# Patient Record
Sex: MALE | Race: WHITE | NOT HISPANIC OR LATINO | Employment: OTHER | URBAN - METROPOLITAN AREA
[De-identification: names, ages, dates, MRNs, and addresses within clinical notes are randomized per-mention and may not be internally consistent; named-entity substitution may affect disease eponyms.]

---

## 2017-01-06 ENCOUNTER — OFFICE VISIT (OUTPATIENT)
Dept: FAMILY MEDICINE CLINIC | Facility: CLINIC | Age: 77
End: 2017-01-06

## 2017-01-06 VITALS
OXYGEN SATURATION: 97 % | SYSTOLIC BLOOD PRESSURE: 134 MMHG | BODY MASS INDEX: 31.84 KG/M2 | HEIGHT: 67 IN | DIASTOLIC BLOOD PRESSURE: 74 MMHG | HEART RATE: 67 BPM | WEIGHT: 202.9 LBS

## 2017-01-06 DIAGNOSIS — J43.9 PULMONARY EMPHYSEMA, UNSPECIFIED EMPHYSEMA TYPE (HCC): Primary | ICD-10-CM

## 2017-01-06 DIAGNOSIS — J30.89 PERENNIAL ALLERGIC RHINITIS, UNSPECIFIED ALLERGIC RHINITIS TRIGGER: ICD-10-CM

## 2017-01-06 PROCEDURE — 94010 BREATHING CAPACITY TEST: CPT | Performed by: INTERNAL MEDICINE

## 2017-01-06 PROCEDURE — 99213 OFFICE O/P EST LOW 20 MIN: CPT | Performed by: INTERNAL MEDICINE

## 2017-01-06 RX ORDER — UBIDECARENONE 100 MG
100 CAPSULE ORAL DAILY
COMMUNITY
End: 2021-05-14

## 2017-01-06 RX ORDER — MONTELUKAST SODIUM 10 MG/1
10 TABLET ORAL NIGHTLY
Qty: 30 TABLET | Refills: 6 | Status: SHIPPED | OUTPATIENT
Start: 2017-01-06 | End: 2017-05-01 | Stop reason: SDUPTHER

## 2017-03-16 RX ORDER — ALBUTEROL SULFATE 2.5 MG/3ML
SOLUTION RESPIRATORY (INHALATION)
Qty: 180 ML | Refills: 1 | Status: SHIPPED | OUTPATIENT
Start: 2017-03-16 | End: 2018-05-21 | Stop reason: SDUPTHER

## 2017-03-17 RX ORDER — ALBUTEROL SULFATE 2.5 MG/3ML
SOLUTION RESPIRATORY (INHALATION)
Qty: 180 ML | OUTPATIENT
Start: 2017-03-17

## 2017-04-06 ENCOUNTER — OFFICE VISIT (OUTPATIENT)
Dept: FAMILY MEDICINE CLINIC | Facility: CLINIC | Age: 77
End: 2017-04-06

## 2017-04-06 VITALS
WEIGHT: 205.1 LBS | BODY MASS INDEX: 32.19 KG/M2 | OXYGEN SATURATION: 96 % | DIASTOLIC BLOOD PRESSURE: 76 MMHG | SYSTOLIC BLOOD PRESSURE: 140 MMHG | HEART RATE: 71 BPM | HEIGHT: 67 IN

## 2017-04-06 DIAGNOSIS — I10 ESSENTIAL HYPERTENSION: ICD-10-CM

## 2017-04-06 DIAGNOSIS — J30.89 PERENNIAL ALLERGIC RHINITIS, UNSPECIFIED ALLERGIC RHINITIS TRIGGER: ICD-10-CM

## 2017-04-06 DIAGNOSIS — J43.9 PULMONARY EMPHYSEMA, UNSPECIFIED EMPHYSEMA TYPE (HCC): Primary | ICD-10-CM

## 2017-04-06 PROCEDURE — 99213 OFFICE O/P EST LOW 20 MIN: CPT | Performed by: INTERNAL MEDICINE

## 2017-04-06 NOTE — PROGRESS NOTES
Subjective   Bayron Acevedo is a 76 y.o. male who presents today for:    COPD (3 month f/u)    History of Present Illness   He was struggling with persistent/recurrent upper respiratory infections last year when we diagnosed him with COPD and started him on Symbicort. Since then, he feels like he can walk to the back of his 2 acre place to feed his cats and his chickens, and he has been able to trim up his fruit trees this winter with less shortness of breath than usual. He still uses his nebulizer once or twice a week for wheezing. Often, he feels he can clear his head of some clear drainage (probably due to allergies) and get the same benefit. He does have the head congestion and drainage every morning.    We started Singulair in January to see if that would help with his allergy symptoms.  He is not coughing as much in the mornings, but   he does not think it is the Singulair that has made a difference.  He has noticed an improvement in his breathing with the LABA/ICS, though.    Mr. Acevedo  reports that he has quit smoking. His smoking use included Cigarettes. He has a 55.00 pack-year smoking history. He has never used smokeless tobacco. He reports that he does not drink alcohol or use illicit drugs.       Current Outpatient Prescriptions:   •  albuterol (PROVENTIL) (2.5 MG/3ML) 0.083% nebulizer solution, INHALE ONE VIAL VIA NEBULIZER EVERY 4 HOURS AS NEEDED FOR FOR WHEEZING, Disp: 180 mL, Rfl: 1  •  aspirin 81 MG tablet, Take 81 mg by mouth daily., Disp: , Rfl:   •  budesonide-formoterol (SYMBICORT) 160-4.5 MCG/ACT inhaler, Inhale 2 puffs 2 (two) times a day. Rinse mouth after each use., Disp: 1 inhaler, Rfl: 12  •  carvedilol (COREG) 6.25 MG tablet, Take 1 tablet by mouth 2 (two) times a day., Disp: , Rfl: 1  •  coenzyme Q10 100 MG capsule, Take 100 mg by mouth Daily., Disp: , Rfl:   •  furosemide (LASIX) 20 MG tablet, Take 1 tablet by mouth daily., Disp: , Rfl: 1  •  isosorbide mononitrate (IMDUR) 60 MG 24 hr  "tablet, Take 1 tablet by mouth daily., Disp: , Rfl: 1  •  lisinopril (PRINIVIL,ZESTRIL) 20 MG tablet, Take 1 tablet by mouth daily., Disp: , Rfl: 5  •  montelukast (SINGULAIR) 10 MG tablet, Take 1 tablet by mouth Every Night., Disp: 30 tablet, Rfl: 6  •  nitroglycerin (NITROSTAT) 0.4 MG SL tablet, Place 1 tablet under the tongue every 5 (five) minutes as needed for chest pain. Take no more than 3 doses in 15 minutes., Disp: 25 tablet, Rfl: 1      •  niacin (NIASPAN) 500 MG CR tablet, Take 1 tablet by mouth daily., Disp: , Rfl:   •  Omega 3-6-9 capsule, Take 1 capsule by mouth daily., Disp: , Rfl:   •  pravastatin (PRAVACHOL) 20 MG tablet, TAKE ONE TABLET BY MOUTH EVERY DAY, Disp: 90 tablet, Rfl: 3  Off pravastatin x 1 week, per cardiologist's recommendation. Pt has stopped his Omega-3 and Niacin supplements.    The following portions of the patient's history were reviewed and updated as appropriate: allergies, current medications, past social history and problem list.    Review of Systems   HENT: Positive for congestion.    Respiratory: Positive for cough, shortness of breath and wheezing.    Cardiovascular: Negative for chest pain, palpitations and leg swelling.   Musculoskeletal:        Muscle cramps better off the statin.   Allergic/Immunologic: Positive for environmental allergies.         Objective   Vitals:    04/06/17 1019 04/06/17 1048   BP: 166/86 140/76   BP Location: Right arm    Patient Position: Sitting    Cuff Size: Adult    Pulse: 71    SpO2: 96%    Weight: 205 lb 1.6 oz (93 kg)    Height: 67\" (170.2 cm)      Physical Exam  Well-developed, well-nourished male, in no acute distress.  No cervical lymphadenopathy, thyromegaly or mass.  Regular rate and rhythm without murmur.  Clear to auscultation bilaterally with slightly diminished breath sounds at both bases.  No wheezing or crackles appreciated today.  He has normal respiratory effort.  No lower extremity edema.    Assessment/Plan   Bayron was seen " today for copd.    Diagnoses and all orders for this visit:    Pulmonary emphysema, unspecified emphysema type    Perennial allergic rhinitis, unspecified allergic rhinitis trigger    Essential hypertension    Blood pressure is doing well, so we will not change his medicines.  Dr. Simpson will be providing guidance regarding the cholesterol lowering medication.  With the patient's history of a stent and a coronary artery, he should be on a statin if at all possible.    Already function seems to be doing better.  He was able to get over an episode of bronchitis without any additional interventions.  He will continue the Symbicort indefinitely.  He will try stopping the Singulair to see if that has made a difference.  If his head congestion or runny nose or cough worsen after stopping it he'll get back on it and let us know.

## 2017-04-17 ENCOUNTER — OFFICE VISIT (OUTPATIENT)
Dept: FAMILY MEDICINE CLINIC | Facility: CLINIC | Age: 77
End: 2017-04-17

## 2017-04-17 VITALS
HEIGHT: 67 IN | WEIGHT: 204.4 LBS | OXYGEN SATURATION: 92 % | SYSTOLIC BLOOD PRESSURE: 154 MMHG | BODY MASS INDEX: 32.08 KG/M2 | TEMPERATURE: 98.5 F | DIASTOLIC BLOOD PRESSURE: 88 MMHG | HEART RATE: 73 BPM

## 2017-04-17 DIAGNOSIS — J20.9 ACUTE BRONCHITIS, UNSPECIFIED ORGANISM: Primary | ICD-10-CM

## 2017-04-17 PROCEDURE — 99213 OFFICE O/P EST LOW 20 MIN: CPT | Performed by: NURSE PRACTITIONER

## 2017-04-17 RX ORDER — AZITHROMYCIN 500 MG/1
500 TABLET, FILM COATED ORAL DAILY
Qty: 3 TABLET | Refills: 0 | Status: SHIPPED | OUTPATIENT
Start: 2017-04-17 | End: 2017-04-20

## 2017-04-17 RX ORDER — PREDNISONE 20 MG/1
20 TABLET ORAL 2 TIMES DAILY
Qty: 10 TABLET | Refills: 0 | Status: SHIPPED | OUTPATIENT
Start: 2017-04-17 | End: 2017-04-22

## 2017-04-17 NOTE — PROGRESS NOTES
Subjective   Bayron Acevedo is a 76 y.o. male who presents today for:    Cough (x 2-3 days) and Wheezing    URI    This is a recurrent problem. The current episode started 1 to 4 weeks ago. The problem has been waxing and waning. There has been no fever. Associated symptoms include chest pain, congestion, coughing, rhinorrhea, sneezing and wheezing. Pertinent negatives include no ear pain, headaches, plugged ear sensation, sinus pain or sore throat. He has tried antihistamine and inhaler use for the symptoms. The treatment provided mild relief.      I have reviewed the patient's medical history in detail and updated the computerized patient record.      Mr. Acevedo  reports that he has quit smoking. His smoking use included Cigarettes. He has a 55.00 pack-year smoking history. He has never used smokeless tobacco. He reports that he does not drink alcohol or use illicit drugs.         Current Outpatient Prescriptions:   •  albuterol (PROVENTIL) (2.5 MG/3ML) 0.083% nebulizer solution, INHALE ONE VIAL VIA NEBULIZER EVERY 4 HOURS AS NEEDED FOR FOR WHEEZING, Disp: 180 mL, Rfl: 1  •  aspirin 81 MG tablet, Take 81 mg by mouth daily., Disp: , Rfl:   •  budesonide-formoterol (SYMBICORT) 160-4.5 MCG/ACT inhaler, Inhale 2 puffs 2 (two) times a day. Rinse mouth after each use., Disp: 1 inhaler, Rfl: 12  •  carvedilol (COREG) 6.25 MG tablet, Take 1 tablet by mouth 2 (two) times a day., Disp: , Rfl: 1  •  coenzyme Q10 100 MG capsule, Take 100 mg by mouth Daily., Disp: , Rfl:   •  furosemide (LASIX) 20 MG tablet, Take 1 tablet by mouth daily., Disp: , Rfl: 1  •  isosorbide mononitrate (IMDUR) 60 MG 24 hr tablet, Take 1 tablet by mouth daily., Disp: , Rfl: 1  •  lisinopril (PRINIVIL,ZESTRIL) 20 MG tablet, Take 1 tablet by mouth daily., Disp: , Rfl: 5  •  montelukast (SINGULAIR) 10 MG tablet, Take 1 tablet by mouth Every Night., Disp: 30 tablet, Rfl: 6  •  nitroglycerin (NITROSTAT) 0.4 MG SL tablet, Place 1 tablet under the tongue every  "5 (five) minutes as needed for chest pain. Take no more than 3 doses in 15 minutes., Disp: 25 tablet, Rfl: 1  •  pravastatin (PRAVACHOL) 20 MG tablet, TAKE ONE TABLET BY MOUTH EVERY DAY, Disp: 90 tablet, Rfl: 3      The following portions of the patient's history were reviewed and updated as appropriate: allergies, current medications, past social history and problem list.    Review of Systems   Constitutional: Positive for fatigue. Negative for appetite change and fever.   HENT: Positive for congestion, postnasal drip, rhinorrhea and sneezing. Negative for ear pain, sinus pressure and sore throat.    Respiratory: Positive for cough, choking, shortness of breath and wheezing. Negative for chest tightness.    Cardiovascular: Positive for chest pain and leg swelling.   Allergic/Immunologic: Positive for environmental allergies.   Neurological: Negative for headaches.   Hematological: Negative for adenopathy.         Objective   Vitals:    04/17/17 1100   BP: 154/88   BP Location: Right arm   Patient Position: Sitting   Cuff Size: Adult   Pulse: 73   Temp: 98.5 °F (36.9 °C)   TempSrc: Oral   SpO2: 92%   Weight: 204 lb 6.4 oz (92.7 kg)   Height: 67\" (170.2 cm)     Physical Exam   Constitutional: He is oriented to person, place, and time. He appears well-developed and well-nourished.   HENT:   Head: Normocephalic.   Right Ear: Tympanic membrane normal.   Left Ear: Tympanic membrane normal.   Nose: Rhinorrhea present. Right sinus exhibits no maxillary sinus tenderness and no frontal sinus tenderness. Left sinus exhibits no maxillary sinus tenderness and no frontal sinus tenderness.   Mouth/Throat: Oropharynx is clear and moist. No posterior oropharyngeal erythema.   Eyes: Conjunctivae and EOM are normal.   Neck: Normal range of motion. Neck supple. No JVD present.   Cardiovascular: Normal rate, regular rhythm and normal heart sounds.  Exam reveals no gallop and no friction rub.    No murmur heard.  Pulmonary/Chest: Effort " normal. He has wheezes (expiratory wheezes throughout).   Lung sounds are diminished on expiration.   Musculoskeletal: He exhibits no edema.   Lymphadenopathy:     He has no cervical adenopathy.   Neurological: He is alert and oriented to person, place, and time.   Skin: Skin is warm and dry.   Psychiatric: He has a normal mood and affect. His behavior is normal.   Vitals reviewed.        Assessment/Plan   Bayron was seen today for cough and wheezing.    Diagnoses and all orders for this visit:    Acute bronchitis, unspecified organism  -     predniSONE (DELTASONE) 20 MG tablet; Take 1 tablet by mouth 2 (Two) Times a Day for 5 days.  -     azithromycin (ZITHROMAX) 500 MG tablet; Take 1 tablet by mouth Daily for 3 days.    1.  Bronchitis.  Patient with diminished and expiratory wheezing on auscultation. Will start patient on prednisone and Zithromax as both have helped patient in the past.    2.  Patient to follow up at next scheduled appointment or sooner if symptoms persist or worsen.

## 2017-05-02 RX ORDER — MONTELUKAST SODIUM 10 MG/1
TABLET ORAL
Qty: 30 TABLET | Refills: 12 | Status: SHIPPED | OUTPATIENT
Start: 2017-05-02 | End: 2018-08-09

## 2017-06-27 RX ORDER — PRAVASTATIN SODIUM 20 MG
TABLET ORAL
Qty: 90 TABLET | Refills: 3 | Status: SHIPPED | OUTPATIENT
Start: 2017-06-27 | End: 2018-03-26 | Stop reason: SDUPTHER

## 2017-07-25 DIAGNOSIS — I10 ESSENTIAL HYPERTENSION: Primary | ICD-10-CM

## 2017-07-25 DIAGNOSIS — E78.5 HYPERLIPIDEMIA, UNSPECIFIED HYPERLIPIDEMIA TYPE: ICD-10-CM

## 2017-07-30 ENCOUNTER — RESULTS ENCOUNTER (OUTPATIENT)
Dept: FAMILY MEDICINE CLINIC | Facility: CLINIC | Age: 77
End: 2017-07-30

## 2017-07-30 DIAGNOSIS — I10 ESSENTIAL HYPERTENSION: ICD-10-CM

## 2017-07-30 DIAGNOSIS — E78.5 HYPERLIPIDEMIA, UNSPECIFIED HYPERLIPIDEMIA TYPE: ICD-10-CM

## 2017-07-31 LAB
ALBUMIN SERPL-MCNC: 4.3 G/DL (ref 3.5–5.2)
ALBUMIN/GLOB SERPL: 1.8 G/DL
ALP SERPL-CCNC: 41 U/L (ref 39–117)
ALT SERPL-CCNC: 18 U/L (ref 1–41)
AST SERPL-CCNC: 24 U/L (ref 1–40)
BILIRUB SERPL-MCNC: 0.7 MG/DL (ref 0.1–1.2)
BUN SERPL-MCNC: 23 MG/DL (ref 8–23)
BUN/CREAT SERPL: 18.7 (ref 7–25)
CALCIUM SERPL-MCNC: 9.4 MG/DL (ref 8.6–10.5)
CHLORIDE SERPL-SCNC: 100 MMOL/L (ref 98–107)
CHOLEST SERPL-MCNC: 183 MG/DL (ref 0–200)
CO2 SERPL-SCNC: 26.7 MMOL/L (ref 22–29)
CREAT SERPL-MCNC: 1.23 MG/DL (ref 0.76–1.27)
GLOBULIN SER CALC-MCNC: 2.4 GM/DL
GLUCOSE SERPL-MCNC: 106 MG/DL (ref 65–99)
HDLC SERPL-MCNC: 43 MG/DL (ref 40–60)
LDLC SERPL CALC-MCNC: 109 MG/DL (ref 0–100)
POTASSIUM SERPL-SCNC: 4.4 MMOL/L (ref 3.5–5.2)
PROT SERPL-MCNC: 6.7 G/DL (ref 6–8.5)
SODIUM SERPL-SCNC: 140 MMOL/L (ref 136–145)
TRIGL SERPL-MCNC: 157 MG/DL (ref 0–150)
VLDLC SERPL CALC-MCNC: 31.4 MG/DL (ref 5–40)

## 2017-10-05 RX ORDER — ALBUTEROL SULFATE 2.5 MG/3ML
SOLUTION RESPIRATORY (INHALATION)
Qty: 180 ML | OUTPATIENT
Start: 2017-10-05

## 2018-03-26 RX ORDER — PRAVASTATIN SODIUM 20 MG
TABLET ORAL
Qty: 90 TABLET | Refills: 3 | Status: SHIPPED | OUTPATIENT
Start: 2018-03-26 | End: 2019-06-17 | Stop reason: SDUPTHER

## 2018-05-21 ENCOUNTER — OFFICE VISIT (OUTPATIENT)
Dept: FAMILY MEDICINE CLINIC | Facility: CLINIC | Age: 78
End: 2018-05-21

## 2018-05-21 VITALS
OXYGEN SATURATION: 94 % | WEIGHT: 208.5 LBS | BODY MASS INDEX: 32.72 KG/M2 | SYSTOLIC BLOOD PRESSURE: 162 MMHG | HEART RATE: 68 BPM | DIASTOLIC BLOOD PRESSURE: 64 MMHG | HEIGHT: 67 IN
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
## 2018-05-21 PROCEDURE — 99212 OFFICE O/P EST SF 10 MIN: CPT | Performed by: INTERNAL MEDICINE

## 2018-05-21 RX ORDER — DOXYCYCLINE 100 MG/1
100 CAPSULE ORAL EVERY 12 HOURS SCHEDULED
Qty: 20 CAPSULE | Refills: 0 | Status: SHIPPED | OUTPATIENT
Start: 2018-05-21 | End: 2018-08-09

## 2018-05-21 RX ORDER — FEXOFENADINE HCL 180 MG/1
180 TABLET ORAL
COMMUNITY
End: 2018-05-21 | Stop reason: ALTCHOICE

## 2018-05-21 RX ORDER — ALBUTEROL SULFATE 90 UG/1
AEROSOL, METERED RESPIRATORY (INHALATION)
COMMUNITY
End: 2019-03-18

## 2018-05-21 RX ORDER — ALBUTEROL SULFATE 2.5 MG/3ML
2.5 SOLUTION RESPIRATORY (INHALATION) EVERY 6 HOURS PRN
Qty: 180 ML | Refills: 1 | Status: SHIPPED | OUTPATIENT
Start: 2018-05-21 | End: 2018-09-05 | Stop reason: SDUPTHER

## 2018-05-21 NOTE — PROGRESS NOTES
Tick Removal (Right foot and right inner thigh)    2 tick bites about 4 days ago, with one present for up to 2-3 days.  Neither was engorged, but both sites were red.  Right inner thigh site has remained erythematous; right ankle site is mildly erythematous.    ROS:   No myalgias or arthralgias.  No fever or chills.    Allergies   Allergen Reactions   • Codeine Sulfate Hives   • Penicillins Other (See Comments)     Passed out after a PCN shot- no other sx noted-per pateint     Current Outpatient Prescriptions:   •  albuterol (PROVENTIL) (2.5 MG/3ML) 0.083% nebulizer solution, INHALE ONE VIAL VIA NEBULIZER EVERY 4 HOURS AS NEEDED FOR FOR WHEEZING, Disp: 180 mL, Rfl: 1  •  albuterol (VENTOLIN HFA) 108 (90 Base) MCG/ACT inhaler, Inhale., Disp: , Rfl:   •  aspirin 81 MG tablet, Take 81 mg by mouth daily., Disp: , Rfl:   •  budesonide-formoterol (SYMBICORT) 160-4.5 MCG/ACT inhaler, Inhale 2 puffs 2 (two) times a day. Rinse mouth after each use., Disp: 1 inhaler, Rfl: 12  •  carvedilol (COREG) 6.25 MG tablet, Take 1 tablet by mouth 2 (two) times a day., Disp: , Rfl: 1  •  coenzyme Q10 100 MG capsule, Take 100 mg by mouth Daily., Disp: , Rfl:   •  furosemide (LASIX) 20 MG tablet, Take 1 tablet by mouth daily., Disp: , Rfl: 1  •  isosorbide mononitrate (IMDUR) 60 MG 24 hr tablet, Take 1 tablet by mouth daily., Disp: , Rfl: 1  •  lisinopril (PRINIVIL,ZESTRIL) 20 MG tablet, Take 1 tablet by mouth daily., Disp: , Rfl: 5  •  mometasone-formoterol (DULERA 100) 100-5 MCG/ACT inhaler, Inhale 2 puffs., Disp: , Rfl:   •  montelukast (SINGULAIR) 10 MG tablet, TAKE ONE TABLET BY MOUTH EVERY NIGHT, Disp: 30 tablet, Rfl: 12  •  nitroglycerin (NITROSTAT) 0.4 MG SL tablet, Place 1 tablet under the tongue every 5 (five) minutes as needed for chest pain. Take no more than 3 doses in 15 minutes., Disp: 25 tablet, Rfl: 1  •  pravastatin (PRAVACHOL) 20 MG tablet, TAKE ONE TABLET BY MOUTH EVERY DAY, Disp: 90 tablet, Rfl: 3.    /64 (BP  "Location: Left arm, Patient Position: Sitting, Cuff Size: Large Adult)   Pulse 68   Ht 170.2 cm (67\")   Wt 94.6 kg (208 lb 8 oz)   SpO2 94%   BMI 32.66 kg/m²     EXAM:  Right posteromedial thigh: Moderate erythema surrounding a very small, punctate lesion.  No significant tenderness.  Anterolateral right lower leg, just above the ankle shows a 2 mm diameter ulceration with 1 cm x 1.5 cm area of erythema.  No tenderness.        Bayron was seen today for tick removal.    Diagnoses and all orders for this visit:    Tick bite, initial encounter    Other orders  -     albuterol (PROVENTIL) (2.5 MG/3ML) 0.083% nebulizer solution;     The tick presented by the patient is extremely small, consistent with a deer tick.  Therefore, not knowing how long was in place, we will treat him with doxycycline.    "

## 2018-05-22 RX ORDER — ALBUTEROL SULFATE 2.5 MG/3ML
SOLUTION RESPIRATORY (INHALATION)
Qty: 180 ML | OUTPATIENT
Start: 2018-05-22

## 2018-07-25 DIAGNOSIS — E78.5 HYPERLIPIDEMIA, UNSPECIFIED HYPERLIPIDEMIA TYPE: ICD-10-CM

## 2018-07-25 DIAGNOSIS — Z12.5 SCREENING PSA (PROSTATE SPECIFIC ANTIGEN): ICD-10-CM

## 2018-07-25 DIAGNOSIS — I10 ESSENTIAL HYPERTENSION: ICD-10-CM

## 2018-07-25 DIAGNOSIS — Z00.00 MEDICARE ANNUAL WELLNESS VISIT, SUBSEQUENT: Primary | ICD-10-CM

## 2018-07-29 ENCOUNTER — RESULTS ENCOUNTER (OUTPATIENT)
Dept: FAMILY MEDICINE CLINIC | Facility: CLINIC | Age: 78
End: 2018-07-29

## 2018-07-29 DIAGNOSIS — Z00.00 MEDICARE ANNUAL WELLNESS VISIT, SUBSEQUENT: ICD-10-CM

## 2018-07-29 DIAGNOSIS — E78.5 HYPERLIPIDEMIA, UNSPECIFIED HYPERLIPIDEMIA TYPE: ICD-10-CM

## 2018-07-29 DIAGNOSIS — Z12.5 SCREENING PSA (PROSTATE SPECIFIC ANTIGEN): ICD-10-CM

## 2018-07-29 DIAGNOSIS — I10 ESSENTIAL HYPERTENSION: ICD-10-CM

## 2018-08-02 LAB
ALBUMIN SERPL-MCNC: 4.6 G/DL (ref 3.5–5.2)
ALBUMIN/GLOB SERPL: 2.4 G/DL
ALP SERPL-CCNC: 42 U/L (ref 39–117)
ALT SERPL-CCNC: 17 U/L (ref 1–41)
AST SERPL-CCNC: 11 U/L (ref 1–40)
BILIRUB SERPL-MCNC: 0.6 MG/DL (ref 0.1–1.2)
BUN SERPL-MCNC: 17 MG/DL (ref 8–23)
BUN/CREAT SERPL: 13.3 (ref 7–25)
CALCIUM SERPL-MCNC: 8.8 MG/DL (ref 8.6–10.5)
CHLORIDE SERPL-SCNC: 100 MMOL/L (ref 98–107)
CHOLEST SERPL-MCNC: 179 MG/DL (ref 0–200)
CO2 SERPL-SCNC: 28.3 MMOL/L (ref 22–29)
CREAT SERPL-MCNC: 1.28 MG/DL (ref 0.76–1.27)
ERYTHROCYTE [DISTWIDTH] IN BLOOD BY AUTOMATED COUNT: 13.9 % (ref 11.5–14.5)
GLOBULIN SER CALC-MCNC: 1.9 GM/DL
GLUCOSE SERPL-MCNC: 99 MG/DL (ref 65–99)
HCT VFR BLD AUTO: 43.9 % (ref 40.4–52.2)
HDLC SERPL-MCNC: 43 MG/DL (ref 40–60)
HGB BLD-MCNC: 14 G/DL (ref 13.7–17.6)
LDLC SERPL CALC-MCNC: 112 MG/DL (ref 0–100)
MCH RBC QN AUTO: 29.5 PG (ref 27–32.7)
MCHC RBC AUTO-ENTMCNC: 31.9 G/DL (ref 32.6–36.4)
MCV RBC AUTO: 92.6 FL (ref 79.8–96.2)
PLATELET # BLD AUTO: 156 10*3/MM3 (ref 140–500)
POTASSIUM SERPL-SCNC: 4.6 MMOL/L (ref 3.5–5.2)
PROT SERPL-MCNC: 6.5 G/DL (ref 6–8.5)
PSA SERPL-MCNC: 0.94 NG/ML (ref 0–4)
RBC # BLD AUTO: 4.74 10*6/MM3 (ref 4.6–6)
SODIUM SERPL-SCNC: 139 MMOL/L (ref 136–145)
TRIGL SERPL-MCNC: 122 MG/DL (ref 0–150)
VLDLC SERPL CALC-MCNC: 24.4 MG/DL (ref 5–40)
WBC # BLD AUTO: 5.06 10*3/MM3 (ref 4.5–10.7)

## 2018-08-09 ENCOUNTER — OFFICE VISIT (OUTPATIENT)
Dept: FAMILY MEDICINE CLINIC | Facility: CLINIC | Age: 78
End: 2018-08-09

## 2018-08-09 VITALS
HEIGHT: 67 IN | DIASTOLIC BLOOD PRESSURE: 74 MMHG | OXYGEN SATURATION: 98 % | WEIGHT: 204 LBS | SYSTOLIC BLOOD PRESSURE: 180 MMHG | BODY MASS INDEX: 32.02 KG/M2 | HEART RATE: 58 BPM

## 2018-08-09 DIAGNOSIS — Z00.00 ROUTINE GENERAL MEDICAL EXAMINATION AT HEALTH CARE FACILITY: Primary | ICD-10-CM

## 2018-08-09 PROCEDURE — G0439 PPPS, SUBSEQ VISIT: HCPCS | Performed by: INTERNAL MEDICINE

## 2018-08-09 RX ORDER — LORATADINE 10 MG/1
10 TABLET, ORALLY DISINTEGRATING ORAL DAILY PRN
COMMUNITY

## 2018-09-05 RX ORDER — ALBUTEROL SULFATE 2.5 MG/3ML
SOLUTION RESPIRATORY (INHALATION)
Qty: 180 ML | Refills: 0 | Status: SHIPPED | OUTPATIENT
Start: 2018-09-05 | End: 2019-03-18

## 2018-09-10 ENCOUNTER — OFFICE VISIT (OUTPATIENT)
Dept: FAMILY MEDICINE CLINIC | Facility: CLINIC | Age: 78
End: 2018-09-10

## 2018-09-10 VITALS
HEIGHT: 67 IN | WEIGHT: 200.9 LBS | OXYGEN SATURATION: 98 % | HEART RATE: 59 BPM | BODY MASS INDEX: 31.53 KG/M2 | SYSTOLIC BLOOD PRESSURE: 136 MMHG | DIASTOLIC BLOOD PRESSURE: 76 MMHG

## 2018-09-10 DIAGNOSIS — I65.29 STENOSIS OF CAROTID ARTERY, UNSPECIFIED LATERALITY: ICD-10-CM

## 2018-09-10 DIAGNOSIS — E78.5 HYPERLIPIDEMIA, UNSPECIFIED HYPERLIPIDEMIA TYPE: ICD-10-CM

## 2018-09-10 DIAGNOSIS — N52.9 ERECTILE DYSFUNCTION, UNSPECIFIED ERECTILE DYSFUNCTION TYPE: ICD-10-CM

## 2018-09-10 DIAGNOSIS — I10 ESSENTIAL HYPERTENSION: Primary | ICD-10-CM

## 2018-09-10 DIAGNOSIS — I25.10 CAD IN NATIVE ARTERY: ICD-10-CM

## 2018-09-10 PROCEDURE — 99214 OFFICE O/P EST MOD 30 MIN: CPT | Performed by: INTERNAL MEDICINE

## 2018-09-10 NOTE — PROGRESS NOTES
Subjective   Bayron Acevedo is a 78 y.o. male who presents today for:    Heart Follow-up and Heartburn    History of Present Illness   He has chronic, benign, essential hypertension treated with furosemide, lisinopril, and carvedilol.    He is also on the below regimen because of coronary artery disease.  He also takes pravastatin for this.  He denies any angina.  We stopped his Imdur at his Medicare wellness visit 8/9/18 in anticipation of trying medication for erectile dysfunction.    He has carotid artery disease and peripheral artery disease followed by Dr. Karen Barrera Jr. he is to have claudication, but that has improved with continued exercise.  He feels more limited by his shortness of breath.    He presents today complaining of heartburn that occurred overnight (0200).  He describes a focal SSCP.  He sat up for a minute and the pain resolved.    At his wellness visit last month, he was complaining of erectile dysfunction and inquiring about Viagra.  Because he was on a long-acting nitrate, we told him it was contraindicated.  We then stopped the Imdur.  Since then, he has had no flare and chest pain.     Mr. Acevedo  reports that he has quit smoking. His smoking use included Cigarettes. He has a 55.00 pack-year smoking history. He has never used smokeless tobacco. He reports that he does not drink alcohol or use drugs.     Allergies   Allergen Reactions   • Codeine Sulfate Hives   • Penicillins Other (See Comments)     Passed out after a PCN shot- no other sx noted-per pateint       Current Outpatient Prescriptions:   •  albuterol (PROVENTIL) (2.5 MG/3ML) 0.083% nebulizer solution, USE ONE VIAL EVERY SIX HOURS AS NEEDED FOR FOR WHEEZING OR SHORTNESS OF AIR, Disp: 180 mL, Rfl: 0  •  albuterol (VENTOLIN HFA) 108 (90 Base) MCG/ACT inhaler, Inhale., Disp: , Rfl:   •  aspirin 81 MG tablet, Take 81 mg by mouth daily., Disp: , Rfl:   •  carvedilol (COREG) 6.25 MG tablet, Take 1 tablet by mouth 2 (two) times a day.,  "Disp: , Rfl: 1  •  coenzyme Q10 100 MG capsule, Take 100 mg by mouth Daily., Disp: , Rfl:   •  furosemide (LASIX) 20 MG tablet, Take 1 tablet by mouth daily., Disp: , Rfl: 1  •  isosorbide mononitrate (IMDUR) 60 MG 24 hr tablet, Take 1 tablet by mouth daily., Disp: , Rfl: 1  •  lisinopril (PRINIVIL,ZESTRIL) 20 MG tablet, Take 1 tablet by mouth daily., Disp: , Rfl: 5  •  loratadine (ALLERGY RELIEF) 10 MG disintegrating tablet, Take 10 mg by mouth Daily As Needed for Allergies., Disp: , Rfl:   •  nitroglycerin (NITROSTAT) 0.4 MG SL tablet, Place 1 tablet under the tongue every 5 (five) minutes as needed for chest pain. Take no more than 3 doses in 15 minutes., Disp: 25 tablet, Rfl: 1  •  pravastatin (PRAVACHOL) 20 MG tablet, TAKE ONE TABLET BY MOUTH EVERY DAY, Disp: 90 tablet, Rfl: 3      Review of Systems   Constitutional: Negative for diaphoresis.   HENT: Negative for trouble swallowing.    Eyes: Negative for visual disturbance.   Respiratory: Positive for cough and shortness of breath. Negative for chest tightness and wheezing.    Cardiovascular: Negative for chest pain, palpitations and leg swelling.   Gastrointestinal: Negative for abdominal pain.        Heartburn   Endocrine: Negative for cold intolerance and heat intolerance.   Genitourinary: Negative for difficulty urinating.   Musculoskeletal: Positive for back pain (chronic). Negative for myalgias.   Neurological: Negative for dizziness, syncope, numbness and headaches.   Hematological: Does not bruise/bleed easily.   Psychiatric/Behavioral: The patient is nervous/anxious.    He has trouble obtaining an erection.        Objective   Vitals:    09/10/18 0942   BP: 138/92   BP Location: Left arm   Patient Position: Sitting   Cuff Size: Adult   Pulse: 59   SpO2: 98%   Weight: 91.1 kg (200 lb 14.4 oz)   Height: 170.2 cm (67\")     Physical Exam    Elderly female in no acute distress.  He is mildly obese.  Sclerae anicteric and the conjunctivae were pink.  No " thyromegaly or mass.  No carotid bruit noted.  Regular rate and rhythm.  No murmur appreciated.  No S3 or S4.  No abdominal bruit.  Bowel sounds are normoactive.  No abdominal tenderness.  Assessment for hepatosplenomegaly and mass is difficult due to his body habitus.  No lower extremity edema.  Station, gait, and coordination are normal.  Alert 92×4 and answers all questions appropriately.        Bayron was seen today for heart follow-up and heartburn.    Diagnoses and all orders for this visit:    Essential hypertension    Hyperlipidemia, unspecified hyperlipidemia type    CAD in native artery    Stenosis of carotid artery, unspecified laterality    Erectile dysfunction, unspecified erectile dysfunction type    Overall, he seems to be doing fairly well.  LDL is not quite at goal on the pravastatin only, please tolerates this medication.  Blood pressure is adequately controlled.  The brief episode of substernal chest discomfort that he had overnight after eating a snack before bed last night is not likely to be cardiac in etiology since he is able to exert himself fairly vigorously without chest discomfort.  He is due to follow-up with his cardiologist next month.    I discussed with the patient at length the proper use of his erectile dysfunction medication.  Specifically, we discussed the possible side effects, and the need to decrease the dose if those side effects are troubling.  Similarly, the dose may be titrated up if there is no benefit at lower doses.  I have asked the patient to call when he settles on an effective dose.  We discussed the potential interaction with certain drugs.  The patient is aware that the risk of orthostatic hypotension exists with this medicine, even though he does not drink acohol.  Furthermore, the effect may be decreased if it is taken after a large meal.    Viagra 50 mg samples (#2) were given to the patient today.

## 2019-03-06 DIAGNOSIS — E78.5 HYPERLIPIDEMIA, UNSPECIFIED HYPERLIPIDEMIA TYPE: ICD-10-CM

## 2019-03-06 DIAGNOSIS — I10 ESSENTIAL HYPERTENSION: Primary | ICD-10-CM

## 2019-03-10 ENCOUNTER — RESULTS ENCOUNTER (OUTPATIENT)
Dept: FAMILY MEDICINE CLINIC | Facility: CLINIC | Age: 79
End: 2019-03-10

## 2019-03-10 DIAGNOSIS — I10 ESSENTIAL HYPERTENSION: ICD-10-CM

## 2019-03-10 DIAGNOSIS — E78.5 HYPERLIPIDEMIA, UNSPECIFIED HYPERLIPIDEMIA TYPE: ICD-10-CM

## 2019-03-11 LAB
ALBUMIN SERPL-MCNC: 4.4 G/DL (ref 3.5–5.2)
ALBUMIN/GLOB SERPL: 2.2 G/DL
ALP SERPL-CCNC: 42 U/L (ref 39–117)
ALT SERPL-CCNC: 13 U/L (ref 1–41)
AST SERPL-CCNC: 13 U/L (ref 1–40)
BILIRUB SERPL-MCNC: 0.5 MG/DL (ref 0.1–1.2)
BUN SERPL-MCNC: 20 MG/DL (ref 8–23)
BUN/CREAT SERPL: 15.6 (ref 7–25)
CALCIUM SERPL-MCNC: 9.4 MG/DL (ref 8.6–10.5)
CHLORIDE SERPL-SCNC: 98 MMOL/L (ref 98–107)
CHOLEST SERPL-MCNC: 163 MG/DL (ref 0–200)
CO2 SERPL-SCNC: 26.9 MMOL/L (ref 22–29)
CREAT SERPL-MCNC: 1.28 MG/DL (ref 0.76–1.27)
GLOBULIN SER CALC-MCNC: 2 GM/DL
GLUCOSE SERPL-MCNC: 97 MG/DL (ref 65–99)
HDLC SERPL-MCNC: 43 MG/DL (ref 40–60)
LDLC SERPL CALC-MCNC: 98 MG/DL (ref 0–100)
POTASSIUM SERPL-SCNC: 4.5 MMOL/L (ref 3.5–5.2)
PROT SERPL-MCNC: 6.4 G/DL (ref 6–8.5)
SODIUM SERPL-SCNC: 138 MMOL/L (ref 136–145)
TRIGL SERPL-MCNC: 112 MG/DL (ref 0–150)
VLDLC SERPL CALC-MCNC: 22.4 MG/DL (ref 5–40)

## 2019-03-18 ENCOUNTER — OFFICE VISIT (OUTPATIENT)
Dept: FAMILY MEDICINE CLINIC | Facility: CLINIC | Age: 79
End: 2019-03-18

## 2019-03-18 VITALS
OXYGEN SATURATION: 98 % | HEART RATE: 60 BPM | TEMPERATURE: 98.6 F | BODY MASS INDEX: 31.86 KG/M2 | RESPIRATION RATE: 16 BRPM | SYSTOLIC BLOOD PRESSURE: 122 MMHG | HEIGHT: 67 IN | DIASTOLIC BLOOD PRESSURE: 84 MMHG | WEIGHT: 203 LBS

## 2019-03-18 DIAGNOSIS — E78.5 HYPERLIPIDEMIA, UNSPECIFIED HYPERLIPIDEMIA TYPE: Primary | ICD-10-CM

## 2019-03-18 DIAGNOSIS — I10 ESSENTIAL HYPERTENSION: ICD-10-CM

## 2019-03-18 PROCEDURE — 99213 OFFICE O/P EST LOW 20 MIN: CPT | Performed by: NURSE PRACTITIONER

## 2019-03-18 RX ORDER — ALBUTEROL SULFATE 2.5 MG/3ML
2.5 SOLUTION RESPIRATORY (INHALATION) EVERY 4 HOURS PRN
Qty: 180 ML | Refills: 0 | Status: SHIPPED | OUTPATIENT
Start: 2019-03-18 | End: 2019-09-16 | Stop reason: SDUPTHER

## 2019-03-18 NOTE — PROGRESS NOTES
Subjective   Bayron Acevedo is a 78 y.o. male.     Mr. Acevedo presents today to follow up on his blood pressure and lipid levels with lab review. He states he is doing well. He did strain his back a month ago and still has some lower back discomfort.     I have reviewed the patient's medical history in detail and updated the computerized patient record.     The following portions of the patient's history were reviewed and updated as appropriate: allergies, current medications, past family history, past medical history, past social history, past surgical history and problem list.       Current Outpatient Medications:   •  albuterol (PROVENTIL) (2.5 MG/3ML) 0.083% nebulizer solution, USE ONE VIAL EVERY SIX HOURS AS NEEDED FOR FOR WHEEZING OR SHORTNESS OF AIR, Disp: 180 mL, Rfl: 0  •  albuterol (VENTOLIN HFA) 108 (90 Base) MCG/ACT inhaler, Inhale., Disp: , Rfl:   •  aspirin 81 MG tablet, Take 81 mg by mouth daily., Disp: , Rfl:   •  carvedilol (COREG) 6.25 MG tablet, Take 1 tablet by mouth 2 (two) times a day., Disp: , Rfl: 1  •  coenzyme Q10 100 MG capsule, Take 100 mg by mouth Daily., Disp: , Rfl:   •  furosemide (LASIX) 20 MG tablet, Take 1 tablet by mouth daily., Disp: , Rfl: 1  •  lisinopril (PRINIVIL,ZESTRIL) 20 MG tablet, Take 1 tablet by mouth daily., Disp: , Rfl: 5  •  loratadine (ALLERGY RELIEF) 10 MG disintegrating tablet, Take 10 mg by mouth Daily As Needed for Allergies., Disp: , Rfl:   •  nitroglycerin (NITROSTAT) 0.4 MG SL tablet, Place 1 tablet under the tongue every 5 (five) minutes as needed for chest pain. Take no more than 3 doses in 15 minutes., Disp: 25 tablet, Rfl: 1  •  pravastatin (PRAVACHOL) 20 MG tablet, TAKE ONE TABLET BY MOUTH EVERY DAY, Disp: 90 tablet, Rfl: 3    Review of Systems   Constitutional: Negative.    Eyes: Negative.    Respiratory: Negative.    Cardiovascular: Negative.    Musculoskeletal: Positive for back pain (lower back).   Skin: Negative.    Neurological: Negative.      "    Vitals:    03/18/19 0932   BP: 122/84   BP Location: Left arm   Patient Position: Sitting   Cuff Size: Adult   Pulse: 60   Resp: 16   Temp: 98.6 °F (37 °C)   TempSrc: Oral   SpO2: 98%   Weight: 92.1 kg (203 lb)   Height: 170.2 cm (67\")       Objective   Physical Exam   Constitutional: He is oriented to person, place, and time. He appears well-developed and well-nourished.   Cardiovascular: Normal rate, regular rhythm and normal heart sounds.   Pulmonary/Chest: Effort normal and breath sounds normal.   Musculoskeletal: Normal range of motion. He exhibits no edema.   Neurological: He is alert and oriented to person, place, and time.   Skin: Skin is warm and dry.   Psychiatric:   No acute distress   Vitals reviewed.        Assessment/Plan   Bayron was seen today for hypertension and coronary artery disease.    Diagnoses and all orders for this visit:    Hyperlipidemia, unspecified hyperlipidemia type    Essential hypertension    Other orders  -     albuterol (PROVENTIL) (2.5 MG/3ML) 0.083% nebulizer solution; Take 2.5 mg by nebulization Every 4 (Four) Hours As Needed for Wheezing.    1. I have reviewed his labs with him today.  His renal function is unchanged from six months ago. And his lipid levels are normal.  2. He is to continue all medications as prescribed.   3. He is to return in 6 months for his annual medicare wellness.            "

## 2019-06-17 RX ORDER — PRAVASTATIN SODIUM 20 MG
20 TABLET ORAL DAILY
Qty: 90 TABLET | Refills: 1 | Status: SHIPPED | OUTPATIENT
Start: 2019-06-17 | End: 2019-11-19 | Stop reason: SINTOL

## 2019-08-28 ENCOUNTER — OFFICE VISIT (OUTPATIENT)
Dept: FAMILY MEDICINE CLINIC | Facility: CLINIC | Age: 79
End: 2019-08-28

## 2019-08-28 VITALS
BODY MASS INDEX: 31.45 KG/M2 | HEART RATE: 64 BPM | DIASTOLIC BLOOD PRESSURE: 82 MMHG | OXYGEN SATURATION: 98 % | SYSTOLIC BLOOD PRESSURE: 130 MMHG | HEIGHT: 67 IN | TEMPERATURE: 97.8 F | RESPIRATION RATE: 16 BRPM | WEIGHT: 200.4 LBS

## 2019-08-28 DIAGNOSIS — Z00.00 MEDICARE ANNUAL WELLNESS VISIT, SUBSEQUENT: Primary | ICD-10-CM

## 2019-08-28 PROCEDURE — G0439 PPPS, SUBSEQ VISIT: HCPCS | Performed by: NURSE PRACTITIONER

## 2019-08-28 PROCEDURE — 99213 OFFICE O/P EST LOW 20 MIN: CPT | Performed by: NURSE PRACTITIONER

## 2019-08-28 RX ORDER — FLUCONAZOLE 100 MG/1
100 TABLET ORAL DAILY
Qty: 3 TABLET | Refills: 0 | Status: SHIPPED | OUTPATIENT
Start: 2019-08-28 | End: 2019-08-31

## 2019-08-28 RX ORDER — AMLODIPINE BESYLATE 5 MG/1
5 TABLET ORAL DAILY
COMMUNITY
End: 2019-11-19

## 2019-08-28 NOTE — PROGRESS NOTES
Subjective   Bayron Acevedo is a 79 y.o. male.     Chief Complaint   Patient presents with   • Medicare Wellness-subsequent   • Rash     recurring in both axilla     Rash   This is a chronic problem. The current episode started more than 1 year ago. The problem is unchanged. The affected locations include the left axilla and right axilla. The rash is characterized by redness. It is unknown if there was an exposure to a precipitant. (None) Treatments tried: antifungal cream. The treatment provided mild relief.      I have reviewed the patient's medical history in detail and updated the computerized patient record.    The following portions of the patient's history were reviewed and updated as appropriate: allergies, current medications, past family history, past medical history, past social history, past surgical history and problem list.    Current Outpatient Medications on File Prior to Visit   Medication Sig   • albuterol (PROVENTIL) (2.5 MG/3ML) 0.083% nebulizer solution Take 2.5 mg by nebulization Every 4 (Four) Hours As Needed for Wheezing.   • amLODIPine (NORVASC) 5 MG tablet Take 5 mg by mouth Daily.   • carvedilol (COREG) 6.25 MG tablet Take 1 tablet by mouth 2 (two) times a day.   • coenzyme Q10 100 MG capsule Take 100 mg by mouth Daily.   • furosemide (LASIX) 20 MG tablet Take 1 tablet by mouth daily.   • lisinopril (PRINIVIL,ZESTRIL) 20 MG tablet Take 1 tablet by mouth daily.   • loratadine (ALLERGY RELIEF) 10 MG disintegrating tablet Take 10 mg by mouth Daily As Needed for Allergies.   • nitroglycerin (NITROSTAT) 0.4 MG SL tablet Place 1 tablet under the tongue every 5 (five) minutes as needed for chest pain. Take no more than 3 doses in 15 minutes.   • pravastatin (PRAVACHOL) 20 MG tablet Take 1 tablet by mouth Daily.   • [DISCONTINUED] aspirin 81 MG tablet Take 81 mg by mouth daily.     No current facility-administered medications on file prior to visit.      Review of Systems   Constitutional: Negative.     Respiratory: Negative.    Cardiovascular: Negative.    Skin: Positive for rash.   Neurological: Negative.        Objective   Physical Exam   Constitutional: He is oriented to person, place, and time. He appears well-developed and well-nourished.   Neurological: He is alert and oriented to person, place, and time.   Skin: Skin is warm and dry.   Right axilla quarter size reddened area and left axilla less than a dime size reddened area.    Psychiatric:   No acute distress   Vitals reviewed.        Assessment/Plan   Bayron was seen today for medicare wellness-subsequent and rash.    Diagnoses and all orders for this visit:    Medicare annual wellness visit, subsequent    Other orders  -     fluconazole (DIFLUCAN) 100 MG tablet; Take 1 tablet by mouth Daily for 3 days.      Mr. Acevedo presents today for his annual medicare wellness and to have me look at a recurring rash he has in both axilla. The OTC antifungal and hydrocotisone creams are not working as well as he hoped. He is to continue both creams as instructed and start Fluconazole 100 mg x 3 days. He is to follow up as needed and in 1 year for his next medicare wellness exam.

## 2019-08-28 NOTE — PATIENT INSTRUCTIONS
Medicare Wellness  Personal Prevention Plan of Service     Date of Office Visit:  2019  Encounter Provider:  DASHA Nunez  Place of Service:  [facility] INTERNAL MEDICINE  Patient Name: Bayron Acevedo  :  1940    As part of the Medicare Wellness portion of your visit today, we are providing you with this personalized preventive plan of services (PPPS). This plan is based upon recommendations of the [place] Preventive Services Task Force (USPSTF) and the Advisory Committee on Immunization Practices (ACIP).    This lists the preventive care services that should be considered, and provides dates of when you are due. Items listed as completed are up-to-date and do not require any further intervention.    Health Maintenance   Topic Date Due   • TDAP/TD VACCINES (1 - Tdap) 1959   • PNEUMOCOCCAL VACCINES (65+ LOW/MEDIUM RISK) (2 of 2 - PPSV23) 10/06/2017   • MEDICARE ANNUAL WELLNESS  2019   • INFLUENZA VACCINE  2019   • LIPID PANEL  2020   • ZOSTER VACCINE  Discontinued       No orders of the defined types were placed in this encounter.      Return in about 1 year (around 2020) for Medicare Wellness.

## 2019-09-16 RX ORDER — ALBUTEROL SULFATE 2.5 MG/3ML
SOLUTION RESPIRATORY (INHALATION)
Qty: 180 ML | Refills: 0 | Status: SHIPPED | OUTPATIENT
Start: 2019-09-16 | End: 2019-12-16 | Stop reason: SDUPTHER

## 2019-09-19 ENCOUNTER — CLINICAL SUPPORT (OUTPATIENT)
Dept: FAMILY MEDICINE CLINIC | Facility: CLINIC | Age: 79
End: 2019-09-19

## 2019-09-19 DIAGNOSIS — Z23 NEED FOR 23-POLYVALENT PNEUMOCOCCAL POLYSACCHARIDE VACCINE: Primary | ICD-10-CM

## 2019-11-19 ENCOUNTER — OFFICE VISIT (OUTPATIENT)
Dept: FAMILY MEDICINE CLINIC | Facility: CLINIC | Age: 79
End: 2019-11-19

## 2019-11-19 VITALS
TEMPERATURE: 98 F | SYSTOLIC BLOOD PRESSURE: 140 MMHG | WEIGHT: 201.1 LBS | DIASTOLIC BLOOD PRESSURE: 78 MMHG | HEIGHT: 67 IN | BODY MASS INDEX: 31.56 KG/M2 | OXYGEN SATURATION: 98 % | RESPIRATION RATE: 16 BRPM | HEART RATE: 79 BPM

## 2019-11-19 DIAGNOSIS — J06.9 ACUTE URI: Primary | ICD-10-CM

## 2019-11-19 PROCEDURE — 99213 OFFICE O/P EST LOW 20 MIN: CPT | Performed by: NURSE PRACTITIONER

## 2019-11-19 RX ORDER — AMLODIPINE BESYLATE 5 MG/1
5 TABLET ORAL DAILY
COMMUNITY

## 2019-11-19 NOTE — PATIENT INSTRUCTIONS
You have been diagnosed with an upper respiratory infection which is likely viral.  Viruses are not killed by antibiotics and therefore an antibiotic is not prescribed for you today.  Viral illness can last between 3 in 14 days, and symptoms may persist the entire course of the illness.  Symptomatic treatment is best.  You may take Mucinex D for relieving congestion and cough.  If you have high blood pressure, do not take Mucinex D, instead opting for plain Mucinex and Coricidin HBP.  Take Ibuprofen or Tylenol as needed for pain or fever.  Oral antihistamine, such as Zyrtec or Claritin may help reduce ear pressure and relieve some nasal symptoms.  A saline nasal spray may be used to keep nose clear from discharge.  Be sure that you are increasing your intake of clear to decaffeinated fluids and get plenty of rest.  If your symptoms worsen or persist follow up as needed.

## 2019-11-19 NOTE — PROGRESS NOTES
Subjective   Bayron Acevedo is a 79 y.o. male.     Chief Complaint   Patient presents with   • Cough   • URI     URI    This is a new problem. The current episode started in the past 7 days (within the past 3 to 4 days). The problem has been unchanged. There has been no fever. Associated symptoms include congestion, coughing, rhinorrhea and wheezing (sometimes, clears after using his inhaler). Pertinent negatives include no headaches, plugged ear sensation, sinus pain or sore throat. He has tried inhaler use for the symptoms. The treatment provided mild relief.      I have reviewed the patient's medical history in detail and updated the computerized patient record.    The following portions of the patient's history were reviewed and updated as appropriate: allergies, current medications, past family history, past medical history, past social history, past surgical history and problem list.       Current Outpatient Medications:   •  albuterol (PROVENTIL) (2.5 MG/3ML) 0.083% nebulizer solution, TAKE 2.5MG BY NEBULIZATION EVERY 4 HOURS AS NEEDED FOR WHEEZING, Disp: 180 mL, Rfl: 0  •  carvedilol (COREG) 6.25 MG tablet, Take 1 tablet by mouth 2 (two) times a day., Disp: , Rfl: 1  •  coenzyme Q10 100 MG capsule, Take 100 mg by mouth Daily., Disp: , Rfl:   •  furosemide (LASIX) 20 MG tablet, Take 1 tablet by mouth daily., Disp: , Rfl: 1  •  lisinopril (PRINIVIL,ZESTRIL) 20 MG tablet, Take 1 tablet by mouth daily., Disp: , Rfl: 5  •  loratadine (ALLERGY RELIEF) 10 MG disintegrating tablet, Take 10 mg by mouth Daily As Needed for Allergies., Disp: , Rfl:   •  nitroglycerin (NITROSTAT) 0.4 MG SL tablet, Place 1 tablet under the tongue every 5 (five) minutes as needed for chest pain. Take no more than 3 doses in 15 minutes., Disp: 25 tablet, Rfl: 1  •  pravastatin (PRAVACHOL) 20 MG tablet, Take 1 tablet by mouth Daily., Disp: 90 tablet, Rfl: 1  •  amLODIPine (NORVASC) 5 MG tablet, Take 5 mg by mouth Daily., Disp: , Rfl:  "    Review of Systems   Constitutional: Negative.  Negative for chills, fatigue and fever.   HENT: Positive for congestion and rhinorrhea. Negative for postnasal drip, sinus pressure and sore throat.    Respiratory: Positive for cough and wheezing (sometimes, clears after using his inhaler). Negative for shortness of breath.    Cardiovascular: Negative.    Musculoskeletal: Negative.    Neurological: Negative.         Vitals:    11/19/19 1139   BP: 140/78   BP Location: Left arm   Patient Position: Sitting   Cuff Size: Adult   Pulse: 79   Resp: 16   Temp: 98 °F (36.7 °C)   TempSrc: Oral   SpO2: 98%   Weight: 91.2 kg (201 lb 1.6 oz)   Height: 170.2 cm (67\")       Objective   Physical Exam   Constitutional: He is oriented to person, place, and time. He appears well-developed and well-nourished. He does not appear ill.   HENT:   Right Ear: Tympanic membrane normal.   Left Ear: Tympanic membrane normal.   Nose: Mucosal edema, rhinorrhea and congestion present. Right sinus exhibits no maxillary sinus tenderness and no frontal sinus tenderness. Left sinus exhibits no maxillary sinus tenderness and no frontal sinus tenderness.   Mouth/Throat: Uvula is midline, oropharynx is clear and moist and mucous membranes are normal.   Cardiovascular: Normal rate, regular rhythm and normal heart sounds.   Pulmonary/Chest: Effort normal and breath sounds normal. He has no wheezes. He has no rales.   Neurological: He is alert and oriented to person, place, and time.   Skin: Skin is warm and dry.   Psychiatric:   No acute distress   Vitals reviewed.        Assessment/Plan   Bayron was seen today for cough and uri.    Diagnoses and all orders for this visit:    Acute URI      You have been diagnosed with an upper respiratory infection which is likely viral.  Viruses are not killed by antibiotics and therefore an antibiotic is not prescribed for you today.  Viral illness can last between 3 in 14 days, and symptoms may persist the entire " course of the illness.  Symptomatic treatment is best.  You may take Mucinex D for relieving congestion and cough.  If you have high blood pressure, do not take Mucinex D, instead opting for plain Mucinex and Coricidin HBP.  Take Ibuprofen or Tylenol as needed for pain or fever.  Oral antihistamine, such as Zyrtec or Claritin may help reduce ear pressure and relieve some nasal symptoms.  A saline nasal spray may be used to keep nose clear from discharge.  Be sure that you are increasing your intake of clear to decaffeinated fluids and get plenty of rest.  If your symptoms worsen or persist follow up as needed.

## 2019-12-16 RX ORDER — ALBUTEROL SULFATE 2.5 MG/3ML
SOLUTION RESPIRATORY (INHALATION)
Qty: 180 ML | Refills: 0 | Status: SHIPPED | OUTPATIENT
Start: 2019-12-16 | End: 2021-03-22

## 2019-12-30 RX ORDER — PRAVASTATIN SODIUM 20 MG
TABLET ORAL
Qty: 90 TABLET | Refills: 1 | OUTPATIENT
Start: 2019-12-30

## 2020-02-07 ENCOUNTER — LAB (OUTPATIENT)
Dept: FAMILY MEDICINE CLINIC | Facility: CLINIC | Age: 80
End: 2020-02-07

## 2020-03-19 DIAGNOSIS — J20.9 ACUTE BRONCHITIS, UNSPECIFIED ORGANISM: Primary | ICD-10-CM

## 2020-03-19 RX ORDER — BENZONATATE 100 MG/1
100 CAPSULE ORAL 3 TIMES DAILY PRN
Qty: 90 CAPSULE | Refills: 1 | Status: SHIPPED | OUTPATIENT
Start: 2020-03-19 | End: 2020-12-30

## 2020-03-19 RX ORDER — PREDNISONE 20 MG/1
20 TABLET ORAL 2 TIMES DAILY
Qty: 10 TABLET | Refills: 0 | Status: SHIPPED | OUTPATIENT
Start: 2020-03-19 | End: 2020-03-24

## 2020-03-19 NOTE — PROGRESS NOTES
Mr. Acevedo called today with symptoms of acute bronchitis for the past few days. Associated symptoms include cough, shortness of breath after coughing, and wheezing. He denies fever, chills, nasal congestion, runny nose or body aches. He is currently taking an OTC allergy medication and mucinex for his symptoms. He is also using his nebulizer as needed.   He is to start Tessalon 100 mg three times a day for cough and Prednisone 20 mg twice a day for 5 days.   He is to continue all other medications as prescribed. He has been instructed on self isolation for the next 2 weeks.   He is to follow up as needed for now.

## 2020-12-30 ENCOUNTER — OFFICE VISIT (OUTPATIENT)
Dept: FAMILY MEDICINE CLINIC | Facility: CLINIC | Age: 80
End: 2020-12-30

## 2020-12-30 VITALS
TEMPERATURE: 97.7 F | OXYGEN SATURATION: 100 % | WEIGHT: 200 LBS | HEART RATE: 54 BPM | BODY MASS INDEX: 31.39 KG/M2 | HEIGHT: 67 IN | SYSTOLIC BLOOD PRESSURE: 152 MMHG | DIASTOLIC BLOOD PRESSURE: 78 MMHG

## 2020-12-30 DIAGNOSIS — L30.9 ECZEMA, UNSPECIFIED TYPE: Primary | ICD-10-CM

## 2020-12-30 PROCEDURE — 99213 OFFICE O/P EST LOW 20 MIN: CPT | Performed by: NURSE PRACTITIONER

## 2020-12-30 RX ORDER — CLOTRIMAZOLE AND BETAMETHASONE DIPROPIONATE 10; .5 MG/ML; MG/ML
LOTION TOPICAL 2 TIMES DAILY
Qty: 30 ML | Refills: 1 | Status: SHIPPED | OUTPATIENT
Start: 2020-12-30 | End: 2020-12-30 | Stop reason: ALTCHOICE

## 2020-12-30 RX ORDER — CLOTRIMAZOLE AND BETAMETHASONE DIPROPIONATE 10; .64 MG/G; MG/G
CREAM TOPICAL 2 TIMES DAILY
Qty: 15 G | Refills: 3 | Status: SHIPPED | OUTPATIENT
Start: 2020-12-30 | End: 2021-05-14

## 2020-12-30 RX ORDER — ROSUVASTATIN CALCIUM 20 MG/1
20 TABLET, COATED ORAL DAILY
COMMUNITY
Start: 2020-12-21

## 2020-12-30 NOTE — PROGRESS NOTES
Subjective   Bayron Acevedo is a 80 y.o. male.     Chief Complaint   Patient presents with   • Rash     neck, elbow creases, cheeks     Rash  This is a new problem. The current episode started 1 to 4 weeks ago (started 3 weeks ago). The problem is unchanged. The affected locations include the neck, left elbow and right elbow. The rash is characterized by itchiness and redness. It is unknown if there was an exposure to a precipitant. (None) Past treatments include topical steroids, anti-itch cream and antihistamine. The treatment provided no relief.        I have reviewed the patient's medical history in detail and updated the computerized patient record.    The following portions of the patient's history were reviewed and updated as appropriate: allergies, current medications, past family history, past medical history, past social history, past surgical history and problem list.      Current Outpatient Medications:   •  albuterol (PROVENTIL) (2.5 MG/3ML) 0.083% nebulizer solution, TAKE 2.5MG BY NEBULIZATION EVERY 4 HOURS AS NEEDED FOR WHEEZING, Disp: 180 mL, Rfl: 0  •  amLODIPine (NORVASC) 5 MG tablet, Take 5 mg by mouth Daily., Disp: , Rfl:   •  furosemide (LASIX) 20 MG tablet, Take 1 tablet by mouth daily., Disp: , Rfl: 1  •  lisinopril (PRINIVIL,ZESTRIL) 20 MG tablet, Take 1 tablet by mouth daily., Disp: , Rfl: 5  •  nitroglycerin (NITROSTAT) 0.4 MG SL tablet, Place 1 tablet under the tongue every 5 (five) minutes as needed for chest pain. Take no more than 3 doses in 15 minutes., Disp: 25 tablet, Rfl: 1  •  rosuvastatin (CRESTOR) 20 MG tablet, Take 20 mg by mouth Daily., Disp: , Rfl:   •  clotrimazole-betamethasone (Lotrisone) 1-0.05 % cream, Apply  topically to the appropriate area as directed 2 (Two) Times a Day., Disp: 15 g, Rfl: 3  •  coenzyme Q10 100 MG capsule, Take 100 mg by mouth Daily., Disp: , Rfl:   •  loratadine (ALLERGY RELIEF) 10 MG disintegrating tablet, Take 10 mg by mouth Daily As Needed for  "Allergies., Disp: , Rfl:      Review of Systems   Constitutional: Negative.    Respiratory: Negative.    Cardiovascular: Negative.    Skin: Positive for rash.   Neurological: Negative.        Objective    Vitals:    12/30/20 0857 12/30/20 0916   BP: 164/74 152/78   BP Location: Right arm    Patient Position: Sitting    Cuff Size: Adult    Pulse: 54    Temp: 97.7 °F (36.5 °C)    TempSrc: Infrared    SpO2: 100%    Weight: 90.7 kg (200 lb)    Height: 170.2 cm (67\")      Physical Exam  Vitals signs reviewed.   Constitutional:       Appearance: Normal appearance. He is obese.   Cardiovascular:      Rate and Rhythm: Normal rate and regular rhythm.      Pulses: Normal pulses.      Heart sounds: Normal heart sounds.   Pulmonary:      Effort: Pulmonary effort is normal.      Breath sounds: Normal breath sounds.   Skin:     Findings: Rash (fine red rash located at the base of his neck and upper chest, and in both antecubital spaces ) present.   Neurological:      Mental Status: He is alert and oriented to person, place, and time.   Psychiatric:      Comments: No acute distress           Assessment/Plan   Diagnoses and all orders for this visit:    1. Eczema, unspecified type (Primary)  -     Discontinue: clotrimazole-betamethasone (LOTRISONE) 1-0.05 % lotion; Apply  topically to the appropriate area as directed 2 (Two) Times a Day.  Dispense: 30 mL; Refill: 1    Other orders  -     clotrimazole-betamethasone (Lotrisone) 1-0.05 % cream; Apply  topically to the appropriate area as directed 2 (Two) Times a Day.  Dispense: 15 g; Refill: 3      Mr. Acevedo presents today with a fine rash on his neck and both antecubital spaces.    He is to start Lotrisone 1-0.05 % cream twice daily, apply to the affected areas.   We discussed that he needs to come in for an annual medicare wellness exam. He does not feel that he needs one because a nurse for his insurance company comes to his house and does one. I explained that I do not get those " results and the as his PCP I need to be able to exam him and do labs to follow his health. He states his cardiologist draws labs and I explained that his cardiologist only monitors certain labs and I need to follow up on his liver and kidney function.   He is still reluctant to schedule a wellness exam.   He is to follow up as needed for now.

## 2021-03-22 DIAGNOSIS — J44.0 CHRONIC OBSTRUCTIVE PULMONARY DISEASE WITH (ACUTE) LOWER RESPIRATORY INFECTION (HCC): ICD-10-CM

## 2021-03-22 RX ORDER — ALBUTEROL SULFATE 2.5 MG/3ML
SOLUTION RESPIRATORY (INHALATION)
Qty: 180 ML | Refills: 0 | Status: SHIPPED | OUTPATIENT
Start: 2021-03-22

## 2021-04-01 ENCOUNTER — TRANSCRIBE ORDERS (OUTPATIENT)
Dept: ADMINISTRATIVE | Facility: HOSPITAL | Age: 81
End: 2021-04-01

## 2021-04-01 DIAGNOSIS — I71.40 ABDOMINAL AORTIC ANEURYSM (AAA) WITHOUT RUPTURE (HCC): Primary | ICD-10-CM

## 2021-04-16 ENCOUNTER — APPOINTMENT (OUTPATIENT)
Dept: CT IMAGING | Facility: HOSPITAL | Age: 81
End: 2021-04-16

## 2021-04-16 ENCOUNTER — HOSPITAL ENCOUNTER (OUTPATIENT)
Dept: CT IMAGING | Facility: HOSPITAL | Age: 81
Discharge: HOME | End: 2021-04-16
Admitting: SURGERY

## 2021-04-16 DIAGNOSIS — I71.40 ABDOMINAL AORTIC ANEURYSM (AAA) WITHOUT RUPTURE (HCC): ICD-10-CM

## 2021-04-16 LAB — CREAT BLDA-MCNC: 1.5 MG/DL (ref 0.6–1.3)

## 2021-04-16 PROCEDURE — 75635 CT ANGIO ABDOMINAL ARTERIES: CPT

## 2021-04-16 PROCEDURE — 82565 ASSAY OF CREATININE: CPT

## 2021-04-16 PROCEDURE — 0 IOPAMIDOL PER 1 ML: Performed by: SURGERY

## 2021-04-16 RX ADMIN — IOPAMIDOL 95 ML: 755 INJECTION, SOLUTION INTRAVENOUS at 13:26

## 2021-05-14 ENCOUNTER — PRE-ADMISSION TESTING (OUTPATIENT)
Dept: PREADMISSION TESTING | Facility: HOSPITAL | Age: 81
End: 2021-05-14

## 2021-05-14 VITALS
DIASTOLIC BLOOD PRESSURE: 76 MMHG | BODY MASS INDEX: 31.23 KG/M2 | WEIGHT: 199 LBS | HEART RATE: 75 BPM | SYSTOLIC BLOOD PRESSURE: 162 MMHG | OXYGEN SATURATION: 98 % | HEIGHT: 67 IN | TEMPERATURE: 98.5 F | RESPIRATION RATE: 20 BRPM

## 2021-05-14 LAB
ANION GAP SERPL CALCULATED.3IONS-SCNC: 8.3 MMOL/L (ref 5–15)
BUN SERPL-MCNC: 18 MG/DL (ref 8–23)
BUN/CREAT SERPL: 12.8 (ref 7–25)
CALCIUM SPEC-SCNC: 9.6 MG/DL (ref 8.6–10.5)
CHLORIDE SERPL-SCNC: 99 MMOL/L (ref 98–107)
CO2 SERPL-SCNC: 26.7 MMOL/L (ref 22–29)
CREAT SERPL-MCNC: 1.41 MG/DL (ref 0.76–1.27)
DEPRECATED RDW RBC AUTO: 43.7 FL (ref 37–54)
ERYTHROCYTE [DISTWIDTH] IN BLOOD BY AUTOMATED COUNT: 13.1 % (ref 12.3–15.4)
GFR SERPL CREATININE-BSD FRML MDRD: 48 ML/MIN/1.73
GLUCOSE SERPL-MCNC: 94 MG/DL (ref 65–99)
HCT VFR BLD AUTO: 41.1 % (ref 37.5–51)
HGB BLD-MCNC: 13.4 G/DL (ref 13–17.7)
MCH RBC QN AUTO: 29.5 PG (ref 26.6–33)
MCHC RBC AUTO-ENTMCNC: 32.6 G/DL (ref 31.5–35.7)
MCV RBC AUTO: 90.3 FL (ref 79–97)
PLATELET # BLD AUTO: 165 10*3/MM3 (ref 140–450)
PMV BLD AUTO: 10.6 FL (ref 6–12)
POTASSIUM SERPL-SCNC: 4.4 MMOL/L (ref 3.5–5.2)
RBC # BLD AUTO: 4.55 10*6/MM3 (ref 4.14–5.8)
SARS-COV-2 ORF1AB RESP QL NAA+PROBE: NOT DETECTED
SODIUM SERPL-SCNC: 134 MMOL/L (ref 136–145)
WBC # BLD AUTO: 6.46 10*3/MM3 (ref 3.4–10.8)

## 2021-05-14 PROCEDURE — 85027 COMPLETE CBC AUTOMATED: CPT

## 2021-05-14 PROCEDURE — 36415 COLL VENOUS BLD VENIPUNCTURE: CPT

## 2021-05-14 PROCEDURE — U0004 COV-19 TEST NON-CDC HGH THRU: HCPCS

## 2021-05-14 PROCEDURE — 80048 BASIC METABOLIC PNL TOTAL CA: CPT

## 2021-05-14 PROCEDURE — C9803 HOPD COVID-19 SPEC COLLECT: HCPCS

## 2021-05-17 ENCOUNTER — APPOINTMENT (OUTPATIENT)
Dept: GENERAL RADIOLOGY | Facility: HOSPITAL | Age: 81
End: 2021-05-17

## 2021-05-17 ENCOUNTER — HOSPITAL ENCOUNTER (INPATIENT)
Facility: HOSPITAL | Age: 81
LOS: 1 days | Discharge: HOME | End: 2021-05-18
Attending: SURGERY | Admitting: SURGERY

## 2021-05-17 ENCOUNTER — ANESTHESIA (OUTPATIENT)
Dept: PERIOP | Facility: HOSPITAL | Age: 81
End: 2021-05-17

## 2021-05-17 ENCOUNTER — ANESTHESIA EVENT (OUTPATIENT)
Dept: PERIOP | Facility: HOSPITAL | Age: 81
End: 2021-05-17

## 2021-05-17 PROBLEM — I71.40 AAA (ABDOMINAL AORTIC ANEURYSM) WITHOUT RUPTURE: Status: ACTIVE | Noted: 2021-05-17

## 2021-05-17 LAB
ABO GROUP BLD: NORMAL
BLD GP AB SCN SERPL QL: NEGATIVE
RH BLD: POSITIVE
T&S EXPIRATION DATE: NORMAL

## 2021-05-17 PROCEDURE — C1874 STENT, COATED/COV W/DEL SYS: HCPCS | Performed by: SURGERY

## 2021-05-17 PROCEDURE — C1889 IMPLANT/INSERT DEVICE, NOC: HCPCS | Performed by: SURGERY

## 2021-05-17 PROCEDURE — C1769 GUIDE WIRE: HCPCS | Performed by: SURGERY

## 2021-05-17 PROCEDURE — 04V03DZ RESTRICTION OF ABDOMINAL AORTA WITH INTRALUMINAL DEVICE, PERCUTANEOUS APPROACH: ICD-10-PCS | Performed by: SURGERY

## 2021-05-17 PROCEDURE — 0 IOPAMIDOL PER 1 ML: Performed by: SURGERY

## 2021-05-17 PROCEDURE — C2628 CATHETER, OCCLUSION: HCPCS | Performed by: SURGERY

## 2021-05-17 PROCEDURE — 25010000002 NEOSTIGMINE 5 MG/10ML SOLUTION: Performed by: NURSE ANESTHETIST, CERTIFIED REGISTERED

## 2021-05-17 PROCEDURE — 25010000002 FENTANYL CITRATE (PF) 100 MCG/2ML SOLUTION: Performed by: ANESTHESIOLOGY

## 2021-05-17 PROCEDURE — 25010000002 PROPOFOL 10 MG/ML EMULSION: Performed by: NURSE ANESTHETIST, CERTIFIED REGISTERED

## 2021-05-17 PROCEDURE — 85347 COAGULATION TIME ACTIVATED: CPT

## 2021-05-17 PROCEDURE — 25010000002 HEPARIN (PORCINE) PER 1000 UNITS: Performed by: SURGERY

## 2021-05-17 PROCEDURE — C1760 CLOSURE DEV, VASC: HCPCS | Performed by: SURGERY

## 2021-05-17 PROCEDURE — 25010000002 HEPARIN (PORCINE) PER 1000 UNITS: Performed by: NURSE ANESTHETIST, CERTIFIED REGISTERED

## 2021-05-17 PROCEDURE — C1894 INTRO/SHEATH, NON-LASER: HCPCS | Performed by: SURGERY

## 2021-05-17 PROCEDURE — B4101ZZ FLUOROSCOPY OF ABDOMINAL AORTA USING LOW OSMOLAR CONTRAST: ICD-10-PCS | Performed by: SURGERY

## 2021-05-17 PROCEDURE — 86900 BLOOD TYPING SEROLOGIC ABO: CPT | Performed by: ANESTHESIOLOGY

## 2021-05-17 PROCEDURE — 94799 UNLISTED PULMONARY SVC/PX: CPT

## 2021-05-17 PROCEDURE — 25010000002 ONDANSETRON PER 1 MG: Performed by: NURSE ANESTHETIST, CERTIFIED REGISTERED

## 2021-05-17 PROCEDURE — 25010000002 PHENYLEPHRINE PER 1 ML: Performed by: NURSE ANESTHETIST, CERTIFIED REGISTERED

## 2021-05-17 PROCEDURE — 86901 BLOOD TYPING SEROLOGIC RH(D): CPT | Performed by: ANESTHESIOLOGY

## 2021-05-17 PROCEDURE — 25010000002 FENTANYL CITRATE (PF) 100 MCG/2ML SOLUTION: Performed by: NURSE ANESTHETIST, CERTIFIED REGISTERED

## 2021-05-17 PROCEDURE — 86850 RBC ANTIBODY SCREEN: CPT | Performed by: ANESTHESIOLOGY

## 2021-05-17 PROCEDURE — 04QL0ZZ REPAIR LEFT FEMORAL ARTERY, OPEN APPROACH: ICD-10-PCS | Performed by: SURGERY

## 2021-05-17 PROCEDURE — 25010000002 MIDAZOLAM PER 1 MG: Performed by: ANESTHESIOLOGY

## 2021-05-17 PROCEDURE — 25010000002 DEXAMETHASONE PER 1 MG: Performed by: NURSE ANESTHETIST, CERTIFIED REGISTERED

## 2021-05-17 PROCEDURE — 25010000002 PROTAMINE SULFATE PER 10 MG: Performed by: NURSE ANESTHETIST, CERTIFIED REGISTERED

## 2021-05-17 PROCEDURE — 25010000002 VANCOMYCIN: Performed by: SURGERY

## 2021-05-17 DEVICE — IMPLANTABLE DEVICE: Type: IMPLANTABLE DEVICE | Site: ABDOMEN | Status: FUNCTIONAL

## 2021-05-17 DEVICE — GRFT AAA ZENITH SPIRAL Z ILIAC LEG 16X56: Type: IMPLANTABLE DEVICE | Site: ABDOMEN | Status: FUNCTIONAL

## 2021-05-17 DEVICE — LIGACLIP MCA MULTIPLE CLIP APPLIERS, 20 SMALL CLIPS
Type: IMPLANTABLE DEVICE | Site: ABDOMEN | Status: FUNCTIONAL
Brand: LIGACLIP

## 2021-05-17 RX ORDER — FAMOTIDINE 10 MG/ML
20 INJECTION, SOLUTION INTRAVENOUS ONCE
Status: COMPLETED | OUTPATIENT
Start: 2021-05-17 | End: 2021-05-17

## 2021-05-17 RX ORDER — ALBUTEROL SULFATE 2.5 MG/3ML
2.5 SOLUTION RESPIRATORY (INHALATION) EVERY 4 HOURS PRN
Status: DISCONTINUED | OUTPATIENT
Start: 2021-05-17 | End: 2021-05-18 | Stop reason: HOSPADM

## 2021-05-17 RX ORDER — NALOXONE HCL 0.4 MG/ML
0.4 VIAL (ML) INJECTION
Status: DISCONTINUED | OUTPATIENT
Start: 2021-05-17 | End: 2021-05-18 | Stop reason: HOSPADM

## 2021-05-17 RX ORDER — SODIUM CHLORIDE, SODIUM LACTATE, POTASSIUM CHLORIDE, CALCIUM CHLORIDE 600; 310; 30; 20 MG/100ML; MG/100ML; MG/100ML; MG/100ML
9 INJECTION, SOLUTION INTRAVENOUS CONTINUOUS
Status: DISCONTINUED | OUTPATIENT
Start: 2021-05-17 | End: 2021-05-17

## 2021-05-17 RX ORDER — DIPHENHYDRAMINE HCL 25 MG
25 CAPSULE ORAL
Status: DISCONTINUED | OUTPATIENT
Start: 2021-05-17 | End: 2021-05-17 | Stop reason: HOSPADM

## 2021-05-17 RX ORDER — GLYCOPYRROLATE 0.2 MG/ML
INJECTION INTRAMUSCULAR; INTRAVENOUS AS NEEDED
Status: DISCONTINUED | OUTPATIENT
Start: 2021-05-17 | End: 2021-05-17 | Stop reason: SURG

## 2021-05-17 RX ORDER — HYDRALAZINE HYDROCHLORIDE 20 MG/ML
5 INJECTION INTRAMUSCULAR; INTRAVENOUS
Status: DISCONTINUED | OUTPATIENT
Start: 2021-05-17 | End: 2021-05-17 | Stop reason: HOSPADM

## 2021-05-17 RX ORDER — LABETALOL HYDROCHLORIDE 5 MG/ML
5 INJECTION, SOLUTION INTRAVENOUS
Status: DISCONTINUED | OUTPATIENT
Start: 2021-05-17 | End: 2021-05-17 | Stop reason: HOSPADM

## 2021-05-17 RX ORDER — NALOXONE HCL 0.4 MG/ML
0.2 VIAL (ML) INJECTION AS NEEDED
Status: DISCONTINUED | OUTPATIENT
Start: 2021-05-17 | End: 2021-05-17 | Stop reason: HOSPADM

## 2021-05-17 RX ORDER — FUROSEMIDE 20 MG/1
20 TABLET ORAL DAILY
Status: DISCONTINUED | OUTPATIENT
Start: 2021-05-17 | End: 2021-05-18 | Stop reason: HOSPADM

## 2021-05-17 RX ORDER — ONDANSETRON 2 MG/ML
4 INJECTION INTRAMUSCULAR; INTRAVENOUS ONCE AS NEEDED
Status: DISCONTINUED | OUTPATIENT
Start: 2021-05-17 | End: 2021-05-17 | Stop reason: HOSPADM

## 2021-05-17 RX ORDER — NEOSTIGMINE METHYLSULFATE 0.5 MG/ML
INJECTION, SOLUTION INTRAVENOUS AS NEEDED
Status: DISCONTINUED | OUTPATIENT
Start: 2021-05-17 | End: 2021-05-17 | Stop reason: SURG

## 2021-05-17 RX ORDER — ONDANSETRON 2 MG/ML
4 INJECTION INTRAMUSCULAR; INTRAVENOUS EVERY 6 HOURS PRN
Status: DISCONTINUED | OUTPATIENT
Start: 2021-05-17 | End: 2021-05-18 | Stop reason: HOSPADM

## 2021-05-17 RX ORDER — PROPOFOL 10 MG/ML
VIAL (ML) INTRAVENOUS AS NEEDED
Status: DISCONTINUED | OUTPATIENT
Start: 2021-05-17 | End: 2021-05-17 | Stop reason: SURG

## 2021-05-17 RX ORDER — SODIUM CHLORIDE 9 MG/ML
100 INJECTION, SOLUTION INTRAVENOUS CONTINUOUS
Status: DISCONTINUED | OUTPATIENT
Start: 2021-05-17 | End: 2021-05-18 | Stop reason: HOSPADM

## 2021-05-17 RX ORDER — ONDANSETRON 4 MG/1
4 TABLET, FILM COATED ORAL EVERY 6 HOURS PRN
Status: DISCONTINUED | OUTPATIENT
Start: 2021-05-17 | End: 2021-05-18 | Stop reason: HOSPADM

## 2021-05-17 RX ORDER — FENTANYL CITRATE 50 UG/ML
INJECTION, SOLUTION INTRAMUSCULAR; INTRAVENOUS
Status: COMPLETED | OUTPATIENT
Start: 2021-05-17 | End: 2021-05-17

## 2021-05-17 RX ORDER — PROMETHAZINE HYDROCHLORIDE 25 MG/1
25 SUPPOSITORY RECTAL ONCE AS NEEDED
Status: DISCONTINUED | OUTPATIENT
Start: 2021-05-17 | End: 2021-05-17 | Stop reason: HOSPADM

## 2021-05-17 RX ORDER — LIDOCAINE HYDROCHLORIDE 20 MG/ML
INJECTION, SOLUTION INFILTRATION; PERINEURAL AS NEEDED
Status: DISCONTINUED | OUTPATIENT
Start: 2021-05-17 | End: 2021-05-17 | Stop reason: SURG

## 2021-05-17 RX ORDER — EPHEDRINE SULFATE 50 MG/ML
5 INJECTION, SOLUTION INTRAVENOUS ONCE AS NEEDED
Status: DISCONTINUED | OUTPATIENT
Start: 2021-05-17 | End: 2021-05-17 | Stop reason: HOSPADM

## 2021-05-17 RX ORDER — DEXAMETHASONE SODIUM PHOSPHATE 10 MG/ML
INJECTION INTRAMUSCULAR; INTRAVENOUS AS NEEDED
Status: DISCONTINUED | OUTPATIENT
Start: 2021-05-17 | End: 2021-05-17 | Stop reason: SURG

## 2021-05-17 RX ORDER — FENTANYL CITRATE 50 UG/ML
50 INJECTION, SOLUTION INTRAMUSCULAR; INTRAVENOUS
Status: DISCONTINUED | OUTPATIENT
Start: 2021-05-17 | End: 2021-05-17 | Stop reason: HOSPADM

## 2021-05-17 RX ORDER — ROCURONIUM BROMIDE 10 MG/ML
INJECTION, SOLUTION INTRAVENOUS AS NEEDED
Status: DISCONTINUED | OUTPATIENT
Start: 2021-05-17 | End: 2021-05-17 | Stop reason: SURG

## 2021-05-17 RX ORDER — DIPHENHYDRAMINE HYDROCHLORIDE 50 MG/ML
12.5 INJECTION INTRAMUSCULAR; INTRAVENOUS
Status: DISCONTINUED | OUTPATIENT
Start: 2021-05-17 | End: 2021-05-17 | Stop reason: HOSPADM

## 2021-05-17 RX ORDER — NITROGLYCERIN 0.4 MG/1
0.4 TABLET SUBLINGUAL
Status: DISCONTINUED | OUTPATIENT
Start: 2021-05-17 | End: 2021-05-18 | Stop reason: HOSPADM

## 2021-05-17 RX ORDER — PROMETHAZINE HYDROCHLORIDE 25 MG/1
25 TABLET ORAL ONCE AS NEEDED
Status: DISCONTINUED | OUTPATIENT
Start: 2021-05-17 | End: 2021-05-17 | Stop reason: HOSPADM

## 2021-05-17 RX ORDER — TRAMADOL HYDROCHLORIDE 50 MG/1
50 TABLET ORAL EVERY 6 HOURS PRN
Status: DISCONTINUED | OUTPATIENT
Start: 2021-05-17 | End: 2021-05-18 | Stop reason: HOSPADM

## 2021-05-17 RX ORDER — AMLODIPINE BESYLATE 5 MG/1
5 TABLET ORAL DAILY
Status: DISCONTINUED | OUTPATIENT
Start: 2021-05-18 | End: 2021-05-18 | Stop reason: HOSPADM

## 2021-05-17 RX ORDER — ONDANSETRON 2 MG/ML
INJECTION INTRAMUSCULAR; INTRAVENOUS AS NEEDED
Status: DISCONTINUED | OUTPATIENT
Start: 2021-05-17 | End: 2021-05-17 | Stop reason: SURG

## 2021-05-17 RX ORDER — LISINOPRIL 20 MG/1
20 TABLET ORAL DAILY
Status: DISCONTINUED | OUTPATIENT
Start: 2021-05-17 | End: 2021-05-18 | Stop reason: HOSPADM

## 2021-05-17 RX ORDER — MIDAZOLAM HYDROCHLORIDE 1 MG/ML
INJECTION INTRAMUSCULAR; INTRAVENOUS
Status: COMPLETED | OUTPATIENT
Start: 2021-05-17 | End: 2021-05-17

## 2021-05-17 RX ORDER — HEPARIN SODIUM 1000 [USP'U]/ML
INJECTION, SOLUTION INTRAVENOUS; SUBCUTANEOUS AS NEEDED
Status: DISCONTINUED | OUTPATIENT
Start: 2021-05-17 | End: 2021-05-17 | Stop reason: SURG

## 2021-05-17 RX ORDER — MORPHINE SULFATE 2 MG/ML
1 INJECTION, SOLUTION INTRAMUSCULAR; INTRAVENOUS EVERY 4 HOURS PRN
Status: DISCONTINUED | OUTPATIENT
Start: 2021-05-17 | End: 2021-05-18 | Stop reason: HOSPADM

## 2021-05-17 RX ORDER — ROSUVASTATIN CALCIUM 20 MG/1
20 TABLET, COATED ORAL DAILY
Status: DISCONTINUED | OUTPATIENT
Start: 2021-05-18 | End: 2021-05-18 | Stop reason: HOSPADM

## 2021-05-17 RX ORDER — FLUMAZENIL 0.1 MG/ML
0.2 INJECTION INTRAVENOUS AS NEEDED
Status: DISCONTINUED | OUTPATIENT
Start: 2021-05-17 | End: 2021-05-17 | Stop reason: HOSPADM

## 2021-05-17 RX ORDER — MIDAZOLAM HYDROCHLORIDE 1 MG/ML
0.5 INJECTION INTRAMUSCULAR; INTRAVENOUS
Status: DISCONTINUED | OUTPATIENT
Start: 2021-05-17 | End: 2021-05-17 | Stop reason: HOSPADM

## 2021-05-17 RX ORDER — SODIUM CHLORIDE 0.9 % (FLUSH) 0.9 %
3-10 SYRINGE (ML) INJECTION AS NEEDED
Status: DISCONTINUED | OUTPATIENT
Start: 2021-05-17 | End: 2021-05-17 | Stop reason: HOSPADM

## 2021-05-17 RX ORDER — SODIUM CHLORIDE 0.9 % (FLUSH) 0.9 %
3 SYRINGE (ML) INJECTION EVERY 12 HOURS SCHEDULED
Status: DISCONTINUED | OUTPATIENT
Start: 2021-05-17 | End: 2021-05-17 | Stop reason: HOSPADM

## 2021-05-17 RX ORDER — EPHEDRINE SULFATE 50 MG/ML
INJECTION, SOLUTION INTRAVENOUS AS NEEDED
Status: DISCONTINUED | OUTPATIENT
Start: 2021-05-17 | End: 2021-05-17 | Stop reason: SURG

## 2021-05-17 RX ORDER — PROTAMINE SULFATE 10 MG/ML
INJECTION, SOLUTION INTRAVENOUS AS NEEDED
Status: DISCONTINUED | OUTPATIENT
Start: 2021-05-17 | End: 2021-05-17 | Stop reason: SURG

## 2021-05-17 RX ORDER — LIDOCAINE HYDROCHLORIDE 10 MG/ML
0.5 INJECTION, SOLUTION EPIDURAL; INFILTRATION; INTRACAUDAL; PERINEURAL ONCE AS NEEDED
Status: DISCONTINUED | OUTPATIENT
Start: 2021-05-17 | End: 2021-05-17 | Stop reason: HOSPADM

## 2021-05-17 RX ADMIN — EPHEDRINE SULFATE 10 MG: 50 INJECTION INTRAVENOUS at 07:44

## 2021-05-17 RX ADMIN — MIDAZOLAM 1 MG: 1 INJECTION INTRAMUSCULAR; INTRAVENOUS at 06:31

## 2021-05-17 RX ADMIN — PROTAMINE SULFATE 40 MG: 10 INJECTION, SOLUTION INTRAVENOUS at 09:10

## 2021-05-17 RX ADMIN — PROPOFOL 50 MG: 10 INJECTION, EMULSION INTRAVENOUS at 08:51

## 2021-05-17 RX ADMIN — TRAMADOL HYDROCHLORIDE 50 MG: 50 TABLET ORAL at 10:32

## 2021-05-17 RX ADMIN — HEPARIN SODIUM 2500 UNITS: 1000 INJECTION INTRAVENOUS; SUBCUTANEOUS at 08:15

## 2021-05-17 RX ADMIN — PROPOFOL 150 MG: 10 INJECTION, EMULSION INTRAVENOUS at 07:31

## 2021-05-17 RX ADMIN — VANCOMYCIN HYDROCHLORIDE 1250 MG: 10 INJECTION, POWDER, LYOPHILIZED, FOR SOLUTION INTRAVENOUS at 06:08

## 2021-05-17 RX ADMIN — DEXAMETHASONE SODIUM PHOSPHATE 8 MG: 10 INJECTION INTRAMUSCULAR; INTRAVENOUS at 07:39

## 2021-05-17 RX ADMIN — FENTANYL CITRATE 50 MCG: 50 INJECTION, SOLUTION INTRAMUSCULAR; INTRAVENOUS at 10:33

## 2021-05-17 RX ADMIN — FENTANYL CITRATE 50 MCG: 50 INJECTION, SOLUTION INTRAMUSCULAR; INTRAVENOUS at 09:57

## 2021-05-17 RX ADMIN — IOPAMIDOL 50 ML: 510 INJECTION, SOLUTION INTRAVASCULAR at 09:24

## 2021-05-17 RX ADMIN — FENTANYL CITRATE 50 MCG: 50 INJECTION INTRAMUSCULAR; INTRAVENOUS at 08:51

## 2021-05-17 RX ADMIN — HEPARIN SODIUM 10000 UNITS: 1000 INJECTION INTRAVENOUS; SUBCUTANEOUS at 08:03

## 2021-05-17 RX ADMIN — FENTANYL CITRATE 50 MCG: 50 INJECTION INTRAMUSCULAR; INTRAVENOUS at 06:31

## 2021-05-17 RX ADMIN — PHENYLEPHRINE HYDROCHLORIDE 100 MCG: 10 INJECTION INTRAVENOUS at 07:45

## 2021-05-17 RX ADMIN — SODIUM CHLORIDE 100 ML/HR: 9 INJECTION, SOLUTION INTRAVENOUS at 20:09

## 2021-05-17 RX ADMIN — FUROSEMIDE 20 MG: 20 TABLET ORAL at 14:20

## 2021-05-17 RX ADMIN — GLYCOPYRROLATE 0.4 MG: 0.2 INJECTION INTRAMUSCULAR; INTRAVENOUS at 09:18

## 2021-05-17 RX ADMIN — FENTANYL CITRATE 50 MCG: 50 INJECTION INTRAMUSCULAR; INTRAVENOUS at 07:29

## 2021-05-17 RX ADMIN — NEOSTIGMINE METHYLSULFATE 4 MG: 0.5 INJECTION INTRAVENOUS at 09:18

## 2021-05-17 RX ADMIN — SODIUM CHLORIDE 100 ML/HR: 9 INJECTION, SOLUTION INTRAVENOUS at 10:35

## 2021-05-17 RX ADMIN — LISINOPRIL 20 MG: 20 TABLET ORAL at 14:20

## 2021-05-17 RX ADMIN — LIDOCAINE HYDROCHLORIDE 100 MG: 20 INJECTION, SOLUTION INFILTRATION; PERINEURAL at 07:31

## 2021-05-17 RX ADMIN — FAMOTIDINE 20 MG: 10 INJECTION INTRAVENOUS at 06:28

## 2021-05-17 RX ADMIN — ROCURONIUM BROMIDE 50 MG: 50 INJECTION INTRAVENOUS at 07:31

## 2021-05-17 RX ADMIN — ONDANSETRON 4 MG: 2 INJECTION INTRAMUSCULAR; INTRAVENOUS at 08:51

## 2021-05-17 RX ADMIN — SODIUM CHLORIDE, POTASSIUM CHLORIDE, SODIUM LACTATE AND CALCIUM CHLORIDE 9 ML/HR: 600; 310; 30; 20 INJECTION, SOLUTION INTRAVENOUS at 06:29

## 2021-05-17 NOTE — ANESTHESIA PROCEDURE NOTES
Airway  Urgency: elective    Date/Time: 5/17/2021 7:35 AM  Airway not difficult    General Information and Staff    Patient location during procedure: OR  Anesthesiologist: Rafael Garcia MD  CRNA: Justin Contreras CRNA    Indications and Patient Condition  Indications for airway management: airway protection    Preoxygenated: yes  MILS maintained throughout  Mask difficulty assessment: 2 - vent by mask + OA or adjuvant +/- NMBA    Final Airway Details  Final airway type: endotracheal airway      Successful airway: ETT  Cuffed: yes   Successful intubation technique: direct laryngoscopy  Facilitating devices/methods: intubating stylet  Endotracheal tube insertion site: oral  Blade: Danisha  Blade size: 3  ETT size (mm): 7.5  Cormack-Lehane Classification: grade I - full view of glottis  Placement verified by: chest auscultation and capnometry   Measured from: gums  ETT/EBT to gums (cm): 24  Number of attempts at approach: 1  Assessment: lips, teeth, and gum same as pre-op and atraumatic intubation

## 2021-05-17 NOTE — ANESTHESIA POSTPROCEDURE EVALUATION
Patient: Bayron Acevedo    Procedure Summary     Date: 05/17/21 [facility] / [facility]    Anesthesia Start: 0718 Anesthesia Stop: 0938    Procedure: ZENITH AORTIC STENT GRAFT (N/A ) Diagnosis:     Surgeons: Karen Barrera Jr., MD Provider: Rafael Garcia MD    Anesthesia Type: general ASA Status: 3          Anesthesia Type: general    Vitals  Vitals Value Taken Time   /57 05/17/21 1145   Temp 36.7 °C (98 °F) 05/17/21 0935   Pulse 66 05/17/21 1159   Resp 18 05/17/21 1145   SpO2 97 % 05/17/21 1159   Vitals shown include unvalidated device data.        Post Anesthesia Care and Evaluation    Patient location during evaluation: PACU  Patient participation: complete - patient participated  Level of consciousness: awake and alert  Pain management: adequate  Airway patency: patent  Anesthetic complications: No anesthetic complications  PONV Status: none  Cardiovascular status: acceptable and hemodynamically stable  Respiratory status: acceptable, nonlabored ventilation and spontaneous ventilation  Hydration status: acceptable

## 2021-05-17 NOTE — ANESTHESIA PROCEDURE NOTES
Arterial Line    Pre-sedation assessment completed: 5/17/2021 6:33 AM    Patient reassessed immediately prior to procedure    Patient location during procedure: holding area  Start time: 5/17/2021 6:33 AM  Stop Time:5/17/2021 6:35 AM       Line placed for hemodynamic monitoring and ABGs/Labs/ISTAT.  Performed By   Anesthesiologist: Rafael Garcia MD  Preanesthetic Checklist  Completed: patient identified, IV checked, site marked, risks and benefits discussed, surgical consent, monitors and equipment checked, pre-op evaluation and timeout performed  Arterial Line Prep   Sterile Tech: cap, gloves and mask  Prep: ChloraPrep  Patient monitoring: blood pressure monitoring, continuous pulse oximetry and EKG  Arterial Line Procedure   Laterality:left  Location:  radial artery  Catheter size: 20 G   Guidance: palpation technique  Number of attempts: 1  Successful placement: yes  Post Assessment   Dressing Type: biopatch applied, occlusive dressing applied, secured with tape and wrist guard applied.   Complications no  Circ/Move/Sens Assessment: normal and unchanged.   Patient Tolerance: patient tolerated the procedure well with no apparent complications

## 2021-05-17 NOTE — ANESTHESIA PREPROCEDURE EVALUATION
Anesthesia Evaluation     Patient summary reviewed and Nursing notes reviewed                Airway   Mallampati: I  TM distance: >3 FB  Neck ROM: full  No difficulty expected  Dental    (+) edentulous    Pulmonary - normal exam   (+) a smoker Former, COPD,   Cardiovascular - normal exam    ECG reviewed  Rhythm: regular  Rate: normal    (+) hypertension 2 medications or greater, past MI  >12 months, CAD, cardiac stents more than 12 months ago PVD, hyperlipidemia,  carotid artery disease left carotid    ROS comment: Hx MI and stent in 4/13/hx left CEA in 1/13/AAA/recent ECG from [facility] showed NSR, old IMI    Neuro/Psych  GI/Hepatic/Renal/Endo    (+) obesity,       Musculoskeletal     Abdominal   (+) obese,    Substance History      OB/GYN          Other                      Anesthesia Plan    ASA 3     general   (Art line/2 large peripheral IVs)  intravenous induction     Anesthetic plan, all risks, benefits, and alternatives have been provided, discussed and informed consent has been obtained with: patient.    Plan discussed with CRNA.

## 2021-05-18 ENCOUNTER — READMISSION MANAGEMENT (OUTPATIENT)
Dept: CALL CENTER | Facility: HOSPITAL | Age: 81
End: 2021-05-18

## 2021-05-18 VITALS
OXYGEN SATURATION: 97 % | BODY MASS INDEX: 30.14 KG/M2 | HEIGHT: 68 IN | RESPIRATION RATE: 16 BRPM | WEIGHT: 198.85 LBS | SYSTOLIC BLOOD PRESSURE: 129 MMHG | TEMPERATURE: 97.8 F | DIASTOLIC BLOOD PRESSURE: 63 MMHG | HEART RATE: 70 BPM

## 2021-05-18 LAB
ACT BLD: 120 SECONDS (ref 82–152)
ACT BLD: 208 SECONDS (ref 82–152)
ACT BLD: 230 SECONDS (ref 82–152)
ANION GAP SERPL CALCULATED.3IONS-SCNC: 9.2 MMOL/L (ref 5–15)
BASOPHILS # BLD AUTO: 0.01 10*3/MM3 (ref 0–0.2)
BASOPHILS NFR BLD AUTO: 0.1 % (ref 0–1.5)
BUN SERPL-MCNC: 15 MG/DL (ref 8–23)
BUN/CREAT SERPL: 15.6 (ref 7–25)
CALCIUM SPEC-SCNC: 8.1 MG/DL (ref 8.6–10.5)
CHLORIDE SERPL-SCNC: 101 MMOL/L (ref 98–107)
CO2 SERPL-SCNC: 22.8 MMOL/L (ref 22–29)
CREAT SERPL-MCNC: 0.96 MG/DL (ref 0.76–1.27)
DEPRECATED RDW RBC AUTO: 41 FL (ref 37–54)
EOSINOPHIL # BLD AUTO: 0.03 10*3/MM3 (ref 0–0.4)
EOSINOPHIL NFR BLD AUTO: 0.3 % (ref 0.3–6.2)
ERYTHROCYTE [DISTWIDTH] IN BLOOD BY AUTOMATED COUNT: 12.7 % (ref 12.3–15.4)
GFR SERPL CREATININE-BSD FRML MDRD: 75 ML/MIN/1.73
GLUCOSE SERPL-MCNC: 123 MG/DL (ref 65–99)
HCT VFR BLD AUTO: 32.4 % (ref 37.5–51)
HGB BLD-MCNC: 10.6 G/DL (ref 13–17.7)
LYMPHOCYTES # BLD AUTO: 0.75 10*3/MM3 (ref 0.7–3.1)
LYMPHOCYTES NFR BLD AUTO: 7.6 % (ref 19.6–45.3)
MCH RBC QN AUTO: 29.5 PG (ref 26.6–33)
MCHC RBC AUTO-ENTMCNC: 32.7 G/DL (ref 31.5–35.7)
MCV RBC AUTO: 90.3 FL (ref 79–97)
MONOCYTES # BLD AUTO: 0.77 10*3/MM3 (ref 0.1–0.9)
MONOCYTES NFR BLD AUTO: 7.8 % (ref 5–12)
NEUTROPHILS NFR BLD AUTO: 8.25 10*3/MM3 (ref 1.7–7)
NEUTROPHILS NFR BLD AUTO: 83.6 % (ref 42.7–76)
PLATELET # BLD AUTO: 123 10*3/MM3 (ref 140–450)
PMV BLD AUTO: 10.7 FL (ref 6–12)
POTASSIUM SERPL-SCNC: 4.6 MMOL/L (ref 3.5–5.2)
RBC # BLD AUTO: 3.59 10*6/MM3 (ref 4.14–5.8)
SODIUM SERPL-SCNC: 133 MMOL/L (ref 136–145)
WBC # BLD AUTO: 9.87 10*3/MM3 (ref 3.4–10.8)

## 2021-05-18 PROCEDURE — 80048 BASIC METABOLIC PNL TOTAL CA: CPT | Performed by: SURGERY

## 2021-05-18 PROCEDURE — 85025 COMPLETE CBC W/AUTO DIFF WBC: CPT | Performed by: SURGERY

## 2021-05-18 RX ADMIN — LISINOPRIL 20 MG: 20 TABLET ORAL at 06:44

## 2021-05-18 RX ADMIN — AMLODIPINE BESYLATE 5 MG: 5 TABLET ORAL at 06:44

## 2021-05-18 RX ADMIN — FUROSEMIDE 20 MG: 20 TABLET ORAL at 08:19

## 2021-05-18 RX ADMIN — ROSUVASTATIN CALCIUM 20 MG: 20 TABLET, FILM COATED ORAL at 08:19

## 2021-05-18 RX ADMIN — SODIUM CHLORIDE 100 ML/HR: 9 INJECTION, SOLUTION INTRAVENOUS at 05:50

## 2021-05-19 ENCOUNTER — TRANSITIONAL CARE MANAGEMENT TELEPHONE ENCOUNTER (OUTPATIENT)
Dept: CALL CENTER | Facility: HOSPITAL | Age: 81
End: 2021-05-19

## 2021-05-19 NOTE — OUTREACH NOTE
Prep Survey      Responses   [facility] patient discharged from?  [place]   Is LACE score < 7 ?  No   Emergency Room discharge w/ pulse ox?  No   Eligibility  [facility]   Date of Admission  05/17/21   Date of Discharge  05/18/21   Discharge Disposition  Home or Self Care   Discharge diagnosis  AAA (abdominal aortic aneurysm- stent placed   Does the patient have one of the following disease processes/diagnoses(primary or secondary)?  General Surgery   Does the patient have Home health ordered?  No   Is there a DME ordered?  No   Prep survey completed?  Yes          Glo Brandon RN

## 2021-05-19 NOTE — OUTREACH NOTE
Call Center TCM Note      Responses   [facility] patient discharged from?  [place]   Does the patient have one of the following disease processes/diagnoses(primary or secondary)?  General Surgery   TCM attempt successful?  Yes   Call start time  1124   Call end time  1125   Discharge diagnosis  AAA (abdominal aortic aneurysm- stent placed)   Meds reviewed with patient/caregiver?  N/A   Does the patient have all medications related to this admission filled (includes all antibiotics, pain medications, etc.)  N/A   Is the patient taking all medications as directed (includes completed medication regime)?  N/A   Does the patient have a follow up appointment scheduled with their surgeon?  No   What is preventing the patient from scheduling follow up appointments?  Waiting on return call   Nursing Interventions  Advised patient to make appointment   Has the patient kept scheduled appointments due by today?  N/A   Psychosocial issues?  No   Did the patient receive a copy of their discharge instructions?  Yes   Nursing interventions  Reviewed instructions with patient   What is the patient's perception of their health status since discharge?  Improving   Nursing interventions  Nurse provided patient education   Is the patient /caregiver able to teach back basic post-op care?  Continue use of incentive spirometry at least 1 week post discharge, Practice 'cough and deep breath', Drive as instructed by MD in discharge instructions, Take showers only when approved by MD-sponge bathe until then, No tub bath, swimming, or hot tub until instructed by MD, Keep incision areas clean,dry and protected, Do not remove steri-strips, Lifting as instructed by MD in discharge instructions   Is the patient/caregiver able to teach back signs and symptoms of incisional infection?  Fever   Is the patient/caregiver able to teach back steps to recovery at home?  Set small, achievable goals for return to baseline health, Rest and rebuild  strength, gradually increase activity   Is the patient/caregiver able to teach back the hierarchy of who to call/visit for symptoms/problems? PCP, Specialist, Home health nurse, Urgent Care, ED, 911  Yes   TCM call completed?  Yes   Wrap up additional comments  States he is doing well, following his discharge instructions, no questions or concerns, states he will be following up with his surgeon, no orders to f/u with PCP.          Chey Ghosh RN    5/19/2021, 11:25 EDT

## 2021-05-28 ENCOUNTER — READMISSION MANAGEMENT (OUTPATIENT)
Dept: CALL CENTER | Facility: HOSPITAL | Age: 81
End: 2021-05-28

## 2021-05-28 NOTE — OUTREACH NOTE
ARMD OU -Discussed findings w/ pt today-OCT of MAC performed today and reviewed with patient OU stable and dry-no injection treatment necessary today continue monitor closely-Advised pt 10 wks is the longest he should go w/out OCT MAC-Order OCT MAC for next visit-RTC January 8th (do not change appt date!!!)PC IOL OU-stable monitorRTC 9 wk F/U AMD OCT MAC General Surgery Week 2 Survey      Responses   [facility] patient discharged from?  [place]   Does the patient have one of the following disease processes/diagnoses(primary or secondary)?  General Surgery   Week 2 attempt successful?  Yes   Call start time  0954   Call end time  0957   Meds reviewed with patient/caregiver?  Yes   Is the patient having any side effects they believe may be caused by any medication additions or changes?  No   Is the patient taking all medications as directed (includes completed medication regime)?  Yes   Has the patient kept scheduled appointments due by today?  N/A   Comments  Dr. Bruce house. next week    Comments  sleeping and eating well, no new issues, healing well   What is the patient's perception of their health status since discharge?  Improving   Week 2 call completed?  Yes   Wrap up additional comments  States he is doing well, at this time.            Karuna Barney RN

## 2021-06-02 ENCOUNTER — TRANSCRIBE ORDERS (OUTPATIENT)
Dept: ADMINISTRATIVE | Facility: HOSPITAL | Age: 81
End: 2021-06-02

## 2021-06-02 DIAGNOSIS — Z95.828 ACQUIRED PORTAL-SYSTEMIC SHUNT: Primary | ICD-10-CM

## 2021-06-02 DIAGNOSIS — Z95.828 PRESENCE OF OTHER VASCULAR IMPLANTS AND GRAFTS: ICD-10-CM

## 2021-09-09 LAB
CHOLEST SERPL-MCNC: 131 MG/DL (ref 0–200)
HDLC SERPL-MCNC: 44 MG/DL (ref 40–60)
LDLC SERPL CALC-MCNC: 70 MG/DL (ref 0–100)
TRIGL SERPL-MCNC: 86 MG/DL (ref 0–150)
VLDLC SERPL CALC-MCNC: 17 MG/DL (ref 5–40)

## 2021-11-03 ENCOUNTER — HOSPITAL ENCOUNTER (OUTPATIENT)
Dept: CT IMAGING | Facility: HOSPITAL | Age: 81
Discharge: HOME | End: 2021-11-03
Admitting: SURGERY

## 2021-11-03 DIAGNOSIS — Z95.828 PRESENCE OF OTHER VASCULAR IMPLANTS AND GRAFTS: ICD-10-CM

## 2021-11-03 LAB — CREAT BLDA-MCNC: 2.1 MG/DL (ref 0.6–1.3)

## 2021-11-03 PROCEDURE — 74176 CT ABD & PELVIS W/O CONTRAST: CPT

## 2021-11-03 PROCEDURE — 82565 ASSAY OF CREATININE: CPT

## 2021-11-05 ENCOUNTER — OFFICE VISIT (OUTPATIENT)
Dept: FAMILY MEDICINE CLINIC | Facility: CLINIC | Age: 81
End: 2021-11-05

## 2021-11-05 VITALS
SYSTOLIC BLOOD PRESSURE: 140 MMHG | OXYGEN SATURATION: 98 % | HEART RATE: 67 BPM | DIASTOLIC BLOOD PRESSURE: 70 MMHG | HEIGHT: 68 IN | BODY MASS INDEX: 29.25 KG/M2 | WEIGHT: 193 LBS

## 2021-11-05 DIAGNOSIS — R79.89 ELEVATED SERUM CREATININE: Primary | ICD-10-CM

## 2021-11-05 PROCEDURE — 99213 OFFICE O/P EST LOW 20 MIN: CPT | Performed by: NURSE PRACTITIONER

## 2021-11-05 RX ORDER — ASPIRIN 81 MG/1
81 TABLET ORAL DAILY
COMMUNITY

## 2021-11-05 NOTE — PROGRESS NOTES
"Chief Complaint  decreased kidney function (was unable to get contrast at a ct scan d/t numbers on 11/3)    Subjective          Bayronchasity Acevedo presents to [facility]  Mr. Acevedo present today to follow up on his renal function. He recently had a CT scan and was unable to have contrast because his creatinine was elevated at 2.10. He has had a past history of elevated creatinine.       I have reviewed the patient's medical history in detail and updated the computerized patient record.    Current Outpatient Medications:   •  albuterol (PROVENTIL) (2.5 MG/3ML) 0.083% nebulizer solution, USE ONE VIAL BY NEBULIZATION EVERY FOUR HOURS AS NEEDED FOR FOR WHEEZING (Patient taking differently: Take 2.5 mg by nebulization Every 4 (Four) Hours As Needed.), Disp: 180 mL, Rfl: 0  •  amLODIPine (NORVASC) 5 MG tablet, Take 5 mg by mouth Daily., Disp: , Rfl:   •  aspirin 81 MG EC tablet, Take 81 mg by mouth Daily., Disp: , Rfl:   •  lisinopril (PRINIVIL,ZESTRIL) 20 MG tablet, Take 20 mg by mouth Daily., Disp: , Rfl: 5  •  loratadine (ALLERGY RELIEF) 10 MG disintegrating tablet, Take 10 mg by mouth Daily As Needed for Allergies., Disp: , Rfl:   •  nitroglycerin (NITROSTAT) 0.4 MG SL tablet, Place 1 tablet under the tongue every 5 (five) minutes as needed for chest pain. Take no more than 3 doses in 15 minutes., Disp: 25 tablet, Rfl: 1  •  rosuvastatin (CRESTOR) 20 MG tablet, Take 20 mg by mouth Daily., Disp: , Rfl:   •  Unable to find, 1 each 1 (One) Time. Med Name: Omega XL dietary supplement for arthritic pain, Disp: , Rfl:   •  furosemide (LASIX) 20 MG tablet, Take 1 tablet by mouth daily., Disp: , Rfl: 1  Objective   Vital Signs:   /70 (BP Location: Left arm, Patient Position: Sitting, Cuff Size: Adult)   Pulse 67   Ht 172.7 cm (68\")   Wt 87.5 kg (193 lb)   SpO2 98%   BMI 29.35 kg/m²     Physical Exam  Vitals reviewed.   Constitutional:       Appearance: Normal appearance.   Cardiovascular:    "   Rate and Rhythm: Normal rate and regular rhythm.      Pulses: Normal pulses.      Heart sounds: Normal heart sounds.   Pulmonary:      Effort: Pulmonary effort is normal.      Breath sounds: Normal breath sounds.   Musculoskeletal:      Right lower leg: No edema.      Left lower leg: No edema.   Skin:     General: Skin is warm and dry.   Neurological:      Mental Status: He is alert and oriented to person, place, and time.   Psychiatric:      Comments: No acute distress        Result Review :                 Assessment and Plan    Diagnoses and all orders for this visit:    1. Elevated serum creatinine (Primary)    Mr. Acevedo appears to be doing well today.  Mr. Acevedo is to continue all medications for now.  He is to return in 2 weeks to have a BMP done.  If his creatinine remains elevated I will discuss with cardiology about stopping his lisinopril.     Follow Up   Return in about 2 weeks (around 11/19/2021), or if symptoms worsen or fail to improve, for Fasting labs in the next 2 week, Next scheduled follow up.  Patient was given instructions and counseling regarding his condition or for health maintenance advice. Please see specific information pulled into the AVS if appropriate.

## 2021-11-08 DIAGNOSIS — R79.89 ELEVATED SERUM CREATININE: Primary | ICD-10-CM

## 2021-11-22 DIAGNOSIS — R79.89 ELEVATED SERUM CREATININE: Primary | ICD-10-CM

## 2021-11-23 ENCOUNTER — TELEPHONE (OUTPATIENT)
Dept: FAMILY MEDICINE CLINIC | Facility: CLINIC | Age: 81
End: 2021-11-23

## 2021-11-23 NOTE — TELEPHONE ENCOUNTER
S/w patient and gave him the lab results and the referral information.  Patient voiced understanding and will await Nephrology's phone call to schedule.

## 2022-04-22 ENCOUNTER — TELEPHONE (OUTPATIENT)
Dept: FAMILY MEDICINE CLINIC | Facility: CLINIC | Age: 82
End: 2022-04-22

## 2022-04-22 ENCOUNTER — OFFICE VISIT (OUTPATIENT)
Dept: FAMILY MEDICINE CLINIC | Facility: CLINIC | Age: 82
End: 2022-04-22

## 2022-04-22 VITALS
DIASTOLIC BLOOD PRESSURE: 72 MMHG | WEIGHT: 190 LBS | SYSTOLIC BLOOD PRESSURE: 136 MMHG | HEIGHT: 68 IN | HEART RATE: 78 BPM | TEMPERATURE: 97.9 F | BODY MASS INDEX: 28.79 KG/M2 | OXYGEN SATURATION: 94 %

## 2022-04-22 DIAGNOSIS — Z20.822 COUGH WITH EXPOSURE TO COVID-19 VIRUS: Primary | ICD-10-CM

## 2022-04-22 DIAGNOSIS — R05.8 COUGH WITH EXPOSURE TO COVID-19 VIRUS: Primary | ICD-10-CM

## 2022-04-22 LAB — SARS-COV-2 RNA PNL SPEC NAA+PROBE: DETECTED

## 2022-04-22 PROCEDURE — 99213 OFFICE O/P EST LOW 20 MIN: CPT | Performed by: NURSE PRACTITIONER

## 2022-04-22 RX ORDER — LISINOPRIL 40 MG/1
TABLET ORAL
COMMUNITY
Start: 2022-03-22

## 2022-04-22 RX ORDER — PREDNISONE 20 MG/1
20 TABLET ORAL 2 TIMES DAILY
Qty: 10 TABLET | Refills: 0 | Status: SHIPPED | OUTPATIENT
Start: 2022-04-22 | End: 2022-04-27

## 2022-04-22 RX ORDER — DEXTROMETHORPHAN HYDROBROMIDE AND PROMETHAZINE HYDROCHLORIDE 15; 6.25 MG/5ML; MG/5ML
5 SYRUP ORAL 4 TIMES DAILY PRN
Qty: 240 ML | Refills: 0 | Status: SHIPPED | OUTPATIENT
Start: 2022-04-22

## 2022-04-22 NOTE — TELEPHONE ENCOUNTER
Caller: Amina Acevedo    Relationship: [employer]    Best call back number: [phone]    What medication are you requesting: METHYLPREDONISOLONE 4MG    What are your current symptoms:     COVID POSITIVE 4/22   HEADACHE/SORETHROAT/COUGH/CONGESTION     How long have you been experiencing symptoms: 4/20    Have you had these symptoms before:    [] Yes  [x] No    Have you been treated for these symptoms before:   [] Yes  [x] No    If a prescription is needed, what is your preferred pharmacy and phone number: [facility] - [place] [address] [facility] [phone] [facility] [phone]      Additional notes:DAUGHTER WAS GIVEN THE MEDICATION ABOVE FOR HER SYMPTOMS AND HE WANTS TO KNOW IF HE CAN HAVE IT AS WELL.

## 2022-04-22 NOTE — PROGRESS NOTES
Chief Complaint  Exposure To Known Illness (Covid positive today)    Subjective          Bayron Acevedo presents to [facility]  Exposed to covid on Sunday, went to u/c on Tuesday, POC test was negative. PCR test came back positive today. Associated symptoms, congestion, cough, sneezing, body aches, chills. No sore throat, no ear pain. No headache at this time.    I have reviewed the patient's medical history in detail and updated the computerized patient record.    Current Outpatient Medications:   •  albuterol (PROVENTIL) (2.5 MG/3ML) 0.083% nebulizer solution, USE ONE VIAL BY NEBULIZATION EVERY FOUR HOURS AS NEEDED FOR FOR WHEEZING (Patient taking differently: Take 2.5 mg by nebulization Every 4 (Four) Hours As Needed.), Disp: 180 mL, Rfl: 0  •  amLODIPine (NORVASC) 5 MG tablet, Take 5 mg by mouth Daily., Disp: , Rfl:   •  aspirin 81 MG EC tablet, Take 81 mg by mouth Daily., Disp: , Rfl:   •  furosemide (LASIX) 20 MG tablet, Take 1 tablet by mouth daily., Disp: , Rfl: 1  •  lisinopril (PRINIVIL,ZESTRIL) 40 MG tablet, Take 1 tablet (40 mg total) by mouth 1 (one) time each day, Disp: , Rfl:   •  loratadine (CLARITIN REDITABS) 10 MG disintegrating tablet, Take 10 mg by mouth Daily As Needed for Allergies., Disp: , Rfl:   •  nitroglycerin (NITROSTAT) 0.4 MG SL tablet, Place 1 tablet under the tongue every 5 (five) minutes as needed for chest pain. Take no more than 3 doses in 15 minutes., Disp: 25 tablet, Rfl: 1  •  rosuvastatin (CRESTOR) 20 MG tablet, Take 20 mg by mouth Daily., Disp: , Rfl:   •  Unable to find, 1 each 1 (One) Time. Med Name: Omega XL dietary supplement for arthritic pain, Disp: , Rfl:   •  predniSONE (DELTASONE) 20 MG tablet, Take 1 tablet by mouth 2 (Two) Times a Day for 5 days., Disp: 10 tablet, Rfl: 0  •  promethazine-dextromethorphan (PROMETHAZINE-DM) 6.25-15 MG/5ML syrup, Take 5 mL by mouth 4 (Four) Times a Day As Needed for Cough., Disp: 240 mL, Rfl: 0  Objective  "  Vital Signs:   /72 (BP Location: Right arm, Patient Position: Sitting, Cuff Size: Adult)   Pulse 78   Temp 97.9 °F (36.6 °C) (Oral)   Ht 172.7 cm (68\")   Wt 86.2 kg (190 lb)   SpO2 94%   BMI 28.89 kg/m²            Physical Exam  Constitutional:       Appearance: Normal appearance. He is ill-appearing.   HENT:      Right Ear: Tympanic membrane normal.      Left Ear: Tympanic membrane normal.      Nose: Congestion present.      Mouth/Throat:      Mouth: Mucous membranes are moist.      Pharynx: Oropharynx is clear. No oropharyngeal exudate or posterior oropharyngeal erythema.   Cardiovascular:      Rate and Rhythm: Normal rate and regular rhythm.      Pulses: Normal pulses.      Heart sounds: Normal heart sounds.   Pulmonary:      Effort: Pulmonary effort is normal.      Breath sounds: Decreased air movement present. No wheezing, rhonchi or rales.   Skin:     General: Skin is warm and dry.   Neurological:      Mental Status: He is alert and oriented to person, place, and time.   Psychiatric:      Comments: No acute distress        Result Review :                 Assessment and Plan    Diagnoses and all orders for this visit:    1. Cough with exposure to COVID-19 virus (Primary)  -     predniSONE (DELTASONE) 20 MG tablet; Take 1 tablet by mouth 2 (Two) Times a Day for 5 days.  Dispense: 10 tablet; Refill: 0  -     promethazine-dextromethorphan (PROMETHAZINE-DM) 6.25-15 MG/5ML syrup; Take 5 mL by mouth 4 (Four) Times a Day As Needed for Cough.  Dispense: 240 mL; Refill: 0    Mr. Acevedo appears to be ill today.  His breath sounds are diminished. He is to start Prednisone 20 mg twice a day for 5 days.  He may take Promethazine DM 5 ml four times a day as needed for his cough.   He is to continue his Albuterol nebs as needed for cough, wheezing and/or shortness of breath.       Follow Up   Return if symptoms worsen or fail to improve, for Next scheduled follow up.  Patient was given instructions and counseling " regarding his condition or for health maintenance advice. Please see specific information pulled into the AVS if appropriate.

## 2022-10-25 NOTE — TELEPHONE ENCOUNTER
----- Message from DASHA Nunez sent at 11/22/2021 11:08 AM EST -----  Please call him and let him know that his creatinine is decreasing slowly but his other renal levels are increasing. I am referring him to nephrology for further evaluation.    No

## 2023-04-07 ENCOUNTER — APPOINTMENT (OUTPATIENT)
Dept: GENERAL RADIOLOGY | Facility: HOSPITAL | Age: 83
DRG: 286 | End: 2023-04-07
Payer: MEDICARE

## 2023-04-07 ENCOUNTER — HOSPITAL ENCOUNTER (INPATIENT)
Facility: HOSPITAL | Age: 83
LOS: 4 days | Discharge: HOME | DRG: 286 | End: 2023-04-12
Attending: EMERGENCY MEDICINE | Admitting: INTERNAL MEDICINE
Payer: MEDICARE

## 2023-04-07 DIAGNOSIS — I50.9 ACUTE CONGESTIVE HEART FAILURE, UNSPECIFIED HEART FAILURE TYPE: Primary | ICD-10-CM

## 2023-04-07 DIAGNOSIS — R77.8 ELEVATED TROPONIN: ICD-10-CM

## 2023-04-07 DIAGNOSIS — I25.10 CAD IN NATIVE ARTERY: ICD-10-CM

## 2023-04-07 DIAGNOSIS — N18.9 CHRONIC RENAL IMPAIRMENT, UNSPECIFIED CKD STAGE: ICD-10-CM

## 2023-04-07 DIAGNOSIS — D64.9 CHRONIC ANEMIA: ICD-10-CM

## 2023-04-07 DIAGNOSIS — I50.21 ACUTE SYSTOLIC HEART FAILURE: ICD-10-CM

## 2023-04-07 DIAGNOSIS — J96.01 ACUTE HYPOXEMIC RESPIRATORY FAILURE: ICD-10-CM

## 2023-04-07 LAB
ALBUMIN SERPL-MCNC: 4.2 G/DL (ref 3.5–5.2)
ALBUMIN/GLOB SERPL: 1.4 G/DL
ALP SERPL-CCNC: 87 U/L (ref 39–117)
ALT SERPL W P-5'-P-CCNC: 11 U/L (ref 1–41)
ANION GAP SERPL CALCULATED.3IONS-SCNC: 11.7 MMOL/L (ref 5–15)
AST SERPL-CCNC: 15 U/L (ref 1–40)
BASOPHILS # BLD AUTO: 0.05 10*3/MM3 (ref 0–0.2)
BASOPHILS NFR BLD AUTO: 0.7 % (ref 0–1.5)
BILIRUB SERPL-MCNC: 0.5 MG/DL (ref 0–1.2)
BUN SERPL-MCNC: 17 MG/DL (ref 8–23)
BUN/CREAT SERPL: 9.1 (ref 7–25)
CALCIUM SPEC-SCNC: 9.2 MG/DL (ref 8.6–10.5)
CHLORIDE SERPL-SCNC: 98 MMOL/L (ref 98–107)
CO2 SERPL-SCNC: 24.3 MMOL/L (ref 22–29)
CREAT SERPL-MCNC: 1.87 MG/DL (ref 0.76–1.27)
DEPRECATED RDW RBC AUTO: 41.6 FL (ref 37–54)
EGFRCR SERPLBLD CKD-EPI 2021: 35 ML/MIN/1.73
EOSINOPHIL # BLD AUTO: 0.24 10*3/MM3 (ref 0–0.4)
EOSINOPHIL NFR BLD AUTO: 3.5 % (ref 0.3–6.2)
ERYTHROCYTE [DISTWIDTH] IN BLOOD BY AUTOMATED COUNT: 13.9 % (ref 12.3–15.4)
GEN 5 2HR TROPONIN T REFLEX: 41 NG/L
GLOBULIN UR ELPH-MCNC: 3.1 GM/DL
GLUCOSE SERPL-MCNC: 105 MG/DL (ref 65–99)
HCT VFR BLD AUTO: 38.2 % (ref 37.5–51)
HGB BLD-MCNC: 11.9 G/DL (ref 13–17.7)
IMM GRANULOCYTES # BLD AUTO: 0.02 10*3/MM3 (ref 0–0.05)
IMM GRANULOCYTES NFR BLD AUTO: 0.3 % (ref 0–0.5)
LYMPHOCYTES # BLD AUTO: 1.14 10*3/MM3 (ref 0.7–3.1)
LYMPHOCYTES NFR BLD AUTO: 16.7 % (ref 19.6–45.3)
MAGNESIUM SERPL-MCNC: 2.1 MG/DL (ref 1.6–2.4)
MCH RBC QN AUTO: 25.8 PG (ref 26.6–33)
MCHC RBC AUTO-ENTMCNC: 31.2 G/DL (ref 31.5–35.7)
MCV RBC AUTO: 82.9 FL (ref 79–97)
MONOCYTES # BLD AUTO: 0.57 10*3/MM3 (ref 0.1–0.9)
MONOCYTES NFR BLD AUTO: 8.3 % (ref 5–12)
NEUTROPHILS NFR BLD AUTO: 4.82 10*3/MM3 (ref 1.7–7)
NEUTROPHILS NFR BLD AUTO: 70.5 % (ref 42.7–76)
NRBC BLD AUTO-RTO: 0 /100 WBC (ref 0–0.2)
NT-PROBNP SERPL-MCNC: 4839 PG/ML (ref 0–1800)
PLATELET # BLD AUTO: 193 10*3/MM3 (ref 140–450)
PMV BLD AUTO: 9.5 FL (ref 6–12)
POTASSIUM SERPL-SCNC: 3.9 MMOL/L (ref 3.5–5.2)
PROCALCITONIN SERPL-MCNC: 0.08 NG/ML (ref 0–0.25)
PROT SERPL-MCNC: 7.3 G/DL (ref 6–8.5)
QT INTERVAL: 421 MS
RBC # BLD AUTO: 4.61 10*6/MM3 (ref 4.14–5.8)
SODIUM SERPL-SCNC: 134 MMOL/L (ref 136–145)
TROPONIN T DELTA: 0 NG/L
TROPONIN T SERPL HS-MCNC: 41 NG/L
WBC NRBC COR # BLD: 6.84 10*3/MM3 (ref 3.4–10.8)

## 2023-04-07 PROCEDURE — 84145 PROCALCITONIN (PCT): CPT | Performed by: PHYSICIAN ASSISTANT

## 2023-04-07 PROCEDURE — 93005 ELECTROCARDIOGRAM TRACING: CPT | Performed by: PHYSICIAN ASSISTANT

## 2023-04-07 PROCEDURE — 99285 EMERGENCY DEPT VISIT HI MDM: CPT

## 2023-04-07 PROCEDURE — 80053 COMPREHEN METABOLIC PANEL: CPT | Performed by: PHYSICIAN ASSISTANT

## 2023-04-07 PROCEDURE — G0378 HOSPITAL OBSERVATION PER HR: HCPCS

## 2023-04-07 PROCEDURE — 84484 ASSAY OF TROPONIN QUANT: CPT | Performed by: PHYSICIAN ASSISTANT

## 2023-04-07 PROCEDURE — 25010000002 FUROSEMIDE PER 20 MG: Performed by: INTERNAL MEDICINE

## 2023-04-07 PROCEDURE — 83735 ASSAY OF MAGNESIUM: CPT | Performed by: PHYSICIAN ASSISTANT

## 2023-04-07 PROCEDURE — 25010000002 FUROSEMIDE PER 20 MG: Performed by: PHYSICIAN ASSISTANT

## 2023-04-07 PROCEDURE — 93010 ELECTROCARDIOGRAM REPORT: CPT | Performed by: INTERNAL MEDICINE

## 2023-04-07 PROCEDURE — 83880 ASSAY OF NATRIURETIC PEPTIDE: CPT | Performed by: PHYSICIAN ASSISTANT

## 2023-04-07 PROCEDURE — 85025 COMPLETE CBC W/AUTO DIFF WBC: CPT | Performed by: PHYSICIAN ASSISTANT

## 2023-04-07 PROCEDURE — 71045 X-RAY EXAM CHEST 1 VIEW: CPT

## 2023-04-07 RX ORDER — UREA 10 %
3 LOTION (ML) TOPICAL NIGHTLY PRN
Status: DISCONTINUED | OUTPATIENT
Start: 2023-04-07 | End: 2023-04-12 | Stop reason: HOSPADM

## 2023-04-07 RX ORDER — METOPROLOL SUCCINATE 50 MG/1
50 TABLET, EXTENDED RELEASE ORAL DAILY
Status: DISCONTINUED | OUTPATIENT
Start: 2023-04-08 | End: 2023-04-09

## 2023-04-07 RX ORDER — EPINEPHRINE 0.3 MG/.3ML
INJECTION SUBCUTANEOUS
COMMUNITY
Start: 2022-10-18 | End: 2023-04-12 | Stop reason: HOSPADM

## 2023-04-07 RX ORDER — HYDRALAZINE HYDROCHLORIDE 50 MG/1
50 TABLET, FILM COATED ORAL 3 TIMES DAILY
COMMUNITY
Start: 2023-04-07

## 2023-04-07 RX ORDER — ALBUTEROL SULFATE 2.5 MG/3ML
2.5 SOLUTION RESPIRATORY (INHALATION) EVERY 4 HOURS PRN
Status: DISCONTINUED | OUTPATIENT
Start: 2023-04-07 | End: 2023-04-12 | Stop reason: HOSPADM

## 2023-04-07 RX ORDER — AMLODIPINE BESYLATE 10 MG/1
10 TABLET ORAL
Status: DISCONTINUED | OUTPATIENT
Start: 2023-04-08 | End: 2023-04-09

## 2023-04-07 RX ORDER — HYDRALAZINE HYDROCHLORIDE 50 MG/1
50 TABLET, FILM COATED ORAL 3 TIMES DAILY
Status: DISCONTINUED | OUTPATIENT
Start: 2023-04-07 | End: 2023-04-12 | Stop reason: HOSPADM

## 2023-04-07 RX ORDER — FUROSEMIDE 10 MG/ML
40 INJECTION INTRAMUSCULAR; INTRAVENOUS ONCE
Status: COMPLETED | OUTPATIENT
Start: 2023-04-07 | End: 2023-04-07

## 2023-04-07 RX ORDER — KETOCONAZOLE 20 MG/ML
SHAMPOO TOPICAL
COMMUNITY
Start: 2022-09-15 | End: 2023-04-12 | Stop reason: HOSPADM

## 2023-04-07 RX ORDER — ACETAMINOPHEN 325 MG/1
650 TABLET ORAL EVERY 4 HOURS PRN
Status: DISCONTINUED | OUTPATIENT
Start: 2023-04-07 | End: 2023-04-12 | Stop reason: HOSPADM

## 2023-04-07 RX ORDER — METOPROLOL SUCCINATE 25 MG/1
50 TABLET, EXTENDED RELEASE ORAL DAILY
Qty: 60 TABLET | Refills: 5 | COMMUNITY
Start: 2023-02-23 | End: 2023-04-19

## 2023-04-07 RX ORDER — AMLODIPINE BESYLATE 5 MG/1
5 TABLET ORAL DAILY
Status: DISCONTINUED | OUTPATIENT
Start: 2023-04-07 | End: 2023-04-07

## 2023-04-07 RX ORDER — FUROSEMIDE 10 MG/ML
40 INJECTION INTRAMUSCULAR; INTRAVENOUS EVERY 12 HOURS
Status: DISCONTINUED | OUTPATIENT
Start: 2023-04-07 | End: 2023-04-10

## 2023-04-07 RX ORDER — ASPIRIN 81 MG/1
81 TABLET ORAL DAILY
Status: DISCONTINUED | OUTPATIENT
Start: 2023-04-07 | End: 2023-04-12 | Stop reason: HOSPADM

## 2023-04-07 RX ORDER — ONDANSETRON 2 MG/ML
4 INJECTION INTRAMUSCULAR; INTRAVENOUS EVERY 6 HOURS PRN
Status: DISCONTINUED | OUTPATIENT
Start: 2023-04-07 | End: 2023-04-12 | Stop reason: HOSPADM

## 2023-04-07 RX ORDER — AMLODIPINE BESYLATE 10 MG/1
10 TABLET ORAL
COMMUNITY
Start: 2022-10-18 | End: 2023-04-12 | Stop reason: HOSPADM

## 2023-04-07 RX ORDER — ONDANSETRON 4 MG/1
4 TABLET, FILM COATED ORAL EVERY 6 HOURS PRN
Status: DISCONTINUED | OUTPATIENT
Start: 2023-04-07 | End: 2023-04-12 | Stop reason: HOSPADM

## 2023-04-07 RX ADMIN — FUROSEMIDE 40 MG: 10 INJECTION, SOLUTION INTRAMUSCULAR; INTRAVENOUS at 21:35

## 2023-04-07 RX ADMIN — FUROSEMIDE 40 MG: 10 INJECTION, SOLUTION INTRAMUSCULAR; INTRAVENOUS at 16:48

## 2023-04-07 RX ADMIN — ASPIRIN 81 MG: 81 TABLET, COATED ORAL at 21:35

## 2023-04-07 RX ADMIN — HYDRALAZINE HYDROCHLORIDE 50 MG: 50 TABLET, FILM COATED ORAL at 21:35

## 2023-04-07 NOTE — ED PROVIDER NOTES
MD ATTESTATION NOTE    The DELMY and I have discussed this patient's history, physical exam, and treatment plan.  I have reviewed the documentation and personally had a face to face interaction with the patient. I affirm the documentation and agree with the treatment and plan.  The attached note describes my personal findings.    I provided a substantive portion of the care of this patient. I personally performed the physical exam, in its entirety.    Independent Historians: Patient, family    A complete HPI/ROS/PMH/PSH/SH/FH are unobtainable due to: Nothing    Chronic or social conditions impacting patient care (social determinants of health): None    Bayron Acevedo is a 82 y.o. male who presents to the ED c/o progressively worse shortness of breath over the last 2 to 3 weeks.  He reports it is now severe.  He states that it is worse with exertion.  He went to the cardiologist today and had an abnormal echo and was sent here for further evaluation.  He reports that laying flat makes it worse.  He states the cardiologist wanted him to go to [place] but he prefers [facility] [place], operated by [facility].        Prescription drug monitoring program review:     My review reveals  no controlled substance prescriptions reported.    On exam:  GENERAL: Awake, alert, no acute distress  SKIN: Warm, dry  HENT: Normocephalic, atraumatic  EYES: no scleral icterus  CV: regular rhythm, regular rate  RESPIRATORY: normal effort, lungs managed bilaterally especially at the bases  ABDOMEN: soft, nontender, nondistended  MUSCULOSKELETAL: no deformity  NEURO: alert, moves all extremities, follows commands    Labs  Recent Results (from the past 24 hour(s))   Basic Metabolic Panel    Collection Time: 04/08/23  4:15 AM    Specimen: Blood   Result Value Ref Range    Glucose 98 65 - 99 mg/dL    BUN 19 8 - 23 mg/dL    Creatinine 1.89 (H) 0.76 - 1.27 mg/dL    Sodium 136 136 - 145 mmol/L    Potassium 3.4 (L) 3.5 - 5.2 mmol/L    Chloride 98 98 - 107 mmol/L    CO2 26.5 22.0 - 29.0  mmol/L    Calcium 9.5 8.6 - 10.5 mg/dL    BUN/Creatinine Ratio 10.1 7.0 - 25.0    Anion Gap 11.5 5.0 - 15.0 mmol/L    eGFR 34.6 (L) >60.0 mL/min/1.73   CBC (No Diff)    Collection Time: 04/08/23  4:15 AM    Specimen: Blood   Result Value Ref Range    WBC 5.96 3.40 - 10.80 10*3/mm3    RBC 4.73 4.14 - 5.80 10*6/mm3    Hemoglobin 11.9 (L) 13.0 - 17.7 g/dL    Hematocrit 38.8 37.5 - 51.0 %    MCV 82.0 79.0 - 97.0 fL    MCH 25.2 (L) 26.6 - 33.0 pg    MCHC 30.7 (L) 31.5 - 35.7 g/dL    RDW 13.9 12.3 - 15.4 %    RDW-SD 41.3 37.0 - 54.0 fl    MPV 10.0 6.0 - 12.0 fL    Platelets 208 140 - 450 10*3/mm3       Radiology  No Radiology Exams Resulted Within Past 24 Hours    Medical Decision Making:  ED Course as of 04/08/23 2255 Fri Apr 07, 2023   1535 WBC: 6.84 [DC]   1535 Hemoglobin(!): 11.9  No significant change from baseline [DC]   1544 Oxygen saturation dropped to 88% with ambulation. [DC]   1603 proBNP(!): 4,839.0 [DC]   1603 HS Troponin T(!): 41 [DC]   1644 XR Chest 1 View  My independent interpretation of the chest x-ray is bilateral pleural effusions and left upper lobe infiltrate [TR]   1705 Procalcitonin: 0.08 [DC]   1723 EKG          EKG time: 1514  Rhythm/Rate: Sinus, rate 76  P waves and OH: Normal P, normal OH  QRS, axis: Left bundle branch block, left axis  ST and T waves: T wave inversions in the lateral leads    Independently Interpreted by me  No prior EKG available for comparison   [TR]   1730 Discussed case with Dr. Brito [facility], who will admit the patient. [DC]      ED Course User Index  [DC] Sharona Summers PA  [TR] Declan Oleary MD       With the patient's abnormal echo today I suspect he has an element of worsening heart failure.  We will check chemistries, blood counts, BNP, troponin, chest x-ray.  He is clinically orthopneic.  My differential diagnosis includes pneumonia, CHF, hypoxia, and others.    Procedures:  Procedures    I interpreted the cardiac monitor rhythm and my independent interpretation  is: normal sinus rhythm.     PPE: The patient wore a mask and I wore an N95 mask throughout the entire patient encounter.      The patient has started, but not completed, their COVID-19 vaccination series.      Diagnosis  Final diagnoses:   Acute congestive heart failure, unspecified heart failure type   Acute hypoxemic respiratory failure   Chronic renal impairment, unspecified CKD stage   Chronic anemia   Elevated troponin       Note Disclaimer: At [facility], we believe that sharing information builds trust and better relationships. You are receiving this note because you recently visited [facility]. It is possible you will see health information before a provider has talked with you about it. This kind of information can be easy to misunderstand. To help you fully understand what it means for your health, we urge you to discuss this note with your provider.     Declan Oleary MD  04/07/23 2029       Declan Oleary MD  04/08/23 0222

## 2023-04-07 NOTE — Clinical Note
Hemostasis started on the right brachial vein. Manual pressure applied to vessel. Manual pressure was held by SHAHANA. Manual pressure was held for 8 min. Hemostasis achieved successfully.

## 2023-04-07 NOTE — H&P
HISTORY AND PHYSICAL   [facility]        Date of Admission: 2023  Patient Identification:  Name: Bayron Acevedo  Age: 82 y.o.  Sex: male  :  1940  MRN: [id number]                     Primary Care Physician: Low Sepulveda    Chief Complaint:  82 year old gentleman presented to the emergency room with shortness of breath which started two weeks ago and has been worsening; he saw his cardiologist earlier today and had an echocardiogram done; he was advised to go to the ED; he has been worse with exertion and lying flat and has been sleeping in a recliner; denies fever or chills; he has been waking up in the middle of the night with shortness of air    History of Present Illness:   As above    Past Medical History:  Past Medical History:   Diagnosis Date   • AAA (abdominal aortic aneurysm)    • CAD in native artery    • Carotid stenosis     s/p CEA Left   • Colon polyp    • Heart attack    • Heart failure    • Hyperlipidemia    • Hypertension    • Perennial allergic rhinitis 2017   • S/P angioplasty with stent    • Tinnitus      Past Surgical History:  Past Surgical History:   Procedure Laterality Date   • ARTERIOGRAM AORTIC N/A 2021    Procedure: ZENITH AORTIC STENT GRAFT;  Surgeon: Karen Barrera Jr., MD;  Location: [facility] ;  Service: Vascular;  Laterality: N/A;   • CARDIAC CATHETERIZATION      X2   • CAROTID ENDARTERECTOMY Left 2013    Bruce Jones Jr, MD   • CAROTID STENT Left     LAD   • COLONOSCOPY     • HERNIA REPAIR     • HYDROCELE EXCISION / REPAIR      Dr. SINTIA Osorio      Home Meds:  Medications Prior to Admission   Medication Sig Dispense Refill Last Dose   • albuterol (PROVENTIL) (2.5 MG/3ML) 0.083% nebulizer solution USE ONE VIAL BY NEBULIZATION EVERY FOUR HOURS AS NEEDED FOR FOR WHEEZING (Patient taking differently: Take 2.5 mg by nebulization Every 4 (Four) Hours As Needed.) 180 mL 0 2023   • amLODIPine (NORVASC) 10 MG tablet 1 tablet.    2023   • amLODIPine (NORVASC) 5 MG tablet Take 1 tablet by mouth Daily.   2023   • aspirin 81 MG EC tablet Take 1 tablet by mouth Daily.   Past Week   • furosemide (LASIX) 20 MG tablet Take 1 tablet by mouth Daily.  1 2023   • hydrALAZINE (APRESOLINE) 50 MG tablet 1 tablet 3 (Three) Times a Day.   2023   • metoprolol succinate XL (TOPROL-XL) 25 MG 24 hr tablet Take 2 tablets by mouth Daily. 60 tablet 5 2023   • EPINEPHrine (EPIPEN) 0.3 MG/0.3ML solution auto-injector injection USE ONE INTRAMUSCULAR ONE TIME   Unknown   • ketoconazole (NIZORAL) 2 % shampoo   0 Refill(s)   More than a month   • nitroglycerin (NITROSTAT) 0.4 MG SL tablet Place 1 tablet under the tongue every 5 (five) minutes as needed for chest pain. Take no more than 3 doses in 15 minutes. 25 tablet 1 More than a month   • promethazine-dextromethorphan (PROMETHAZINE-DM) 6.25-15 MG/5ML syrup Take 5 mL by mouth 4 (Four) Times a Day As Needed for Cough. 240 mL 0 Unknown       Allergies:  Allergies   Allergen Reactions   • Codeine Sulfate Hives   • Penicillins Other (See Comments)     Passed out after a PCN shot- no other sx noted-per pateint     Immunizations:  Immunization History   Administered Date(s) Administered   • COVID-19 (MODERNA) 1st, 2nd, 3rd Dose Only 2021, 2021, 2021   • Pneumococcal Conjugate 13-Valent (PCV13) 10/06/2016   • Pneumococcal Polysaccharide (PPSV23) 2019   • Pneumococcal, Unspecified 2011     Social History:   Social History     Social History Narrative   • Not on file     Social History     Socioeconomic History   • Marital status:    Tobacco Use   • Smoking status: Former     Packs/day: 1.00     Years: 55.00     Pack years: 55.00     Types: Cigarettes     Quit date: 2000     Years since quittin.2   • Smokeless tobacco: Never   Vaping Use   • Vaping Use: Never used   Substance and Sexual Activity   • Alcohol use: No   • Drug use: No   • Sexual activity: Defer  "      Family History:  Family History   Problem Relation Age of Onset   • No Known Problems Mother         complications of child birth   • No Known Problems Father    • Cancer Brother    • Malig Hyperthermia Neg Hx         Review of Systems  See history of present illness and past medical history.  Patient denies headache, dizziness, syncope, falls, trauma, change in vision, change in hearing, change in taste, changes in weight, changes in appetite, focal weakness, numbness, or paresthesia.  Patient denies chest pain, palpitations cough, sinus pressure, rhinorrhea, epistaxis, hemoptysis, nausea, vomiting,hematemesis, diarrhea, constipation or hematchezia.  Denies cold or heat intolerance, polydipsia, polyuria, polyphagia. Denies hematuria, pyuria, dysuria, hesitancy, frequency or urgency. Denies consumption of raw and under cooked meats foods or change in water source.  Denies fever, chills, sweats, night sweats.  Denies missing any routine medications.       Objective:  T Max 24 hrs: Temp (24hrs), Av.9 °F (36.6 °C), Min:97.5 °F (36.4 °C), Max:98.2 °F (36.8 °C)    Vitals Ranges:   Temp:  [97.5 °F (36.4 °C)-98.2 °F (36.8 °C)] 97.5 °F (36.4 °C)  Heart Rate:  [63-99] 99  Resp:  [16-20] 16  BP: (156-185)/(77-96) 156/92      Exam:  /92 (BP Location: Left arm, Patient Position: Sitting)   Pulse 99   Temp 97.5 °F (36.4 °C) (Oral)   Resp 16   Ht 170.2 cm (67\")   Wt 79.8 kg (176 lb)   SpO2 96%   BMI 27.57 kg/m²     General Appearance:    Alert, cooperative, no distress, appears stated age   Head:    Normocephalic, without obvious abnormality, atraumatic   Eyes:    PERRL, conjunctivae/corneas clear, EOM's intact, both eyes   Ears:    Normal external ear canals, both ears   Nose:   Nares normal, septum midline, mucosa normal, no drainage    or sinus tenderness   Throat:   Lips, mucosa, and tongue normal   Neck:   Supple, symmetrical, trachea midline, no adenopathy;     thyroid:  no " enlargement/tenderness/nodules; no carotid    bruit or JVD   Back:     Symmetric, no curvature, ROM normal, no CVA tenderness   Lungs:     Decreased breath sounds bilaterally, respirations unlabored   Chest Wall:    No tenderness or deformity    Heart:    Regular rate and rhythm, S1 and S2 normal, no murmur, rub   or gallop   Abdomen:     Soft, nontender, bowel sounds active all four quadrants,     no masses, no hepatomegaly, no splenomegaly   Extremities:   Extremities normal, atraumatic, no cyanosis or edema   Pulses:   2+ and symmetric all extremities   Skin:   Skin color, texture, turgor normal, no rashes or lesions               .    Data Review:  Labs in chart were reviewed.  WBC   Date Value Ref Range Status   04/07/2023 6.84 3.40 - 10.80 10*3/mm3 Final     Hemoglobin   Date Value Ref Range Status   04/07/2023 11.9 (L) 13.0 - 17.7 g/dL Final     Hematocrit   Date Value Ref Range Status   04/07/2023 38.2 37.5 - 51.0 % Final     Platelets   Date Value Ref Range Status   04/07/2023 193 140 - 450 10*3/mm3 Final     Sodium   Date Value Ref Range Status   04/07/2023 134 (L) 136 - 145 mmol/L Final     Potassium   Date Value Ref Range Status   04/07/2023 3.9 3.5 - 5.2 mmol/L Final     Chloride   Date Value Ref Range Status   04/07/2023 98 98 - 107 mmol/L Final     CO2   Date Value Ref Range Status   04/07/2023 24.3 22.0 - 29.0 mmol/L Final     BUN   Date Value Ref Range Status   04/07/2023 17 8 - 23 mg/dL Final     Creatinine   Date Value Ref Range Status   04/07/2023 1.87 (H) 0.76 - 1.27 mg/dL Final     Glucose   Date Value Ref Range Status   04/07/2023 105 (H) 65 - 99 mg/dL Final     Calcium   Date Value Ref Range Status   04/07/2023 9.2 8.6 - 10.5 mg/dL Final     Magnesium   Date Value Ref Range Status   04/07/2023 2.1 1.6 - 2.4 mg/dL Final     AST (SGOT)   Date Value Ref Range Status   04/07/2023 15 1 - 40 U/L Final     ALT (SGPT)   Date Value Ref Range Status   04/07/2023 11 1 - 41 U/L Final     Alkaline  Phosphatase   Date Value Ref Range Status   04/07/2023 87 39 - 117 U/L Final                Imaging Results (All)     Procedure Component Value Units Date/Time    XR Chest 1 View [[id number]] Collected: 04/07/23 1553     Updated: 04/07/23 1601    Narrative:      XR CHEST 1 VW-     HISTORY: Male who is 82 years-old,  dyspnea     TECHNIQUE: Frontal view of the chest     COMPARISON: 01/21/2013     FINDINGS: The heart size is borderline. Aorta is calcified. Pulmonary  vasculature is mildly congested. Patchy opacity at the left upper lung  may represent developing infiltrate. Moderate left and mild to moderate  right pleural effusions are evident with atelectasis or infiltrate at  the bases. No pneumothorax. No acute osseous process.       Impression:      Left more than right pleural effusions and  atelectasis/infiltrate the bases, patchy opacity at the left upper lung,  follow-up recommended, CT can be obtained for further evaluation as  indicated. Borderline heart size with mild pulmonary vascular  congestion.     This report was finalized on 4/7/2023 3:57 PM by Dr. Bruce Johnson M.D.               Assessment:  Active Hospital Problems    Diagnosis  POA   • **Acute systolic congestive heart failure, unspecified heart failure type [I50.9]  Yes      Resolved Hospital Problems   No resolved problems to display.   dyspnea  Cad  Hypertension  ckd3  anemia    Plan:  Will continue diuresis  Monitor on telemetry  Troponin is slightly high but flat  Monitor creatinine on diuretics  D.w patient, wife and ED provider  Jeri Brito MD  4/7/2023  19:43 EDT

## 2023-04-07 NOTE — Clinical Note
The right DP pulse is +2. The right PT pulse is +1. The right radial pulse is +2. The right ulnar pulse is +1. The right femoral pulse is +2.

## 2023-04-07 NOTE — ED NOTES
"Nursing report ED to floor  Bayron Acevedo  82 y.o.  male    HPI :   Chief Complaint   Patient presents with    Shortness of Breath       Admitting doctor:   Jeri Brito MD    Admitting diagnosis:   The primary encounter diagnosis was Acute congestive heart failure, unspecified heart failure type. Diagnoses of Acute hypoxemic respiratory failure, Chronic renal impairment, unspecified CKD stage, Chronic anemia, and Elevated troponin were also pertinent to this visit.    Code status:   Current Code Status       Date Active Code Status Order ID Comments User Context       Prior            Allergies:   Codeine sulfate and Penicillins    Isolation:   No active isolations    Intake and Output  No intake or output data in the 24 hours ending 04/07/23 1747    Weight:       04/07/23  1506   Weight: 79.8 kg (176 lb)       Most recent vitals:   Vitals:    04/07/23 1506 04/07/23 1531 04/07/23 1631 04/07/23 1731   BP: 178/96 178/88 163/77 (!) 185/91   BP Location: Right arm Right arm     Patient Position:  Sitting     Pulse: 77 70 63 73   Resp: 19 17     Temp:       SpO2: 96% 96% 96% 95%   Weight: 79.8 kg (176 lb)      Height: 170.2 cm (67\")          Active LDAs/IV Access:   Lines, Drains & Airways       Active LDAs       Name Placement date Placement time Site Days    Peripheral IV 04/07/23 1515 Right Antecubital 04/07/23  1515  Antecubital  less than 1                    Labs (abnormal labs have a star):   Labs Reviewed   COMPREHENSIVE METABOLIC PANEL - Abnormal; Notable for the following components:       Result Value    Glucose 105 (*)     Creatinine 1.87 (*)     Sodium 134 (*)     eGFR 35.0 (*)     All other components within normal limits    Narrative:     GFR Normal >60  Chronic Kidney Disease <60  Kidney Failure <15    The GFR formula is only valid for adults with stable renal function between ages 18 and 70.   BNP (IN-HOUSE) - Abnormal; Notable for the following components:    proBNP 4,839.0 (*)     All other " "components within normal limits    Narrative:     Among patients with dyspnea, NT-proBNP is highly sensitive for the detection of acute congestive heart failure. In addition NT-proBNP of <300 pg/ml effectively rules out acute congestive heart failure with 99% negative predictive value.    Results may be falsely decreased if patient taking Biotin.     TROPONIN - Abnormal; Notable for the following components:    HS Troponin T 41 (*)     All other components within normal limits    Narrative:     High Sensitive Troponin T Reference Range:  <10.0 ng/L- Negative Female for AMI  <15.0 ng/L- Negative Male for AMI  >=10 - Abnormal Female indicating possible myocardial injury.  >=15 - Abnormal Male indicating possible myocardial injury.   Clinicians would have to utilize clinical acumen, EKG, Troponin, and serial changes to determine if it is an Acute Myocardial Infarction or myocardial injury due to an underlying chronic condition.        CBC WITH AUTO DIFFERENTIAL - Abnormal; Notable for the following components:    Hemoglobin 11.9 (*)     MCH 25.8 (*)     MCHC 31.2 (*)     Lymphocyte % 16.7 (*)     All other components within normal limits   MAGNESIUM - Normal   PROCALCITONIN - Normal    Narrative:     As a Marker for Sepsis (Non-Neonates):    1. <0.5 ng/mL represents a low risk of severe sepsis and/or septic shock.  2. >2 ng/mL represents a high risk of severe sepsis and/or septic shock.    As a Marker for Lower Respiratory Tract Infections that require antibiotic therapy:    PCT on Admission    Antibiotic Therapy       6-12 Hrs later    >0.5                Strongly Recommended  >0.25 - <0.5        Recommended   0.1 - 0.25          Discouraged              Remeasure/reassess PCT  <0.1                Strongly Discouraged     Remeasure/reassess PCT    As 28 day mortality risk marker: \"Change in Procalcitonin Result\" (>80% or <=80%) if Day 0 (or Day 1) and Day 4 values are available. Refer to " http://www.[facility]-pct-calculator.com    Change in PCT <=80%  A decrease of PCT levels below or equal to 80% defines a positive change in PCT test result representing a higher risk for 28-day all-cause mortality of patients diagnosed with severe sepsis for septic shock.    Change in PCT >80%  A decrease of PCT levels of more than 80% defines a negative change in PCT result representing a lower risk for 28-day all-cause mortality of patients diagnosed with severe sepsis or septic shock.      HIGH SENSITIVITIY TROPONIN T 2HR   CBC AND DIFFERENTIAL    Narrative:     The following orders were created for panel order CBC & Differential.  Procedure                               Abnormality         Status                     ---------                               -----------         ------                     CBC Auto Differential[[id number]]        Abnormal            Final result                 Please view results for these tests on the individual orders.       EKG:   ECG 12 Lead Dyspnea   Final Result   HEART RATE= 76  bpm   RR Interval= 789  ms   UT Interval= 199  ms   P Horizontal Axis= 1  deg   P Front Axis= 35  deg   QRSD Interval= 109  ms   QT Interval= 421  ms   QRS Axis= -52  deg   T Wave Axis=   deg   - ABNORMAL ECG -   Sinus rhythm   Probable left atrial enlargement   Incomplete left bundle branch block   Inferior infarct, old   TWI is not new   Electronically Signed By: Loly Fonseca ([facility]) 07-Apr-2023 16:14:46   Date and Time of Study: 2023-04-07 15:14:25          Meds given in ED:   Medications   furosemide (LASIX) injection 40 mg (40 mg Intravenous Given 4/7/23 1648)       Imaging results:  XR Chest 1 View    Result Date: 4/7/2023  Left more than right pleural effusions and atelectasis/infiltrate the bases, patchy opacity at the left upper lung, follow-up recommended, CT can be obtained for further evaluation as indicated. Borderline heart size with mild pulmonary vascular congestion.  This report was  finalized on 2023 3:57 PM by Dr. Bruce Johnson M.D.       Ambulatory status:   - assist x1      Social issues:   Social History     Socioeconomic History    Marital status:    Tobacco Use    Smoking status: Former     Packs/day: 1.00     Years: 55.00     Pack years: 55.00     Types: Cigarettes     Quit date:      Years since quittin.2    Smokeless tobacco: Never   Vaping Use    Vaping Use: Never used   Substance and Sexual Activity    Alcohol use: No    Drug use: No    Sexual activity: Defer       NIH Stroke Scale:         Darcie Lawrence RN  23 17:47 EDT

## 2023-04-07 NOTE — Clinical Note
Hemostasis started on the right radial artery. R-Band was used in achieving hemostasis. Radial compression device applied to vessel. Hemostasis achieved successfully. Closure device additional comment: TR BAND 12 CC OF AIR

## 2023-04-07 NOTE — ED PROVIDER NOTES
EMERGENCY DEPARTMENT ENCOUNTER    Room Number:  S502/1  Date of encounter:  4/7/2023  PCP: Low Sepulveda  Patient Care Team:  Low Sepulveda as PCP - General (Family Medicine)  Guru Simpson MD as Consulting Physician (Cardiology)  Karen Barrera Jr., MD as Consulting Physician (Vascular Surgery)  Bronson Blanc MD as Consulting Physician (Gastroenterology)  Cristian Reyes MD as Consulting Physician (Nephrology)   Independent Historians: Patient    HPI:  Chief Complaint: Dyspnea  A complete HPI/ROS/PMH/PSH/SH/FH are unobtainable due to: None    Chronic or social conditions impacting patient care (social determinants of health): None    Context: Bayron Acevedo is a 82 y.o. male who presents to the ED c/o shortness of breath that has been worsening over the past 2 to 3 weeks.  Patient reports it is worse with exertion.  He denies chest pain with the shortness of breath.  Patient was seen by his cardiologist and had an abnormal echocardiogram today.  They recommended he go to the ER for evaluation of acute heart failure.  Patient reports his primary cardiologist is Dr. Simpson with [facility]. Pt also notes he has been sleeping in a recliner the past couple weeks.    Review of prior external notes (non-ED): Office visit with cardiology Dr. Armstrong today.  Patient was seen for dyspnea and to discuss abnormal echocardiogram results.  Patient was recommended to increase hydralazine to 50 mg 3 times daily and to go to the ER.    Review of prior external test results outside of this encounter: BNP on 3/31/2023 was 677.6.  Labs from same date showed mild hyponatremia of sodium of 135.    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   • Hypertension    • Hyperlipidemia    • S/P angioplasty with stent    • Heart failure    • Heart attack    • Colon polyp    • Carotid stenosis    • CAD in native  artery    • Peripheral artery disease 2016   • History of hydrocele 2016   • Perennial allergic rhinitis 2017   • AAA (abdominal aortic aneurysm) without rupture 2021     Resolved Ambulatory Problems     Diagnosis Date Noted   • No Resolved Ambulatory Problems     Past Medical History:   Diagnosis Date   • AAA (abdominal aortic aneurysm)    • Tinnitus        The patient has started, but not completed, their COVID-19 vaccination series.    PAST SURGICAL HISTORY  Past Surgical History:   Procedure Laterality Date   • ARTERIOGRAM AORTIC N/A 2021    Procedure: ZENITH AORTIC STENT GRAFT;  Surgeon: Karen Barrera Jr., MD;  Location: [facility] OR ;  Service: Vascular;  Laterality: N/A;   • CARDIAC CATHETERIZATION      X2   • CAROTID ENDARTERECTOMY Left 2013    Bruce Jones Jr, MD   • CAROTID STENT Left     LAD   • COLONOSCOPY     • HERNIA REPAIR     • HYDROCELE EXCISION / REPAIR      Dr. SINTIA Osorio         FAMILY HISTORY  Family History   Problem Relation Age of Onset   • No Known Problems Mother         complications of child birth   • No Known Problems Father    • Cancer Brother    • Malig Hyperthermia Neg Hx          SOCIAL HISTORY  Social History     Socioeconomic History   • Marital status:    Tobacco Use   • Smoking status: Former     Packs/day: 1.00     Years: 55.00     Pack years: 55.00     Types: Cigarettes     Quit date:      Years since quittin.2   • Smokeless tobacco: Never   Vaping Use   • Vaping Use: Never used   Substance and Sexual Activity   • Alcohol use: No   • Drug use: No   • Sexual activity: Defer         ALLERGIES  Codeine sulfate and Penicillins        REVIEW OF SYSTEMS  Review of Systems   Constitutional: Negative for chills and fever.   Respiratory: Positive for shortness of breath.    Cardiovascular: Negative for chest pain and leg swelling.        All systems reviewed and negative except for those discussed in HPI.       PHYSICAL  EXAM    I have reviewed the triage vital signs and nursing notes.    ED Triage Vitals   Temp Heart Rate Resp BP SpO2   04/07/23 1457 04/07/23 1457 04/07/23 1457 04/07/23 1506 04/07/23 1457   98.2 °F (36.8 °C) 79 20 178/96 94 %      Temp src Heart Rate Source Patient Position BP Location FiO2 (%)   -- 04/07/23 1506 -- 04/07/23 1506 --    Monitor  Right arm        Physical Exam  GENERAL: alert, no acute distress  SKIN: Warm, dry  HENT: Normocephalic, atraumatic  EYES: no scleral icterus  CV: regular rhythm, regular rate  RESPIRATORY: conversational dyspnea, coarse breath sounds to bases  ABDOMEN: soft, nontender, nondistended  MUSCULOSKELETAL: no deformity, trace edema  NEURO: alert, moves all extremities, follows commands          LAB RESULTS  Recent Results (from the past 24 hour(s))   ECG 12 Lead Dyspnea    Collection Time: 04/07/23  3:14 PM   Result Value Ref Range    QT Interval 421 ms   Comprehensive Metabolic Panel    Collection Time: 04/07/23  3:26 PM    Specimen: Blood   Result Value Ref Range    Glucose 105 (H) 65 - 99 mg/dL    BUN 17 8 - 23 mg/dL    Creatinine 1.87 (H) 0.76 - 1.27 mg/dL    Sodium 134 (L) 136 - 145 mmol/L    Potassium 3.9 3.5 - 5.2 mmol/L    Chloride 98 98 - 107 mmol/L    CO2 24.3 22.0 - 29.0 mmol/L    Calcium 9.2 8.6 - 10.5 mg/dL    Total Protein 7.3 6.0 - 8.5 g/dL    Albumin 4.2 3.5 - 5.2 g/dL    ALT (SGPT) 11 1 - 41 U/L    AST (SGOT) 15 1 - 40 U/L    Alkaline Phosphatase 87 39 - 117 U/L    Total Bilirubin 0.5 0.0 - 1.2 mg/dL    Globulin 3.1 gm/dL    A/G Ratio 1.4 g/dL    BUN/Creatinine Ratio 9.1 7.0 - 25.0    Anion Gap 11.7 5.0 - 15.0 mmol/L    eGFR 35.0 (L) >60.0 mL/min/1.73   BNP    Collection Time: 04/07/23  3:26 PM    Specimen: Blood   Result Value Ref Range    proBNP 4,839.0 (H) 0.0 - 1,800.0 pg/mL   High Sensitivity Troponin T    Collection Time: 04/07/23  3:26 PM    Specimen: Blood   Result Value Ref Range    HS Troponin T 41 (H) <15 ng/L   CBC Auto Differential    Collection Time:  04/07/23  3:26 PM    Specimen: Blood   Result Value Ref Range    WBC 6.84 3.40 - 10.80 10*3/mm3    RBC 4.61 4.14 - 5.80 10*6/mm3    Hemoglobin 11.9 (L) 13.0 - 17.7 g/dL    Hematocrit 38.2 37.5 - 51.0 %    MCV 82.9 79.0 - 97.0 fL    MCH 25.8 (L) 26.6 - 33.0 pg    MCHC 31.2 (L) 31.5 - 35.7 g/dL    RDW 13.9 12.3 - 15.4 %    RDW-SD 41.6 37.0 - 54.0 fl    MPV 9.5 6.0 - 12.0 fL    Platelets 193 140 - 450 10*3/mm3    Neutrophil % 70.5 42.7 - 76.0 %    Lymphocyte % 16.7 (L) 19.6 - 45.3 %    Monocyte % 8.3 5.0 - 12.0 %    Eosinophil % 3.5 0.3 - 6.2 %    Basophil % 0.7 0.0 - 1.5 %    Immature Grans % 0.3 0.0 - 0.5 %    Neutrophils, Absolute 4.82 1.70 - 7.00 10*3/mm3    Lymphocytes, Absolute 1.14 0.70 - 3.10 10*3/mm3    Monocytes, Absolute 0.57 0.10 - 0.90 10*3/mm3    Eosinophils, Absolute 0.24 0.00 - 0.40 10*3/mm3    Basophils, Absolute 0.05 0.00 - 0.20 10*3/mm3    Immature Grans, Absolute 0.02 0.00 - 0.05 10*3/mm3    nRBC 0.0 0.0 - 0.2 /100 WBC   Magnesium    Collection Time: 04/07/23  3:26 PM    Specimen: Blood   Result Value Ref Range    Magnesium 2.1 1.6 - 2.4 mg/dL   Procalcitonin    Collection Time: 04/07/23  3:26 PM    Specimen: Blood   Result Value Ref Range    Procalcitonin 0.08 0.00 - 0.25 ng/mL   High Sensitivity Troponin T 2Hr    Collection Time: 04/07/23  5:27 PM    Specimen: Blood   Result Value Ref Range    HS Troponin T 41 (H) <15 ng/L    Troponin T Delta 0 >=-4 - <+4 ng/L       Ordered the above labs and independently reviewed and interpreted the results.        RADIOLOGY  XR Chest 1 View    Result Date: 4/7/2023  XR CHEST 1 VW-  HISTORY: Male who is 82 years-old,  dyspnea  TECHNIQUE: Frontal view of the chest  COMPARISON: 01/21/2013  FINDINGS: The heart size is borderline. Aorta is calcified. Pulmonary vasculature is mildly congested. Patchy opacity at the left upper lung may represent developing infiltrate. Moderate left and mild to moderate right pleural effusions are evident with atelectasis or infiltrate  at the bases. No pneumothorax. No acute osseous process.      Left more than right pleural effusions and atelectasis/infiltrate the bases, patchy opacity at the left upper lung, follow-up recommended, CT can be obtained for further evaluation as indicated. Borderline heart size with mild pulmonary vascular congestion.  This report was finalized on 4/7/2023 3:57 PM by Dr. Bruce Johnson M.D.        I ordered the above noted radiological studies. Independently reviewed and interpreted by me.  See dictation for official radiology interpretation.      PROCEDURES    Procedures      MEDICATIONS GIVEN IN ER    Medications   acetaminophen (TYLENOL) tablet 650 mg (has no administration in time range)   ondansetron (ZOFRAN) tablet 4 mg (has no administration in time range)     Or   ondansetron (ZOFRAN) injection 4 mg (has no administration in time range)   melatonin tablet 3 mg (has no administration in time range)   furosemide (LASIX) injection 40 mg (has no administration in time range)   albuterol (PROVENTIL) nebulizer solution 0.083% 2.5 mg/3mL (has no administration in time range)   amLODIPine (NORVASC) tablet 10 mg (has no administration in time range)   aspirin EC tablet 81 mg (has no administration in time range)   hydrALAZINE (APRESOLINE) tablet 50 mg (has no administration in time range)   metoprolol succinate XL (TOPROL-XL) 24 hr tablet 50 mg (has no administration in time range)   furosemide (LASIX) injection 40 mg (40 mg Intravenous Given 4/7/23 1648)         PROGRESS, DATA ANALYSIS, CONSULTS, AND MEDICAL DECISION MAKING    All labs have been independently reviewed and interpreted by me.  All radiology studies have been independently reviewed and interpreted by me and discussed with radiologist dictating the report.   EKG's independently reviewed and interpreted by me.  Discussion below represents my analysis of pertinent findings related to patient's condition, differential diagnosis, treatment plan and  final disposition.    My differential diagnosis for dyspnea includes but is not limited to:    Asthma, COPD, pneumonia, pulmonary embolism, acute respiratory distress syndrome, pneumothorax, hemothorax, pleural effusion, pulmonary fibrosis, congestive heart failure, myocardial infarction, DKA, uremia, acidosis, sepsis, anemia, drug related, hyperventilation, CNS disease, inhalation exposure, airway obstruction, aspiration, electrolyte abnormalities, myasthenia gravis, panic attack, anaphylaxis      ED Course as of 04/07/23 2043 Fri Apr 07, 2023   1535 WBC: 6.84 [DC]   1535 Hemoglobin(!): 11.9  No significant change from baseline [DC]   1544 Oxygen saturation dropped to 88% with ambulation. [DC]   1603 proBNP(!): 4,839.0 [DC]   1603 HS Troponin T(!): 41 [DC]   1644 XR Chest 1 View  My independent interpretation of the chest x-ray is bilateral pleural effusions and left upper lobe infiltrate [TR]   1705 Procalcitonin: 0.08 [DC]   1723 EKG          EKG time: 1514  Rhythm/Rate: Sinus, rate 76  P waves and FL: Normal P, normal FL  QRS, axis: Left bundle branch block, left axis  ST and T waves: T wave inversions in the lateral leads    Independently Interpreted by me  No prior EKG available for comparison   [TR]   1730 Discussed case with Dr. Brito, [facility], who will admit the patient. [DC]      ED Course User Index  [DC] Sharona Summers PA  [TR] Declan Oleary MD           PPE: Patient was placed in face mask in first look. Patient was wearing facemask when I entered the room and throughout our encounter. I wore full protective equipment throughout this patient encounter including a face mask, and gloves. Hand hygiene was performed before donning protective equipment and after removal when leaving the room.        AS OF 20:43 EDT VITALS:    BP - 156/92  HR - 99  TEMP - 97.5 °F (36.4 °C) (Oral)  O2 SATS - 96%        DIAGNOSIS  Final diagnoses:   Acute congestive heart failure, unspecified heart failure type   Acute  hypoxemic respiratory failure   Chronic renal impairment, unspecified CKD stage   Chronic anemia   Elevated troponin         DISPOSITION  ED Disposition     ED Disposition   Decision to Admit    Condition   --    Comment   Level of Care: Telemetry [5]   Diagnosis: Acute congestive heart failure, unspecified heart failure type [[id number]]   Admitting Physician: LADI BARNETT [[id number]]   Attending Physician: LADI BARNETT [[id number]]                  Note Disclaimer: At [facility], we believe that sharing information builds trust and better relationships. You are receiving this note because you recently visited [facility]. It is possible you will see health information before a provider has talked with you about it. This kind of information can be easy to misunderstand. To help you fully understand what it means for your health, we urge you to discuss this note with your provider.       Sharona Summers PA  04/07/23 2047

## 2023-04-07 NOTE — ED NOTES
"Pt arrived after being referred by his primary MD due to shortness of breath. He was told he has a \"leaky valve and fluid on his lungs.\"    Pt masked upon arrival, this RN wearing appropriate PPE during encounter.    "

## 2023-04-08 LAB
ANION GAP SERPL CALCULATED.3IONS-SCNC: 11.5 MMOL/L (ref 5–15)
BUN SERPL-MCNC: 19 MG/DL (ref 8–23)
BUN/CREAT SERPL: 10.1 (ref 7–25)
CALCIUM SPEC-SCNC: 9.5 MG/DL (ref 8.6–10.5)
CHLORIDE SERPL-SCNC: 98 MMOL/L (ref 98–107)
CO2 SERPL-SCNC: 26.5 MMOL/L (ref 22–29)
CREAT SERPL-MCNC: 1.89 MG/DL (ref 0.76–1.27)
DEPRECATED RDW RBC AUTO: 41.3 FL (ref 37–54)
EGFRCR SERPLBLD CKD-EPI 2021: 34.6 ML/MIN/1.73
ERYTHROCYTE [DISTWIDTH] IN BLOOD BY AUTOMATED COUNT: 13.9 % (ref 12.3–15.4)
GLUCOSE SERPL-MCNC: 98 MG/DL (ref 65–99)
HCT VFR BLD AUTO: 38.8 % (ref 37.5–51)
HGB BLD-MCNC: 11.9 G/DL (ref 13–17.7)
MCH RBC QN AUTO: 25.2 PG (ref 26.6–33)
MCHC RBC AUTO-ENTMCNC: 30.7 G/DL (ref 31.5–35.7)
MCV RBC AUTO: 82 FL (ref 79–97)
PLATELET # BLD AUTO: 208 10*3/MM3 (ref 140–450)
PMV BLD AUTO: 10 FL (ref 6–12)
POTASSIUM SERPL-SCNC: 3.4 MMOL/L (ref 3.5–5.2)
RBC # BLD AUTO: 4.73 10*6/MM3 (ref 4.14–5.8)
SODIUM SERPL-SCNC: 136 MMOL/L (ref 136–145)
WBC NRBC COR # BLD: 5.96 10*3/MM3 (ref 3.4–10.8)

## 2023-04-08 PROCEDURE — 25010000002 FUROSEMIDE PER 20 MG: Performed by: INTERNAL MEDICINE

## 2023-04-08 PROCEDURE — 80048 BASIC METABOLIC PNL TOTAL CA: CPT | Performed by: INTERNAL MEDICINE

## 2023-04-08 PROCEDURE — 85027 COMPLETE CBC AUTOMATED: CPT | Performed by: INTERNAL MEDICINE

## 2023-04-08 RX ORDER — POTASSIUM CHLORIDE 1.5 G/1.77G
40 POWDER, FOR SOLUTION ORAL AS NEEDED
Status: DISCONTINUED | OUTPATIENT
Start: 2023-04-08 | End: 2023-04-12 | Stop reason: HOSPADM

## 2023-04-08 RX ORDER — POTASSIUM CHLORIDE 750 MG/1
40 TABLET, FILM COATED, EXTENDED RELEASE ORAL AS NEEDED
Status: DISCONTINUED | OUTPATIENT
Start: 2023-04-08 | End: 2023-04-12 | Stop reason: HOSPADM

## 2023-04-08 RX ORDER — POTASSIUM CHLORIDE 7.45 MG/ML
10 INJECTION INTRAVENOUS
Status: DISCONTINUED | OUTPATIENT
Start: 2023-04-08 | End: 2023-04-12 | Stop reason: HOSPADM

## 2023-04-08 RX ORDER — NITROGLYCERIN 0.4 MG/1
0.4 TABLET SUBLINGUAL
Status: DISCONTINUED | OUTPATIENT
Start: 2023-04-08 | End: 2023-04-12 | Stop reason: HOSPADM

## 2023-04-08 RX ADMIN — METOPROLOL SUCCINATE 50 MG: 50 TABLET, FILM COATED, EXTENDED RELEASE ORAL at 09:22

## 2023-04-08 RX ADMIN — FUROSEMIDE 40 MG: 10 INJECTION, SOLUTION INTRAMUSCULAR; INTRAVENOUS at 09:22

## 2023-04-08 RX ADMIN — HYDRALAZINE HYDROCHLORIDE 50 MG: 50 TABLET, FILM COATED ORAL at 09:22

## 2023-04-08 RX ADMIN — HYDRALAZINE HYDROCHLORIDE 50 MG: 50 TABLET, FILM COATED ORAL at 17:15

## 2023-04-08 RX ADMIN — AMLODIPINE BESYLATE 10 MG: 10 TABLET ORAL at 09:22

## 2023-04-08 RX ADMIN — ASPIRIN 81 MG: 81 TABLET, COATED ORAL at 09:22

## 2023-04-08 RX ADMIN — HYDRALAZINE HYDROCHLORIDE 50 MG: 50 TABLET, FILM COATED ORAL at 20:20

## 2023-04-08 RX ADMIN — FUROSEMIDE 40 MG: 10 INJECTION, SOLUTION INTRAMUSCULAR; INTRAVENOUS at 20:20

## 2023-04-08 RX ADMIN — POTASSIUM CHLORIDE 40 MEQ: 750 TABLET, EXTENDED RELEASE ORAL at 20:20

## 2023-04-08 NOTE — PROGRESS NOTES
Name: Bayron Acevedo ADMIT: 2023   : 1940  PCP: Low Sepulveda    MRN: [id number] LOS: 0 days   AGE/SEX: 82 y.o. male  ROOM: [facility]     Subjective   Subjective   Patient seen at bedside.     Objective   Objective   Vital Signs  Temp:  [97.5 °F (36.4 °C)-98.2 °F (36.8 °C)] 98.2 °F (36.8 °C)  Heart Rate:  [70-99] 76  Resp:  [16-18] 16  BP: (148-172)/(77-93) 148/77  SpO2:  [93 %-96 %] 93 %  on   ;   Device (Oxygen Therapy): room air  Body mass index is 27.57 kg/m².  Physical Exam   General, awake and alert.  Head and ENT, normocephalic and atraumatic.  Lungs, symmetric expansion, equal air entry bilaterally.  Heart, regular rate and rhythm.  Abdomen, soft and nontender.  Extremities, no clubbing or cyanosis.  Neuro, no focal deficits.  Skin: Warm and no rash.  Psych, normal mood and affect.  Musculoskeletal, joint examination is grossly normal.      Results Review     I reviewed the patient's new clinical results.  Results from last 7 days   Lab Units 23  0415 23  1526   WBC 10*3/mm3 5.96 6.84   HEMOGLOBIN g/dL 11.9* 11.9*   PLATELETS 10*3/mm3 208 193     Results from last 7 days   Lab Units 23  0415 23  1526   SODIUM mmol/L 136 134*   POTASSIUM mmol/L 3.4* 3.9   CHLORIDE mmol/L 98 98   CO2 mmol/L 26.5 24.3   BUN mg/dL 19 17   CREATININE mg/dL 1.89* 1.87*   GLUCOSE mg/dL 98 105*   EGFR mL/min/1.73 34.6* 35.0*     Results from last 7 days   Lab Units 23  1526   ALBUMIN g/dL 4.2   BILIRUBIN mg/dL 0.5   ALK PHOS U/L 87   AST (SGOT) U/L 15   ALT (SGPT) U/L 11     Results from last 7 days   Lab Units 23  0415 23  1526   CALCIUM mg/dL 9.5 9.2   ALBUMIN g/dL  --  4.2   MAGNESIUM mg/dL  --  2.1     Results from last 7 days   Lab Units 23  1526   PROCALCITONIN ng/mL 0.08     No results found for: HGBA1C, POCGLU    XR Chest 1 View    Result Date: 2023  Left more than right pleural effusions and atelectasis/infiltrate the bases, patchy opacity at the left upper lung,  follow-up recommended, CT can be obtained for further evaluation as indicated. Borderline heart size with mild pulmonary vascular congestion.  This report was finalized on 4/7/2023 3:57 PM by Dr. Bruce Johnson M.D.      I have personally reviewed all medications:  Scheduled Medications  amLODIPine, 10 mg, Oral, Q24H  aspirin, 81 mg, Oral, Daily  furosemide, 40 mg, Intravenous, Q12H  hydrALAZINE, 50 mg, Oral, TID  metoprolol succinate XL, 50 mg, Oral, Daily    Infusions   Diet  Diet: Cardiac Diets; Healthy Heart (2-3 Na+); Texture: Regular Texture (IDDSI 7); Fluid Consistency: Thin (IDDSI 0)    I have personally reviewed:  [x]  Laboratory   [x]  Microbiology   [x]  Radiology   [x]  EKG/Telemetry  [x]  Cardiology/Vascular   [x]  Pathology    [x]  Records       Assessment/Plan     Active Hospital Problems    Diagnosis  POA   • **Acute congestive heart failure, unspecified heart failure type [I50.9]  Yes   • Hyperlipidemia [E78.5]  Yes   • Hypertension [I10]  Yes   • CAD in native artery [I25.10]  Yes   • 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       82 y.o. male admitted with Acute congestive heart failure, unspecified heart failure type.    Assessment and plan  1.  Acute CHF exacerbation, continue Lasix, check echo, cardiology consult.    2.  Hypertension, hyperlipidemia, chronic conditions, continue home medications.    3.  History of coronary disease, continue home medications.    4.  CODE STATUS is full code.  Further plans based on hospital course.       Lonnie Farr MD  [facility]  04/08/23  17:45 EDT

## 2023-04-08 NOTE — PLAN OF CARE
Goal Outcome Evaluation:  Plan of Care Reviewed With: patient           Outcome Evaluation: A&O x4, RA VSS, no complaints of pain. Patient does have SOA with movement but no chest pain. Resting comfortably throughout the night with no questions or concerns. Will continue to monitor.

## 2023-04-08 NOTE — PLAN OF CARE
Goal Outcome Evaluation: Pt resting in bed with no signs of distress noted at this time. Cardiology consult has been called. VS stable. Bed in lowest position with siderails up X2. Call light within reach. RN will continue to monitor.

## 2023-04-09 LAB
ALBUMIN SERPL-MCNC: 3.7 G/DL (ref 3.5–5.2)
ALBUMIN/GLOB SERPL: 1.2 G/DL
ALP SERPL-CCNC: 88 U/L (ref 39–117)
ALT SERPL W P-5'-P-CCNC: 9 U/L (ref 1–41)
ANION GAP SERPL CALCULATED.3IONS-SCNC: 12 MMOL/L (ref 5–15)
AST SERPL-CCNC: 16 U/L (ref 1–40)
BASOPHILS # BLD AUTO: 0.06 10*3/MM3 (ref 0–0.2)
BASOPHILS NFR BLD AUTO: 1 % (ref 0–1.5)
BILIRUB SERPL-MCNC: 0.5 MG/DL (ref 0–1.2)
BUN SERPL-MCNC: 22 MG/DL (ref 8–23)
BUN/CREAT SERPL: 11.2 (ref 7–25)
CALCIUM SPEC-SCNC: 9.2 MG/DL (ref 8.6–10.5)
CHLORIDE SERPL-SCNC: 93 MMOL/L (ref 98–107)
CO2 SERPL-SCNC: 25 MMOL/L (ref 22–29)
CREAT SERPL-MCNC: 1.96 MG/DL (ref 0.76–1.27)
CREAT UR-MCNC: 111.5 MG/DL
DEPRECATED RDW RBC AUTO: 40.6 FL (ref 37–54)
EGFRCR SERPLBLD CKD-EPI 2021: 33.1 ML/MIN/1.73
EOSINOPHIL # BLD AUTO: 0.22 10*3/MM3 (ref 0–0.4)
EOSINOPHIL NFR BLD AUTO: 3.6 % (ref 0.3–6.2)
ERYTHROCYTE [DISTWIDTH] IN BLOOD BY AUTOMATED COUNT: 13.7 % (ref 12.3–15.4)
GLOBULIN UR ELPH-MCNC: 3 GM/DL
GLUCOSE SERPL-MCNC: 97 MG/DL (ref 65–99)
HCT VFR BLD AUTO: 39.1 % (ref 37.5–51)
HGB BLD-MCNC: 12.7 G/DL (ref 13–17.7)
IMM GRANULOCYTES # BLD AUTO: 0.02 10*3/MM3 (ref 0–0.05)
IMM GRANULOCYTES NFR BLD AUTO: 0.3 % (ref 0–0.5)
LYMPHOCYTES # BLD AUTO: 1.22 10*3/MM3 (ref 0.7–3.1)
LYMPHOCYTES NFR BLD AUTO: 20.2 % (ref 19.6–45.3)
MCH RBC QN AUTO: 26.3 PG (ref 26.6–33)
MCHC RBC AUTO-ENTMCNC: 32.5 G/DL (ref 31.5–35.7)
MCV RBC AUTO: 81.1 FL (ref 79–97)
MONOCYTES # BLD AUTO: 0.59 10*3/MM3 (ref 0.1–0.9)
MONOCYTES NFR BLD AUTO: 9.8 % (ref 5–12)
NEUTROPHILS NFR BLD AUTO: 3.92 10*3/MM3 (ref 1.7–7)
NEUTROPHILS NFR BLD AUTO: 65.1 % (ref 42.7–76)
NRBC BLD AUTO-RTO: 0 /100 WBC (ref 0–0.2)
PLATELET # BLD AUTO: 219 10*3/MM3 (ref 140–450)
PMV BLD AUTO: 9.7 FL (ref 6–12)
POTASSIUM SERPL-SCNC: 3.9 MMOL/L (ref 3.5–5.2)
PROT ?TM UR-MCNC: 75 MG/DL
PROT SERPL-MCNC: 6.7 G/DL (ref 6–8.5)
PROT/CREAT UR: 672.6 MG/G CREA (ref 0–200)
RBC # BLD AUTO: 4.82 10*6/MM3 (ref 4.14–5.8)
SODIUM SERPL-SCNC: 130 MMOL/L (ref 136–145)
WBC NRBC COR # BLD: 6.03 10*3/MM3 (ref 3.4–10.8)

## 2023-04-09 PROCEDURE — 82570 ASSAY OF URINE CREATININE: CPT | Performed by: INTERNAL MEDICINE

## 2023-04-09 PROCEDURE — 80053 COMPREHEN METABOLIC PANEL: CPT | Performed by: INTERNAL MEDICINE

## 2023-04-09 PROCEDURE — 97530 THERAPEUTIC ACTIVITIES: CPT

## 2023-04-09 PROCEDURE — 84156 ASSAY OF PROTEIN URINE: CPT | Performed by: INTERNAL MEDICINE

## 2023-04-09 PROCEDURE — 85025 COMPLETE CBC W/AUTO DIFF WBC: CPT | Performed by: INTERNAL MEDICINE

## 2023-04-09 PROCEDURE — 99222 1ST HOSP IP/OBS MODERATE 55: CPT | Performed by: INTERNAL MEDICINE

## 2023-04-09 PROCEDURE — 97162 PT EVAL MOD COMPLEX 30 MIN: CPT

## 2023-04-09 PROCEDURE — 25010000002 FUROSEMIDE PER 20 MG: Performed by: INTERNAL MEDICINE

## 2023-04-09 RX ORDER — METOPROLOL SUCCINATE 100 MG/1
100 TABLET, EXTENDED RELEASE ORAL DAILY
Status: DISCONTINUED | OUTPATIENT
Start: 2023-04-10 | End: 2023-04-10

## 2023-04-09 RX ORDER — LOSARTAN POTASSIUM 50 MG/1
50 TABLET ORAL
Status: DISCONTINUED | OUTPATIENT
Start: 2023-04-09 | End: 2023-04-10

## 2023-04-09 RX ORDER — METOPROLOL SUCCINATE 50 MG/1
50 TABLET, EXTENDED RELEASE ORAL ONCE
Status: COMPLETED | OUTPATIENT
Start: 2023-04-09 | End: 2023-04-09

## 2023-04-09 RX ADMIN — HYDRALAZINE HYDROCHLORIDE 50 MG: 50 TABLET, FILM COATED ORAL at 20:18

## 2023-04-09 RX ADMIN — METOPROLOL SUCCINATE 50 MG: 50 TABLET, FILM COATED, EXTENDED RELEASE ORAL at 09:50

## 2023-04-09 RX ADMIN — LOSARTAN POTASSIUM 50 MG: 50 TABLET, FILM COATED ORAL at 16:29

## 2023-04-09 RX ADMIN — AMLODIPINE BESYLATE 10 MG: 10 TABLET ORAL at 09:50

## 2023-04-09 RX ADMIN — METOPROLOL SUCCINATE 50 MG: 50 TABLET, FILM COATED, EXTENDED RELEASE ORAL at 11:08

## 2023-04-09 RX ADMIN — HYDRALAZINE HYDROCHLORIDE 50 MG: 50 TABLET, FILM COATED ORAL at 16:29

## 2023-04-09 RX ADMIN — FUROSEMIDE 40 MG: 10 INJECTION, SOLUTION INTRAMUSCULAR; INTRAVENOUS at 09:50

## 2023-04-09 RX ADMIN — FUROSEMIDE 40 MG: 10 INJECTION, SOLUTION INTRAMUSCULAR; INTRAVENOUS at 20:18

## 2023-04-09 RX ADMIN — HYDRALAZINE HYDROCHLORIDE 50 MG: 50 TABLET, FILM COATED ORAL at 09:50

## 2023-04-09 RX ADMIN — ASPIRIN 81 MG: 81 TABLET, COATED ORAL at 09:50

## 2023-04-09 NOTE — PLAN OF CARE
Goal Outcome Evaluation:  Plan of Care Reviewed With: patient, spouse        Progress: improving  Outcome Evaluation: Pt is a 83 y/o M who presented to ED with c/o SOB x2 weeks that has progressively worsened. Plan for R/L heart cath this week. Pt reports being independent at baseline without use of AD, living with his wife in a one level home w/ 3 YU. Pt is currently independent for all mobility without AD, ambulating 150'. Pt satting 96% on PT arrival while on RA. Pt ambulated on RA w/ c/o SOB around 120', satting 92% once returned to room. Quick recovery back to 95-96% within few seconds of rest. Pt denied any dizziness or fatigue with ambulation. Pt reports feeling at his baseline and only concern is his SOB. As pt is independent with all mobility and at his baseline, PT will sign-off. Educated pt on activity rec of sitting UIC and ambulating TID; pt v/u. Please re-consult if there is a status change. No skilled PT required at [place]. Thank you.

## 2023-04-09 NOTE — PROGRESS NOTES
Name: Bayron Acevedo ADMIT: 2023   : 1940  PCP: Low Sepulveda    MRN: [id number] LOS: 1 days   AGE/SEX: 82 y.o. male  ROOM: [facility]     Subjective   Subjective   Patient seen at bedside.       Objective   Objective   Vital Signs  Temp:  [97.3 °F (36.3 °C)-97.8 °F (36.6 °C)] 97.7 °F (36.5 °C)  Heart Rate:  [60-68] 60  Resp:  [16-24] 20  BP: (149-159)/(69-95) 151/69  SpO2:  [95 %-97 %] 95 %  on   ;   Device (Oxygen Therapy): room air  Body mass index is 27.57 kg/m².  Physical Exam    General, awake and alert.  Head and ENT, normocephalic and atraumatic.  Lungs, symmetric expansion, equal air entry bilaterally.  Heart, regular rate and rhythm.  Abdomen, soft and nontender.  Extremities, no clubbing or cyanosis.  Neuro, no focal deficits.  Skin: Warm and no rash.  Psych, normal mood and affect.  Musculoskeletal, joint examination is grossly normal.      Results Review     I reviewed the patient's new clinical results.  Results from last 7 days   Lab Units 23  0559 23  04123  1526   WBC 10*3/mm3 6.03 5.96 6.84   HEMOGLOBIN g/dL 12.7* 11.9* 11.9*   PLATELETS 10*3/mm3 219 208 193     Results from last 7 days   Lab Units 23  0559 235 23  1526   SODIUM mmol/L 130* 136 134*   POTASSIUM mmol/L 3.9 3.4* 3.9   CHLORIDE mmol/L 93* 98 98   CO2 mmol/L 25.0 26.5 24.3   BUN mg/dL 22 19 17   CREATININE mg/dL 1.96* 1.89* 1.87*   GLUCOSE mg/dL 97 98 105*   EGFR mL/min/1.73 33.1* 34.6* 35.0*     Results from last 7 days   Lab Units 23  0559 23  1526   ALBUMIN g/dL 3.7 4.2   BILIRUBIN mg/dL 0.5 0.5   ALK PHOS U/L 88 87   AST (SGOT) U/L 16 15   ALT (SGPT) U/L 9 11     Results from last 7 days   Lab Units 23  0559 23  0415 23  1526   CALCIUM mg/dL 9.2 9.5 9.2   ALBUMIN g/dL 3.7  --  4.2   MAGNESIUM mg/dL  --   --  2.1     Results from last 7 days   Lab Units 23  1526   PROCALCITONIN ng/mL 0.08     No results found for: HGBA1C, POCGLU    XR Chest 1  View    Result Date: 4/7/2023  Left more than right pleural effusions and atelectasis/infiltrate the bases, patchy opacity at the left upper lung, follow-up recommended, CT can be obtained for further evaluation as indicated. Borderline heart size with mild pulmonary vascular congestion.  This report was finalized on 4/7/2023 3:57 PM by Dr. Bruce Johnson M.D.      I have personally reviewed all medications:  Scheduled Medications  aspirin, 81 mg, Oral, Daily  furosemide, 40 mg, Intravenous, Q12H  hydrALAZINE, 50 mg, Oral, TID  losartan, 50 mg, Oral, Q24H  [START ON 4/10/2023] metoprolol succinate XL, 100 mg, Oral, Daily    Infusions   Diet  Diet: Cardiac Diets; Healthy Heart (2-3 Na+); Texture: Regular Texture (IDDSI 7); Fluid Consistency: Thin (IDDSI 0)  NPO Diet NPO Type: Strict NPO    I have personally reviewed:  [x]  Laboratory   [x]  Microbiology   [x]  Radiology   [x]  EKG/Telemetry  [x]  Cardiology/Vascular   [x]  Pathology    [x]  Records       Assessment/Plan     Active Hospital Problems    Diagnosis  POA   • **Acute congestive heart failure, unspecified heart failure type [I50.9]  Yes   • Hyperlipidemia [E78.5]  Yes   • Hypertension [I10]  Yes   • CAD in native artery [I25.10]  Yes   • 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       82 y.o. male admitted with Acute congestive heart failure, unspecified heart failure type.    Assessment and plan  1.  Acute on chronic systolic CHF, continue IV diuretics.  Nephrology and cardiology on board.  Patient has been started on losartan based on nephrology recommendations.  He may need a repeat cardiac cath.  N.p.o. after midnight for tentative plans for right and left heart cath.     2.  Moderate to severe mitral regurgitation, pulmonary hypertension, peripheral vascular disease, h/o AAA repair carotid endarterectomy.  Chronic conditions, cardiology on board.    3.   Hypertension, monitor BP trend, while patient is on Lasix and losartan.    4.  Chronic kidney disease, nephrology on board, follow management recommendations.    5.  CODE STATUS is full code.  Further plans based on hospital course.      Lonnie Farr MD  [facility]  04/09/23  14:29 EDT

## 2023-04-09 NOTE — PLAN OF CARE
Goal Outcome Evaluation:  Plan of Care Reviewed With: patient           Outcome Evaluation: A&O x4, VSS, RA, no complaints of pain at this time. On IV Lasix. Patient resting comfortably throughout the night, no questions orconcerns at this time. Will continue to monitor.

## 2023-04-09 NOTE — CONSULTS
[facility] Consult Note      Patient Name: Bayron Acevedo  : 1940  MRN: [id number]  Primary Care Physician:  Low Sepulveda  Referring Physician: Jeri Brito MD  Date of admission: 2023    Subjective     Reason for Consult: Chronic kidney disease and the patient need RAAS therapy    HPI:   Bayron Acevedo is a 82 y.o. male was admitted on 2023 when he presented with shortness of breath.  He is a patient followed [place] [facility] he had decreased ejection fraction in 20 1325 to 30%, but has recovered to 50 to 55%.  And he had significant coronary artery disease.  He was on ACE inhibitor lisinopril 40 mg daily but that was discontinued because of angioedema.  He has chronic kidney disease stage IIIb followed by my partner .  His baseline creatinine around 1.8 as noted on 3/27/2023  He has peripheral vascular disease abdominal aortic aneurysm repair carotid endarterectomy  He had an echocardiogram revealed ejection fraction 30 to 40% with moderate to severe mitral regurgitation right ventricular systolic pressure of 49 mmHg.  Has history of dyslipidemia, history of coronary artery disease prior PCI and stent    At present he has no chest pain or shortness of air, no orthopnea or PND, no nausea or vomiting, no abdominal pain, no dysuria or gross hematuria.    Review of Systems:   14 point review of systems is otherwise negative except for mentioned above on HPI    Personal History     Past Medical History:   Diagnosis Date   • AAA (abdominal aortic aneurysm)    • CAD in native artery    • Carotid stenosis     s/p CEA Left   • Colon polyp    • Heart attack    • Heart failure    • Hyperlipidemia    • Hypertension    • Perennial allergic rhinitis 2017   • S/P angioplasty with stent    • Tinnitus        Past Surgical History:   Procedure Laterality Date   • ARTERIOGRAM AORTIC N/A 2021    Procedure: ZENITH AORTIC STENT GRAFT;  Surgeon: Karen Barrera  Houston Edward MD;  Location: [facility] HYBRID OR 18/19;  Service: Vascular;  Laterality: N/A;   • CARDIAC CATHETERIZATION      X2   • CAROTID ENDARTERECTOMY Left 01/2013    Bruce Jones Jr, MD   • CAROTID STENT Left     LAD   • COLONOSCOPY     • HERNIA REPAIR     • HYDROCELE EXCISION / REPAIR      Dr. SINTIA Osorio       Family History: family history includes Cancer in his brother; No Known Problems in his father and mother.    Social History:  reports that he quit smoking about 23 years ago. His smoking use included cigarettes. He has a 55.00 pack-year smoking history. He has never used smokeless tobacco. He reports that he does not drink alcohol and does not use drugs.    Home Medications:  Prior to Admission medications    Medication Sig Start Date End Date Taking? Authorizing Provider   albuterol (PROVENTIL) (2.5 MG/3ML) 0.083% nebulizer solution USE ONE VIAL BY NEBULIZATION EVERY FOUR HOURS AS NEEDED FOR FOR WHEEZING  Patient taking differently: Take 2.5 mg by nebulization Every 4 (Four) Hours As Needed. 3/22/21  Yes Denise Foley APRN   amLODIPine (NORVASC) 10 MG tablet 1 tablet. 10/18/22  Yes Jose Dominguez MD   amLODIPine (NORVASC) 5 MG tablet Take 1 tablet by mouth Daily.   Yes ProviderJose MD   aspirin 81 MG EC tablet Take 1 tablet by mouth Daily.   Yes ProviderJose MD   furosemide (LASIX) 20 MG tablet Take 1 tablet by mouth Daily. 12/14/15  Yes Jose Dominguez MD   hydrALAZINE (APRESOLINE) 50 MG tablet 1 tablet 3 (Three) Times a Day. 4/7/23  Yes Jose Dominguez MD   metoprolol succinate XL (TOPROL-XL) 25 MG 24 hr tablet Take 2 tablets by mouth Daily. 2/23/23 8/22/23 Yes Jose Dominguez MD   EPINEPHrine (EPIPEN) 0.3 MG/0.3ML solution auto-injector injection USE ONE INTRAMUSCULAR ONE TIME 10/18/22   Jose Dominguez MD   ketoconazole (NIZORAL) 2 % shampoo   0 Refill(s) 9/15/22   Jose Dominguez MD   nitroglycerin (NITROSTAT) 0.4 MG SL tablet Place 1  tablet under the tongue every 5 (five) minutes as needed for chest pain. Take no more than 3 doses in 15 minutes. 5/5/16   Mario Bennett MD   promethazine-dextromethorphan (PROMETHAZINE-DM) 6.25-15 MG/5ML syrup Take 5 mL by mouth 4 (Four) Times a Day As Needed for Cough. 4/22/22   Denise Foley, APRN       Allergies:  Allergies   Allergen Reactions   • Codeine Sulfate Hives   • Penicillins Other (See Comments)     Passed out after a PCN shot- no other sx noted-per pateint       Objective     Vitals:   Temp:  [97.3 °F (36.3 °C)-98.2 °F (36.8 °C)] 97.8 °F (36.6 °C)  Heart Rate:  [63-76] 65  Resp:  [16-24] 24  BP: (148-159)/(77-95) 156/81    Intake/Output Summary (Last 24 hours) at 4/9/2023 1242  Last data filed at 4/9/2023 1100  Gross per 24 hour   Intake 720 ml   Output 2750 ml   Net -2030 ml       Physical Exam:   Constitutional: Awake, alert, no acute distress.  Chronically ill  HEENT: Sclera anicteric, no conjunctival injection, oral mucosa is  Neck: Supple, no thyromegaly, no lymphadenopathy, trachea at midline, no JVD  Respiratory: Clear to auscultation bilaterally, nonlabored respiration  Cardiovascular: RRR, no murmurs, no rubs or gallops, I did not appreciate carotid bruits.  Gastrointestinal: Positive bowel sounds, abdomen is soft, nontender and nondistended  : No palpable bladder  Musculoskeletal: No edema, no clubbing or cyanosis  Psychiatric: Appropriate affect, cooperative  Neurologic: Oriented x3, moving all extremities, normal speech and mental status  Skin: Warm and dry       Scheduled Meds:     aspirin, 81 mg, Oral, Daily  furosemide, 40 mg, Intravenous, Q12H  hydrALAZINE, 50 mg, Oral, TID  [START ON 4/10/2023] metoprolol succinate XL, 100 mg, Oral, Daily      IV Meds:        Results Reviewed:   I have personally reviewed the results from the time of this admission to 4/9/2023 12:42 EDT     Lab Results   Component Value Date    GLUCOSE 97 04/09/2023    CALCIUM 9.2 04/09/2023     (L)  04/09/2023    K 3.9 04/09/2023    CO2 25.0 04/09/2023    CL 93 (L) 04/09/2023    BUN 22 04/09/2023    CREATININE 1.96 (H) 04/09/2023    EGFRIFAFRI 34 (L) 11/19/2021    EGFRIFNONA 30 (L) 11/19/2021    BCR 11.2 04/09/2023    ANIONGAP 12.0 04/09/2023      Lab Results   Component Value Date    MG 2.1 04/07/2023    ALBUMIN 3.7 04/09/2023           Assessment / Plan     ASSESSMENT:  -Chronic kidney disease stage IIIb, associated with nephrosclerosis with the right atrophic kidney which measures 7.4 cm on prior ultrasound while the left kidney is 12.3 cm.  He appears to be near baseline.  -Coronary artery disease  -History of systolic and diastolic dysfunction  -Peripheral vascular disease with prior CEA and aortic aneurysm repair with stent graft  -Dyslipidemia  -Hypertension associate with chronic kidney disease, was on ACE inhibitor in the past was discontinued because of angioedema.  -Mild anemia hemoglobin 12.7, mostly associated with chronic kidney disease but no RIDDHI is indicated..      PLAN:  -With his history of angioedema would be very careful with initiation of ARB I will start him on losartan 50 mg daily monitor closely  -Check random urine for protein to creatinine ratio  -Will monitor renal function very closely and will watch for potential allergic reaction in this patient.  And if he is tolerating the ARB will consider switching to Entresto if indicated by cardiology.    Thank you for involving us in the care of Bayron Acevedo.  Please feel free to call with any questions.    Patrick Humphries MD  04/09/23  12:42 EDT    [facility]  [phone]      Please note that portions of this note were completed with a voice recognition program.   C/w home medications   No active flare

## 2023-04-09 NOTE — CONSULTS
Patient Name: Bayron Acevedo  :1940  82 y.o.    Date of Admission: 2023  Encounter Provider: Loly Fonseca MD  Date of Encounter Visit: 23  Place of Service: [facility] CARDIOLOGY  Referring Provider: Jeri Brito MD  Patient Care Team:  Low Sepulveda as PCP - General (Family Medicine)  Guru Simpson MD as Consulting Physician (Cardiology)  Karen Barrera Jr., MD as Consulting Physician (Vascular Surgery)  Bronson Blanc MD as Consulting Physician (Gastroenterology)  Cristian Reyes MD as Consulting Physician (Nephrology)      Chief complaint:  SOA    History of Present Illness:     This is a patient who follows with Dr. Simpson at [facility].  In  he had a ejection fraction of 25 to 30%.  Heart catheterization showed a significant stenosis in the LAD and he had a stent placed there in .  He had 50% disease in the circumflex which was treated medically.  His ejection fraction recovered to about 50 to 55%.  He also has peripheral vascular disease and is status post carotid endarterectomy, abdominal aortic aneurysm repair.  He also has hypertension, history of tobacco use, chronic kidney disease and hyperlipidemia.  He saw his usual cardiologist and was complaining of worsening shortness of breath.  He had an echocardiogram done which showed his ejection fraction was down to 30 to 40% with moderate to severe mitral regurgitation and a right ventricular systolic pressure of 49 mmHg.  He was told to go to [facility] but he does not like the [facility] so he came to [facility].  He has been treated with IV Lasix and says he is feeling better.    Lab work in the emergency room showed elevated high sensitivity troponin of 41 with repeat unchanged. proBNP 4839. Creatinine 1.8 which appears near his baseline. cXR showed pleural effusions (left greater than right) and mild pulmonary vascular congestion. His blood  pressure was elevated. He is on room air.     He has received 3 doses of IV lasix with over 2 liters of urine output documented.     Echo 4/7/23  Summary:                 Overall left ventricular systolic function is moderately depressed.                            The estimated ejection fraction is 35-40%.                            There is diffuse global hypokinesis of the left ventricle.                            Moderate concentric left ventricular hypertrophy.                            Mildly dilated left atrium.                            Trace aortic regurgitation is noted.                            Moderate to severe mitral regurgitation is present.                            Right ventricular systolic pressure of 49 mmHg.                            Mild to moderate tricuspid regurgitation.                            There is a small (trivial) pericardial effusion.                            There is no 2D and/or doppler evidence of tamponade physiology.                            A pleural effusion is visualized.   Holter 4/14/2022  Findings: 1.  This is a 48-hour recording done due to ventricular ectopy   2.  The intrinsic rhythm is sinus rhythm with an average heart rate of 65 bpm with a range from 44 to 120 bpm   3.  There are frequent PVCs totaling over 2200 representing 1.2% of all beats.  There was 134 beat run of a wide-complex tachycardia at 170 bpm consistent with ventricular tachycardia   4.  There are frequent PACs totaling over 2900 representing 1.6% of all beats.  The computer tallied 7 runs of SVT.  The longest was 20 beats and fastest 156 bpm.  Most of these appear to be a slow atrial tachycardia of less than 120 bpm   5.  There were no prolonged pauses   6 there were no symptoms     Echo 12/8/14  Conclusions:   LVH   Normal LV and RV systolic function with grade I LV diastolic   dysfunction   MIld MR   Mild TR     Past Medical History:   Diagnosis Date   • AAA (abdominal aortic aneurysm)     • CAD in native artery    • Carotid stenosis     s/p CEA Left   • Colon polyp    • Heart attack 2013   • Heart failure    • Hyperlipidemia    • Hypertension    • Perennial allergic rhinitis 1/6/2017   • S/P angioplasty with stent    • Tinnitus        Past Surgical History:   Procedure Laterality Date   • ARTERIOGRAM AORTIC N/A 5/17/2021    Procedure: ZENITH AORTIC STENT GRAFT;  Surgeon: Karen Barrera Jr., MD;  Location: [facility] OR 18/19;  Service: Vascular;  Laterality: N/A;   • CARDIAC CATHETERIZATION      X2   • CAROTID ENDARTERECTOMY Left 01/2013    Bruce Jones Jr, MD   • CAROTID STENT Left     LAD   • COLONOSCOPY     • HERNIA REPAIR     • HYDROCELE EXCISION / REPAIR      Dr. SINTIA Osorio         Prior to Admission medications    Medication Sig Start Date End Date Taking? Authorizing Provider   albuterol (PROVENTIL) (2.5 MG/3ML) 0.083% nebulizer solution USE ONE VIAL BY NEBULIZATION EVERY FOUR HOURS AS NEEDED FOR FOR WHEEZING  Patient taking differently: Take 2.5 mg by nebulization Every 4 (Four) Hours As Needed. 3/22/21  Yes Denise Foley APRN   amLODIPine (NORVASC) 10 MG tablet 1 tablet. 10/18/22  Yes ProviderJose MD   amLODIPine (NORVASC) 5 MG tablet Take 1 tablet by mouth Daily.   Yes Provider, MD Jose   aspirin 81 MG EC tablet Take 1 tablet by mouth Daily.   Yes Provider, MD Jose   furosemide (LASIX) 20 MG tablet Take 1 tablet by mouth Daily. 12/14/15  Yes ProviderJose MD   hydrALAZINE (APRESOLINE) 50 MG tablet 1 tablet 3 (Three) Times a Day. 4/7/23  Yes ProviderJose MD   metoprolol succinate XL (TOPROL-XL) 25 MG 24 hr tablet Take 2 tablets by mouth Daily. 2/23/23 8/22/23 Yes ProviderJose MD   EPINEPHrine (EPIPEN) 0.3 MG/0.3ML solution auto-injector injection USE ONE INTRAMUSCULAR ONE TIME 10/18/22   Jose Dominguez MD   ketoconazole (NIZORAL) 2 % shampoo   0 Refill(s) 9/15/22   Jose Dominguez MD   nitroglycerin (NITROSTAT)  0.4 MG SL tablet Place 1 tablet under the tongue every 5 (five) minutes as needed for chest pain. Take no more than 3 doses in 15 minutes. 16   Mario Bennett MD   promethazine-dextromethorphan (PROMETHAZINE-DM) 6.25-15 MG/5ML syrup Take 5 mL by mouth 4 (Four) Times a Day As Needed for Cough. 22   Denise Foley, APRRUPERT       Allergies   Allergen Reactions   • Codeine Sulfate Hives   • Penicillins Other (See Comments)     Passed out after a PCN shot- no other sx noted-per pateint       Social History     Socioeconomic History   • Marital status:    Tobacco Use   • Smoking status: Former     Packs/day: 1.00     Years: 55.00     Pack years: 55.00     Types: Cigarettes     Quit date:      Years since quittin.2   • Smokeless tobacco: Never   Vaping Use   • Vaping Use: Never used   Substance and Sexual Activity   • Alcohol use: No   • Drug use: No   • Sexual activity: Defer       Family History   Problem Relation Age of Onset   • No Known Problems Mother         complications of child birth   • No Known Problems Father    • Cancer Brother    • Malig Hyperthermia Neg Hx        REVIEW OF SYSTEMS:   All other systems reviewed and negative.        Objective:     Vitals:    23 1903 23 2335 23 0549 23 0700   BP: 149/80 159/95  156/81   BP Location: Left arm Left arm  Left arm   Patient Position: Sitting Lying  Lying   Pulse: 68 63  65   Resp: 16 16  24   Temp: 97.6 °F (36.4 °C) 97.3 °F (36.3 °C)  97.8 °F (36.6 °C)   TempSrc: Oral Oral  Oral   SpO2:  97%  95%   Weight:   79.8 kg (176 lb)    Height:         Body mass index is 27.57 kg/m².    Intake/Output Summary (Last 24 hours) at 2023 1014  Last data filed at 2023 0400  Gross per 24 hour   Intake 240 ml   Output 1950 ml   Net -1710 ml     Constitutional: He is oriented to person, place, and time. He appears well-developed. He does not appear ill.   HENT:   Head: Normocephalic and atraumatic. Head is without  contusion.   Right Ear: Hearing normal. No drainage.   Left Ear: Hearing normal. No drainage.   Nose: No nasal deformity. No epistaxis.   Eyes: Lids are normal. Right eye exhibits no exudate. Left eye exhibits no exudate.  Neck: No JVD present. Carotid bruit is not present. No tracheal deviation present. No thyroid mass and no thyromegaly present.   Cardiovascular: Normal rate, regular rhythm and normal heart sounds.    Pulses:       Posterior tibial pulses are 2+ on the right side, and 2+ on the left side.   Pulmonary/Chest: On room air but wheezes in all lung fields.  Abdominal: Soft. Normal appearance and bowel sounds are normal. There is no tenderness.   Musculoskeletal: Normal range of motion.        Right shoulder: He exhibits no deformity.        Left shoulder: He exhibits no deformity.   Neurological: He is alert and oriented to person, place, and time. He has normal strength.   Skin: Skin is warm, dry and intact. No rash noted.   Psychiatric: He has a normal mood and affect. His behavior is normal. Thought content normal.   Vitals reviewed      Lab Review:     Results from last 7 days   Lab Units 04/09/23  0559   SODIUM mmol/L 130*   POTASSIUM mmol/L 3.9   CHLORIDE mmol/L 93*   CO2 mmol/L 25.0   BUN mg/dL 22   CREATININE mg/dL 1.96*   CALCIUM mg/dL 9.2   BILIRUBIN mg/dL 0.5   ALK PHOS U/L 88   ALT (SGPT) U/L 9   AST (SGOT) U/L 16   GLUCOSE mg/dL 97     Results from last 7 days   Lab Units 04/07/23  1727 04/07/23  1526   HSTROP T ng/L 41* 41*     Results from last 7 days   Lab Units 04/09/23  0559   WBC 10*3/mm3 6.03   HEMOGLOBIN g/dL 12.7*   HEMATOCRIT % 39.1   PLATELETS 10*3/mm3 219         Results from last 7 days   Lab Units 04/07/23  1526   MAGNESIUM mg/dL 2.1          I personally viewed and interpreted the patient's EKG/Telemetry data.        Assessment and Plan:       1. Acute on chronic systolic congestive heart failure with an ejection fraction of 35 to 40%.  I agree with IV diuretics.  I am going  to increase his metoprolol.  I think he needs to have a repeat heart catheterization.  I will ask nephrology to weigh in on his kidney function with a possible impending heart cath and need for angiotensin receptor blocker or Entresto.  2.Moderate to severe mitral regurgitation.  3.  Pulmonary hypertension.  4.  Peripheral vascular disease with history of an abdominal aortic aneurysm repair and carotid endarterectomy.  5.  Hypertension.  Hold amlodipine for now.  May need to restart it but I would like to get him on guideline directed medical therapy for his heart failure.  6.  Chronic kidney disease  7.  Hyperlipidemia    N.p.o. after midnight for tentative plans for a right and left heart catheterization tomorrow without LV gram    Loly Fonseca MD  04/09/23  10:14 EDT

## 2023-04-10 PROBLEM — I50.23 ACUTE ON CHRONIC SYSTOLIC CHF (CONGESTIVE HEART FAILURE): Status: ACTIVE | Noted: 2023-04-10

## 2023-04-10 PROBLEM — N18.32 STAGE 3B CHRONIC KIDNEY DISEASE: Chronic | Status: ACTIVE | Noted: 2023-04-10

## 2023-04-10 LAB
ALBUMIN SERPL-MCNC: 3.6 G/DL (ref 3.5–5.2)
ANION GAP SERPL CALCULATED.3IONS-SCNC: 13.7 MMOL/L (ref 5–15)
BASOPHILS # BLD AUTO: 0.05 10*3/MM3 (ref 0–0.2)
BASOPHILS NFR BLD AUTO: 0.8 % (ref 0–1.5)
BUN SERPL-MCNC: 32 MG/DL (ref 8–23)
BUN/CREAT SERPL: 14.2 (ref 7–25)
CALCIUM SPEC-SCNC: 9.2 MG/DL (ref 8.6–10.5)
CHLORIDE SERPL-SCNC: 91 MMOL/L (ref 98–107)
CO2 SERPL-SCNC: 25.3 MMOL/L (ref 22–29)
CREAT SERPL-MCNC: 2.25 MG/DL (ref 0.76–1.27)
DEPRECATED RDW RBC AUTO: 40.6 FL (ref 37–54)
EGFRCR SERPLBLD CKD-EPI 2021: 28 ML/MIN/1.73
EOSINOPHIL # BLD AUTO: 0.17 10*3/MM3 (ref 0–0.4)
EOSINOPHIL NFR BLD AUTO: 2.8 % (ref 0.3–6.2)
ERYTHROCYTE [DISTWIDTH] IN BLOOD BY AUTOMATED COUNT: 14 % (ref 12.3–15.4)
GLUCOSE SERPL-MCNC: 94 MG/DL (ref 65–99)
HCT VFR BLD AUTO: 37.6 % (ref 37.5–51)
HGB BLD-MCNC: 11.8 G/DL (ref 13–17.7)
IMM GRANULOCYTES # BLD AUTO: 0.02 10*3/MM3 (ref 0–0.05)
IMM GRANULOCYTES NFR BLD AUTO: 0.3 % (ref 0–0.5)
LYMPHOCYTES # BLD AUTO: 1.47 10*3/MM3 (ref 0.7–3.1)
LYMPHOCYTES NFR BLD AUTO: 24.1 % (ref 19.6–45.3)
MAGNESIUM SERPL-MCNC: 2.1 MG/DL (ref 1.6–2.4)
MCH RBC QN AUTO: 25.6 PG (ref 26.6–33)
MCHC RBC AUTO-ENTMCNC: 31.4 G/DL (ref 31.5–35.7)
MCV RBC AUTO: 81.6 FL (ref 79–97)
MONOCYTES # BLD AUTO: 0.65 10*3/MM3 (ref 0.1–0.9)
MONOCYTES NFR BLD AUTO: 10.7 % (ref 5–12)
NEUTROPHILS NFR BLD AUTO: 3.74 10*3/MM3 (ref 1.7–7)
NEUTROPHILS NFR BLD AUTO: 61.3 % (ref 42.7–76)
NRBC BLD AUTO-RTO: 0 /100 WBC (ref 0–0.2)
PHOSPHATE SERPL-MCNC: 4.1 MG/DL (ref 2.5–4.5)
PLATELET # BLD AUTO: 235 10*3/MM3 (ref 140–450)
PMV BLD AUTO: 10 FL (ref 6–12)
POTASSIUM SERPL-SCNC: 3.9 MMOL/L (ref 3.5–5.2)
RBC # BLD AUTO: 4.61 10*6/MM3 (ref 4.14–5.8)
SODIUM SERPL-SCNC: 130 MMOL/L (ref 136–145)
URATE SERPL-MCNC: 9.1 MG/DL (ref 3.4–7)
WBC NRBC COR # BLD: 6.1 10*3/MM3 (ref 3.4–10.8)

## 2023-04-10 PROCEDURE — 80069 RENAL FUNCTION PANEL: CPT | Performed by: INTERNAL MEDICINE

## 2023-04-10 PROCEDURE — 99232 SBSQ HOSP IP/OBS MODERATE 35: CPT | Performed by: NURSE PRACTITIONER

## 2023-04-10 PROCEDURE — 83735 ASSAY OF MAGNESIUM: CPT | Performed by: INTERNAL MEDICINE

## 2023-04-10 PROCEDURE — 25010000002 FUROSEMIDE PER 20 MG: Performed by: INTERNAL MEDICINE

## 2023-04-10 PROCEDURE — 84550 ASSAY OF BLOOD/URIC ACID: CPT | Performed by: INTERNAL MEDICINE

## 2023-04-10 PROCEDURE — 85025 COMPLETE CBC W/AUTO DIFF WBC: CPT | Performed by: INTERNAL MEDICINE

## 2023-04-10 RX ORDER — LOSARTAN POTASSIUM 25 MG/1
25 TABLET ORAL
Status: DISCONTINUED | OUTPATIENT
Start: 2023-04-11 | End: 2023-04-12 | Stop reason: HOSPADM

## 2023-04-10 RX ORDER — METOPROLOL SUCCINATE 50 MG/1
50 TABLET, EXTENDED RELEASE ORAL DAILY
Status: DISCONTINUED | OUTPATIENT
Start: 2023-04-10 | End: 2023-04-12 | Stop reason: HOSPADM

## 2023-04-10 RX ORDER — SODIUM CHLORIDE 9 MG/ML
75 INJECTION, SOLUTION INTRAVENOUS CONTINUOUS
Status: ACTIVE | OUTPATIENT
Start: 2023-04-10 | End: 2023-04-11

## 2023-04-10 RX ADMIN — LOSARTAN POTASSIUM 50 MG: 50 TABLET, FILM COATED ORAL at 09:13

## 2023-04-10 RX ADMIN — HYDRALAZINE HYDROCHLORIDE 50 MG: 50 TABLET, FILM COATED ORAL at 17:16

## 2023-04-10 RX ADMIN — FUROSEMIDE 40 MG: 10 INJECTION, SOLUTION INTRAMUSCULAR; INTRAVENOUS at 09:28

## 2023-04-10 RX ADMIN — ASPIRIN 81 MG: 81 TABLET, COATED ORAL at 09:12

## 2023-04-10 RX ADMIN — SODIUM CHLORIDE 75 ML/HR: 9 INJECTION, SOLUTION INTRAVENOUS at 23:07

## 2023-04-10 RX ADMIN — HYDRALAZINE HYDROCHLORIDE 50 MG: 50 TABLET, FILM COATED ORAL at 09:12

## 2023-04-10 RX ADMIN — HYDRALAZINE HYDROCHLORIDE 50 MG: 50 TABLET, FILM COATED ORAL at 20:16

## 2023-04-10 NOTE — CONSULTS
Nutrition Services    Patient Name:  Bayron Acevedo  YOB: 1940  MRN: [id number]  Admit Date:  4/7/2023  Assessment Date:  04/10/23    Comment: Consult per nurse admission screen and MST score 4.     Received consult for nutrition assessment per nurse admission screen and MST score 4. Pt cleared for healthy heart diet today per MD. Limited documentation of intake per I/Os. NPO at MN for tentative plan for R&L heart cath tomorrow per notes. Pt reporting SOB PTA. Weight history reviewed with 15 lb (8.1%) weight loss past 4 months. Pt also endorsing decreased PO intake PTA per RN documentation. Pt meets criteria for moderate protein-calorie malnutrition. Will plan to complete NFPE at later time. Last documented BM on 4/9 per I/Os.    Patient meets ASPEN/AND criteria for nutrition diagnosis of moderate protein-calorie malnutrition of chronic illness based on: intake <75% est needs for at least past 3 months and 8.1% weight loss past 4 months per weight history.     Recommendations:   1. Continue healthy heart diet and encourage adequate PO intake PRN.     2. Add Boost Plus TID with meals for additional nutrition support.     RD will continue to follow course for PO intake and tolerance.     CLINICAL NUTRITION ASSESSMENT      Reason for Assessment MST score 2+, Nurse Admission Screen     Diagnosis/Problem   Acute on chronic CHF    Medical/Surgical History Past Medical History:   Diagnosis Date   • AAA (abdominal aortic aneurysm)    • CAD in native artery    • Carotid stenosis     s/p CEA Left   • Colon polyp    • Heart attack 2013   • Heart failure    • Hyperlipidemia    • Hypertension    • Perennial allergic rhinitis 1/6/2017   • S/P angioplasty with stent    • Tinnitus        Past Surgical History:   Procedure Laterality Date   • ARTERIOGRAM AORTIC N/A 5/17/2021    Procedure: ZENITH AORTIC STENT GRAFT;  Surgeon: Karen Barrera Jr., MD;  Location: [facility], [facility] OR 18/19;  Service: Vascular;   "Laterality: N/A;   • CARDIAC CATHETERIZATION      X2   • CAROTID ENDARTERECTOMY Left 01/2013    Bruce Jones Jr, MD   • CAROTID STENT Left     LAD   • COLONOSCOPY     • HERNIA REPAIR     • HYDROCELE EXCISION / REPAIR      Dr. SINTIA Osorio        Encounter Information        Nutrition History:     Food Preferences:    Supplements:    Factors Affecting Intake: altered respiratory status, decreased appetite     Anthropometrics        Current Height  Current Weight  BMI kg/m2 Height: 170.2 cm (67\")  Weight: 76.8 kg (169 lb 4.8 oz) (04/10/23 0600)  Body mass index is 26.52 kg/m².   Adjusted BMI (if applicable)        Admission Weight 169 lb (76.8 kg)       Ideal Body Weight (IBW) 148 lb (67.3 kg)   Adjusted IBW (if applicable)        Usual Body Weight (UBW) 185-190 lb (12/2022) per weight history    Weight Change/Trend Loss, Amount/Timeframe: 15 lb (8.1%) weight loss past 4 months per weight history        Weight History Wt Readings from Last 30 Encounters:   04/10/23 0600 76.8 kg (169 lb 4.8 oz)   04/09/23 0549 79.8 kg (176 lb)   04/08/23 0531 79.8 kg (176 lb)   04/07/23 1908 79.8 kg (176 lb)   04/07/23 1506 79.8 kg (176 lb)   04/22/22 1500 86.2 kg (190 lb)   11/05/21 0842 87.5 kg (193 lb)   05/17/21 0555 90.2 kg (198 lb 13.7 oz)   05/14/21 1100 90.3 kg (199 lb)   12/30/20 0857 90.7 kg (200 lb)   11/19/19 1139 91.2 kg (201 lb 1.6 oz)   08/28/19 1047 90.9 kg (200 lb 6.4 oz)   03/18/19 0932 92.1 kg (203 lb)   09/10/18 0942 91.1 kg (200 lb 14.4 oz)   08/09/18 1428 92.5 kg (204 lb)   05/21/18 1424 94.6 kg (208 lb 8 oz)   04/17/17 1100 92.7 kg (204 lb 6.4 oz)   04/06/17 1019 93 kg (205 lb 1.6 oz)   01/06/17 1001 92 kg (202 lb 14.4 oz)   10/06/16 0828 91.6 kg (202 lb)   04/19/16 1301 93.8 kg (206 lb 11.2 oz)   02/25/16 1400 92.1 kg (203 lb)   02/19/16 1036 92.5 kg (204 lb)           --  Tests/Procedures        Tests/Procedures Other:Heart cath tomorrow      Labs       Pertinent Labs    Results from last 7 days   Lab Units " 04/10/23  0447 04/09/23  0559 04/08/23 0415 04/07/23  1526   SODIUM mmol/L 130* 130* 136 134*   POTASSIUM mmol/L 3.9 3.9 3.4* 3.9   CHLORIDE mmol/L 91* 93* 98 98   CO2 mmol/L 25.3 25.0 26.5 24.3   BUN mg/dL 32* 22 19 17   CREATININE mg/dL 2.25* 1.96* 1.89* 1.87*   CALCIUM mg/dL 9.2 9.2 9.5 9.2   BILIRUBIN mg/dL  --  0.5  --  0.5   ALK PHOS U/L  --  88  --  87   ALT (SGPT) U/L  --  9  --  11   AST (SGOT) U/L  --  16  --  15   GLUCOSE mg/dL 94 97 98 105*     Results from last 7 days   Lab Units 04/10/23  0447 04/08/23  0415 04/07/23  1526   MAGNESIUM mg/dL 2.1  --  2.1   PHOSPHORUS mg/dL 4.1  --   --    HEMOGLOBIN g/dL 11.8*   < > 11.9*   HEMATOCRIT % 37.6   < > 38.2   WBC 10*3/mm3 6.10   < > 6.84   ALBUMIN g/dL 3.6   < > 4.2    < > = values in this interval not displayed.     Results from last 7 days   Lab Units 04/10/23  0447 04/09/23  0559 04/08/23 0415 04/07/23  1526   PLATELETS 10*3/mm3 235 219 208 193     External COVID19   Date Value Ref Range Status   04/22/2022 Detected (A) Not Detected - Ref. Range Final     No results found for: HGBA1C       Medications           Scheduled Medications aspirin, 81 mg, Oral, Daily  hydrALAZINE, 50 mg, Oral, TID  [START ON 4/11/2023] losartan, 25 mg, Oral, Q24H  metoprolol succinate XL, 50 mg, Oral, Daily       Infusions sodium chloride, 75 mL/hr       PRN Medications •  acetaminophen  •  albuterol  •  melatonin  •  nitroglycerin  •  ondansetron **OR** ondansetron  •  potassium chloride **OR** potassium chloride **OR** potassium chloride     Physical Findings          Physical Appearance alert, oriented, room air   Oral/Mouth Cavity dentures   Edema  lower extremity , 1+ (trace)   Gastrointestinal last bowel movement:4/9   Skin  bruising, excoriation   Tubes/Drains none   NFPE See Malnutrition Severity Assessment     Malnutrition Severity Assessment      Patient meets criteria for : Moderate (non-severe) Malnutrition (intake <75% est needs for at least past 3 months and 8.1%  weight loss past 4 months)  Malnutrition Type (last 8 hours)     Malnutrition Severity Assessment     Row Name 04/10/23 1701       Malnutrition Severity Assessment    Malnutrition Type Chronic Disease - Related Malnutrition    Row Name 04/10/23 1701       Insufficient Energy Intake     Insufficient Energy Intake Findings Moderate    Insufficient Energy Intake  <75% of est. energy requirement for > or equal to 3 months    Row Name 04/10/23 1701       Unintentional Weight Loss     Unintentional Weight Loss Findings Moderate    Unintentional Weight Loss  Weight loss of 7.5% in three months  15 lb (8.1%) weight loss past 4 months per weight history    Row Name 04/10/23 1701       Criteria Met (Must meet criteria for severity in at least 2 of these categories: M Wasting, Fat Loss, Fluid, Secondary Signs, Wt. Status, Intake)    Patient meets criteria for  Moderate (non-severe) Malnutrition  intake <75% est needs for at least past 3 months and 8.1% weight loss past 4 months                   Current Nutrition Orders & Evaluation of Intake       Oral Nutrition     Food Allergies NKFA   Current PO Diet Diet: Cardiac Diets; Healthy Heart (2-3 Na+); Texture: Regular Texture (IDDSI 7); Fluid Consistency: Thin (IDDSI 0)  NPO Diet NPO Type: Sips with Meds   Supplement n/a   PO Evaluation     % PO Intake Limited documentation     # of Days Evaluated    --  PES STATEMENT / NUTRITION DIAGNOSIS      Nutrition Dx Problem  Problem: Malnutrition  Etiology: Medical Diagnosis and Factors Affecting Nutrition SOB, decreased appetite   Signs/Symptoms: Report of Minimal PO Intake and Unintended Weight Change    Comment:    --  NUTRITION INTERVENTION / PLAN OF CARE      Intervention Goal(s) Maintain nutrition status, Nutrition support treatment, Meet estimated needs, Disease management/therapy, Tolerate PO , Maintain weight and No significant weight loss         RD Intervention/Action Encourage intake, Follow Tx Progress, Care plan reviewed  and Recommend/ordered:          Prescription/Orders:       PO Diet       Supplements Boost Plus TID with meals       Snacks       Enteral Nutrition       Parenteral Nutrition    New Prescription Ordered? Yes   --      Monitor/Evaluation Per protocol, I&O, PO intake, Supplement intake, Pertinent labs, Weight, Skin status, GI status, Symptoms   Discharge Plan/Needs Pending clinical course   Education Will instruct as appropriate   --    RD to follow per protocol.      Electronically signed by:  Uzma Mares RD  04/10/23 16:55 EDT

## 2023-04-10 NOTE — PLAN OF CARE
Goal Outcome Evaluation:  Plan of Care Reviewed With: patient           Outcome Evaluation: A&O x4, VSS, RA, no complaints of pain. Possible Heart Cath today. No questions or concerns. Patient resting comfortably at this time. Will continue to monitor. Call light in reach bed lowered.

## 2023-04-10 NOTE — PROGRESS NOTES
Name: Bayron Acevedo ADMIT: 2023   : 1940  PCP: Low Sepulveda    MRN: [id number] LOS: 2 days   AGE/SEX: 82 y.o. male  ROOM: [facility]     Subjective   Subjective   Feeling okay today. No CP or palp or SOA. Feeling a bit dizzy this afternoon and BPs noted to be low. He is voiding well. Tolerating diet but appetite poor. No N/V/D/abd pain. No F/C/NS.    Review of Systems   as above    Objective   Objective   Vital Signs  Temp:  [97.7 °F (36.5 °C)-98.1 °F (36.7 °C)] 97.9 °F (36.6 °C)  Heart Rate:  [54-63] 54  Resp:  [18-20] 18  BP: (123-151)/(63-73) 133/67  SpO2:  [94 %-95 %] 94 %  on   ;   Device (Oxygen Therapy): room air  Body mass index is 26.52 kg/m².  Physical Exam  Vitals and nursing note reviewed. Exam conducted with a chaperone present (wife and RN).   Constitutional:       General: He is not in acute distress.     Appearance: He is not ill-appearing, toxic-appearing or diaphoretic.   HENT:      Head: Normocephalic.      Nose: Nose normal.      Mouth/Throat:      Mouth: Mucous membranes are moist.      Pharynx: Oropharynx is clear.   Eyes:      General: No scleral icterus.        Right eye: No discharge.         Left eye: No discharge.      Extraocular Movements: Extraocular movements intact.      Conjunctiva/sclera: Conjunctivae normal.   Cardiovascular:      Rate and Rhythm: Normal rate and regular rhythm.      Pulses: Normal pulses.   Pulmonary:      Effort: Pulmonary effort is normal. No respiratory distress.      Breath sounds: Normal breath sounds. No wheezing or rales.   Abdominal:      General: Bowel sounds are normal. There is no distension.      Palpations: Abdomen is soft.      Tenderness: There is no abdominal tenderness.   Musculoskeletal:         General: No swelling or deformity. Normal range of motion.      Cervical back: Neck supple. No rigidity.   Lymphadenopathy:      Cervical: No cervical adenopathy.   Skin:     General: Skin is warm and dry.      Capillary Refill: Capillary  refill takes less than 2 seconds.      Coloration: Skin is not jaundiced.   Neurological:      General: No focal deficit present.      Mental Status: He is alert and oriented to person, place, and time. Mental status is at baseline.      Cranial Nerves: No cranial nerve deficit.      Coordination: Coordination normal.   Psychiatric:         Mood and Affect: Mood normal.         Behavior: Behavior normal.         Thought Content: Thought content normal.       Results Review     I reviewed the patient's new clinical results.  Results from last 7 days   Lab Units 04/10/23  0447 04/09/23  0559 04/08/23  0415 04/07/23  1526   WBC 10*3/mm3 6.10 6.03 5.96 6.84   HEMOGLOBIN g/dL 11.8* 12.7* 11.9* 11.9*   PLATELETS 10*3/mm3 235 219 208 193     Results from last 7 days   Lab Units 04/10/23  0447 04/09/23  0559 04/08/23  0415 04/07/23  1526   SODIUM mmol/L 130* 130* 136 134*   POTASSIUM mmol/L 3.9 3.9 3.4* 3.9   CHLORIDE mmol/L 91* 93* 98 98   CO2 mmol/L 25.3 25.0 26.5 24.3   BUN mg/dL 32* 22 19 17   CREATININE mg/dL 2.25* 1.96* 1.89* 1.87*   GLUCOSE mg/dL 94 97 98 105*   EGFR mL/min/1.73 28.0* 33.1* 34.6* 35.0*     Results from last 7 days   Lab Units 04/10/23  0447 04/09/23  0559 04/07/23  1526   ALBUMIN g/dL 3.6 3.7 4.2   BILIRUBIN mg/dL  --  0.5 0.5   ALK PHOS U/L  --  88 87   AST (SGOT) U/L  --  16 15   ALT (SGPT) U/L  --  9 11     Results from last 7 days   Lab Units 04/10/23  0447 04/09/23  0559 04/08/23  0415 04/07/23  1526   CALCIUM mg/dL 9.2 9.2 9.5 9.2   ALBUMIN g/dL 3.6 3.7  --  4.2   MAGNESIUM mg/dL 2.1  --   --  2.1   PHOSPHORUS mg/dL 4.1  --   --   --      Results from last 7 days   Lab Units 04/07/23  1526   PROCALCITONIN ng/mL 0.08     No results found for: HGBA1C, POCGLU    No radiology results for the last day  I have personally reviewed all medications:  Scheduled Medications  aspirin, 81 mg, Oral, Daily  hydrALAZINE, 50 mg, Oral, TID  losartan, 50 mg, Oral, Q24H  metoprolol succinate XL, 50 mg, Oral,  Daily    Infusions  sodium chloride, 75 mL/hr    Diet  Diet: Cardiac Diets; Healthy Heart (2-3 Na+); Texture: Regular Texture (IDDSI 7); Fluid Consistency: Thin (IDDSI 0)  NPO Diet NPO Type: Sips with Meds    I have personally reviewed:  [x]  Laboratory   []  Microbiology   [x]  Radiology   [x]  EKG/Telemetry  [x]  Cardiology/Vascular   []  Pathology    [x]  Records       Assessment/Plan     Active Hospital Problems    Diagnosis  POA   • **Acute on chronic systolic CHF (congestive heart failure) [I50.23]  Yes   • Stage 3b chronic kidney disease [N18.32]  Yes   • Hyperlipidemia [E78.5]  Yes   • Hypertension [I10]  Yes   • CAD in native artery [I25.10]  Yes   • S/P angioplasty with stent [Z95.820]  Not Applicable   • 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       81yo gentleman with h/o chronic systolic CHF, AAA repair, CAD, HTN, CKD3b, and COPD, who presented to ER at the direction of his cardiologist with 2 weeks of worsening SOA. He was found to have evidence of CHF on CXR as well as elevated Trop, Cr, and BNP.    Acute on chronic systolic CHF  Mod-severe mitral regurgitation  Pulm HTN: left/right heart cath planned for tomorrow if Cr allows, s/p diuresis initially--on hold now given rise in Cr, weight down, no hypoxia, uric acid appropriately elevated, GDMT per Card as BP and renal function allow    CAD: continue ASA, metoprolol, ?statin, heart cath tomorrow    H/o AAA repair  H/o Carotid endarterectomy: stable, continue ASA, not on statin for some reason . . .     HTN: BPs a bit low, amlodipine and Lasix on hold, continue hydralazine and metoprolol    CATHRYN/CKD3b: Cr up with initiation of ARB, continue for now per Renal, diuretics on hold now and IVFs started in anticipation of heart cath tomorrow, appreciate Renal attention to pt    HLD: not on statin PTA, checking lipid panel    Abnl CXR: suspect patchy opacity in  KIET is edema as no fever or leukocytosis and PCT wnl, lung exam unremarkable, will need f/u CXR after CHF treated      · SCDs for DVT prophylaxis.  · Full code.  · Discussed with patient, nursing staff, CCP and care team on multidisciplinary rounds. D/w wife at bedside.  · Anticipate discharge home with family in 1-2 days.      Myles Mckeon MD  [facility]  04/10/23  12:12 EDT

## 2023-04-10 NOTE — PROGRESS NOTES
[facility] Progress Note      Patient Name: Bayron Acevedo  : 1940  MRN: [id number]  Primary Care Physician:  Low Sepulveda  Date of admission: 2023    Subjective     Interval History:   Follow-up chronic kidney disease    Patient is feeling better today he has no chest pain or shortness of air, no orthopnea or PND, no lightheadedness, his blood pressure seems to be doing reasonably well, he tolerated the losartan yesterday.  He was supposed to have heart catheterization but his creatinine was slightly increased hence it was postponed.    Review of Systems:   As noted above    Objective     Vitals:   Temp:  [97.7 °F (36.5 °C)-98.1 °F (36.7 °C)] 97.9 °F (36.6 °C)  Heart Rate:  [54-63] 54  Resp:  [18-20] 18  BP: (123-151)/(63-73) 133/67    Intake/Output Summary (Last 24 hours) at 4/10/2023 1013  Last data filed at 2023 2330  Gross per 24 hour   Intake 480 ml   Output 1600 ml   Net -1120 ml       Physical Exam:    General Appearance: alert, awake, chronically ill, no acute distress   Skin: warm and dry  HEENT: oral mucosa normal, nonicteric sclera  Neck: supple, no JVD  Lungs: CTA, unlabored breathing effort  Heart: RRR, normal S1 and S2, no S3, no rub  Abdomen: soft, nontender, nondistended, normoactive bowels  : no palpable bladder  Extremities: no edema, cyanosis or clubbing  Neuro: normal speech and mental status     Scheduled Meds:     aspirin, 81 mg, Oral, Daily  furosemide, 40 mg, Intravenous, Q12H  hydrALAZINE, 50 mg, Oral, TID  losartan, 50 mg, Oral, Q24H  metoprolol succinate XL, 100 mg, Oral, Daily      IV Meds:        Results Reviewed:   I have personally reviewed the results from the time of this admission to 4/10/2023 10:13 EDT     Results from last 7 days   Lab Units 04/10/23  0447 23  0559 23  0415 23  1526   SODIUM mmol/L 130* 130* 136 134*   POTASSIUM mmol/L 3.9 3.9 3.4* 3.9   CHLORIDE mmol/L 91* 93* 98 98   CO2 mmol/L 25.3 25.0 26.5 24.3    BUN mg/dL 32* 22 19 17   CREATININE mg/dL 2.25* 1.96* 1.89* 1.87*   CALCIUM mg/dL 9.2 9.2 9.5 9.2   BILIRUBIN mg/dL  --  0.5  --  0.5   ALK PHOS U/L  --  88  --  87   ALT (SGPT) U/L  --  9  --  11   AST (SGOT) U/L  --  16  --  15   GLUCOSE mg/dL 94 97 98 105*       Estimated Creatinine Clearance: 26.5 mL/min (A) (by C-G formula based on SCr of 2.25 mg/dL (H)).    Results from last 7 days   Lab Units 04/10/23  0447 04/07/23  1526   MAGNESIUM mg/dL 2.1 2.1   PHOSPHORUS mg/dL 4.1  --        Results from last 7 days   Lab Units 04/10/23  0447   URIC ACID mg/dL 9.1*       Results from last 7 days   Lab Units 04/10/23  0447 04/09/23  0559 04/08/23  0415 04/07/23  1526   WBC 10*3/mm3 6.10 6.03 5.96 6.84   HEMOGLOBIN g/dL 11.8* 12.7* 11.9* 11.9*   PLATELETS 10*3/mm3 235 219 208 193             Assessment / Plan     ASSESSMENT:  -Chronic kidney disease stage IIIb, associated with nephrosclerosis with the right atrophic kidney which measures 7.4 cm on prior ultrasound while the left kidney is 12.3 cm.  He appears to be near baseline.  Creatinine yesterday was 1.96 he was started on losartan today's creatinine 2.25 he is not meeting the criteria to discontinue ACE inhibitor unless his creatinine continued to rise.  -Coronary artery disease, will need heart catheterization  -History of systolic and diastolic dysfunction  -Peripheral vascular disease with prior CEA and aortic aneurysm repair with stent graft  -Dyslipidemia  -Hypertension associate with chronic kidney disease, was on ACE inhibitor in the past was discontinued because of angioedema.  -Mild anemia hemoglobin 11.8, mostly associated with chronic kidney disease but no RIDDHI is indicated..    PLAN:  -Continue the same treatment and same dose of losartan  -I will give normal saline at midnight in preparation for possible heart cath tomorrow to minimize the risk of MAMADOU  -Will discontinue IV diuretics, and will restart oral diuretics after the heart catheterization and  or if having worsening congestive heart failure.  -Surveillance labs    I discussed the case with Dr. Mesa    Thank you for involving us in the care of Bayron Acevedo.  Please feel free to call with any questions.    Patrick Humphries MD  04/10/23  10:13 EDT    [facility]  [phone]    Please note that portions of this note were completed with a voice recognition program.

## 2023-04-10 NOTE — PLAN OF CARE
Problem: Adult Inpatient Plan of Care  Goal: Plan of Care Review  Outcome: Ongoing, Not Progressing  Flowsheets (Taken 4/9/2023 2018)  Progress: no change  Plan of Care Reviewed With: patient  Outcome Evaluation: A&O x4. VSS. Metoprolol increased by Cardiology. Heart Cath planned for tomorrow. No complaints of pain or discomfort this shift. Questions answered. Call light within reach.   Goal Outcome Evaluation:  Plan of Care Reviewed With: patient        Progress: no change  Outcome Evaluation: A&O x4. VSS. Metoprolol increased by Cardiology. Heart Cath planned for tomorrow. No complaints of pain or discomfort this shift. Questions answered. Call light within reach.

## 2023-04-10 NOTE — ACP (ADVANCE CARE PLANNING)
Visited with patient and completed AD.  Patient's wife also asked if we could complete an AD for her.  AD completed for patient's wife and faxed to medical records.

## 2023-04-10 NOTE — PROGRESS NOTES
"    Patient Name: Bayron Acevedo  :1940  82 y.o.      Patient Care Team:  Low Sepulveda as PCP - General (Family Medicine)  Guru Simpson MD as Consulting Physician (Cardiology)  Karen Barrera Jr., MD as Consulting Physician (Vascular Surgery)  Bronson Blanc MD as Consulting Physician (Gastroenterology)  Cristian Reyes MD as Consulting Physician (Nephrology)    Chief Complaint: follow up cardiomyopathy, CHF    Interval History: he is sitting on the side of the bed. He is on room air. Feels much better. Ambulated without cp or SOA.        Objective   Vital Signs  Temp:  [97.7 °F (36.5 °C)-98.1 °F (36.7 °C)] 97.9 °F (36.6 °C)  Heart Rate:  [54-63] 54  Resp:  [18-20] 18  BP: (123-151)/(63-73) 133/67    Intake/Output Summary (Last 24 hours) at 4/10/2023 1131  Last data filed at 2023 2330  Gross per 24 hour   Intake --   Output 1250 ml   Net -1250 ml     Flowsheet Rows    Flowsheet Row First Filed Value   Admission Height 170.2 cm (67\") Documented at 2023 1506   Admission Weight 79.8 kg (176 lb) Documented at 2023 1506          Physical Exam:   General Appearance:    Alert, cooperative, in no acute distress   Lungs:     Clear to auscultation.  Normal respiratory effort and rate.      Heart:    Regular rhythm and normal rate, normal S1 and S2, no murmurs, gallops or rubs.     Chest Wall:    No abnormalities observed   Abdomen:     Soft, nontender, positive bowel sounds.     Extremities:   no cyanosis, clubbing or edema.  No marked joint deformities.  Adequate musculoskeletal strength.       Results Review:    Results from last 7 days   Lab Units 04/10/23  0447   SODIUM mmol/L 130*   POTASSIUM mmol/L 3.9   CHLORIDE mmol/L 91*   CO2 mmol/L 25.3   BUN mg/dL 32*   CREATININE mg/dL 2.25*   GLUCOSE mg/dL 94   CALCIUM mg/dL 9.2     Results from last 7 days   Lab Units 23  1727 23  1526   HSTROP T ng/L 41* 41*     Results from last 7 days   Lab Units 04/10/23  0447 "   WBC 10*3/mm3 6.10   HEMOGLOBIN g/dL 11.8*   HEMATOCRIT % 37.6   PLATELETS 10*3/mm3 235         Results from last 7 days   Lab Units 04/10/23  0447   MAGNESIUM mg/dL 2.1                   Medication Review:   aspirin, 81 mg, Oral, Daily  hydrALAZINE, 50 mg, Oral, TID  losartan, 50 mg, Oral, Q24H  metoprolol succinate XL, 100 mg, Oral, Daily         sodium chloride, 75 mL/hr        Assessment & Plan   1. Acute on chronic congestive heart failure with ejection fraction 35-40%. Metoprolol increased and received 100 mg yesterday - HR today 55 and nurse has held it. Will reduce to 50 mg - ok to give today. Losartan added yesterday. History of angioedema. Monitor closely.    2. [place] on chronic kidney disease. Creatinine slightly up today. Diuretics stopped. Plans for IVF and potential heart cath tomorrow. .    3. Coronary artery disease status post stent to prox LAD. Had cardiomyopathy at that time but EF recovered.     4. PAD status post carotid endarterectomy and AAA repair    5. Hypertension - amlodipine stopped. Titrate GDMT as tolerated.     6. Moderate to severe mitral valve regurgitation    7. Hyperlipidemia - not on statin. Check lipid panel.     IVF per nephrology.   Tentative plan for right and left heart catheterization tomorrow pending renal function.     DASHA Bullard  [facility]  04/10/23  11:31 EDT

## 2023-04-10 NOTE — PLAN OF CARE
Goal Outcome Evaluation:              Outcome Evaluation: A&Ox4, up ad-linda. Cardiac cath cancelled today due to creatinine being elevated. Gentle fluids started at 75cc/hr. NPO after mn in hopes for heart cath in am. Cardiac meds adjusted due to bradycardia of 50's most of the day. Had a bout of symptomatic hypotension today. Was dizzy and felt bad, 99/43 as well. BP now 131/68. No distress noted.

## 2023-04-10 NOTE — PROGRESS NOTES
Discharge Planning Assessment  [facility]     Patient Name: Bayron Acevedo  MRN: [id number]  Today's Date: 4/10/2023    Admit Date: 4/7/2023    Plan: Return home with spouse denies any discharge needs   Discharge Needs Assessment     Row Name 04/10/23 1250       Living Environment    People in Home spouse    Name(s) of People in Home Amina Acevedo spouse [phone]    Current Living Arrangements home    Primary Care Provided by self    Family Caregiver if Needed spouse    Quality of Family Relationships involved;helpful;supportive    Able to Return to Prior Arrangements yes       Transition Planning    Patient/Family Anticipates Transition to home with family    Transportation Anticipated family or friend will provide       Discharge Needs Assessment    Equipment Currently Used at Home none    Concerns to be Addressed no discharge needs identified    Equipment Needed After Discharge none    Row Name 04/10/23 1025       Living Environment    Current Living Arrangements home    Primary Care Provided by self    Family Caregiver if Needed spouse    Quality of Family Relationships involved;helpful;supportive    Able to Return to Prior Arrangements yes       Transition Planning    Patient/Family Anticipates Transition to home with family    Patient/Family Anticipated Services at Transition none       Discharge Needs Assessment    Equipment Currently Used at Home none    Concerns to be Addressed no discharge needs identified    Equipment Needed After Discharge none               Discharge Plan     Row Name 04/10/23 1254       Plan    Plan Return home with spouse denies any discharge needs    Plan Comments Spoke with patient and spouse Amina [phone] at bedside face sheet verified. Lives in a single level home and is IADL's denies using any medical equipment. Denies any discharge needs plans to return home at discharge with spouse. If Entresto prescribed Entresto at discharge the Pharmacists was able do the cost it will be  23.40 for a 30 day supply. Melissa Bhandari RN              Continued Care and Services - Admitted Since 4/7/2023    Coordination has not been started for this encounter.          Demographic Summary     Row Name 04/10/23 1024       General Information    Referral Source admission list    Reason for Consult discharge planning               Functional Status     Row Name 04/10/23 1024       Functional Status, IADL    Medications independent    Meal Preparation independent    Housekeeping independent    Laundry independent    Shopping independent               Psychosocial    No documentation.                Abuse/Neglect    No documentation.                Legal    No documentation.                Substance Abuse    No documentation.                Patient Forms    No documentation.                   Melissa Bhandari, RN

## 2023-04-11 LAB
ACT BLD: 287 SECONDS (ref 82–152)
ALBUMIN SERPL-MCNC: 3.4 G/DL (ref 3.5–5.2)
ALBUMIN/GLOB SERPL: 1.4 G/DL
ALP SERPL-CCNC: 75 U/L (ref 39–117)
ALT SERPL W P-5'-P-CCNC: 8 U/L (ref 1–41)
ANION GAP SERPL CALCULATED.3IONS-SCNC: 9 MMOL/L (ref 5–15)
AST SERPL-CCNC: 14 U/L (ref 1–40)
BASOPHILS # BLD AUTO: 0.03 10*3/MM3 (ref 0–0.2)
BASOPHILS NFR BLD AUTO: 0.5 % (ref 0–1.5)
BILIRUB SERPL-MCNC: 0.5 MG/DL (ref 0–1.2)
BUN SERPL-MCNC: 34 MG/DL (ref 8–23)
BUN/CREAT SERPL: 17.2 (ref 7–25)
CALCIUM SPEC-SCNC: 8.3 MG/DL (ref 8.6–10.5)
CHLORIDE SERPL-SCNC: 95 MMOL/L (ref 98–107)
CHOLEST SERPL-MCNC: 123 MG/DL (ref 0–200)
CO2 SERPL-SCNC: 25 MMOL/L (ref 22–29)
CREAT SERPL-MCNC: 1.98 MG/DL (ref 0.76–1.27)
DEPRECATED RDW RBC AUTO: 41.6 FL (ref 37–54)
EGFRCR SERPLBLD CKD-EPI 2021: 32.7 ML/MIN/1.73
EOSINOPHIL # BLD AUTO: 0.24 10*3/MM3 (ref 0–0.4)
EOSINOPHIL NFR BLD AUTO: 3.8 % (ref 0.3–6.2)
ERYTHROCYTE [DISTWIDTH] IN BLOOD BY AUTOMATED COUNT: 13.8 % (ref 12.3–15.4)
GLOBULIN UR ELPH-MCNC: 2.4 GM/DL
GLUCOSE SERPL-MCNC: 92 MG/DL (ref 65–99)
HCT VFR BLD AUTO: 35.7 % (ref 37.5–51)
HCT VFR BLDA CALC: 35 % (ref 38–51)
HCT VFR BLDA CALC: 35 % (ref 38–51)
HDLC SERPL-MCNC: 34 MG/DL (ref 40–60)
HGB BLD-MCNC: 11.2 G/DL (ref 13–17.7)
HGB BLDA-MCNC: 11.9 G/DL (ref 12–17)
HGB BLDA-MCNC: 11.9 G/DL (ref 12–17)
IMM GRANULOCYTES # BLD AUTO: 0.02 10*3/MM3 (ref 0–0.05)
IMM GRANULOCYTES NFR BLD AUTO: 0.3 % (ref 0–0.5)
LDLC SERPL CALC-MCNC: 70 MG/DL (ref 0–100)
LDLC/HDLC SERPL: 2.04 {RATIO}
LYMPHOCYTES # BLD AUTO: 1.31 10*3/MM3 (ref 0.7–3.1)
LYMPHOCYTES NFR BLD AUTO: 20.9 % (ref 19.6–45.3)
MAGNESIUM SERPL-MCNC: 2.9 MG/DL (ref 1.6–2.4)
MCH RBC QN AUTO: 25.9 PG (ref 26.6–33)
MCHC RBC AUTO-ENTMCNC: 31.4 G/DL (ref 31.5–35.7)
MCV RBC AUTO: 82.6 FL (ref 79–97)
MONOCYTES # BLD AUTO: 0.68 10*3/MM3 (ref 0.1–0.9)
MONOCYTES NFR BLD AUTO: 10.8 % (ref 5–12)
NEUTROPHILS NFR BLD AUTO: 4 10*3/MM3 (ref 1.7–7)
NEUTROPHILS NFR BLD AUTO: 63.7 % (ref 42.7–76)
NRBC BLD AUTO-RTO: 0 /100 WBC (ref 0–0.2)
PHOSPHATE SERPL-MCNC: 3.8 MG/DL (ref 2.5–4.5)
PLATELET # BLD AUTO: 189 10*3/MM3 (ref 140–450)
PMV BLD AUTO: 10 FL (ref 6–12)
POTASSIUM SERPL-SCNC: 3.7 MMOL/L (ref 3.5–5.2)
PROT SERPL-MCNC: 5.8 G/DL (ref 6–8.5)
RBC # BLD AUTO: 4.32 10*6/MM3 (ref 4.14–5.8)
SAO2 % BLDA: 71 % (ref 95–98)
SAO2 % BLDA: 99 % (ref 95–98)
SODIUM SERPL-SCNC: 129 MMOL/L (ref 136–145)
TRIGL SERPL-MCNC: 98 MG/DL (ref 0–150)
URATE SERPL-MCNC: 8.5 MG/DL (ref 3.4–7)
VLDLC SERPL-MCNC: 19 MG/DL (ref 5–40)
WBC NRBC COR # BLD: 6.28 10*3/MM3 (ref 3.4–10.8)

## 2023-04-11 PROCEDURE — 83735 ASSAY OF MAGNESIUM: CPT | Performed by: INTERNAL MEDICINE

## 2023-04-11 PROCEDURE — 25010000002 HEPARIN (PORCINE) PER 1000 UNITS: Performed by: INTERNAL MEDICINE

## 2023-04-11 PROCEDURE — 4A023N8 MEASUREMENT OF CARDIAC SAMPLING AND PRESSURE, BILATERAL, PERCUTANEOUS APPROACH: ICD-10-PCS | Performed by: INTERNAL MEDICINE

## 2023-04-11 PROCEDURE — B2111ZZ FLUOROSCOPY OF MULTIPLE CORONARY ARTERIES USING LOW OSMOLAR CONTRAST: ICD-10-PCS | Performed by: INTERNAL MEDICINE

## 2023-04-11 PROCEDURE — 25010000002 MIDAZOLAM PER 1 MG: Performed by: INTERNAL MEDICINE

## 2023-04-11 PROCEDURE — 93571 IV DOP VEL&/PRESS C FLO 1ST: CPT | Performed by: INTERNAL MEDICINE

## 2023-04-11 PROCEDURE — 99232 SBSQ HOSP IP/OBS MODERATE 35: CPT | Performed by: NURSE PRACTITIONER

## 2023-04-11 PROCEDURE — 85347 COAGULATION TIME ACTIVATED: CPT

## 2023-04-11 PROCEDURE — 84550 ASSAY OF BLOOD/URIC ACID: CPT | Performed by: INTERNAL MEDICINE

## 2023-04-11 PROCEDURE — C1887 CATHETER, GUIDING: HCPCS | Performed by: INTERNAL MEDICINE

## 2023-04-11 PROCEDURE — B2151ZZ FLUOROSCOPY OF LEFT HEART USING LOW OSMOLAR CONTRAST: ICD-10-PCS | Performed by: INTERNAL MEDICINE

## 2023-04-11 PROCEDURE — 85018 HEMOGLOBIN: CPT

## 2023-04-11 PROCEDURE — C1769 GUIDE WIRE: HCPCS | Performed by: INTERNAL MEDICINE

## 2023-04-11 PROCEDURE — 25010000002 FENTANYL CITRATE (PF) 50 MCG/ML SOLUTION: Performed by: INTERNAL MEDICINE

## 2023-04-11 PROCEDURE — 93460 R&L HRT ART/VENTRICLE ANGIO: CPT | Performed by: INTERNAL MEDICINE

## 2023-04-11 PROCEDURE — 85025 COMPLETE CBC W/AUTO DIFF WBC: CPT | Performed by: INTERNAL MEDICINE

## 2023-04-11 PROCEDURE — 25510000001 IOPAMIDOL PER 1 ML: Performed by: INTERNAL MEDICINE

## 2023-04-11 PROCEDURE — C1894 INTRO/SHEATH, NON-LASER: HCPCS | Performed by: INTERNAL MEDICINE

## 2023-04-11 PROCEDURE — 82810 BLOOD GASES O2 SAT ONLY: CPT

## 2023-04-11 PROCEDURE — 85014 HEMATOCRIT: CPT

## 2023-04-11 PROCEDURE — 80053 COMPREHEN METABOLIC PANEL: CPT | Performed by: HOSPITALIST

## 2023-04-11 PROCEDURE — 80061 LIPID PANEL: CPT | Performed by: HOSPITALIST

## 2023-04-11 PROCEDURE — 84100 ASSAY OF PHOSPHORUS: CPT | Performed by: INTERNAL MEDICINE

## 2023-04-11 RX ORDER — LIDOCAINE HYDROCHLORIDE 20 MG/ML
INJECTION, SOLUTION INFILTRATION; PERINEURAL
Status: DISCONTINUED | OUTPATIENT
Start: 2023-04-11 | End: 2023-04-11 | Stop reason: HOSPADM

## 2023-04-11 RX ORDER — HEPARIN SODIUM 1000 [USP'U]/ML
INJECTION, SOLUTION INTRAVENOUS; SUBCUTANEOUS
Status: DISCONTINUED | OUTPATIENT
Start: 2023-04-11 | End: 2023-04-11 | Stop reason: HOSPADM

## 2023-04-11 RX ORDER — SODIUM CHLORIDE 9 MG/ML
INJECTION, SOLUTION INTRAVENOUS
Status: COMPLETED | OUTPATIENT
Start: 2023-04-11 | End: 2023-04-11

## 2023-04-11 RX ORDER — FENTANYL CITRATE 50 UG/ML
INJECTION, SOLUTION INTRAMUSCULAR; INTRAVENOUS
Status: DISCONTINUED | OUTPATIENT
Start: 2023-04-11 | End: 2023-04-11 | Stop reason: HOSPADM

## 2023-04-11 RX ORDER — SODIUM CHLORIDE 9 MG/ML
100 INJECTION, SOLUTION INTRAVENOUS CONTINUOUS
Status: ACTIVE | OUTPATIENT
Start: 2023-04-11 | End: 2023-04-11

## 2023-04-11 RX ORDER — VERAPAMIL HYDROCHLORIDE 2.5 MG/ML
INJECTION, SOLUTION INTRAVENOUS
Status: DISCONTINUED | OUTPATIENT
Start: 2023-04-11 | End: 2023-04-11 | Stop reason: HOSPADM

## 2023-04-11 RX ORDER — ACETAMINOPHEN 325 MG/1
650 TABLET ORAL EVERY 4 HOURS PRN
Status: DISCONTINUED | OUTPATIENT
Start: 2023-04-11 | End: 2023-04-12 | Stop reason: HOSPADM

## 2023-04-11 RX ORDER — MIDAZOLAM HYDROCHLORIDE 1 MG/ML
INJECTION INTRAMUSCULAR; INTRAVENOUS
Status: DISCONTINUED | OUTPATIENT
Start: 2023-04-11 | End: 2023-04-11 | Stop reason: HOSPADM

## 2023-04-11 RX ADMIN — HYDRALAZINE HYDROCHLORIDE 50 MG: 50 TABLET, FILM COATED ORAL at 08:32

## 2023-04-11 RX ADMIN — ASPIRIN 81 MG: 81 TABLET, COATED ORAL at 08:32

## 2023-04-11 RX ADMIN — HYDRALAZINE HYDROCHLORIDE 50 MG: 50 TABLET, FILM COATED ORAL at 18:55

## 2023-04-11 RX ADMIN — HYDRALAZINE HYDROCHLORIDE 50 MG: 50 TABLET, FILM COATED ORAL at 23:50

## 2023-04-11 RX ADMIN — LOSARTAN POTASSIUM 25 MG: 25 TABLET, FILM COATED ORAL at 08:32

## 2023-04-11 NOTE — PLAN OF CARE
Goal Outcome Evaluation:              Outcome Evaluation: Left and right heart cath today, no stents placed. IVF for 5 hours following cath for kidney protection. Right wrist sites are soft and nontender. Bruising noted but no bleeding.

## 2023-04-11 NOTE — PROGRESS NOTES
[facility] Progress Note      Patient Name: Bayron Acevedo  : 1940  MRN: [id number]  Primary Care Physician:  Low Sepulveda  Date of admission: 2023    Subjective     Interval History:   Follow-up chronic kidney disease    The patient is feeling better today, denies any chest pain or shortness of air, no orthopnea or PND, no nausea or vomiting, no dysuria or gross hematuria.  He is being hydrated in preparation for heart catheterization, his creatinine is slightly lower today so he should be cleared to have his heart cath.    Review of Systems:   As noted above    Objective     Vitals:   Temp:  [97.6 °F (36.4 °C)-97.8 °F (36.6 °C)] 97.8 °F (36.6 °C)  Heart Rate:  [51-59] 55  Resp:  [16-18] 18  BP: ()/(49-84) 158/70    Intake/Output Summary (Last 24 hours) at 2023 1002  Last data filed at 2023 0618  Gross per 24 hour   Intake 240 ml   Output 1500 ml   Net -1260 ml       Physical Exam:    General Appearance: alert, awake, chronically ill, no acute distress   Skin: warm and dry  HEENT: oral mucosa normal, nonicteric sclera  Neck: supple, no JVD  Lungs: CTA, unlabored breathing effort  Heart: RRR, normal S1 and S2, no S3, no rub  Abdomen: soft, nontender, nondistended, normoactive bowels  : no palpable bladder  Extremities: no edema, cyanosis or clubbing  Neuro: normal speech and mental status     Scheduled Meds:     aspirin, 81 mg, Oral, Daily  hydrALAZINE, 50 mg, Oral, TID  losartan, 25 mg, Oral, Q24H  metoprolol succinate XL, 50 mg, Oral, Daily      IV Meds:        Results Reviewed:   I have personally reviewed the results from the time of this admission to 2023 10:02 EDT     Results from last 7 days   Lab Units 23  0509 04/10/23  0447 23  0559 23  0415 23  1526   SODIUM mmol/L 129* 130* 130*   < > 134*   POTASSIUM mmol/L 3.7 3.9 3.9   < > 3.9   CHLORIDE mmol/L 95* 91* 93*   < > 98   CO2 mmol/L 25.0 25.3 25.0   < > 24.3   BUN mg/dL  34* 32* 22   < > 17   CREATININE mg/dL 1.98* 2.25* 1.96*   < > 1.87*   CALCIUM mg/dL 8.3* 9.2 9.2   < > 9.2   BILIRUBIN mg/dL 0.5  --  0.5  --  0.5   ALK PHOS U/L 75  --  88  --  87   ALT (SGPT) U/L 8  --  9  --  11   AST (SGOT) U/L 14  --  16  --  15   GLUCOSE mg/dL 92 94 97   < > 105*    < > = values in this interval not displayed.       Estimated Creatinine Clearance: 31 mL/min (A) (by C-G formula based on SCr of 1.98 mg/dL (H)).    Results from last 7 days   Lab Units 04/11/23  0509 04/10/23  0447 04/07/23  1526   MAGNESIUM mg/dL 2.9* 2.1 2.1   PHOSPHORUS mg/dL 3.8 4.1  --        Results from last 7 days   Lab Units 04/11/23  0509 04/10/23  0447   URIC ACID mg/dL 8.5* 9.1*       Results from last 7 days   Lab Units 04/11/23  0509 04/10/23  0447 04/09/23  0559 04/08/23  0415 04/07/23  1526   WBC 10*3/mm3 6.28 6.10 6.03 5.96 6.84   HEMOGLOBIN g/dL 11.2* 11.8* 12.7* 11.9* 11.9*   PLATELETS 10*3/mm3 189 235 219 208 193             Assessment / Plan     ASSESSMENT:  -Chronic kidney disease stage IIIb, associated with nephrosclerosis with the right atrophic kidney which measures 7.4 cm on prior ultrasound while the left kidney is 12.3 cm.  He appears to be near baseline.  Creatinine yesterday was 1.96 he was started on losartan today's creatinine is down to 1.98, he is currently on losartan without any major changes in his renal function over the past 24 hours slight improvement he is being hydrated in preparation for probable heart catheterization today which she is cleared to have it.    -Coronary artery disease, will need heart catheterization, he is cleared to have his heart cath today  -Hyponatremia, sodium 129 stable for the past 48 hours  -History of systolic and diastolic dysfunction  -Peripheral vascular disease with prior CEA and aortic aneurysm repair with stent graft  -Dyslipidemia  -Hypertension associate with chronic kidney disease, was on ACE inhibitor in the past was discontinued because of  angioedema.  -Mild anemia hemoglobin 11.2, mostly associated with chronic kidney disease but no RIDDHI is indicated..    PLAN:  -Continue the same treatment and same dose of losartan  -He is cleared for the heart cath  -We will resume IV diuretics in the next 24 hours.  -Surveillance labs        Thank you for involving us in the care of Bayron Acevedo.  Please feel free to call with any questions.    Patrick Humphries MD  04/11/23  10:02 EDT    [facility]  [phone]    Please note that portions of this note were completed with a voice recognition program.

## 2023-04-11 NOTE — DISCHARGE INSTRUCTIONS
[facility]  [address] [place] [address]    Coronary Angiogram (Radial/Ulnar Approach) After Care    Refer to this sheet in the next few weeks. These instructions provide you with information on caring for yourself after your procedure. Your caregiver may also give you more specific instructions. Your treatment has been planned according to current medical practices, but problems sometimes occur. Call your caregiver if you have any problems or questions after your procedure.    Home Care Instructions:  You may shower the day after the procedure. Remove the bandage (dressing) and gently wash the site with plain soap and water. Gently pat the site dry. You may apply a band aid daily for 2 days if desired.    Do not apply powder or lotion to the site.  Do not submerge the affected site in water for 3 to 5 days or until the site is completely healed.   Do not lift, push or pull anything over 5 pounds for 5 days after your procedure or as directed by your physician.  As a reference, a gallon of milk weighs 8 pounds.   Inspect the site at least twice daily. You may notice some bruising at the site and it may be tender for 1 to 2 weeks.     Increase your fluid intake for the next 2 days.    Keep arm elevated for 24 hours. For the remainder of the day, keep your arm in “Pledge of Allegiance” position when up and about.     You may drive 24 hours after the procedure unless otherwise instructed by your caregiver.  Do not operate machinery or power tools for 24 hours.  A responsible adult should be with you for the first 24 hours after you arrive home. Do not make any important legal decisions or sign legal papers for 24 hours.  Do not drink alcohol for 24 hours.    Metformin or any medications containing Metformin should not be taken for 48 hours after your procedure.      Call Your Doctor if:   You have unusual pain at the radial/ulnar (wrist) site.  You have redness, warmth, swelling, or pain at the  radial/ulnar (wrist) site.  You have drainage (other than a small amount of blood on the dressing).  `You have chills or a fever > 101.  Your arm becomes pale or dark, cool, tingly, or numb.  You develop chest pain, shortness of breath, feel faint or pass out.    You have heavy bleeding from the site, hold pressure on the site for 20 minutes.  If the bleeding stops, apply a fresh bandage and call your cardiologist.  However, if you        continue to have bleeding, call 911 and continue to apply pressure to the site.   You have any symptoms of a stroke.  Remember BE FAST  B-balance. Sudden trouble walking or loss of balance.  E-eyes.  Sudden changes in how you see or a sudden onset of a very bad headache.   F-face. Sudden weakness or loss of feeling of the face or facial droop on one side.   A-arms Sudden weakness or numbness in one arm.  One arm drifts down if they are both held out in front of you. This happens suddenly and usually on one side of the body.   S-speech.  Sudden trouble speaking, slurred speech or trouble understanding what are saying.   T-time  Time to call emergency services.  Write down the symptoms and the time they started.

## 2023-04-11 NOTE — PLAN OF CARE
Goal Outcome Evaluation:  Plan of Care Reviewed With: patient        Progress: improving  Outcome Evaluation: VSS. No new complaints overnight. IVF continues. Plan is for heart cath today if Cr better. Labs for the morning.      Problem: Adult Inpatient Plan of Care  Goal: Plan of Care Review  Outcome: Ongoing, Progressing  Flowsheets (Taken 4/11/2023 0433)  Progress: improving  Plan of Care Reviewed With: patient  Outcome Evaluation: VSS. No new complaints overnight. IVF continues. Plan is for heart cath today if Cr better. Labs for the morning.  Goal: Patient-Specific Goal (Individualized)  Outcome: Ongoing, Progressing  Goal: Absence of Hospital-Acquired Illness or Injury  Outcome: Ongoing, Progressing  Intervention: Identify and Manage Fall Risk  Recent Flowsheet Documentation  Taken 4/11/2023 0403 by Shirley Olguin RN  Safety Promotion/Fall Prevention:   assistive device/personal items within reach   clutter free environment maintained   fall prevention program maintained   nonskid shoes/slippers when out of bed   room organization consistent   safety round/check completed  Taken 4/11/2023 0215 by Shirley Olguin RN  Safety Promotion/Fall Prevention:   assistive device/personal items within reach   clutter free environment maintained   fall prevention program maintained   nonskid shoes/slippers when out of bed   safety round/check completed   room organization consistent  Taken 4/11/2023 0015 by Shirley Olguin RN  Safety Promotion/Fall Prevention:   assistive device/personal items within reach   clutter free environment maintained   fall prevention program maintained   nonskid shoes/slippers when out of bed   room organization consistent   safety round/check completed  Taken 4/10/2023 2204 by Shirley Olguin RN  Safety Promotion/Fall Prevention:   assistive device/personal items within reach   clutter free environment maintained   fall prevention program maintained   nonskid shoes/slippers when out of bed   room  organization consistent   safety round/check completed  Taken 4/10/2023 2018 by Shirley Olguin RN  Safety Promotion/Fall Prevention:   assistive device/personal items within reach   clutter free environment maintained   fall prevention program maintained   nonskid shoes/slippers when out of bed   room organization consistent   safety round/check completed  Intervention: Prevent Skin Injury  Recent Flowsheet Documentation  Taken 4/11/2023 0403 by Shirley Olguin RN  Body Position: supine  Taken 4/11/2023 0215 by Shirley Olguin RN  Body Position:   position changed independently   supine  Taken 4/11/2023 0015 by Shirley Olguin RN  Body Position: supine  Taken 4/10/2023 2204 by Shirley Olguin RN  Body Position: sitting up in bed  Taken 4/10/2023 2018 by Shirley Olguin RN  Body Position: sitting up in bed  Intervention: Prevent and Manage VTE (Venous Thromboembolism) Risk  Recent Flowsheet Documentation  Taken 4/11/2023 0015 by Shirley Olguin RN  Activity Management: up ad linda  Taken 4/10/2023 2018 by Shirley Olguin RN  Activity Management: up ad linda  VTE Prevention/Management:   bilateral   sequential compression devices off   patient refused intervention  Goal: Optimal Comfort and Wellbeing  Outcome: Ongoing, Progressing  Intervention: Provide Person-Centered Care  Recent Flowsheet Documentation  Taken 4/11/2023 0015 by Shirley Olguin RN  Trust Relationship/Rapport: reassurance provided  Taken 4/10/2023 2018 by Shirley Olguin RN  Trust Relationship/Rapport:   care explained   choices provided   questions answered   questions encouraged   reassurance provided   thoughts/feelings acknowledged  Goal: Readiness for Transition of Care  Outcome: Ongoing, Progressing

## 2023-04-11 NOTE — PROGRESS NOTES
Name: Bayron Acevedo ADMIT: 2023   : 1940  PCP: Sepulveda Low DOHERTY    MRN: [id number] LOS: 3 days   AGE/SEX: 82 y.o. male  ROOM: [facility]     Subjective   Subjective   Feeling okay again today. No CP or palp or SOA. BPs improved--no dizziness. He is voiding well. NPO for heart cath this afternoon. No N/V/D/abd pain. No F/C/NS.    Review of Systems     as above    Objective   Objective   Vital Signs  Temp:  [97.6 °F (36.4 °C)-97.8 °F (36.6 °C)] 97.8 °F (36.6 °C)  Heart Rate:  [51-59] 55  Resp:  [16-18] 18  BP: ()/(49-84) 158/70  SpO2:  [94 %-96 %] 94 %  on   ;   Device (Oxygen Therapy): room air  Body mass index is 27.24 kg/m².     (No change in exam today)    Physical Exam  Vitals and nursing note reviewed. Exam conducted with a chaperone present (wife).   Constitutional:       General: He is not in acute distress.     Appearance: He is not ill-appearing, toxic-appearing or diaphoretic.   HENT:      Head: Normocephalic.      Nose: Nose normal.      Mouth/Throat:      Mouth: Mucous membranes are moist.      Pharynx: Oropharynx is clear.   Eyes:      General: No scleral icterus.        Right eye: No discharge.         Left eye: No discharge.      Extraocular Movements: Extraocular movements intact.      Conjunctiva/sclera: Conjunctivae normal.   Cardiovascular:      Rate and Rhythm: Normal rate and regular rhythm.      Pulses: Normal pulses.   Pulmonary:      Effort: Pulmonary effort is normal. No respiratory distress.      Breath sounds: Normal breath sounds. No wheezing or rales.   Abdominal:      General: Bowel sounds are normal. There is no distension.      Palpations: Abdomen is soft.      Tenderness: There is no abdominal tenderness.   Musculoskeletal:         General: No swelling or deformity. Normal range of motion.      Cervical back: Neck supple. No rigidity.   Lymphadenopathy:      Cervical: No cervical adenopathy.   Skin:     General: Skin is warm and dry.      Capillary Refill: Capillary  refill takes less than 2 seconds.      Coloration: Skin is not jaundiced.   Neurological:      General: No focal deficit present.      Mental Status: He is alert and oriented to person, place, and time. Mental status is at baseline.      Cranial Nerves: No cranial nerve deficit.      Coordination: Coordination normal.   Psychiatric:         Mood and Affect: Mood normal.         Behavior: Behavior normal.         Thought Content: Thought content normal.       Results Review     I reviewed the patient's new clinical results.  Results from last 7 days   Lab Units 04/11/23  0509 04/10/23  0447 04/09/23  0559 04/08/23  0415   WBC 10*3/mm3 6.28 6.10 6.03 5.96   HEMOGLOBIN g/dL 11.2* 11.8* 12.7* 11.9*   PLATELETS 10*3/mm3 189 235 219 208     Results from last 7 days   Lab Units 04/11/23  0509 04/10/23  0447 04/09/23  0559 04/08/23  0415   SODIUM mmol/L 129* 130* 130* 136   POTASSIUM mmol/L 3.7 3.9 3.9 3.4*   CHLORIDE mmol/L 95* 91* 93* 98   CO2 mmol/L 25.0 25.3 25.0 26.5   BUN mg/dL 34* 32* 22 19   CREATININE mg/dL 1.98* 2.25* 1.96* 1.89*   GLUCOSE mg/dL 92 94 97 98   EGFR mL/min/1.73 32.7* 28.0* 33.1* 34.6*     Results from last 7 days   Lab Units 04/11/23  0509 04/10/23  0447 04/09/23  0559 04/07/23  1526   ALBUMIN g/dL 3.4* 3.6 3.7 4.2   BILIRUBIN mg/dL 0.5  --  0.5 0.5   ALK PHOS U/L 75  --  88 87   AST (SGOT) U/L 14  --  16 15   ALT (SGPT) U/L 8  --  9 11     Results from last 7 days   Lab Units 04/11/23  0509 04/10/23  0447 04/09/23  0559 04/08/23 0415 04/07/23  1526   CALCIUM mg/dL 8.3* 9.2 9.2 9.5 9.2   ALBUMIN g/dL 3.4* 3.6 3.7  --  4.2   MAGNESIUM mg/dL 2.9* 2.1  --   --  2.1   PHOSPHORUS mg/dL 3.8 4.1  --   --   --      Results from last 7 days   Lab Units 04/07/23  1526   PROCALCITONIN ng/mL 0.08     No results found for: HGBA1C, POCGLU    No radiology results for the last day  I have personally reviewed all medications:  Scheduled Medications  aspirin, 81 mg, Oral, Daily  hydrALAZINE, 50 mg, Oral,  TID  losartan, 25 mg, Oral, Q24H  metoprolol succinate XL, 50 mg, Oral, Daily    Infusions   Diet  NPO Diet NPO Type: Sips with Meds    I have personally reviewed:  [x]  Laboratory   []  Microbiology   []  Radiology   [x]  EKG/Telemetry  []  Cardiology/Vascular   []  Pathology    []  Records       Assessment/Plan     Active Hospital Problems    Diagnosis  POA   • **Acute on chronic systolic CHF (congestive heart failure) [I50.23]  Yes   • Stage 3b chronic kidney disease [N18.32]  Yes   • Hyperlipidemia [E78.5]  Yes   • Hypertension [I10]  Yes   • CAD in native artery [I25.10]  Yes   • S/P angioplasty with stent [Z95.820]  Not Applicable   • 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       83yo gentleman with h/o chronic systolic CHF, AAA repair, CAD, HTN, CKD3b, and COPD, who presented to ER at the direction of his cardiologist with 2 weeks of worsening SOA. He was found to have evidence of CHF on CXR as well as elevated Trop, Cr, and BNP.    Acute on chronic systolic CHF  Mod-severe mitral regurgitation  Pulm HTN: left/right heart cath planned for today, s/p diuresis initially--on hold now given rise in Cr, no hypoxia, uric acid appropriately elevated, GDMT per Card as BP and renal function allow    CAD: continue ASA, Losartan, Metoprolol, ?statin, heart cath today    H/o AAA repair  H/o Carotid endarterectomy: stable, continue ASA, not on statin for some reason . . .     HTN: BPs no longer low, amlodipine and Lasix on hold, continue Hydralazine, Losartan, and Metoprolol    CATHRYN/CKD3b: Cr up with initiation of ARB, can continue Losartan for now per Renal, diuretics on hold now and IVFs given overnight in anticipation of heart cath today--Cr improved, appreciate Renal attention to pt    HLD: not on statin PTA, lipid panel wnl    Abnl CXR: suspect patchy opacity in KIET is edema as no fever or leukocytosis and PCT wnl, lung  exam unremarkable, will need f/u CXR after CHF treated      · SCDs for DVT prophylaxis.  · Full code.  · Discussed with patient, nursing staff, CCP and care team on multidisciplinary rounds. D/w wife at bedside.  · Anticipate discharge home with family in 1-2 days.      Myles Mckeon MD  [facility]  04/11/23  11:56 EDT

## 2023-04-11 NOTE — PROGRESS NOTES
"    Patient Name: Bayron Acevedo  :1940  82 y.o.      Patient Care Team:  Low Sepulveda as PCP - General (Family Medicine)  Guru Simpson MD as Consulting Physician (Cardiology)  Karen Barrera Jr., MD as Consulting Physician (Vascular Surgery)  Bronson Blanc MD as Consulting Physician (Gastroenterology)  Cristian Reyes MD as Consulting Physician (Nephrology)    Chief Complaint: follow up cardiomyopathy, CHF    Interval History: renal function stable. Treated with IVF over night.        Objective   Vital Signs  Temp:  [97.6 °F (36.4 °C)-97.8 °F (36.6 °C)] 97.8 °F (36.6 °C)  Heart Rate:  [51-59] 55  Resp:  [16-18] 18  BP: ()/(49-84) 158/70    Intake/Output Summary (Last 24 hours) at 2023 1126  Last data filed at 2023 1000  Gross per 24 hour   Intake 240 ml   Output 1700 ml   Net -1460 ml     Flowsheet Rows    Flowsheet Row First Filed Value   Admission Height 170.2 cm (67\") Documented at 2023 1506   Admission Weight 79.8 kg (176 lb) Documented at 2023 1506          Physical Exam:   General Appearance:    Alert, cooperative, in no acute distress   Lungs:     Clear to auscultation.  Normal respiratory effort and rate.      Heart:    Regular rhythm and normal rate, normal S1 and S2, no murmurs, gallops or rubs.     Chest Wall:    No abnormalities observed   Abdomen:     Soft, nontender, positive bowel sounds.     Extremities:   no cyanosis, clubbing or edema.  No marked joint deformities.  Adequate musculoskeletal strength.       Results Review:    Results from last 7 days   Lab Units 23  0509   SODIUM mmol/L 129*   POTASSIUM mmol/L 3.7   CHLORIDE mmol/L 95*   CO2 mmol/L 25.0   BUN mg/dL 34*   CREATININE mg/dL 1.98*   GLUCOSE mg/dL 92   CALCIUM mg/dL 8.3*     Results from last 7 days   Lab Units 23  1727 23  1526   HSTROP T ng/L 41* 41*     Results from last 7 days   Lab Units 23  0509   WBC 10*3/mm3 6.28   HEMOGLOBIN g/dL 11.2* "   HEMATOCRIT % 35.7*   PLATELETS 10*3/mm3 189         Results from last 7 days   Lab Units 04/11/23  0509   MAGNESIUM mg/dL 2.9*     Results from last 7 days   Lab Units 04/11/23  0509   CHOLESTEROL mg/dL 123   TRIGLYCERIDES mg/dL 98   HDL CHOL mg/dL 34*   LDL CHOL mg/dL 70               Medication Review:   aspirin, 81 mg, Oral, Daily  hydrALAZINE, 50 mg, Oral, TID  losartan, 25 mg, Oral, Q24H  metoprolol succinate XL, 50 mg, Oral, Daily              Assessment & Plan   1. Acute on chronic congestive heart failure with ejection fraction 35-40%. Metoprolol increased and received 100 mg sunday- HR today 55 and held yesterday. Then hypotensive (SBP 90's) and symptomatic.  Metoprolol reduced to 50 mg - losartan decreased to 25. Consider decreasing hydralazine to allow titrate of GDMT.  Losartan added yesterday. History of angioedema. Monitor closely.    2. [place] on chronic kidney disease. Creatinine stable today. Diuretics held and received IVF overnight .    3. Coronary artery disease status post stent to prox LAD (2013). Had cardiomyopathy at that time but EF recovered.     4. PAD status post carotid endarterectomy and AAA repair    5. Hypertension - amlodipine stopped. Titrate GDMT as tolerated.     6. Moderate to severe mitral valve regurgitation    7. Hyperlipidemia - not on statin. Check lipid panel.     Renal function maximized. Treated with IVF overnight. Waste products stable.   Proceed with right and left heart catheterization. No LV today.      DASHA Bullard  [facility]  04/11/23  11:26 EDT

## 2023-04-12 VITALS
WEIGHT: 172.2 LBS | HEART RATE: 61 BPM | SYSTOLIC BLOOD PRESSURE: 128 MMHG | BODY MASS INDEX: 27.03 KG/M2 | HEIGHT: 67 IN | TEMPERATURE: 98 F | RESPIRATION RATE: 18 BRPM | OXYGEN SATURATION: 98 % | DIASTOLIC BLOOD PRESSURE: 59 MMHG

## 2023-04-12 LAB
ALBUMIN SERPL-MCNC: 3.3 G/DL (ref 3.5–5.2)
ANION GAP SERPL CALCULATED.3IONS-SCNC: 8 MMOL/L (ref 5–15)
BASOPHILS # BLD AUTO: 0.03 10*3/MM3 (ref 0–0.2)
BASOPHILS NFR BLD AUTO: 0.5 % (ref 0–1.5)
BUN SERPL-MCNC: 26 MG/DL (ref 8–23)
BUN/CREAT SERPL: 14.9 (ref 7–25)
CALCIUM SPEC-SCNC: 8.2 MG/DL (ref 8.6–10.5)
CHLORIDE SERPL-SCNC: 100 MMOL/L (ref 98–107)
CO2 SERPL-SCNC: 24 MMOL/L (ref 22–29)
CREAT SERPL-MCNC: 1.75 MG/DL (ref 0.76–1.27)
DEPRECATED RDW RBC AUTO: 41.8 FL (ref 37–54)
EGFRCR SERPLBLD CKD-EPI 2021: 37.9 ML/MIN/1.73
EOSINOPHIL # BLD AUTO: 0.2 10*3/MM3 (ref 0–0.4)
EOSINOPHIL NFR BLD AUTO: 3.5 % (ref 0.3–6.2)
ERYTHROCYTE [DISTWIDTH] IN BLOOD BY AUTOMATED COUNT: 13.9 % (ref 12.3–15.4)
GLUCOSE SERPL-MCNC: 90 MG/DL (ref 65–99)
HCT VFR BLD AUTO: 34.2 % (ref 37.5–51)
HGB BLD-MCNC: 10.7 G/DL (ref 13–17.7)
IMM GRANULOCYTES # BLD AUTO: 0.02 10*3/MM3 (ref 0–0.05)
IMM GRANULOCYTES NFR BLD AUTO: 0.3 % (ref 0–0.5)
LYMPHOCYTES # BLD AUTO: 1.03 10*3/MM3 (ref 0.7–3.1)
LYMPHOCYTES NFR BLD AUTO: 18 % (ref 19.6–45.3)
MAGNESIUM SERPL-MCNC: 2.2 MG/DL (ref 1.6–2.4)
MCH RBC QN AUTO: 25.8 PG (ref 26.6–33)
MCHC RBC AUTO-ENTMCNC: 31.3 G/DL (ref 31.5–35.7)
MCV RBC AUTO: 82.4 FL (ref 79–97)
MONOCYTES # BLD AUTO: 0.54 10*3/MM3 (ref 0.1–0.9)
MONOCYTES NFR BLD AUTO: 9.4 % (ref 5–12)
NEUTROPHILS NFR BLD AUTO: 3.9 10*3/MM3 (ref 1.7–7)
NEUTROPHILS NFR BLD AUTO: 68.3 % (ref 42.7–76)
NRBC BLD AUTO-RTO: 0 /100 WBC (ref 0–0.2)
PHOSPHATE SERPL-MCNC: 3.1 MG/DL (ref 2.5–4.5)
PLATELET # BLD AUTO: 182 10*3/MM3 (ref 140–450)
PMV BLD AUTO: 10.1 FL (ref 6–12)
POTASSIUM SERPL-SCNC: 4.2 MMOL/L (ref 3.5–5.2)
QT INTERVAL: 428 MS
RBC # BLD AUTO: 4.15 10*6/MM3 (ref 4.14–5.8)
SODIUM SERPL-SCNC: 132 MMOL/L (ref 136–145)
URATE SERPL-MCNC: 7.6 MG/DL (ref 3.4–7)
WBC NRBC COR # BLD: 5.72 10*3/MM3 (ref 3.4–10.8)

## 2023-04-12 PROCEDURE — 83735 ASSAY OF MAGNESIUM: CPT | Performed by: INTERNAL MEDICINE

## 2023-04-12 PROCEDURE — 99232 SBSQ HOSP IP/OBS MODERATE 35: CPT | Performed by: NURSE PRACTITIONER

## 2023-04-12 PROCEDURE — 93010 ELECTROCARDIOGRAM REPORT: CPT | Performed by: STUDENT IN AN ORGANIZED HEALTH CARE EDUCATION/TRAINING PROGRAM

## 2023-04-12 PROCEDURE — 93005 ELECTROCARDIOGRAM TRACING: CPT | Performed by: INTERNAL MEDICINE

## 2023-04-12 PROCEDURE — 84550 ASSAY OF BLOOD/URIC ACID: CPT | Performed by: INTERNAL MEDICINE

## 2023-04-12 PROCEDURE — 80069 RENAL FUNCTION PANEL: CPT | Performed by: INTERNAL MEDICINE

## 2023-04-12 PROCEDURE — 85025 COMPLETE CBC W/AUTO DIFF WBC: CPT | Performed by: INTERNAL MEDICINE

## 2023-04-12 RX ORDER — LOSARTAN POTASSIUM 25 MG/1
25 TABLET ORAL
Qty: 30 TABLET | Refills: 0 | Status: SHIPPED | OUTPATIENT
Start: 2023-04-13

## 2023-04-12 RX ORDER — TORSEMIDE 20 MG/1
40 TABLET ORAL DAILY
Status: DISCONTINUED | OUTPATIENT
Start: 2023-04-12 | End: 2023-04-12 | Stop reason: HOSPADM

## 2023-04-12 RX ORDER — TORSEMIDE 20 MG/1
40 TABLET ORAL DAILY
Qty: 60 TABLET | Refills: 0 | Status: SHIPPED | OUTPATIENT
Start: 2023-04-13

## 2023-04-12 RX ADMIN — METOPROLOL SUCCINATE 50 MG: 50 TABLET, FILM COATED, EXTENDED RELEASE ORAL at 09:20

## 2023-04-12 RX ADMIN — TORSEMIDE 40 MG: 20 TABLET ORAL at 11:37

## 2023-04-12 RX ADMIN — HYDRALAZINE HYDROCHLORIDE 50 MG: 50 TABLET, FILM COATED ORAL at 09:20

## 2023-04-12 RX ADMIN — ACETAMINOPHEN 650 MG: 325 TABLET, FILM COATED ORAL at 09:20

## 2023-04-12 RX ADMIN — HYDRALAZINE HYDROCHLORIDE 50 MG: 50 TABLET, FILM COATED ORAL at 14:55

## 2023-04-12 RX ADMIN — ASPIRIN 81 MG: 81 TABLET, COATED ORAL at 09:20

## 2023-04-12 RX ADMIN — LOSARTAN POTASSIUM 25 MG: 25 TABLET, FILM COATED ORAL at 09:20

## 2023-04-12 NOTE — PROGRESS NOTES
Name: Bayron Acevedo ADMIT: 2023   : 1940  PCP: Sepulveda, Low DOHERTY    MRN: [id number] LOS: 4 days   AGE/SEX: 82 y.o. male  ROOM: [facility]     Subjective   Subjective   Feeling okay again today. No CP or palp or SOA. BPs improved--no dizziness. He is voiding well. Tolerating diet. No N/V/D/abd pain. No F/C/NS. Eager to go home.    Review of Systems     as above    Objective   Objective   Vital Signs  Temp:  [97.8 °F (36.6 °C)-98.8 °F (37.1 °C)] 97.8 °F (36.6 °C)  Heart Rate:  [59-71] 71  Resp:  [14-26] 18  BP: (121-169)/(46-98) 168/83  SpO2:  [94 %-100 %] 96 %  on   ;   Device (Oxygen Therapy): room air  Body mass index is 26.97 kg/m².     (No change in exam today)    Physical Exam  Vitals and nursing note reviewed. Exam conducted with a chaperone present (wife).   Constitutional:       General: He is not in acute distress.     Appearance: He is not ill-appearing, toxic-appearing or diaphoretic.   HENT:      Head: Normocephalic.      Nose: Nose normal.      Mouth/Throat:      Mouth: Mucous membranes are moist.      Pharynx: Oropharynx is clear.   Eyes:      General: No scleral icterus.        Right eye: No discharge.         Left eye: No discharge.      Extraocular Movements: Extraocular movements intact.      Conjunctiva/sclera: Conjunctivae normal.   Cardiovascular:      Rate and Rhythm: Normal rate and regular rhythm.      Pulses: Normal pulses.   Pulmonary:      Effort: Pulmonary effort is normal. No respiratory distress.      Breath sounds: Normal breath sounds. No wheezing or rales.   Abdominal:      General: Bowel sounds are normal. There is no distension.      Palpations: Abdomen is soft.      Tenderness: There is no abdominal tenderness.   Musculoskeletal:         General: No swelling or deformity. Normal range of motion.      Cervical back: Neck supple. No rigidity.   Lymphadenopathy:      Cervical: No cervical adenopathy.   Skin:     General: Skin is warm and dry.      Capillary Refill: Capillary  refill takes less than 2 seconds.      Coloration: Skin is not jaundiced.   Neurological:      General: No focal deficit present.      Mental Status: He is alert and oriented to person, place, and time. Mental status is at baseline.      Cranial Nerves: No cranial nerve deficit.      Coordination: Coordination normal.   Psychiatric:         Mood and Affect: Mood normal.         Behavior: Behavior normal.         Thought Content: Thought content normal.       Results Review     I reviewed the patient's new clinical results.  Results from last 7 days   Lab Units 04/12/23 0453 04/11/23  1413 04/11/23  1409 04/11/23  0509 04/10/23  0447 04/09/23  0559   WBC 10*3/mm3 5.72  --   --  6.28 6.10 6.03   HEMOGLOBIN g/dL 10.7*  --   --  11.2* 11.8* 12.7*   HEMOGLOBIN, POC g/dL  --  11.9* 11.9*  --   --   --    PLATELETS 10*3/mm3 182  --   --  189 235 219     Results from last 7 days   Lab Units 04/12/23 0453 04/11/23  0509 04/10/23  0447 04/09/23  0559   SODIUM mmol/L 132* 129* 130* 130*   POTASSIUM mmol/L 4.2 3.7 3.9 3.9   CHLORIDE mmol/L 100 95* 91* 93*   CO2 mmol/L 24.0 25.0 25.3 25.0   BUN mg/dL 26* 34* 32* 22   CREATININE mg/dL 1.75* 1.98* 2.25* 1.96*   GLUCOSE mg/dL 90 92 94 97   EGFR mL/min/1.73 37.9* 32.7* 28.0* 33.1*     Results from last 7 days   Lab Units 04/12/23  0453 04/11/23  0509 04/10/23  0447 04/09/23  0559 04/07/23  1526   ALBUMIN g/dL 3.3* 3.4* 3.6 3.7 4.2   BILIRUBIN mg/dL  --  0.5  --  0.5 0.5   ALK PHOS U/L  --  75  --  88 87   AST (SGOT) U/L  --  14  --  16 15   ALT (SGPT) U/L  --  8  --  9 11     Results from last 7 days   Lab Units 04/12/23  0453 04/11/23  0509 04/10/23  0447 04/09/23  0559 04/08/23  0415 04/07/23  1526   CALCIUM mg/dL 8.2* 8.3* 9.2 9.2   < > 9.2   ALBUMIN g/dL 3.3* 3.4* 3.6 3.7  --  4.2   MAGNESIUM mg/dL 2.2 2.9* 2.1  --   --  2.1   PHOSPHORUS mg/dL 3.1 3.8 4.1  --   --   --     < > = values in this interval not displayed.     Results from last 7 days   Lab Units 04/07/23  1526    PROCALCITONIN ng/mL 0.08     No results found for: HGBA1C, POCGLU    No radiology results for the last day  I have personally reviewed all medications:  Scheduled Medications  aspirin, 81 mg, Oral, Daily  hydrALAZINE, 50 mg, Oral, TID  losartan, 25 mg, Oral, Q24H  metoprolol succinate XL, 50 mg, Oral, Daily  torsemide, 40 mg, Oral, Daily    Infusions   Diet  Diet: Cardiac Diets; Healthy Heart (2-3 Na+); Texture: Regular Texture (IDDSI 7); Fluid Consistency: Thin (IDDSI 0)    I have personally reviewed:  [x]  Laboratory   []  Microbiology   []  Radiology   [x]  EKG/Telemetry  []  Cardiology/Vascular   []  Pathology    []  Records       Assessment/Plan     Active Hospital Problems    Diagnosis  POA   • **Acute on chronic systolic CHF (congestive heart failure) [I50.23]  Yes   • Stage 3b chronic kidney disease [N18.32]  Yes   • Hyperlipidemia [E78.5]  Yes   • Hypertension [I10]  Yes   • CAD in native artery [I25.10]  Yes   • S/P angioplasty with stent [Z95.820]  Not Applicable   • 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       83yo gentleman with h/o chronic systolic CHF, AAA repair, CAD, HTN, CKD3b, and COPD, who presented to ER at the direction of his cardiologist with 2 weeks of worsening SOA. He was found to have evidence of CHF on CXR as well as elevated Trop, Cr, and BNP.    Acute on chronic systolic CHF  Mod-severe mitral regurgitation  Pulm HTN: left/right heart cath stable yesterday, med mgmt recommended, diuretics restarted by Renal this AM, GDMT per Card as BP and renal function allow, Card okay with dc home today, f/u with Card in 1 week    CAD: heart cath 4/11 was stable--no intervention, continue ASA, Losartan, Metoprolol, defer statin to Card    H/o AAA repair  H/o Carotid endarterectomy: stable, continue ASA, not on statin for some reason . . . Defer to Card    HTN: BPs no longer low, amlodipine on hold,  continue Hydralazine, Losartan, and Metoprolol, Torsemide added this AM per Renal    CATHRYN/CKD3b: Cr up with initiation of ARB, continued Losartan for now per Renal, Cr improved despite undergoing heart cath yesterday, appreciate Renal attention to pt, they have started Torsemide this AM, they are okay with his dc home this afternoon if BP tolerates Torsemide and if he is voiding well, f/u with Renal in 1 week    HLD: not on statin PTA, lipid panel wnl    Abnl CXR: suspect patchy opacity in KIET is edema as no fever or leukocytosis and PCT wnl, lung exam unremarkable, will need f/u CXR with PCP in a couple weeks      · SCDs for DVT prophylaxis.  · Full code.  · Discussed with patient, nursing staff, CCP and care team on multidisciplinary rounds. D/w wife at bedside. D/w Dr. Humphries.  · Anticipate discharge home with family this afternoon.      Myles Mckeon MD  [facility]  04/12/23  11:27 EDT

## 2023-04-12 NOTE — DISCHARGE SUMMARY
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        Hospital Course     Pleasant 81yo gentleman with h/o chronic systolic CHF, AAA repair, CAD, HTN, CKD3b, and COPD, who presented to ER at the direction of his cardiologist with 2 weeks of worsening SOA. He was found to have evidence of CHF on CXR as well as elevated Trop, Cr, and BNP. Please see below for details of admission:     Acute on chronic systolic CHF  Mod-severe mitral regurgitation  Pulm HTN: left/right heart cath stable yesterday, med mgmt recommended, diuretics restarted by Renal this AM, GDMT per Card as BP and renal function allow, Card okay with dc home today, f/u with Card in 1 week     CAD: heart cath  was stable--no intervention, continue ASA, Losartan, Metoprolol, defer statin to Card at f/u     H/o AAA repair  H/o Carotid endarterectomy: stable, continue ASA, not on statin for some reason . . . Defer to Card at f/u     HTN: BPs no longer low, amlodipine on hold, continue Hydralazine, Losartan, and Metoprolol, Torsemide added this AM per Renal, tolerating so far     CATHRYN/CKD3b: Cr up initially with addition of ARB, continue Losartan for now per Renal, Cr improved despite undergoing heart cath yesterday, appreciate Renal attention to pt, they have started Torsemide this AM, they are okay with  his dc home this afternoon, f/u with Renal in 1 week     HLD: not on statin PTA, lipid panel wnl     Abnl CXR: suspect patchy opacity in KIET is edema as no fever or leukocytosis and PCT wnl, lung exam unremarkable, will need f/u CXR with PCP in a couple weeks        • SCDs for DVT prophylaxis.  • Full code.  • Discussed with patient, nursing staff, CCP and care team on multidisciplinary rounds. D/w wife at bedside. D/w Dr. Humphries.  • Discharge home with family this afternoon  • F/u with Dr. Reyes (Renal) in 1 week  • F/u with Vickie Devine NP in 1 week  • F/u with Dr. Jiang (Card) in 6 weeks  • F/u with PCP (Derick) in 2 weeks    Day of Discharge     Subjective:  Feeling okay again today. No CP or palp or SOA. BPs improved--no dizziness. He is voiding well. Tolerating diet. No N/V/D/abd pain. No F/C/NS. Eager to go home.    Physical Exam:  Temp:  [97.8 °F (36.6 °C)-98.8 °F (37.1 °C)] 98 °F (36.7 °C)  Heart Rate:  [57-71] 61  Resp:  [14-26] 18  BP: (121-169)/(46-98) 128/59  Body mass index is 26.97 kg/m².  Physical Exam  Vitals and nursing note reviewed. Exam conducted with a chaperone present (wife).   Constitutional:       General: He is not in acute distress.     Appearance: He is not ill-appearing, toxic-appearing or diaphoretic.    Cardiovascular:      Rate and Rhythm: Normal rate and regular rhythm.      Pulses: Normal pulses.   Pulmonary:      Effort: Pulmonary effort is normal. No respiratory distress.      Breath sounds: Normal breath sounds. No wheezing or rales.   Abdominal:      General: Bowel sounds are normal. There is no distension.      Palpations: Abdomen is soft.      Tenderness: There is no abdominal tenderness.   Musculoskeletal:         General: No swelling or deformity. Normal range of motion.      Cervical back: Neck supple. No rigidity.   Lymphadenopathy:      Cervical: No cervical adenopathy.   Skin:     General: Skin is warm and dry.      Capillary Refill: Capillary refill takes less  than 2 seconds.      Coloration: Skin is not jaundiced.   Neurological:      General: No focal deficit present.      Mental Status: He is alert and oriented to person, place, and time. Mental status is at baseline.      Cranial Nerves: No cranial nerve deficit.      Coordination: Coordination normal.   Psychiatric:         Mood and Affect: Mood normal.         Behavior: Behavior normal.         Thought Content: Thought content normal.      Consultants     Consult Orders (all) (From admission, onward)     Start     Ordered    04/09/23 0954  Inpatient Nephrology Consult  Once        Specialty:  Nephrology  Provider:  Tima Mcdonald MD    04/09/23 0954    04/08/23 1725  Inpatient Cardiology Consult  Once        Specialty:  Cardiology  Provider:  Jakub Bowden MD    04/08/23 1724    04/08/23 1651  Inpatient Advance Care Planning Consult  Once        Provider:  (Not yet assigned)    04/08/23 1650    04/07/23 1945  Inpatient Nutrition Consult  Once        Provider:  (Not yet assigned)    04/07/23 1945    04/07/23 1650  [facility] (on-call MD unless specified) Details  Once        Specialty:  Hospitalist  Provider:  Jeri Brito MD    04/07/23 1649              Procedures     Left Heart Cath, Right Heart Cath, Coronary angiography, Left ventriculography, RESTING FULL CYCLE RATIO      Imaging Results (All)     Procedure Component Value Units Date/Time    XR Chest 1 View [[id number]] Collected: 04/07/23 1553     Updated: 04/07/23 1601    Narrative:      XR CHEST 1 VW-     HISTORY: Male who is 82 years-old,  dyspnea     TECHNIQUE: Frontal view of the chest     COMPARISON: 01/21/2013     FINDINGS: The heart size is borderline. Aorta is calcified. Pulmonary  vasculature is mildly congested. Patchy opacity at the left upper lung  may represent developing infiltrate. Moderate left and mild to moderate  right pleural effusions are evident with atelectasis or infiltrate at  the bases. No pneumothorax. No acute  osseous process.       Impression:      Left more than right pleural effusions and  atelectasis/infiltrate the bases, patchy opacity at the left upper lung,  follow-up recommended, CT can be obtained for further evaluation as  indicated. Borderline heart size with mild pulmonary vascular  congestion.     This report was finalized on 4/7/2023 3:57 PM by Dr. Bruce Johnson M.D.           Results for orders placed in visit on 11/13/19    SCANNED VASCULAR STUDIES      Pertinent Labs     Results from last 7 days   Lab Units 04/12/23  0453 04/11/23  1413 04/11/23  1409 04/11/23  0509 04/10/23  0447 04/09/23  0559   WBC 10*3/mm3 5.72  --   --  6.28 6.10 6.03   HEMOGLOBIN g/dL 10.7*  --   --  11.2* 11.8* 12.7*   HEMOGLOBIN, POC g/dL  --  11.9* 11.9*  --   --   --    PLATELETS 10*3/mm3 182  --   --  189 235 219     Results from last 7 days   Lab Units 04/12/23 0453 04/11/23  0509 04/10/23  0447 04/09/23  0559   SODIUM mmol/L 132* 129* 130* 130*   POTASSIUM mmol/L 4.2 3.7 3.9 3.9   CHLORIDE mmol/L 100 95* 91* 93*   CO2 mmol/L 24.0 25.0 25.3 25.0   BUN mg/dL 26* 34* 32* 22   CREATININE mg/dL 1.75* 1.98* 2.25* 1.96*   GLUCOSE mg/dL 90 92 94 97   EGFR mL/min/1.73 37.9* 32.7* 28.0* 33.1*     Results from last 7 days   Lab Units 04/12/23  0453 04/11/23  0509 04/10/23  0447 04/09/23  0559 04/07/23  1526   ALBUMIN g/dL 3.3* 3.4* 3.6 3.7 4.2   BILIRUBIN mg/dL  --  0.5  --  0.5 0.5   ALK PHOS U/L  --  75  --  88 87   AST (SGOT) U/L  --  14  --  16 15   ALT (SGPT) U/L  --  8  --  9 11     Results from last 7 days   Lab Units 04/12/23  0453 04/11/23  0509 04/10/23  0447 04/09/23  0559 04/08/23  0415 04/07/23  1526   CALCIUM mg/dL 8.2* 8.3* 9.2 9.2   < > 9.2   ALBUMIN g/dL 3.3* 3.4* 3.6 3.7  --  4.2   MAGNESIUM mg/dL 2.2 2.9* 2.1  --   --  2.1   PHOSPHORUS mg/dL 3.1 3.8 4.1  --   --   --     < > = values in this interval not displayed.       Results from last 7 days   Lab Units 04/07/23  1727 04/07/23  1526   HSTROP T ng/L 41* 41*    PROBNP pg/mL  --  4,839.0*     Results from last 7 days   Lab Units 04/12/23  0453 04/10/23  0447 04/09/23  1417   CREATININE UR mg/dL  --   --  111.5   PROTEIN TOTAL URINE mg/dL  --   --  75.0   URIC ACID mg/dL 7.6*   < >  --    PROT/CREAT RATIO UR mg/G Crea  --   --  672.6*    < > = values in this interval not displayed.     Results from last 7 days   Lab Units 04/11/23  0509   CHOLESTEROL mg/dL 123   TRIGLYCERIDES mg/dL 98   HDL CHOL mg/dL 34*   LDL CHOL mg/dL 70             Test Results Pending at Discharge       Discharge Details        Discharge Medications      New Medications      Instructions Start Date   losartan 25 MG tablet  Commonly known as: COZAAR   25 mg, Oral, Every 24 Hours Scheduled   Start Date: April 13, 2023     Torsemide 40 MG tablet   40 mg, Oral, Daily   Start Date: April 13, 2023        Changes to Medications      Instructions Start Date   albuterol (2.5 MG/3ML) 0.083% nebulizer solution  Commonly known as: PROVENTIL  What changed: See the new instructions.   USE ONE VIAL BY NEBULIZATION EVERY FOUR HOURS AS NEEDED FOR FOR WHEEZING         Continue These Medications      Instructions Start Date   aspirin 81 MG EC tablet   81 mg, Oral, Daily      hydrALAZINE 50 MG tablet  Commonly known as: APRESOLINE   50 mg, 3 Times Daily      metoprolol succinate XL 25 MG 24 hr tablet  Commonly known as: TOPROL-XL   50 mg, Oral, Daily      nitroglycerin 0.4 MG SL tablet  Commonly known as: NITROSTAT   0.4 mg, Sublingual, Every 5 Minutes PRN, Take no more than 3 doses in 15 minutes.          Stop These Medications    amLODIPine 10 MG tablet  Commonly known as: NORVASC     amLODIPine 5 MG tablet  Commonly known as: NORVASC     EPINEPHrine 0.3 MG/0.3ML solution auto-injector injection  Commonly known as: EPIPEN     furosemide 20 MG tablet  Commonly known as: LASIX     ketoconazole 2 % shampoo  Commonly known as: NIZORAL     promethazine-dextromethorphan 6.25-15 MG/5ML syrup  Commonly known as:  PROMETHAZINE-DM            Allergies   Allergen Reactions   • Codeine Sulfate Hives   • Penicillins Other (See Comments)     Passed out after a PCN shot- no other sx noted-per pateint       Discharge Disposition:  Home or Self Care      Discharge Diet:  Diet Order   Procedures   • Diet: Cardiac Diets; Healthy Heart (2-3 Na+); Texture: Regular Texture (IDDSI 7); Fluid Consistency: Thin (IDDSI 0)       Discharge Activity:   as tolerated    CODE STATUS:    Code Status and Medical Interventions:   Ordered at: 04/07/23 180     Code Status (Patient has no pulse and is not breathing):    CPR (Attempt to Resuscitate)     Medical Interventions (Patient has pulse or is breathing):    Full       No future appointments.  Additional Instructions for the Follow-ups that You Need to Schedule     Discharge Follow-up with PCP   As directed       Currently Documented PCP:    Low Sepulveda    PCP Phone Number:    [phone]     Follow Up Details: Dr. Sepulveda (PCP) in 2 weeks for repeat chest xray         Discharge Follow-up with Specified Provider: Dr. Reyes (Renal); 1 Week   As directed      To: Dr. Reyes (Renal)    Follow Up: 1 Week         Discharge Follow-up with Specified Provider: Dr. Jiang (Card); 6 Weeks   As directed      To: Dr. Jiang (Card)    Follow Up: 6 Weeks         Discharge Follow-up with Specified Provider: [facility] Nurse Practitioner; 1 Week   As directed      To: [facility] Nurse Practitioner    Follow Up: 1 Week            Follow-up Information     Low Sepulveda Follow up on 4/13/2023.    Specialty: Family Medicine  Why: Appointment will be scheduled on Thursday, April 13th, 2023 at 4:40pm  Contact information:  [address]  [address] [address]  [phone]             Low Sepulveda .    Specialty: Family Medicine  Why: Dr. Sepulveda (PCP) in 2 weeks for repeat chest xray  Contact information:  Ernst Early  [address]  [address]  [phone]                         Additional Instructions for the Follow-ups that You Need to Schedule     Discharge Follow-up with PCP   As directed       Currently Documented PCP:    Low Sepulveda    PCP Phone Number:    [phone]     Follow Up Details: Dr. Sepulveda (PCP) in 2 weeks for repeat chest xray         Discharge Follow-up with Specified Provider: Dr. Reyes (Renal); 1 Week   As directed      To: Dr. Reyes (Renal)    Follow Up: 1 Week         Discharge Follow-up with Specified Provider: Dr. Jiang (Card); 6 Weeks   As directed      To: Dr. Jiang (Card)    Follow Up: 6 Weeks         Discharge Follow-up with Specified Provider: [facility] Nurse Practitioner; 1 Week   As directed      To: [facility] Practition[facility]    Follow Up: 1 Week           Time Spent on Discharge:  Greater than 30 minutes      Myles Mckeon MD  [facility]  04/12/23  13:42 EDT

## 2023-04-12 NOTE — PROGRESS NOTES
"    Patient Name: Bayron Acevedo  :1940  82 y.o.      Patient Care Team:  Low Sepulveda as PCP - General (Family Medicine)  Guru Simpson MD as Consulting Physician (Cardiology)  Karen Barrera Jr., MD as Consulting Physician (Vascular Surgery)  Bronson Blanc MD as Consulting Physician (Gastroenterology)  Cristian Reyes MD as Consulting Physician (Nephrology)    Chief Complaint: follow up cardiomyopathy, CHF    Interval History: right radial site looks good. Pulse intact. No hematoma.       Objective   Vital Signs  Temp:  [97.8 °F (36.6 °C)-98.8 °F (37.1 °C)] 97.8 °F (36.6 °C)  Heart Rate:  [59-71] 71  Resp:  [14-26] 18  BP: (121-169)/(46-98) 168/83    Intake/Output Summary (Last 24 hours) at 2023 1049  Last data filed at 2023 0541  Gross per 24 hour   Intake 480 ml   Output 775 ml   Net -295 ml     Flowsheet Rows    Flowsheet Row First Filed Value   Admission Height 170.2 cm (67\") Documented at 2023 1506   Admission Weight 79.8 kg (176 lb) Documented at 2023 1506          Physical Exam:   General Appearance:    Alert, cooperative, in no acute distress   Lungs:     Clear to auscultation.  Normal respiratory effort and rate.      Heart:    Regular rhythm and normal rate, normal S1 and S2, no murmurs, gallops or rubs.     Chest Wall:    No abnormalities observed   Abdomen:     Soft, nontender, positive bowel sounds.     Extremities:   no cyanosis, clubbing or edema.  No marked joint deformities.  Adequate musculoskeletal strength.       Results Review:    Results from last 7 days   Lab Units 23  0453   SODIUM mmol/L 132*   POTASSIUM mmol/L 4.2   CHLORIDE mmol/L 100   CO2 mmol/L 24.0   BUN mg/dL 26*   CREATININE mg/dL 1.75*   GLUCOSE mg/dL 90   CALCIUM mg/dL 8.2*     Results from last 7 days   Lab Units 23  1727 23  1526   HSTROP T ng/L 41* 41*     Results from last 7 days   Lab Units 23  0453   WBC 10*3/mm3 5.72   HEMOGLOBIN g/dL 10.7* "   HEMATOCRIT % 34.2*   PLATELETS 10*3/mm3 182         Results from last 7 days   Lab Units 04/12/23  0453   MAGNESIUM mg/dL 2.2     Results from last 7 days   Lab Units 04/11/23  0509   CHOLESTEROL mg/dL 123   TRIGLYCERIDES mg/dL 98   HDL CHOL mg/dL 34*   LDL CHOL mg/dL 70               Medication Review:   aspirin, 81 mg, Oral, Daily  hydrALAZINE, 50 mg, Oral, TID  losartan, 25 mg, Oral, Q24H  metoprolol succinate XL, 50 mg, Oral, Daily  torsemide, 40 mg, Oral, Daily              Assessment & Plan   1. Acute on chronic congestive heart failure with ejection fraction 35-40%. Metoprolol increased and received 100 mg sunday- HR today 55 and held yesterday. Then hypotensive (SBP 90's) and symptomatic.  Metoprolol reduced to 50 mg - losartan decreased to 25. Consider decreasing hydralazine to allow titration of GDMT.  History of angioedema. Monitor closely.    2. [place] on chronic kidney disease. Creatinine very stable today post contrast exposure. Oral diuretic started. Normal filling pressures noted.    3. Coronary artery disease status post stent to prox LAD (2013). Had cardiomyopathy at that time but EF recovered. Cath 4/11 without new disease. Proximal diagonal branch with 70% ostial stenosis related to pinching from prior LAD stent whi=ch was patent.     4. PAD status post carotid endarterectomy and AAA repair    5. Hypertension - amlodipine stopped. Titrate GDMT as tolerated.     6. Moderate to severe mitral valve regurgitation    7. Hyperlipidemia - not on statin. Check lipid panel.     He looks good today. No objection to discharge from cardiac standpoint. He would like to transition cardiac care to [facility] and has requested to follow with Dr. Jiang.     Will arrange one week follow up with DASHA and 6 weeks with Dr. Jiang.     Consider decreasing and stopping hydralazine to increase GDMT as outpatient.     DASHA Bullard  [facility]  04/12/23  10:49 EDT

## 2023-04-12 NOTE — PLAN OF CARE
Goal Outcome Evaluation:  Plan of Care Reviewed With: patient        Progress: no change  Outcome Evaluation: Pt had no c/o pain overnight. Dressing to heart cath sites are clean dry and intact. Pulses are palpable. No hematoma observed. Applied 2L O2 via NC overnight due to oxygenation desaturation intermittently while asleep. Pt appears to be a mouth breather as well. AOx4. HTN noted. BP meds given per orders. Bed alarm refused. SCDs refused this shift. Pt stated he is anticipating discharge today.

## 2023-04-12 NOTE — PROGRESS NOTES
[facility] Progress Note      Patient Name: Bayron Acevedo  : 1940  MRN: [id number]  Primary Care Physician:  Low Sepulveda  Date of admission: 2023    Subjective     Interval History:   Follow-up chronic kidney disease    The patient is feeling much better today denies any chest pain or shortness of air, no orthopnea or PND, no dysuria or gross hematuria, had heart catheterization revealed some obstructive disease but medical therapy is recommended.    Review of Systems:   As noted above    Objective     Vitals:   Temp:  [97.8 °F (36.6 °C)-98.8 °F (37.1 °C)] 97.8 °F (36.6 °C)  Heart Rate:  [59-71] 71  Resp:  [14-26] 18  BP: (121-169)/(46-98) 168/83    Intake/Output Summary (Last 24 hours) at 2023 0917  Last data filed at 2023 0541  Gross per 24 hour   Intake 480 ml   Output 975 ml   Net -495 ml       Physical Exam:    General Appearance: alert, awake, chronically ill, no acute distress   Skin: warm and dry  HEENT: oral mucosa normal, nonicteric sclera  Neck: supple, no JVD  Lungs: CTA, unlabored breathing effort  Heart: RRR, normal S1 and S2, no S3, no rub  Abdomen: soft, nontender, nondistended, normoactive bowels  : no palpable bladder  Extremities: no edema, cyanosis or clubbing  Neuro: normal speech and mental status     Scheduled Meds:     aspirin, 81 mg, Oral, Daily  hydrALAZINE, 50 mg, Oral, TID  losartan, 25 mg, Oral, Q24H  metoprolol succinate XL, 50 mg, Oral, Daily      IV Meds:        Results Reviewed:   I have personally reviewed the results from the time of this admission to 2023 09:17 EDT     Results from last 7 days   Lab Units 23  0453 23  0509 04/10/23  0447 23  0559 23  0415 23  1526   SODIUM mmol/L 132* 129* 130* 130*   < > 134*   POTASSIUM mmol/L 4.2 3.7 3.9 3.9   < > 3.9   CHLORIDE mmol/L 100 95* 91* 93*   < > 98   CO2 mmol/L 24.0 25.0 25.3 25.0   < > 24.3   BUN mg/dL 26* 34* 32* 22   < > 17   CREATININE mg/dL  1.75* 1.98* 2.25* 1.96*   < > 1.87*   CALCIUM mg/dL 8.2* 8.3* 9.2 9.2   < > 9.2   BILIRUBIN mg/dL  --  0.5  --  0.5  --  0.5   ALK PHOS U/L  --  75  --  88  --  87   ALT (SGPT) U/L  --  8  --  9  --  11   AST (SGOT) U/L  --  14  --  16  --  15   GLUCOSE mg/dL 90 92 94 97   < > 105*    < > = values in this interval not displayed.       Estimated Creatinine Clearance: 34.7 mL/min (A) (by C-G formula based on SCr of 1.75 mg/dL (H)).    Results from last 7 days   Lab Units 04/12/23  0453 04/11/23  0509 04/10/23  0447   MAGNESIUM mg/dL 2.2 2.9* 2.1   PHOSPHORUS mg/dL 3.1 3.8 4.1       Results from last 7 days   Lab Units 04/12/23  0453 04/11/23  0509 04/10/23  0447   URIC ACID mg/dL 7.6* 8.5* 9.1*       Results from last 7 days   Lab Units 04/12/23  0453 04/11/23  1413 04/11/23  1409 04/11/23  0509 04/10/23  0447 04/09/23  0559 04/08/23  0415   WBC 10*3/mm3 5.72  --   --  6.28 6.10 6.03 5.96   HEMOGLOBIN g/dL 10.7*  --   --  11.2* 11.8* 12.7* 11.9*   HEMOGLOBIN, POC g/dL  --  11.9* 11.9*  --   --   --   --    PLATELETS 10*3/mm3 182  --   --  189 235 219 208             Assessment / Plan     ASSESSMENT:  -Chronic kidney disease stage IIIb, associated with nephrosclerosis with the right atrophic kidney which measures 7.4 cm on prior ultrasound while the left kidney is 12.3 cm.  He appears to be near baseline.  Creatinine yesterday was 1.96 he was started on losartan today's creatinine is down to 1.75, he is currently on losartan without any major changes in his renal function, on the contrary has improved somewhat.  He had heart catheterization without any impact on his renal function.  -Coronary artery disease, has obstructive disease, but nothing needs angioplasty, but medical therapy is recommended.  -Hyponatremia, improved sodium up to 130  -History of systolic and diastolic dysfunction  -Peripheral vascular disease with prior CEA and aortic aneurysm repair with stent graft  -Dyslipidemia  -Hypertension associate with  chronic kidney disease, was on ACE inhibitor in the past was discontinued because of angioedema.  -Mild anemia hemoglobin 10.7, mostly associated with chronic kidney disease but no RIDDHI is indicated..    PLAN:  -Continue the same treatment and same dose of losartan  -Start torsemide 40 mg daily  -Surveillance labs        Thank you for involving us in the care of Bayron Acevedo.  Please feel free to call with any questions.    Patrick Humphries MD  04/12/23  09:17 EDT    [facility]  [phone]    Please note that portions of this note were completed with a voice recognition program.

## 2023-04-13 ENCOUNTER — READMISSION MANAGEMENT (OUTPATIENT)
Dept: CALL CENTER | Facility: HOSPITAL | Age: 83
End: 2023-04-13
Payer: MEDICARE

## 2023-04-13 NOTE — OUTREACH NOTE
Prep Survey    Flowsheet Row Responses   [facility] patient discharged from? [facility]   Is LACE score < 7 ? No   Eligibility Readm Mgmt   Discharge diagnosis Acute on chronic systolic CHF    Does the patient have one of the following disease processes/diagnoses(primary or secondary)? CHF   Does the patient have Home health ordered? No   Is there a DME ordered? No   Prep survey completed? Yes          Gi PATRICIO - Registered Nurse

## 2023-04-17 ENCOUNTER — READMISSION MANAGEMENT (OUTPATIENT)
Dept: CALL CENTER | Facility: HOSPITAL | Age: 83
End: 2023-04-17
Payer: MEDICARE

## 2023-04-17 NOTE — OUTREACH NOTE
CHF Week 1 Survey    Flowsheet Row Responses   [facility] patient discharged from? [place]   Does the patient have one of the following disease processes/diagnoses(primary or secondary)? CHF   CHF Week 1 attempt successful? Yes   Call start time 1518   Call end time 1520   Discharge diagnosis Acute on chronic systolic CHF    Medication alerts for this patient PCP told pt that he should be taking a cholesterol med, he plans to discuss with Cardio.   Meds reviewed with patient/caregiver? Yes   Is the patient having any side effects they believe may be caused by any medication additions or changes? No   Does the patient have all medications ordered at discharge? Yes   Is the patient taking all medications as directed (includes completed medication regime)? Yes   Does the patient have a primary care provider?  Yes   Does the patient have an appointment with their PCP within 7 days of discharge? Yes   Has the patient kept scheduled appointments due by today? Yes   Comments Pt denies CP/dizziness/SOA or edema. Doing daily wts.   Did the patient receive a copy of their discharge instructions? Yes   Nursing interventions Reviewed instructions with patient   What is the patient's perception of their health status since discharge? Returned to baseline/stable   Nursing interventions Nurse provided patient education   If the patient is a current smoker, are they able to teach back resources for cessation? Not a smoker   Is the patient/caregiver able to teach back the hierarchy of who to call/visit for symptoms/problems? PCP, Specialist, Home health nurse, Urgent Care, ED, 911 Yes   CHF Zone this Call Green Zone   Green Zone Patient reports doing well, No new or worsening shortness of breath, No new swelling -  feet, ankles and legs look normal for you, No chest pain   Green Zone Interventions Meds as directed, Daily weight check, Follow up visits planned    CHF Week 1 call completed? Yes   Revoked No further  contact(revokes)-requires comment   Is the patient interested in additional calls from an ambulatory ?  NOTE:  applies to high risk patients requiring additional follow-up. No   Graduated/Revoked comments stable   Call end time 1520          Nenita DOHERTY - Registered Nurse

## 2023-04-19 ENCOUNTER — HOSPITAL ENCOUNTER (OUTPATIENT)
Dept: GENERAL RADIOLOGY | Facility: HOSPITAL | Age: 83
Discharge: HOME | End: 2023-04-19
Payer: MEDICARE

## 2023-04-19 ENCOUNTER — HOSPITAL ENCOUNTER (OUTPATIENT)
Dept: CARDIOLOGY | Facility: HOSPITAL | Age: 83
Discharge: HOME | End: 2023-04-19
Payer: MEDICARE

## 2023-04-19 ENCOUNTER — OFFICE VISIT (OUTPATIENT)
Dept: CARDIOLOGY | Facility: CLINIC | Age: 83
End: 2023-04-19
Payer: MEDICARE

## 2023-04-19 VITALS
BODY MASS INDEX: 27.94 KG/M2 | DIASTOLIC BLOOD PRESSURE: 70 MMHG | SYSTOLIC BLOOD PRESSURE: 140 MMHG | HEART RATE: 56 BPM | WEIGHT: 178 LBS | OXYGEN SATURATION: 95 % | HEIGHT: 67 IN

## 2023-04-19 DIAGNOSIS — R06.09 DYSPNEA ON EXERTION: ICD-10-CM

## 2023-04-19 DIAGNOSIS — I25.10 CORONARY ARTERY DISEASE INVOLVING NATIVE CORONARY ARTERY OF NATIVE HEART WITHOUT ANGINA PECTORIS: ICD-10-CM

## 2023-04-19 DIAGNOSIS — I25.10 CORONARY ARTERY DISEASE INVOLVING NATIVE CORONARY ARTERY OF NATIVE HEART WITHOUT ANGINA PECTORIS: Primary | ICD-10-CM

## 2023-04-19 LAB
ALBUMIN SERPL-MCNC: 4.2 G/DL (ref 3.5–5.2)
ALBUMIN/GLOB SERPL: 1.6 G/DL
ALP SERPL-CCNC: 79 U/L (ref 39–117)
ALT SERPL W P-5'-P-CCNC: 12 U/L (ref 1–41)
ANION GAP SERPL CALCULATED.3IONS-SCNC: 11 MMOL/L (ref 5–15)
AST SERPL-CCNC: 14 U/L (ref 1–40)
BILIRUB SERPL-MCNC: 0.5 MG/DL (ref 0–1.2)
BUN SERPL-MCNC: 23 MG/DL (ref 8–23)
BUN/CREAT SERPL: 11.9 (ref 7–25)
CALCIUM SPEC-SCNC: 9.3 MG/DL (ref 8.6–10.5)
CHLORIDE SERPL-SCNC: 96 MMOL/L (ref 98–107)
CO2 SERPL-SCNC: 29 MMOL/L (ref 22–29)
CREAT SERPL-MCNC: 1.93 MG/DL (ref 0.76–1.27)
EGFRCR SERPLBLD CKD-EPI 2021: 33.7 ML/MIN/1.73
GLOBULIN UR ELPH-MCNC: 2.6 GM/DL
GLUCOSE SERPL-MCNC: 97 MG/DL (ref 65–99)
NT-PROBNP SERPL-MCNC: 5992 PG/ML (ref 0–1800)
POTASSIUM SERPL-SCNC: 4 MMOL/L (ref 3.5–5.2)
PROT SERPL-MCNC: 6.8 G/DL (ref 6–8.5)
SODIUM SERPL-SCNC: 136 MMOL/L (ref 136–145)

## 2023-04-19 PROCEDURE — 83880 ASSAY OF NATRIURETIC PEPTIDE: CPT | Performed by: NURSE PRACTITIONER

## 2023-04-19 PROCEDURE — 71046 X-RAY EXAM CHEST 2 VIEWS: CPT

## 2023-04-19 PROCEDURE — 25010000002 FUROSEMIDE PER 20 MG: Performed by: NURSE PRACTITIONER

## 2023-04-19 PROCEDURE — 80053 COMPREHEN METABOLIC PANEL: CPT | Performed by: NURSE PRACTITIONER

## 2023-04-19 PROCEDURE — 96374 THER/PROPH/DIAG INJ IV PUSH: CPT

## 2023-04-19 PROCEDURE — 36415 COLL VENOUS BLD VENIPUNCTURE: CPT

## 2023-04-19 RX ORDER — FUROSEMIDE 10 MG/ML
80 INJECTION INTRAMUSCULAR; INTRAVENOUS ONCE
Status: COMPLETED | OUTPATIENT
Start: 2023-04-19 | End: 2023-04-19

## 2023-04-19 RX ORDER — ROSUVASTATIN CALCIUM 20 MG/1
20 TABLET, COATED ORAL DAILY
Qty: 90 TABLET | Refills: 3 | Status: SHIPPED | OUTPATIENT
Start: 2023-04-19

## 2023-04-19 RX ORDER — METOPROLOL SUCCINATE 25 MG/1
50 TABLET, EXTENDED RELEASE ORAL DAILY
Qty: 60 TABLET | Refills: 5
Start: 2023-04-19 | End: 2023-10-16

## 2023-04-19 RX ADMIN — FUROSEMIDE 80 MG: 10 INJECTION, SOLUTION INTRAMUSCULAR; INTRAVENOUS at 11:36

## 2023-04-19 NOTE — PROGRESS NOTES
"[facility] Follow Up Office Note     Encounter Date:23  Patient:Bayron Acevedo  :1940  MRN:[id number]      Chief Complaint:   Chief Complaint   Patient presents with   • Follow-up         History of Presenting Illness:        Bayron Acevedo is a 82 y.o. male who is here for follow-up.  He is a patient of Dr Jiang.    Patient has past medical history significant for chronic systolic heart failure LVEF 35 to 40%, ischemic cardiomyopathy, coronary artery disease with prior PCI, PAD status post carotid endarterectomy and AAA repair, essential hypertension, moderate to severe mitral regurgitation, essential hyperlipidemia, CKD.    Patient had recent hospitalization at [facility] with acute systolic heart failure.  He was previously followed by Dr. Christine Jha at [place].    In  patient presented with EF of 25 to 30% and was found to have significant coronary disease status post PCI of LAD with residual 50% circumflex disease.  LVEF subsequently recovered to 50 to 55%.    Patient presented to [facility] in early April with shortness of breath.  Echo showed LVEF down to 30 to 40% with moderate to severe mitral regurgitation.  He was IV diuresed.  Right and left heart catheterization with moderate coronary disease and patent proximal LAD stent, normal filling pressures, normal cardiac output.  Patient was started on guideline directed therapies for heart failure.  He was continued on oral diuretic at discharge.    Patient thinks he has been doing okay since he left the hospital.  He has not really been that active but he did mow the lawn and a ride mower yesterday.  He has a cough and is still a bit short of breath with activity.  He has been blaming this on allergies and \"a little bit of COPD \".  He has been sleeping in a recliner since he left the hospital.  He has noticed some swelling in his left leg.  He denies chest pain or tightness, palpitations, presyncope.  He has a BP cuff but " not monitored his blood pressure at home since discharge.    Review of Systems:  Review of Systems   Cardiovascular: Positive for dyspnea on exertion, leg swelling and orthopnea. Negative for chest pain and palpitations.   Respiratory: Negative for shortness of breath.        Current Outpatient Medications on File Prior to Visit   Medication Sig Dispense Refill   • albuterol (PROVENTIL) (2.5 MG/3ML) 0.083% nebulizer solution USE ONE VIAL BY NEBULIZATION EVERY FOUR HOURS AS NEEDED FOR FOR WHEEZING (Patient taking differently: Take 2.5 mg by nebulization Every 4 (Four) Hours As Needed.) 180 mL 0   • aspirin 81 MG EC tablet Take 1 tablet by mouth Daily.     • hydrALAZINE (APRESOLINE) 50 MG tablet 1 tablet 3 (Three) Times a Day.     • losartan (COZAAR) 25 MG tablet Take 1 tablet by mouth Daily. 30 tablet 0   • nitroglycerin (NITROSTAT) 0.4 MG SL tablet Place 1 tablet under the tongue every 5 (five) minutes as needed for chest pain. Take no more than 3 doses in 15 minutes. 25 tablet 1   • torsemide (DEMADEX) 20 MG tablet Take 2 tablets by mouth Daily. 60 tablet 0   • [DISCONTINUED] metoprolol succinate XL (TOPROL-XL) 25 MG 24 hr tablet Take 2 tablets by mouth Daily. 60 tablet 5     No current facility-administered medications on file prior to visit.       Allergies   Allergen Reactions   • Codeine Sulfate Hives   • Penicillins Other (See Comments)     Passed out after a PCN shot- no other sx noted-per pateint       Past Medical History:   Diagnosis Date   • AAA (abdominal aortic aneurysm)    • CAD in native artery    • Carotid stenosis     s/p CEA Left   • Colon polyp    • Heart attack 2013   • Heart failure    • Hyperlipidemia    • Hypertension    • Perennial allergic rhinitis 1/6/2017   • S/P angioplasty with stent    • Tinnitus        Past Surgical History:   Procedure Laterality Date   • ARTERIOGRAM AORTIC N/A 5/17/2021    Procedure: ZENITH AORTIC STENT GRAFT;  Surgeon: Karen Barrera Jr., MD;  Location:   [facility] ;  Service: Vascular;  Laterality: N/A;   • CARDIAC CATHETERIZATION      X2   • CARDIAC CATHETERIZATION N/A 2023    Procedure: Left Heart Cath;  Surgeon: Guru Jiang MD;  Location:  [facility];  Service: Cardiovascular;  Laterality: N/A;   • CARDIAC CATHETERIZATION N/A 2023    Procedure: Right Heart Cath;  Surgeon: Guru Jiang MD;  Location:  [facility];  Service: Cardiovascular;  Laterality: N/A;   • CARDIAC CATHETERIZATION N/A 2023    Procedure: Coronary angiography;  Surgeon: Guru Jiang MD;  Location:  [facility];  Service: Cardiovascular;  Laterality: N/A;   • CARDIAC CATHETERIZATION N/A 2023    Procedure: Left ventriculography;  Surgeon: Guru Jiang MD;  Location:  [facility];  Service: Cardiovascular;  Laterality: N/A;   • CARDIAC CATHETERIZATION  2023    Procedure: RESTING FULL CYCLE RATIO;  Surgeon: Guru Jiang MD;  Location:  [facility];  Service: Cardiovascular;;   • CAROTID ENDARTERECTOMY Left 2013    Bruce Jones Jr, MD   • CAROTID STENT Left     LAD   • COLONOSCOPY     • HERNIA REPAIR     • HYDROCELE EXCISION / REPAIR      Dr. SINTIA Osorio       Social History     Socioeconomic History   • Marital status:    Tobacco Use   • Smoking status: Former     Packs/day: 1.00     Years: 55.00     Pack years: 55.00     Types: Cigarettes     Quit date: 2000     Years since quittin.3   • Smokeless tobacco: Never   Vaping Use   • Vaping Use: Never used   Substance and Sexual Activity   • Alcohol use: No   • Drug use: No   • Sexual activity: Defer       Family History   Problem Relation Age of Onset   • No Known Problems Mother         complications of child birth   • No Known Problems Father    • Cancer Brother    • Malig Hyperthermia Neg Hx        The following portions of the patient's history were reviewed and updated as  "appropriate: allergies, current medications, past family history, past medical history, past social history, past surgical history and problem list.       Objective:       Vitals:    04/19/23 1035   BP: 140/70   BP Location: Right arm   Patient Position: Sitting   Cuff Size: Adult   Pulse: 56   SpO2: 95%   Weight: 80.7 kg (178 lb)   Height: 170.2 cm (67\")         Physical Exam:  Constitutional:  Alert and conversant, pleasant  HENT: Oropharynx clear and membrane moist  Eyes: Normal conjunctiva, no sclera icterus  Neck: Supple, no carotid bruit bilaterally  Cardiovascular: Regular rate and rhythm, Early peaking systolic murmur over the right upper sternal border, 1+ bilateral lower extremity edema  Pulmonary: Normal respiratory effort, faint bibasilar crackles  Neurological: Alert and orient x 3  Skin: Warm, dry, no ecchymosis, no rash  Psych: Appropriate mood and affect. Normal judgment and insight         Lab Results   Component Value Date     (L) 04/12/2023     (L) 04/11/2023    K 4.2 04/12/2023    K 3.7 04/11/2023     04/12/2023    CL 95 (L) 04/11/2023    CO2 24.0 04/12/2023    CO2 25.0 04/11/2023    BUN 26 (H) 04/12/2023    BUN 34 (H) 04/11/2023    CREATININE 1.75 (H) 04/12/2023    CREATININE 1.98 (H) 04/11/2023    EGFRIFNONA 30 (L) 11/19/2021    EGFRIFNONA 75 05/18/2021    EGFRIFAFRI 34 (L) 11/19/2021    EGFRIFAFRI 66 03/11/2019    GLUCOSE 90 04/12/2023    GLUCOSE 92 04/11/2023    CALCIUM 8.2 (L) 04/12/2023    CALCIUM 8.3 (L) 04/11/2023    PROTENTOTREF 6.4 03/11/2019    PROTENTOTREF 6.5 08/02/2018    ALBUMIN 3.3 (L) 04/12/2023    ALBUMIN 3.4 (L) 04/11/2023    BILITOT 0.5 04/11/2023    BILITOT 0.5 04/09/2023    AST 14 04/11/2023    AST 16 04/09/2023    ALT 8 04/11/2023    ALT 9 04/09/2023     Lab Results   Component Value Date    WBC 5.72 04/12/2023    WBC 6.28 04/11/2023    HGB 10.7 (L) 04/12/2023    HGB 11.9 (L) 04/11/2023    HCT 34.2 (L) 04/12/2023    HCT 35 (L) 04/11/2023    MCV 82.4 " 04/12/2023    MCV 82.6 04/11/2023     04/12/2023     04/11/2023     Lab Results   Component Value Date    CHOL 123 04/11/2023    TRIG 98 04/11/2023    TRIG 86 09/09/2021    HDL 34 (L) 04/11/2023    HDL 44 09/09/2021    LDL 70 04/11/2023    LDL 70 09/09/2021     Lab Results   Component Value Date    PROBNP 4,839.0 (H) 04/07/2023    .6 (H) 03/31/2023     Lab Results   Component Value Date    TROPONINT 41 (H) 04/07/2023     No results found for: TSH        ECG 12 Lead    Date/Time: 4/19/2023 10:48 AM  Performed by: Esperanza Zarate APRN  Authorized by: Esperanza Zarate APRN   Comparison: compared with previous ECG from 4/12/2023  Rhythm: sinus rhythm  Rate: normal  BPM: 56  Other findings: non-specific ST-T wave changes  Other findings comments: repolariozation abnormality                 Assessment:          Diagnosis Plan   1. Coronary artery disease involving native coronary artery of native heart without angina pectoris  ECG 12 Lead    furosemide (LASIX) injection 80 mg    Comprehensive Metabolic Panel    XR chest pa and lateral      2. Dyspnea on exertion  proBNP             Plan:       Chronic systolic heart failure // Ischemic cardiomyopathy - previous reduction in LVEF with normalization following LAD stent.  Now with newly reduced EF to 35 to 40%.  Guideline directed therapies with metoprolol succinate, losartan.  [facility] with moderate CAD, no intervention recommended. On oral torsemide and he appears wet today.  We will give 80 mg IV Lasix and check labs today.  May adjust oral diuretics.  If his kidney function is stable we will need to think about Entresto.  He needs to come back in a week    Coronary artery disease - prior PCI of LAD.  Patent stent with moderate CAD on most recent left heart catheterization.  Continue aspirin and beta-blocker.  Resume statin    PAD - s/p carotid endarterectomy. ASA, statin    AAA - s/p repair    Essential hypertension - BP is a little high today but  he had some lower blood pressure readings in the hospital.  I am not going to change anything today but I requested him to keep a home log and bring this back to the office next week    Mixed hyperlipidemia - 4/11/23 labs with LDL at goal.  It sounds like he was actually taking rosuvastatin 20 mg at home and I have resume this    CKD / hyponatremia - repeat labs    Patient is seen today for follow-up.  He is still wet on exam and I am going to give him IV diuretics today.  Also get labs and a chest x-ray.  Come back in a week.  Continue optimization of heart failure regimen.  Follow-up with Dr. Jiang in 6 weeks    Orders Placed This Encounter   Procedures   • XR chest pa and lateral     Standing Status:   Future     Standing Expiration Date:   4/19/2024     Order Specific Question:   Reason for Exam:     Answer:   chf     Order Specific Question:   Release to patient     Answer:   Routine Release   • Comprehensive Metabolic Panel     Standing Status:   Future     Number of Occurrences:   1     Standing Expiration Date:   4/19/2024     Order Specific Question:   Release to patient     Answer:   Routine Release   • proBNP     Standing Status:   Future     Number of Occurrences:   1     Standing Expiration Date:   4/19/2024     Order Specific Question:   Release to patient     Answer:   Routine Release   • ECG 12 Lead     This order was created via procedure documentation     Order Specific Question:   Release to patient     Answer:   Routine Release            DASHA Mccartney  [facility]  04/19/23  11:20 EDT

## 2023-04-20 NOTE — PROGRESS NOTES
Spoke to patient regarding results.  He remains on diuretics and will continue to follow effusions.    We will have him follow-up with PCP regarding questionable consolidation on imaging.  Copy of result also faxed to their office.

## 2023-04-27 ENCOUNTER — HOSPITAL ENCOUNTER (OUTPATIENT)
Dept: CARDIOLOGY | Facility: HOSPITAL | Age: 83
Discharge: HOME | End: 2023-04-27
Payer: MEDICARE

## 2023-04-27 ENCOUNTER — OFFICE VISIT (OUTPATIENT)
Dept: CARDIOLOGY | Facility: CLINIC | Age: 83
End: 2023-04-27
Payer: MEDICARE

## 2023-04-27 VITALS
DIASTOLIC BLOOD PRESSURE: 70 MMHG | HEART RATE: 84 BPM | SYSTOLIC BLOOD PRESSURE: 150 MMHG | BODY MASS INDEX: 28.09 KG/M2 | HEIGHT: 67 IN | WEIGHT: 179 LBS | OXYGEN SATURATION: 95 %

## 2023-04-27 DIAGNOSIS — I10 PRIMARY HYPERTENSION: ICD-10-CM

## 2023-04-27 DIAGNOSIS — I25.10 CORONARY ARTERY DISEASE INVOLVING NATIVE CORONARY ARTERY OF NATIVE HEART WITHOUT ANGINA PECTORIS: ICD-10-CM

## 2023-04-27 DIAGNOSIS — I71.40 ABDOMINAL AORTIC ANEURYSM (AAA) WITHOUT RUPTURE, UNSPECIFIED PART: ICD-10-CM

## 2023-04-27 DIAGNOSIS — R06.09 DYSPNEA ON EXERTION: ICD-10-CM

## 2023-04-27 DIAGNOSIS — I25.10 CAD IN NATIVE ARTERY: ICD-10-CM

## 2023-04-27 DIAGNOSIS — I50.23 ACUTE ON CHRONIC SYSTOLIC CHF (CONGESTIVE HEART FAILURE): ICD-10-CM

## 2023-04-27 DIAGNOSIS — I25.10 CORONARY ARTERY DISEASE INVOLVING NATIVE CORONARY ARTERY OF NATIVE HEART WITHOUT ANGINA PECTORIS: Primary | ICD-10-CM

## 2023-04-27 DIAGNOSIS — E78.5 HYPERLIPIDEMIA, UNSPECIFIED HYPERLIPIDEMIA TYPE: ICD-10-CM

## 2023-04-27 LAB
ANION GAP SERPL CALCULATED.3IONS-SCNC: 10.9 MMOL/L (ref 5–15)
BUN SERPL-MCNC: 20 MG/DL (ref 8–23)
BUN/CREAT SERPL: 10.5 (ref 7–25)
CALCIUM SPEC-SCNC: 9.4 MG/DL (ref 8.6–10.5)
CHLORIDE SERPL-SCNC: 100 MMOL/L (ref 98–107)
CO2 SERPL-SCNC: 26.1 MMOL/L (ref 22–29)
CREAT SERPL-MCNC: 1.9 MG/DL (ref 0.76–1.27)
EGFRCR SERPLBLD CKD-EPI 2021: 34.4 ML/MIN/1.73
GLUCOSE SERPL-MCNC: 98 MG/DL (ref 65–99)
NT-PROBNP SERPL-MCNC: 5806 PG/ML (ref 0–1800)
POTASSIUM SERPL-SCNC: 3.9 MMOL/L (ref 3.5–5.2)
SODIUM SERPL-SCNC: 137 MMOL/L (ref 136–145)

## 2023-04-27 PROCEDURE — 36415 COLL VENOUS BLD VENIPUNCTURE: CPT

## 2023-04-27 PROCEDURE — 83880 ASSAY OF NATRIURETIC PEPTIDE: CPT | Performed by: NURSE PRACTITIONER

## 2023-04-27 PROCEDURE — 80048 BASIC METABOLIC PNL TOTAL CA: CPT | Performed by: NURSE PRACTITIONER

## 2023-04-27 RX ORDER — CARVEDILOL 12.5 MG/1
12.5 TABLET ORAL 2 TIMES DAILY
Qty: 60 TABLET | Refills: 11 | Status: SHIPPED | OUTPATIENT
Start: 2023-04-27

## 2023-04-27 NOTE — PROGRESS NOTES
[facility] Follow Up Office Note     Encounter Date:23  Patient:Bayron Acevedo  :1940  MRN:[id number]      Chief Complaint:   Chief Complaint   Patient presents with   • Follow-up         History of Presenting Illness:        Bayron Acevedo is a 82 y.o. male who is here for follow-up.  He is a patient of Dr Jiang.    Patient has past medical history significant for chronic systolic heart failure LVEF 35 to 40%, ischemic cardiomyopathy, coronary artery disease with prior PCI, PAD status post carotid endarterectomy and AAA repair, essential hypertension, moderate to severe mitral regurgitation, essential hyperlipidemia, CKD.    Patient had recent hospitalization at [facility] with acute systolic heart failure.  He was previously followed by Dr. Christine Jha at [facility].    In  patient presented with EF of 25 to 30% and was found to have significant coronary disease status post PCI of LAD with residual 50% circumflex disease.  LVEF subsequently recovered to 50 to 55%.    Patient presented to [facility] in early April with shortness of breath.  Echo showed LVEF down to 30 to 40% with moderate to severe mitral regurgitation.  He was IV diuresed.  Right and left heart catheterization with moderate coronary disease and patent proximal LAD stent, normal filling pressures, normal cardiac output.  Patient was started on guideline directed therapies for heart failure.  He was continued on oral diuretic at discharge.    I saw the patient last week and he was mildly overloaded with elevated BP. He received 80mg IV Lasix in clinic.     Today patient reports he has shortness of breath with activity.  He thinks this is related to his lungs.  He has some mild lower extremity swelling, more so on the left side which has been chronic.  He is sleeping in a recliner but he has not tried sleeping flat in bed since his hospital discharge as he was previously short of breath reclining flat.  He kept a BP log  this week for titration of GDMT and his SBP is primarily 140 to 150 mmHg.  He denies chest pain, palpitations, presyncope.  He is scheduled to see nephrology next week.    Review of Systems:  Review of Systems   Cardiovascular: Positive for dyspnea on exertion, leg swelling and orthopnea. Negative for chest pain and palpitations.   Respiratory: Negative for shortness of breath.        Current Outpatient Medications on File Prior to Visit   Medication Sig Dispense Refill   • albuterol (PROVENTIL) (2.5 MG/3ML) 0.083% nebulizer solution USE ONE VIAL BY NEBULIZATION EVERY FOUR HOURS AS NEEDED FOR FOR WHEEZING (Patient taking differently: Take 2.5 mg by nebulization Every 4 (Four) Hours As Needed.) 180 mL 0   • aspirin 81 MG EC tablet Take 1 tablet by mouth Daily.     • hydrALAZINE (APRESOLINE) 50 MG tablet 1 tablet 3 (Three) Times a Day.     • losartan (COZAAR) 25 MG tablet Take 1 tablet by mouth Daily. 30 tablet 0   • nitroglycerin (NITROSTAT) 0.4 MG SL tablet Place 1 tablet under the tongue every 5 (five) minutes as needed for chest pain. Take no more than 3 doses in 15 minutes. 25 tablet 1   • torsemide (DEMADEX) 20 MG tablet Take 2 tablets by mouth Daily. 60 tablet 0   • [DISCONTINUED] metoprolol succinate XL (TOPROL-XL) 25 MG 24 hr tablet Take 2 tablets by mouth Daily for 180 days. 60 tablet 5   • [DISCONTINUED] rosuvastatin (CRESTOR) 20 MG tablet Take 1 tablet by mouth Daily. 90 tablet 3     No current facility-administered medications on file prior to visit.       Allergies   Allergen Reactions   • Codeine Sulfate Hives   • Penicillins Other (See Comments)     Passed out after a PCN shot- no other sx noted-per pateint       Past Medical History:   Diagnosis Date   • AAA (abdominal aortic aneurysm)    • CAD in native artery    • Carotid stenosis     s/p CEA Left   • Colon polyp    • Heart attack 2013   • Heart failure    • Hyperlipidemia    • Hypertension    • Perennial allergic rhinitis 1/6/2017   • S/P  angioplasty with stent    • Tinnitus        Past Surgical History:   Procedure Laterality Date   • ARTERIOGRAM AORTIC N/A 2021    Procedure: ZENITH AORTIC STENT GRAFT;  Surgeon: Karen Barrera Jr., MD;  Location: [facility] ;  Service: Vascular;  Laterality: N/A;   • CARDIAC CATHETERIZATION      X2   • CARDIAC CATHETERIZATION N/A 2023    Procedure: Left Heart Cath;  Surgeon: Guru Jiang MD;  Location: [facility] CATH INVASIVE LOCATION;  Service: Cardiovascular;  Laterality: N/A;   • CARDIAC CATHETERIZATION N/A 2023    Procedure: Right Heart Cath;  Surgeon: Guru Jiang MD;  Location: [facility] CATH INVASIVE LOCATION;  Service: Cardiovascular;  Laterality: N/A;   • CARDIAC CATHETERIZATION N/A 2023    Procedure: Coronary angiography;  Surgeon: Guru Jiang MD;  Location: [facility] CATH INVASIVE LOCATION;  Service: Cardiovascular;  Laterality: N/A;   • CARDIAC CATHETERIZATION N/A 2023    Procedure: Left ventriculography;  Surgeon: Guru Jiang MD;  Location: [facility] CATH INVASIVE LOCATION;  Service: Cardiovascular;  Laterality: N/A;   • CARDIAC CATHETERIZATION  2023    Procedure: RESTING FULL CYCLE RATIO;  Surgeon: Guru Jiang MD;  Location: [facility] CATH INVASIVE LOCATION;  Service: Cardiovascular;;   • CAROTID ENDARTERECTOMY Left 2013    Bruce Jones Jr, MD   • CAROTID STENT Left     LAD   • COLONOSCOPY     • HERNIA REPAIR     • HYDROCELE EXCISION / REPAIR      Dr. SINTIA Osorio       Social History     Socioeconomic History   • Marital status:    Tobacco Use   • Smoking status: Former     Packs/day: 1.00     Years: 55.00     Pack years: 55.00     Types: Cigarettes     Quit date:      Years since quittin.3   • Smokeless tobacco: Never   Vaping Use   • Vaping Use: Never used   Substance and Sexual Activity   • Alcohol use: No   • Drug use: No   • Sexual activity: Defer       Family History   Problem Relation Age of Onset   • No Known  "Problems Mother         complications of child birth   • No Known Problems Father    • Cancer Brother    • Malig Hyperthermia Neg Hx        The following portions of the patient's history were reviewed and updated as appropriate: allergies, current medications, past family history, past medical history, past social history, past surgical history and problem list.       Objective:       Vitals:    04/27/23 0904   BP: 150/70   BP Location: Left arm   Patient Position: Sitting   Cuff Size: Adult   Pulse: 84   SpO2: 95%   Weight: 81.2 kg (179 lb)   Height: 170.2 cm (67\")         Physical Exam:  Constitutional:  Alert and conversant, pleasant  HENT: Oropharynx clear and membrane moist  Eyes: Normal conjunctiva, no sclera icterus  Neck: Supple, no carotid bruit bilaterally  Cardiovascular: Regular rate and rhythm, Early peaking systolic murmur over the right upper sternal border, 1+ bilateral lower extremity edema  Pulmonary: Normal respiratory effort, faint bibasilar crackles  Neurological: Alert and orient x 3  Skin: Warm, dry, no ecchymosis, no rash  Psych: Appropriate mood and affect. Normal judgment and insight         Lab Results   Component Value Date     04/19/2023     (L) 04/12/2023    K 4.0 04/19/2023    K 4.2 04/12/2023    CL 96 (L) 04/19/2023     04/12/2023    CO2 29.0 04/19/2023    CO2 24.0 04/12/2023    BUN 23 04/19/2023    BUN 26 (H) 04/12/2023    CREATININE 1.93 (H) 04/19/2023    CREATININE 1.75 (H) 04/12/2023    EGFRIFNONA 30 (L) 11/19/2021    EGFRIFNONA 75 05/18/2021    EGFRIFAFRI 34 (L) 11/19/2021    EGFRIFAFRI 66 03/11/2019    GLUCOSE 97 04/19/2023    GLUCOSE 90 04/12/2023    CALCIUM 9.3 04/19/2023    CALCIUM 8.2 (L) 04/12/2023    PROTENTOTREF 6.4 03/11/2019    PROTENTOTREF 6.5 08/02/2018    ALBUMIN 4.2 04/19/2023    ALBUMIN 3.3 (L) 04/12/2023    BILITOT 0.5 04/19/2023    BILITOT 0.5 04/11/2023    AST 14 04/19/2023    AST 14 04/11/2023    ALT 12 04/19/2023    ALT 8 04/11/2023     Lab " Results   Component Value Date    WBC 5.72 04/12/2023    WBC 6.28 04/11/2023    HGB 10.7 (L) 04/12/2023    HGB 11.9 (L) 04/11/2023    HCT 34.2 (L) 04/12/2023    HCT 35 (L) 04/11/2023    MCV 82.4 04/12/2023    MCV 82.6 04/11/2023     04/12/2023     04/11/2023     Lab Results   Component Value Date    CHOL 123 04/11/2023    TRIG 98 04/11/2023    TRIG 86 09/09/2021    HDL 34 (L) 04/11/2023    HDL 44 09/09/2021    LDL 70 04/11/2023    LDL 70 09/09/2021     Lab Results   Component Value Date    PROBNP 5,992.0 (H) 04/19/2023    PROBNP 4,839.0 (H) 04/07/2023    .6 (H) 03/31/2023     Lab Results   Component Value Date    TROPONINT 41 (H) 04/07/2023     No results found for: TSH        ECG 12 Lead    Date/Time: 4/27/2023 9:25 AM  Performed by: Esperanza Zarate APRN  Authorized by: Esperanza Zarate APRN   Comparison: compared with previous ECG from 4/19/2023  Similar to previous ECG  Rhythm: sinus rhythm  BPM: 62  Other findings: left ventricular hypertrophy with strain               Assessment:          Diagnosis Plan   1. Coronary artery disease involving native coronary artery of native heart without angina pectoris  ECG 12 Lead    Basic Metabolic Panel      2. Dyspnea on exertion  proBNP      3. Acute on chronic systolic CHF (congestive heart failure)        4. Abdominal aortic aneurysm (AAA) without rupture, unspecified part        5. CAD in native artery        6. Hyperlipidemia, unspecified hyperlipidemia type        7. Primary hypertension               Plan:       Chronic systolic heart failure // Ischemic cardiomyopathy - EF to 35 to 40%. [facility] with moderate CAD, no intervention recommended. On oral torsemide and he appears mildly overloaded on exam however right heart catheterization during hospitalization with normal filling pressures.  His weight has been stable at home.  Transition metoprolol succinate to carvedilol as blood pressure is elevated.  Continue losartan.    Coronary artery  disease - prior PCI of LAD.  Patent stent with moderate CAD on most recent left heart catheterization.  Continue aspirin, beta-blocker and statin    PAD - s/p carotid endarterectomy. ASA, statin    AAA - s/p repair    Essential hypertension - BP elevated at home -150.  Transition metoprolol succinate to carvedilol.  Call office in 2 weeks with BP log    Mixed hyperlipidemia - 4/11/23 labs with LDL at goal.  Continue rosuvastatin 20 mg daily    CKD / hyponatremia - creatinine last week stable at 1.9.  Repeat labs again today.  Follows with nephrology, will be seen again next week      Patient is seen today for follow-up.  I think he is stable, but his blood pressure is elevated and we will change him to carvedilol for better control.  Repeat labs.  He has scheduled follow-up with nephrology.  See Dr. Jiang in 3 weeks    Orders Placed This Encounter   Procedures   • Basic Metabolic Panel     Standing Status:   Future     Number of Occurrences:   1     Standing Expiration Date:   4/27/2024     Order Specific Question:   Release to patient     Answer:   Routine Release   • proBNP     Standing Status:   Future     Number of Occurrences:   1     Standing Expiration Date:   4/27/2024     Order Specific Question:   Release to patient     Answer:   Routine Release   • ECG 12 Lead     This order was created via procedure documentation     Order Specific Question:   Release to patient     Answer:   Routine Release            DASHA Mccartney  [facility]  04/27/23  10:13 EDT

## 2023-05-18 ENCOUNTER — OFFICE VISIT (OUTPATIENT)
Dept: CARDIOLOGY | Facility: CLINIC | Age: 83
End: 2023-05-18
Payer: MEDICARE

## 2023-05-18 VITALS
HEIGHT: 67 IN | BODY MASS INDEX: 26.56 KG/M2 | DIASTOLIC BLOOD PRESSURE: 70 MMHG | WEIGHT: 169.2 LBS | HEART RATE: 61 BPM | SYSTOLIC BLOOD PRESSURE: 176 MMHG

## 2023-05-18 DIAGNOSIS — I34.0 NONRHEUMATIC MITRAL VALVE REGURGITATION: ICD-10-CM

## 2023-05-18 DIAGNOSIS — I25.10 CORONARY ARTERY DISEASE INVOLVING NATIVE CORONARY ARTERY OF NATIVE HEART WITHOUT ANGINA PECTORIS: Primary | ICD-10-CM

## 2023-05-18 DIAGNOSIS — R91.8 LUNG MASS: ICD-10-CM

## 2023-05-18 DIAGNOSIS — I50.22 CHRONIC SYSTOLIC HEART FAILURE: ICD-10-CM

## 2023-05-18 RX ORDER — ROSUVASTATIN CALCIUM 20 MG/1
20 TABLET, COATED ORAL DAILY
Qty: 90 TABLET | Refills: 3 | Status: SHIPPED | OUTPATIENT
Start: 2023-05-18

## 2023-05-18 RX ORDER — TORSEMIDE 20 MG/1
40 TABLET ORAL DAILY
Qty: 180 TABLET | Refills: 3 | Status: SHIPPED | OUTPATIENT
Start: 2023-05-18

## 2023-05-18 RX ORDER — LOSARTAN POTASSIUM 25 MG/1
25 TABLET ORAL
Qty: 90 TABLET | Refills: 3 | Status: SHIPPED | OUTPATIENT
Start: 2023-05-18

## 2023-05-18 RX ORDER — ROSUVASTATIN CALCIUM 20 MG/1
1 TABLET, COATED ORAL DAILY
COMMUNITY
Start: 2022-12-19 | End: 2023-05-18 | Stop reason: SDUPTHER

## 2023-05-18 RX ORDER — AMLODIPINE BESYLATE 5 MG/1
5 TABLET ORAL DAILY
Qty: 90 TABLET | Refills: 3 | Status: SHIPPED | OUTPATIENT
Start: 2023-05-18

## 2023-05-18 NOTE — PROGRESS NOTES
[facility] Follow Up Office Note     Encounter Date:23  Patient:Bayron Acevedo  :1940  MRN:[id number]      Chief Complaint:   Chief Complaint   Patient presents with   • 3 week hospital f/u         History of Presenting Illness:      Mr. Acevedo is a 82 y.o. gentleman with past medical history notable for chronic kidney disease, essential hypertension, mixed hyperlipidemia, chronic systolic heart failure with recovered left ventricular function as well as coronary artery disease who present presents to our office for scheduled follow-up.  I first met him in the hospital doing his heart catheterization.  He presented there with an acute exacerbation of his congestive heart failure.  We did do a heart catheterization did show borderline left main disease at approximately 50% to 60% but RFR assessment was above the cutoff at 0.91.  He did have his diuretic regimen optimized and since leaving the hospital he is lost a lot of weight.  His blood pressure is still a little bit high.  He is actually been feeling a lot better since adjusting his diuretics and clinically is doing well.  He has been out of his losartan and torsemide for the last week despite this he feels okay but his blood pressure is definitely been higher than where it has been in the past.  Typically his blood pressures been in the 140s at home sometimes its in the 110s later in the evening.  As of late the been more in the 150s to 170s.  He has not gained any weight.  Generally he is doing okay.    Review of Systems:  Review of Systems   Constitutional: Positive for weight loss.   HENT: Negative.    Eyes: Negative.    Cardiovascular: Positive for dyspnea on exertion and leg swelling.   Respiratory: Positive for shortness of breath.    Endocrine: Negative.    Hematologic/Lymphatic: Negative.    Skin: Negative.    Musculoskeletal: Negative.    Gastrointestinal: Negative.    Genitourinary: Negative.    Neurological: Negative.     Psychiatric/Behavioral: Negative.    Allergic/Immunologic: Negative.        Current Outpatient Medications on File Prior to Visit   Medication Sig Dispense Refill   • albuterol (PROVENTIL) (2.5 MG/3ML) 0.083% nebulizer solution USE ONE VIAL BY NEBULIZATION EVERY FOUR HOURS AS NEEDED FOR FOR WHEEZING (Patient taking differently: Take 2.5 mg by nebulization Every 4 (Four) Hours As Needed.) 180 mL 0   • aspirin 81 MG EC tablet Take 1 tablet by mouth Daily.     • carvedilol (COREG) 12.5 MG tablet Take 1 tablet by mouth 2 (Two) Times a Day. 60 tablet 11   • hydrALAZINE (APRESOLINE) 50 MG tablet 1 tablet 3 (Three) Times a Day.     • nitroglycerin (NITROSTAT) 0.4 MG SL tablet Place 1 tablet under the tongue every 5 (five) minutes as needed for chest pain. Take no more than 3 doses in 15 minutes. 25 tablet 1   • [DISCONTINUED] losartan (COZAAR) 25 MG tablet Take 1 tablet by mouth Daily. 30 tablet 0   • [DISCONTINUED] rosuvastatin (CRESTOR) 20 MG tablet Take 1 tablet by mouth Daily.     • [DISCONTINUED] torsemide (DEMADEX) 20 MG tablet Take 2 tablets by mouth Daily. 60 tablet 0     No current facility-administered medications on file prior to visit.       Allergies   Allergen Reactions   • Codeine Sulfate Hives   • Penicillins Other (See Comments)     Passed out after a PCN shot- no other sx noted-per pateint       Past Medical History:   Diagnosis Date   • AAA (abdominal aortic aneurysm)    • CAD in native artery    • Carotid stenosis     s/p CEA Left   • Colon polyp    • Heart attack 2013   • Heart failure    • Hyperlipidemia    • Hypertension    • Perennial allergic rhinitis 1/6/2017   • S/P angioplasty with stent    • Tinnitus        Past Surgical History:   Procedure Laterality Date   • ARTERIOGRAM AORTIC N/A 5/17/2021    Procedure: ZENANT AORTIC STENT GRAFT;  Surgeon: Karen Barrera Jr., MD;  Location: [facility] 18/19;  Service: Vascular;  Laterality: N/A;   • CARDIAC CATHETERIZATION      X2   • CARDIAC  CATHETERIZATION N/A 2023    Procedure: Left Heart Cath;  Surgeon: Guru Jiang MD;  Location:  [facility];  Service: Cardiovascular;  Laterality: N/A;   • CARDIAC CATHETERIZATION N/A 2023    Procedure: Right Heart Cath;  Surgeon: Guru Jiang MD;  Location:  [facility];  Service: Cardiovascular;  Laterality: N/A;   • CARDIAC CATHETERIZATION N/A 2023    Procedure: Coronary angiography;  Surgeon: Guru Jiang MD;  Location:  [facility];  Service: Cardiovascular;  Laterality: N/A;   • CARDIAC CATHETERIZATION N/A 2023    Procedure: Left ventriculography;  Surgeon: Guru Jiang MD;  Location:  [facility];  Service: Cardiovascular;  Laterality: N/A;   • CARDIAC CATHETERIZATION  2023    Procedure: RESTING FULL CYCLE RATIO;  Surgeon: Guru Jiang MD;  Location[facility];  Service: Cardiovascular;;   • CAROTID ENDARTERECTOMY Left 2013    Bruce Jones Jr, MD   • CAROTID STENT Left     LAD   • COLONOSCOPY     • HERNIA REPAIR     • HYDROCELE EXCISION / REPAIR      Dr. SINTIA Osorio       Social History     Socioeconomic History   • Marital status:    Tobacco Use   • Smoking status: Former     Packs/day: 1.00     Years: 55.00     Pack years: 55.00     Types: Cigarettes     Quit date:      Years since quittin.3   • Smokeless tobacco: Never   Vaping Use   • Vaping Use: Never used   Substance and Sexual Activity   • Alcohol use: No   • Drug use: No   • Sexual activity: Defer       Family History   Problem Relation Age of Onset   • No Known Problems Mother         complications of child birth   • No Known Problems Father    • Cancer Brother    • Malig Hyperthermia Neg Hx        The following portions of the patient's history were reviewed and updated as appropriate: allergies, current medications, past family history, past medical history, past social history, past  "surgical history and problem list.       Objective:       Vitals:    05/18/23 0916   BP: 176/70   BP Location: Left arm   Patient Position: Sitting   Pulse: 61   Weight: 76.7 kg (169 lb 3.2 oz)   Height: 170.2 cm (67\")         Physical Exam:  Constitutional: Well appearing, well developed, no acute distress   HENT: Oropharynx clear and membrane moist  Eyes: Normal conjunctiva, no sclera icterus.  Neck: Supple, no carotid bruit bilaterally.  Cardiovascular: Regular rate and rhythm, Early peaking systolic murmur over the right upper sternal border and Holosystolic murmur at the apex, Trace bilateral lower extremity edema.  Pulmonary: Normal respiratory effort, normal lung sounds, no wheezing.  Neurological: Alert and orient x 3.   Skin: Warm, dry, no ecchymosis, no rash.  Psych: Appropriate mood and affect. Normal judgment and insight.         Lab Results   Component Value Date     04/27/2023     04/19/2023    K 3.9 04/27/2023    K 4.0 04/19/2023     04/27/2023    CL 96 (L) 04/19/2023    CO2 26.1 04/27/2023    CO2 29.0 04/19/2023    BUN 20 04/27/2023    BUN 23 04/19/2023    CREATININE 1.90 (H) 04/27/2023    CREATININE 1.93 (H) 04/19/2023    EGFRIFNONA 30 (L) 11/19/2021    EGFRIFNONA 75 05/18/2021    EGFRIFAFRI 34 (L) 11/19/2021    EGFRIFAFRI 66 03/11/2019    GLUCOSE 98 04/27/2023    GLUCOSE 97 04/19/2023    CALCIUM 9.4 04/27/2023    CALCIUM 9.3 04/19/2023    PROTENTOTREF 6.4 03/11/2019    PROTENTOTREF 6.5 08/02/2018    ALBUMIN 4.2 04/19/2023    ALBUMIN 3.3 (L) 04/12/2023    BILITOT 0.5 04/19/2023    BILITOT 0.5 04/11/2023    AST 14 04/19/2023    AST 14 04/11/2023    ALT 12 04/19/2023    ALT 8 04/11/2023     Lab Results   Component Value Date    WBC 5.72 04/12/2023    WBC 6.28 04/11/2023    HGB 10.7 (L) 04/12/2023    HGB 11.9 (L) 04/11/2023    HCT 34.2 (L) 04/12/2023    HCT 35 (L) 04/11/2023    MCV 82.4 04/12/2023    MCV 82.6 04/11/2023     04/12/2023     04/11/2023     Lab Results "   Component Value Date    CHOL 123 04/11/2023    TRIG 98 04/11/2023    TRIG 86 09/09/2021    HDL 34 (L) 04/11/2023    HDL 44 09/09/2021    LDL 70 04/11/2023    LDL 70 09/09/2021     Lab Results   Component Value Date    PROBNP 5,806.0 (H) 04/27/2023    PROBNP 5,992.0 (H) 04/19/2023    .6 (H) 03/31/2023     Lab Results   Component Value Date    TROPONINT 41 (H) 04/07/2023     No results found for: TSH        ECG 12 Lead    Date/Time: 5/18/2023 9:45 AM  Performed by: Guru Jiang MD  Authorized by: Guru Jiang MD   Comparison: compared with previous ECG from 4/19/2023  Similar to previous ECG  Rhythm: sinus rhythm  Other findings: left ventricular hypertrophy        Cardiac catheterization 5/18/2023:  · Moderate coronary artery disease with 40% proximal left main stenoses, patent stent within the proximal LAD, and 40% proximal RCA segment stenoses.  · Normal right and left-sided filling pressures with normal pulmonary artery pressures.  · Normal cardiac output of 4.4 L/min and index of 2.3 L/min/m2  · RFR assessment of the left main confirm no significant hemodynamic stenoses at 0.91 thus PCI was deferred.  Echocardiogram 4/7/2023 [facility]  · Overall left ventricular systolic function is moderately depressed. The estimated ejection fraction is 35-40%. There is diffuse global hypokinesis of the left ventricle. Moderate concentric left ventricular hypertrophy.   · Mildly dilated left atrium.   · Trace aortic regurgitation is noted.   · Moderate to severe mitral regurgitation is present.   · Right ventricular systolic pressure of 49 mmHg.   · Mild to moderate tricuspid regurgitation.   · There is a small (trivial) pericardial effusion. There is no 2D and/or doppler evidence of tamponade physiology.   · A pleural effusion is visualized.     Holter monitor 4/14/2022:  · This is a 48-hour recording done due to ventricular ectopy   · The intrinsic rhythm is sinus rhythm with an average heart rate  of 65 bpm with a range from 44 to 120 bpm   · There are frequent PVCs totaling over 2200 representing 1.2% of all beats.  There was 134 beat run of a wide-complex tachycardia at 170 bpm consistent with ventricular tachycardia  · There are frequent PACs totaling over 2900 representing 1.6% of all beats.  The computer tallied 7 runs of SVT.  The longest was 20 beats and fastest 156 bpm.  Most of these appear to be a slow atrial tachycardia of less than 120 bpm   · There were no prolonged pauses   · There were no symptoms     Echocardiogram 12/8/2014 [facility]:  · Ejection fraction estimated at 53% with basal inferior, mid inferior, and apical inferior wall hypokinesis  · Mild aortic stenosis with a mean gradient of 12 mmHg across the aortic valve    Stress MPI 4/17/2014 [facility]:  · Inferior lateral wall hypokinesis  · Baseline ejection fraction 60% but decreases to 40% with stress with a 3 times daily of 1.3         Assessment:          Diagnosis Plan   1. Coronary artery disease involving native coronary artery of native heart without angina pectoris  ECG 12 Lead      2. Lung mass  CT Chest Without Contrast      3. Nonrheumatic mitral valve regurgitation  Adult Transthoracic Echo Complete W/ Cont if Necessary Per Protocol      4. Chronic systolic heart failure  Adult Transthoracic Echo Complete W/ Cont if Necessary Per Protocol             Plan:       Mr. Acevedo is a 82 y.o. gentleman with past medical history notable for chronic kidney disease, essential hypertension, mixed hyperlipidemia, chronic systolic heart failure with recovered left ventricular function as well as coronary artery disease who present presents to our office for scheduled follow-up.  He was recently seen in the hospital for acute systolic congestive heart failure.  His medical regimen was optimized and presents for scheduled follow-up.  Overall he seems to be doing fairly well his blood pressure is elevated but he is also been out of  his torsemide and losartan he just started those back today after being out of them.  Nonetheless he still is having high blood pressure even before running out of the medication I would start a low-dose amlodipine and monitor his response.  I also like to get repeat echocardiogram now that his volume status seems to be significantly improved.  He is lost about 10 pounds since leaving the hospital.  We will get a follow-up echocardiogram in about 2 months hopefully will allow for us to get more blood pressure control in the intervening time to reassess his mitral valve regurgitation.  He was noted to have a lung mass on his chest x-ray in the hospital it was recommended that he get a CT scan unfortunately this was not performed in the hospital I have ordered it today.    Coronary artery disease without angina:  · We will continue with aspirin  · Continue carvedilol  · Continue statin    Nonischemic cardiomyopathy/chronic systolic congestive heart failure:  · Does have borderline left main disease but has also had issues with cardiomyopathies in the past  · We will try and optimize blood pressure control  · Continue carvedilol  · Continue losartan  · Renal insufficiency limits further titration of ARB or considering spironolactone  · Hopefully with better blood pressure control this will improve  · Follow-up on repeat echocardiogram to reassess LV function and mitral valve regurgitation    Nonrheumatic mitral valve regurgitation:  · Follow-up on repeat echocardiogram    Lung mass:  · Noncontrasted CT scan ordered      Follow-up:  2 months with echocardiogram      Guru Jiang MD  [facility]  05/18/23  10:01 EDT

## 2023-05-18 NOTE — PATIENT INSTRUCTIONS
Will start amlodipine 5 mg daily to help with blood pressure (monitor blood pressure at home and look out for any worsening leg swelling)  Will get CT scan to evaluate abnormal CXR  Will get echocardiogram in 2 months

## 2023-05-26 ENCOUNTER — TELEPHONE (OUTPATIENT)
Dept: CARDIOLOGY | Facility: CLINIC | Age: 83
End: 2023-05-26
Payer: MEDICARE

## 2023-05-26 ENCOUNTER — HOSPITAL ENCOUNTER (OUTPATIENT)
Dept: CT IMAGING | Facility: HOSPITAL | Age: 83
Discharge: HOME | End: 2023-05-26
Payer: MEDICARE

## 2023-05-26 DIAGNOSIS — R91.8 LUNG MASS: Primary | ICD-10-CM

## 2023-05-26 DIAGNOSIS — R91.8 LUNG MASS: ICD-10-CM

## 2023-05-26 PROCEDURE — 71250 CT THORAX DX C-: CPT

## 2023-05-26 NOTE — TELEPHONE ENCOUNTER
Did review CT scan results which unfortunately is showing mass concerning for bronchogenic carcinoma.  We will further evaluate with our thoracic surgery colleagues regarding further work-up.  Did call and talk to the wife and explained the plan we will arrange for further referral and evaluation with our thoracic team.          Dr. Carolina is requesting to speak with Dr. Jiang regarding Bayron Acevedo.    Please return call to [phone].    Notified Dr. Jiang via text of epic message.    Thank you,  Kathy MCADAMS RN  Triage Nurse [facility]   15:09 EDT

## 2023-06-06 ENCOUNTER — OFFICE VISIT (OUTPATIENT)
Dept: OTHER | Facility: HOSPITAL | Age: 83
End: 2023-06-06
Payer: MEDICARE

## 2023-06-06 ENCOUNTER — PREP FOR SURGERY (OUTPATIENT)
Dept: SURGERY | Facility: CLINIC | Age: 83
End: 2023-06-06
Payer: MEDICARE

## 2023-06-06 VITALS
TEMPERATURE: 98.4 F | OXYGEN SATURATION: 97 % | SYSTOLIC BLOOD PRESSURE: 184 MMHG | DIASTOLIC BLOOD PRESSURE: 81 MMHG | HEART RATE: 65 BPM | WEIGHT: 169 LBS | BODY MASS INDEX: 26.47 KG/M2

## 2023-06-06 DIAGNOSIS — R91.8 PULMONARY MASS: Primary | ICD-10-CM

## 2023-06-06 PROCEDURE — 3077F SYST BP >= 140 MM HG: CPT | Performed by: STUDENT IN AN ORGANIZED HEALTH CARE EDUCATION/TRAINING PROGRAM

## 2023-06-06 PROCEDURE — 3079F DIAST BP 80-89 MM HG: CPT | Performed by: STUDENT IN AN ORGANIZED HEALTH CARE EDUCATION/TRAINING PROGRAM

## 2023-06-06 PROCEDURE — G0463 HOSPITAL OUTPT CLINIC VISIT: HCPCS

## 2023-06-06 NOTE — PROGRESS NOTES
06/13/23 0001   Pre-Procedure Phone Call   Procedure Time Verified Yes   Arrival Time 0830   Procedure Location Verified Yes   Medical History Reviewed No   NPO Status Reinforced Yes   Ride and Caregiver Arranged Yes   Phone Number for Ride/Caregiver instructed pt to hold aspirin x 5 days prior to procedure. Pt voiced understanding.   Patient Knows to Bring Current Medications No   Bring Outside Films Requested No

## 2023-06-06 NOTE — PROGRESS NOTES
"Chief Complaint  Spiculated left upper lobe pulmonary mass.  Mediastinal lymphadenopathy    Subjective        Bayron TOYIN Acevedo presents to [facility]  History of Present Illness  Mr. Acevedo is a pleasant 82-year-old gentleman who presents today in evaluation of a spiculated left upper lobe pulmonary mass that measures 3.7 x 4.3 x 4.2 cm in size.  In addition he has a lot of mediastinal bulky lymphadenopathy.  And he has bilateral pleural effusions.  He is an ex-smoker.  He stopped approximately 25 years ago.  Prior to this he said he smoked approximately 50 years.  He switched between cigarette and pipe tobacco smoke.  He does state that he has had some unintentional weight loss over the last year which he accredited to his transfemoral AAA repair.  He denies any prior chest surgery, he has had a heart stent placed in the past.  He does state that he has limited physical activity.  He does become short of breath just with walking.  He is unable to walk far distances without becoming winded and having to sit down with an elevated heart rate.  He denies any fevers or chills.  He denies any hematemesis or hemoptysis.  He does state that he has a productive cough.  He denies any other symptoms.    Objective   Vital Signs:  BP (!) 184/81   Pulse 65   Temp 98.4 °F (36.9 °C)   Wt 76.7 kg (169 lb)   SpO2 97%   BMI 26.47 kg/m²   Estimated body mass index is 26.47 kg/m² as calculated from the following:    Height as of 5/18/23: 170.2 cm (67\").    Weight as of this encounter: 76.7 kg (169 lb).         Physical Exam  Vitals reviewed.   Constitutional:       Appearance: Normal appearance.   Neck:      Comments: No cervical lymphadenopathy was appreciated on physical exam  Cardiovascular:      Rate and Rhythm: Normal rate and regular rhythm.   Pulmonary:      Effort: Pulmonary effort is normal.      Breath sounds: Normal breath sounds.   Musculoskeletal:         General: Normal range of " motion.      Cervical back: Normal range of motion.   Skin:     General: Skin is warm.      Capillary Refill: Capillary refill takes less than 2 seconds.   Neurological:      General: No focal deficit present.      Mental Status: He is alert and oriented to person, place, and time.   Psychiatric:         Mood and Affect: Mood normal.         Behavior: Behavior normal.         Thought Content: Thought content normal.         Judgment: Judgment normal.      Result Review :  CT scan of the chest performed on 5/26/2023 was visualized by myself.  Does have a concerning left upper lobe pulmonary nodule and mediastinal lymphadenopathy.  He has bilateral pleural effusions.  I agree with the rest the interpretation               Assessment and Plan   Diagnoses and all orders for this visit:    1. Pulmonary mass (Primary)  -     NM PET/CT Skull Base to Mid Thigh; Future    Mr. Acevedo is a pleasant 82-year-old gentleman who presents for evaluation of a left upper lobe pulmonary mass and mediastinal lymphadenopathy.  This is concerning for primary lung malignancy.  I am also concerned that this would be advanced age due to his bulky mediastinal lymph nodes and bilateral pleural effusions.  I am going to order a CT/PET scan to look for metabolic activities and these other areas  In addition I am also going to order a CT-guided percutaneous biopsy of this left upper lobe lesion to establish primary diagnosis.38  Once both of these tests are performed we can see him back in multidisciplinary clinic in regards to his results.       I spent   38 minutes caring for Bayron on this date of service. This time includes time spent by me in the following activities:preparing for the visit, reviewing tests, obtaining and/or reviewing a separately obtained history, performing a medically appropriate examination and/or evaluation , counseling and educating the patient/family/caregiver, ordering medications, tests, or procedures, referring and  communicating with other health care professionals , documenting information in the medical record, independently interpreting results and communicating that information with the patient/family/caregiver, and care coordination  Follow Up   No follow-ups on file.  Patient was given instructions and counseling regarding his condition or for health maintenance advice. Please see specific information pulled into the AVS if appropriate.

## 2023-06-06 NOTE — H&P (VIEW-ONLY)
"Chief Complaint  Spiculated left upper lobe pulmonary mass.  Mediastinal lymphadenopathy    Subjective        Bayron TOYIN Acevedo presents to [facility]  History of Present Illness  Mr. Acevedo is a pleasant 82-year-old gentleman who presents today in evaluation of a spiculated left upper lobe pulmonary mass that measures 3.7 x 4.3 x 4.2 cm in size.  In addition he has a lot of mediastinal bulky lymphadenopathy.  And he has bilateral pleural effusions.  He is an ex-smoker.  He stopped approximately 25 years ago.  Prior to this he said he smoked approximately 50 years.  He switched between cigarette and pipe tobacco smoke.  He does state that he has had some unintentional weight loss over the last year which he accredited to his transfemoral AAA repair.  He denies any prior chest surgery, he has had a heart stent placed in the past.  He does state that he has limited physical activity.  He does become short of breath just with walking.  He is unable to walk far distances without becoming winded and having to sit down with an elevated heart rate.  He denies any fevers or chills.  He denies any hematemesis or hemoptysis.  He does state that he has a productive cough.  He denies any other symptoms.    Objective   Vital Signs:  BP (!) 184/81   Pulse 65   Temp 98.4 °F (36.9 °C)   Wt 76.7 kg (169 lb)   SpO2 97%   BMI 26.47 kg/m²   Estimated body mass index is 26.47 kg/m² as calculated from the following:    Height as of 5/18/23: 170.2 cm (67\").    Weight as of this encounter: 76.7 kg (169 lb).         Physical Exam  Vitals reviewed.   Constitutional:       Appearance: Normal appearance.   Neck:      Comments: No cervical lymphadenopathy was appreciated on physical exam  Cardiovascular:      Rate and Rhythm: Normal rate and regular rhythm.   Pulmonary:      Effort: Pulmonary effort is normal.      Breath sounds: Normal breath sounds.   Musculoskeletal:         General: Normal range of " motion.      Cervical back: Normal range of motion.   Skin:     General: Skin is warm.      Capillary Refill: Capillary refill takes less than 2 seconds.   Neurological:      General: No focal deficit present.      Mental Status: He is alert and oriented to person, place, and time.   Psychiatric:         Mood and Affect: Mood normal.         Behavior: Behavior normal.         Thought Content: Thought content normal.         Judgment: Judgment normal.      Result Review :  CT scan of the chest performed on 5/26/2023 was visualized by myself.  Does have a concerning left upper lobe pulmonary nodule and mediastinal lymphadenopathy.  He has bilateral pleural effusions.  I agree with the rest the interpretation               Assessment and Plan   Diagnoses and all orders for this visit:    1. Pulmonary mass (Primary)  -     NM PET/CT Skull Base to Mid Thigh; Future    Mr. Acevedo is a pleasant 82-year-old gentleman who presents for evaluation of a left upper lobe pulmonary mass and mediastinal lymphadenopathy.  This is concerning for primary lung malignancy.  I am also concerned that this would be advanced age due to his bulky mediastinal lymph nodes and bilateral pleural effusions.  I am going to order a CT/PET scan to look for metabolic activities and these other areas  In addition I am also going to order a CT-guided percutaneous biopsy of this left upper lobe lesion to establish primary diagnosis.38  Once both of these tests are performed we can see him back in multidisciplinary clinic in regards to his results.       I spent   38 minutes caring for Bayron on this date of service. This time includes time spent by me in the following activities:preparing for the visit, reviewing tests, obtaining and/or reviewing a separately obtained history, performing a medically appropriate examination and/or evaluation , counseling and educating the patient/family/caregiver, ordering medications, tests, or procedures, referring and  communicating with other health care professionals , documenting information in the medical record, independently interpreting results and communicating that information with the patient/family/caregiver, and care coordination  Follow Up   No follow-ups on file.  Patient was given instructions and counseling regarding his condition or for health maintenance advice. Please see specific information pulled into the AVS if appropriate.

## 2023-06-12 ENCOUNTER — HOSPITAL ENCOUNTER (OUTPATIENT)
Dept: PET IMAGING | Facility: HOSPITAL | Age: 83
Discharge: HOME | End: 2023-06-12
Payer: MEDICARE

## 2023-06-12 DIAGNOSIS — R91.8 PULMONARY MASS: ICD-10-CM

## 2023-06-12 LAB — GLUCOSE BLDC GLUCOMTR-MCNC: 88 MG/DL (ref 70–130)

## 2023-06-12 PROCEDURE — 0 FLUDEOXYGLUCOSE F18 SOLUTION: Performed by: STUDENT IN AN ORGANIZED HEALTH CARE EDUCATION/TRAINING PROGRAM

## 2023-06-12 PROCEDURE — 82948 REAGENT STRIP/BLOOD GLUCOSE: CPT

## 2023-06-12 PROCEDURE — 78815 PET IMAGE W/CT SKULL-THIGH: CPT

## 2023-06-12 PROCEDURE — A9552 F18 FDG: HCPCS | Performed by: STUDENT IN AN ORGANIZED HEALTH CARE EDUCATION/TRAINING PROGRAM

## 2023-06-12 RX ADMIN — FLUDEOXYGLUCOSE F18 1 DOSE: 300 INJECTION INTRAVENOUS at 13:19

## 2023-06-13 ENCOUNTER — HOSPITAL ENCOUNTER (OUTPATIENT)
Dept: GENERAL RADIOLOGY | Facility: HOSPITAL | Age: 83
Discharge: HOME | End: 2023-06-13
Payer: MEDICARE

## 2023-06-13 ENCOUNTER — HOSPITAL ENCOUNTER (INPATIENT)
Dept: CT IMAGING | Facility: HOSPITAL | Age: 83
LOS: 2 days | Discharge: HOME | End: 2023-06-15
Attending: STUDENT IN AN ORGANIZED HEALTH CARE EDUCATION/TRAINING PROGRAM | Admitting: STUDENT IN AN ORGANIZED HEALTH CARE EDUCATION/TRAINING PROGRAM
Payer: MEDICARE

## 2023-06-13 ENCOUNTER — HOSPITAL ENCOUNTER (OUTPATIENT)
Dept: CT IMAGING | Facility: HOSPITAL | Age: 83
Discharge: HOME | End: 2023-06-13
Payer: MEDICARE

## 2023-06-13 VITALS
SYSTOLIC BLOOD PRESSURE: 148 MMHG | OXYGEN SATURATION: 99 % | DIASTOLIC BLOOD PRESSURE: 77 MMHG | HEART RATE: 63 BPM | RESPIRATION RATE: 22 BRPM

## 2023-06-13 DIAGNOSIS — R91.8 PULMONARY MASS: Primary | ICD-10-CM

## 2023-06-13 DIAGNOSIS — R91.8 PULMONARY MASS: ICD-10-CM

## 2023-06-13 DIAGNOSIS — J93.9 PNEUMOTHORAX, UNSPECIFIED TYPE: ICD-10-CM

## 2023-06-13 PROBLEM — J94.8 HYDROPNEUMOTHORAX: Status: ACTIVE | Noted: 2023-06-13

## 2023-06-13 LAB
ALBUMIN SERPL-MCNC: 3.3 G/DL (ref 3.5–5.2)
ALBUMIN/GLOB SERPL: 1.1 G/DL
ALP SERPL-CCNC: 77 U/L (ref 39–117)
ALT SERPL W P-5'-P-CCNC: 16 U/L (ref 1–41)
ANION GAP SERPL CALCULATED.3IONS-SCNC: 8.6 MMOL/L (ref 5–15)
APPEARANCE FLD: ABNORMAL
AST SERPL-CCNC: 13 U/L (ref 1–40)
BASOPHILS # BLD AUTO: 0.04 10*3/MM3 (ref 0–0.2)
BASOPHILS NFR BLD AUTO: 0.7 % (ref 0–1.5)
BILIRUB SERPL-MCNC: 0.4 MG/DL (ref 0–1.2)
BUN SERPL-MCNC: 26 MG/DL (ref 8–23)
BUN/CREAT SERPL: 14.4 (ref 7–25)
CALCIUM SPEC-SCNC: 9.2 MG/DL (ref 8.6–10.5)
CHLORIDE SERPL-SCNC: 101 MMOL/L (ref 98–107)
CO2 SERPL-SCNC: 28.4 MMOL/L (ref 22–29)
COLOR FLD: ABNORMAL
CREAT SERPL-MCNC: 1.81 MG/DL (ref 0.76–1.27)
DEPRECATED RDW RBC AUTO: 39.8 FL (ref 37–54)
EGFRCR SERPLBLD CKD-EPI 2021: 36.4 ML/MIN/1.73
EOSINOPHIL # BLD AUTO: 0.12 10*3/MM3 (ref 0–0.4)
EOSINOPHIL NFR BLD AUTO: 2.1 % (ref 0.3–6.2)
EOSINOPHIL NFR FLD MANUAL: 1 %
ERYTHROCYTE [DISTWIDTH] IN BLOOD BY AUTOMATED COUNT: 14.1 % (ref 12.3–15.4)
GLOBULIN UR ELPH-MCNC: 3 GM/DL
GLUCOSE FLD-MCNC: 60 MG/DL
GLUCOSE SERPL-MCNC: 125 MG/DL (ref 65–99)
HCT VFR BLD AUTO: 33.4 % (ref 37.5–51)
HGB BLD-MCNC: 10.6 G/DL (ref 13–17.7)
IMM GRANULOCYTES # BLD AUTO: 0.03 10*3/MM3 (ref 0–0.05)
IMM GRANULOCYTES NFR BLD AUTO: 0.5 % (ref 0–0.5)
INR PPP: 1.3 (ref 0.8–1.2)
LDH FLD-CCNC: 281 U/L
LYMPHOCYTES # BLD AUTO: 0.8 10*3/MM3 (ref 0.7–3.1)
LYMPHOCYTES NFR BLD AUTO: 14.3 % (ref 19.6–45.3)
LYMPHOCYTES NFR FLD MANUAL: 92 %
MCH RBC QN AUTO: 24.7 PG (ref 26.6–33)
MCHC RBC AUTO-ENTMCNC: 31.7 G/DL (ref 31.5–35.7)
MCV RBC AUTO: 77.9 FL (ref 79–97)
METHOD: ABNORMAL
MONOCYTES # BLD AUTO: 0.47 10*3/MM3 (ref 0.1–0.9)
MONOCYTES NFR BLD AUTO: 8.4 % (ref 5–12)
MONOS+MACROS NFR FLD: 4 %
NEUTROPHILS NFR BLD AUTO: 4.13 10*3/MM3 (ref 1.7–7)
NEUTROPHILS NFR BLD AUTO: 74 % (ref 42.7–76)
NEUTROPHILS NFR FLD MANUAL: 3 %
NRBC BLD AUTO-RTO: 0 /100 WBC (ref 0–0.2)
NUC CELL # FLD: 1100 /MM3
PH FLD: 7.35 [PH]
PLATELET # BLD AUTO: 187 10*3/MM3 (ref 140–450)
PLATELET # BLD AUTO: 201 10*3/MM3 (ref 140–450)
PMV BLD AUTO: 9.9 FL (ref 6–12)
POTASSIUM SERPL-SCNC: 3.7 MMOL/L (ref 3.5–5.2)
PROT FLD-MCNC: 3.9 G/DL
PROT SERPL-MCNC: 6.3 G/DL (ref 6–8.5)
PROTHROMBIN TIME: 15.5 SECONDS (ref 12.8–15.2)
RBC # BLD AUTO: 4.29 10*6/MM3 (ref 4.14–5.8)
RBC # FLD AUTO: ABNORMAL /MM3
SODIUM SERPL-SCNC: 138 MMOL/L (ref 136–145)
WBC NRBC COR # BLD: 5.59 10*3/MM3 (ref 3.4–10.8)

## 2023-06-13 PROCEDURE — C1729 CATH, DRAINAGE: HCPCS

## 2023-06-13 PROCEDURE — 87075 CULTR BACTERIA EXCEPT BLOOD: CPT | Performed by: NURSE PRACTITIONER

## 2023-06-13 PROCEDURE — 89051 BODY FLUID CELL COUNT: CPT | Performed by: NURSE PRACTITIONER

## 2023-06-13 PROCEDURE — 25010000002 FENTANYL CITRATE (PF) 50 MCG/ML SOLUTION: Performed by: RADIOLOGY

## 2023-06-13 PROCEDURE — 80053 COMPREHEN METABOLIC PANEL: CPT | Performed by: NURSE PRACTITIONER

## 2023-06-13 PROCEDURE — 85049 AUTOMATED PLATELET COUNT: CPT | Performed by: RADIOLOGY

## 2023-06-13 PROCEDURE — 71045 X-RAY EXAM CHEST 1 VIEW: CPT

## 2023-06-13 PROCEDURE — 88305 TISSUE EXAM BY PATHOLOGIST: CPT | Performed by: NURSE PRACTITIONER

## 2023-06-13 PROCEDURE — 87102 FUNGUS ISOLATION CULTURE: CPT | Performed by: NURSE PRACTITIONER

## 2023-06-13 PROCEDURE — 88305 TISSUE EXAM BY PATHOLOGIST: CPT | Performed by: STUDENT IN AN ORGANIZED HEALTH CARE EDUCATION/TRAINING PROGRAM

## 2023-06-13 PROCEDURE — 87070 CULTURE OTHR SPECIMN AEROBIC: CPT | Performed by: NURSE PRACTITIONER

## 2023-06-13 PROCEDURE — 82945 GLUCOSE OTHER FLUID: CPT | Performed by: NURSE PRACTITIONER

## 2023-06-13 PROCEDURE — 83615 LACTATE (LD) (LDH) ENZYME: CPT | Performed by: NURSE PRACTITIONER

## 2023-06-13 PROCEDURE — 88342 IMHCHEM/IMCYTCHM 1ST ANTB: CPT | Performed by: STUDENT IN AN ORGANIZED HEALTH CARE EDUCATION/TRAINING PROGRAM

## 2023-06-13 PROCEDURE — 94799 UNLISTED PULMONARY SVC/PX: CPT

## 2023-06-13 PROCEDURE — 88341 IMHCHEM/IMCYTCHM EA ADD ANTB: CPT | Performed by: STUDENT IN AN ORGANIZED HEALTH CARE EDUCATION/TRAINING PROGRAM

## 2023-06-13 PROCEDURE — 0 LIDOCAINE 1 % SOLUTION: Performed by: STUDENT IN AN ORGANIZED HEALTH CARE EDUCATION/TRAINING PROGRAM

## 2023-06-13 PROCEDURE — 84157 ASSAY OF PROTEIN OTHER: CPT | Performed by: NURSE PRACTITIONER

## 2023-06-13 PROCEDURE — 88112 CYTOPATH CELL ENHANCE TECH: CPT | Performed by: NURSE PRACTITIONER

## 2023-06-13 PROCEDURE — 85025 COMPLETE CBC W/AUTO DIFF WBC: CPT | Performed by: RADIOLOGY

## 2023-06-13 PROCEDURE — 25010000002 MIDAZOLAM PER 1 MG: Performed by: RADIOLOGY

## 2023-06-13 PROCEDURE — 85610 PROTHROMBIN TIME: CPT

## 2023-06-13 PROCEDURE — 87205 SMEAR GRAM STAIN: CPT | Performed by: NURSE PRACTITIONER

## 2023-06-13 PROCEDURE — 75989 ABSCESS DRAINAGE UNDER X-RAY: CPT

## 2023-06-13 PROCEDURE — 83986 ASSAY PH BODY FLUID NOS: CPT | Performed by: NURSE PRACTITIONER

## 2023-06-13 PROCEDURE — 99152 MOD SED SAME PHYS/QHP 5/>YRS: CPT

## 2023-06-13 RX ORDER — FENTANYL CITRATE 50 UG/ML
INJECTION, SOLUTION INTRAMUSCULAR; INTRAVENOUS AS NEEDED
Status: COMPLETED | OUTPATIENT
Start: 2023-06-13 | End: 2023-06-13

## 2023-06-13 RX ORDER — HEPARIN SODIUM 5000 [USP'U]/ML
5000 INJECTION, SOLUTION INTRAVENOUS; SUBCUTANEOUS EVERY 8 HOURS SCHEDULED
Status: CANCELLED | OUTPATIENT
Start: 2023-06-13

## 2023-06-13 RX ORDER — AMOXICILLIN 250 MG
2 CAPSULE ORAL 2 TIMES DAILY
Status: CANCELLED | OUTPATIENT
Start: 2023-06-13

## 2023-06-13 RX ORDER — LIDOCAINE HYDROCHLORIDE 10 MG/ML
20 INJECTION, SOLUTION INFILTRATION; PERINEURAL ONCE
Status: COMPLETED | OUTPATIENT
Start: 2023-06-13 | End: 2023-06-13

## 2023-06-13 RX ORDER — SODIUM CHLORIDE 0.9 % (FLUSH) 0.9 %
10 SYRINGE (ML) INJECTION EVERY 12 HOURS SCHEDULED
Status: DISCONTINUED | OUTPATIENT
Start: 2023-06-13 | End: 2023-06-15 | Stop reason: HOSPADM

## 2023-06-13 RX ORDER — AMOXICILLIN 250 MG
2 CAPSULE ORAL 2 TIMES DAILY
Status: DISCONTINUED | OUTPATIENT
Start: 2023-06-13 | End: 2023-06-15 | Stop reason: HOSPADM

## 2023-06-13 RX ORDER — ALBUTEROL SULFATE 2.5 MG/3ML
2.5 SOLUTION RESPIRATORY (INHALATION) EVERY 4 HOURS PRN
Status: DISCONTINUED | OUTPATIENT
Start: 2023-06-13 | End: 2023-06-15 | Stop reason: HOSPADM

## 2023-06-13 RX ORDER — BISACODYL 5 MG/1
5 TABLET, DELAYED RELEASE ORAL DAILY PRN
Status: CANCELLED | OUTPATIENT
Start: 2023-06-13

## 2023-06-13 RX ORDER — POLYETHYLENE GLYCOL 3350 17 G/17G
17 POWDER, FOR SOLUTION ORAL DAILY PRN
Status: CANCELLED | OUTPATIENT
Start: 2023-06-13

## 2023-06-13 RX ORDER — NITROGLYCERIN 0.4 MG/1
0.4 TABLET SUBLINGUAL
Status: DISCONTINUED | OUTPATIENT
Start: 2023-06-13 | End: 2023-06-15 | Stop reason: HOSPADM

## 2023-06-13 RX ORDER — SODIUM CHLORIDE 0.9 % (FLUSH) 0.9 %
10 SYRINGE (ML) INJECTION AS NEEDED
Status: DISCONTINUED | OUTPATIENT
Start: 2023-06-13 | End: 2023-06-15 | Stop reason: HOSPADM

## 2023-06-13 RX ORDER — BISACODYL 5 MG/1
5 TABLET, DELAYED RELEASE ORAL DAILY PRN
Status: DISCONTINUED | OUTPATIENT
Start: 2023-06-13 | End: 2023-06-15 | Stop reason: HOSPADM

## 2023-06-13 RX ORDER — HEPARIN SODIUM 5000 [USP'U]/ML
5000 INJECTION, SOLUTION INTRAVENOUS; SUBCUTANEOUS EVERY 8 HOURS SCHEDULED
Status: DISCONTINUED | OUTPATIENT
Start: 2023-06-13 | End: 2023-06-13

## 2023-06-13 RX ORDER — CARVEDILOL 6.25 MG/1
12.5 TABLET ORAL 2 TIMES DAILY
Status: DISCONTINUED | OUTPATIENT
Start: 2023-06-13 | End: 2023-06-15 | Stop reason: HOSPADM

## 2023-06-13 RX ORDER — SODIUM CHLORIDE 9 MG/ML
40 INJECTION, SOLUTION INTRAVENOUS AS NEEDED
Status: DISCONTINUED | OUTPATIENT
Start: 2023-06-13 | End: 2023-06-15 | Stop reason: HOSPADM

## 2023-06-13 RX ORDER — ACETAMINOPHEN 325 MG/1
650 TABLET ORAL EVERY 4 HOURS PRN
Status: CANCELLED | OUTPATIENT
Start: 2023-06-13

## 2023-06-13 RX ORDER — BISACODYL 10 MG
10 SUPPOSITORY, RECTAL RECTAL DAILY PRN
Status: DISCONTINUED | OUTPATIENT
Start: 2023-06-13 | End: 2023-06-15 | Stop reason: HOSPADM

## 2023-06-13 RX ORDER — SODIUM CHLORIDE 9 MG/ML
40 INJECTION, SOLUTION INTRAVENOUS AS NEEDED
Status: CANCELLED | OUTPATIENT
Start: 2023-06-13

## 2023-06-13 RX ORDER — LOSARTAN POTASSIUM 25 MG/1
25 TABLET ORAL
Status: DISCONTINUED | OUTPATIENT
Start: 2023-06-14 | End: 2023-06-15

## 2023-06-13 RX ORDER — SODIUM CHLORIDE 9 MG/ML
25 INJECTION, SOLUTION INTRAVENOUS ONCE
Status: COMPLETED | OUTPATIENT
Start: 2023-06-13 | End: 2023-06-13

## 2023-06-13 RX ORDER — AMLODIPINE BESYLATE 5 MG/1
5 TABLET ORAL DAILY
Status: DISCONTINUED | OUTPATIENT
Start: 2023-06-14 | End: 2023-06-15 | Stop reason: HOSPADM

## 2023-06-13 RX ORDER — HYDRALAZINE HYDROCHLORIDE 50 MG/1
50 TABLET, FILM COATED ORAL 3 TIMES DAILY
Status: DISCONTINUED | OUTPATIENT
Start: 2023-06-13 | End: 2023-06-15

## 2023-06-13 RX ORDER — ACETAMINOPHEN 325 MG/1
650 TABLET ORAL EVERY 4 HOURS PRN
Status: DISCONTINUED | OUTPATIENT
Start: 2023-06-13 | End: 2023-06-15 | Stop reason: HOSPADM

## 2023-06-13 RX ORDER — SODIUM CHLORIDE 0.9 % (FLUSH) 0.9 %
10 SYRINGE (ML) INJECTION EVERY 12 HOURS SCHEDULED
Status: CANCELLED | OUTPATIENT
Start: 2023-06-13

## 2023-06-13 RX ORDER — MIDAZOLAM HYDROCHLORIDE 1 MG/ML
INJECTION INTRAMUSCULAR; INTRAVENOUS AS NEEDED
Status: COMPLETED | OUTPATIENT
Start: 2023-06-13 | End: 2023-06-13

## 2023-06-13 RX ORDER — LIDOCAINE HYDROCHLORIDE 10 MG/ML
20 INJECTION, SOLUTION INFILTRATION; PERINEURAL ONCE
Status: DISCONTINUED | OUTPATIENT
Start: 2023-06-13 | End: 2023-06-14 | Stop reason: HOSPADM

## 2023-06-13 RX ORDER — SODIUM CHLORIDE 0.9 % (FLUSH) 0.9 %
10 SYRINGE (ML) INJECTION AS NEEDED
Status: CANCELLED | OUTPATIENT
Start: 2023-06-13

## 2023-06-13 RX ORDER — POLYETHYLENE GLYCOL 3350 17 G/17G
17 POWDER, FOR SOLUTION ORAL DAILY PRN
Status: DISCONTINUED | OUTPATIENT
Start: 2023-06-13 | End: 2023-06-15 | Stop reason: HOSPADM

## 2023-06-13 RX ORDER — BISACODYL 10 MG
10 SUPPOSITORY, RECTAL RECTAL DAILY PRN
Status: CANCELLED | OUTPATIENT
Start: 2023-06-13

## 2023-06-13 RX ORDER — TORSEMIDE 20 MG/1
40 TABLET ORAL DAILY
Status: DISCONTINUED | OUTPATIENT
Start: 2023-06-13 | End: 2023-06-15 | Stop reason: HOSPADM

## 2023-06-13 RX ORDER — IPRATROPIUM BROMIDE AND ALBUTEROL SULFATE 2.5; .5 MG/3ML; MG/3ML
3 SOLUTION RESPIRATORY (INHALATION)
Status: DISCONTINUED | OUTPATIENT
Start: 2023-06-13 | End: 2023-06-15 | Stop reason: HOSPADM

## 2023-06-13 RX ORDER — SODIUM CHLORIDE 0.9 % (FLUSH) 0.9 %
3 SYRINGE (ML) INJECTION EVERY 12 HOURS SCHEDULED
Status: DISCONTINUED | OUTPATIENT
Start: 2023-06-13 | End: 2023-06-15 | Stop reason: HOSPADM

## 2023-06-13 RX ORDER — ROSUVASTATIN CALCIUM 20 MG/1
20 TABLET, COATED ORAL NIGHTLY
Status: DISCONTINUED | OUTPATIENT
Start: 2023-06-13 | End: 2023-06-15 | Stop reason: HOSPADM

## 2023-06-13 RX ADMIN — FENTANYL CITRATE 50 MCG: 50 INJECTION INTRAMUSCULAR; INTRAVENOUS at 10:15

## 2023-06-13 RX ADMIN — Medication 10 ML: at 20:05

## 2023-06-13 RX ADMIN — HYDRALAZINE HYDROCHLORIDE 50 MG: 50 TABLET, FILM COATED ORAL at 20:05

## 2023-06-13 RX ADMIN — FENTANYL CITRATE 25 MCG: 50 INJECTION INTRAMUSCULAR; INTRAVENOUS at 10:23

## 2023-06-13 RX ADMIN — MIDAZOLAM 1 MG: 1 INJECTION INTRAMUSCULAR; INTRAVENOUS at 10:15

## 2023-06-13 RX ADMIN — SODIUM CHLORIDE 40 ML: 9 INJECTION, SOLUTION INTRAVENOUS at 15:33

## 2023-06-13 RX ADMIN — SODIUM CHLORIDE 25 ML/HR: 9 INJECTION, SOLUTION INTRAVENOUS at 09:57

## 2023-06-13 RX ADMIN — ROSUVASTATIN CALCIUM 20 MG: 20 TABLET, FILM COATED ORAL at 20:05

## 2023-06-13 RX ADMIN — MIDAZOLAM 1 MG: 1 INJECTION INTRAMUSCULAR; INTRAVENOUS at 15:47

## 2023-06-13 RX ADMIN — LIDOCAINE HYDROCHLORIDE 20 ML: 10 INJECTION, SOLUTION INFILTRATION; PERINEURAL at 10:16

## 2023-06-13 RX ADMIN — Medication 3 ML: at 10:14

## 2023-06-13 RX ADMIN — CARVEDILOL 12.5 MG: 6.25 TABLET, FILM COATED ORAL at 21:47

## 2023-06-13 RX ADMIN — IPRATROPIUM BROMIDE AND ALBUTEROL SULFATE 3 ML: .5; 2.5 SOLUTION RESPIRATORY (INHALATION) at 20:58

## 2023-06-13 RX ADMIN — FENTANYL CITRATE 50 MCG: 50 INJECTION, SOLUTION INTRAMUSCULAR; INTRAVENOUS at 15:47

## 2023-06-13 NOTE — DISCHARGE INSTRUCTIONS
EDUCATION /DISCHARGE INSTRUCTIONS  CT/US guided biopsy:  A biopsy is a procedure done to remove tissue for further analysis.  Before images are taken to locate the target area.  Images can be obtained using ultrasound, CT or MRI.  A physician will clean your skin with antiseptic soap, place a sterile towel around the site and administer a local anesthetic to numb the area.  The physician will then insert a special needle.  Sometimes images are taken of the needle after it is inserted to ensure the needle is in the correct area to be biopsied.   A sample is obtained and sent to the laboratory for study.  Occasionally the laboratory is unable to make a diagnosis from the sample and the procedure may need to be repeated.  Within a week the radiologist will send a report to your physician.  A pathologist will also examine the tissue and send a report.    Risks of the procedure include but are not limited to:   *  Bleeding    *  Infection   *  Puncture of surrounding organs *  Death     *  Lung collapse if the biopsy is near the chest which may require insertion of a      chest tube to re-inflate the lung if severe.    Benefits of the procedure:  Using x-ray helps to locate the area that requires a biopsy. The procedure is less invasive than a surgical procedure, there are no large incisions and it does not require anesthesia.    Alternatives to the procedure:  A biopsy can be performed surgically.  Risks of a surgical biopsy include exposure to anesthesia, infection, excessive bleeding and injury to abdominal organs.  A benefit of surgical biopsy is the ability to see the area to be biopsied and remove of a larger piece of tissue.    THIS EDUCATION INFORMATION WAS REVIEWED PRIOR TO PROCEDURE AND CONSENT. Patient initials__________________Time___________________    Post Procedure:    *  Expect the biopsy site may be tender up to one week.    *  Rest today (no pushing pulling or straining).   *  Slowly increase activity  tomorrow.    *  If you received sedation do not drive for 24 hours.   *  Keep dressing clean and dry.   *  Leave dressing on puncture site for 24 hours.    *  You may shower when dressing removed.  Call your doctor if experiencing:   *  Signs of infection such as redness, swelling, excessive pain and / or foul        smelling drainage from the puncture site.   *  Chills or fever over 101 degrees (by mouth).   *  Unrelieved pain.   *  Any new or severe symptoms.   *  If experiencing sudden / severe shortness of breath or chest pain go to the       nearest emergency room.   Following the procedure:     Follow-up with the ordering physician as directed.    Continue to take other medications as directed by your physician unless    otherwise instructed.   If applicable, resume taking your blood thinners or Aspirin on __6/14/23_________.    If you have any concerns please call the Radiology Nurses Desk at [phone].  You are the most important factor in your recovery.  Follow the above instructions carefully.           Discharge Instructions after receiving Moderate Sedation  These instructions provide you with information about caring for yourself after your procedure. Your health care provider may also give you more specific instructions. Your treatment has been planned according to current medical practices, but problems sometimes occur. Call your physican or the Radiology Nurses ([phone]) if you have any problems or questions after your procedure.    What can I expect after the procedure?  After your procedure, you may:  Feel sleepy or light headed for several hours.  Feel clumsy, dizzy or have poor balance for several hours.  Feel forgetful about what happened after the procedure.  Have poor judgment for several hours.  Feel nauseous or vomit.    For at least 24 hours after the procedure:  Have a responsible adult stay with you or frequently check on you until you are awake and alert.   Rest as needed.    Do  not:  Participate in activities in which you could fall or become injured.  Drive or operate heavy machinery  Take sleeping pills or medicines that cause drowsiness.  Make important decisions or sign legal documents.  Take care of children on your own.    Eating and drinking: Clear liquids then progress slowly to a normal diet.  If you vomit, drink water, juice, or soup when you can drink without vomiting.  Make sure you have little or no nausea before eating solid foods.    General instructions: Take over-the-counter and prescription medicines only as told by your health care provider .If you have sleep apnea, surgery and certain medicines can increase your risk for breathing problems. Follow instructions from your health care provider about wearing your sleep device: If you smoke, do not smoke without supervision.  Keep all follow-up visits as told by your health care provider. This is important.    Contact your physician if:  You continue to keep feeling nauseous or you keep vomiting. You continue to feel light-headed, develop a fever or a rash.  Get help right away if you have trouble breathing    Chest Tube Discharge Instructions    1. Activity:  Climb stairs as tolerated  May drive car when no longer taking pain medication.  Walk at least 3-4 times a day  Limit lifting for first week. No lifting, pushing, or pulling greater than 10 pounds till seen by MD.  Continue to use your incentive spirometer at least 4 times per day.    2. Nutrition:  Resume previous diet  Eat well balanced meals  Avoid constipation by eating fresh fruits, vegetables, whole grain foods and increasing fluid intake.    3. Incision (wound) Care:  Remove dressing after 48 hours  If continued drainage, change dressing every 24 hours and as needed.  May shower after discharge.  Wash around incision area with soap and water daily.  No lotions or creams on incision area.  Take temperature daily for the first week after discharge.     4. When to  call for Medical Advise:  Fever greater than 101 degrees  Unusual or severe pain  Difficulty breathing  Incision changes (redness, swelling, or increased drainage)  Any questions or problems    5. Medication Instructions:  Take Pain medications as directed to stay comfortable  No driving or drinking alcohol when taking prescribed pain meds  Laxative of choice if needed for constipation.    6. Medication and dosages:  See discharge medication instruction form

## 2023-06-13 NOTE — NURSING NOTE
Patient arrived to triage bay 7 for CT lung biopsy.  
Per Dr. Mcdaniels, ok to cancel 3 hr chest xray since patient is getting a chest tube placed today.  
Chest pain

## 2023-06-13 NOTE — NURSING NOTE
Report called to nurse on [facility] receiving patient. Transport here to take patient via stretcher to room 551. Wife at bedside.

## 2023-06-13 NOTE — INTERVAL H&P NOTE
H&P reviewed. The patient was examined and there are no changes to the H&P.    Patient underwent CT-guided biopsy of the left upper lobe lung mass today.  No issues, complications or difficulty with this procedure.  Follow-up imaging demonstrated a right basilar pneumothorax (contralateral side to biopsy).  Upon further review, this was noted on the PET scan that was performed yesterday and is new compared to the May CT scan.  Discussed with Dr. Cast.  He will place a chest tube on the right and the patient will be admitted for further evaluation and care.

## 2023-06-13 NOTE — POST-PROCEDURE NOTE
POST PROCEDURE NOTE     Procedure: CT GUIDED Chest tube     Pre-Procedure Diagnosis: KIET right hydropneumothorax     Post-procedure Diagnosis: same     Findings: successful 14 fr chest tube lcmt     Complications: none     Blood loss: min     Specimen Removed: [email or web address].fluid sample     Disposition:   To be admitted     
POST PROCEDURE NOTE    Procedure: CT GUIDED LUNG BIOPSY    Pre-Procedure Diagnosis: KIET mass    Post-procedure Diagnosis: same    Findings: successful KIET mass bx, bloodpatch. unrelated right hydropneumothorax     Complications: none    Blood loss: min    Specimen Removed: multiple cores    Disposition:   To be admitted    
done

## 2023-06-14 ENCOUNTER — APPOINTMENT (OUTPATIENT)
Dept: GENERAL RADIOLOGY | Facility: HOSPITAL | Age: 83
End: 2023-06-14
Payer: MEDICARE

## 2023-06-14 PROBLEM — I42.8 NICM (NONISCHEMIC CARDIOMYOPATHY): Status: ACTIVE | Noted: 2023-06-14

## 2023-06-14 PROBLEM — I50.22 CHRONIC SYSTOLIC CHF (CONGESTIVE HEART FAILURE): Status: ACTIVE | Noted: 2023-06-14

## 2023-06-14 PROBLEM — R91.8 MASS OF UPPER LOBE OF LEFT LUNG: Status: ACTIVE | Noted: 2023-06-14

## 2023-06-14 LAB
ANION GAP SERPL CALCULATED.3IONS-SCNC: 10.6 MMOL/L (ref 5–15)
BUN SERPL-MCNC: 23 MG/DL (ref 8–23)
BUN/CREAT SERPL: 13.7 (ref 7–25)
CALCIUM SPEC-SCNC: 8.7 MG/DL (ref 8.6–10.5)
CHLORIDE SERPL-SCNC: 100 MMOL/L (ref 98–107)
CO2 SERPL-SCNC: 25.4 MMOL/L (ref 22–29)
CREAT SERPL-MCNC: 1.68 MG/DL (ref 0.76–1.27)
DEPRECATED RDW RBC AUTO: 39.8 FL (ref 37–54)
EGFRCR SERPLBLD CKD-EPI 2021: 39.8 ML/MIN/1.73
ERYTHROCYTE [DISTWIDTH] IN BLOOD BY AUTOMATED COUNT: 14 % (ref 12.3–15.4)
GLUCOSE SERPL-MCNC: 105 MG/DL (ref 65–99)
HCT VFR BLD AUTO: 31.5 % (ref 37.5–51)
HGB BLD-MCNC: 9.9 G/DL (ref 13–17.7)
MCH RBC QN AUTO: 24.9 PG (ref 26.6–33)
MCHC RBC AUTO-ENTMCNC: 31.4 G/DL (ref 31.5–35.7)
MCV RBC AUTO: 79.3 FL (ref 79–97)
PLATELET # BLD AUTO: 184 10*3/MM3 (ref 140–450)
PMV BLD AUTO: 10 FL (ref 6–12)
POTASSIUM SERPL-SCNC: 4.1 MMOL/L (ref 3.5–5.2)
RBC # BLD AUTO: 3.97 10*6/MM3 (ref 4.14–5.8)
SODIUM SERPL-SCNC: 136 MMOL/L (ref 136–145)
WBC NRBC COR # BLD: 5.02 10*3/MM3 (ref 3.4–10.8)

## 2023-06-14 PROCEDURE — 85027 COMPLETE CBC AUTOMATED: CPT | Performed by: NURSE PRACTITIONER

## 2023-06-14 PROCEDURE — 94664 DEMO&/EVAL PT USE INHALER: CPT

## 2023-06-14 PROCEDURE — 94761 N-INVAS EAR/PLS OXIMETRY MLT: CPT

## 2023-06-14 PROCEDURE — 94799 UNLISTED PULMONARY SVC/PX: CPT

## 2023-06-14 PROCEDURE — 71045 X-RAY EXAM CHEST 1 VIEW: CPT

## 2023-06-14 PROCEDURE — 80048 BASIC METABOLIC PNL TOTAL CA: CPT | Performed by: NURSE PRACTITIONER

## 2023-06-14 RX ADMIN — CARVEDILOL 12.5 MG: 6.25 TABLET, FILM COATED ORAL at 08:13

## 2023-06-14 RX ADMIN — HYDRALAZINE HYDROCHLORIDE 50 MG: 50 TABLET, FILM COATED ORAL at 08:13

## 2023-06-14 RX ADMIN — Medication 3 ML: at 08:15

## 2023-06-14 RX ADMIN — IPRATROPIUM BROMIDE AND ALBUTEROL SULFATE 3 ML: .5; 2.5 SOLUTION RESPIRATORY (INHALATION) at 11:30

## 2023-06-14 RX ADMIN — TORSEMIDE 40 MG: 20 TABLET ORAL at 08:13

## 2023-06-14 RX ADMIN — IPRATROPIUM BROMIDE AND ALBUTEROL SULFATE 3 ML: .5; 2.5 SOLUTION RESPIRATORY (INHALATION) at 15:23

## 2023-06-14 RX ADMIN — Medication 10 ML: at 20:38

## 2023-06-14 RX ADMIN — CARVEDILOL 12.5 MG: 6.25 TABLET, FILM COATED ORAL at 20:38

## 2023-06-14 RX ADMIN — AMLODIPINE BESYLATE 5 MG: 5 TABLET ORAL at 08:13

## 2023-06-14 RX ADMIN — Medication 10 ML: at 08:14

## 2023-06-14 RX ADMIN — ROSUVASTATIN CALCIUM 20 MG: 20 TABLET, FILM COATED ORAL at 20:38

## 2023-06-14 RX ADMIN — LOSARTAN POTASSIUM 25 MG: 25 TABLET, FILM COATED ORAL at 08:13

## 2023-06-14 RX ADMIN — HYDRALAZINE HYDROCHLORIDE 50 MG: 50 TABLET, FILM COATED ORAL at 20:38

## 2023-06-14 RX ADMIN — IPRATROPIUM BROMIDE AND ALBUTEROL SULFATE 3 ML: .5; 2.5 SOLUTION RESPIRATORY (INHALATION) at 20:27

## 2023-06-14 RX ADMIN — IPRATROPIUM BROMIDE AND ALBUTEROL SULFATE 3 ML: .5; 2.5 SOLUTION RESPIRATORY (INHALATION) at 07:27

## 2023-06-14 NOTE — PLAN OF CARE
Goal Outcome Evaluation:  Plan of Care Reviewed With: patient        Progress: improving  Outcome Evaluation: VSS, no complaints, chest tube to water seal. CXR in AM

## 2023-06-14 NOTE — PROGRESS NOTES
Discharge Planning Assessment  [facility]     Patient Name: Bayron Acevedo  MRN: [id number]  Today's Date: 6/14/2023    Admit Date: 6/13/2023    Plan: Home with spouse   Discharge Needs Assessment       Row Name 06/14/23 1343       Living Environment    People in Home spouse    Name(s) of People in Home Amina    Current Living Arrangements home    Primary Care Provided by spouse/significant other    Provides Primary Care For no one    Family Caregiver if Needed child(rkista), adult;spouse    Family Caregiver Names Amina or daughter, Mayra    Quality of Family Relationships helpful;involved;supportive    Able to Return to Prior Arrangements yes       Resource/Environmental Concerns    Resource/Environmental Concerns none       Transition Planning    Patient/Family Anticipates Transition to home with family    Patient/Family Anticipated Services at Transition none    Transportation Anticipated family or friend will provide       Discharge Needs Assessment    Readmission Within the Last 30 Days no previous admission in last 30 days    Equipment Currently Used at Home none    Concerns to be Addressed denies needs/concerns at this time    Equipment Needed After Discharge none    Provided Post Acute Provider List? N/A    Provided Post Acute Provider Quality & Resource List? N/A                   Discharge Plan       Row Name 06/14/23 6978       Plan    Plan Home with spouse    Patient/Family in Agreement with Plan yes    Plan Comments Met with patient and spouse at the bedside, verified facesheet. Patient lives with his spouse, he is IADL and he does not use any DME. PCP is [facility] and preferred pharmacy is [facility] in [place]. Patient drives/owns a car, he denies issues with transportation; spouse will transport home after dc. Patient denies dc needs at this time.                  Continued Care and Services - Admitted Since 6/13/2023    Coordination has not been started for this encounter.          Demographic Summary     No documentation.                  Functional Status       Row Name 06/14/23 1345       Functional Status    Usual Activity Tolerance good       Assessment of Health Literacy    Health Literacy Good       Functional Status, IADL    Medications independent    Meal Preparation independent    Housekeeping independent    Laundry independent    Shopping independent       Mental Status    General Appearance WDL WDL       Mental Status Summary    Recent Changes in Mental Status/Cognitive Functioning no changes                   Psychosocial    No documentation.                  Abuse/Neglect    No documentation.                  Legal    No documentation.                  Substance Abuse    No documentation.                  Patient Forms    No documentation.                     Pippa Burk RN

## 2023-06-14 NOTE — PROGRESS NOTES
"    Chief Complaint: Right pneumothorax  S/P: CT-guided chest tube placement  POD # 1    Subjective:  Symptoms:  Stable.  No shortness of breath, chest pain or weakness.    Diet:  Adequate intake.    Activity level: Normal.    Pain:  He reports no pain.    Up to chair.  Denies shortness of breath.  Feeling well overall.      Vital Signs:  Temp:  [97.7 °F (36.5 °C)-98.4 °F (36.9 °C)] 97.7 °F (36.5 °C)  Heart Rate:  [50-63] 50  Resp:  [12-24] 16  BP: (123-170)/(57-79) 123/79    Intake & Output (last day)         06/13 0701  06/14 0700 06/14 0701  06/15 0700    P.O.  240    Total Intake(mL/kg)  240 (3.3)    Urine (mL/kg/hr) 600     Chest Tube 400     Total Output 1000     Net -1000 +240          Urine Unmeasured Occurrence 1 x             Objective:  General Appearance:  Comfortable, well-appearing and in no acute distress.    Vital signs: (most recent): Blood pressure 123/79, pulse 50, temperature 97.7 °F (36.5 °C), temperature source Oral, resp. rate 16, height 170.2 cm (67\"), weight 72.6 kg (160 lb), SpO2 100 %.    HEENT: Normal HEENT exam.    Lungs:  Normal effort.  He is not in respiratory distress.    Heart: Normal rate.    Chest: Chest wall tenderness present.    Extremities: Normal range of motion.    Neurological: Patient is alert.    Skin:  Warm and dry.              Chest tube:   Site: Right, Clean, Dry, Intact, and Securement device intact  Suction: -20 cm  Air Leak: negative  24 Hour Total: 400ml    Results Review:     I reviewed the patient's new clinical results.  I reviewed the patient's new imaging results and agree with the interpretation.  Discussed with patient, RN and Dr. Kramer    Imaging Results (Last 24 Hours)       Procedure Component Value Units Date/Time    XR Chest 1 View [[id number]] Collected: 06/14/23 0659     Updated: 06/14/23 0705    Narrative:      XR CHEST 1 VW-clinical: Chest tube management, right-sided  hydropneumothorax, left upper lobe lung mass.     COMPARISON examination " 06/13/2023     FINDINGS: Pigtail chest tube is demonstrated at the right lung base.  Much of the pleural fluid at this location has been evacuated,  pneumothorax considerably diminished in size with trace residual. There  is infiltrate or atelectasis also at the right lung base. Left upper  lobe lung mass is again demonstrated with left-sided pleural effusion.  Cardiomediastinal silhouette is stable. The remainder is unremarkable.     This report was finalized on 6/14/2023 7:02 AM by Dr. Jose Carolina M.D.       CT Needle Biopsy Lung [[id number]] Collected: 06/13/23 1402     Updated: 06/13/23 1504    Narrative:      CT GUIDED LUNG BIOPSY     HISTORY: Left pulmonary mass in former smoker, high risk for cancer     COMPARISON: Recent cross-sectional imaging.     FINDINGS:   After informed written consent was obtained with a witness present, the  patient was placed in supine position. A planning CT scan was performed  and an approach chosen; incidental right hydropneumothorax noted which  was present on yesterday's PET CT but not on 05/26/2023 chest CT  (subsequently discussed with Dr. Kramer). The skin was prepped and draped  in the usual sterile fashion. A nurse was continuously present for  monitoring and to administer moderate sedation from 1015 to 1028 hours  under physician supervision utilizing a total of 1 mg Versed and 75 mcg  fentanyl. 1% lidocaine solution was injected into the soft tissues for  local anesthesia. Under CT guidance, a 19-gauge introducer needle was  advanced into the left upper lobe mass. A total of 3 passes were  performed under CT visualization using a 20-gauge core biopsy needle  with appropriate appearing tissue specimens placed in formalin. The  introducer needle was then removed under injection of a few milliliters  of freshly drawn autologous blood to serve as parenchymal blood patch.  Manual pressure was held. A sterile dressing was applied. The patient  tolerated the procedure well.  There were no immediate complications. All  elements of maximal sterile technique were followed. Radiation dose  reduction techniques were utilized, including automated exposure control  and exposure modulation based on body size.          Impression:      Successful left upper lobe mass biopsy, see subsequent pathology report.  Incidental right hydropneumothorax.     This report was finalized on 6/13/2023 3:01 PM by Dr. Emigdio Mcdaniels M.D.               Lab Results:     Lab Results (last 24 hours)       Procedure Component Value Units Date/Time    Basic Metabolic Panel [[id number]]  (Abnormal) Collected: 06/14/23 0425    Specimen: Blood Updated: 06/14/23 0545     Glucose 105 mg/dL      BUN 23 mg/dL      Creatinine 1.68 mg/dL      Sodium 136 mmol/L      Potassium 4.1 mmol/L      Chloride 100 mmol/L      CO2 25.4 mmol/L      Calcium 8.7 mg/dL      BUN/Creatinine Ratio 13.7     Anion Gap 10.6 mmol/L      eGFR 39.8 mL/min/1.73     Narrative:      GFR Normal >60  Chronic Kidney Disease <60  Kidney Failure <15    The GFR formula is only valid for adults with stable renal function between ages 18 and 70.    CBC (No Diff) [[id number]]  (Abnormal) Collected: 06/14/23 0425    Specimen: Blood Updated: 06/14/23 0530     WBC 5.02 10*3/mm3      RBC 3.97 10*6/mm3      Hemoglobin 9.9 g/dL      Hematocrit 31.5 %      MCV 79.3 fL      MCH 24.9 pg      MCHC 31.4 g/dL      RDW 14.0 %      RDW-SD 39.8 fl      MPV 10.0 fL      Platelets 184 10*3/mm3     Comprehensive Metabolic Panel [[id number]]  (Abnormal) Collected: 06/13/23 2000    Specimen: Blood Updated: 06/13/23 2046     Glucose 125 mg/dL      BUN 26 mg/dL      Creatinine 1.81 mg/dL      Sodium 138 mmol/L      Potassium 3.7 mmol/L      Chloride 101 mmol/L      CO2 28.4 mmol/L      Calcium 9.2 mg/dL      Total Protein 6.3 g/dL      Albumin 3.3 g/dL      ALT (SGPT) 16 U/L      AST (SGOT) 13 U/L      Alkaline Phosphatase 77 U/L      Total Bilirubin 0.4 mg/dL      Globulin 3.0 gm/dL       A/G Ratio 1.1 g/dL      BUN/Creatinine Ratio 14.4     Anion Gap 8.6 mmol/L      eGFR 36.4 mL/min/1.73     Narrative:      GFR Normal >60  Chronic Kidney Disease <60  Kidney Failure <15    The GFR formula is only valid for adults with stable renal function between ages 18 and 70.             Assessment & Plan       Hydropneumothorax    COPD (chronic obstructive pulmonary disease) with emphysema    Hypertension    Hyperlipidemia    S/P angioplasty with stent    CAD in native artery    AAA (abdominal aortic aneurysm) without rupture    Stage 3b chronic kidney disease    Mass of upper lobe of left lung    NICM (nonischemic cardiomyopathy)    Chronic systolic CHF (congestive heart failure)       Assessment & Plan    I have independently reviewed the chest x-ray performed this morning which demonstrates solution of right-sided hydropneumothorax status post chest tube placement.  Left upper lobe mass without change.  No acute findings on the left.    Right hydropneumothorax: Status post CT-guided chest tube placement with approximately 400 cc out overnight.  Little too much output to remove today.  No airleak with forceful cough.  We will place his chest tube to waterseal and recheck a chest x-ray in the morning.  Anticipate chest tube removal and discharge home tomorrow.      Saritha Posey DNP, APRN  [facility]  06/14/23  13:02 EDT

## 2023-06-14 NOTE — CONSULTS
Patient Name:  Bayron Acevedo  YOB: 1940  Patient Care Team:  Low Sepulveda as PCP - General (Family Medicine)  Guru Simpson MD as Consulting Physician (Cardiology)  Karen Barrera Jr., MD as Consulting Physician (Vascular Surgery)  Bronson Blanc MD as Consulting Physician (Gastroenterology)  Cristian Reyes MD as Consulting Physician (Nephrology)    Date of Admission:  6/13/2023  Date of Consult:  6/14/2023    Inpatient Hospitalist Consult  Consult performed by: Olga Benitez APRN  Consult ordered by: Ethel Barrera APRN  Reason for consult: medical management, CKD        Subjective   History of Present Illness  Mr. Acevedo is a 82 y.o. male that has been admitted to [facility] due to KIET lung mass, s/p CT guided biopsy. Post procedure imaging showed rt basilar pneumothorax (contralateral side to biopsy), requiring chest tube placement.  He has been admitted by Nemesio Kramer MD PhD and we were asked to see and assist with his medical problems, specifically relating to his CKD, HTN. No specific medical complaints. Minimal discomfort from chest tube. Denies fever, chest pain, soa, abd pain, n/v/d/c, dysuria, LE swelling. Followed by Nephrology outpatient for CKD, baseline near baseline based on trends. BP acceptable.       Past Medical History:   Diagnosis Date   • AAA (abdominal aortic aneurysm)    • CAD in native artery    • Carotid stenosis     s/p CEA Left   • Colon polyp    • Heart attack 2013   • Heart failure    • Hyperlipidemia    • Hypertension    • Perennial allergic rhinitis 1/6/2017   • S/P angioplasty with stent    • Tinnitus      Past Surgical History:   Procedure Laterality Date   • ARTERIOGRAM AORTIC N/A 5/17/2021    Procedure: ZENITH AORTIC STENT GRAFT;  Surgeon: Karen Barrera Jr., MD;  Location: [facility] OR 18/19;  Service: Vascular;  Laterality: N/A;   • CARDIAC CATHETERIZATION      X2   • CARDIAC  CATHETERIZATION N/A 2023    Procedure: Left Heart Cath;  Surgeon: Guru Jiang MD;  Location:  [facility] CATH INVASIVE LOCATION;  Service: Cardiovascular;  Laterality: N/A;   • CARDIAC CATHETERIZATION N/A 2023    Procedure: Right Heart Cath;  Surgeon: Guru Jiang MD;  Location:  [facility] CATH INVASIVE LOCATION;  Service: Cardiovascular;  Laterality: N/A;   • CARDIAC CATHETERIZATION N/A 2023    Procedure: Coronary angiography;  Surgeon: Guru Jiang MD;  Location:  [facility] CATH INVASIVE LOCATION;  Service: Cardiovascular;  Laterality: N/A;   • CARDIAC CATHETERIZATION N/A 2023    Procedure: Left ventriculography;  Surgeon: Guru Jiang MD;  Location:  [facility] CATH INVASIVE LOCATION;  Service: Cardiovascular;  Laterality: N/A;   • CARDIAC CATHETERIZATION  2023    Procedure: RESTING FULL CYCLE RATIO;  Surgeon: Guru Jiang MD;  Location: [facility] CATH INVASIVE LOCATION;  Service: Cardiovascular;;   • CAROTID ENDARTERECTOMY Left 2013    Bruce Jones Jr, MD   • CAROTID STENT Left     LAD   • COLONOSCOPY     • HERNIA REPAIR     • HYDROCELE EXCISION / REPAIR      Dr. SINTIA Osorio     Family History   Problem Relation Age of Onset   • No Known Problems Mother         complications of child birth   • No Known Problems Father    • Cancer Brother    • Malig Hyperthermia Neg Hx      Social History     Tobacco Use   • Smoking status: Former     Packs/day: 1.00     Years: 55.00     Pack years: 55.00     Types: Cigarettes     Quit date:      Years since quittin.4   • Smokeless tobacco: Never   Vaping Use   • Vaping Use: Never used   Substance Use Topics   • Alcohol use: No   • Drug use: No     Medications Prior to Admission   Medication Sig Dispense Refill Last Dose   • albuterol (PROVENTIL) (2.5 MG/3ML) 0.083% nebulizer solution USE ONE VIAL BY NEBULIZATION EVERY FOUR HOURS AS NEEDED FOR FOR WHEEZING (Patient taking differently: Take 2.5 mg by nebulization Every 4 (Four)  Hours As Needed.) 180 mL 0 6/13/2023   • amLODIPine (NORVASC) 5 MG tablet Take 1 tablet by mouth Daily. 90 tablet 3 6/13/2023   • aspirin 81 MG EC tablet Take 1 tablet by mouth Daily.   Past Week   • carvedilol (COREG) 12.5 MG tablet Take 1 tablet by mouth 2 (Two) Times a Day. 60 tablet 11 6/13/2023   • hydrALAZINE (APRESOLINE) 50 MG tablet 1 tablet 3 (Three) Times a Day.   6/13/2023   • losartan (COZAAR) 25 MG tablet Take 1 tablet by mouth Daily. 90 tablet 3 6/13/2023   • rosuvastatin (CRESTOR) 20 MG tablet Take 1 tablet by mouth Daily. 90 tablet 3 6/12/2023   • torsemide (DEMADEX) 20 MG tablet Take 2 tablets by mouth Daily. 180 tablet 3 6/12/2023   • nitroglycerin (NITROSTAT) 0.4 MG SL tablet Place 1 tablet under the tongue every 5 (five) minutes as needed for chest pain. Take no more than 3 doses in 15 minutes. 25 tablet 1 More than a month     Allergies:  Lisinopril, Codeine sulfate, and Penicillins    Review of Systems   Constitutional: Negative for fever.   HENT: Negative for congestion.    Respiratory: Negative for shortness of breath.    Cardiovascular: Negative for chest pain.   Gastrointestinal: Negative for constipation, diarrhea, nausea and vomiting.   Genitourinary: Negative for dysuria.   Musculoskeletal:        Discomfort at rt chest tube site   Skin: Negative for rash.   Neurological: Negative for headaches.   Psychiatric/Behavioral: Negative for sleep disturbance.       Objective      Vital Signs  Temp:  [97.9 °F (36.6 °C)-98.4 °F (36.9 °C)] 98.4 °F (36.9 °C)  Heart Rate:  [51-63] 52  Resp:  [12-24] 18  BP: (141-170)/(57-77) 152/77  Body mass index is 25.06 kg/m².    Physical Exam  Vitals and nursing note reviewed.   Constitutional:       General: He is not in acute distress.  HENT:      Head: Normocephalic.      Mouth/Throat:      Mouth: Mucous membranes are moist.   Eyes:      Conjunctiva/sclera: Conjunctivae normal.   Cardiovascular:      Rate and Rhythm: Normal rate and regular rhythm.    Pulmonary:      Effort: Pulmonary effort is normal. No respiratory distress.      Breath sounds: Examination of the right-lower field reveals decreased breath sounds. Decreased breath sounds present. No wheezing or rales.      Comments: On room air  Abdominal:      General: Bowel sounds are normal.      Palpations: Abdomen is soft.   Musculoskeletal:      Cervical back: Neck supple.      Right lower leg: No edema.      Left lower leg: No edema.   Skin:     General: Skin is warm and dry.   Neurological:      Mental Status: He is alert and oriented to person, place, and time.   Psychiatric:         Mood and Affect: Mood normal.         Behavior: Behavior normal.         Results Review:   I reviewed the patient's new clinical results.  I reviewed the patient's new imaging results and agree with the interpretation.  I reviewed the patient's other test results and agree with the interpretation  I personally viewed and interpreted the patient's EKG/Telemetry data         Active Hospital Problems    Diagnosis  POA   • **Hydropneumothorax [J94.8]  Yes   • Mass of upper lobe of left lung [R91.8]  Yes   • NICM (nonischemic cardiomyopathy) [I42.8]  Yes   • Chronic systolic CHF (congestive heart failure) [I50.22]  Yes   • Stage 3b chronic kidney disease [N18.32]  Yes   • AAA (abdominal aortic aneurysm) without rupture [I71.40]  Yes   • CAD in native artery [I25.10]  Yes   • Hypertension [I10]  Yes   • Hyperlipidemia [E78.5]  Yes   • S/P angioplasty with stent [Z95.820]  Not Applicable     Proximal LAD by Dr. Barbour.     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 CT in place. Serial xrays. On room air. Pathology pending  · CKD IIIb: Cr at baseline. Avoid nephrotoxic meds. Followed by Nephrology outpatient. Monitor  · CAD/Angioplasty/NICM/Chronic systolic CHF: Denies chest pain, CHF symptoms. Carvedilol, torsemide  · HTN: BP  acceptable acutely. Losartan, amlodipine, hydralazine  · HLD: statin  · COPD: No wheezing on exam. On room air. Albuterol as needed  · AAA: Hx repair      Thank you very much for asking [facility] to be involved in this patient's care. We will follow along with you.      DASHA Bocanegra  [facility]  06/14/23  10:52 EDT

## 2023-06-14 NOTE — PLAN OF CARE
Goal Outcome Evaluation:  Plan of Care Reviewed With: patient        Progress: improving  Outcome Evaluation: vss, no complaints of pain. CT remains on -20 of suction. pt rested well during shift. cxr and labs in am. will continue to monitor.

## 2023-06-15 ENCOUNTER — APPOINTMENT (OUTPATIENT)
Dept: GENERAL RADIOLOGY | Facility: HOSPITAL | Age: 83
End: 2023-06-15
Payer: MEDICARE

## 2023-06-15 ENCOUNTER — READMISSION MANAGEMENT (OUTPATIENT)
Dept: CALL CENTER | Facility: HOSPITAL | Age: 83
End: 2023-06-15
Payer: MEDICARE

## 2023-06-15 VITALS
HEIGHT: 67 IN | BODY MASS INDEX: 25.11 KG/M2 | TEMPERATURE: 97.6 F | WEIGHT: 160 LBS | SYSTOLIC BLOOD PRESSURE: 90 MMHG | RESPIRATION RATE: 18 BRPM | HEART RATE: 55 BPM | OXYGEN SATURATION: 95 % | DIASTOLIC BLOOD PRESSURE: 55 MMHG

## 2023-06-15 LAB
ANION GAP SERPL CALCULATED.3IONS-SCNC: 10.6 MMOL/L (ref 5–15)
BUN SERPL-MCNC: 26 MG/DL (ref 8–23)
BUN/CREAT SERPL: 13.7 (ref 7–25)
CALCIUM SPEC-SCNC: 9 MG/DL (ref 8.6–10.5)
CHLORIDE SERPL-SCNC: 100 MMOL/L (ref 98–107)
CO2 SERPL-SCNC: 25.4 MMOL/L (ref 22–29)
CREAT SERPL-MCNC: 1.9 MG/DL (ref 0.76–1.27)
CYTO UR: NORMAL
DEPRECATED RDW RBC AUTO: 40.7 FL (ref 37–54)
EGFRCR SERPLBLD CKD-EPI 2021: 34.4 ML/MIN/1.73
ERYTHROCYTE [DISTWIDTH] IN BLOOD BY AUTOMATED COUNT: 14.1 % (ref 12.3–15.4)
GLUCOSE SERPL-MCNC: 108 MG/DL (ref 65–99)
HCT VFR BLD AUTO: 31.5 % (ref 37.5–51)
HGB BLD-MCNC: 9.8 G/DL (ref 13–17.7)
LAB AP CASE REPORT: NORMAL
MCH RBC QN AUTO: 24.8 PG (ref 26.6–33)
MCHC RBC AUTO-ENTMCNC: 31.1 G/DL (ref 31.5–35.7)
MCV RBC AUTO: 79.7 FL (ref 79–97)
PATH REPORT.FINAL DX SPEC: NORMAL
PATH REPORT.GROSS SPEC: NORMAL
PLATELET # BLD AUTO: 172 10*3/MM3 (ref 140–450)
PMV BLD AUTO: 9.5 FL (ref 6–12)
POTASSIUM SERPL-SCNC: 3.8 MMOL/L (ref 3.5–5.2)
RBC # BLD AUTO: 3.95 10*6/MM3 (ref 4.14–5.8)
SODIUM SERPL-SCNC: 136 MMOL/L (ref 136–145)
WBC NRBC COR # BLD: 5.17 10*3/MM3 (ref 3.4–10.8)

## 2023-06-15 PROCEDURE — 85027 COMPLETE CBC AUTOMATED: CPT | Performed by: NURSE PRACTITIONER

## 2023-06-15 PROCEDURE — 71045 X-RAY EXAM CHEST 1 VIEW: CPT

## 2023-06-15 PROCEDURE — 80048 BASIC METABOLIC PNL TOTAL CA: CPT | Performed by: NURSE PRACTITIONER

## 2023-06-15 PROCEDURE — 94799 UNLISTED PULMONARY SVC/PX: CPT

## 2023-06-15 RX ORDER — ACETAMINOPHEN 325 MG/1
650 TABLET ORAL EVERY 6 HOURS PRN
Qty: 112 TABLET | Refills: 0 | Status: SHIPPED | OUTPATIENT
Start: 2023-06-15 | End: 2023-06-30

## 2023-06-15 RX ORDER — HYDRALAZINE HYDROCHLORIDE 10 MG/1
10 TABLET, FILM COATED ORAL 3 TIMES DAILY
Status: DISCONTINUED | OUTPATIENT
Start: 2023-06-15 | End: 2023-06-15 | Stop reason: HOSPADM

## 2023-06-15 RX ORDER — LOSARTAN POTASSIUM 25 MG/1
25 TABLET ORAL
Status: DISCONTINUED | OUTPATIENT
Start: 2023-06-16 | End: 2023-06-15 | Stop reason: HOSPADM

## 2023-06-15 RX ORDER — HYDRALAZINE HYDROCHLORIDE 10 MG/1
10 TABLET, FILM COATED ORAL 3 TIMES DAILY
Qty: 90 TABLET | Refills: 0 | OUTPATIENT
Start: 2023-06-15 | End: 2023-07-15

## 2023-06-15 RX ADMIN — HYDRALAZINE HYDROCHLORIDE 10 MG: 10 TABLET, FILM COATED ORAL at 09:29

## 2023-06-15 RX ADMIN — IPRATROPIUM BROMIDE AND ALBUTEROL SULFATE 3 ML: .5; 2.5 SOLUTION RESPIRATORY (INHALATION) at 11:07

## 2023-06-15 RX ADMIN — AMLODIPINE BESYLATE 5 MG: 5 TABLET ORAL at 09:30

## 2023-06-15 RX ADMIN — Medication 3 ML: at 09:31

## 2023-06-15 RX ADMIN — IPRATROPIUM BROMIDE AND ALBUTEROL SULFATE 3 ML: .5; 2.5 SOLUTION RESPIRATORY (INHALATION) at 07:17

## 2023-06-15 RX ADMIN — CARVEDILOL 12.5 MG: 6.25 TABLET, FILM COATED ORAL at 09:29

## 2023-06-15 RX ADMIN — Medication 10 ML: at 09:30

## 2023-06-15 RX ADMIN — TORSEMIDE 40 MG: 20 TABLET ORAL at 09:32

## 2023-06-15 NOTE — PLAN OF CARE
Problem: Adult Inpatient Plan of Care  Goal: Plan of Care Review  Outcome: Ongoing, Progressing  Flowsheets (Taken 6/14/2023 1417 by Gisselle Che, RN)  Outcome Evaluation: VSS, no complaints, chest tube to water seal. CXR in AM  Goal: Patient-Specific Goal (Individualized)  Outcome: Ongoing, Progressing  Goal: Absence of Hospital-Acquired Illness or Injury  Outcome: Ongoing, Progressing  Intervention: Identify and Manage Fall Risk  Recent Flowsheet Documentation  Taken 6/15/2023 0426 by Lauren Sanchez, RN  Safety Promotion/Fall Prevention:   activity supervised   assistive device/personal items within reach   clutter free environment maintained   lighting adjusted   mobility aid in reach   nonskid shoes/slippers when out of bed   safety round/check completed   toileting scheduled  Taken 6/15/2023 0224 by Lauren Sanchez, RN  Safety Promotion/Fall Prevention:   activity supervised   assistive device/personal items within reach   clutter free environment maintained   lighting adjusted   mobility aid in reach   nonskid shoes/slippers when out of bed   safety round/check completed   toileting scheduled  Taken 6/15/2023 0004 by Lauren Sanchez, RN  Safety Promotion/Fall Prevention:   activity supervised   assistive device/personal items within reach   clutter free environment maintained   fall prevention program maintained   mobility aid in reach   lighting adjusted   nonskid shoes/slippers when out of bed   safety round/check completed   toileting scheduled  Taken 6/14/2023 2205 by Lauren Sanchez, RN  Safety Promotion/Fall Prevention:   activity supervised   assistive device/personal items within reach   clutter free environment maintained   lighting adjusted   mobility aid in reach   nonskid shoes/slippers when out of bed   safety round/check completed   toileting scheduled  Taken 6/14/2023 2010 by Lauren Sanchez, RN  Safety Promotion/Fall Prevention:   activity supervised   assistive  device/personal items within reach   clutter free environment maintained   lighting adjusted   mobility aid in reach   nonskid shoes/slippers when out of bed   safety round/check completed   toileting scheduled  Intervention: Prevent and Manage VTE (Venous Thromboembolism) Risk  Recent Flowsheet Documentation  Taken 6/15/2023 0426 by Lauren Sanchez, RN  Activity Management: activity encouraged  Taken 6/15/2023 0224 by Lauren Sanchez, RN  Activity Management: activity encouraged  Taken 6/15/2023 0004 by Lauren Sanchez, RN  Activity Management: activity encouraged  Taken 6/14/2023 2205 by Lauren Sanchez, RN  Activity Management: activity encouraged  Taken 6/14/2023 2010 by Lauren Sanchez, RN  Activity Management: activity encouraged  Goal: Optimal Comfort and Wellbeing  Outcome: Ongoing, Progressing  Intervention: Provide Person-Centered Care  Recent Flowsheet Documentation  Taken 6/15/2023 0224 by Lauren Sanchez, RN  Trust Relationship/Rapport:   care explained   choices provided   emotional support provided   questions answered   questions encouraged   reassurance provided   thoughts/feelings acknowledged  Taken 6/14/2023 2010 by Lauren Sanchez, RN  Trust Relationship/Rapport:   care explained   emotional support provided   choices provided   questions answered   questions encouraged   reassurance provided   thoughts/feelings acknowledged  Goal: Readiness for Transition of Care  Outcome: Ongoing, Progressing   Goal Outcome Evaluation:

## 2023-06-15 NOTE — DISCHARGE SUMMARY
Patient Care Team:  Low Sepulveda as PCP - General (Family Medicine)  Guru Simpson MD as Consulting Physician (Cardiology)  Karen Barrera Jr., MD as Consulting Physician (Vascular Surgery)  Bronson Blanc MD as Consulting Physician (Gastroenterology)  Cristian Reyes MD as Consulting Physician (Nephrology)    Date of Admission: 6/13/2023   Date of Discharge:  6/15/2023    Discharge Diagnosis: Hydropneumothorax, right.    Presenting Problem  Pulmonary mass [R91.8]  Hydropneumothorax [J94.8]     History of Present Illness  Bayron Acevedo is a 82 y.o. male who was evaluated by Dr. Nemesio Kramer on 06/06/23 for a spiculated left upper lobe pulmonary mass measuring 3.7 x 4.3 x 4.2 cm along with mediastinal lymphadenopathy. Given concern for primary lung malignancy, a CT/PET along with a CT-guided percutaneous biopsy of the left upper lobe lesion was recommended. CT/PET performed on 6/12/2023 demonstrated hypermetabolic activity of the pleural-based left upper lobe mass along with a small-to-moderate right hydropneumothorax.    On 6/13/2023, the patient presented to [facility] for a scheduled CT-guided biopsy.  Follow-up imaging again demonstrated a right basilar hydropneumothorax contralateral to the biopsy side. The patient was admitted subsequently for chest tube placement and management.    Hospital Course  The patient was admitted to [facility] on 6/13/2023 secondary to an identified right basilar hydropneumothorax again seen during left CT-guided biopsy.  A CT-guided chest tube was placed with 400 cc of initial output.  No air leak was appreciated and the chest tube was clamped on hospital day 3.  Follow-up chest x-ray imaging demonstrated no pneumothorax and the chest tube was removed.  He is now stable for discharge home with an outpatient appointment scheduled at our multidisciplinary lung clinic on 6/20/2023 with a chest x-ray.  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    Procedures Performed  * No procedures listed *       Consults:   Consults       Date and Time Order Name Status Description    6/13/2023  2:07 PM Inpatient Hospitalist Consult Completed             Pertinent Test Results:     Imaging Results (Last 24 Hours)       Procedure Component Value Units Date/Time    XR Chest 1 View [[id number]] Resulted: 06/15/23 1245     Updated: 06/15/23 1313    XR Chest 1 View [[id number]] Collected: 06/15/23 0716     Updated: 06/15/23 0722    Narrative:      XR CHEST 1 VW-clinical: Chest tube management     COMPARISON 06/14/2023     FINDINGS: Right base chest tube in position as before, diminished  infiltrate or atelectasis at the right lung base. Very small  pneumothorax again seen along the costophrenic sulcus at the insertion  site. Left upper lobe lung mass again demonstrated. Left-sided pleural  effusion as before. Cardiomediastinal silhouette is stable.     CONCLUSION: Diminished airspace disease at the right lung base otherwise  similar to 06/14/2023     This report was finalized on 6/15/2023 7:19 AM by Dr. Jose Carolina M.D.               Lab Results (last 24 hours)       Procedure Component Value Units Date/Time    Basic Metabolic Panel [[id number]]  (Abnormal) Collected: 06/15/23 0423    Specimen: Blood Updated: 06/15/23 0505     Glucose 108 mg/dL      BUN 26 mg/dL      Creatinine 1.90 mg/dL      Sodium 136 mmol/L      Potassium 3.8 mmol/L      Chloride 100 mmol/L      CO2 25.4 mmol/L      Calcium 9.0 mg/dL      BUN/Creatinine Ratio 13.7     Anion Gap 10.6 mmol/L      eGFR 34.4 mL/min/1.73     Narrative:      GFR Normal >60  Chronic Kidney Disease <60  Kidney Failure <15    The GFR formula is only valid for adults with stable renal function between ages 18 and 70.    CBC (No Diff) [[id number]]  (Abnormal) Collected: 06/15/23 0423    Specimen: Blood Updated: 06/15/23 0446     WBC 5.17  10*3/mm3      RBC 3.95 10*6/mm3      Hemoglobin 9.8 g/dL      Hematocrit 31.5 %      MCV 79.7 fL      MCH 24.8 pg      MCHC 31.1 g/dL      RDW 14.1 %      RDW-SD 40.7 fl      MPV 9.5 fL      Platelets 172 10*3/mm3               Condition on Discharge: Stable    Vital Signs  Temp:  [97.6 °F (36.4 °C)-98.1 °F (36.7 °C)] 97.6 °F (36.4 °C)  Heart Rate:  [55-64] 55  Resp:  [16-18] 18  BP: ()/(55-68) 90/55    Physical Exam:    General Appearance:    Alert, cooperative, in no acute distress   Head:    Normocephalic, without obvious abnormality, atraumatic   Eyes:            Lids and lashes normal, conjunctivae and sclerae normal, no   icterus, no pallor, corneas clear, PERRLA   Ears:    Ears appear intact with no abnormalities noted   Throat:   No oral lesions, no thrush, oral mucosa moist   Neck:   No adenopathy, supple, trachea midline, no thyromegaly, no     carotid bruit, no JVD   Back:     No kyphosis present, no scoliosis present, no skin lesions,       erythema or scars, no tenderness to percussion or                   palpation,   range of motion normal   Lungs:     Clear to auscultation,respirations regular, even and                   unlabored    Heart:    Regular rhythm and normal rate, normal S1 and S2, no            murmur, no gallop, no rub, no click   Breast Exam:    Deferred   Abdomen:     Normal bowel sounds, no masses, no organomegaly, soft        non-tender, non-distended, no guarding, no rebound                 tenderness   Genitalia:    Deferred   Extremities:   Moves all extremities well, no edema, no cyanosis, no              redness   Pulses:   Pulses palpable and equal bilaterally   Skin:   No bleeding, bruising or rash [CT-guided chest tube placement site clean, dry, dressed with Duoderm.]    Lymph nodes:   No palpable adenopathy   Neurologic:   Cranial nerves 2 - 12 grossly intact, sensation intact, DTR        present and equal bilaterally       Discharge Disposition  Home  today    Discharge Medications     Discharge Medications        New Medications        Instructions Start Date   acetaminophen 325 MG tablet  Commonly known as: TYLENOL   650 mg, Oral, Every 6 Hours PRN             Changes to Medications        Instructions Start Date   albuterol (2.5 MG/3ML) 0.083% nebulizer solution  Commonly known as: PROVENTIL  What changed: See the new instructions.   USE ONE VIAL BY NEBULIZATION EVERY FOUR HOURS AS NEEDED FOR FOR WHEEZING             Continue These Medications        Instructions Start Date   amLODIPine 5 MG tablet  Commonly known as: NORVASC   5 mg, Oral, Daily      aspirin 81 MG EC tablet   81 mg, Oral, Daily      carvedilol 12.5 MG tablet  Commonly known as: COREG   12.5 mg, Oral, 2 Times Daily      hydrALAZINE 50 MG tablet  Commonly known as: APRESOLINE   50 mg, 3 Times Daily      losartan 25 MG tablet  Commonly known as: COZAAR   25 mg, Oral, Every 24 Hours Scheduled      nitroglycerin 0.4 MG SL tablet  Commonly known as: NITROSTAT   0.4 mg, Sublingual, Every 5 Minutes PRN, Take no more than 3 doses in 15 minutes.       rosuvastatin 20 MG tablet  Commonly known as: CRESTOR   20 mg, Oral, Daily      torsemide 20 MG tablet  Commonly known as: DEMADEX   40 mg, Oral, Daily               Discharge Instructions:  No heavy lifting, pushing, pulling greater than 10 pounds.  No driving up until 2 weeks after surgery and no longer taking narcotics.  Resume home diet as tolerated.  Continue incentive spirometer at least 4 times per day.  Remove dressing from post chest tube site after 48 hours, may shower and clean surgical sites with antibacterial soap or hydrogen peroxide, and apply gauze dressing or band-aid as needed for any drainage.  No dressing needed once no longer draining.          Follow-up Appointments  Future Appointments   Date Time Provider Department Center   6/20/2023  8:00 AM THORACIC SURGEON [facility]   7/19/2023 12:30 PM [facility] RM 1 [facility]NA [facility]  [facility]   7/19/2023  1:30 PM Jessenia Otero APRN [facility]     [unfilled]    Test Results Pending at Discharge      For any questions regarding patient's stay, please refer to patient's chart.    DASHA Cardenas  [facility]  06/15/23  15:02 EDT

## 2023-06-15 NOTE — PROGRESS NOTES
Name: Bayron Acevedo ADMIT: 2023   : 1940  PCP: Low Sepulveda    MRN: [id number] LOS: 2 days   AGE/SEX: 82 y.o. male  ROOM: [facility]     Subjective   Subjective   Sitting up in the bed, spouse present at bedside.  No acute events overnight.  Complains of soreness in the chest tube area.  Denies shortness of breath, fevers, chills, chest pain, nausea, vomiting,    Review of Systems   As above  Objective   Objective   Vital Signs  Temp:  [97.6 °F (36.4 °C)-98.1 °F (36.7 °C)] 97.6 °F (36.4 °C)  Heart Rate:  [50-64] 58  Resp:  [16-18] 16  BP: (103-147)/(57-79) 147/68  SpO2:  [95 %-100 %] 95 %  on   ;   Device (Oxygen Therapy): room air  Body mass index is 25.06 kg/m².  Physical Exam    General: Alert, sitting up in the bed, not in distress,  HEENT: Normocephalic, atraumatic  CV: Regular rate and rhythm, no murmurs rubs or gallops  Lungs: Chest tube in place, decreased breath sounds, no wheezing, on room air,  Abdomen: Soft, nontender, nondistended  Extremities: No significant peripheral edema , no cyanosis     Results Review     I reviewed the patient's new clinical results.  Results from last 7 days   Lab Units 06/15/23  0423 06/14/23  04223  0915   WBC 10*3/mm3 5.17 5.02 5.59   HEMOGLOBIN g/dL 9.8* 9.9* 10.6*   PLATELETS 10*3/mm3 172 184 201  187     Results from last 7 days   Lab Units 06/15/23  0423 23  04223  2000   SODIUM mmol/L 136 136 138   POTASSIUM mmol/L 3.8 4.1 3.7   CHLORIDE mmol/L 100 100 101   CO2 mmol/L 25.4 25.4 28.4   BUN mg/dL 26* 23 26*   CREATININE mg/dL 1.90* 1.68* 1.81*   GLUCOSE mg/dL 108* 105* 125*   Estimated Creatinine Clearance: 29.7 mL/min (A) (by C-G formula based on SCr of 1.9 mg/dL (H)).  Results from last 7 days   Lab Units 23   ALBUMIN g/dL 3.3*   BILIRUBIN mg/dL 0.4   ALK PHOS U/L 77   AST (SGOT) U/L 13   ALT (SGPT) U/L 16     Results from last 7 days   Lab Units 06/15/23  0423 23  04223   CALCIUM mg/dL 9.0 8.7 9.2    ALBUMIN g/dL  --   --  3.3*       COVID19   Date Value Ref Range Status   05/14/2021 Not Detected Not Detected - Ref. Range Final     External COVID19   Date Value Ref Range Status   04/22/2022 Detected (A) Not Detected - Ref. Range Final     Glucose   Date/Time Value Ref Range Status   06/12/2023 1319 88 70 - 130 mg/dL Final     Comment:     Meter: [id number] : [id number] Sharon Fuchs           XR Chest 1 View  XR CHEST 1 VW-clinical: Chest tube management     COMPARISON 06/14/2023     FINDINGS: Right base chest tube in position as before, diminished  infiltrate or atelectasis at the right lung base. Very small  pneumothorax again seen along the costophrenic sulcus at the insertion  site. Left upper lobe lung mass again demonstrated. Left-sided pleural  effusion as before. Cardiomediastinal silhouette is stable.     CONCLUSION: Diminished airspace disease at the right lung base otherwise  similar to 06/14/2023     This report was finalized on 6/15/2023 7:19 AM by Dr. Jose Carolina M.D.       Scheduled Medications  amLODIPine, 5 mg, Oral, Daily  carvedilol, 12.5 mg, Oral, BID  hydrALAZINE, 10 mg, Oral, TID  ipratropium-albuterol, 3 mL, Nebulization, 4x Daily - RT  [START ON 6/16/2023] losartan, 25 mg, Oral, Q24H  rosuvastatin, 20 mg, Oral, Nightly  senna-docusate sodium, 2 tablet, Oral, BID  sodium chloride, 10 mL, Intravenous, Q12H  sodium chloride, 3 mL, Intravenous, Q12H  torsemide, 40 mg, Oral, Daily    Infusions   Diet  Diet: Cardiac Diets; Healthy Heart (2-3 Na+); Texture: Regular Texture (IDDSI 7); Fluid Consistency: Thin (IDDSI 0)    I have personally reviewed     [x]  Laboratory   []  Microbiology   [x]  Radiology   []  EKG/Telemetry  []  Cardiology/Vascular   [x]  Pathology    []  Records       Assessment/Plan     Active Hospital Problems    Diagnosis  POA    **Hydropneumothorax [J94.8]  Yes    Mass of upper lobe of left lung [R91.8]  Yes    NICM (nonischemic cardiomyopathy) [I42.8]  Yes     Chronic systolic CHF (congestive heart failure) [I50.22]  Yes    Stage 3b chronic kidney disease [N18.32]  Yes    AAA (abdominal aortic aneurysm) without rupture [I71.40]  Yes    CAD in native artery [I25.10]  Yes    Hypertension [I10]  Yes    Hyperlipidemia [E78.5]  Yes    S/P angioplasty with stent [Z95.820]  Not Applicable    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       82 y.o. male admitted with Hydropneumothorax.    Right Hydropneumothorax/KIET mass:   --recent PET CT 06/13/2023- shoed Hypermetabolic 5.2 x 3.1 cm irregular pleural-based left upper lobe lung mass. There is low-level activity at the subcarinal nodes which is indeterminate and should be followed with CT. The rest of the mediastinal and hilar nodes have blood pool level activity.  2. New small-moderate right hydropneumothorax since the 05/26/2023 CT.  3. There is no convincing evidence for metastatic disease at the neck,  abdomen, or pelvis.  Management per admitting.    Chest tube in place. Serial xrays. On room air  Pathology from CT-Guided Biopsies for a Mass 06/13/2023 showed : INVASIVE POORLY DIFFERENTIATED PULMONARY ADENOCARCINOMA.       CKD IIIb: Avoid nephrotoxic meds. Followed by Nephrology outpatient.  cretine Slightly up today, around baseline.  Monitor daily.    CAD/Angioplasty/NICM/Chronic systolic CHF: Denies chest pain, CHF symptoms. Carvedilol, torsemide    HTN: Be acutely stable.  Losartan, amlodipine, hydralazine    HLD: statin    COPD: No wheezing on exam. On room air. Albuterol as needed    AAA: Hx repair       SCDs for DVT prophylaxis.  Full code.  Discussed with patient and spouse.  Anticipate dischargeTBD      Copied text in this note has been reviewed and is accurate as of 06/15/23.         Dictated utilizing Dragon dictation        Robyn Gomez MD  [facility]  06/15/23  10:05 EDT

## 2023-06-15 NOTE — PLAN OF CARE
"Goal Outcome Evaluation:   Vital signs stable. Alert and oriented x 4. Up with stand by assistance out of bed. On room air. Sinus bradycardia noted. Voiding per urinal without difficulty. Tolerating regular diet fair. States he \"doesn't care for hospital food.\" Spouse at bedside. Right chest tube removed per thoracic nurse practitioner. Duoderm dressing applied. Chest x ray stable. Discharged home in stable condition. Denies discomfort or shortness of breath. Will continue to monitor.                      "

## 2023-06-16 ENCOUNTER — PATIENT OUTREACH (OUTPATIENT)
Dept: OTHER | Facility: HOSPITAL | Age: 83
End: 2023-06-16
Payer: MEDICARE

## 2023-06-16 DIAGNOSIS — R91.8 MASS OF UPPER LOBE OF LEFT LUNG: Primary | ICD-10-CM

## 2023-06-16 LAB
BACTERIA FLD CULT: NORMAL
GRAM STN SPEC: NORMAL
GRAM STN SPEC: NORMAL
LAB AP CASE REPORT: NORMAL
LAB AP CLINICAL INFORMATION: NORMAL
LAB AP DIAGNOSIS COMMENT: NORMAL
LAB AP SPECIAL STAINS: NORMAL
PATH REPORT.ADDENDUM SPEC: NORMAL
PATH REPORT.FINAL DX SPEC: NORMAL
PATH REPORT.GROSS SPEC: NORMAL

## 2023-06-16 NOTE — CASE MANAGEMENT/SOCIAL WORK
Case Management Discharge Note      Final Note: Discharged home    Provided Post Acute Provider List?: N/A  Provided Post Acute Provider Quality & Resource List?: N/A    Selected Continued Care - Discharged on 6/15/2023 Admission date: 6/13/2023 - Discharge disposition: Home or Self Care      Destination    No services have been selected for the patient.                Durable Medical Equipment    No services have been selected for the patient.                Dialysis/Infusion    No services have been selected for the patient.                Home Medical Care    No services have been selected for the patient.                Therapy    No services have been selected for the patient.                Community Resources    No services have been selected for the patient.                Community & DME    No services have been selected for the patient.                    Transportation Services  Private: Car    Final Discharge Disposition Code: 01 - home or self-care

## 2023-06-16 NOTE — OUTREACH NOTE
Prep Survey      Flowsheet Row Responses   [facility]ondenominational facility patient discharged from? [facility]   Is LACE score < 7 ? No   Eligibility Readm Mgmt   Discharge diagnosis Hydropneumothorax   Does the patient have one of the following disease processes/diagnoses(primary or secondary)? Other   Does the patient have Home health ordered? No   Is there a DME ordered? No   Prep survey completed? Yes            JU STEWART - Registered Nurse

## 2023-06-18 LAB
BACTERIA SPEC ANAEROBE CULT: NORMAL
FUNGUS WND CULT: NORMAL

## 2023-06-20 LAB — FUNGUS WND CULT: NORMAL

## 2023-06-27 LAB — FUNGUS WND CULT: NORMAL

## 2023-07-04 LAB — FUNGUS WND CULT: NORMAL

## 2023-07-11 PROBLEM — D64.9 NORMOCYTIC ANEMIA: Status: ACTIVE | Noted: 2023-07-11

## 2023-07-11 PROBLEM — C34.92 ADENOCARCINOMA, LUNG, LEFT: Status: ACTIVE | Noted: 2023-07-11

## 2023-07-11 LAB — FUNGUS WND CULT: NORMAL

## 2023-07-12 LAB
LAB AP CASE REPORT: NORMAL
LAB AP CLINICAL INFORMATION: NORMAL
LAB AP DIAGNOSIS COMMENT: NORMAL
LAB AP SPECIAL STAINS: NORMAL
Lab: NORMAL
PATH REPORT.ADDENDUM SPEC: NORMAL
PATH REPORT.FINAL DX SPEC: NORMAL
PATH REPORT.GROSS SPEC: NORMAL

## 2023-07-19 NOTE — H&P (VIEW-ONLY)
"Mr. Acevedo today was seen in a telephone follow-up.  I attempted to connect via video follow-up and had technical difficulties connecting.  He agreed to discuss his results and steps moving forward over the phone.    Chief Complaint  Left upper lobe non-small cell lung cancer    Subjective        Bayronchasity Acevedo presents to [facility] THORACIC SURGERY  History of Present Illness  Mr. 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  He has had an extensive work-up including a brain MRI which was negative, a CT/PET scan which did not show any other areas of metabolic activity and mediastinal staging which was negative.  His pulmonary function test are adequate for pulmonary resection.  His FEV1 is 44% predicted and his DLCO is 56% predicted.  In addition he is extremely active.  He denies any shortness of breath.  He is able to mow his lawn he is able to walk up stairs without becoming short of breath.    Objective   Vital Signs:  There were no vitals taken for this visit.  Estimated body mass index is 25.06 kg/mý as calculated from the following:    Height as of an earlier encounter on 7/19/23: 170.2 cm (67\").    Weight as of an earlier encounter on 7/19/23: 72.6 kg (160 lb).           Physical Exam   No physical exam performed today as this was a telephone visit  Result Review :  Mediastinal staging was performed and is negative.           Assessment and Plan   Diagnoses and all orders for this visit:    1. Mass of upper lobe of left lung (Primary)    Mr. Acevedo is a very pleasant 82-year-old gentleman who presents today in regards to his work-up for a left upper lobe non-small cell lung cancer.  He does not have any evidence of disease spread based on the current information we have.  He has " negative mediastinal staging, a negative brain MRI and no signs of PET avidity on his CT/PET scan outside of his primary lung malignancy.  I think he has adequate pulmonary reserve for left upper lobectomy.  He is to be evaluated by his cardiologist today.  He also has a follow-up appointment with Dr. Santos with medical oncology over the next several days.    I think he would be an appropriate surgical candidate.  I will schedule him for a robotic assisted left upper lobectomy with mediastinal lymph node dissection.           I spent 20 minutes caring for Bayron on this date of service. This time includes time spent by me in the following activities:preparing for the visit, reviewing tests, obtaining and/or reviewing a separately obtained history, performing a medically appropriate examination and/or evaluation , counseling and educating the patient/family/caregiver, ordering medications, tests, or procedures, referring and communicating with other health care professionals , documenting information in the medical record, independently interpreting results and communicating that information with the patient/family/caregiver, and care coordination  Follow Up   No follow-ups on file.  Patient was given instructions and counseling regarding his condition or for health maintenance advice. Please see specific information pulled into the AVS if appropriate.

## 2023-07-21 ENCOUNTER — LAB (OUTPATIENT)
Dept: LAB | Facility: HOSPITAL | Age: 83
End: 2023-07-21
Payer: MEDICARE

## 2023-07-21 DIAGNOSIS — D64.9 NORMOCYTIC ANEMIA: ICD-10-CM

## 2023-07-21 LAB
ALBUMIN SERPL-MCNC: 3.6 G/DL (ref 3.5–5.2)
ALBUMIN/GLOB SERPL: 1.4 G/DL
ALP SERPL-CCNC: 75 U/L (ref 39–117)
ALT SERPL W P-5'-P-CCNC: 5 U/L (ref 1–41)
ANION GAP SERPL CALCULATED.3IONS-SCNC: 14.9 MMOL/L (ref 5–15)
AST SERPL-CCNC: 16 U/L (ref 1–40)
BASOPHILS # BLD AUTO: 0.04 10*3/MM3 (ref 0–0.2)
BASOPHILS NFR BLD AUTO: 0.6 % (ref 0–1.5)
BILIRUB SERPL-MCNC: 0.5 MG/DL (ref 0–1.2)
BUN SERPL-MCNC: 21 MG/DL (ref 8–23)
BUN/CREAT SERPL: 11.7 (ref 7–25)
CALCIUM SPEC-SCNC: 8.7 MG/DL (ref 8.6–10.5)
CHLORIDE SERPL-SCNC: 101 MMOL/L (ref 98–107)
CO2 SERPL-SCNC: 23.1 MMOL/L (ref 22–29)
CREAT SERPL-MCNC: 1.79 MG/DL (ref 0.7–1.3)
DEPRECATED RDW RBC AUTO: 49.9 FL (ref 37–54)
EGFRCR SERPLBLD CKD-EPI 2021: 36.9 ML/MIN/1.73
EOSINOPHIL # BLD AUTO: 0.19 10*3/MM3 (ref 0–0.4)
EOSINOPHIL NFR BLD AUTO: 2.7 % (ref 0.3–6.2)
ERYTHROCYTE [DISTWIDTH] IN BLOOD BY AUTOMATED COUNT: 16.8 % (ref 12.3–15.4)
GLOBULIN UR ELPH-MCNC: 2.6 GM/DL
GLUCOSE SERPL-MCNC: 97 MG/DL (ref 65–99)
HCT VFR BLD AUTO: 34 % (ref 37.5–51)
HGB BLD-MCNC: 10.3 G/DL (ref 13–17.7)
IMM GRANULOCYTES # BLD AUTO: 0.03 10*3/MM3 (ref 0–0.05)
IMM GRANULOCYTES NFR BLD AUTO: 0.4 % (ref 0–0.5)
LYMPHOCYTES # BLD AUTO: 0.8 10*3/MM3 (ref 0.7–3.1)
LYMPHOCYTES NFR BLD AUTO: 11.3 % (ref 19.6–45.3)
MCH RBC QN AUTO: 24.8 PG (ref 26.6–33)
MCHC RBC AUTO-ENTMCNC: 30.3 G/DL (ref 31.5–35.7)
MCV RBC AUTO: 81.9 FL (ref 79–97)
MONOCYTES # BLD AUTO: 0.64 10*3/MM3 (ref 0.1–0.9)
MONOCYTES NFR BLD AUTO: 9 % (ref 5–12)
NEUTROPHILS NFR BLD AUTO: 5.39 10*3/MM3 (ref 1.7–7)
NEUTROPHILS NFR BLD AUTO: 76 % (ref 42.7–76)
NRBC BLD AUTO-RTO: 0 /100 WBC (ref 0–0.2)
PLATELET # BLD AUTO: 180 10*3/MM3 (ref 140–450)
PMV BLD AUTO: 9.2 FL (ref 6–12)
POTASSIUM SERPL-SCNC: 4.1 MMOL/L (ref 3.5–5.2)
PROT SERPL-MCNC: 6.2 G/DL (ref 6–8.5)
RBC # BLD AUTO: 4.15 10*6/MM3 (ref 4.14–5.8)
SODIUM SERPL-SCNC: 139 MMOL/L (ref 136–145)
WBC NRBC COR # BLD: 7.09 10*3/MM3 (ref 3.4–10.8)

## 2023-07-21 PROCEDURE — 85025 COMPLETE CBC W/AUTO DIFF WBC: CPT

## 2023-07-21 PROCEDURE — 36415 COLL VENOUS BLD VENIPUNCTURE: CPT

## 2023-07-21 PROCEDURE — 80053 COMPREHEN METABOLIC PANEL: CPT

## 2023-07-25 LAB
LAB AP CASE REPORT: NORMAL
LAB AP CLINICAL INFORMATION: NORMAL
LAB AP DIAGNOSIS COMMENT: NORMAL
LAB AP SPECIAL STAINS: NORMAL
Lab: NORMAL
Lab: NORMAL
PATH REPORT.ADDENDUM SPEC: NORMAL
PATH REPORT.FINAL DX SPEC: NORMAL
PATH REPORT.GROSS SPEC: NORMAL

## 2023-07-31 ENCOUNTER — ANESTHESIA (OUTPATIENT)
Dept: PERIOP | Facility: HOSPITAL | Age: 83
DRG: 164 | End: 2023-07-31
Payer: MEDICARE

## 2023-07-31 ENCOUNTER — HOSPITAL ENCOUNTER (INPATIENT)
Facility: HOSPITAL | Age: 83
LOS: 15 days | Discharge: HOME | DRG: 164 | End: 2023-08-15
Attending: STUDENT IN AN ORGANIZED HEALTH CARE EDUCATION/TRAINING PROGRAM | Admitting: STUDENT IN AN ORGANIZED HEALTH CARE EDUCATION/TRAINING PROGRAM
Payer: MEDICARE

## 2023-07-31 ENCOUNTER — APPOINTMENT (OUTPATIENT)
Dept: GENERAL RADIOLOGY | Facility: HOSPITAL | Age: 83
DRG: 164 | End: 2023-07-31
Payer: MEDICARE

## 2023-07-31 ENCOUNTER — ANESTHESIA EVENT (OUTPATIENT)
Dept: PERIOP | Facility: HOSPITAL | Age: 83
DRG: 164 | End: 2023-07-31
Payer: MEDICARE

## 2023-07-31 DIAGNOSIS — C34.92 ADENOCARCINOMA, LUNG, LEFT: Primary | ICD-10-CM

## 2023-07-31 DIAGNOSIS — R91.8 MASS OF UPPER LOBE OF LEFT LUNG: ICD-10-CM

## 2023-07-31 LAB
ABO GROUP BLD: NORMAL
ANION GAP SERPL CALCULATED.3IONS-SCNC: 9.8 MMOL/L (ref 5–15)
BLD GP AB SCN SERPL QL: NEGATIVE
BUN SERPL-MCNC: 23 MG/DL (ref 8–23)
BUN/CREAT SERPL: 11.9 (ref 7–25)
CALCIUM SPEC-SCNC: 8.4 MG/DL (ref 8.6–10.5)
CHLORIDE SERPL-SCNC: 101 MMOL/L (ref 98–107)
CO2 SERPL-SCNC: 25.2 MMOL/L (ref 22–29)
CREAT SERPL-MCNC: 1.93 MG/DL (ref 0.76–1.27)
DEPRECATED RDW RBC AUTO: 43.6 FL (ref 37–54)
EGFRCR SERPLBLD CKD-EPI 2021: 33.7 ML/MIN/1.73
ERYTHROCYTE [DISTWIDTH] IN BLOOD BY AUTOMATED COUNT: 15.3 % (ref 12.3–15.4)
GLUCOSE SERPL-MCNC: 116 MG/DL (ref 65–99)
HCT VFR BLD AUTO: 27.8 % (ref 37.5–51)
HGB BLD-MCNC: 8.8 G/DL (ref 13–17.7)
MCH RBC QN AUTO: 25.4 PG (ref 26.6–33)
MCHC RBC AUTO-ENTMCNC: 31.7 G/DL (ref 31.5–35.7)
MCV RBC AUTO: 80.1 FL (ref 79–97)
PLATELET # BLD AUTO: 204 10*3/MM3 (ref 140–450)
PMV BLD AUTO: 8.9 FL (ref 6–12)
POTASSIUM SERPL-SCNC: 5.1 MMOL/L (ref 3.5–5.2)
RBC # BLD AUTO: 3.47 10*6/MM3 (ref 4.14–5.8)
RH BLD: POSITIVE
SODIUM SERPL-SCNC: 136 MMOL/L (ref 136–145)
T&S EXPIRATION DATE: NORMAL
WBC NRBC COR # BLD: 8.26 10*3/MM3 (ref 3.4–10.8)

## 2023-07-31 PROCEDURE — 88341 IMHCHEM/IMCYTCHM EA ADD ANTB: CPT | Performed by: STUDENT IN AN ORGANIZED HEALTH CARE EDUCATION/TRAINING PROGRAM

## 2023-07-31 PROCEDURE — 32652 THORACOSCOPY REM TOTL CORTEX: CPT | Performed by: STUDENT IN AN ORGANIZED HEALTH CARE EDUCATION/TRAINING PROGRAM

## 2023-07-31 PROCEDURE — 25010000002 ONDANSETRON PER 1 MG: Performed by: REGISTERED NURSE

## 2023-07-31 PROCEDURE — 25010000002 CLINDAMYCIN 900 MG/50ML SOLUTION: Performed by: STUDENT IN AN ORGANIZED HEALTH CARE EDUCATION/TRAINING PROGRAM

## 2023-07-31 PROCEDURE — 25010000002 DEXAMETHASONE SODIUM PHOSPHATE 20 MG/5ML SOLUTION: Performed by: REGISTERED NURSE

## 2023-07-31 PROCEDURE — 8E0W4CZ ROBOTIC ASSISTED PROCEDURE OF TRUNK REGION, PERCUTANEOUS ENDOSCOPIC APPROACH: ICD-10-PCS | Performed by: STUDENT IN AN ORGANIZED HEALTH CARE EDUCATION/TRAINING PROGRAM

## 2023-07-31 PROCEDURE — 25010000002 HEPARIN (PORCINE) PER 1000 UNITS: Performed by: STUDENT IN AN ORGANIZED HEALTH CARE EDUCATION/TRAINING PROGRAM

## 2023-07-31 PROCEDURE — 86901 BLOOD TYPING SEROLOGIC RH(D): CPT | Performed by: ANESTHESIOLOGY

## 2023-07-31 PROCEDURE — 25010000002 HYDROMORPHONE PER 4 MG: Performed by: STUDENT IN AN ORGANIZED HEALTH CARE EDUCATION/TRAINING PROGRAM

## 2023-07-31 PROCEDURE — 25010000002 SUGAMMADEX 200 MG/2ML SOLUTION: Performed by: REGISTERED NURSE

## 2023-07-31 PROCEDURE — 86850 RBC ANTIBODY SCREEN: CPT | Performed by: ANESTHESIOLOGY

## 2023-07-31 PROCEDURE — 25010000002 FENTANYL CITRATE (PF) 50 MCG/ML SOLUTION: Performed by: ANESTHESIOLOGY

## 2023-07-31 PROCEDURE — 3E0T3BZ INTRODUCTION OF ANESTHETIC AGENT INTO PERIPHERAL NERVES AND PLEXI, PERCUTANEOUS APPROACH: ICD-10-PCS | Performed by: STUDENT IN AN ORGANIZED HEALTH CARE EDUCATION/TRAINING PROGRAM

## 2023-07-31 PROCEDURE — 32652 THORACOSCOPY REM TOTL CORTEX: CPT | Performed by: SPECIALIST/TECHNOLOGIST, OTHER

## 2023-07-31 PROCEDURE — 88305 TISSUE EXAM BY PATHOLOGIST: CPT | Performed by: STUDENT IN AN ORGANIZED HEALTH CARE EDUCATION/TRAINING PROGRAM

## 2023-07-31 PROCEDURE — 86900 BLOOD TYPING SEROLOGIC ABO: CPT | Performed by: ANESTHESIOLOGY

## 2023-07-31 PROCEDURE — 85027 COMPLETE CBC AUTOMATED: CPT | Performed by: STUDENT IN AN ORGANIZED HEALTH CARE EDUCATION/TRAINING PROGRAM

## 2023-07-31 PROCEDURE — 80048 BASIC METABOLIC PNL TOTAL CA: CPT | Performed by: STUDENT IN AN ORGANIZED HEALTH CARE EDUCATION/TRAINING PROGRAM

## 2023-07-31 PROCEDURE — 88342 IMHCHEM/IMCYTCHM 1ST ANTB: CPT | Performed by: STUDENT IN AN ORGANIZED HEALTH CARE EDUCATION/TRAINING PROGRAM

## 2023-07-31 PROCEDURE — 25010000002 AZTREONAM PER 500 MG: Performed by: STUDENT IN AN ORGANIZED HEALTH CARE EDUCATION/TRAINING PROGRAM

## 2023-07-31 PROCEDURE — 0BJ08ZZ INSPECTION OF TRACHEOBRONCHIAL TREE, VIA NATURAL OR ARTIFICIAL OPENING ENDOSCOPIC: ICD-10-PCS | Performed by: STUDENT IN AN ORGANIZED HEALTH CARE EDUCATION/TRAINING PROGRAM

## 2023-07-31 PROCEDURE — 71045 X-RAY EXAM CHEST 1 VIEW: CPT

## 2023-07-31 PROCEDURE — 25010000002 PHENYLEPHRINE 10 MG/ML SOLUTION 5 ML VIAL: Performed by: REGISTERED NURSE

## 2023-07-31 PROCEDURE — 88331 PATH CONSLTJ SURG 1 BLK 1SPC: CPT | Performed by: STUDENT IN AN ORGANIZED HEALTH CARE EDUCATION/TRAINING PROGRAM

## 2023-07-31 PROCEDURE — 02NN4ZZ RELEASE PERICARDIUM, PERCUTANEOUS ENDOSCOPIC APPROACH: ICD-10-PCS | Performed by: STUDENT IN AN ORGANIZED HEALTH CARE EDUCATION/TRAINING PROGRAM

## 2023-07-31 PROCEDURE — 25010000002 BUPIVACAINE (PF) 0.25 % SOLUTION: Performed by: ANESTHESIOLOGY

## 2023-07-31 PROCEDURE — 07B74ZX EXCISION OF THORAX LYMPHATIC, PERCUTANEOUS ENDOSCOPIC APPROACH, DIAGNOSTIC: ICD-10-PCS | Performed by: STUDENT IN AN ORGANIZED HEALTH CARE EDUCATION/TRAINING PROGRAM

## 2023-07-31 PROCEDURE — C9290 INJ, BUPIVACAINE LIPOSOME: HCPCS | Performed by: ANESTHESIOLOGY

## 2023-07-31 PROCEDURE — 0 BUPIVACAINE LIPOSOME 1.3 % SUSPENSION: Performed by: ANESTHESIOLOGY

## 2023-07-31 PROCEDURE — 0BBG4ZZ EXCISION OF LEFT UPPER LUNG LOBE, PERCUTANEOUS ENDOSCOPIC APPROACH: ICD-10-PCS | Performed by: STUDENT IN AN ORGANIZED HEALTH CARE EDUCATION/TRAINING PROGRAM

## 2023-07-31 PROCEDURE — C9290 INJ, BUPIVACAINE LIPOSOME: HCPCS | Performed by: STUDENT IN AN ORGANIZED HEALTH CARE EDUCATION/TRAINING PROGRAM

## 2023-07-31 PROCEDURE — 0 BUPIVACAINE LIPOSOME 1.3 % SUSPENSION: Performed by: STUDENT IN AN ORGANIZED HEALTH CARE EDUCATION/TRAINING PROGRAM

## 2023-07-31 PROCEDURE — 25010000002 MAGNESIUM SULFATE PER 500 MG OF MAGNESIUM: Performed by: REGISTERED NURSE

## 2023-07-31 PROCEDURE — 0BNJ4ZZ RELEASE LEFT LOWER LUNG LOBE, PERCUTANEOUS ENDOSCOPIC APPROACH: ICD-10-PCS | Performed by: STUDENT IN AN ORGANIZED HEALTH CARE EDUCATION/TRAINING PROGRAM

## 2023-07-31 PROCEDURE — 25010000002 PROPOFOL 10 MG/ML EMULSION: Performed by: REGISTERED NURSE

## 2023-07-31 RX ORDER — GLYCOPYRROLATE 0.2 MG/ML
INJECTION INTRAMUSCULAR; INTRAVENOUS AS NEEDED
Status: DISCONTINUED | OUTPATIENT
Start: 2023-07-31 | End: 2023-07-31 | Stop reason: SURG

## 2023-07-31 RX ORDER — CLINDAMYCIN PHOSPHATE 900 MG/50ML
900 INJECTION, SOLUTION INTRAVENOUS
Status: DISCONTINUED | OUTPATIENT
Start: 2023-07-31 | End: 2023-07-31

## 2023-07-31 RX ORDER — CLINDAMYCIN PHOSPHATE 900 MG/50ML
900 INJECTION INTRAVENOUS ONCE
Status: COMPLETED | OUTPATIENT
Start: 2023-07-31 | End: 2023-07-31

## 2023-07-31 RX ORDER — SODIUM CHLORIDE 0.9 % (FLUSH) 0.9 %
3 SYRINGE (ML) INJECTION EVERY 12 HOURS SCHEDULED
Status: DISCONTINUED | OUTPATIENT
Start: 2023-07-31 | End: 2023-07-31 | Stop reason: HOSPADM

## 2023-07-31 RX ORDER — ACETAMINOPHEN 500 MG
1000 TABLET ORAL ONCE
Status: DISCONTINUED | OUTPATIENT
Start: 2023-07-31 | End: 2023-07-31 | Stop reason: HOSPADM

## 2023-07-31 RX ORDER — GABAPENTIN 300 MG/1
300 CAPSULE ORAL ONCE
Status: COMPLETED | OUTPATIENT
Start: 2023-07-31 | End: 2023-07-31

## 2023-07-31 RX ORDER — DROPERIDOL 2.5 MG/ML
0.62 INJECTION, SOLUTION INTRAMUSCULAR; INTRAVENOUS
Status: DISCONTINUED | OUTPATIENT
Start: 2023-07-31 | End: 2023-07-31 | Stop reason: HOSPADM

## 2023-07-31 RX ORDER — HYDROMORPHONE HYDROCHLORIDE 1 MG/ML
0.25 INJECTION, SOLUTION INTRAMUSCULAR; INTRAVENOUS; SUBCUTANEOUS
Status: DISCONTINUED | OUTPATIENT
Start: 2023-07-31 | End: 2023-07-31 | Stop reason: HOSPADM

## 2023-07-31 RX ORDER — SODIUM CHLORIDE 0.9 % (FLUSH) 0.9 %
3-10 SYRINGE (ML) INJECTION AS NEEDED
Status: DISCONTINUED | OUTPATIENT
Start: 2023-07-31 | End: 2023-07-31 | Stop reason: HOSPADM

## 2023-07-31 RX ORDER — ONDANSETRON 2 MG/ML
INJECTION INTRAMUSCULAR; INTRAVENOUS AS NEEDED
Status: DISCONTINUED | OUTPATIENT
Start: 2023-07-31 | End: 2023-07-31 | Stop reason: SURG

## 2023-07-31 RX ORDER — HYDRALAZINE HYDROCHLORIDE 50 MG/1
50 TABLET, FILM COATED ORAL 2 TIMES DAILY
Status: DISCONTINUED | OUTPATIENT
Start: 2023-07-31 | End: 2023-08-15 | Stop reason: HOSPADM

## 2023-07-31 RX ORDER — HYDROCODONE BITARTRATE AND ACETAMINOPHEN 7.5; 325 MG/1; MG/1
1 TABLET ORAL EVERY 4 HOURS PRN
Status: DISCONTINUED | OUTPATIENT
Start: 2023-07-31 | End: 2023-07-31 | Stop reason: HOSPADM

## 2023-07-31 RX ORDER — CELECOXIB 100 MG/1
100 CAPSULE ORAL EVERY 12 HOURS SCHEDULED
Status: DISCONTINUED | OUTPATIENT
Start: 2023-07-31 | End: 2023-08-01

## 2023-07-31 RX ORDER — PROMETHAZINE HYDROCHLORIDE 25 MG/1
25 TABLET ORAL ONCE AS NEEDED
Status: DISCONTINUED | OUTPATIENT
Start: 2023-07-31 | End: 2023-07-31 | Stop reason: HOSPADM

## 2023-07-31 RX ORDER — ROSUVASTATIN CALCIUM 20 MG/1
20 TABLET, COATED ORAL DAILY
Status: DISCONTINUED | OUTPATIENT
Start: 2023-07-31 | End: 2023-08-15 | Stop reason: HOSPADM

## 2023-07-31 RX ORDER — BUPIVACAINE HYDROCHLORIDE 2.5 MG/ML
INJECTION, SOLUTION EPIDURAL; INFILTRATION; INTRACAUDAL
Status: COMPLETED
Start: 2023-07-31 | End: 2023-07-31

## 2023-07-31 RX ORDER — PROMETHAZINE HYDROCHLORIDE 25 MG/1
25 SUPPOSITORY RECTAL ONCE AS NEEDED
Status: DISCONTINUED | OUTPATIENT
Start: 2023-07-31 | End: 2023-07-31 | Stop reason: HOSPADM

## 2023-07-31 RX ORDER — LABETALOL HYDROCHLORIDE 5 MG/ML
5 INJECTION, SOLUTION INTRAVENOUS
Status: DISCONTINUED | OUTPATIENT
Start: 2023-07-31 | End: 2023-07-31 | Stop reason: HOSPADM

## 2023-07-31 RX ORDER — IPRATROPIUM BROMIDE AND ALBUTEROL SULFATE 2.5; .5 MG/3ML; MG/3ML
3 SOLUTION RESPIRATORY (INHALATION) ONCE AS NEEDED
Status: DISCONTINUED | OUTPATIENT
Start: 2023-07-31 | End: 2023-07-31 | Stop reason: HOSPADM

## 2023-07-31 RX ORDER — CELECOXIB 100 MG/1
100 CAPSULE ORAL ONCE
Status: DISCONTINUED | OUTPATIENT
Start: 2023-07-31 | End: 2023-07-31

## 2023-07-31 RX ORDER — CARVEDILOL 25 MG/1
25 TABLET ORAL 2 TIMES DAILY
Status: DISCONTINUED | OUTPATIENT
Start: 2023-07-31 | End: 2023-08-15 | Stop reason: HOSPADM

## 2023-07-31 RX ORDER — ONDANSETRON 2 MG/ML
4 INJECTION INTRAMUSCULAR; INTRAVENOUS EVERY 6 HOURS PRN
Status: DISCONTINUED | OUTPATIENT
Start: 2023-07-31 | End: 2023-08-15 | Stop reason: HOSPADM

## 2023-07-31 RX ORDER — PROPOFOL 10 MG/ML
VIAL (ML) INTRAVENOUS AS NEEDED
Status: DISCONTINUED | OUTPATIENT
Start: 2023-07-31 | End: 2023-07-31 | Stop reason: SURG

## 2023-07-31 RX ORDER — HEPARIN SODIUM 5000 [USP'U]/ML
5000 INJECTION, SOLUTION INTRAVENOUS; SUBCUTANEOUS
Status: DISCONTINUED | OUTPATIENT
Start: 2023-08-01 | End: 2023-07-31

## 2023-07-31 RX ORDER — EPHEDRINE SULFATE 50 MG/ML
5 INJECTION, SOLUTION INTRAVENOUS ONCE AS NEEDED
Status: DISCONTINUED | OUTPATIENT
Start: 2023-07-31 | End: 2023-07-31 | Stop reason: HOSPADM

## 2023-07-31 RX ORDER — MAGNESIUM SULFATE HEPTAHYDRATE 500 MG/ML
INJECTION, SOLUTION INTRAMUSCULAR; INTRAVENOUS AS NEEDED
Status: DISCONTINUED | OUTPATIENT
Start: 2023-07-31 | End: 2023-07-31 | Stop reason: SURG

## 2023-07-31 RX ORDER — SODIUM CHLORIDE 9 MG/ML
40 INJECTION, SOLUTION INTRAVENOUS AS NEEDED
Status: DISCONTINUED | OUTPATIENT
Start: 2023-07-31 | End: 2023-07-31 | Stop reason: HOSPADM

## 2023-07-31 RX ORDER — EPHEDRINE SULFATE 50 MG/ML
INJECTION, SOLUTION INTRAVENOUS AS NEEDED
Status: DISCONTINUED | OUTPATIENT
Start: 2023-07-31 | End: 2023-07-31 | Stop reason: SURG

## 2023-07-31 RX ORDER — ONDANSETRON 4 MG/1
4 TABLET, FILM COATED ORAL EVERY 6 HOURS PRN
Status: DISCONTINUED | OUTPATIENT
Start: 2023-07-31 | End: 2023-08-15 | Stop reason: HOSPADM

## 2023-07-31 RX ORDER — FENTANYL CITRATE 50 UG/ML
50 INJECTION, SOLUTION INTRAMUSCULAR; INTRAVENOUS
Status: DISCONTINUED | OUTPATIENT
Start: 2023-07-31 | End: 2023-07-31 | Stop reason: HOSPADM

## 2023-07-31 RX ORDER — BUPIVACAINE HYDROCHLORIDE 2.5 MG/ML
INJECTION, SOLUTION EPIDURAL; INFILTRATION; INTRACAUDAL
Status: COMPLETED | OUTPATIENT
Start: 2023-07-31 | End: 2023-07-31

## 2023-07-31 RX ORDER — NITROGLYCERIN 0.4 MG/1
0.4 TABLET SUBLINGUAL
Status: DISCONTINUED | OUTPATIENT
Start: 2023-07-31 | End: 2023-07-31

## 2023-07-31 RX ORDER — LIDOCAINE HYDROCHLORIDE 20 MG/ML
INJECTION, SOLUTION INFILTRATION; PERINEURAL AS NEEDED
Status: DISCONTINUED | OUTPATIENT
Start: 2023-07-31 | End: 2023-07-31 | Stop reason: SURG

## 2023-07-31 RX ORDER — FENTANYL CITRATE 50 UG/ML
25 INJECTION, SOLUTION INTRAMUSCULAR; INTRAVENOUS
Status: DISCONTINUED | OUTPATIENT
Start: 2023-07-31 | End: 2023-07-31 | Stop reason: HOSPADM

## 2023-07-31 RX ORDER — HYDROMORPHONE HYDROCHLORIDE 1 MG/ML
0.5 INJECTION, SOLUTION INTRAMUSCULAR; INTRAVENOUS; SUBCUTANEOUS
Status: DISPENSED | OUTPATIENT
Start: 2023-07-31 | End: 2023-08-13

## 2023-07-31 RX ORDER — HYDROCODONE BITARTRATE AND ACETAMINOPHEN 5; 325 MG/1; MG/1
1 TABLET ORAL ONCE AS NEEDED
Status: DISCONTINUED | OUTPATIENT
Start: 2023-07-31 | End: 2023-07-31 | Stop reason: HOSPADM

## 2023-07-31 RX ORDER — HEPARIN SODIUM 5000 [USP'U]/ML
5000 INJECTION, SOLUTION INTRAVENOUS; SUBCUTANEOUS ONCE
Status: COMPLETED | OUTPATIENT
Start: 2023-07-31 | End: 2023-07-31

## 2023-07-31 RX ORDER — HYDRALAZINE HYDROCHLORIDE 20 MG/ML
5 INJECTION INTRAMUSCULAR; INTRAVENOUS
Status: DISCONTINUED | OUTPATIENT
Start: 2023-07-31 | End: 2023-07-31 | Stop reason: HOSPADM

## 2023-07-31 RX ORDER — ASPIRIN 81 MG/1
81 TABLET ORAL DAILY
Status: DISCONTINUED | OUTPATIENT
Start: 2023-07-31 | End: 2023-08-15 | Stop reason: HOSPADM

## 2023-07-31 RX ORDER — ROCURONIUM BROMIDE 10 MG/ML
INJECTION, SOLUTION INTRAVENOUS AS NEEDED
Status: DISCONTINUED | OUTPATIENT
Start: 2023-07-31 | End: 2023-07-31 | Stop reason: SURG

## 2023-07-31 RX ORDER — ACETAMINOPHEN 500 MG
1000 TABLET ORAL 3 TIMES DAILY
Status: DISCONTINUED | OUTPATIENT
Start: 2023-07-31 | End: 2023-08-15 | Stop reason: HOSPADM

## 2023-07-31 RX ORDER — TORSEMIDE 20 MG/1
40 TABLET ORAL DAILY
Status: DISCONTINUED | OUTPATIENT
Start: 2023-08-01 | End: 2023-08-13

## 2023-07-31 RX ORDER — ONDANSETRON 2 MG/ML
4 INJECTION INTRAMUSCULAR; INTRAVENOUS ONCE AS NEEDED
Status: DISCONTINUED | OUTPATIENT
Start: 2023-07-31 | End: 2023-07-31 | Stop reason: HOSPADM

## 2023-07-31 RX ORDER — DIPHENHYDRAMINE HYDROCHLORIDE 50 MG/ML
12.5 INJECTION INTRAMUSCULAR; INTRAVENOUS
Status: DISCONTINUED | OUTPATIENT
Start: 2023-07-31 | End: 2023-07-31 | Stop reason: HOSPADM

## 2023-07-31 RX ORDER — DEXTROSE MONOHYDRATE, SODIUM CHLORIDE, AND POTASSIUM CHLORIDE 50; 1.49; 4.5 G/1000ML; G/1000ML; G/1000ML
75 INJECTION, SOLUTION INTRAVENOUS CONTINUOUS
Status: DISCONTINUED | OUTPATIENT
Start: 2023-07-31 | End: 2023-08-01

## 2023-07-31 RX ORDER — FAMOTIDINE 10 MG/ML
20 INJECTION, SOLUTION INTRAVENOUS ONCE
Status: COMPLETED | OUTPATIENT
Start: 2023-07-31 | End: 2023-07-31

## 2023-07-31 RX ORDER — AMLODIPINE BESYLATE 5 MG/1
5 TABLET ORAL DAILY
Status: DISCONTINUED | OUTPATIENT
Start: 2023-07-31 | End: 2023-08-15 | Stop reason: HOSPADM

## 2023-07-31 RX ORDER — SODIUM CHLORIDE, SODIUM LACTATE, POTASSIUM CHLORIDE, CALCIUM CHLORIDE 600; 310; 30; 20 MG/100ML; MG/100ML; MG/100ML; MG/100ML
9 INJECTION, SOLUTION INTRAVENOUS CONTINUOUS
Status: DISCONTINUED | OUTPATIENT
Start: 2023-07-31 | End: 2023-07-31

## 2023-07-31 RX ORDER — ENOXAPARIN SODIUM 100 MG/ML
30 INJECTION SUBCUTANEOUS DAILY
Status: DISCONTINUED | OUTPATIENT
Start: 2023-08-01 | End: 2023-08-15 | Stop reason: HOSPADM

## 2023-07-31 RX ORDER — ACETAMINOPHEN 500 MG
1000 TABLET ORAL ONCE
Status: COMPLETED | OUTPATIENT
Start: 2023-07-31 | End: 2023-07-31

## 2023-07-31 RX ORDER — SODIUM CHLORIDE 9 MG/ML
INJECTION, SOLUTION INTRAVENOUS AS NEEDED
Status: DISCONTINUED | OUTPATIENT
Start: 2023-07-31 | End: 2023-07-31 | Stop reason: HOSPADM

## 2023-07-31 RX ORDER — KETAMINE HCL IN NACL, ISO-OSM 100MG/10ML
SYRINGE (ML) INJECTION AS NEEDED
Status: DISCONTINUED | OUTPATIENT
Start: 2023-07-31 | End: 2023-07-31 | Stop reason: SURG

## 2023-07-31 RX ORDER — NITROGLYCERIN 0.4 MG/1
0.4 TABLET SUBLINGUAL
Status: DISCONTINUED | OUTPATIENT
Start: 2023-07-31 | End: 2023-08-15 | Stop reason: HOSPADM

## 2023-07-31 RX ORDER — NALOXONE HCL 0.4 MG/ML
0.2 VIAL (ML) INJECTION AS NEEDED
Status: DISCONTINUED | OUTPATIENT
Start: 2023-07-31 | End: 2023-07-31 | Stop reason: HOSPADM

## 2023-07-31 RX ORDER — GABAPENTIN 300 MG/1
300 CAPSULE ORAL EVERY 8 HOURS SCHEDULED
Status: DISCONTINUED | OUTPATIENT
Start: 2023-07-31 | End: 2023-08-01

## 2023-07-31 RX ORDER — DIAZEPAM 2 MG/1
2 TABLET ORAL EVERY 6 HOURS PRN
Status: DISPENSED | OUTPATIENT
Start: 2023-07-31 | End: 2023-08-13

## 2023-07-31 RX ORDER — FLUMAZENIL 0.1 MG/ML
0.2 INJECTION INTRAVENOUS AS NEEDED
Status: DISCONTINUED | OUTPATIENT
Start: 2023-07-31 | End: 2023-07-31 | Stop reason: HOSPADM

## 2023-07-31 RX ORDER — DEXAMETHASONE SODIUM PHOSPHATE 4 MG/ML
INJECTION, SOLUTION INTRA-ARTICULAR; INTRALESIONAL; INTRAMUSCULAR; INTRAVENOUS; SOFT TISSUE AS NEEDED
Status: DISCONTINUED | OUTPATIENT
Start: 2023-07-31 | End: 2023-07-31 | Stop reason: SURG

## 2023-07-31 RX ADMIN — POTASSIUM CHLORIDE, DEXTROSE MONOHYDRATE AND SODIUM CHLORIDE 75 ML/HR: 150; 5; 450 INJECTION, SOLUTION INTRAVENOUS at 20:47

## 2023-07-31 RX ADMIN — BUPIVACAINE 10 ML: 13.3 INJECTION, SUSPENSION, LIPOSOMAL INFILTRATION at 09:50

## 2023-07-31 RX ADMIN — PROPOFOL 50 MCG/KG/MIN: 10 INJECTION, EMULSION INTRAVENOUS at 13:40

## 2023-07-31 RX ADMIN — AMLODIPINE BESYLATE 5 MG: 5 TABLET ORAL at 20:45

## 2023-07-31 RX ADMIN — PROPOFOL 150 MG: 10 INJECTION, EMULSION INTRAVENOUS at 13:24

## 2023-07-31 RX ADMIN — EPHEDRINE SULFATE 10 MG: 50 INJECTION INTRAVENOUS at 14:20

## 2023-07-31 RX ADMIN — MAGNESIUM SULFATE HEPTAHYDRATE 2 G: 500 INJECTION, SOLUTION INTRAMUSCULAR; INTRAVENOUS at 13:45

## 2023-07-31 RX ADMIN — ACETAMINOPHEN 1000 MG: 500 TABLET ORAL at 09:18

## 2023-07-31 RX ADMIN — SUGAMMADEX 200 MG: 100 INJECTION, SOLUTION INTRAVENOUS at 15:58

## 2023-07-31 RX ADMIN — ACETAMINOPHEN 1000 MG: 500 TABLET, FILM COATED ORAL at 20:45

## 2023-07-31 RX ADMIN — Medication 10 MG: at 14:40

## 2023-07-31 RX ADMIN — ASPIRIN 81 MG: 81 TABLET, COATED ORAL at 20:44

## 2023-07-31 RX ADMIN — HEPARIN SODIUM 5000 UNITS: 5000 INJECTION INTRAVENOUS; SUBCUTANEOUS at 09:57

## 2023-07-31 RX ADMIN — SODIUM CHLORIDE, POTASSIUM CHLORIDE, SODIUM LACTATE AND CALCIUM CHLORIDE 9 ML/HR: 600; 310; 30; 20 INJECTION, SOLUTION INTRAVENOUS at 09:01

## 2023-07-31 RX ADMIN — BUPIVACAINE HYDROCHLORIDE 10 ML: 2.5 INJECTION, SOLUTION EPIDURAL; INFILTRATION; INTRACAUDAL; PERINEURAL at 09:50

## 2023-07-31 RX ADMIN — GLYCOPYRROLATE 0.2 MG: 0.2 INJECTION INTRAMUSCULAR; INTRAVENOUS at 15:10

## 2023-07-31 RX ADMIN — HYDROMORPHONE HYDROCHLORIDE 0.5 MG: 1 INJECTION, SOLUTION INTRAMUSCULAR; INTRAVENOUS; SUBCUTANEOUS at 20:51

## 2023-07-31 RX ADMIN — FENTANYL CITRATE 25 MCG: 50 INJECTION, SOLUTION INTRAMUSCULAR; INTRAVENOUS at 09:33

## 2023-07-31 RX ADMIN — CLINDAMYCIN PHOSPHATE 50 MG: 900 INJECTION, SOLUTION INTRAVENOUS at 13:35

## 2023-07-31 RX ADMIN — FAMOTIDINE 20 MG: 10 INJECTION INTRAVENOUS at 09:40

## 2023-07-31 RX ADMIN — ROCURONIUM BROMIDE 40 MG: 10 INJECTION, SOLUTION INTRAVENOUS at 13:24

## 2023-07-31 RX ADMIN — CARVEDILOL 25 MG: 25 TABLET, FILM COATED ORAL at 20:45

## 2023-07-31 RX ADMIN — LIDOCAINE HYDROCHLORIDE 100 MG: 20 INJECTION, SOLUTION INFILTRATION; PERINEURAL at 13:24

## 2023-07-31 RX ADMIN — Medication 20 MG: at 13:40

## 2023-07-31 RX ADMIN — AZTREONAM 2 G: 2 INJECTION, POWDER, LYOPHILIZED, FOR SOLUTION INTRAMUSCULAR; INTRAVENOUS at 13:01

## 2023-07-31 RX ADMIN — HYDRALAZINE HYDROCHLORIDE 50 MG: 50 TABLET, FILM COATED ORAL at 20:45

## 2023-07-31 RX ADMIN — ROCURONIUM BROMIDE 20 MG: 10 INJECTION, SOLUTION INTRAVENOUS at 15:11

## 2023-07-31 RX ADMIN — DEXAMETHASONE SODIUM PHOSPHATE 8 MG: 4 INJECTION, SOLUTION INTRAMUSCULAR; INTRAVENOUS at 13:45

## 2023-07-31 RX ADMIN — PHENYLEPHRINE HYDROCHLORIDE 0.5 MCG/KG/MIN: 10 INJECTION, SOLUTION INTRAVENOUS at 14:07

## 2023-07-31 RX ADMIN — FENTANYL CITRATE 25 MCG: 50 INJECTION, SOLUTION INTRAMUSCULAR; INTRAVENOUS at 09:37

## 2023-07-31 RX ADMIN — CLINDAMYCIN PHOSPHATE 900 MG: 900 INJECTION, SOLUTION INTRAVENOUS at 13:00

## 2023-07-31 RX ADMIN — ROSUVASTATIN CALCIUM 20 MG: 20 TABLET, FILM COATED ORAL at 20:45

## 2023-07-31 RX ADMIN — ONDANSETRON 4 MG: 2 INJECTION INTRAMUSCULAR; INTRAVENOUS at 13:45

## 2023-07-31 RX ADMIN — GABAPENTIN 300 MG: 300 CAPSULE ORAL at 09:18

## 2023-07-31 NOTE — OP NOTE
OPERATIVE NOTE     Date of procedure: 7/31/2023     Patient name: Bayron Acevedo  MRN: [id number]    Pre OP diagnosis:  Non-small cell lung cancer of the left upper lobe.    Patient Active Problem List   Diagnosis    COPD (chronic obstructive pulmonary disease) with emphysema    Hypertension    Hyperlipidemia    S/P angioplasty with stent    Heart attack    Colon polyp    Carotid stenosis    CAD in native artery    Peripheral artery disease    History of hydrocele    Perennial allergic rhinitis    AAA (abdominal aortic aneurysm) without rupture    Acute on chronic systolic CHF (congestive heart failure)    Stage 3b chronic kidney disease    Hydropneumothorax    Mass of upper lobe of left lung    NICM (nonischemic cardiomyopathy)    Chronic systolic CHF (congestive heart failure)    Normocytic anemia    Adenocarcinoma, lung, left       Post OP diagnosis:  Non-small cell lung cancer of the left upper lobe.  Gross mediastinal invasion into the anterior chest wall, intercostal vessels, and left phrenic nerve.    Procedure performed:   Flexible Bronchoscopy.   Robot assisted Thoracoscopic surgery  Extensive lysis of adhesions  Partial pulmonary decortication   Intercostal Nerve Block.     Indications:   Mr. Acevedo is a pleasant 82-year-old gentleman who has biopsy-proven non-small cell lung cancer left upper lobe.  He presents today for an elective primary anatomic lung resection.    After discussing the risks and benefits of the procedure, the patient provided informed consent for a Flexible Bronchoscopy, Robotic assisted left upper lobectomy, and mediastinal lymph node dissection.    Findings:  There was gross tumor invasion into the mediastinal  after an extensive amount of dissection.  The tumor itself involved the phrenic nerve, and the intercostal vessels of the anterior chest wall.    Surgeon: Nemesio Kramer MD, PhD    Assistants:  Assistant: Miki Otto CSA was responsible for performing the following  activities: Retraction, Suction, Irrigation, Suturing, Closing, Placing Dressing, and Held/Positioned Camera and their skilled assistance was necessary for the success of this case.    Anesthesia: General endotracheal - Double lumen tube administered by Anesthesiologist: Blanco Selby MD; Myles Lange MD  CRNA: Nell Delacruz CRNA      Procedure Details   On 7/31/2023, the patient was brought to the operating room and placed supine on the operating table.  Following an uneventful induction of general anesthesia, she was intubated with a double-lumen endotracheal tube without incident.  Appropriate placement was confirmed with the pediatric bronchoscope.  A radial arterial line and additional peripheral IVs were placed by the Anesthesia team. Almodovar catheter was inserted.  Pneumatic compression devices placed to the lower extremities.  Patient was repositioned in the right lateral decubitus position.  The table was jackknifed. All bony prominences were well padded.  The placement of endotracheal tube was confirmed again after positioning with the pediatric bronchoscope. The patient received Ancef for intravenous antibiotic prophylaxis and 5000 units subcutaneous heparin for DVT prophylaxis.  The left chest was prepped and draped in usual sterile fashion.  Prior to beginning the operation, a time-out was conducted with all members of the surgical team present.      I first made an incision in auscultatory triangle.  We placed a 8 mm trocar, upon entry with the 10 mm 30 degree scope there was a large amount of adhesions.  We were unable to safely get an through this incision.  I then made an incision in the seventh intercostal space most anterior along the mid axillary line.  I was able to cut down directly and entered the pleural space.  I then began dissecting the lung off the chest wall itself.  He had a large pleural rind encasing the left lower lobe and a very thick pleura.  I took multiple biopsies  of this pleura as it looked grossly abnormal and sent off for frozen section pathology.  After 30 minutes of lysis of adhesion I was able to place 2 more working robotic ports both in the eighth interspace equally distance between my most anterior and posterior port.  And a working assistant port to spaces down.  He had a large amount of adhesions.  I then spent the next hour to an hour and a half grossly dissecting his adhesions to clear space within the chest itself.  I then was able to identify the primary mass along the anterior chest wall.  I proceeded to dissect the lower lobe off of the heart and mediastinal fat.  I then noticed the tumor itself was grossly invading the anterior chest wall.  With blunt and sharp dissection I was able to dissect this free from most attachment points.  I then noted that the phrenic nerve itself was being invaded by this mass.  In addition the mass was grossly invading the intercostal vessels on the anterior aspect of this chest wall.  And it had slight invasion into the bony prominence of his anterior chest wall.  At this point, I did asked my partner Dr. Meier to come to the OR to see if this would be amenable to possible resection.  After a long discussion with her and continue to evaluate this lesion itself.  We both did not feel like it was appropriate to proceed with an R1 resection.  I then cleared the rest of the adhesions.  And performed a partial lower lobe pulmonary decortication to allow the lower lobe to fully expand.  In addition, I was able to dissect free 1 station 5 lymph node, but I was unable to safely dissect the entire station 5 and 6 lymph node areas due to scar tissue and adhesion.  At this point, the decision was made to abort due to gross mediastinal invasion of the primary malignancy.  I made sure all hemostasis was achieved and verified.  We then removed all trocar ports under direct vision and placed a 24 [place]ian channel drain in the anterior  mediastinum.  This was secured to the skin with 0 Ethibond suture.  And all robotic working ports were closed in multiple layers first with a 2-0 Vicryl and finally with a 4-0 Monocryl.  Skin glue for the incisions.  All labs were appropriately counted.  He was then woken from general anesthesia and transferred to the PACU in stable condition.    Estimated Blood Loss:   50           Drains: 24 Fr chest tube on - 20 suction                 Specimens:   ID Type Source Tests Collected by Time   A : PLEURA FOR FROZEN  RM [id number]  HX OF LUNG CANCER Tissue Chest, Left TISSUE PATHOLOGY EXAM Nemesio Kramer MD PhD 7/31/2023 1421   B : PLEURA #2 FOR  FROZEN  RM NO.[id number]  HX LUNG CA Tissue Chest, Left TISSUE PATHOLOGY EXAM Nemesio Kramer MD PhD 7/31/2023 1427   C : STATION 5 LYMPH NODE FOR FROZEN  RM [id number] Tissue Lymph Node TISSUE PATHOLOGY EXAM Nemesio Kramer MD PhD 7/31/2023 1455   D : 10 R Tissue Lymph Node TISSUE PATHOLOGY EXAM Nemesio Kramer MD PhD 7/31/2023 1522              Implants:   Nothing was implanted during the procedure           Complications: None           Disposition: PACU - hemodynamically stable.           Condition: Stable  Nemesio Kramer MD, PhD

## 2023-08-01 ENCOUNTER — APPOINTMENT (OUTPATIENT)
Dept: GENERAL RADIOLOGY | Facility: HOSPITAL | Age: 83
DRG: 164 | End: 2023-08-01
Payer: MEDICARE

## 2023-08-01 PROCEDURE — 99024 POSTOP FOLLOW-UP VISIT: CPT | Performed by: NURSE PRACTITIONER

## 2023-08-01 PROCEDURE — 71045 X-RAY EXAM CHEST 1 VIEW: CPT

## 2023-08-01 PROCEDURE — 25010000002 ENOXAPARIN PER 10 MG: Performed by: STUDENT IN AN ORGANIZED HEALTH CARE EDUCATION/TRAINING PROGRAM

## 2023-08-01 RX ORDER — GABAPENTIN 100 MG/1
100 CAPSULE ORAL EVERY 12 HOURS SCHEDULED
Status: DISCONTINUED | OUTPATIENT
Start: 2023-08-01 | End: 2023-08-15 | Stop reason: HOSPADM

## 2023-08-01 RX ORDER — GABAPENTIN 100 MG/1
100 CAPSULE ORAL EVERY 12 HOURS SCHEDULED
Status: DISCONTINUED | OUTPATIENT
Start: 2023-08-01 | End: 2023-08-01

## 2023-08-01 RX ADMIN — GABAPENTIN 100 MG: 100 CAPSULE ORAL at 20:23

## 2023-08-01 RX ADMIN — CARVEDILOL 25 MG: 25 TABLET, FILM COATED ORAL at 20:23

## 2023-08-01 RX ADMIN — ASPIRIN 81 MG: 81 TABLET, COATED ORAL at 09:13

## 2023-08-01 RX ADMIN — AMLODIPINE BESYLATE 5 MG: 5 TABLET ORAL at 09:14

## 2023-08-01 RX ADMIN — TORSEMIDE 40 MG: 20 TABLET ORAL at 09:13

## 2023-08-01 RX ADMIN — HYDRALAZINE HYDROCHLORIDE 50 MG: 50 TABLET, FILM COATED ORAL at 20:23

## 2023-08-01 RX ADMIN — ROSUVASTATIN CALCIUM 20 MG: 20 TABLET, FILM COATED ORAL at 20:23

## 2023-08-01 RX ADMIN — ENOXAPARIN SODIUM 30 MG: 100 INJECTION SUBCUTANEOUS at 09:14

## 2023-08-01 RX ADMIN — ACETAMINOPHEN 1000 MG: 500 TABLET, FILM COATED ORAL at 20:22

## 2023-08-01 RX ADMIN — DIAZEPAM 2 MG: 2 TABLET ORAL at 05:40

## 2023-08-01 RX ADMIN — ACETAMINOPHEN 1000 MG: 500 TABLET, FILM COATED ORAL at 17:00

## 2023-08-01 RX ADMIN — DIAZEPAM 2 MG: 2 TABLET ORAL at 22:30

## 2023-08-01 RX ADMIN — CARVEDILOL 25 MG: 25 TABLET, FILM COATED ORAL at 09:13

## 2023-08-01 RX ADMIN — ACETAMINOPHEN 1000 MG: 500 TABLET, FILM COATED ORAL at 09:14

## 2023-08-01 RX ADMIN — GABAPENTIN 100 MG: 100 CAPSULE ORAL at 11:35

## 2023-08-01 RX ADMIN — HYDRALAZINE HYDROCHLORIDE 50 MG: 50 TABLET, FILM COATED ORAL at 09:13

## 2023-08-01 NOTE — PLAN OF CARE
Goal Outcome Evaluation:  Plan of Care Reviewed With: patient        Progress: improving     Vital signs stable. Alert and oriented x 4. Spouse at bedside. Denies discomfort or shortness of breath. Normal sinus cardiac rhythm. On room air. Left chest tube patent to - 10 cm suction. Air leak noted. Serosanguinous drainage noted in atrium. Sub q air present in left chest wall. Gauze dressing changed twice for sanguinous drainage around insertion site. Assisted out of bed with one to chair. Sat up for 2 hours. Generalized weakness noted. Ambulated in room. Tolerated regular diet well. Voiding well per urinal. Urine clear yellow. IV fluids discontinued per order. Will continue to monitor.

## 2023-08-01 NOTE — PROGRESS NOTES
"  POST-OPERATIVE NOTE     Chief Complaint: Non-small cell lung cancer of the left upper lobe of the lung, postoperative care  S/P: Bronchoscopy, left video-assisted thoracoscopy with da Ismael robot assisted partial decortication with lysis of adhesions and intercostal nerve block  POD # 1    Subjective:  Symptoms:  Stable.  He reports shortness of breath.    Diet:  No nausea or vomiting.    Activity level: Impaired due to weakness.    Pain:  He complains of pain that is mild.      Objective:  General Appearance:  Comfortable and in no acute distress.    Vital signs: (most recent): Blood pressure 105/61, pulse 57, temperature 97.5 øF (36.4 øC), temperature source Oral, resp. rate 16, height 170.2 cm (67\"), weight 73 kg (161 lb), SpO2 98 %.  Vital signs are normal.  No fever.    HEENT: Normal HEENT exam.    Lungs:  Normal effort and normal respiratory rate.  He is not in respiratory distress.    Heart: Normal rate.  Regular rhythm.    Chest: Chest wall tenderness present.    Abdomen: Abdomen is soft.  There is no abdominal tenderness.     Neurological: Patient is alert and oriented to person, place and time.    Skin:  Warm and dry.              Chest tube:   Site: Left, Clean, Dry, Intact, and Securement device intact  Suction: -10 cm  Air Leak: positive  24 Hour Total: 137mL    Results Review:     I reviewed the patient's new clinical results.  I reviewed the patient's new imaging results and agree with the interpretation.  I reviewed the patient's other test results and agree with the interpretation  Discussed with patient, spouse at bedside, RN and Dr. Kramer.    Assessment & Plan     Mr. Keys is a pleasant 82-year-old gentleman who is POD #1, s/p left VAT with da Ismael robot-assisted partial decortication and lysis of adhesions with intercostal block.  Unfortunately, due to gross tumor invasion into the mediastinum with the tumor itself involving the phrenic nerve and intercostal vessels of the anterior chest wall, " Dr. Kramer elected not to proceed with an R1 resection.  He did clear the rest of the adhesions and performed a partial decortication.  He was able to dissect free 1 station 5 lymph node but was unable to safely dissect the entire station 5 and 6 lymph node areas due to scar tissue and adhesions.  Decision was made to abort the remainder of the procedure and Dr. Kramer is actually contacted both the patient's medical oncologist Dr. Santos, as well as Dr. Painter with radiation oncology and plans will be made for patient to proceed with radiation therapy for treatment of this malignancy.  All this was explained in detail to the patient while his wife was present at the bedside today.  The patient understands and agrees with the current plan of care.  He presently does have an air leak and his chest tube will remain to -10 cm suction today.  We will plan to repeat a chest x-ray in the morning and wean the tube as able so that the patient may discharge home and proceed with planned radiation in the next 2 to 3 weeks.  Continue to encourage good pulmonary hygiene, incentive spirometry 10 times per hour while awake and ambulation around the nursing desk multiple times throughout the day.      DASHA Ahumada  [facility]  08/01/23  15:04 EDT    Patient was seen and assessed while wearing personal protective equipment including facemask, protective eyewear and gloves.  Hand hygiene performed prior to entering the room and upon exiting with doffing of gloves.

## 2023-08-01 NOTE — PLAN OF CARE
Goal Outcome Evaluation:  VSS. Pain controlled with dilaudid. CT was placed to water seal at 0300 per order. + continuous air leak and subq air noted around CT insertion site. CT dressing changed d/t drainage. Pulmonary hygiene encouraged. Chest xray this am. Will continue to monitor.

## 2023-08-02 ENCOUNTER — APPOINTMENT (OUTPATIENT)
Dept: GENERAL RADIOLOGY | Facility: HOSPITAL | Age: 83
DRG: 164 | End: 2023-08-02
Payer: MEDICARE

## 2023-08-02 LAB
ANION GAP SERPL CALCULATED.3IONS-SCNC: 10.5 MMOL/L (ref 5–15)
BUN SERPL-MCNC: 28 MG/DL (ref 8–23)
BUN/CREAT SERPL: 14.3 (ref 7–25)
CALCIUM SPEC-SCNC: 8.6 MG/DL (ref 8.6–10.5)
CHLORIDE SERPL-SCNC: 102 MMOL/L (ref 98–107)
CO2 SERPL-SCNC: 25.5 MMOL/L (ref 22–29)
CREAT SERPL-MCNC: 1.96 MG/DL (ref 0.76–1.27)
EGFRCR SERPLBLD CKD-EPI 2021: 33.1 ML/MIN/1.73
GLUCOSE SERPL-MCNC: 99 MG/DL (ref 65–99)
LAB AP CASE REPORT: NORMAL
LAB AP DIAGNOSIS COMMENT: NORMAL
LAB AP SPECIAL STAINS: NORMAL
Lab: NORMAL
PATH REPORT.FINAL DX SPEC: NORMAL
PATH REPORT.GROSS SPEC: NORMAL
POTASSIUM SERPL-SCNC: 4.4 MMOL/L (ref 3.5–5.2)
SODIUM SERPL-SCNC: 138 MMOL/L (ref 136–145)

## 2023-08-02 PROCEDURE — 71045 X-RAY EXAM CHEST 1 VIEW: CPT

## 2023-08-02 PROCEDURE — 25010000002 ENOXAPARIN PER 10 MG: Performed by: STUDENT IN AN ORGANIZED HEALTH CARE EDUCATION/TRAINING PROGRAM

## 2023-08-02 PROCEDURE — 93005 ELECTROCARDIOGRAM TRACING: CPT | Performed by: STUDENT IN AN ORGANIZED HEALTH CARE EDUCATION/TRAINING PROGRAM

## 2023-08-02 PROCEDURE — 99024 POSTOP FOLLOW-UP VISIT: CPT | Performed by: NURSE PRACTITIONER

## 2023-08-02 PROCEDURE — 93010 ELECTROCARDIOGRAM REPORT: CPT | Performed by: INTERNAL MEDICINE

## 2023-08-02 PROCEDURE — 94799 UNLISTED PULMONARY SVC/PX: CPT

## 2023-08-02 PROCEDURE — 80048 BASIC METABOLIC PNL TOTAL CA: CPT

## 2023-08-02 RX ORDER — GUAIFENESIN 600 MG/1
600 TABLET, EXTENDED RELEASE ORAL EVERY 12 HOURS SCHEDULED
Status: DISCONTINUED | OUTPATIENT
Start: 2023-08-02 | End: 2023-08-15 | Stop reason: HOSPADM

## 2023-08-02 RX ORDER — DOCUSATE SODIUM 100 MG/1
100 CAPSULE, LIQUID FILLED ORAL 2 TIMES DAILY
Status: DISCONTINUED | OUTPATIENT
Start: 2023-08-02 | End: 2023-08-03

## 2023-08-02 RX ORDER — BISACODYL 10 MG
10 SUPPOSITORY, RECTAL RECTAL DAILY PRN
Status: DISCONTINUED | OUTPATIENT
Start: 2023-08-02 | End: 2023-08-15 | Stop reason: HOSPADM

## 2023-08-02 RX ORDER — ALBUTEROL SULFATE 2.5 MG/3ML
2.5 SOLUTION RESPIRATORY (INHALATION) EVERY 6 HOURS PRN
Status: DISCONTINUED | OUTPATIENT
Start: 2023-08-02 | End: 2023-08-02

## 2023-08-02 RX ORDER — IPRATROPIUM BROMIDE AND ALBUTEROL SULFATE 2.5; .5 MG/3ML; MG/3ML
3 SOLUTION RESPIRATORY (INHALATION)
Status: DISCONTINUED | OUTPATIENT
Start: 2023-08-02 | End: 2023-08-15 | Stop reason: HOSPADM

## 2023-08-02 RX ORDER — BISACODYL 5 MG/1
10 TABLET, DELAYED RELEASE ORAL DAILY PRN
Status: DISCONTINUED | OUTPATIENT
Start: 2023-08-02 | End: 2023-08-03

## 2023-08-02 RX ORDER — DOCUSATE SODIUM 100 MG/1
100 CAPSULE, LIQUID FILLED ORAL DAILY
Status: DISCONTINUED | OUTPATIENT
Start: 2023-08-02 | End: 2023-08-02

## 2023-08-02 RX ADMIN — ACETAMINOPHEN 1000 MG: 500 TABLET, FILM COATED ORAL at 21:09

## 2023-08-02 RX ADMIN — GUAIFENESIN 600 MG: 600 TABLET, EXTENDED RELEASE ORAL at 16:04

## 2023-08-02 RX ADMIN — GUAIFENESIN 600 MG: 600 TABLET, EXTENDED RELEASE ORAL at 21:09

## 2023-08-02 RX ADMIN — CARVEDILOL 25 MG: 25 TABLET, FILM COATED ORAL at 21:08

## 2023-08-02 RX ADMIN — GABAPENTIN 100 MG: 100 CAPSULE ORAL at 21:09

## 2023-08-02 RX ADMIN — DOCUSATE SODIUM 100 MG: 100 CAPSULE, LIQUID FILLED ORAL at 21:09

## 2023-08-02 RX ADMIN — ASPIRIN 81 MG: 81 TABLET, COATED ORAL at 09:01

## 2023-08-02 RX ADMIN — CARVEDILOL 25 MG: 25 TABLET, FILM COATED ORAL at 09:00

## 2023-08-02 RX ADMIN — TORSEMIDE 40 MG: 20 TABLET ORAL at 09:00

## 2023-08-02 RX ADMIN — METOPROLOL TARTRATE 5 MG: 1 INJECTION, SOLUTION INTRAVENOUS at 21:41

## 2023-08-02 RX ADMIN — HYDRALAZINE HYDROCHLORIDE 50 MG: 50 TABLET, FILM COATED ORAL at 21:08

## 2023-08-02 RX ADMIN — ACETAMINOPHEN 1000 MG: 500 TABLET, FILM COATED ORAL at 16:04

## 2023-08-02 RX ADMIN — DOCUSATE SODIUM 100 MG: 100 CAPSULE, LIQUID FILLED ORAL at 09:00

## 2023-08-02 RX ADMIN — IPRATROPIUM BROMIDE AND ALBUTEROL SULFATE 3 ML: 2.5; .5 SOLUTION RESPIRATORY (INHALATION) at 15:49

## 2023-08-02 RX ADMIN — HYDRALAZINE HYDROCHLORIDE 50 MG: 50 TABLET, FILM COATED ORAL at 09:00

## 2023-08-02 RX ADMIN — BISACODYL 10 MG: 5 TABLET ORAL at 17:56

## 2023-08-02 RX ADMIN — ROSUVASTATIN CALCIUM 20 MG: 20 TABLET, FILM COATED ORAL at 21:08

## 2023-08-02 RX ADMIN — ACETAMINOPHEN 1000 MG: 500 TABLET, FILM COATED ORAL at 09:00

## 2023-08-02 RX ADMIN — GABAPENTIN 100 MG: 100 CAPSULE ORAL at 08:59

## 2023-08-02 RX ADMIN — AMLODIPINE BESYLATE 5 MG: 5 TABLET ORAL at 09:00

## 2023-08-02 RX ADMIN — ENOXAPARIN SODIUM 30 MG: 100 INJECTION SUBCUTANEOUS at 08:59

## 2023-08-02 NOTE — PLAN OF CARE
Goal Outcome Evaluation:  Plan of Care Reviewed With: (P) patient        Progress: (P) improving    Vital signs stable, Alert and oriented X 4. Pain management with PRN PO medication per MAR. Left chest tube remain to -10 cm suction with positive air leak and Sub q  air noted, voiding per urinal. Regular diet tolerated well. Room air, productive cough. Will continue to monitor.

## 2023-08-02 NOTE — PLAN OF CARE
Problem: Adult Inpatient Plan of Care  Goal: Plan of Care Review  Outcome: Ongoing, Progressing  Flowsheets (Taken 8/2/2023 0450)  Progress: no change  Plan of Care Reviewed With: patient  Outcome Evaluation: pt w left chest tube to -10 sx, with positive airleak noted and sq air noted.  medicated per mar for discomfort.  coughing productive, wife at bedside. will cont t o monitor   Goal Outcome Evaluation:  Plan of Care Reviewed With: patient        Progress: no change  Outcome Evaluation: pt w left chest tube to -10 sx, with positive airleak noted and sq air noted.  medicated per mar for discomfort.  coughing productive, wife at bedside. will cont t o monitor

## 2023-08-02 NOTE — PROGRESS NOTES
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  He reports shortness of breath.    Diet:  No nausea or vomiting.    Activity level: Impaired due to weakness.    Pain:  He complains of pain that is mild.    Up to chair.  Concerned he has not had a bowel movement.  Some postop discomfort      Objective:  General Appearance:  Comfortable and in no acute distress.    Vital signs: (most recent): Blood pressure 171/59, pulse 67, temperature 97.6 øF (36.4 øC), temperature source Oral, resp. rate 16, height 170.2 cm (67\"), weight 73 kg (161 lb), SpO2 98 %.  Vital signs are normal.  No fever.    HEENT: Normal HEENT exam.    Lungs:  Normal effort and normal respiratory rate.  He is not in respiratory distress.    Heart: Normal rate.  Regular rhythm.    Chest: Chest wall tenderness present.    Abdomen: Abdomen is soft.  There is no abdominal tenderness.     Neurological: Patient is alert and oriented to person, place and time.    Skin:  Warm and dry.              Chest tube:   Site: Left, Clean, Dry, Intact, and Securement device intact  Suction: -10 cm  Air Leak: positive  24 Hour Total: 110mL    Results Review:     I reviewed the patient's new clinical results.  I reviewed the patient's new imaging results and agree with the interpretation.  I reviewed the patient's other test results and agree with the interpretation  Discussed with patient, spouse at bedside, RN and Dr. Kramer.    Assessment & Plan     Mr. Keys is a pleasant 82-year-old gentleman who is POD #2, s/p left VAT with da Ismael robot-assisted partial decortication and lysis of adhesions with intercostal block.  Unfortunately, due to gross tumor invasion into the mediastinum with the tumor " itself involving the phrenic nerve and intercostal vessels of the anterior chest wall, Dr. Kramer elected not to proceed with an R1 resection.  He is doing well postoperatively but is concerned he has not had a bowel movement.  He is having some expected postoperative discomfort that is moderately controlled with medication.  Still has an air leak to his chest tube.  We will try placing this to waterseal and recheck a chest x-ray this afternoon as well as in the morning.  Physical therapy has been consulted to assist with his mobilization postoperatively.  Appreciate their assistance.        Saritha Posey DNP, APRN  [facility]  08/02/23  12:55 EDT    Patient was seen and assessed while wearing personal protective equipment including facemask, protective eyewear and gloves.  Hand hygiene performed prior to entering the room and upon exiting with doffing of gloves.

## 2023-08-02 NOTE — CASE MANAGEMENT/SOCIAL WORK
Discharge Planning Assessment  [facility]     Patient Name: Bayron Acevedo  MRN: [id number]  Today's Date: 8/2/2023    Admit Date: 7/31/2023    Plan: Home with family - PT eval pending   Discharge Needs Assessment       Row Name 08/02/23 1313       Living Environment    People in Home spouse    Name(s) of People in Home SpouseEmily    Current Living Arrangements home    Potentially Unsafe Housing Conditions none    Primary Care Provided by self    Provides Primary Care For no one    Family Caregiver if Needed spouse    Family Caregiver Names Spouse, Emily [phone] and DaughterMayra [phone]    Quality of Family Relationships helpful;involved;supportive    Able to Return to Prior Arrangements yes       Resource/Environmental Concerns    Resource/Environmental Concerns none       Transition Planning    Patient/Family Anticipates Transition to home with family    Transportation Anticipated family or friend will provide       Discharge Needs Assessment    Equipment Currently Used at Home none    Concerns to be Addressed discharge planning                   Discharge Plan       Row Name 08/02/23 1312       Plan    Plan Home with family - PT eval pending    Patient/Family in Agreement with Plan yes    Plan Comments CCP spoke with patient and patient's spouse at bedside; CCP role explained, face sheet verified, and discharge plan discussed. Patient resides with spouse in one-level home with three steps to enter through the front with hand rails on both sides. Patient denies use of any DME and reports being independent with ADLs. Confirms pharmacy [facility] in [place]. Denies any HH and SNF history. Patient and spouse deny any known discharge needs at this time and plan to return home. Family to transport. PT eval pending. CCP to follow pending PT eval to determine level of care needs. José PATRICIO LCSW                  Continued Care and Services - Admitted Since 7/31/2023    Coordination has  not been started for this encounter.          Demographic Summary       Row Name 08/02/23 1312       General Information    Admission Type inpatient    Arrived From home    Reason for Consult discharge planning    Preferred Language English                   Functional Status       Row Name 08/02/23 1312       Functional Status    Usual Activity Tolerance good    Current Activity Tolerance good       Functional Status, IADL    Medications independent    Meal Preparation independent    Housekeeping independent    Laundry independent    Shopping independent       Mental Status    General Appearance WDL WDL                   Psychosocial    No documentation.                  Abuse/Neglect    No documentation.                  Legal    No documentation.                  Substance Abuse    No documentation.                  Patient Forms    No documentation.                     MARIA L SchaeferW

## 2023-08-03 ENCOUNTER — APPOINTMENT (OUTPATIENT)
Dept: GENERAL RADIOLOGY | Facility: HOSPITAL | Age: 83
DRG: 164 | End: 2023-08-03
Payer: MEDICARE

## 2023-08-03 LAB — QT INTERVAL: 396 MS

## 2023-08-03 PROCEDURE — 94799 UNLISTED PULMONARY SVC/PX: CPT

## 2023-08-03 PROCEDURE — 94761 N-INVAS EAR/PLS OXIMETRY MLT: CPT

## 2023-08-03 PROCEDURE — 71045 X-RAY EXAM CHEST 1 VIEW: CPT

## 2023-08-03 PROCEDURE — 25010000002 ENOXAPARIN PER 10 MG: Performed by: STUDENT IN AN ORGANIZED HEALTH CARE EDUCATION/TRAINING PROGRAM

## 2023-08-03 PROCEDURE — 99024 POSTOP FOLLOW-UP VISIT: CPT

## 2023-08-03 PROCEDURE — 99222 1ST HOSP IP/OBS MODERATE 55: CPT | Performed by: NURSE PRACTITIONER

## 2023-08-03 PROCEDURE — 94664 DEMO&/EVAL PT USE INHALER: CPT

## 2023-08-03 RX ORDER — BISACODYL 5 MG/1
5 TABLET, DELAYED RELEASE ORAL DAILY PRN
Status: DISCONTINUED | OUTPATIENT
Start: 2023-08-03 | End: 2023-08-15 | Stop reason: HOSPADM

## 2023-08-03 RX ORDER — AMOXICILLIN 250 MG
2 CAPSULE ORAL 2 TIMES DAILY
Status: DISCONTINUED | OUTPATIENT
Start: 2023-08-03 | End: 2023-08-15 | Stop reason: HOSPADM

## 2023-08-03 RX ORDER — BISACODYL 10 MG
10 SUPPOSITORY, RECTAL RECTAL DAILY PRN
Status: DISCONTINUED | OUTPATIENT
Start: 2023-08-03 | End: 2023-08-15 | Stop reason: HOSPADM

## 2023-08-03 RX ORDER — BISACODYL 5 MG/1
10 TABLET, DELAYED RELEASE ORAL DAILY
Status: DISCONTINUED | OUTPATIENT
Start: 2023-08-04 | End: 2023-08-03

## 2023-08-03 RX ORDER — POLYETHYLENE GLYCOL 3350 17 G/17G
17 POWDER, FOR SOLUTION ORAL DAILY
Status: DISCONTINUED | OUTPATIENT
Start: 2023-08-03 | End: 2023-08-15 | Stop reason: HOSPADM

## 2023-08-03 RX ORDER — POLYETHYLENE GLYCOL 3350 17 G/17G
17 POWDER, FOR SOLUTION ORAL DAILY PRN
Status: DISCONTINUED | OUTPATIENT
Start: 2023-08-03 | End: 2023-08-15 | Stop reason: HOSPADM

## 2023-08-03 RX ADMIN — AMLODIPINE BESYLATE 5 MG: 5 TABLET ORAL at 08:37

## 2023-08-03 RX ADMIN — BISACODYL 10 MG: 5 TABLET ORAL at 09:51

## 2023-08-03 RX ADMIN — IPRATROPIUM BROMIDE AND ALBUTEROL SULFATE 3 ML: 2.5; .5 SOLUTION RESPIRATORY (INHALATION) at 15:44

## 2023-08-03 RX ADMIN — TORSEMIDE 40 MG: 20 TABLET ORAL at 08:37

## 2023-08-03 RX ADMIN — CARVEDILOL 25 MG: 25 TABLET, FILM COATED ORAL at 21:22

## 2023-08-03 RX ADMIN — ACETAMINOPHEN 1000 MG: 500 TABLET, FILM COATED ORAL at 21:23

## 2023-08-03 RX ADMIN — GABAPENTIN 100 MG: 100 CAPSULE ORAL at 21:23

## 2023-08-03 RX ADMIN — IPRATROPIUM BROMIDE AND ALBUTEROL SULFATE 3 ML: 2.5; .5 SOLUTION RESPIRATORY (INHALATION) at 07:36

## 2023-08-03 RX ADMIN — GUAIFENESIN 600 MG: 600 TABLET, EXTENDED RELEASE ORAL at 08:37

## 2023-08-03 RX ADMIN — GABAPENTIN 100 MG: 100 CAPSULE ORAL at 08:37

## 2023-08-03 RX ADMIN — DOCUSATE SODIUM 100 MG: 100 CAPSULE, LIQUID FILLED ORAL at 08:38

## 2023-08-03 RX ADMIN — SENNOSIDES AND DOCUSATE SODIUM 2 TABLET: 50; 8.6 TABLET ORAL at 21:22

## 2023-08-03 RX ADMIN — HYDRALAZINE HYDROCHLORIDE 50 MG: 50 TABLET, FILM COATED ORAL at 21:22

## 2023-08-03 RX ADMIN — ASPIRIN 81 MG: 81 TABLET, COATED ORAL at 08:37

## 2023-08-03 RX ADMIN — CARVEDILOL 25 MG: 25 TABLET, FILM COATED ORAL at 08:37

## 2023-08-03 RX ADMIN — POLYETHYLENE GLYCOL 3350 17 G: 17 POWDER, FOR SOLUTION ORAL at 11:55

## 2023-08-03 RX ADMIN — IPRATROPIUM BROMIDE AND ALBUTEROL SULFATE 3 ML: 2.5; .5 SOLUTION RESPIRATORY (INHALATION) at 22:19

## 2023-08-03 RX ADMIN — BISACODYL 10 MG: 10 SUPPOSITORY RECTAL at 08:38

## 2023-08-03 RX ADMIN — ACETAMINOPHEN 1000 MG: 500 TABLET, FILM COATED ORAL at 08:37

## 2023-08-03 RX ADMIN — ROSUVASTATIN CALCIUM 20 MG: 20 TABLET, FILM COATED ORAL at 21:22

## 2023-08-03 RX ADMIN — ENOXAPARIN SODIUM 30 MG: 100 INJECTION SUBCUTANEOUS at 08:38

## 2023-08-03 RX ADMIN — ACETAMINOPHEN 1000 MG: 500 TABLET, FILM COATED ORAL at 16:03

## 2023-08-03 RX ADMIN — HYDRALAZINE HYDROCHLORIDE 50 MG: 50 TABLET, FILM COATED ORAL at 08:37

## 2023-08-03 RX ADMIN — GUAIFENESIN 600 MG: 600 TABLET, EXTENDED RELEASE ORAL at 21:22

## 2023-08-03 NOTE — CONSULTS
Patient Name: Bayron Acevedo  :1940  82 y.o.    Date of Admission: 2023  Date of Consultation:  23  Encounter Provider: DASHA Bullard  Place of Service: [facility] CARDIOLOGY  Referring Provider: Nemesio Kramer MD PhD  Patient Care Team:  Low Sepulveda as PCP - General (Family Medicine)  Guru Simpson MD as Consulting Physician (Cardiology)  Karen Barrera Jr., MD as Consulting Physician (Vascular Surgery)  Bronson Blanc MD as Consulting Physician (Gastroenterology)  Cristian Reyes MD as Consulting Physician (Nephrology)  Hetal Watkins RN as Nurse Navigator  Myles Santos Jr., MD as Consulting Physician (Hematology and Oncology)  Guru Jiang MD as Consulting Physician (Cardiology)      Chief complaint: non small cell lung cancer.     Reason for consultation : atrial fibrillation (new)    History of Present Illness: Mr. Acevedo is an 82 year old man followed by Dr. Jiang. He has a previous history of coronary artery disease and cardiomyopathy. In the past followed by Dr. Simpson at [place]. In  he had a ejection fraction of 25 to 30%. Heart catheterization showed a significant stenosis in the LAD and he had a stent placed there in . He had 50% disease in the circumflex which was treated medically. His ejection fraction recovered to about 50 to 55%. He also has peripheral vascular disease and is status post carotid endarterectomy, abdominal aortic aneurysm repair. He also has hypertension, history of tobacco use, chronic kidney disease and hyperlipidemia.     He was admitted in April with acute decompensated systolic congestive heart failure. EF on a recent outpatient echo was noted to be 30-40%. He underwent right and left heart catheterization that showed moderate obstructive disease (40% proximal left main disease, patent proximal LAD stent and 40% proximal RCA disease. Left main lesion was not  hemodynamically significant by RFR). Medical therapy was recommended. Losartan was added. Has a history of angioedema with ACE-I.     He followed up in the office one week after discharge. He was volume overloaded and received IV lasix. Shortly after that he followed up again. BP was elevated. Metoprolol was changed to carvedilol. Follow up echo on 7/19/23 showed preserved LVEF 56-60%.     Mass noted on CXR while hospitalized. Follow up CT scan showed lung mass concerning for malignancy. 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     He presented on 7/31 for lung resection. Unfortunately he was found to have gross mediastinal invasion in to the anterior chest wall, intercostal vessels and left phrenic nerve and it was decided not to proceed with complete resection. He will start radiation as an outpatient.     He is recovering well. He still has a chest tube. His abd is distended. He has not had a bowel movement since surgery.     Last night around 9 pm he was noted to go in to atrial fibrillation with relatively controlled ventricular response. He is asymptomatic and remains in AF today with controlled rate.     Echo 7/19/2023    Left ventricular systolic function is normal. Left ventricular ejection fraction appears to be 56 - 60%.    The left ventricular cavity is mildly dilated.    The left atrial cavity is mildly dilated.    Estimated right ventricular systolic pressure from tricuspid regurgitation is normal (<35 mmHg). Calculated right ventricular systolic pressure from tricuspid regurgitation is 29 mmHg.     Cardiac catheterization:  Procedure performed:  Diagnostic Left Heart Catheterization  Diagnostic Right Heart Catheterization  Coronary Angiography  RFR assessment left main     Access Sites:  Right radial artery  Right brachial vein     Findings:  1. Coronary Artery Anatomy:  Dominance: Right  Left Main: The proximal segment contains a shelf of calcium with a 40%  segment stenoses  Left Anterior Descending: Moderate caliber size.  There is 20 to 30% proximal segment stenoses followed by patent stent with no significant in-stent restenosis and luminal regularities distally.  Supplies a proximal diagonal branch which does have a 70% ostial stenoses related to pinching from the prior LAD stent  Circumflex Artery: Moderate caliber size.  Contains luminal irregularities throughout supplies inferior marginal branches.  Right Coronary Artery: Moderate caliber size.  Contains a proximal 40% segment stenoses otherwise luminal regularities throughout supplies a PDA and posterolateral branch     2. Hemodynamics:  Right Atrium: 6/4/3 mmHg  Right Ventricle: 32/4 mmHg  Pulmonary Artery: 32/16/20 mmHg  Pulmonary Wedge: 9/8/7 mmHg  Left Ventricle: 150/2/15 mmHg  Aorta: 138/46/77 mmHg  Cardiac Output/Index: 4.45 L/min 2.34 L/min/m2  PA Sat: 71 %  AO Sat: 99 %     3.  RFR assessment:  Location: Left main  Findings: 0.91, not hemodynamically significant     Conclusions:  Moderate coronary artery disease with 40% proximal left main stenoses, patent stent within the proximal LAD, and 40% proximal RCA segment stenoses.  Normal right and left-sided filling pressures with normal pulmonary artery pressures.  Normal cardiac output of 4.4 L/min and index of 2.3 L/min/m2  RFR assessment of the left main confirm no significant hemodynamic stenoses at 0.91 thus PCI was deferred.     Recommendations:   Would optimize patient's medical regimen to address congestive heart failure does appear to be more euvolemic on invasive hemodynamic assessment today    Past Medical History:   Diagnosis Date    AAA (abdominal aortic aneurysm)     CAD in native artery     Carotid stenosis     s/p CEA Left    CKD (chronic kidney disease)     Colon polyp     COPD (chronic obstructive pulmonary disease)     H/O Acute myocardial infarction 2013    H/O PAD (peripheral artery disease)     Heart attack 2013    Heart failure      Hyperlipidemia     Hypertension     Inguinal hernia     RIGHT    Left ventricular dysfunction     Mass of left lung     SHOWS ON MRI    Mitral valve regurgitation     Perennial allergic rhinitis 01/06/2017    PVD (peripheral vascular disease)     S/P angioplasty with stent     Tinnitus     Tricuspid regurgitation        Past Surgical History:   Procedure Laterality Date    APPENDECTOMY      ARTERIOGRAM AORTIC N/A 05/17/2021    Procedure: ZENITH AORTIC STENT GRAFT;  Surgeon: Karen Barrera Jr., MD;  Location: [facility] OR 18/19;  Service: Vascular;  Laterality: N/A;    CARDIAC CATHETERIZATION      X2    CARDIAC CATHETERIZATION N/A 04/11/2023    Procedure: Left Heart Cath;  Surgeon: Guru Jiang MD;  Location: [facility] CATH INVASIVE LOCATION;  Service: Cardiovascular;  Laterality: N/A;    CARDIAC CATHETERIZATION N/A 04/11/2023    Procedure: Right Heart Cath;  Surgeon: Guru Jiang MD;  [facility];  Service: Cardiovascular;  Laterality: N/A;    CARDIAC CATHETERIZATION N/A 04/11/2023    Procedure: Coronary angiography;  Surgeon: Guru Jiang MD;  Location: [facility] CATH INVASIVE LOCATION;  Service: Cardiovascular;  Laterality: N/A;    CARDIAC CATHETERIZATION N/A 04/11/2023    Procedure: Left ventriculography;  Surgeon: Guru Jiang MD;  Location: [facility] CATH INVASIVE LOCATION;  Service: Cardiovascular;  Laterality: N/A;    CARDIAC CATHETERIZATION  04/11/2023    Procedure: RESTING FULL CYCLE RATIO;  Surgeon: Guru Jiang MD;  Location: [facility] CATH INVASIVE LOCATION;  Service: Cardiovascular;;    CAROTID ENDARTERECTOMY Left 01/2013    Bruce Jones Jr, MD    CAROTID STENT Left     LAD    COLONOSCOPY      HERNIA REPAIR      HYDROCELE EXCISION / REPAIR      Dr. SINTIA Osorio    LOBECTOMY Left 7/31/2023    Procedure: BRONCHOSCOPY, LEFT VAT WITH DAVINCI ROBOT, EXTENSIVE LYSIS OF ADHESIONS, PARTIAL PULMONARY DECORTICATION, INTERCOSTAL NERVE BLOCK;  Surgeon: Williams  Nemesio WHITESIDE MD PhD;  Location: [facility] MAIN OR;  Service: Robotics - DaVinci;  Laterality: Left;    MEDIASTINOSCOPY N/A 7/17/2023    Procedure: MEDIASTINOSCOPY;  Surgeon: Nemesio Kramer MD PhD;  Location: [facility] MAIN OR;  Service: Thoracic;  Laterality: N/A;    UMBILICAL HERNIA REPAIR  2003         Prior to Admission medications    Medication Sig Start Date End Date Taking? Authorizing Provider   albuterol (PROVENTIL) (2.5 MG/3ML) 0.083% nebulizer solution USE ONE VIAL BY NEBULIZATION EVERY FOUR HOURS AS NEEDED FOR FOR WHEEZING  Patient taking differently: Take 2.5 mg by nebulization Every 4 (Four) Hours As Needed. 3/22/21  Yes Denise Foley APRN   amLODIPine (NORVASC) 5 MG tablet Take 1 tablet by mouth Daily. 5/18/23  Yes Guru Jiang MD   carvedilol (COREG) 25 MG tablet Take 1 tablet by mouth 2 (Two) Times a Day. 7/21/23  Yes Jessenia Otero APRN   Chlorhexidine Gluconate Cloth 2 % pads Apply  topically Take As Directed.   Yes Jose Dominguez MD   hydrALAZINE (APRESOLINE) 50 MG tablet 1 tablet 2 (Two) Times a Day. 4/7/23  Yes Jose Dominguez MD   rosuvastatin (CRESTOR) 20 MG tablet Take 1 tablet by mouth Daily. 5/18/23  Yes Guru Jiang MD   torsemide (DEMADEX) 20 MG tablet Take 2 tablets by mouth Daily. 5/18/23  Yes Guru Jiang MD   aspirin 81 MG EC tablet Take 1 tablet by mouth Daily. PT TO CHECK WITH MD FOR HOLD DATE    Jose Dominguez MD   nitroglycerin (NITROSTAT) 0.4 MG SL tablet Place 1 tablet under the tongue every 5 (five) minutes as needed for chest pain. Take no more than 3 doses in 15 minutes. 5/5/16   Mario Bennett MD       Allergies   Allergen Reactions    Lisinopril Anaphylaxis    Codeine Sulfate Hives    Contrast Dye (Echo Or Unknown Ct/Mr) Other (See Comments)     HYPERTENSION    Penicillins Other (See Comments)     Passed out after a PCN shot- no other sx noted-per pateint       Social History     Socioeconomic History    Marital status:       Spouse name: Amina   Tobacco Use    Smoking status: Former     Packs/day: 1.00     Years: 55.00     Pack years: 55.00     Types: Cigarettes     Quit date:      Years since quittin.6    Smokeless tobacco: Never   Vaping Use    Vaping Use: Never used   Substance and Sexual Activity    Alcohol use: No    Drug use: No    Sexual activity: Defer       Family History   Problem Relation Age of Onset    No Known Problems Mother         complications of child birth    Cancer Father     Leukemia Father     Cancer Brother     Heart disease Maternal Uncle     Malig Hyperthermia Neg Hx        REVIEW OF SYSTEMS:   All systems reviewed.  Pertinent positives identified in HPI.  All other systems are negative.      Objective:     Vitals:    23 0013 23 0700 23 0736 23 1100   BP: 124/72 150/78  127/64   BP Location: Right arm Right arm  Right arm   Patient Position: Lying Lying  Lying   Pulse: 84  103    Resp: 16 16 18 18   Temp: 97.5 øF (36.4 øC) 98.5 øF (36.9 øC)  97.6 øF (36.4 øC)   TempSrc: Oral Oral  Oral   SpO2: 95%  95%    Weight:       Height:         Body mass index is 25.22 kg/mý.    Physical Exam:  Constitutional: He is oriented to person, place, and time. He appears well-developed. He does not appear ill.   HENT:   Head: Normocephalic and atraumatic. Head is without contusion.   Right Ear: Hearing normal. No drainage.   Left Ear: Hearing normal. No drainage.   Nose: No nasal deformity. No epistaxis.   Eyes: Lids are normal. Right eye exhibits no exudate. Left eye exhibits no exudate.  Neck: No JVD present  Cardiovascular: Normal rate, irregular rhythm and normal heart sounds.    Pulses:       Posterior tibial pulses are 2+ on the right side, and 2+ on the left side.   Pulmonary/Chest: Effort normal and breath sounds normal.   Abdominal: distended  Musculoskeletal: Normal range of motion.        Right shoulder: He exhibits no deformity.        Left shoulder: He exhibits no  deformity.   Neurological: He is alert and oriented to person, place, and time. He has normal strength.   Skin: Skin is warm, dry and intact. No rash noted.   Psychiatric: He has a normal mood and affect. His behavior is normal. Thought content normal.   Vitals reviewed      Lab Review:     Results from last 7 days   Lab Units 08/02/23  0620   SODIUM mmol/L 138   POTASSIUM mmol/L 4.4   CHLORIDE mmol/L 102   CO2 mmol/L 25.5   BUN mg/dL 28*   CREATININE mg/dL 1.96*   CALCIUM mg/dL 8.6   GLUCOSE mg/dL 99         Results from last 7 days   Lab Units 07/31/23  1646   WBC 10*3/mm3 8.26   HEMOGLOBIN g/dL 8.8*   HEMATOCRIT % 27.8*   PLATELETS 10*3/mm3 204         Current Facility-Administered Medications:     acetaminophen (TYLENOL) tablet 1,000 mg, 1,000 mg, Oral, TID, Nemesio Kramer MD PhD, 1,000 mg at 08/03/23 0837    amLODIPine (NORVASC) tablet 5 mg, 5 mg, Oral, Daily, Nemesio Kramer MD PhD, 5 mg at 08/03/23 0837    aspirin EC tablet 81 mg, 81 mg, Oral, Daily, Nemesio Kramer MD PhD, 81 mg at 08/03/23 0837    [START ON 8/4/2023] bisacodyl (DULCOLAX) EC tablet 10 mg, 10 mg, Oral, Daily, Tima Lacy APRN    bisacodyl (DULCOLAX) suppository 10 mg, 10 mg, Rectal, Daily PRN, Tima Lacy APRN, 10 mg at 08/03/23 0838    carvedilol (COREG) tablet 25 mg, 25 mg, Oral, BID, Nemesio Kramer MD PhD, 25 mg at 08/03/23 0837    diazePAM (VALIUM) tablet 2 mg, 2 mg, Oral, Q6H PRN, Nemesio Kramer MD PhD, 2 mg at 08/01/23 2230    docusate sodium (COLACE) capsule 100 mg, 100 mg, Oral, BID, Tima Lacy APRN, 100 mg at 08/03/23 0838    Enoxaparin Sodium (LOVENOX) syringe 30 mg, 30 mg, Subcutaneous, Daily, Nemesio Kramer MD PhD, 30 mg at 08/03/23 0838    gabapentin (NEURONTIN) capsule 100 mg, 100 mg, Oral, Q12H, Nemesio Kramer MD PhD, 100 mg at 08/03/23 0837    guaiFENesin (MUCINEX) 12 hr tablet 600 mg, 600 mg, Oral, Q12H, Saritha Posey, DNP, APRN, 600 mg at 08/03/23 0837    hydrALAZINE (APRESOLINE)  tablet 50 mg, 50 mg, Oral, BID, Nemesio Kramer MD PhD, 50 mg at 08/03/23 0837    HYDROmorphone (DILAUDID) injection 0.5 mg, 0.5 mg, Intravenous, Q2H PRN, Nemesio Kramer MD PhD, 0.5 mg at 07/31/23 2051    ipratropium-albuterol (DUO-NEB) nebulizer solution 3 mL, 3 mL, Nebulization, Q8H - RT, Saritha Posey, JERICA, APRN, 3 mL at 08/03/23 0736    nitroglycerin (NITROSTAT) SL tablet 0.4 mg, 0.4 mg, Sublingual, Q5 Min PRN, Nemesio Kramer MD PhD    ondansetron (ZOFRAN) tablet 4 mg, 4 mg, Oral, Q6H PRN **OR** ondansetron (ZOFRAN) injection 4 mg, 4 mg, Intravenous, Q6H PRN, Nemesio Kramer MD PhD    polyethylene glycol (MIRALAX) packet 17 g, 17 g, Oral, Daily, Tima Lacy, APRN, 17 g at 08/03/23 1155    rosuvastatin (CRESTOR) tablet 20 mg, 20 mg, Oral, Daily, Nemesio Kramer MD PhD, 20 mg at 08/02/23 2108    torsemide (DEMADEX) tablet 40 mg, 40 mg, Oral, Daily, Nemesio Kramer MD PhD, 40 mg at 08/03/23 0837    Assessment and Plan:     Non small cell lung cancer with invasion of the mediastinum in to the anterior chest wall, intercostal vessels and left phrenic nerve. Unable to complete resection. POD # 3. CT still in place with air leak.   Atrial fibrillation, post op - new. Controlled ventricular rate on home dose of carvedilol. CHADs2 Vasc score of 5. Anticoagulation not recommended at this time given immediate post op state and chest tube in place - however may need to be considered if he remains in AF.   Chronic systolic heart failure with improved LVEF on most recent echocardiogram. Continue GDMT with carvedilol. He has had angioedema with ACE-I thus ARB/ARNI contraindicated. Volume status appears stable on oral torsemide.   Chronic kidney disease  Coronary artery disease (moderate) by cardiac cath April 2023. 40% left main lesion not hemodynamically significant by RFR. On aspirin and rosuvastatin. He denies angina.   Hypertension - blood pressure appears relatively controlled on current regimen.  Intermittently above goal - pain possibly contributing. Will follow.       Noy Carlisle, APRN  08/03/23  14:51 EDT

## 2023-08-03 NOTE — PLAN OF CARE
Problem: Adult Inpatient Plan of Care  Goal: Plan of Care Review  Outcome: Ongoing, Progressing  Flowsheets (Taken 8/3/2023 0458)  Progress: no change  Plan of Care Reviewed With: patient  Outcome Evaluation: pt conts w large airleak noted on -10sx. sq air noted.  denies need for pain med.  co constipation medicated per mar earlier awaiting results.  will cont to monitor   Goal Outcome Evaluation:  Plan of Care Reviewed With: patient        Progress: no change  Outcome Evaluation: pt conts w large airleak noted on -10sx. sq air noted.  denies need for pain med.  co constipation medicated per mar earlier awaiting results.  will cont to monitor

## 2023-08-03 NOTE — PROGRESS NOTES
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  He reports shortness of breath and weakness.  No cough or chest pain (Well-controlled.).    Diet:  Adequate intake.  No nausea or vomiting.    Activity level: Impaired due to weakness.    Pain:  He complains of pain that is mild.  Pain is well controlled.    Up in bed.  Continues to endorse abdominal distention and constipation.      Objective:  General Appearance:  Comfortable and in no acute distress.    Vital signs: (most recent): Blood pressure 105/75, pulse 78, temperature 97.7 øF (36.5 øC), temperature source Oral, resp. rate 16, height 170.2 cm (67\"), weight 73 kg (161 lb), SpO2 100 %.  Vital signs are normal.  No fever.    HEENT: Normal HEENT exam.    Lungs:  Normal effort and normal respiratory rate.  He is not in respiratory distress.    Heart: Normal rate.  Regular rhythm.    Chest: Chest wall tenderness present.    Abdomen: Abdomen is soft and distended.  There is no abdominal tenderness.     Neurological: Patient is alert and oriented to person, place and time.    Skin:  Warm and dry.              Chest tube:   Site: Left, Clean, Dry, Intact, and Securement device intact  Suction: -10 cm  Air Leak: positive  24 Hour Total: 110mL    Results Review:     I reviewed the patient's new clinical results.  I reviewed the patient's new imaging results and agree with the interpretation.  I reviewed the patient's other test results and agree with the interpretation  Discussed with patient, spouse at bedside, RN and Dr. Kramer.    Assessment & Plan     Mr. Acevedo is a pleasant 82-year-old gentleman who is POD #3, s/p left VAT with da Ismael robot-assisted partial decortication and lysis of adhesions " with intercostal block.    Unfortunately, due to gross tumor invasion into the mediastinum with the tumor itself involving the phrenic nerve and intercostal vessels of the anterior chest wall, Dr. Kramer elected not to proceed with an R1 resection.     He continues to do well postoperatively.  His primary complaint is persistent abdominal distention with constipation.  We will escalate his bowel regimen.  His chest tube was placed to waterseal yesterday although follow-up chest x-ray demonstrated increased pneumothorax with lateral separation. Follow-up chest x-ray performed this morning demonstrates resolution of the pneumothorax after it was placed back to -10 cm suction.    A small airleak remains on exam therefore we will continue his chest tube to -10 cm of suction.  Patient also developed atrial fibrillation overnight.  He is rate-controlled and normotensive, on Coreg.  [place] [facility] [place] will ask for their input for any other recommendations. Repeat a chest x-ray in the morning.    Encourage good pulmonary hygiene including use of I-S.  Increase activity including scheduled walks around nurses station.    DASHA Cardenas  [facility]  08/03/23  15:51 EDT    Patient was seen and assessed while wearing personal protective equipment including facemask, protective eyewear and gloves.  Hand hygiene performed prior to entering the room and upon exiting with doffing of gloves.

## 2023-08-03 NOTE — PLAN OF CARE
Goal Outcome Evaluation:  Plan of Care Reviewed With: patient        Progress: no change  Outcome Evaluation: VSS, chest tube to -10 sx, C/o constipation, meds, suppository and enema given.

## 2023-08-04 ENCOUNTER — APPOINTMENT (OUTPATIENT)
Dept: GENERAL RADIOLOGY | Facility: HOSPITAL | Age: 83
DRG: 164 | End: 2023-08-04
Payer: MEDICARE

## 2023-08-04 PROCEDURE — 71045 X-RAY EXAM CHEST 1 VIEW: CPT

## 2023-08-04 PROCEDURE — 94761 N-INVAS EAR/PLS OXIMETRY MLT: CPT

## 2023-08-04 PROCEDURE — 99024 POSTOP FOLLOW-UP VISIT: CPT

## 2023-08-04 PROCEDURE — 25010000002 ENOXAPARIN PER 10 MG: Performed by: STUDENT IN AN ORGANIZED HEALTH CARE EDUCATION/TRAINING PROGRAM

## 2023-08-04 PROCEDURE — 94799 UNLISTED PULMONARY SVC/PX: CPT

## 2023-08-04 PROCEDURE — 94664 DEMO&/EVAL PT USE INHALER: CPT

## 2023-08-04 PROCEDURE — 99232 SBSQ HOSP IP/OBS MODERATE 35: CPT | Performed by: INTERNAL MEDICINE

## 2023-08-04 PROCEDURE — 74018 RADEX ABDOMEN 1 VIEW: CPT

## 2023-08-04 RX ORDER — CALCIUM CARBONATE 500 MG/1
1 TABLET, CHEWABLE ORAL 2 TIMES DAILY PRN
Status: DISCONTINUED | OUTPATIENT
Start: 2023-08-04 | End: 2023-08-15 | Stop reason: HOSPADM

## 2023-08-04 RX ADMIN — GABAPENTIN 100 MG: 100 CAPSULE ORAL at 21:27

## 2023-08-04 RX ADMIN — CARVEDILOL 25 MG: 25 TABLET, FILM COATED ORAL at 09:00

## 2023-08-04 RX ADMIN — POLYETHYLENE GLYCOL 3350 17 G: 17 POWDER, FOR SOLUTION ORAL at 09:00

## 2023-08-04 RX ADMIN — ACETAMINOPHEN 1000 MG: 500 TABLET, FILM COATED ORAL at 21:27

## 2023-08-04 RX ADMIN — GUAIFENESIN 600 MG: 600 TABLET, EXTENDED RELEASE ORAL at 08:59

## 2023-08-04 RX ADMIN — GABAPENTIN 100 MG: 100 CAPSULE ORAL at 09:00

## 2023-08-04 RX ADMIN — IPRATROPIUM BROMIDE AND ALBUTEROL SULFATE 3 ML: 2.5; .5 SOLUTION RESPIRATORY (INHALATION) at 22:44

## 2023-08-04 RX ADMIN — IPRATROPIUM BROMIDE AND ALBUTEROL SULFATE 3 ML: 2.5; .5 SOLUTION RESPIRATORY (INHALATION) at 15:14

## 2023-08-04 RX ADMIN — ASPIRIN 81 MG: 81 TABLET, COATED ORAL at 08:59

## 2023-08-04 RX ADMIN — SENNOSIDES AND DOCUSATE SODIUM 2 TABLET: 50; 8.6 TABLET ORAL at 09:16

## 2023-08-04 RX ADMIN — HYDRALAZINE HYDROCHLORIDE 50 MG: 50 TABLET, FILM COATED ORAL at 21:28

## 2023-08-04 RX ADMIN — CARVEDILOL 25 MG: 25 TABLET, FILM COATED ORAL at 23:18

## 2023-08-04 RX ADMIN — GUAIFENESIN 600 MG: 600 TABLET, EXTENDED RELEASE ORAL at 21:28

## 2023-08-04 RX ADMIN — ACETAMINOPHEN 1000 MG: 500 TABLET, FILM COATED ORAL at 16:21

## 2023-08-04 RX ADMIN — AMLODIPINE BESYLATE 5 MG: 5 TABLET ORAL at 08:59

## 2023-08-04 RX ADMIN — ACETAMINOPHEN 1000 MG: 500 TABLET, FILM COATED ORAL at 08:59

## 2023-08-04 RX ADMIN — ENOXAPARIN SODIUM 30 MG: 100 INJECTION SUBCUTANEOUS at 09:00

## 2023-08-04 RX ADMIN — ROSUVASTATIN CALCIUM 20 MG: 20 TABLET, FILM COATED ORAL at 21:28

## 2023-08-04 RX ADMIN — DIAZEPAM 2 MG: 2 TABLET ORAL at 23:18

## 2023-08-04 RX ADMIN — TORSEMIDE 40 MG: 20 TABLET ORAL at 08:59

## 2023-08-04 RX ADMIN — SENNOSIDES AND DOCUSATE SODIUM 2 TABLET: 50; 8.6 TABLET ORAL at 21:27

## 2023-08-04 RX ADMIN — DIAZEPAM 2 MG: 2 TABLET ORAL at 04:25

## 2023-08-04 RX ADMIN — IPRATROPIUM BROMIDE AND ALBUTEROL SULFATE 3 ML: 2.5; .5 SOLUTION RESPIRATORY (INHALATION) at 07:27

## 2023-08-04 RX ADMIN — ANTACID TABLETS 1 TABLET: 500 TABLET, CHEWABLE ORAL at 21:39

## 2023-08-04 RX ADMIN — HYDRALAZINE HYDROCHLORIDE 50 MG: 50 TABLET, FILM COATED ORAL at 08:59

## 2023-08-04 NOTE — PROGRESS NOTES
"  POST-OPERATIVE NOTE     Chief Complaint: Non-small cell lung cancer of the left upper lobe of the lung, postoperative care  S/P: Bronchoscopy, left video-assisted thoracoscopy with da Ismael robot assisted partial decortication with lysis of adhesions and intercostal nerve block  POD # 4    Subjective:  Symptoms:  Improved.  He reports weakness.  No shortness of breath, cough or chest pain (Well-controlled.).    Diet:  Poor intake.  No nausea or vomiting.    Activity level: Impaired due to weakness.    Pain:  He complains of pain that is mild.  Pain is well controlled.      Objective:  General Appearance:  Comfortable, in no acute distress and in pain.    Vital signs: (most recent): Blood pressure 133/66, pulse 61, temperature 98 øF (36.7 øC), temperature source Oral, resp. rate 16, height 170.2 cm (67\"), weight 73 kg (161 lb), SpO2 97 %.  Vital signs are normal.  No fever.    HEENT: Normal HEENT exam.    Lungs:  Normal effort and normal respiratory rate.  He is not in respiratory distress.    Heart: Normal rate.  Regular rhythm.    Chest: Symmetric chest wall expansion. Chest wall tenderness present.    Abdomen: Abdomen is soft and distended (Improving).  There is no abdominal tenderness.     Neurological: Patient is alert and oriented to person, place and time.    Skin:  Warm and dry.            Chest tube:   Site: Left, Clean, Dry, Intact, and Securement device intact  Suction: -10 cm  Air Leak: positive  24 Hour Total: 110 mL    Results Review:     I reviewed the patient's new clinical results.  I reviewed the patient's new imaging results and agree with the interpretation.  I reviewed the patient's other test results and agree with the interpretation  Discussed with patient, spouse at bedside, RN and Dr. Kramer.    Assessment & Plan     Mr. Acevedo is a pleasant 82-year-old gentleman who is POD #4, s/p left VAT with da Ismael robot-assisted partial decortication and lysis of adhesions with intercostal block.  "   Unfortunately, due to gross tumor invasion into the mediastinum with the tumor itself involving the phrenic nerve and intercostal vessels of the anterior chest wall, Dr. Kramer elected not to proceed with an R1 resection.     He reports improvement of his abdominal distention and discomfort after escalation of bowel regimen.  He denies any uncontrolled postoperative pain.  I have independently reviewed the follow-up chest x-ray this morning which demonstrates satisfactory positioning of the left-sided chest tube.  KUB performed this morning was independently reviewed which demonstrates a non-obstructive bowel gas pattern.  There was mention of lucency projecting over the left abdominal margin.  I suspect this is superimposed subcutaneous soft tissue gas which is consistent with the subcutaneous emphysema palpated on exam, not unexpected postoperatively.     We will try to wean the chest tube to waterseal today follow-up chest x-ray planned for this afternoon.  If without significant change we will continue chest tube to waterseal and repeat a chest x-ray in the morning.  Continue good pulmonary hygiene including use of incentive spirometer.  Increase activity as tolerated including scheduled walks around the nurses station.  Patient endorses poor p.o. intake, will add boost with meals to supplement nutrition.  Cardiology recommendations noted and much appreciated.  He has since converted back to normal sinus rhythm.    DASHA Cardenas  [facility]  08/04/23  14:56 EDT    Patient was seen and assessed while wearing personal protective equipment including facemask, protective eyewear and gloves.  Hand hygiene performed prior to entering the room and upon exiting with doffing of gloves.

## 2023-08-04 NOTE — NURSING NOTE
Complains of pain in abdomen upper center states hurts to cough states this area hurts worse than surgery and has had pain  for several months describes as a burning sensation

## 2023-08-04 NOTE — PLAN OF CARE
Problem: Adult Inpatient Plan of Care  Goal: Plan of Care Review  Flowsheets (Taken 8/4/2023 9848)  Progress: no change  Plan of Care Reviewed With: patient  Outcome Evaluation: chest tube remains to -10 of suction air leak noted abd remains distended but soft had a bm after the enema he states felt like it was pretty good denies pain   Goal Outcome Evaluation:  Plan of Care Reviewed With: patient        Progress: no change  Outcome Evaluation: chest tube remains to -10 of suction air leak noted abd remains distended but soft had a bm after the enema he states felt like it was pretty good denies pain

## 2023-08-04 NOTE — PLAN OF CARE
Goal Outcome Evaluation:  Plan of Care Reviewed With: patient        Progress: no change  Outcome Evaluation: Pt alert and orient. On room air. Pt with c/o left upper abdominal pain this morning, ARPN notified, KUB ordered - results called to DASHA Lacy. Enema given per order, effective. Chest tube back to -10 suction per order, air leak present. Pt up with assist. Wife at bedside assisting in care.

## 2023-08-04 NOTE — PROGRESS NOTES
[facility] Progress Note       Encounter Date:23  Patient:Bayron Acevedo  :1940  MRN:[id number]      Chief Complaint: Follow up CHF and atrial fibrillation      Subjective:        Patient complaining abdominal discomfort, no further episodes of atrial fibrillation    Review of Systems:  Review of Systems   Constitutional: Positive for malaise/fatigue.   Cardiovascular:  Positive for dyspnea on exertion.   Respiratory:  Positive for cough and shortness of breath.    Gastrointestinal:  Positive for bloating and abdominal pain.     Medications:  Scheduled Meds:  acetaminophen, 1,000 mg, Oral, TID  amLODIPine, 5 mg, Oral, Daily  aspirin, 81 mg, Oral, Daily  carvedilol, 25 mg, Oral, BID  enoxaparin, 30 mg, Subcutaneous, Daily  gabapentin, 100 mg, Oral, Q12H  guaiFENesin, 600 mg, Oral, Q12H  hydrALAZINE, 50 mg, Oral, BID  ipratropium-albuterol, 3 mL, Nebulization, Q8H - RT  polyethylene glycol, 17 g, Oral, Daily  rosuvastatin, 20 mg, Oral, Daily  senna-docusate sodium, 2 tablet, Oral, BID  torsemide, 40 mg, Oral, Daily    Continuous Infusions:   PRN Meds:    senna-docusate sodium **AND** polyethylene glycol **AND** bisacodyl **AND** bisacodyl    bisacodyl    diazePAM    HYDROmorphone    nitroglycerin    ondansetron **OR** ondansetron         Objective:       Vitals:    23 2219 23 0013 23 0728 23 0741   BP:  142/63  156/81   BP Location:  Right arm  Right arm   Patient Position:  Lying  Lying   Pulse: 63 63 69 73   Resp: 16 16 16 18   Temp:  97.5 øF (36.4 øC)     TempSrc:  Oral  Oral   SpO2: 97% 93% 95% 97%   Weight:       Height:               Physical Exam:  Constitutional: Well appearing, well developed, no acute distress   HENT: Oropharynx clear and membrane moist  Eyes: Normal conjunctiva, no sclera icterus.  Neck: Supple, no carotid bruit bilaterally.  Cardiovascular: Regular rate and rhythm, No Murmur, No bilateral lower extremity edema.  Pulmonary: Normal  respiratory effort, coarse breath sounds over the left lung with inspiratory and expiratory wheezing.  Abdominal: Soft, nontender, no hepatosplenomegaly, liver is non-pulsatile.  Neurological: Alert and orient x 3.   Skin: Warm, dry, no ecchymosis, no rash.  Psych: Appropriate mood and affect. Normal judgment and insight.           Lab Review:   Results from last 7 days   Lab Units 08/02/23  0620 07/31/23  1646   SODIUM mmol/L 138 136   POTASSIUM mmol/L 4.4 5.1   CHLORIDE mmol/L 102 101   CO2 mmol/L 25.5 25.2   BUN mg/dL 28* 23   CREATININE mg/dL 1.96* 1.93*   GLUCOSE mg/dL 99 116*   CALCIUM mg/dL 8.6 8.4*         Results from last 7 days   Lab Units 07/31/23  1646   WBC 10*3/mm3 8.26   HEMOGLOBIN g/dL 8.8*   HEMATOCRIT % 27.8*   PLATELETS 10*3/mm3 204                   Invalid input(s): LDLCALC            Echocardiogram 7/19/2023 with images reviewed by myself:  Left ventricular systolic function is normal. Left ventricular ejection fraction appears to be 56 - 60%.  The left ventricular cavity is mildly dilated.  The left atrial cavity is mildly dilated.  Estimated right ventricular systolic pressure from tricuspid regurgitation is normal (<35 mmHg). Calculated right ventricular systolic pressure from tricuspid regurgitation is 29 mmHg.    Cardiac catheterization 4/11/2023:  Moderate coronary artery disease with 40% proximal left main stenoses, patent stent within the proximal LAD, and 40% proximal RCA segment stenoses.  Normal right and left-sided filling pressures with normal pulmonary artery pressures.  Normal cardiac output of 4.4 L/min and index of 2.3 L/min/m2  RFR assessment of the left main confirm no significant hemodynamic stenoses at 0.91 thus PCI was deferred.    Echocardiogram 4/7/2023 [facility]  Overall left ventricular systolic function is moderately depressed. The estimated ejection fraction is 35-40%. There is diffuse global hypokinesis of the left ventricle. Moderate concentric left  ventricular hypertrophy.   Mildly dilated left atrium.   Trace aortic regurgitation is noted.   Moderate to severe mitral regurgitation is present.   Right ventricular systolic pressure of 49 mmHg.   Mild to moderate tricuspid regurgitation.   There is a small (trivial) pericardial effusion. There is no 2D and/or doppler evidence of tamponade physiology.   A pleural effusion is visualized.      Holter monitor 4/14/2022:  This is a 48-hour recording done due to ventricular ectopy   The intrinsic rhythm is sinus rhythm with an average heart rate of 65 bpm with a range from 44 to 120 bpm   There are frequent PVCs totaling over 2200 representing 1.2% of all beats.  There was 134 beat run of a wide-complex tachycardia at 170 bpm consistent with ventricular tachycardia  There are frequent PACs totaling over 2900 representing 1.6% of all beats.  The computer tallied 7 runs of SVT.  The longest was 20 beats and fastest 156 bpm.  Most of these appear to be a slow atrial tachycardia of less than 120 bpm   There were no prolonged pauses   There were no symptoms      Echocardiogram 12/8/2014 [facility]:  Ejection fraction estimated at 53% with basal inferior, mid inferior, and apical inferior wall hypokinesis  Mild aortic stenosis with a mean gradient of 12 mmHg across the aortic valve     Stress MPI 4/17/2014 [facility]:  Inferior lateral wall hypokinesis  Baseline ejection fraction 60% but decreases to 40% with stress with a 3 times daily of 1.3          Assessment:          Diagnosis Plan   1. Mass of upper lobe of left lung  acetaminophen (TYLENOL) tablet 1,000 mg    gabapentin (NEURONTIN) capsule 300 mg    aztreonam (AZACTAM) 2 g in sodium chloride 0.9 % 100 mL IVPB-VTB    heparin (porcine) 5000 UNIT/ML injection 5,000 Units    Tissue Pathology Exam    Tissue Pathology Exam             Plan:       Mr. Acevedo is a 82 y.o. gentleman with past medical history notable for chronic kidney disease, essential hypertension,  mixed hyperlipidemia, chronic systolic heart failure with recovered left ventricular function as well as coronary artery disease who presents for lung resection of his lung cancer on 7/31 unfortunately his cancer had invaded into his chest wall and phrenic nerve and felt not amenable to surgery.  Chest tube was placed and plans to be to start radiation as an outpatient.  He has been monitored here since unfortunately in his postoperative state he did have an episode of atrial fibrillation on 8/2/2023 which was self-limiting.  I would recommend continuing      Paroxysmal Atrial Fibrillation:  Would be beneficial to initiate anticoagulation once safe from a postoperative bleeding perspective  Patient's [id number] score is 4 however probably even higher given his known cancer he would be at high risk for other thromboembolic events so would be reasonable to initiate anticoagulation  Continue carvedilol  Likely provoked from surgery we will monitor if any recurrent episodes would recommend amiodarone    Coronary artery disease without angina:  We will continue with aspirin  Continue carvedilol  Continue statin     Nonischemic cardiomyopathy/chronic systolic congestive heart failure:  Does have borderline left main disease but has also had issues with cardiomyopathies in the past  We will try and optimize blood pressure control  Continue carvedilol  Losartan was stopped due to acute kidney injury  Renal insufficiency limits further titration of ARB or considering spironolactone     Nonrheumatic mitral valve regurgitation:  Follow-up echocardiogram 7/2023 demonstrates resolution of mitral regurgitation after improvement of heart function we will continue medical management for now     Lung cancer:  Plans for radiation therapy              Guru Jiang MD  [facility]  08/04/23  10:08 EDT

## 2023-08-05 ENCOUNTER — APPOINTMENT (OUTPATIENT)
Dept: GENERAL RADIOLOGY | Facility: HOSPITAL | Age: 83
DRG: 164 | End: 2023-08-05
Payer: MEDICARE

## 2023-08-05 PROCEDURE — 94799 UNLISTED PULMONARY SVC/PX: CPT

## 2023-08-05 PROCEDURE — 99232 SBSQ HOSP IP/OBS MODERATE 35: CPT

## 2023-08-05 PROCEDURE — 97110 THERAPEUTIC EXERCISES: CPT

## 2023-08-05 PROCEDURE — 94761 N-INVAS EAR/PLS OXIMETRY MLT: CPT

## 2023-08-05 PROCEDURE — 94664 DEMO&/EVAL PT USE INHALER: CPT

## 2023-08-05 PROCEDURE — 25010000002 ENOXAPARIN PER 10 MG: Performed by: STUDENT IN AN ORGANIZED HEALTH CARE EDUCATION/TRAINING PROGRAM

## 2023-08-05 PROCEDURE — 97162 PT EVAL MOD COMPLEX 30 MIN: CPT

## 2023-08-05 PROCEDURE — 71045 X-RAY EXAM CHEST 1 VIEW: CPT

## 2023-08-05 RX ADMIN — ASPIRIN 81 MG: 81 TABLET, COATED ORAL at 08:27

## 2023-08-05 RX ADMIN — ANTACID TABLETS 1 TABLET: 500 TABLET, CHEWABLE ORAL at 19:27

## 2023-08-05 RX ADMIN — SENNOSIDES AND DOCUSATE SODIUM 2 TABLET: 50; 8.6 TABLET ORAL at 20:39

## 2023-08-05 RX ADMIN — HYDRALAZINE HYDROCHLORIDE 50 MG: 50 TABLET, FILM COATED ORAL at 08:27

## 2023-08-05 RX ADMIN — POLYETHYLENE GLYCOL 3350 17 G: 17 POWDER, FOR SOLUTION ORAL at 08:27

## 2023-08-05 RX ADMIN — GUAIFENESIN 600 MG: 600 TABLET, EXTENDED RELEASE ORAL at 20:39

## 2023-08-05 RX ADMIN — BISACODYL 5 MG: 5 TABLET, COATED ORAL at 10:08

## 2023-08-05 RX ADMIN — AMLODIPINE BESYLATE 5 MG: 5 TABLET ORAL at 08:27

## 2023-08-05 RX ADMIN — GUAIFENESIN 600 MG: 600 TABLET, EXTENDED RELEASE ORAL at 08:27

## 2023-08-05 RX ADMIN — HYDRALAZINE HYDROCHLORIDE 50 MG: 50 TABLET, FILM COATED ORAL at 20:40

## 2023-08-05 RX ADMIN — SENNOSIDES AND DOCUSATE SODIUM 2 TABLET: 50; 8.6 TABLET ORAL at 08:27

## 2023-08-05 RX ADMIN — DIAZEPAM 2 MG: 2 TABLET ORAL at 05:49

## 2023-08-05 RX ADMIN — IPRATROPIUM BROMIDE AND ALBUTEROL SULFATE 3 ML: 2.5; .5 SOLUTION RESPIRATORY (INHALATION) at 07:18

## 2023-08-05 RX ADMIN — IPRATROPIUM BROMIDE AND ALBUTEROL SULFATE 3 ML: 2.5; .5 SOLUTION RESPIRATORY (INHALATION) at 22:55

## 2023-08-05 RX ADMIN — CARVEDILOL 25 MG: 25 TABLET, FILM COATED ORAL at 20:39

## 2023-08-05 RX ADMIN — ENOXAPARIN SODIUM 30 MG: 100 INJECTION SUBCUTANEOUS at 08:28

## 2023-08-05 RX ADMIN — GABAPENTIN 100 MG: 100 CAPSULE ORAL at 20:39

## 2023-08-05 RX ADMIN — TORSEMIDE 40 MG: 20 TABLET ORAL at 08:27

## 2023-08-05 RX ADMIN — ACETAMINOPHEN 1000 MG: 500 TABLET, FILM COATED ORAL at 20:39

## 2023-08-05 RX ADMIN — DIAZEPAM 2 MG: 2 TABLET ORAL at 20:39

## 2023-08-05 RX ADMIN — ACETAMINOPHEN 1000 MG: 500 TABLET, FILM COATED ORAL at 08:27

## 2023-08-05 RX ADMIN — IPRATROPIUM BROMIDE AND ALBUTEROL SULFATE 3 ML: 2.5; .5 SOLUTION RESPIRATORY (INHALATION) at 15:21

## 2023-08-05 RX ADMIN — ACETAMINOPHEN 1000 MG: 500 TABLET, FILM COATED ORAL at 16:25

## 2023-08-05 RX ADMIN — GABAPENTIN 100 MG: 100 CAPSULE ORAL at 08:27

## 2023-08-05 RX ADMIN — CARVEDILOL 25 MG: 25 TABLET, FILM COATED ORAL at 08:27

## 2023-08-05 RX ADMIN — ROSUVASTATIN CALCIUM 20 MG: 20 TABLET, FILM COATED ORAL at 20:39

## 2023-08-05 NOTE — PROGRESS NOTES
Electrophysiology Follow-Up Note      Patient Name: Bayron Acevedo  Age/Sex: 82 y.o. male  : 1940  MRN: [id number]      Day of Service: 23       Chief Complaint/Follow-up: CHF, AF    S:doing well today, up in the chair. Having issues with constipation. In NSR.       Temp:  [97.7 øF (36.5 øC)-98.6 øF (37 øC)] 98.4 øF (36.9 øC)  Heart Rate:  [61-75] 61  Resp:  [14-16] 16  BP: (115-155)/(50-84) 115/50     PHYSICAL EXAM:    General Appearance: No acute distress, well developed and well nourished.   Eyes: Conjunctiva and lids: No erythema, swelling, or discharge. Sclera non-icteric.   HENT: Atraumatic, normocephalic. External eyes, ears, and nose normal.   Respiratory: No signs of respiratory distress. Respiration rhythm and depth normal.   Clear to auscultation. No rales, crackles, rhonchi, or wheezing auscultated.   Cardiovascular:  Heart Rate and Rhythm: Normal, Heart Sounds: Normal S1 and S2. No S3 or S4 noted  Gastrointestinal:  Abdomen soft, non-distended, non-tender.  Musculoskeletal: Normal movement of extremities  Skin: Warm and dry.   Psychiatric: Patient alert and oriented to person, place, and time. Speech and behavior appropriate. Normal mood and affect.         Results from last 7 days   Lab Units 23  0620 23  1646   SODIUM mmol/L 138 136   POTASSIUM mmol/L 4.4 5.1   CHLORIDE mmol/L 102 101   CO2 mmol/L 25.5 25.2   BUN mg/dL 28* 23   CREATININE mg/dL 1.96* 1.93*   GLUCOSE mg/dL 99 116*   CALCIUM mg/dL 8.6 8.4*     Results from last 7 days   Lab Units 23  1646   WBC 10*3/mm3 8.26   HEMOGLOBIN g/dL 8.8*   HEMATOCRIT % 27.8*   PLATELETS 10*3/mm3 204                       Current Medications:   Scheduled Meds:acetaminophen, 1,000 mg, Oral, TID  amLODIPine, 5 mg, Oral, Daily  aspirin, 81 mg, Oral, Daily  carvedilol, 25 mg, Oral, BID  enoxaparin, 30 mg, Subcutaneous, Daily  gabapentin, 100 mg, Oral, Q12H  guaiFENesin, 600 mg, Oral, Q12H  hydrALAZINE, 50 mg, Oral,  BID  ipratropium-albuterol, 3 mL, Nebulization, Q8H - RT  polyethylene glycol, 17 g, Oral, Daily  rosuvastatin, 20 mg, Oral, Daily  senna-docusate sodium, 2 tablet, Oral, BID  torsemide, 40 mg, Oral, Daily            Mass of upper lobe of left lung       Plan:   PAF-- recommend starting DOAC when okay from surgical standpoint. Continue carvedilol. He is in NSR.    2. NICM-- continue carvedilol. ARB stopped due to renal function, slightly up again today.     Stable from cardiac standpoint, if further AF consider amiodarone. Continue coreg. Anticoagulation at discharge.     DASHA Preston  08/05/23  14:31 EDT

## 2023-08-05 NOTE — THERAPY EVALUATION
Patient Name: Bayron Acevedo  : 1940    MRN: [id number]                              Today's Date: 2023       Admit Date: 2023    Visit Dx:     ICD-10-CM ICD-9-CM   1. Mass of upper lobe of left lung  R91.8 786.6     Patient Active Problem List   Diagnosis    COPD (chronic obstructive pulmonary disease) with emphysema    Hypertension    Hyperlipidemia    S/P angioplasty with stent    Heart attack    Colon polyp    Carotid stenosis    CAD in native artery    Peripheral artery disease    History of hydrocele    Perennial allergic rhinitis    AAA (abdominal aortic aneurysm) without rupture    Acute on chronic systolic CHF (congestive heart failure)    Stage 3b chronic kidney disease    Hydropneumothorax    Mass of upper lobe of left lung    NICM (nonischemic cardiomyopathy)    Chronic systolic CHF (congestive heart failure)    Normocytic anemia    Adenocarcinoma, lung, left     Past Medical History:   Diagnosis Date    AAA (abdominal aortic aneurysm)     CAD in native artery     Carotid stenosis     s/p CEA Left    CKD (chronic kidney disease)     Colon polyp     COPD (chronic obstructive pulmonary disease)     H/O Acute myocardial infarction     H/O PAD (peripheral artery disease)     Heart attack 2013    Heart failure     Hyperlipidemia     Hypertension     Inguinal hernia     RIGHT    Left ventricular dysfunction     Mass of left lung     SHOWS ON MRI    Mitral valve regurgitation     Perennial allergic rhinitis 2017    PVD (peripheral vascular disease)     S/P angioplasty with stent     Tinnitus     Tricuspid regurgitation      Past Surgical History:   Procedure Laterality Date    APPENDECTOMY      ARTERIOGRAM AORTIC N/A 2021    Procedure: ZENITH AORTIC STENT GRAFT;  Surgeon: Karen Barrera Jr., MD;  Location: [facility] ;  Service: Vascular;  Laterality: N/A;    CARDIAC CATHETERIZATION      X2    CARDIAC CATHETERIZATION N/A 2023    Procedure: Left Heart Cath;   Surgeon: Guru Jaing MD;  Location:  [facility];  Service: Cardiovascular;  Laterality: N/A;    CARDIAC CATHETERIZATION N/A 04/11/2023    Procedure: Right Heart Cath;  Surgeon: Guru Jiang MD;  Location:  [facility];  Service: Cardiovascular;  Laterality: N/A;    CARDIAC CATHETERIZATION N/A 04/11/2023    Procedure: Coronary angiography;  Surgeon: Guru Jiang MD;  Location:  [facility];  Service: Cardiovascular;  Laterality: N/A;    CARDIAC CATHETERIZATION N/A 04/11/2023    Procedure: Left ventriculography;  Surgeon: Guru Jiang MD;  Location:  [facility];  Service: Cardiovascular;  Laterality: N/A;    CARDIAC CATHETERIZATION  04/11/2023    Procedure: RESTING FULL CYCLE RATIO;  Surgeon: Guru Jiang MD;  Location[facility];  Service: Cardiovascular;;    CAROTID ENDARTERECTOMY Left 01/2013    Bruce Jones Jr, MD    CAROTID STENT Left     LAD    COLONOSCOPY      HERNIA REPAIR      HYDROCELE EXCISION / REPAIR      Dr. SINTIA Osorio    LOBECTOMY Left 7/31/2023    Procedure: BRONCHOSCOPY, LEFT VAT WITH DAVINCI ROBOT, EXTENSIVE LYSIS OF ADHESIONS, PARTIAL PULMONARY DECORTICATION, INTERCOSTAL NERVE BLOCK;  Surgeon: Nemesio Kramer MD PhD;  Location: [facility];  Service: Robotics - DaVinci;  Laterality: Left;    MEDIASTINOSCOPY N/A 7/17/2023    Procedure: MEDIASTINOSCOPY;  Surgeon: Nemesio Kramer MD PhD;  Location: [facility] MAIN OR;  Service: Thoracic;  Laterality: N/A;    UMBILICAL HERNIA REPAIR  2003      General Information       Row Name 08/05/23 1346          Physical Therapy Time and Intention    Document Type discharge evaluation/summary  -RD     Mode of Treatment physical therapy  -RD       Row Name 08/05/23 1346          General Information    Patient Profile Reviewed yes  -RD     Prior Level of Function independent:  -RD     Existing Precautions/Restrictions no known  precautions/restrictions  -RD     Barriers to Rehab none identified  -RD       Row Name 08/05/23 1346          Living Environment    People in Home spouse  -RD       Row Name 08/05/23 1346          Home Main Entrance    Number of Stairs, Main Entrance three  -RD     Stair Railings, Main Entrance railings safe and in good condition  -RD               User Key  (r) = Recorded By, (t) = Taken By, (c) = Cosigned By      Initials Name Provider Type    Agapito Rogers PT Physical Therapist                   Mobility       Row Name 08/05/23 1346          Bed-Chair Transfer    Bed-Chair [place] (Transfers) independent  -RD       Row Name 08/05/23 1346          Sit-Stand Transfer    Sit-Stand [place] (Transfers) independent  -RD       Row Name 08/05/23 1346          Gait/Stairs (Locomotion)    [place] Level (Gait) independent  -RD     Distance in Feet (Gait) 50ft within room.  -RD               User Key  (r) = Recorded By, (t) = Taken By, (c) = Cosigned By      Initials Name Provider Type    Agapito Rogers PT Physical Therapist                   Obj/Interventions       Row Name 08/05/23 1347          Range of Motion Comprehensive    General Range of Motion no range of motion deficits identified  -RD       Row Name 08/05/23 1347          Strength Comprehensive (MMT)    General Manual Muscle Testing (MMT) Assessment no strength deficits identified  -RD       Row Name 08/05/23 1347          Balance    Balance Assessment other (see comments)  Good static and dynamic sitting and standing balance.  -RD               User Key  (r) = Recorded By, (t) = Taken By, (c) = Cosigned By      Initials Name Provider Type    Agapito Rogers PT Physical Therapist                   Goals/Plan    No documentation.                  Clinical Impression       Row Name 08/05/23 1347          Pain    Pretreatment Pain Rating 0/10 - no pain  -RD     Posttreatment Pain Rating 0/10 - no pain  -RD       Row Name 08/05/23 1347           Plan of Care Review    Plan of Care Reviewed With patient;spouse  -RD     Outcome Evaluation The patient is a 83y/o male admitted to the hospital for a mass of the left lung and a hx of ephysema, HTN, and lung cancer. At baseline he was fully independent with all ADLs and functional mobility. He is currently at baseline and does not require skilled physcial therapy treatment at this time. The patient was instructed to continue with ambulation as able to maintain strength and endurance. The patient was left in a chair call light within reach. PT d/c recommendation is home.  -RD       Row Name 08/05/23 8740          Therapy Assessment/Plan (PT)    Criteria for Skilled Interventions Met (PT) no;does not meet criteria for skilled intervention;no problems identified which require skilled intervention  -RD               User Key  (r) = Recorded By, (t) = Taken By, (c) = Cosigned By      Initials Name Provider Type    Agapito Rogers PT Physical Therapist                   Outcome Measures       Row Name 08/05/23 1357          How much help from another person do you currently need...    Turning from your back to your side while in flat bed without using bedrails? 4  -RD     Moving from lying on back to sitting on the side of a flat bed without bedrails? 4  -RD     Moving to and from a bed to a chair (including a wheelchair)? 4  -RD     Standing up from a chair using your arms (e.g., wheelchair, bedside chair)? 4  -RD     Climbing 3-5 steps with a railing? 4  -RD     To walk in hospital room? 4  -RD     AM-PAC 6 Clicks Score (PT) 24  -RD     Highest level of mobility 8 --> Walked 250 feet or more  -RD       Row Name 08/05/23 1354          Functional Assessment    Outcome Measure Options AM-PAC 6 Clicks Basic Mobility (PT)  -RD               User Key  (r) = Recorded By, (t) = Taken By, (c) = Cosigned By      Initials Name Provider Type    Agapito Rogers PT Physical Therapist                                 Physical  Therapy Education       Title: PT OT SLP Therapies (Done)       Topic: Physical Therapy (Done)       Point: Mobility training (Done)       Learning Progress Summary             Patient Acceptance, E,TB, VU by RD at 8/5/2023 1353   Significant Other Acceptance, E,TB, VU by RD at 8/5/2023 1353                         Point: Home exercise program (Done)       Learning Progress Summary             Patient Acceptance, E,TB, VU by RD at 8/5/2023 1353   Significant Other Acceptance, E,TB, VU by RD at 8/5/2023 1353                         Point: Body mechanics (Done)       Learning Progress Summary             Patient Acceptance, E,TB, VU by RD at 8/5/2023 1353   Significant Other Acceptance, E,TB, VU by RD at 8/5/2023 1353                         Point: Precautions (Done)       Learning Progress Summary             Patient Acceptance, E,TB, VU by RD at 8/5/2023 1353   Significant Other Acceptance, E,TB, VU by RD at 8/5/2023 1353                                         User Key       Initials Effective Dates Name Provider Type Discipline     05/03/23 -  Agapito Holley, DAYSI Physical Therapist PT                  PT Recommendation and Plan     Plan of Care Reviewed With: patient, spouse  Outcome Evaluation: The patient is a 81y/o male admitted to the hospital for a mass of the left lung and a hx of ephysema, HTN, and lung cancer. At baseline he was fully independent with all ADLs and functional mobility. He is currently at baseline and does not require skilled physcial therapy treatment at this time. The patient was instructed to continue with ambulation as able to maintain strength and endurance. The patient was left in a chair call light within reach. PT d/c recommendation is home.     Time Calculation:         PT Charges       Row Name 08/05/23 1354             Time Calculation    Start Time 0910  -      Stop Time 0925  -      Time Calculation (min) 15 min  -      PT Non-Billable Time (min) 5 min  -      PT  Received On 08/05/23  -RD         Time Calculation- PT    Total Timed Code Minutes- PT 10 minute(s)  -RD         Timed Charges    99535 - PT Therapeutic Exercise Minutes 10  -RD         Untimed Charges    PT Eval/Re-eval Minutes 5  -RD         Total Minutes    Timed Charges Total Minutes 10  -RD      Untimed Charges Total Minutes 5  -RD       Total Minutes 15  -RD                User Key  (r) = Recorded By, (t) = Taken By, (c) = Cosigned By      Initials Name Provider Type    RD Agapito Holley, PT Physical Therapist                  Therapy Charges for Today       Code Description Service Date Service Provider Modifiers Qty    08517402469 HC PT THER PROC EA 15 MIN 8/5/2023 Agapito Holley, PT GP 1    69123910204 HC PT EVAL MOD COMPLEXITY 4 8/5/2023 Agapito Holley, PT GP 1            PT G-Codes  Outcome Measure Options: AM-PAC 6 Clicks Basic Mobility (PT)  AM-PAC 6 Clicks Score (PT): 24  PT Discharge Summary  Anticipated Discharge Disposition (PT): home  Reason for Discharge: At baseline function  Discharge Destination: Home    Agapito Holley PT  8/5/2023

## 2023-08-05 NOTE — PLAN OF CARE
Goal Outcome Evaluation:  Plan of Care Reviewed With: patient, spouse           Outcome Evaluation: The patient is a 83y/o male admitted to the hospital for a mass of the left lung and a hx of ephysema, HTN, and lung cancer. At baseline he was fully independent with all ADLs and functional mobility. He is currently at baseline and does not require skilled physcial therapy treatment at this time. The patient was instructed to continue with ambulation as able to maintain strength and endurance. The patient was left in a chair call light within reach. PT d/c recommendation is home.      Anticipated Discharge Disposition (PT): home

## 2023-08-05 NOTE — PLAN OF CARE
Goal Outcome Evaluation:  Plan of Care Reviewed With: patient        Progress: no change  Outcome Evaluation: vss, pain controlled by prn pain meds. follow up education about ambulating and increase fluid intake to help with bowels. Also followed up eductaion on IS anf good pulmonarty hygiene. pt has rested well during shift. labs in am. will continue to monitor.

## 2023-08-06 ENCOUNTER — APPOINTMENT (OUTPATIENT)
Dept: GENERAL RADIOLOGY | Facility: HOSPITAL | Age: 83
DRG: 164 | End: 2023-08-06
Payer: MEDICARE

## 2023-08-06 PROCEDURE — 94760 N-INVAS EAR/PLS OXIMETRY 1: CPT

## 2023-08-06 PROCEDURE — 94799 UNLISTED PULMONARY SVC/PX: CPT

## 2023-08-06 PROCEDURE — 94664 DEMO&/EVAL PT USE INHALER: CPT

## 2023-08-06 PROCEDURE — 25010000002 ENOXAPARIN PER 10 MG: Performed by: STUDENT IN AN ORGANIZED HEALTH CARE EDUCATION/TRAINING PROGRAM

## 2023-08-06 PROCEDURE — 99232 SBSQ HOSP IP/OBS MODERATE 35: CPT

## 2023-08-06 PROCEDURE — 94761 N-INVAS EAR/PLS OXIMETRY MLT: CPT

## 2023-08-06 PROCEDURE — 71045 X-RAY EXAM CHEST 1 VIEW: CPT

## 2023-08-06 RX ADMIN — CARVEDILOL 25 MG: 25 TABLET, FILM COATED ORAL at 09:02

## 2023-08-06 RX ADMIN — ACETAMINOPHEN 1000 MG: 500 TABLET, FILM COATED ORAL at 20:23

## 2023-08-06 RX ADMIN — IPRATROPIUM BROMIDE AND ALBUTEROL SULFATE 3 ML: 2.5; .5 SOLUTION RESPIRATORY (INHALATION) at 15:22

## 2023-08-06 RX ADMIN — ENOXAPARIN SODIUM 30 MG: 100 INJECTION SUBCUTANEOUS at 09:02

## 2023-08-06 RX ADMIN — HYDRALAZINE HYDROCHLORIDE 50 MG: 50 TABLET, FILM COATED ORAL at 20:23

## 2023-08-06 RX ADMIN — AMLODIPINE BESYLATE 5 MG: 5 TABLET ORAL at 09:02

## 2023-08-06 RX ADMIN — DIAZEPAM 2 MG: 2 TABLET ORAL at 23:14

## 2023-08-06 RX ADMIN — IPRATROPIUM BROMIDE AND ALBUTEROL SULFATE 3 ML: 2.5; .5 SOLUTION RESPIRATORY (INHALATION) at 07:30

## 2023-08-06 RX ADMIN — TORSEMIDE 40 MG: 20 TABLET ORAL at 09:02

## 2023-08-06 RX ADMIN — GUAIFENESIN 600 MG: 600 TABLET, EXTENDED RELEASE ORAL at 20:23

## 2023-08-06 RX ADMIN — ACETAMINOPHEN 1000 MG: 500 TABLET, FILM COATED ORAL at 09:01

## 2023-08-06 RX ADMIN — IPRATROPIUM BROMIDE AND ALBUTEROL SULFATE 3 ML: 2.5; .5 SOLUTION RESPIRATORY (INHALATION) at 22:48

## 2023-08-06 RX ADMIN — SENNOSIDES AND DOCUSATE SODIUM 2 TABLET: 50; 8.6 TABLET ORAL at 20:23

## 2023-08-06 RX ADMIN — ANTACID TABLETS 1 TABLET: 500 TABLET, CHEWABLE ORAL at 21:53

## 2023-08-06 RX ADMIN — HYDRALAZINE HYDROCHLORIDE 50 MG: 50 TABLET, FILM COATED ORAL at 09:02

## 2023-08-06 RX ADMIN — GABAPENTIN 100 MG: 100 CAPSULE ORAL at 09:02

## 2023-08-06 RX ADMIN — ROSUVASTATIN CALCIUM 20 MG: 20 TABLET, FILM COATED ORAL at 20:23

## 2023-08-06 RX ADMIN — GABAPENTIN 100 MG: 100 CAPSULE ORAL at 20:23

## 2023-08-06 RX ADMIN — POLYETHYLENE GLYCOL 3350 17 G: 17 POWDER, FOR SOLUTION ORAL at 09:01

## 2023-08-06 RX ADMIN — ASPIRIN 81 MG: 81 TABLET, COATED ORAL at 09:01

## 2023-08-06 RX ADMIN — ACETAMINOPHEN 1000 MG: 500 TABLET, FILM COATED ORAL at 16:22

## 2023-08-06 RX ADMIN — GUAIFENESIN 600 MG: 600 TABLET, EXTENDED RELEASE ORAL at 09:02

## 2023-08-06 RX ADMIN — CARVEDILOL 25 MG: 25 TABLET, FILM COATED ORAL at 20:23

## 2023-08-06 RX ADMIN — SENNOSIDES AND DOCUSATE SODIUM 2 TABLET: 50; 8.6 TABLET ORAL at 09:01

## 2023-08-06 NOTE — PROGRESS NOTES
Electrophysiology Follow-Up Note      Patient Name: Bayron Acevedo  Age/Sex: 82 y.o. male  : 1940  MRN: [id number]      Day of Service: 23       Chief Complaint/Follow-up: CHF, AF    S: Doing well this morning.  Just got done walking.  Still having some abdominal pain.  Remains in normal sinus rhythm.      Temp:  [97.6 øF (36.4 øC)-98.2 øF (36.8 øC)] 98 øF (36.7 øC)  Heart Rate:  [56-69] 62  Resp:  [12-16] 16  BP: (126-156)/(59-69) 126/66     PHYSICAL EXAM:    General Appearance: No acute distress, well developed and well nourished.   Eyes: Conjunctiva and lids: No erythema, swelling, or discharge. Sclera non-icteric.   HENT: Atraumatic, normocephalic. External eyes, ears, and nose normal.   Respiratory: No signs of respiratory distress. Respiration rhythm and depth normal.   Clear to auscultation. No rales, crackles, rhonchi, or wheezing auscultated.   Cardiovascular:  Heart Rate and Rhythm: Normal, Heart Sounds: Normal S1 and S2.   Gastrointestinal:  Abdomen soft, non-distended, non-tender.  Musculoskeletal: Normal movement of extremities  Skin: Warm and dry.   Psychiatric: Patient alert and oriented to person, place, and time. Speech and behavior appropriate. Normal mood and affect.        Results from last 7 days   Lab Units 23  0620 23  1646   SODIUM mmol/L 138 136   POTASSIUM mmol/L 4.4 5.1   CHLORIDE mmol/L 102 101   CO2 mmol/L 25.5 25.2   BUN mg/dL 28* 23   CREATININE mg/dL 1.96* 1.93*   GLUCOSE mg/dL 99 116*   CALCIUM mg/dL 8.6 8.4*     Results from last 7 days   Lab Units 23  1646   WBC 10*3/mm3 8.26   HEMOGLOBIN g/dL 8.8*   HEMATOCRIT % 27.8*   PLATELETS 10*3/mm3 204                       Current Medications:   Scheduled Meds:acetaminophen, 1,000 mg, Oral, TID  amLODIPine, 5 mg, Oral, Daily  aspirin, 81 mg, Oral, Daily  carvedilol, 25 mg, Oral, BID  enoxaparin, 30 mg, Subcutaneous, Daily  gabapentin, 100 mg, Oral, Q12H  guaiFENesin, 600 mg, Oral, Q12H  hydrALAZINE,  50 mg, Oral, BID  ipratropium-albuterol, 3 mL, Nebulization, Q8H - RT  polyethylene glycol, 17 g, Oral, Daily  rosuvastatin, 20 mg, Oral, Daily  senna-docusate sodium, 2 tablet, Oral, BID  torsemide, 40 mg, Oral, Daily            Mass of upper lobe of left lung       Plan:   1.  PAF--- recommend DOAC okay from surgical standpoint.  Continue carvedilol.  Remains in normal sinus rhythm, no further AF.     2. NICM--- continue carvedilol.  Euvolemic on exam.    He is stable from a cardiac standpoint.  Can be discharged when cleared by surgery, follow-up with Dr. Jiang or DASHA in 1-2 weeks.    We will see patient as needed while he is here.  DASHA Preston  08/06/23  11:32 EDT

## 2023-08-06 NOTE — PROGRESS NOTES
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  No shortness of breath, cough or chest pain (Well-controlled.).    Diet:  Adequate intake.  No nausea or vomiting.    Activity level: Returning to normal.    Pain:  He reports no pain.    Only real complaint this morning is abdominal distention.  He said he felt a little bit better with a bowel movement yesterday but continues to feel very bloated.  He denies any shortness of breath.  Objective:  General Appearance:  Comfortable, in no acute distress and in pain.    Vital signs: (most recent): Blood pressure 126/66, pulse 62, temperature 98 øF (36.7 øC), temperature source Oral, resp. rate 16, height 170.2 cm (67\"), weight 73 kg (161 lb), SpO2 96 %.  Vital signs are normal.  No fever.    HEENT: Normal HEENT exam.    Lungs:  Normal effort and normal respiratory rate.  He is not in respiratory distress.    Heart: Normal rate.  Regular rhythm.    Chest: Symmetric chest wall expansion. Chest wall tenderness present.    Abdomen: Abdomen is soft and distended (Improving).  There is no abdominal tenderness.     Neurological: Patient is alert and oriented to person, place and time.    Skin:  Warm and dry.          His abdomen is slightly more distended compared to yesterday, and continues to overall improved.  It is soft.  Chest tube:   Site: Left, Clean, Dry, Intact, and Securement device intact  Suction: -10 cm  Air Leak: positive, intermittent.  This is improving.  24 Hour Total: Not Recorded mL    Results Review:     I reviewed the patient's new clinical results.  I reviewed the patient's new imaging results and agree with the interpretation.  I reviewed the patient's other test results and agree " with the interpretation  Discussed with patient, at bedside, RN.    Assessment & Plan     Mr. Acevedo is a pleasant 82-year-old gentleman who is POD #6, s/p left VAT with da Ismael robot-assisted partial decortication and lysis of adhesions with intercostal block.      Continues to have intermittent airleak with respiratory variation.  I am going to try 1 more time to place to [facility] today and get follow-up chest x-ray to see who the lung stays expanded.    We will continue the aggressive bowel regimen that he is already on for his abdominal distention.  He does continue to have intermittent bowel function, which does symptomatically help.    Continue aggressive pulmonary toilet at this time.    Nemesio Kramer MD PhD  [facility]  08/06/23  11:13 EDT    Patient was seen and assessed while wearing personal protective equipment including facemask, protective eyewear and gloves.  Hand hygiene performed prior to entering the room and upon exiting with doffing of gloves.

## 2023-08-06 NOTE — PLAN OF CARE
Problem: Adult Inpatient Plan of Care  Goal: Plan of Care Review  Recent Flowsheet Documentation  Taken 8/6/2023 3686 by Sharona Huerta RN  Progress: no change  Plan of Care Reviewed With: patient   Goal Outcome Evaluation:  Plan of Care Reviewed With: patient        Progress: no change       Attempted to place pt to water-seal but was unable to continue. Pt is now back to -10 LWS, air leak present. Dressing is clean, dry, and intact. Pt has walked around the nurses station a few times. Pt has no complaints of SOA at this time. Pt complains of abd pain due to constipation. Tap water enema was given but no results with that. Pt is on a bowel regimen also. Pt not eating due to abd discomfort. VSS.

## 2023-08-06 NOTE — PLAN OF CARE
Goal Outcome Evaluation:  Plan of Care Reviewed With: patient        Progress: no change  Outcome Evaluation: Rested intermittently through night. Medicated x 1 for pain. Chest tube to -10 of suction, airleak continues. Pt reports abdominal discomfort, but improving after bowel movement during previous shift. Voiding per urinal.

## 2023-08-07 ENCOUNTER — APPOINTMENT (OUTPATIENT)
Dept: GENERAL RADIOLOGY | Facility: HOSPITAL | Age: 83
DRG: 164 | End: 2023-08-07
Payer: MEDICARE

## 2023-08-07 PROCEDURE — 94799 UNLISTED PULMONARY SVC/PX: CPT

## 2023-08-07 PROCEDURE — 99232 SBSQ HOSP IP/OBS MODERATE 35: CPT | Performed by: NURSE PRACTITIONER

## 2023-08-07 PROCEDURE — 25010000002 METOCLOPRAMIDE PER 10 MG: Performed by: NURSE PRACTITIONER

## 2023-08-07 PROCEDURE — 25010000002 ENOXAPARIN PER 10 MG: Performed by: STUDENT IN AN ORGANIZED HEALTH CARE EDUCATION/TRAINING PROGRAM

## 2023-08-07 PROCEDURE — 94760 N-INVAS EAR/PLS OXIMETRY 1: CPT

## 2023-08-07 PROCEDURE — 94664 DEMO&/EVAL PT USE INHALER: CPT

## 2023-08-07 PROCEDURE — 94761 N-INVAS EAR/PLS OXIMETRY MLT: CPT

## 2023-08-07 PROCEDURE — 25010000002 HYDROMORPHONE PER 4 MG: Performed by: STUDENT IN AN ORGANIZED HEALTH CARE EDUCATION/TRAINING PROGRAM

## 2023-08-07 PROCEDURE — 71045 X-RAY EXAM CHEST 1 VIEW: CPT

## 2023-08-07 RX ORDER — LACTULOSE 10 G/15ML
300 SOLUTION ORAL ONCE
Status: COMPLETED | OUTPATIENT
Start: 2023-08-07 | End: 2023-08-07

## 2023-08-07 RX ORDER — METOCLOPRAMIDE HYDROCHLORIDE 5 MG/ML
10 INJECTION INTRAMUSCULAR; INTRAVENOUS EVERY 6 HOURS
Status: DISCONTINUED | OUTPATIENT
Start: 2023-08-07 | End: 2023-08-15 | Stop reason: HOSPADM

## 2023-08-07 RX ADMIN — LACTULOSE 300 ML: 10 SOLUTION ORAL at 12:41

## 2023-08-07 RX ADMIN — CARVEDILOL 25 MG: 25 TABLET, FILM COATED ORAL at 23:46

## 2023-08-07 RX ADMIN — DIAZEPAM 2 MG: 2 TABLET ORAL at 17:46

## 2023-08-07 RX ADMIN — ENOXAPARIN SODIUM 30 MG: 100 INJECTION SUBCUTANEOUS at 08:47

## 2023-08-07 RX ADMIN — GUAIFENESIN 600 MG: 600 TABLET, EXTENDED RELEASE ORAL at 20:15

## 2023-08-07 RX ADMIN — IPRATROPIUM BROMIDE AND ALBUTEROL SULFATE 3 ML: 2.5; .5 SOLUTION RESPIRATORY (INHALATION) at 23:28

## 2023-08-07 RX ADMIN — HYDROMORPHONE HYDROCHLORIDE 0.5 MG: 1 INJECTION, SOLUTION INTRAMUSCULAR; INTRAVENOUS; SUBCUTANEOUS at 20:55

## 2023-08-07 RX ADMIN — GABAPENTIN 100 MG: 100 CAPSULE ORAL at 20:15

## 2023-08-07 RX ADMIN — IPRATROPIUM BROMIDE AND ALBUTEROL SULFATE 3 ML: 2.5; .5 SOLUTION RESPIRATORY (INHALATION) at 07:48

## 2023-08-07 RX ADMIN — IPRATROPIUM BROMIDE AND ALBUTEROL SULFATE 3 ML: 2.5; .5 SOLUTION RESPIRATORY (INHALATION) at 14:09

## 2023-08-07 RX ADMIN — DIAZEPAM 2 MG: 2 TABLET ORAL at 05:53

## 2023-08-07 RX ADMIN — ANTACID TABLETS 1 TABLET: 500 TABLET, CHEWABLE ORAL at 05:53

## 2023-08-07 RX ADMIN — SENNOSIDES AND DOCUSATE SODIUM 2 TABLET: 50; 8.6 TABLET ORAL at 20:15

## 2023-08-07 RX ADMIN — METOCLOPRAMIDE HYDROCHLORIDE 10 MG: 5 INJECTION INTRAMUSCULAR; INTRAVENOUS at 17:46

## 2023-08-07 RX ADMIN — HYDRALAZINE HYDROCHLORIDE 50 MG: 50 TABLET, FILM COATED ORAL at 23:46

## 2023-08-07 RX ADMIN — METOCLOPRAMIDE HYDROCHLORIDE 10 MG: 5 INJECTION INTRAMUSCULAR; INTRAVENOUS at 20:15

## 2023-08-07 RX ADMIN — ACETAMINOPHEN 1000 MG: 500 TABLET, FILM COATED ORAL at 20:15

## 2023-08-07 RX ADMIN — DIAZEPAM 2 MG: 2 TABLET ORAL at 23:46

## 2023-08-07 NOTE — PLAN OF CARE
Problem: Adult Inpatient Plan of Care  Goal: Plan of Care Review  Recent Flowsheet Documentation  Taken 8/7/2023 0243 by Lauren Sanchez, RN  Outcome Evaluation: VSS. Aox 4. medicated with Valium for pain. CT to -10sx air leak continues. Pt has abd pain and abd is distended, stool softeners given. Voiding per urinal.  Goal: Absence of Hospital-Acquired Illness or Injury  Intervention: Identify and Manage Fall Risk  Recent Flowsheet Documentation  Taken 8/7/2023 0226 by Lauren Sanchez, RN  Safety Promotion/Fall Prevention:   activity supervised   assistive device/personal items within reach   clutter free environment maintained   lighting adjusted   mobility aid in reach   nonskid shoes/slippers when out of bed   safety round/check completed   toileting scheduled  Taken 8/7/2023 0006 by Lauren Sanchez, RN  Safety Promotion/Fall Prevention:   activity supervised   assistive device/personal items within reach   clutter free environment maintained   lighting adjusted   mobility aid in reach   nonskid shoes/slippers when out of bed   safety round/check completed   toileting scheduled  Taken 8/6/2023 2204 by Lauren Sanchez, RN  Safety Promotion/Fall Prevention:   activity supervised   assistive device/personal items within reach   clutter free environment maintained   lighting adjusted   mobility aid in reach   nonskid shoes/slippers when out of bed   safety round/check completed   toileting scheduled  Taken 8/6/2023 2023 by Lauren Sanchez, RN  Safety Promotion/Fall Prevention:   activity supervised   assistive device/personal items within reach   clutter free environment maintained   lighting adjusted   mobility aid in reach   nonskid shoes/slippers when out of bed   safety round/check completed   toileting scheduled  Intervention: Prevent Skin Injury  Recent Flowsheet Documentation  Taken 8/6/2023 2023 by Lauren Sanchez, RN  Skin Protection:   adhesive use limited   tubing/devices  free from skin contact  Intervention: Prevent and Manage VTE (Venous Thromboembolism) Risk  Recent Flowsheet Documentation  Taken 8/7/2023 0226 by Lauren Sanchez, RN  Activity Management: activity encouraged  Taken 8/7/2023 0006 by Lauren Sanchez, RN  Activity Management: activity encouraged  Taken 8/6/2023 2204 by Lauren Sanchez, RN  Activity Management: activity encouraged  Taken 8/6/2023 2023 by Lauren Sanchez, RN  Activity Management: activity encouraged  Goal: Optimal Comfort and Wellbeing  Intervention: Provide Person-Centered Care  Recent Flowsheet Documentation  Taken 8/6/2023 2023 by Lauren Sanchez, RN  Trust Relationship/Rapport:   care explained   choices provided   emotional support provided   questions answered   reassurance provided   questions encouraged   thoughts/feelings acknowledged   Goal Outcome Evaluation:              Outcome Evaluation: VSS. Aox 4. medicated with Valium for pain. CT to -10sx air leak continues. Pt has abd pain and abd is distended, stool softeners given. Voiding per urinal.

## 2023-08-07 NOTE — CASE MANAGEMENT/SOCIAL WORK
Continued Stay Note  [facility]     Patient Name: Bayron Acevedo  MRN: [id number]  Today's Date: 8/7/2023    Admit Date: 7/31/2023    Plan: Home with family   Discharge Plan       Row Name 08/07/23 1405       Plan    Plan Home with family    Patient/Family in Agreement with Plan yes    Plan Comments Met with pt at bedside who confirms plan to return home at discharge.  No identified needs at this time. CCP continues to follow.  LUANA Saini RN                   Discharge Codes    No documentation.                       Cherry Saini, RN

## 2023-08-07 NOTE — PLAN OF CARE
Problem: Adult Inpatient Plan of Care  Goal: Plan of Care Review  Recent Flowsheet Documentation  Taken 8/7/2023 1808 by Sharona Huerta RN  Progress: improving  Plan of Care Reviewed With: patient   Goal Outcome Evaluation:  Plan of Care Reviewed With: patient        Progress: improving          Pt was finally able to have a large bowel movement with a lactulose enema. Pt is still complaining of some abdominal discomfort but better over all. Pt is still not taking any of his oral medications. Pt still has a left side chest tube to -10 LWS, air leak present. Dressing clean, dry, and intact. Pt is to undergo a chemical pleurodesis 8/8/2023. VSS.

## 2023-08-07 NOTE — PROGRESS NOTES
"  POST-OPERATIVE NOTE     Chief Complaint: Non-small cell lung cancer of the left upper lobe of the lung, postoperative care  S/P: Bronchoscopy, left video-assisted thoracoscopy with da Ismael robot assisted partial decortication with lysis of adhesions and intercostal nerve block  POD # 7    Subjective:  Symptoms:  Stable.  No shortness of breath, cough or chest pain (Well-controlled.).    Diet:  Poor intake.  No nausea or vomiting.    Activity level: Returning to normal.    Pain:  He complains of pain that is mild.    Only real complaint this morning is abdominal distention.  He is frustrated he has not had a bowel movement today.    Objective:  General Appearance:  Comfortable, in no acute distress, in pain and ill-appearing.    Vital signs: (most recent): Blood pressure 119/67, pulse 72, temperature 98.4 øF (36.9 øC), temperature source Oral, resp. rate 16, height 170.2 cm (67\"), weight 73 kg (161 lb), SpO2 95 %.  Vital signs are normal.  No fever.    Output: Producing urine and minimal stool output.    HEENT: Normal HEENT exam.    Lungs:  Normal effort and normal respiratory rate.  He is not in respiratory distress.    Heart: Normal rate.  Regular rhythm.    Chest: Symmetric chest wall expansion. Chest wall tenderness present.    Abdomen: Abdomen is soft and distended (Improving).  There is no abdominal tenderness.     Neurological: Patient is alert and oriented to person, place and time.    Skin:  Warm and dry.            Chest tube:   Site: Left, Clean, Dry, Intact, and Securement device intact  Suction: -10 cm  Air Leak: positive, intermittent.  This is improving.  24 Hour Total: 140 mL    Results Review:     I reviewed the patient's new clinical results.  I reviewed the patient's new imaging results and agree with the interpretation.  I reviewed the patient's other test results and agree with the interpretation  Discussed with patient, at bedside, RN.    Assessment & Plan     Mr. Acevedo is a pleasant " 82-year-old gentleman who is POD #7, s/p left VAT with da Ismael robot-assisted partial decortication and lysis of adhesions with intercostal block.      Continues to have airleak with respiratory variation.  Due to persistent air leak, patient may benefit from chemical pleurodesis at the bedside.  We will consider this for tomorrow.    We will continue the aggressive bowel regimen that he is already on for his abdominal distention.  Lactulose enema ordered.  When I followed up with the patient, he reported good results.    Continue aggressive pulmonary toilet at this time.    DASHA Ahumada  [facility]  08/07/23  16:04 EDT    Patient was seen and assessed while wearing personal protective equipment including facemask, protective eyewear and gloves.  Hand hygiene performed prior to entering the room and upon exiting with doffing of gloves.

## 2023-08-08 ENCOUNTER — APPOINTMENT (OUTPATIENT)
Dept: GENERAL RADIOLOGY | Facility: HOSPITAL | Age: 83
DRG: 164 | End: 2023-08-08
Payer: MEDICARE

## 2023-08-08 PROCEDURE — 3E0L3GC INTRODUCTION OF OTHER THERAPEUTIC SUBSTANCE INTO PLEURAL CAVITY, PERCUTANEOUS APPROACH: ICD-10-PCS | Performed by: NURSE PRACTITIONER

## 2023-08-08 PROCEDURE — 99024 POSTOP FOLLOW-UP VISIT: CPT | Performed by: NURSE PRACTITIONER

## 2023-08-08 PROCEDURE — 71045 X-RAY EXAM CHEST 1 VIEW: CPT

## 2023-08-08 PROCEDURE — 94761 N-INVAS EAR/PLS OXIMETRY MLT: CPT

## 2023-08-08 PROCEDURE — 25010000002 ENOXAPARIN PER 10 MG: Performed by: STUDENT IN AN ORGANIZED HEALTH CARE EDUCATION/TRAINING PROGRAM

## 2023-08-08 PROCEDURE — 25010000002 METOCLOPRAMIDE PER 10 MG: Performed by: NURSE PRACTITIONER

## 2023-08-08 PROCEDURE — 94799 UNLISTED PULMONARY SVC/PX: CPT

## 2023-08-08 PROCEDURE — 94760 N-INVAS EAR/PLS OXIMETRY 1: CPT

## 2023-08-08 PROCEDURE — 25010000002 HYDROMORPHONE PER 4 MG: Performed by: STUDENT IN AN ORGANIZED HEALTH CARE EDUCATION/TRAINING PROGRAM

## 2023-08-08 PROCEDURE — 32560 TREAT PLEURODESIS W/AGENT: CPT | Performed by: NURSE PRACTITIONER

## 2023-08-08 PROCEDURE — 94664 DEMO&/EVAL PT USE INHALER: CPT

## 2023-08-08 RX ORDER — LIDOCAINE HYDROCHLORIDE 20 MG/ML
10 INJECTION, SOLUTION INFILTRATION; PERINEURAL ONCE
Status: COMPLETED | OUTPATIENT
Start: 2023-08-08 | End: 2023-08-08

## 2023-08-08 RX ADMIN — METOCLOPRAMIDE HYDROCHLORIDE 10 MG: 5 INJECTION INTRAMUSCULAR; INTRAVENOUS at 09:55

## 2023-08-08 RX ADMIN — DOXYCYCLINE 500 MG: 100 INJECTION, POWDER, LYOPHILIZED, FOR SOLUTION INTRAVENOUS at 11:21

## 2023-08-08 RX ADMIN — IPRATROPIUM BROMIDE AND ALBUTEROL SULFATE 3 ML: 2.5; .5 SOLUTION RESPIRATORY (INHALATION) at 07:28

## 2023-08-08 RX ADMIN — ASPIRIN 81 MG: 81 TABLET, COATED ORAL at 09:54

## 2023-08-08 RX ADMIN — LIDOCAINE HYDROCHLORIDE 10 ML: 20 INJECTION, SOLUTION INFILTRATION; PERINEURAL at 11:21

## 2023-08-08 RX ADMIN — GUAIFENESIN 600 MG: 600 TABLET, EXTENDED RELEASE ORAL at 20:40

## 2023-08-08 RX ADMIN — METOCLOPRAMIDE HYDROCHLORIDE 10 MG: 5 INJECTION INTRAMUSCULAR; INTRAVENOUS at 22:03

## 2023-08-08 RX ADMIN — HYDROMORPHONE HYDROCHLORIDE 0.5 MG: 1 INJECTION, SOLUTION INTRAMUSCULAR; INTRAVENOUS; SUBCUTANEOUS at 11:16

## 2023-08-08 RX ADMIN — POLYETHYLENE GLYCOL 3350 17 G: 17 POWDER, FOR SOLUTION ORAL at 09:54

## 2023-08-08 RX ADMIN — GABAPENTIN 100 MG: 100 CAPSULE ORAL at 20:40

## 2023-08-08 RX ADMIN — IPRATROPIUM BROMIDE AND ALBUTEROL SULFATE 3 ML: 2.5; .5 SOLUTION RESPIRATORY (INHALATION) at 23:16

## 2023-08-08 RX ADMIN — GABAPENTIN 100 MG: 100 CAPSULE ORAL at 09:54

## 2023-08-08 RX ADMIN — DIAZEPAM 2 MG: 2 TABLET ORAL at 09:55

## 2023-08-08 RX ADMIN — AMLODIPINE BESYLATE 5 MG: 5 TABLET ORAL at 09:54

## 2023-08-08 RX ADMIN — ACETAMINOPHEN 1000 MG: 500 TABLET, FILM COATED ORAL at 20:40

## 2023-08-08 RX ADMIN — IPRATROPIUM BROMIDE AND ALBUTEROL SULFATE 3 ML: 2.5; .5 SOLUTION RESPIRATORY (INHALATION) at 14:43

## 2023-08-08 RX ADMIN — METOCLOPRAMIDE HYDROCHLORIDE 10 MG: 5 INJECTION INTRAMUSCULAR; INTRAVENOUS at 15:39

## 2023-08-08 RX ADMIN — ACETAMINOPHEN 1000 MG: 500 TABLET, FILM COATED ORAL at 15:39

## 2023-08-08 RX ADMIN — CARVEDILOL 25 MG: 25 TABLET, FILM COATED ORAL at 20:40

## 2023-08-08 RX ADMIN — SENNOSIDES AND DOCUSATE SODIUM 2 TABLET: 50; 8.6 TABLET ORAL at 09:54

## 2023-08-08 RX ADMIN — HYDRALAZINE HYDROCHLORIDE 50 MG: 50 TABLET, FILM COATED ORAL at 09:53

## 2023-08-08 RX ADMIN — CARVEDILOL 25 MG: 25 TABLET, FILM COATED ORAL at 09:54

## 2023-08-08 RX ADMIN — METOCLOPRAMIDE HYDROCHLORIDE 10 MG: 5 INJECTION INTRAMUSCULAR; INTRAVENOUS at 04:33

## 2023-08-08 RX ADMIN — GUAIFENESIN 600 MG: 600 TABLET, EXTENDED RELEASE ORAL at 09:53

## 2023-08-08 RX ADMIN — ACETAMINOPHEN 1000 MG: 500 TABLET, FILM COATED ORAL at 09:54

## 2023-08-08 RX ADMIN — DIAZEPAM 2 MG: 2 TABLET ORAL at 20:40

## 2023-08-08 RX ADMIN — HYDRALAZINE HYDROCHLORIDE 50 MG: 50 TABLET, FILM COATED ORAL at 20:40

## 2023-08-08 RX ADMIN — ENOXAPARIN SODIUM 30 MG: 100 INJECTION SUBCUTANEOUS at 09:54

## 2023-08-08 RX ADMIN — SENNOSIDES AND DOCUSATE SODIUM 2 TABLET: 50; 8.6 TABLET ORAL at 20:40

## 2023-08-08 RX ADMIN — TORSEMIDE 40 MG: 20 TABLET ORAL at 09:54

## 2023-08-08 RX ADMIN — ROSUVASTATIN CALCIUM 20 MG: 20 TABLET, FILM COATED ORAL at 20:40

## 2023-08-08 NOTE — PROCEDURES
Pre-op Diagnosis: Persistent pneumothorax     Post-Op Diagnosis: Persistent pneumothorax     Procedure performed: Chemical Pleurodesis    Anesthesia: 200 mg lidocaine, intrapleurally; 0.5 mg hydromorphone    Summary of procedure: Patient was placed in the right lateral decubitus position in his bed.  Under sterile technique the pleural catheter was opened. 200 mg of lidocaine was instilled intrapleurally. The tube was then clamped for 2 minutes. At that point, the pleural catheter was then reopened under sterile technique.  500 mg of Doxycycline (reconstituted in 50cc of normal saline) was instilled into the pleural cavity. The Pleur-evac was placed above the chest level allowing for air to leak through the chest tube however preventing the doxycycline from leaking out of the patient's chest.  The patient's position was changed to every 15 minutes for 2 hours.  The tube was then placed back to suction.  Patient tolerated the procedure without difficulty.

## 2023-08-08 NOTE — PLAN OF CARE
Problem: Adult Inpatient Plan of Care  Goal: Absence of Hospital-Acquired Illness or Injury  Intervention: Identify and Manage Fall Risk  Recent Flowsheet Documentation  Taken 8/8/2023 0417 by Lauren Sanchez, RN  Safety Promotion/Fall Prevention:   activity supervised   assistive device/personal items within reach   clutter free environment maintained   lighting adjusted   mobility aid in reach   nonskid shoes/slippers when out of bed   safety round/check completed   toileting scheduled  Taken 8/8/2023 0238 by Lauren Sanchez, RN  Safety Promotion/Fall Prevention:   assistive device/personal items within reach   activity supervised   clutter free environment maintained   lighting adjusted   mobility aid in reach   nonskid shoes/slippers when out of bed   safety round/check completed   toileting scheduled  Taken 8/8/2023 0010 by Lauren Sanchez, RN  Safety Promotion/Fall Prevention:   activity supervised   assistive device/personal items within reach   clutter free environment maintained   lighting adjusted   mobility aid in reach   nonskid shoes/slippers when out of bed   safety round/check completed   toileting scheduled  Taken 8/7/2023 2205 by Lauren Sanchez RN  Safety Promotion/Fall Prevention:   activity supervised   assistive device/personal items within reach   clutter free environment maintained   lighting adjusted   mobility aid in reach   nonskid shoes/slippers when out of bed   safety round/check completed   toileting scheduled  Taken 8/7/2023 2010 by Lauren Sanchez, RN  Safety Promotion/Fall Prevention:   activity supervised   assistive device/personal items within reach   clutter free environment maintained   lighting adjusted   mobility aid in reach   nonskid shoes/slippers when out of bed   safety round/check completed   toileting scheduled  Intervention: Prevent Skin Injury  Recent Flowsheet Documentation  Taken 8/7/2023 2010 by Lauren Sanchez, KIRT  Skin  Protection:   adhesive use limited   tubing/devices free from skin contact  Intervention: Prevent and Manage VTE (Venous Thromboembolism) Risk  Recent Flowsheet Documentation  Taken 8/8/2023 0417 by Lauren Sanchez, RN  Activity Management: bedrest  Taken 8/8/2023 0238 by Lauren Sanchez, RN  Activity Management: bedrest  Taken 8/8/2023 0010 by Lauren Sanchez, RN  Activity Management: bedrest  Taken 8/7/2023 2205 by Lauren Sanchez, RN  Activity Management: bedrest  Taken 8/7/2023 2010 by Lauren Sanchez, RN  Activity Management: bedrest  Goal: Optimal Comfort and Wellbeing  Intervention: Provide Person-Centered Care  Recent Flowsheet Documentation  Taken 8/7/2023 2010 by Lauren Sanchez, RN  Trust Relationship/Rapport:   care explained   choices provided   emotional support provided   questions answered   questions encouraged   reassurance provided   thoughts/feelings acknowledged   Goal Outcome Evaluation:              Outcome Evaluation: VSS. Aox 4. medicated with Valium for pain. CT to -10sx air leak continues. Pt has abd pain and abd is distended, stool softeners given. Voiding per urinal.

## 2023-08-08 NOTE — PLAN OF CARE
Goal Outcome Evaluation:  Plan of Care Reviewed With: patient           Outcome Evaluation: VSS. Pt up to chair. Ct to -10 suction with air leak present. Last BM 8/7/23 post enema. Pt had chemical pleurodesis today at bedside; tolerated well.

## 2023-08-08 NOTE — PROGRESS NOTES
"  POST-OPERATIVE NOTE     Chief Complaint: Non-small cell lung cancer of the left upper lobe of the lung, postoperative care  S/P: Bronchoscopy, left video-assisted thoracoscopy with da Ismael robot assisted partial decortication with lysis of adhesions and intercostal nerve block  POD # 8    Subjective:  Symptoms:  Stable.  No shortness of breath, cough or chest pain (Well-controlled.).    Diet:  Poor intake.  No nausea or vomiting.    Activity level: Returning to normal.    Pain:  He complains of pain that is mild.    Feels a little better today.  Had large bowel movement yesterday.    Objective:  General Appearance:  Comfortable, in no acute distress, in pain and ill-appearing.    Vital signs: (most recent): Blood pressure 131/49, pulse 70, temperature 98.3 øF (36.8 øC), temperature source Oral, resp. rate 16, height 170.2 cm (67\"), weight 73 kg (161 lb), SpO2 99 %.  Vital signs are normal.  No fever.    Output: Producing urine and minimal stool output.    HEENT: Normal HEENT exam.    Lungs:  Normal effort and normal respiratory rate.  He is not in respiratory distress.    Heart: Normal rate.  Regular rhythm.    Chest: Symmetric chest wall expansion. Chest wall tenderness present.    Abdomen: Abdomen is soft and distended (Improving).  There is no abdominal tenderness.     Neurological: Patient is alert and oriented to person, place and time.    Skin:  Warm and dry.            Chest tube:   Site: Left, Clean, Dry, Intact, and Securement device intact  Suction: -10 cm  Air Leak: positive, intermittent.  This is improving.  24 Hour Total: 50 mL    Results Review:     I reviewed the patient's new clinical results.  I reviewed the patient's new imaging results and agree with the interpretation.  I reviewed the patient's other test results and agree with the interpretation  Discussed with patient, at bedside, RN.    Assessment & Plan     Mr. Acevedo is a pleasant 82-year-old gentleman who is POD #8, s/p left VAT with da " Ismael robot-assisted partial decortication and lysis of adhesions with intercostal block.      Continues to have airleak with respiratory variation.  Due to persistent air leak, who will attempt a chemical pleurodesis at bedside today with intrapleural doxycycline.  Patient's tube may not be clamped due to persistent air leak so we will try to raise the level of the Pleur-evac above the chest, keeping low enough to allow for air to escape.    Patient reported good results with lactulose enema yesterday.  Continue to follow today with previously ordered bowel regimen.    Continue aggressive pulmonary toilet at this time.    DASHA Ahumada  [facility]  08/08/23  13:33 EDT    Patient was seen and assessed while wearing personal protective equipment including facemask, protective eyewear and gloves.  Hand hygiene performed prior to entering the room and upon exiting with doffing of gloves.

## 2023-08-09 ENCOUNTER — APPOINTMENT (OUTPATIENT)
Dept: GENERAL RADIOLOGY | Facility: HOSPITAL | Age: 83
DRG: 164 | End: 2023-08-09
Payer: MEDICARE

## 2023-08-09 PROCEDURE — 94799 UNLISTED PULMONARY SVC/PX: CPT

## 2023-08-09 PROCEDURE — 94761 N-INVAS EAR/PLS OXIMETRY MLT: CPT

## 2023-08-09 PROCEDURE — 71045 X-RAY EXAM CHEST 1 VIEW: CPT

## 2023-08-09 PROCEDURE — 94664 DEMO&/EVAL PT USE INHALER: CPT

## 2023-08-09 PROCEDURE — 99024 POSTOP FOLLOW-UP VISIT: CPT

## 2023-08-09 PROCEDURE — 25010000002 METOCLOPRAMIDE PER 10 MG: Performed by: NURSE PRACTITIONER

## 2023-08-09 PROCEDURE — 25010000002 ENOXAPARIN PER 10 MG: Performed by: STUDENT IN AN ORGANIZED HEALTH CARE EDUCATION/TRAINING PROGRAM

## 2023-08-09 RX ORDER — LACTULOSE 10 G/15ML
300 SOLUTION ORAL ONCE
Status: COMPLETED | OUTPATIENT
Start: 2023-08-09 | End: 2023-08-10

## 2023-08-09 RX ADMIN — METOCLOPRAMIDE HYDROCHLORIDE 10 MG: 5 INJECTION INTRAMUSCULAR; INTRAVENOUS at 21:29

## 2023-08-09 RX ADMIN — ROSUVASTATIN CALCIUM 20 MG: 20 TABLET, FILM COATED ORAL at 21:29

## 2023-08-09 RX ADMIN — IPRATROPIUM BROMIDE AND ALBUTEROL SULFATE 3 ML: 2.5; .5 SOLUTION RESPIRATORY (INHALATION) at 07:41

## 2023-08-09 RX ADMIN — METOCLOPRAMIDE HYDROCHLORIDE 10 MG: 5 INJECTION INTRAMUSCULAR; INTRAVENOUS at 09:46

## 2023-08-09 RX ADMIN — GABAPENTIN 100 MG: 100 CAPSULE ORAL at 09:45

## 2023-08-09 RX ADMIN — IPRATROPIUM BROMIDE AND ALBUTEROL SULFATE 3 ML: 2.5; .5 SOLUTION RESPIRATORY (INHALATION) at 15:07

## 2023-08-09 RX ADMIN — CARVEDILOL 25 MG: 25 TABLET, FILM COATED ORAL at 09:45

## 2023-08-09 RX ADMIN — ASPIRIN 81 MG: 81 TABLET, COATED ORAL at 09:45

## 2023-08-09 RX ADMIN — ACETAMINOPHEN 1000 MG: 500 TABLET, FILM COATED ORAL at 21:29

## 2023-08-09 RX ADMIN — SENNOSIDES AND DOCUSATE SODIUM 2 TABLET: 50; 8.6 TABLET ORAL at 21:29

## 2023-08-09 RX ADMIN — GUAIFENESIN 600 MG: 600 TABLET, EXTENDED RELEASE ORAL at 21:29

## 2023-08-09 RX ADMIN — TORSEMIDE 40 MG: 20 TABLET ORAL at 09:45

## 2023-08-09 RX ADMIN — HYDRALAZINE HYDROCHLORIDE 50 MG: 50 TABLET, FILM COATED ORAL at 21:29

## 2023-08-09 RX ADMIN — GUAIFENESIN 600 MG: 600 TABLET, EXTENDED RELEASE ORAL at 09:45

## 2023-08-09 RX ADMIN — ENOXAPARIN SODIUM 30 MG: 100 INJECTION SUBCUTANEOUS at 09:45

## 2023-08-09 RX ADMIN — SENNOSIDES AND DOCUSATE SODIUM 2 TABLET: 50; 8.6 TABLET ORAL at 09:45

## 2023-08-09 RX ADMIN — CARVEDILOL 25 MG: 25 TABLET, FILM COATED ORAL at 21:29

## 2023-08-09 RX ADMIN — METOCLOPRAMIDE HYDROCHLORIDE 10 MG: 5 INJECTION INTRAMUSCULAR; INTRAVENOUS at 18:15

## 2023-08-09 RX ADMIN — HYDRALAZINE HYDROCHLORIDE 50 MG: 50 TABLET, FILM COATED ORAL at 09:45

## 2023-08-09 RX ADMIN — GABAPENTIN 100 MG: 100 CAPSULE ORAL at 21:29

## 2023-08-09 RX ADMIN — POLYETHYLENE GLYCOL 3350 17 G: 17 POWDER, FOR SOLUTION ORAL at 09:45

## 2023-08-09 RX ADMIN — ACETAMINOPHEN 1000 MG: 500 TABLET, FILM COATED ORAL at 09:45

## 2023-08-09 RX ADMIN — AMLODIPINE BESYLATE 5 MG: 5 TABLET ORAL at 09:45

## 2023-08-09 RX ADMIN — DIAZEPAM 2 MG: 2 TABLET ORAL at 21:29

## 2023-08-09 RX ADMIN — METOCLOPRAMIDE HYDROCHLORIDE 10 MG: 5 INJECTION INTRAMUSCULAR; INTRAVENOUS at 05:59

## 2023-08-09 RX ADMIN — ACETAMINOPHEN 1000 MG: 500 TABLET, FILM COATED ORAL at 18:15

## 2023-08-09 RX ADMIN — IPRATROPIUM BROMIDE AND ALBUTEROL SULFATE 3 ML: 2.5; .5 SOLUTION RESPIRATORY (INHALATION) at 19:55

## 2023-08-09 NOTE — PLAN OF CARE
Goal Outcome Evaluation:      A/O, R/A, VSS, left CT to -10 suction, air leak present, dressing C/D/I, family @ bedside

## 2023-08-09 NOTE — PROGRESS NOTES
Nutrition Services    Patient Name:  Bayron Acevedo  YOB: 1940  MRN: [id number]  Admit Date:  7/31/2023    Assessment Date:  08/09/23    Comment: Nutrition assessment initiated due to LOS 9.  Pt s/p eft VAT with da Ismael robot-assisted partial decortication and lysis of adhesions.  Pt reporting a poor appetite.  Per nursing, po has been 25-75% of his meals. Not drinking much of the boost but plans to start trying to drink it more. Also c/o constipation.  Bowel regimen noted and I have added a prune juice with his supper.  No recent labs, Encouraged po. Will follow.      CLINICAL NUTRITION ASSESSMENT      Reason for Assessment Length of Stay     Diagnosis/Problem   s/p left VAT with da Ismael robot-assisted partial decortication and lysis of adhesions    Medical/Surgical History Past Medical History:   Diagnosis Date    AAA (abdominal aortic aneurysm)     CAD in native artery     Carotid stenosis     s/p CEA Left    CKD (chronic kidney disease)     Colon polyp     COPD (chronic obstructive pulmonary disease)     H/O Acute myocardial infarction 2013    H/O PAD (peripheral artery disease)     Heart attack 2013    Heart failure     Hyperlipidemia     Hypertension     Inguinal hernia     RIGHT    Left ventricular dysfunction     Mass of left lung     SHOWS ON MRI    Mitral valve regurgitation     Perennial allergic rhinitis 01/06/2017    PVD (peripheral vascular disease)     S/P angioplasty with stent     Tinnitus     Tricuspid regurgitation        Past Surgical History:   Procedure Laterality Date    APPENDECTOMY      ARTERIOGRAM AORTIC N/A 05/17/2021    Procedure: ZENITH AORTIC STENT GRAFT;  Surgeon: Karen Barrera Jr., MD;  Location: [facility] OR 18/19;  Service: Vascular;  Laterality: N/A;    CARDIAC CATHETERIZATION      X2    CARDIAC CATHETERIZATION N/A 04/11/2023    Procedure: Left Heart Cath;  Surgeon: Guru Jiang MD;  Location: [facility] CATH INVASIVE LOCATION;  Service:  "Cardiovascular;  Laterality: N/A;    CARDIAC CATHETERIZATION N/A 04/11/2023    Procedure: Right Heart Cath;  Surgeon: Guru Jiang MD;  Location: [facility] CATH [facility]VASIVE LOC[facility];  Service: Cardiovascular;  Laterality: N/A;    CARDIAC CATHETERIZATION N/A 04/11/2023    Procedure: Coronary angiography;  Surgeon: Guru Jiang MD;  [facility]: [facility];  Service: Cardiovascular;  Laterality: N/A;    CARDIAC CATHETERIZATION N/A 04/11/2023    Procedure: Left ventriculography;  Surgeon: Guru Jiang MD;  Location:  [facility] CATH INVASIVE LOCATION;  Service: Cardiovascular;  Laterality: N/A;    CARDIAC CATHETERIZATION  04/11/2023    Procedure: RESTING FULL CYCLE RATIO;  Surgeon: Guru Jiang MD;  Location: [facility];  Service: Cardiovascular;;    CAROTID ENDARTERECTOMY Left 01/2013    Bruce Jones Jr, MD    CAROTID STENT Left     LAD    COLONOSCOPY      HERNIA REPAIR      HYDROCELE EXCISION / REPAIR      Dr. SINTIA Osorio    LOBECTOMY Left 7/31/2023    Procedure: BRONCHOSCOPY, LEFT VAT WITH DAVINCI ROBOT, EXTENSIVE LYSIS OF ADHESIONS, PARTIAL PULMONARY DECORTICATION, INTERCOSTAL NERVE BLOCK;  Surgeon: Nemesio Kramer MD PhD;  Location: [facility] OR;  Service: Robotics - DaVinci;  Laterality: Left;    MEDIASTINOSCOPY N/A 7/17/2023    Procedure: MEDIASTINOSCOPY;  Surgeon: Nemesio Kramer MD PhD;  Location: [facility] OR;  Service: Thoracic;  Laterality: N/A;    UMBILICAL HERNIA REPAIR  2003        Encounter Information        Nutrition History:  Hasn't been drinking the boost but did drink one at lunch today.   Food Preferences:    Supplements:    Factors Affecting Intake: constipation, decreased appetite     Anthropometrics        Current Height  Current Weight  BMI kg/m2 Height: 170.2 cm (67\")  Weight: 73 kg (161 lb) (07/31/23 2343)  Body mass index is 25.22 kg/mý.   Adjusted BMI (if applicable)    BMI Category Overweight (25 - 29.9)       Admission Weight 73kg "       Ideal Body Weight (IBW) 66.1kg   Adjusted IBW (if applicable)        Usual Body Weight (UBW) 176lbs in April   Weight Change/Trend Loss       Weight History Wt Readings from Last 30 Encounters:   07/31/23 2343 73 kg (161 lb)   07/26/23 0857 73 kg (161 lb)   07/21/23 1009 73.6 kg (162 lb 3.2 oz)   07/21/23 0857 73.6 kg (162 lb 3.2 oz)   07/19/23 1239 72.6 kg (160 lb)   07/11/23 0852 72.6 kg (160 lb)   07/11/23 0759 72.6 kg (160 lb)   06/20/23 0801 72.6 kg (160 lb)   06/13/23 0841 72.6 kg (160 lb)   06/06/23 0834 76.7 kg (169 lb)   05/18/23 0916 76.7 kg (169 lb 3.2 oz)   04/27/23 0904 81.2 kg (179 lb)   04/19/23 1035 80.7 kg (178 lb)   04/12/23 0541 78.1 kg (172 lb 3.2 oz)   04/11/23 0618 78.9 kg (173 lb 14.4 oz)   04/10/23 0600 76.8 kg (169 lb 4.8 oz)   04/09/23 0549 79.8 kg (176 lb)   04/08/23 0531 79.8 kg (176 lb)   04/07/23 1908 79.8 kg (176 lb)   04/07/23 1506 79.8 kg (176 lb)   04/22/22 1500 86.2 kg (190 lb)   11/05/21 0842 87.5 kg (193 lb)   05/17/21 0555 90.2 kg (198 lb 13.7 oz)   05/14/21 1100 90.3 kg (199 lb)   12/30/20 0857 90.7 kg (200 lb)   11/19/19 1139 91.2 kg (201 lb 1.6 oz)   08/28/19 1047 90.9 kg (200 lb 6.4 oz)   03/18/19 0932 92.1 kg (203 lb)   09/10/18 0942 91.1 kg (200 lb 14.4 oz)   08/09/18 1428 92.5 kg (204 lb)   05/21/18 1424 94.6 kg (208 lb 8 oz)   04/17/17 1100 92.7 kg (204 lb 6.4 oz)   04/06/17 1019 93 kg (205 lb 1.6 oz)   01/06/17 1001 92 kg (202 lb 14.4 oz)   10/06/16 0828 91.6 kg (202 lb)   04/19/16 1301 93.8 kg (206 lb 11.2 oz)           --  Tests/Procedures        Tests/Procedures X-Ray     Labs       Pertinent Labs          Invalid input(s): LABALBU, PROT          External COVID19   Date Value Ref Range Status   04/22/2022 Detected (A) Not Detected - Ref. Range Final     No results found for: HGBA1C       Medications           Scheduled Medications acetaminophen, 1,000 mg, Oral, TID  amLODIPine, 5 mg, Oral, Daily  aspirin, 81 mg, Oral, Daily  carvedilol, 25 mg, Oral,  BID  enoxaparin, 30 mg, Subcutaneous, Daily  gabapentin, 100 mg, Oral, Q12H  guaiFENesin, 600 mg, Oral, Q12H  hydrALAZINE, 50 mg, Oral, BID  ipratropium-albuterol, 3 mL, Nebulization, Q8H - RT  lactulose, 300 mL, Rectal, Once  metoclopramide, 10 mg, Intravenous, Q6H  polyethylene glycol, 17 g, Oral, Daily  rosuvastatin, 20 mg, Oral, Daily  senna-docusate sodium, 2 tablet, Oral, BID  torsemide, 40 mg, Oral, Daily       Infusions     PRN Medications   senna-docusate sodium **AND** polyethylene glycol **AND** bisacodyl **AND** bisacodyl    bisacodyl    calcium carbonate    diazePAM    HYDROmorphone    nitroglycerin    ondansetron **OR** ondansetron     Physical Findings          Physical Appearance alert, oriented   Oral/Mouth Cavity dentures   Edema  1+ (trace)   Gastrointestinal constipation, last bowel movement: 8/7   Skin  surgical incision: chest, throat   Tubes/Drains/Lines chest tube   NFPE No clinical signs of muscle wasting or fat loss   --  Current Nutrition Orders & Evaluation of Intake       Oral Nutrition     Food Allergies NKFA   Current PO Diet Diet: Regular/House Diet; Texture: Regular Texture (IDDSI 7); Fluid Consistency: Thin (IDDSI 0)   Supplement Boost Plus, TID   PO Evaluation     % PO Intake 25-75%    # of Days Evaluated 5   --  PES STATEMENT / NUTRITION DIAGNOSIS      Nutrition Dx Problem  Problem: Inadequate Oral Intake  Etiology: Medical Diagnosis L VATS  Signs/Symptoms: Report of Minimal PO Intake and Unintended Weight Change    Comment:    --  NUTRITION INTERVENTION / PLAN OF CARE      Intervention Goal(s) Maintain nutrition status, Reduce/improve symptoms, Meet estimated needs, Disease management/therapy, Tolerate PO , Increase intake, and Maintain weight         RD Intervention/Action Advise alternative selection, Advise available snack, Interview for preferences, Encourage intake, Follow Tx Progress, Care plan reviewed, and Recommend/ordered:          Prescription/Orders:       PO Diet        Supplements Prune juice with dinner tonight      Snacks       Enteral Nutrition       Parenteral Nutrition    New Prescription Ordered? Yes   --      Monitor/Evaluation Per protocol, I&O, PO intake, Supplement intake, Pertinent labs, Weight, Skin status, GI status, Symptoms   Discharge Plan/Needs Pending clinical course   Education Will instruct as appropriate   --    RD to follow per protocol.    Yudith Masters, DEMETRIO  08/09/23 12:28 EDT

## 2023-08-09 NOTE — PROGRESS NOTES
"  POST-OPERATIVE NOTE     Chief Complaint: Non-small cell lung cancer of the left upper lobe of the lung, postoperative care  S/P: Bronchoscopy, left video-assisted thoracoscopy with da Ismael robot assisted partial decortication with lysis of adhesions and intercostal nerve block  POD # 9  S/P: Doxycycline chemical pleurodesis.  POD: #1     Subjective:  Symptoms:  Stable.  No shortness of breath, cough or chest pain (Well-controlled.).    Diet:  Poor intake.  No nausea or vomiting.    Activity level: Returning to normal.    Pain:  He complains of pain that is mild.  Pain is well controlled.    Continues to endorse constipation, last bowel movement 8/7/2023    Objective:  General Appearance:  Comfortable, in no acute distress, in pain and ill-appearing.    Vital signs: (most recent): Blood pressure 94/55, pulse 87, temperature 98 øF (36.7 øC), temperature source Oral, resp. rate 20, height 170.2 cm (67\"), weight 73 kg (161 lb), SpO2 96 %.  Vital signs are normal.  No fever.    Output: Producing urine and minimal stool output.    HEENT: Normal HEENT exam.    Lungs:  Normal effort and normal respiratory rate.  He is not in respiratory distress.    Heart: Normal rate.  Regular rhythm.    Chest: Symmetric chest wall expansion. Chest wall tenderness present.    Abdomen: Abdomen is soft and distended (Improving).  There is no abdominal tenderness.     Neurological: Patient is alert and oriented to person, place and time.    Skin:  Warm and dry.            Chest tube:   Site: Left, Clean, Dry, Intact, and Securement device intact  Suction: -10 cm  Air Leak: positive, intermittent. Slightly improved  24 Hour Total: 180 mL    Results Review:     I reviewed the patient's new clinical results.  I reviewed the patient's new imaging results and agree with the interpretation.  I reviewed the patient's other test results and agree with the interpretation  Discussed with patient, at bedside, RN.  And Dr. Meier    Assessment & Plan "     Mr. Acevedo is a pleasant 82-year-old gentleman who is POD #9, s/p left VAT with da Ismael robot-assisted partial decortication and lysis of adhesions with intercostal block.  He subsequently underwent doxycycline chemical produces yesterday, 8/8/2023.    He denies any pain or shortness of breath.  A.m. chest x-ray reviewed which demonstrates chest tube in position. There is a small amount of lateral pleural separation seen.  Airleak persists though may be marginally improved compared to yesterday.  Will continue chest tube to -10 cm suction and repeat chest x-ray in the morning. Continue encourage good pulm hygiene.  He continues to endorse constipation. Will repeat lactulose enema today.  Continue Reglan and Jess-Colace along with PRN bowel regimen. Increase activity as tolerated.    DASHA Cardenas  [facility]  08/09/23  12:17 EDT    Patient was seen and assessed while wearing personal protective equipment including facemask, protective eyewear and gloves.  Hand hygiene performed prior to entering the room and upon exiting with doffing of gloves.

## 2023-08-09 NOTE — PLAN OF CARE
Problem: Adult Inpatient Plan of Care  Goal: Plan of Care Review  Recent Flowsheet Documentation  Taken 8/9/2023 1718 by Kathy Medeiros RN  Progress: no change  Plan of Care Reviewed With: patient  Outcome Evaluation: vss, pt up in chair, ambulate 900ft , chesttube remains -10suction with airleak, abd soft with hyperactive bowel sounds, pain controlled,  Goal: Absence of Hospital-Acquired Illness or Injury  Intervention: Identify and Manage Fall Risk  Recent Flowsheet Documentation  Taken 8/9/2023 1630 by Kathy Medeiros RN  Safety Promotion/Fall Prevention:   safety round/check completed   room organization consistent   nonskid shoes/slippers when out of bed   lighting adjusted   fall prevention program maintained   clutter free environment maintained   assistive device/personal items within reach  Taken 8/9/2023 1450 by Kathy Medeiros RN  Safety Promotion/Fall Prevention:   room organization consistent   safety round/check completed   nonskid shoes/slippers when out of bed   lighting adjusted   fall prevention program maintained   clutter free environment maintained   assistive device/personal items within reach  Taken 8/9/2023 1250 by Kathy Medeiros RN  Safety Promotion/Fall Prevention:   safety round/check completed   room organization consistent   nonskid shoes/slippers when out of bed   lighting adjusted   fall prevention program maintained   clutter free environment maintained   assistive device/personal items within reach  Taken 8/9/2023 1050 by Kathy Medeiros RN  Safety Promotion/Fall Prevention:   safety round/check completed   room organization consistent   nonskid shoes/slippers when out of bed   lighting adjusted   fall prevention program maintained   assistive device/personal items within reach   clutter free environment maintained  Taken 8/9/2023 0945 by Kathy Medeiros RN  Safety Promotion/Fall Prevention:   safety round/check completed   room organization consistent   nonskid  shoes/slippers when out of bed   lighting adjusted   fall prevention program maintained   clutter free environment maintained   assistive device/personal items within reach  Intervention: Prevent and Manage VTE (Venous Thromboembolism) Risk  Recent Flowsheet Documentation  Taken 8/9/2023 1250 by Kathy Medeiros RN  Activity Management:   ambulated outside room   up in chair  Taken 8/9/2023 1050 by Kathy Medeiros RN  Activity Management: up in chair  Taken 8/9/2023 0945 by Kathy Medeiros RN  Activity Management: up in chair  Goal: Optimal Comfort and Wellbeing  Intervention: Provide Person-Centered Care  Recent Flowsheet Documentation  Taken 8/9/2023 0945 by Kathy Medeiros RN  Trust Relationship/Rapport:   care explained   questions answered   reassurance provided   thoughts/feelings acknowledged   Goal Outcome Evaluation:  Plan of Care Reviewed With: patient        Progress: no change  Outcome Evaluation: vss, pt up in chair, ambulate 900ft , chesttube remains -10suction with airleak, abd soft with hyperactive bowel sounds, pain controlled,

## 2023-08-10 ENCOUNTER — APPOINTMENT (OUTPATIENT)
Dept: GENERAL RADIOLOGY | Facility: HOSPITAL | Age: 83
DRG: 164 | End: 2023-08-10
Payer: MEDICARE

## 2023-08-10 PROCEDURE — 71045 X-RAY EXAM CHEST 1 VIEW: CPT

## 2023-08-10 PROCEDURE — 94761 N-INVAS EAR/PLS OXIMETRY MLT: CPT

## 2023-08-10 PROCEDURE — 94799 UNLISTED PULMONARY SVC/PX: CPT

## 2023-08-10 PROCEDURE — 94762 N-INVAS EAR/PLS OXIMTRY CONT: CPT

## 2023-08-10 PROCEDURE — 94664 DEMO&/EVAL PT USE INHALER: CPT

## 2023-08-10 PROCEDURE — 25010000002 ENOXAPARIN PER 10 MG: Performed by: STUDENT IN AN ORGANIZED HEALTH CARE EDUCATION/TRAINING PROGRAM

## 2023-08-10 PROCEDURE — 25010000002 METOCLOPRAMIDE PER 10 MG: Performed by: NURSE PRACTITIONER

## 2023-08-10 PROCEDURE — 99024 POSTOP FOLLOW-UP VISIT: CPT

## 2023-08-10 RX ADMIN — DIAZEPAM 2 MG: 2 TABLET ORAL at 20:54

## 2023-08-10 RX ADMIN — ACETAMINOPHEN 1000 MG: 500 TABLET, FILM COATED ORAL at 09:54

## 2023-08-10 RX ADMIN — ENOXAPARIN SODIUM 30 MG: 100 INJECTION SUBCUTANEOUS at 09:54

## 2023-08-10 RX ADMIN — IPRATROPIUM BROMIDE AND ALBUTEROL SULFATE 3 ML: 2.5; .5 SOLUTION RESPIRATORY (INHALATION) at 16:00

## 2023-08-10 RX ADMIN — TORSEMIDE 40 MG: 20 TABLET ORAL at 09:54

## 2023-08-10 RX ADMIN — ACETAMINOPHEN 1000 MG: 500 TABLET, FILM COATED ORAL at 18:22

## 2023-08-10 RX ADMIN — SENNOSIDES AND DOCUSATE SODIUM 2 TABLET: 50; 8.6 TABLET ORAL at 20:54

## 2023-08-10 RX ADMIN — CARVEDILOL 25 MG: 25 TABLET, FILM COATED ORAL at 09:54

## 2023-08-10 RX ADMIN — IPRATROPIUM BROMIDE AND ALBUTEROL SULFATE 3 ML: 2.5; .5 SOLUTION RESPIRATORY (INHALATION) at 20:38

## 2023-08-10 RX ADMIN — METOCLOPRAMIDE HYDROCHLORIDE 10 MG: 5 INJECTION INTRAMUSCULAR; INTRAVENOUS at 18:22

## 2023-08-10 RX ADMIN — GABAPENTIN 100 MG: 100 CAPSULE ORAL at 09:54

## 2023-08-10 RX ADMIN — IPRATROPIUM BROMIDE AND ALBUTEROL SULFATE 3 ML: 2.5; .5 SOLUTION RESPIRATORY (INHALATION) at 07:55

## 2023-08-10 RX ADMIN — AMLODIPINE BESYLATE 5 MG: 5 TABLET ORAL at 09:54

## 2023-08-10 RX ADMIN — ROSUVASTATIN CALCIUM 20 MG: 20 TABLET, FILM COATED ORAL at 20:54

## 2023-08-10 RX ADMIN — METOCLOPRAMIDE HYDROCHLORIDE 10 MG: 5 INJECTION INTRAMUSCULAR; INTRAVENOUS at 05:43

## 2023-08-10 RX ADMIN — GABAPENTIN 100 MG: 100 CAPSULE ORAL at 20:54

## 2023-08-10 RX ADMIN — METOCLOPRAMIDE HYDROCHLORIDE 10 MG: 5 INJECTION INTRAMUSCULAR; INTRAVENOUS at 22:05

## 2023-08-10 RX ADMIN — CARVEDILOL 25 MG: 25 TABLET, FILM COATED ORAL at 20:54

## 2023-08-10 RX ADMIN — HYDRALAZINE HYDROCHLORIDE 50 MG: 50 TABLET, FILM COATED ORAL at 09:54

## 2023-08-10 RX ADMIN — METOCLOPRAMIDE HYDROCHLORIDE 10 MG: 5 INJECTION INTRAMUSCULAR; INTRAVENOUS at 09:54

## 2023-08-10 RX ADMIN — GUAIFENESIN 600 MG: 600 TABLET, EXTENDED RELEASE ORAL at 20:54

## 2023-08-10 RX ADMIN — SENNOSIDES AND DOCUSATE SODIUM 2 TABLET: 50; 8.6 TABLET ORAL at 09:54

## 2023-08-10 RX ADMIN — POLYETHYLENE GLYCOL 3350 17 G: 17 POWDER, FOR SOLUTION ORAL at 09:54

## 2023-08-10 RX ADMIN — LACTULOSE 300 ML: 10 SOLUTION ORAL; RECTAL at 17:24

## 2023-08-10 RX ADMIN — ACETAMINOPHEN 1000 MG: 500 TABLET, FILM COATED ORAL at 20:54

## 2023-08-10 RX ADMIN — HYDRALAZINE HYDROCHLORIDE 50 MG: 50 TABLET, FILM COATED ORAL at 20:54

## 2023-08-10 RX ADMIN — ASPIRIN 81 MG: 81 TABLET, COATED ORAL at 09:54

## 2023-08-10 NOTE — PROGRESS NOTES
"  POST-OPERATIVE NOTE     Chief Complaint: Non-small cell lung cancer of the left upper lobe of the lung, postoperative care  S/P: Bronchoscopy, left video-assisted thoracoscopy with da Ismael robot assisted partial decortication with lysis of adhesions and intercostal nerve block  POD # 10  S/P: Doxycycline chemical pleurodesis.  POD: # 2    Subjective:  Symptoms:  Improved.  No shortness of breath, cough or chest pain (Well-controlled.).    Diet:  Poor intake.  No nausea or vomiting.    Activity level: Returning to normal.    Pain:  He complains of pain that is mild.  Pain is well controlled.    Primary complaint is constipation    Objective:  General Appearance:  Comfortable, in no acute distress, in pain and ill-appearing.    Vital signs: (most recent): Blood pressure 104/71, pulse 63, temperature 98.2 øF (36.8 øC), temperature source Oral, resp. rate 16, height 170.2 cm (67\"), weight 73 kg (161 lb), SpO2 97 %.  Vital signs are normal.  No fever.    Output: Producing urine and minimal stool output.    HEENT: Normal HEENT exam.    Lungs:  Normal effort and normal respiratory rate.  He is not in respiratory distress.    Heart: Normal rate.  Regular rhythm.    Chest: Symmetric chest wall expansion. Chest wall tenderness present.    Abdomen: Abdomen is soft and distended (Improving).  There is no abdominal tenderness.     Neurological: Patient is alert and oriented to person, place and time.    Skin:  Warm and dry.            Chest tube:   Site: Left, Clean, Dry, Intact, and Securement device intact  Suction: -10 cm  Air Leak: positive, intermittent. Slightly improved  24 Hour Total: 20 mL    Results Review:     I reviewed the patient's new clinical results.  I reviewed the patient's new imaging results and agree with the interpretation.  I reviewed the patient's other test results and agree with the interpretation  Discussed with patient, at bedside, RN.  And Dr. Kramer.    Assessment & Plan     Mr. Acevedo is a pleasant " 82-year-old gentleman who is POD #9, s/p left VAT with da Ismael robot-assisted partial decortication and lysis of adhesions with intercostal block.  He subsequently underwent doxycycline chemical pleurodesis on 8/8/2023.    A.m. chest x-ray reviewed which appears similar compared to yesterday's.  Chest tube was transitioned to waterseal and follow-up imaging later today demonstrated resolution of the left pneumothorax. Continue chest tube to waterseal and repeat chest x-ray in the morning.  May possibly transition chest tube to mini 500 tomorrow in preparation for discharge.  Overnight oximetry ordered. His primary complaint today is constipation.  Repeat lactulose enema.  Continue good pulmonary hygiene.  Increase activity.    DASHA Cardenas  [facility]  08/10/23  14:13 EDT    Patient was seen and assessed while wearing personal protective equipment including facemask, protective eyewear and gloves.  Hand hygiene performed prior to entering the room and upon exiting with doffing of gloves.

## 2023-08-10 NOTE — DISCHARGE INSTRUCTIONS
Discharge instructions for [facility]    Keep unit upright and below the level of your chest  Do not disconnect unit  Do not kink tubing by sitting or laying on it  Use belt straps provided to be able to move  and walk around with unit attached to you  No tub baths until chest tube is removed. Sponge baths are okay or you may shower on the days that your visiting nurse visits. Shower just before the nurse visit so the nurse can change the dressing.  Per Doctor Directions Only:  If you are to empty the unit, clean access port on lower front of unit with alcohol wipe. Attach syringe and withdraw fluid. Write down how much fluid you withdraw and bring this with you the next time you see your doctor. How often you do this and when you do it depends on what your doctor tells you to do.  Chest tube usually stays in 1-2 weeks until leak is healed  Return to office to have tube removed  Dressing is then removed 48 hours after chest tube is removed.  Call your doctor if:  The tube falls out: cover the site with a dry, sterile dressing right away  You have shortness of breath or chest pain  You have a fever over 101 degrees  You have any foul smelling or cloudy drainage  Post-op VAT / Thoracotomy Discharge Instructions    1. Activity:  ú Climb stairs as tolerated  ú May drive car after 2 weeks or as directed by surgeon  ú Walk at least 3-4 times a day  ú Limit lifting for first 6 weeks. No lifting, pushing, or pulling greater than 20 pounds for at least 2 weeks  ú Continue to use your incentive spirometer 10x/hour    2. Nutrition:  ú Resume previous diet  ú Eat well balanced meals  ú Avoid constipation by eating fresh fruits, vegetables, whole grain foods and increasing fluid intake.    3. Incision (wound) Care:  ú Remove dressing after 48 hours, then leave open to air  ú If continued drainage, change dressing every 24 hours and as needed with dry gauze  ú May shower after discharge.  ú Wash around incision area with  soap and water daily. May also cleanse with hydrogen peroxide and/or betadine  ú No lotions or creams on incision area. It is normal to have some drainage from your chest tube site. Please keep the area clean and dry.    4. When to call for Medical Advice: [phone]  ú Fever greater than 101 degrees  ú Unusual or severe pain  ú Difficulty breathing  ú Incision changes (redness, swelling, or increased purulent drainage)  ú Any questions or problems    5. Medication Instructions:  ú Take Pain medications as directed to stay comfortable     -Tylenol: Gabapentin (for nerve pain), Celebrex (anti-inflamatory), oxycodone as needed- FOR 30 DAYS  ú No driving or drinking alcohol when taking prescribed pain meds  ú Laxative of choice if needed for constipation.    6. Follow- up appointment:  ú We will arrange a follow up appointment in about 2 weeks   Please go to the [facility] hospital for a chest x-ray prior to your appointment

## 2023-08-10 NOTE — CASE MANAGEMENT/SOCIAL WORK
Continued Stay Note  [facility]     Patient Name: Bayron Acevedo  MRN: [id number]  Today's Date: 8/10/2023    Admit Date: 7/31/2023    Plan: Home with family   Discharge Plan       Row Name 08/10/23 1445       Plan    Plan Home with family    Patient/Family in Agreement with Plan yes    Plan Comments Met wit patient at bedside who confirms plan to return home at discharge. Discussed possible need for night time O2.  Will use [place] if needed.  CCP continues to follow.  LUANA Saini RN                   Discharge Codes    No documentation.                 Expected Discharge Date and Time       Expected Discharge Date Expected Discharge Time    Aug 9, 2023               Cherry Saini, RN

## 2023-08-10 NOTE — PLAN OF CARE
Problem: Adult Inpatient Plan of Care  Goal: Plan of Care Review  Recent Flowsheet Documentation  Taken 8/10/2023 1619 by Kathy Medeiros RN  Progress: improving  Plan of Care Reviewed With: patient  Outcome Evaluation: vss, ambulated 900ft, chest tube placed to [id number], (+)airleak, pain controlled  Goal: Absence of Hospital-Acquired Illness or Injury  Intervention: Identify and Manage Fall Risk  Recent Flowsheet Documentation  Taken 8/10/2023 1430 by Kathy Medeiros RN  Safety Promotion/Fall Prevention:   safety round/check completed   room organization consistent   nonskid shoes/slippers when out of bed   lighting adjusted   fall prevention program maintained   clutter free environment maintained   assistive device/personal items within reach  Taken 8/10/2023 1230 by Kathy Medeiros RN  Safety Promotion/Fall Prevention:   safety round/check completed   room organization consistent   nonskid shoes/slippers when out of bed   lighting adjusted   fall prevention program maintained   clutter free environment maintained   assistive device/personal items within reach  Taken 8/10/2023 1050 by Kathy Medeiros RN  Safety Promotion/Fall Prevention:   safety round/check completed   room organization consistent   nonskid shoes/slippers when out of bed   lighting adjusted   fall prevention program maintained   clutter free environment maintained   assistive device/personal items within reach  Taken 8/10/2023 0954 by Kathy Medeiros RN  Safety Promotion/Fall Prevention:   safety round/check completed   room organization consistent   nonskid shoes/slippers when out of bed   lighting adjusted   fall prevention program maintained   clutter free environment maintained   assistive device/personal items within reach  Intervention: Prevent and Manage VTE (Venous Thromboembolism) Risk  Recent Flowsheet Documentation  Taken 8/10/2023 1430 by Kathy Medeiros RN  Activity Management: up in chair  Taken 8/10/2023 1050 by  Kathy Medeiros, RN  Activity Management:   up in chair   ambulated outside room  Taken 8/10/2023 0954 by Kathy Medeiros RN  Activity Management: up in chair  Goal: Optimal Comfort and Wellbeing  Intervention: Provide Person-Centered Care  Recent Flowsheet Documentation  Taken 8/10/2023 0954 by Kathy Medeiros, RN  Trust Relationship/Rapport:   care explained   thoughts/feelings acknowledged   Goal Outcome Evaluation:  Plan of Care Reviewed With: patient        Progress: improving  Outcome Evaluation: vss, ambulated 900ft, chest tube placed to [id number], (+)airleak, pain controlled

## 2023-08-11 ENCOUNTER — APPOINTMENT (OUTPATIENT)
Dept: GENERAL RADIOLOGY | Facility: HOSPITAL | Age: 83
DRG: 164 | End: 2023-08-11
Payer: MEDICARE

## 2023-08-11 PROCEDURE — 71045 X-RAY EXAM CHEST 1 VIEW: CPT

## 2023-08-11 PROCEDURE — 94799 UNLISTED PULMONARY SVC/PX: CPT

## 2023-08-11 PROCEDURE — 25010000002 METOCLOPRAMIDE PER 10 MG: Performed by: NURSE PRACTITIONER

## 2023-08-11 PROCEDURE — 0 LIDOCAINE 1 % SOLUTION: Performed by: NURSE PRACTITIONER

## 2023-08-11 PROCEDURE — 99024 POSTOP FOLLOW-UP VISIT: CPT

## 2023-08-11 PROCEDURE — 25010000002 ENOXAPARIN PER 10 MG: Performed by: STUDENT IN AN ORGANIZED HEALTH CARE EDUCATION/TRAINING PROGRAM

## 2023-08-11 PROCEDURE — 94761 N-INVAS EAR/PLS OXIMETRY MLT: CPT

## 2023-08-11 PROCEDURE — 94664 DEMO&/EVAL PT USE INHALER: CPT

## 2023-08-11 RX ORDER — LIDOCAINE HYDROCHLORIDE 10 MG/ML
10 INJECTION, SOLUTION INFILTRATION; PERINEURAL ONCE
Status: DISCONTINUED | OUTPATIENT
Start: 2023-08-11 | End: 2023-08-11

## 2023-08-11 RX ORDER — LIDOCAINE HYDROCHLORIDE 10 MG/ML
20 INJECTION, SOLUTION INFILTRATION; PERINEURAL ONCE
Status: COMPLETED | OUTPATIENT
Start: 2023-08-11 | End: 2023-08-11

## 2023-08-11 RX ORDER — LIDOCAINE HYDROCHLORIDE 20 MG/ML
10 INJECTION, SOLUTION INFILTRATION; PERINEURAL ONCE
Status: DISCONTINUED | OUTPATIENT
Start: 2023-08-11 | End: 2023-08-11

## 2023-08-11 RX ORDER — SIMETHICONE 80 MG
80 TABLET,CHEWABLE ORAL 3 TIMES DAILY PRN
Status: DISCONTINUED | OUTPATIENT
Start: 2023-08-11 | End: 2023-08-15 | Stop reason: HOSPADM

## 2023-08-11 RX ADMIN — CARVEDILOL 25 MG: 25 TABLET, FILM COATED ORAL at 20:53

## 2023-08-11 RX ADMIN — METOCLOPRAMIDE HYDROCHLORIDE 10 MG: 5 INJECTION INTRAMUSCULAR; INTRAVENOUS at 10:48

## 2023-08-11 RX ADMIN — SENNOSIDES AND DOCUSATE SODIUM 2 TABLET: 50; 8.6 TABLET ORAL at 20:52

## 2023-08-11 RX ADMIN — HYDRALAZINE HYDROCHLORIDE 50 MG: 50 TABLET, FILM COATED ORAL at 20:52

## 2023-08-11 RX ADMIN — IPRATROPIUM BROMIDE AND ALBUTEROL SULFATE 3 ML: 2.5; .5 SOLUTION RESPIRATORY (INHALATION) at 07:56

## 2023-08-11 RX ADMIN — BISACODYL 5 MG: 5 TABLET, COATED ORAL at 14:47

## 2023-08-11 RX ADMIN — ASPIRIN 81 MG: 81 TABLET, COATED ORAL at 08:48

## 2023-08-11 RX ADMIN — POLYETHYLENE GLYCOL 3350 17 G: 17 POWDER, FOR SOLUTION ORAL at 08:48

## 2023-08-11 RX ADMIN — METOCLOPRAMIDE HYDROCHLORIDE 10 MG: 5 INJECTION INTRAMUSCULAR; INTRAVENOUS at 16:09

## 2023-08-11 RX ADMIN — ANTACID TABLETS 1 TABLET: 500 TABLET, CHEWABLE ORAL at 22:42

## 2023-08-11 RX ADMIN — TORSEMIDE 40 MG: 20 TABLET ORAL at 08:48

## 2023-08-11 RX ADMIN — ACETAMINOPHEN 1000 MG: 500 TABLET, FILM COATED ORAL at 08:48

## 2023-08-11 RX ADMIN — GUAIFENESIN 600 MG: 600 TABLET, EXTENDED RELEASE ORAL at 20:52

## 2023-08-11 RX ADMIN — ENOXAPARIN SODIUM 30 MG: 100 INJECTION SUBCUTANEOUS at 08:49

## 2023-08-11 RX ADMIN — SIMETHICONE 80 MG: 80 TABLET, CHEWABLE ORAL at 14:47

## 2023-08-11 RX ADMIN — METOCLOPRAMIDE HYDROCHLORIDE 10 MG: 5 INJECTION INTRAMUSCULAR; INTRAVENOUS at 20:53

## 2023-08-11 RX ADMIN — AMLODIPINE BESYLATE 5 MG: 5 TABLET ORAL at 08:48

## 2023-08-11 RX ADMIN — GUAIFENESIN 600 MG: 600 TABLET, EXTENDED RELEASE ORAL at 08:48

## 2023-08-11 RX ADMIN — LIDOCAINE HYDROCHLORIDE 2 ML: 10 INJECTION, SOLUTION INFILTRATION; PERINEURAL at 11:50

## 2023-08-11 RX ADMIN — SENNOSIDES AND DOCUSATE SODIUM 2 TABLET: 50; 8.6 TABLET ORAL at 08:48

## 2023-08-11 RX ADMIN — ROSUVASTATIN CALCIUM 20 MG: 20 TABLET, FILM COATED ORAL at 20:52

## 2023-08-11 RX ADMIN — GABAPENTIN 100 MG: 100 CAPSULE ORAL at 08:48

## 2023-08-11 RX ADMIN — GABAPENTIN 100 MG: 100 CAPSULE ORAL at 20:52

## 2023-08-11 RX ADMIN — ACETAMINOPHEN 1000 MG: 500 TABLET, FILM COATED ORAL at 16:09

## 2023-08-11 RX ADMIN — METOCLOPRAMIDE HYDROCHLORIDE 10 MG: 5 INJECTION INTRAMUSCULAR; INTRAVENOUS at 06:20

## 2023-08-11 RX ADMIN — HYDRALAZINE HYDROCHLORIDE 50 MG: 50 TABLET, FILM COATED ORAL at 08:48

## 2023-08-11 RX ADMIN — ACETAMINOPHEN 1000 MG: 500 TABLET, FILM COATED ORAL at 20:52

## 2023-08-11 RX ADMIN — IPRATROPIUM BROMIDE AND ALBUTEROL SULFATE 3 ML: 2.5; .5 SOLUTION RESPIRATORY (INHALATION) at 15:19

## 2023-08-11 RX ADMIN — IPRATROPIUM BROMIDE AND ALBUTEROL SULFATE 3 ML: 2.5; .5 SOLUTION RESPIRATORY (INHALATION) at 21:10

## 2023-08-11 RX ADMIN — CARVEDILOL 25 MG: 25 TABLET, FILM COATED ORAL at 08:48

## 2023-08-11 NOTE — PLAN OF CARE
Goal Outcome Evaluation:  Plan of Care Reviewed With: patient        Progress: improving  Outcome Evaluation: Pt alert and orient. On room air. Pt with minimal c/o pain, scheduled meds given per MAR. Up with SBA. Chest tube to waterseal, airleak and fluctuation present.

## 2023-08-11 NOTE — PROGRESS NOTES
"  POST-OPERATIVE NOTE     Chief Complaint: Non-small cell lung cancer of the left upper lobe of the lung, postoperative care  S/P: Bronchoscopy, left video-assisted thoracoscopy with da Ismael robot assisted partial decortication with lysis of adhesions and intercostal nerve block  POD # 11  S/P: Doxycycline chemical pleurodesis.  POD: # 3    Subjective:  Symptoms:  Improved.  No shortness of breath, cough or chest pain (Well-controlled.).    Diet:  Poor intake.  No nausea or vomiting.    Activity level: Returning to normal.    Pain:  He complains of pain that is mild.  Pain is well controlled.        Objective:  General Appearance:  Comfortable, in no acute distress, in pain and ill-appearing.    Vital signs: (most recent): Blood pressure 118/53, pulse 72, temperature 97.7 øF (36.5 øC), temperature source Oral, resp. rate 16, height 170.2 cm (67\"), weight 73 kg (161 lb), SpO2 94 %.  Vital signs are normal.  No fever.    Output: Producing urine and minimal stool output.    HEENT: Normal HEENT exam.    Lungs:  Normal effort and normal respiratory rate.  He is not in respiratory distress.    Heart: Normal rate.  Regular rhythm.    Chest: Symmetric chest wall expansion. Chest wall tenderness present.    Abdomen: Abdomen is soft and distended (Improving).  There is no abdominal tenderness.     Neurological: Patient is alert and oriented to person, place and time.    Skin:  Warm and dry.            Chest tube:   Site: Left, Clean, Dry, Intact, and Securement device intact  Suction: Waterseal  Air Leak: positive, intermittent.   24 Hour Total: 60 mL    Results Review:     I reviewed the patient's new clinical results.  I reviewed the patient's new imaging results and agree with the interpretation.  I reviewed the patient's other test results and agree with the interpretation  Discussed with patient, at bedside, RN.  And Dr. Kramer. & Dr. Meier.    Assessment & Plan     Mr. Acevedo is a pleasant 82-year-old gentleman who is POD " #11, s/p left VAT with da Ismael robot-assisted partial decortication and lysis of adhesions with intercostal block.  He subsequently underwent doxycycline chemical pleurodesis on 8/8/2023.    A.m. chest x-ray reviewed which demonstrates increase of the left lateral pneumothorax. Discussed with Dr. Kramer & Dr. Meier. The chest tube was retracted to optimize positioning.  Follow-up chest x-ray demonstrates reduced left lateral pneumothorax.  Continue chest tube to waterseal repeat chest x-ray in the morning.  Encourage good pulmonary hygiene.  Increase activity as tolerated.  Continue bowel regimen, simethicone added to aid with his gas.    DASHA Cardenas  [facility]  08/11/23  14:48 EDT    Patient was seen and assessed while wearing personal protective equipment including facemask, protective eyewear and gloves.  Hand hygiene performed prior to entering the room and upon exiting with doffing of gloves.

## 2023-08-11 NOTE — PLAN OF CARE
Goal Outcome Evaluation:   VSS. A&OX4. Pain managed with PRN valium. Up with SBA. Left chest tube to water seal. + air leak. Overnight oximetry in process. Remains on room air. Call light in reach.

## 2023-08-12 ENCOUNTER — APPOINTMENT (OUTPATIENT)
Dept: GENERAL RADIOLOGY | Facility: HOSPITAL | Age: 83
DRG: 164 | End: 2023-08-12
Payer: MEDICARE

## 2023-08-12 LAB
ANION GAP SERPL CALCULATED.3IONS-SCNC: 10.2 MMOL/L (ref 5–15)
BUN SERPL-MCNC: 22 MG/DL (ref 8–23)
BUN/CREAT SERPL: 14.9 (ref 7–25)
CALCIUM SPEC-SCNC: 9.3 MG/DL (ref 8.6–10.5)
CHLORIDE SERPL-SCNC: 95 MMOL/L (ref 98–107)
CO2 SERPL-SCNC: 25.8 MMOL/L (ref 22–29)
CREAT SERPL-MCNC: 1.48 MG/DL (ref 0.76–1.27)
EGFRCR SERPLBLD CKD-EPI 2021: 46.1 ML/MIN/1.73
GLUCOSE SERPL-MCNC: 108 MG/DL (ref 65–99)
POTASSIUM SERPL-SCNC: 4.3 MMOL/L (ref 3.5–5.2)
SODIUM SERPL-SCNC: 131 MMOL/L (ref 136–145)

## 2023-08-12 PROCEDURE — 99232 SBSQ HOSP IP/OBS MODERATE 35: CPT | Performed by: NURSE PRACTITIONER

## 2023-08-12 PROCEDURE — 71045 X-RAY EXAM CHEST 1 VIEW: CPT

## 2023-08-12 PROCEDURE — 94799 UNLISTED PULMONARY SVC/PX: CPT

## 2023-08-12 PROCEDURE — 25010000002 ENOXAPARIN PER 10 MG: Performed by: STUDENT IN AN ORGANIZED HEALTH CARE EDUCATION/TRAINING PROGRAM

## 2023-08-12 PROCEDURE — 25010000002 METOCLOPRAMIDE PER 10 MG: Performed by: NURSE PRACTITIONER

## 2023-08-12 PROCEDURE — 80048 BASIC METABOLIC PNL TOTAL CA: CPT

## 2023-08-12 PROCEDURE — 94761 N-INVAS EAR/PLS OXIMETRY MLT: CPT

## 2023-08-12 RX ADMIN — GABAPENTIN 100 MG: 100 CAPSULE ORAL at 21:00

## 2023-08-12 RX ADMIN — HYDRALAZINE HYDROCHLORIDE 50 MG: 50 TABLET, FILM COATED ORAL at 08:15

## 2023-08-12 RX ADMIN — HYDRALAZINE HYDROCHLORIDE 50 MG: 50 TABLET, FILM COATED ORAL at 21:00

## 2023-08-12 RX ADMIN — GUAIFENESIN 600 MG: 600 TABLET, EXTENDED RELEASE ORAL at 21:00

## 2023-08-12 RX ADMIN — TORSEMIDE 40 MG: 20 TABLET ORAL at 08:14

## 2023-08-12 RX ADMIN — ENOXAPARIN SODIUM 30 MG: 100 INJECTION SUBCUTANEOUS at 08:15

## 2023-08-12 RX ADMIN — CARVEDILOL 25 MG: 25 TABLET, FILM COATED ORAL at 21:00

## 2023-08-12 RX ADMIN — ACETAMINOPHEN 1000 MG: 500 TABLET, FILM COATED ORAL at 16:01

## 2023-08-12 RX ADMIN — METOCLOPRAMIDE HYDROCHLORIDE 10 MG: 5 INJECTION INTRAMUSCULAR; INTRAVENOUS at 09:29

## 2023-08-12 RX ADMIN — ROSUVASTATIN CALCIUM 20 MG: 20 TABLET, FILM COATED ORAL at 21:00

## 2023-08-12 RX ADMIN — GUAIFENESIN 600 MG: 600 TABLET, EXTENDED RELEASE ORAL at 08:14

## 2023-08-12 RX ADMIN — ACETAMINOPHEN 1000 MG: 500 TABLET, FILM COATED ORAL at 21:00

## 2023-08-12 RX ADMIN — ASPIRIN 81 MG: 81 TABLET, COATED ORAL at 08:14

## 2023-08-12 RX ADMIN — IPRATROPIUM BROMIDE AND ALBUTEROL SULFATE 3 ML: 2.5; .5 SOLUTION RESPIRATORY (INHALATION) at 20:24

## 2023-08-12 RX ADMIN — METOCLOPRAMIDE HYDROCHLORIDE 10 MG: 5 INJECTION INTRAMUSCULAR; INTRAVENOUS at 21:02

## 2023-08-12 RX ADMIN — SENNOSIDES AND DOCUSATE SODIUM 2 TABLET: 50; 8.6 TABLET ORAL at 21:00

## 2023-08-12 RX ADMIN — GABAPENTIN 100 MG: 100 CAPSULE ORAL at 08:14

## 2023-08-12 RX ADMIN — POLYETHYLENE GLYCOL 3350 17 G: 17 POWDER, FOR SOLUTION ORAL at 08:14

## 2023-08-12 RX ADMIN — CARVEDILOL 25 MG: 25 TABLET, FILM COATED ORAL at 08:14

## 2023-08-12 RX ADMIN — DIAZEPAM 2 MG: 2 TABLET ORAL at 21:00

## 2023-08-12 RX ADMIN — METOCLOPRAMIDE HYDROCHLORIDE 10 MG: 5 INJECTION INTRAMUSCULAR; INTRAVENOUS at 05:16

## 2023-08-12 RX ADMIN — ACETAMINOPHEN 1000 MG: 500 TABLET, FILM COATED ORAL at 08:14

## 2023-08-12 RX ADMIN — SENNOSIDES AND DOCUSATE SODIUM 2 TABLET: 50; 8.6 TABLET ORAL at 08:14

## 2023-08-12 RX ADMIN — METOCLOPRAMIDE HYDROCHLORIDE 10 MG: 5 INJECTION INTRAMUSCULAR; INTRAVENOUS at 16:01

## 2023-08-12 RX ADMIN — AMLODIPINE BESYLATE 5 MG: 5 TABLET ORAL at 08:15

## 2023-08-12 NOTE — PLAN OF CARE
Goal Outcome Evaluation:  Plan of Care Reviewed With: patient        Progress: no change  Outcome Evaluation: VSS, chest tube still with air leak. Up standby assist. Renal consulted for hyponatremia.

## 2023-08-12 NOTE — PROGRESS NOTES
"  POST-OPERATIVE NOTE     Chief Complaint: Non-small cell lung cancer of the left upper lobe of the lung, postoperative care  S/P: Bronchoscopy, left video-assisted thoracoscopy with da Ismael robot assisted partial decortication with lysis of adhesions and intercostal nerve block  POD # 12  S/P: Doxycycline chemical pleurodesis.  POD: # 4    Subjective:  Symptoms:  Stable.  He reports malaise, weakness and anorexia.  No shortness of breath, cough or chest pain (Well-controlled.).    Diet:  Poor intake.  No nausea or vomiting.    Activity level: Normal with assistance.    Pain:  He complains of pain that is mild.  Pain is well controlled.    She complains that he does not have a lot of energy to walk around and that he has no appetite.    Objective:  General Appearance:  Comfortable, in no acute distress, in pain and ill-appearing.    Vital signs: (most recent): Blood pressure 118/49, pulse 62, temperature 97.6 øF (36.4 øC), temperature source Oral, resp. rate 18, height 170.2 cm (67\"), weight 73 kg (161 lb), SpO2 97 %.  Vital signs are normal.  No fever.    Output: Producing urine and producing stool.    HEENT: Normal HEENT exam.    Lungs:  Normal effort and normal respiratory rate.  He is not in respiratory distress.    Heart: Normal rate.  Regular rhythm.    Chest: Symmetric chest wall expansion. Chest wall tenderness present.    Abdomen: Abdomen is soft and distended (Improving).  There is no abdominal tenderness.     Neurological: Patient is alert and oriented to person, place and time.    Skin:  Warm and dry.            Chest tube:   Site: Left, Clean, Dry, Intact, and Securement device intact  Suction: Waterseal  Air Leak: positive, intermittent.   24 Hour Total: 10 mL    Results Review:     I reviewed the patient's new clinical results.  I reviewed the patient's new imaging results and agree with the interpretation.  I reviewed the patient's other test results and agree with the interpretation  Discussed with " patient, family at bedside, RN and Dr. Meier.      Assessment & Plan     Mr. Acevedo is a pleasant 82-year-old gentleman who is POD #12, s/p left VAT with da Ismael robot-assisted partial decortication and lysis of adhesions with intercostal block.  He subsequently underwent doxycycline chemical pleurodesis on 8/8/2023.  This morning's chest x-ray is stable but shows improvement compared to yesterday's early morning image since chest tube was retracted slightly.  Patient's airleak is certainly improved on assessment today but is evident with forceful cough.  He has had some bowel movements with enemas but has some persistent distention.  Hyponatremia noted on this morning's BMP.  Nephrology consulted by per Dr. Meier for assistance.  Him somewhat concerned about the patient's decreased appetite and lack of caloric intake.  He is not really utilizing the protein shakes that have been provided.  We will ask nutrition to assist with calorie count.  Continue chest tube to waterseal repeat chest x-ray in the morning.  Encourage good pulmonary hygiene.  Increase activity as tolerated.  Continue bowel regimen.    DASHA Ahumada  [facility]  08/12/23  15:57 EDT    Patient was seen and assessed while wearing personal protective equipment including facemask, protective eyewear and gloves.  Hand hygiene performed prior to entering the room and upon exiting with doffing of gloves.

## 2023-08-13 ENCOUNTER — APPOINTMENT (OUTPATIENT)
Dept: GENERAL RADIOLOGY | Facility: HOSPITAL | Age: 83
DRG: 164 | End: 2023-08-13
Payer: MEDICARE

## 2023-08-13 LAB
ALBUMIN SERPL-MCNC: 3.2 G/DL (ref 3.5–5.2)
ANION GAP SERPL CALCULATED.3IONS-SCNC: 9.1 MMOL/L (ref 5–15)
BUN SERPL-MCNC: 21 MG/DL (ref 8–23)
BUN/CREAT SERPL: 13.8 (ref 7–25)
CALCIUM SPEC-SCNC: 9 MG/DL (ref 8.6–10.5)
CHLORIDE SERPL-SCNC: 94 MMOL/L (ref 98–107)
CHLORIDE UR-SCNC: 87 MMOL/L
CO2 SERPL-SCNC: 25.9 MMOL/L (ref 22–29)
CREAT SERPL-MCNC: 1.52 MG/DL (ref 0.76–1.27)
CREAT UR-MCNC: 52.2 MG/DL
EGFRCR SERPLBLD CKD-EPI 2021: 44.6 ML/MIN/1.73
GLUCOSE SERPL-MCNC: 111 MG/DL (ref 65–99)
OSMOLALITY UR: 339 MOSM/KG
PHOSPHATE SERPL-MCNC: 3.6 MG/DL (ref 2.5–4.5)
POTASSIUM SERPL-SCNC: 4.2 MMOL/L (ref 3.5–5.2)
PROT ?TM UR-MCNC: 11.6 MG/DL
PROT/CREAT UR: 222.2 MG/G CREA (ref 0–200)
SODIUM SERPL-SCNC: 129 MMOL/L (ref 136–145)
SODIUM UR-SCNC: 78 MMOL/L

## 2023-08-13 PROCEDURE — 83935 ASSAY OF URINE OSMOLALITY: CPT | Performed by: INTERNAL MEDICINE

## 2023-08-13 PROCEDURE — 82436 ASSAY OF URINE CHLORIDE: CPT | Performed by: INTERNAL MEDICINE

## 2023-08-13 PROCEDURE — 84300 ASSAY OF URINE SODIUM: CPT | Performed by: INTERNAL MEDICINE

## 2023-08-13 PROCEDURE — 99232 SBSQ HOSP IP/OBS MODERATE 35: CPT | Performed by: NURSE PRACTITIONER

## 2023-08-13 PROCEDURE — 25010000002 METOCLOPRAMIDE PER 10 MG: Performed by: NURSE PRACTITIONER

## 2023-08-13 PROCEDURE — 94799 UNLISTED PULMONARY SVC/PX: CPT

## 2023-08-13 PROCEDURE — 80069 RENAL FUNCTION PANEL: CPT | Performed by: NURSE PRACTITIONER

## 2023-08-13 PROCEDURE — 82570 ASSAY OF URINE CREATININE: CPT | Performed by: INTERNAL MEDICINE

## 2023-08-13 PROCEDURE — 84156 ASSAY OF PROTEIN URINE: CPT | Performed by: INTERNAL MEDICINE

## 2023-08-13 PROCEDURE — 94664 DEMO&/EVAL PT USE INHALER: CPT

## 2023-08-13 PROCEDURE — 25010000002 ENOXAPARIN PER 10 MG: Performed by: STUDENT IN AN ORGANIZED HEALTH CARE EDUCATION/TRAINING PROGRAM

## 2023-08-13 PROCEDURE — 71045 X-RAY EXAM CHEST 1 VIEW: CPT

## 2023-08-13 RX ADMIN — ASPIRIN 81 MG: 81 TABLET, COATED ORAL at 08:20

## 2023-08-13 RX ADMIN — METOCLOPRAMIDE HYDROCHLORIDE 10 MG: 5 INJECTION INTRAMUSCULAR; INTRAVENOUS at 09:42

## 2023-08-13 RX ADMIN — GUAIFENESIN 600 MG: 600 TABLET, EXTENDED RELEASE ORAL at 08:20

## 2023-08-13 RX ADMIN — POLYETHYLENE GLYCOL 3350 17 G: 17 POWDER, FOR SOLUTION ORAL at 08:20

## 2023-08-13 RX ADMIN — AMLODIPINE BESYLATE 5 MG: 5 TABLET ORAL at 08:20

## 2023-08-13 RX ADMIN — METOCLOPRAMIDE HYDROCHLORIDE 10 MG: 5 INJECTION INTRAMUSCULAR; INTRAVENOUS at 05:11

## 2023-08-13 RX ADMIN — ACETAMINOPHEN 1000 MG: 500 TABLET, FILM COATED ORAL at 08:20

## 2023-08-13 RX ADMIN — METOCLOPRAMIDE HYDROCHLORIDE 10 MG: 5 INJECTION INTRAMUSCULAR; INTRAVENOUS at 21:00

## 2023-08-13 RX ADMIN — ENOXAPARIN SODIUM 30 MG: 100 INJECTION SUBCUTANEOUS at 08:20

## 2023-08-13 RX ADMIN — ACETAMINOPHEN 1000 MG: 500 TABLET, FILM COATED ORAL at 16:07

## 2023-08-13 RX ADMIN — HYDRALAZINE HYDROCHLORIDE 50 MG: 50 TABLET, FILM COATED ORAL at 08:20

## 2023-08-13 RX ADMIN — ACETAMINOPHEN 1000 MG: 500 TABLET, FILM COATED ORAL at 21:00

## 2023-08-13 RX ADMIN — HYDRALAZINE HYDROCHLORIDE 50 MG: 50 TABLET, FILM COATED ORAL at 21:01

## 2023-08-13 RX ADMIN — SENNOSIDES AND DOCUSATE SODIUM 2 TABLET: 50; 8.6 TABLET ORAL at 08:20

## 2023-08-13 RX ADMIN — CARVEDILOL 25 MG: 25 TABLET, FILM COATED ORAL at 21:00

## 2023-08-13 RX ADMIN — GUAIFENESIN 600 MG: 600 TABLET, EXTENDED RELEASE ORAL at 21:00

## 2023-08-13 RX ADMIN — ROSUVASTATIN CALCIUM 20 MG: 20 TABLET, FILM COATED ORAL at 21:02

## 2023-08-13 RX ADMIN — IPRATROPIUM BROMIDE AND ALBUTEROL SULFATE 3 ML: 2.5; .5 SOLUTION RESPIRATORY (INHALATION) at 08:33

## 2023-08-13 RX ADMIN — SENNOSIDES AND DOCUSATE SODIUM 2 TABLET: 50; 8.6 TABLET ORAL at 21:00

## 2023-08-13 RX ADMIN — GABAPENTIN 100 MG: 100 CAPSULE ORAL at 21:00

## 2023-08-13 RX ADMIN — GABAPENTIN 100 MG: 100 CAPSULE ORAL at 08:20

## 2023-08-13 RX ADMIN — CARVEDILOL 25 MG: 25 TABLET, FILM COATED ORAL at 08:20

## 2023-08-13 RX ADMIN — METOCLOPRAMIDE HYDROCHLORIDE 10 MG: 5 INJECTION INTRAMUSCULAR; INTRAVENOUS at 16:07

## 2023-08-13 RX ADMIN — TORSEMIDE 40 MG: 20 TABLET ORAL at 08:20

## 2023-08-13 NOTE — CONSULTS
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   Food Preferences:    Supplements:    Factors Affecting Intake: constipation, decreased appetite     Anthropometrics        Current Height  Current Weight  BMI kg/m2 Height: 170.2 cm (67\")  Weight: 73 kg (161 lb) (07/31/23 2343)  Body mass index is 25.22 kg/mý.   Adjusted BMI (if applicable)    BMI Category Overweight (25 - 29.9)       Admission Weight 73kg       Ideal Body Weight (IBW) 66.1kg   Adjusted IBW (if applicable)        Usual Body Weight (UBW) 176lbs in April   Weight Change/Trend Loss, Amount/Timeframe: 14# (8%) x 4 months       Weight History Wt Readings from Last 30 Encounters:   07/31/23 2343 73 kg (161 lb)   07/26/23 0857 73 kg (161 lb)   07/21/23 1009 73.6 kg (162 lb 3.2 oz)   07/21/23 0857 73.6 kg (162 lb 3.2 oz)   07/19/23 1239 72.6 kg (160 lb)   07/11/23 0852 72.6 kg (160 lb)   07/11/23 0759 72.6 kg (160 lb)   06/20/23 0801 72.6 kg (160 lb)   06/13/23 0841 72.6 kg (160 lb)   06/06/23 0834 76.7 kg (169 lb)   05/18/23 0916 76.7 kg (169 lb 3.2 oz)   04/27/23 0904 81.2 kg (179 lb)   04/19/23 1035 80.7 kg (178 lb)   04/12/23 0541 78.1 kg (172 lb 3.2 oz)   04/11/23 0618 78.9 kg (173 lb 14.4 oz)   04/10/23 0600 76.8 kg (169 lb 4.8 oz)   04/09/23 0549 79.8 kg (176 lb)   04/08/23 0531 79.8 kg (176 lb)   04/07/23 1908 79.8 kg (176 lb)   04/07/23 1506 79.8 kg (176 lb) "   04/22/22 1500 86.2 kg (190 lb)   11/05/21 0842 87.5 kg (193 lb)   05/17/21 0555 90.2 kg (198 lb 13.7 oz)   05/14/21 1100 90.3 kg (199 lb)   12/30/20 0857 90.7 kg (200 lb)   11/19/19 1139 91.2 kg (201 lb 1.6 oz)   08/28/19 1047 90.9 kg (200 lb 6.4 oz)   03/18/19 0932 92.1 kg (203 lb)   09/10/18 0942 91.1 kg (200 lb 14.4 oz)   08/09/18 1428 92.5 kg (204 lb)   05/21/18 1424 94.6 kg (208 lb 8 oz)   04/17/17 1100 92.7 kg (204 lb 6.4 oz)   04/06/17 1019 93 kg (205 lb 1.6 oz)   01/06/17 1001 92 kg (202 lb 14.4 oz)   10/06/16 0828 91.6 kg (202 lb)   04/19/16 1301 93.8 kg (206 lb 11.2 oz)           --  Estimated/Assessed Needs        Current Weight  Weight: 73 kg (161 lb) (07/31/23 2343)       Energy Requirements    Weight for Calculation 73kg   Method for Estimation  25 kcal/kg, 30 kcal/kg   EST Needs (kcal/day) 2412-6385       Protein Requirements    Weight for Calculation 73kg   EST Protein Needs (g/kg) 1.2 - 1.5 gm/kg   EST Daily Needs (g/day)        Fluid Requirements     Method for Estimation 1 mL/kcal    EST Needs (mL/day)      Tests/Procedures        Tests/Procedures X-Ray     Labs       Pertinent Labs    Results from last 7 days   Lab Units 08/13/23 0453 08/12/23  0521   SODIUM mmol/L 129* 131*   POTASSIUM mmol/L 4.2 4.3   CHLORIDE mmol/L 94* 95*   CO2 mmol/L 25.9 25.8   BUN mg/dL 21 22   CREATININE mg/dL 1.52* 1.48*   CALCIUM mg/dL 9.0 9.3   GLUCOSE mg/dL 111* 108*     Results from last 7 days   Lab Units 08/13/23  0453   PHOSPHORUS mg/dL 3.6   ALBUMIN g/dL 3.2*         External COVID19   Date Value Ref Range Status   04/22/2022 Detected (A) Not Detected - Ref. Range Final     No results found for: HGBA1C       Medications           Scheduled Medications acetaminophen, 1,000 mg, Oral, TID  amLODIPine, 5 mg, Oral, Daily  aspirin, 81 mg, Oral, Daily  carvedilol, 25 mg, Oral, BID  enoxaparin, 30 mg, Subcutaneous, Daily  gabapentin, 100 mg, Oral, Q12H  guaiFENesin, 600 mg, Oral, Q12H  hydrALAZINE, 50 mg, Oral,  BID  ipratropium-albuterol, 3 mL, Nebulization, Q8H - RT  metoclopramide, 10 mg, Intravenous, Q6H  polyethylene glycol, 17 g, Oral, Daily  rosuvastatin, 20 mg, Oral, Daily  senna-docusate sodium, 2 tablet, Oral, BID       Infusions     PRN Medications   senna-docusate sodium **AND** polyethylene glycol **AND** bisacodyl **AND** bisacodyl    bisacodyl    calcium carbonate    diazePAM    HYDROmorphone    nitroglycerin    ondansetron **OR** ondansetron    simethicone     Physical Findings          Physical Appearance alert, oriented   Oral/Mouth Cavity dental appliance   Edema  1+ (trace)   Gastrointestinal constipation, normoactive, last bowel movement: 8/11, enema   Skin  surgical incision: chest, throat   Tubes/Drains/Lines chest tube   NFPE No clinical signs of muscle wasting or fat loss   --  Current Nutrition Orders & Evaluation of Intake       Oral Nutrition     Food Allergies NKFA   Current PO Diet Diet: Regular/House Diet; Texture: Regular Texture (IDDSI 7); Fluid Consistency: Thin (IDDSI 0)   Supplement Boost Plus, TID   PO Evaluation     % PO Intake Insufficient data     # of Days Evaluated 3    --  PES STATEMENT / NUTRITION DIAGNOSIS      Nutrition Dx Problem  Problem: Inadequate Oral Intake  Etiology: Medical Diagnosis L VATS  Signs/Symptoms: Report of Minimal PO Intake and Unintended Weight Change    Comment:    --  NUTRITION INTERVENTION / PLAN OF CARE      Intervention Goal(s) Maintain nutrition status, Reduce/improve symptoms, Meet estimated needs, Disease management/therapy, Tolerate PO , Increase intake, and Maintain weight         RD Intervention/Action Advise alternative selection, Advise available snack, Interview for preferences, Encourage intake, Follow Tx Progress, Care plan reviewed, and Recommend/ordered:          Prescription/Orders:       PO Diet       Supplements       Snacks       Enteral Nutrition       Parenteral Nutrition    New Prescription Ordered? Continue same per protocol   --       Monitor/Evaluation Per protocol, I&O, PO intake, Supplement intake, Pertinent labs, Weight, Skin status, GI status, Symptoms   Discharge Plan/Needs Pending clinical course   Education Will instruct as appropriate   --    RD to follow per protocol.    Patricia Osorio RD  08/13/23 11:48 EDT

## 2023-08-13 NOTE — PLAN OF CARE
Problem: Adult Inpatient Plan of Care  Goal: Plan of Care Review  Outcome: Ongoing, Progressing  Flowsheets (Taken 8/13/2023 0605)  Progress: no change  Plan of Care Reviewed With: patient  Outcome Evaluation: pt conts w left chest tube to waterseal.  airleak positive.  ambulated around nursing station x3 this shift. rupinder well.  no distress noted.   Goal Outcome Evaluation:  Plan of Care Reviewed With: patient        Progress: no change  Outcome Evaluation: pt conts w left chest tube to waterseal.  airleak positive.  ambulated around nursing station x3 this shift. rupinder well.  no distress noted.

## 2023-08-13 NOTE — PLAN OF CARE
Goal Outcome Evaluation:  Plan of Care Reviewed With: patient        Progress: no change  Outcome Evaluation: VSS, chest tube placed to mini 500. Ambulating around unit.

## 2023-08-14 ENCOUNTER — HOME HEALTH ADMISSION (OUTPATIENT)
Dept: HOME HEALTH SERVICES | Facility: HOME HEALTHCARE | Age: 83
End: 2023-08-14
Payer: MEDICARE

## 2023-08-14 ENCOUNTER — APPOINTMENT (OUTPATIENT)
Dept: GENERAL RADIOLOGY | Facility: HOSPITAL | Age: 83
DRG: 164 | End: 2023-08-14
Payer: MEDICARE

## 2023-08-14 LAB
ALBUMIN SERPL-MCNC: 3.2 G/DL (ref 3.5–5.2)
ANION GAP SERPL CALCULATED.3IONS-SCNC: 12 MMOL/L (ref 5–15)
BUN SERPL-MCNC: 22 MG/DL (ref 8–23)
BUN/CREAT SERPL: 14.8 (ref 7–25)
CALCIUM SPEC-SCNC: 8.7 MG/DL (ref 8.6–10.5)
CHLORIDE SERPL-SCNC: 96 MMOL/L (ref 98–107)
CHLORIDE UR-SCNC: 21 MMOL/L
CO2 SERPL-SCNC: 22 MMOL/L (ref 22–29)
CORTIS SERPL-MCNC: 17.6 MCG/DL
CREAT SERPL-MCNC: 1.49 MG/DL (ref 0.76–1.27)
EGFRCR SERPLBLD CKD-EPI 2021: 45.7 ML/MIN/1.73
GLUCOSE SERPL-MCNC: 100 MG/DL (ref 65–99)
OSMOLALITY UR: 459 MOSM/KG
PHOSPHATE SERPL-MCNC: 3.6 MG/DL (ref 2.5–4.5)
POTASSIUM SERPL-SCNC: 4.3 MMOL/L (ref 3.5–5.2)
SODIUM SERPL-SCNC: 130 MMOL/L (ref 136–145)
SODIUM UR-SCNC: 67 MMOL/L
TSH SERPL DL<=0.05 MIU/L-ACNC: 5.29 UIU/ML (ref 0.27–4.2)

## 2023-08-14 PROCEDURE — 94799 UNLISTED PULMONARY SVC/PX: CPT

## 2023-08-14 PROCEDURE — 94664 DEMO&/EVAL PT USE INHALER: CPT

## 2023-08-14 PROCEDURE — 94760 N-INVAS EAR/PLS OXIMETRY 1: CPT

## 2023-08-14 PROCEDURE — 25010000002 METOCLOPRAMIDE PER 10 MG: Performed by: NURSE PRACTITIONER

## 2023-08-14 PROCEDURE — 94761 N-INVAS EAR/PLS OXIMETRY MLT: CPT

## 2023-08-14 PROCEDURE — 82436 ASSAY OF URINE CHLORIDE: CPT | Performed by: INTERNAL MEDICINE

## 2023-08-14 PROCEDURE — 99024 POSTOP FOLLOW-UP VISIT: CPT | Performed by: NURSE PRACTITIONER

## 2023-08-14 PROCEDURE — 84443 ASSAY THYROID STIM HORMONE: CPT | Performed by: INTERNAL MEDICINE

## 2023-08-14 PROCEDURE — 25010000002 ENOXAPARIN PER 10 MG: Performed by: STUDENT IN AN ORGANIZED HEALTH CARE EDUCATION/TRAINING PROGRAM

## 2023-08-14 PROCEDURE — 82533 TOTAL CORTISOL: CPT | Performed by: INTERNAL MEDICINE

## 2023-08-14 PROCEDURE — 84300 ASSAY OF URINE SODIUM: CPT | Performed by: INTERNAL MEDICINE

## 2023-08-14 PROCEDURE — 83935 ASSAY OF URINE OSMOLALITY: CPT | Performed by: INTERNAL MEDICINE

## 2023-08-14 PROCEDURE — 71045 X-RAY EXAM CHEST 1 VIEW: CPT

## 2023-08-14 PROCEDURE — 80069 RENAL FUNCTION PANEL: CPT | Performed by: NURSE PRACTITIONER

## 2023-08-14 RX ADMIN — GABAPENTIN 100 MG: 100 CAPSULE ORAL at 21:10

## 2023-08-14 RX ADMIN — IPRATROPIUM BROMIDE AND ALBUTEROL SULFATE 3 ML: 2.5; .5 SOLUTION RESPIRATORY (INHALATION) at 20:15

## 2023-08-14 RX ADMIN — ROSUVASTATIN CALCIUM 20 MG: 20 TABLET, FILM COATED ORAL at 22:28

## 2023-08-14 RX ADMIN — GUAIFENESIN 600 MG: 600 TABLET, EXTENDED RELEASE ORAL at 21:10

## 2023-08-14 RX ADMIN — ANTACID TABLETS 1 TABLET: 500 TABLET, CHEWABLE ORAL at 21:09

## 2023-08-14 RX ADMIN — ACETAMINOPHEN 1000 MG: 500 TABLET, FILM COATED ORAL at 21:10

## 2023-08-14 RX ADMIN — HYDRALAZINE HYDROCHLORIDE 50 MG: 50 TABLET, FILM COATED ORAL at 08:47

## 2023-08-14 RX ADMIN — ENOXAPARIN SODIUM 30 MG: 100 INJECTION SUBCUTANEOUS at 08:44

## 2023-08-14 RX ADMIN — ACETAMINOPHEN 1000 MG: 500 TABLET, FILM COATED ORAL at 17:43

## 2023-08-14 RX ADMIN — IPRATROPIUM BROMIDE AND ALBUTEROL SULFATE 3 ML: 2.5; .5 SOLUTION RESPIRATORY (INHALATION) at 15:27

## 2023-08-14 RX ADMIN — METOCLOPRAMIDE HYDROCHLORIDE 10 MG: 5 INJECTION INTRAMUSCULAR; INTRAVENOUS at 17:44

## 2023-08-14 RX ADMIN — METOCLOPRAMIDE HYDROCHLORIDE 10 MG: 5 INJECTION INTRAMUSCULAR; INTRAVENOUS at 22:28

## 2023-08-14 RX ADMIN — IPRATROPIUM BROMIDE AND ALBUTEROL SULFATE 3 ML: 2.5; .5 SOLUTION RESPIRATORY (INHALATION) at 07:10

## 2023-08-14 RX ADMIN — SENNOSIDES AND DOCUSATE SODIUM 2 TABLET: 50; 8.6 TABLET ORAL at 21:10

## 2023-08-14 RX ADMIN — GUAIFENESIN 600 MG: 600 TABLET, EXTENDED RELEASE ORAL at 08:44

## 2023-08-14 RX ADMIN — CARVEDILOL 25 MG: 25 TABLET, FILM COATED ORAL at 22:28

## 2023-08-14 RX ADMIN — ASPIRIN 81 MG: 81 TABLET, COATED ORAL at 08:44

## 2023-08-14 RX ADMIN — HYDRALAZINE HYDROCHLORIDE 50 MG: 50 TABLET, FILM COATED ORAL at 22:28

## 2023-08-14 RX ADMIN — GABAPENTIN 100 MG: 100 CAPSULE ORAL at 08:44

## 2023-08-14 RX ADMIN — METOCLOPRAMIDE HYDROCHLORIDE 10 MG: 5 INJECTION INTRAMUSCULAR; INTRAVENOUS at 04:20

## 2023-08-14 RX ADMIN — SENNOSIDES AND DOCUSATE SODIUM 2 TABLET: 50; 8.6 TABLET ORAL at 08:45

## 2023-08-14 RX ADMIN — ANTACID TABLETS 1 TABLET: 500 TABLET, CHEWABLE ORAL at 00:17

## 2023-08-14 RX ADMIN — POLYETHYLENE GLYCOL 3350 17 G: 17 POWDER, FOR SOLUTION ORAL at 08:46

## 2023-08-14 RX ADMIN — CARVEDILOL 25 MG: 25 TABLET, FILM COATED ORAL at 08:43

## 2023-08-14 RX ADMIN — AMLODIPINE BESYLATE 5 MG: 5 TABLET ORAL at 08:44

## 2023-08-14 RX ADMIN — METOCLOPRAMIDE HYDROCHLORIDE 10 MG: 5 INJECTION INTRAMUSCULAR; INTRAVENOUS at 08:47

## 2023-08-14 RX ADMIN — ACETAMINOPHEN 1000 MG: 500 TABLET, FILM COATED ORAL at 08:45

## 2023-08-14 NOTE — PROGRESS NOTES
"  POST-OPERATIVE NOTE     Chief Complaint: Non-small cell lung cancer of the left upper lobe of the lung, postoperative care  S/P: Bronchoscopy, left video-assisted thoracoscopy with da Ismael robot assisted partial decortication with lysis of adhesions and intercostal nerve block  POD # 14  S/P: Doxycycline chemical pleurodesis.  POD: # 6    Subjective:  Symptoms:  Improved.  He reports anorexia.  No shortness of breath, cough or chest pain (Well-controlled.).    Diet:  Adequate intake.  No nausea or vomiting.    Activity level: Normal with assistance.    Pain:  He complains of pain that is mild.  Pain is well controlled.    Improved oral intake today.  He is eager for discharge.    Objective:  General Appearance:  Comfortable, in no acute distress and not in pain.    Vital signs: (most recent): Blood pressure 120/66, pulse 64, temperature 97.5 øF (36.4 øC), temperature source Oral, resp. rate 20, height 170.2 cm (67\"), weight 73 kg (161 lb), SpO2 98 %.  Vital signs are normal.  No fever.    Output: Producing urine and producing stool.    HEENT: Normal HEENT exam.    Lungs:  Normal effort and normal respiratory rate.  He is not in respiratory distress.    Heart: Normal rate.  Regular rhythm.    Chest: Symmetric chest wall expansion. Chest wall tenderness present.    Abdomen: Abdomen is soft and distended (Improving).  There is no abdominal tenderness.     Neurological: Patient is alert and oriented to person, place and time.    Skin:  Warm and dry.            Chest tube:   Site: Left, Clean, Dry, Intact, and Securement device intact  Suction: Waterseal  Air Leak: positive  24 Hour Total: not documented    Results Review:     I reviewed the patient's new clinical results.  I reviewed the patient's new imaging results and agree with the interpretation.  I reviewed the patient's other test results and agree with the interpretation  Discussed with patient, family at bedside, RN and Dr. Meier.      Assessment & Plan "     Mr. Acevedo is a pleasant 82-year-old gentleman who is POD #14, s/p left VAT with da Ismael robot-assisted partial decortication and lysis of adhesions with intercostal block.  He subsequently underwent doxycycline chemical pleurodesis on 8/8/2023.  Chest tube placed a mini 500 yesterday.  Follow-up imaging is stable.  Air leak is still present on my assessment today.  Appreciate assistance from nephrology regarding patient's CATHRYN and hyponatremia.  Patient may discharge home once home health is arranged and okay per nephrology.    DASHA Ahumada  [facility]  08/14/23  14:10 EDT    Patient was seen and assessed while wearing personal protective equipment including facemask, protective eyewear and gloves.  Hand hygiene performed prior to entering the room and upon exiting with doffing of gloves.

## 2023-08-14 NOTE — CASE MANAGEMENT/SOCIAL WORK
Continued Stay Note  [facility]     Patient Name: Bayron Acevedo  MRN: [id number]  Today's Date: 8/14/2023    Admit Date: 7/31/2023    Plan: Home with HH   Discharge Plan       Row Name 08/14/23 1540       Plan    Plan Home with HH    Patient/Family in Agreement with Plan yes    Plan Comments Met with pt at bedside who confirms plan to return home at dsicharge.  Discussed possible need for HH for mini 500.  Pt agreeable and reqeusts referral to [facility].  Referral placed in Epic.  No further needs identified at this time.  LUANA Saini RN                   Discharge Codes    No documentation.                 Expected Discharge Date and Time       Expected Discharge Date Expected Discharge Time    Aug 9, 2023               Cherry Saini, RN

## 2023-08-14 NOTE — DISCHARGE PLACEMENT REQUEST
"Wilmer Acevedo (83 y.o. Male)       Date of Birth   1940    Social Security Number       Address   [address] [place] [address]    Home Phone   [phone]    MRN   [id number]       Hindu   Worship    Marital Status                               Admission Date   7/31/23    Admission Type   Elective    Admitting Provider   Nemesio Kramer MD PhD    Attending Provider   Nemesio Kramer MD PhD    Department, Room/Bed   [address], E566/1       Discharge Date       Discharge Disposition       Discharge Destination                                 Attending Provider: Nemesio Kramer MD PhD    Allergies: Lisinopril, Codeine Sulfate, Contrast Dye (Echo Or Unknown Ct/mr), Penicillins    Isolation: None   Infection: None   Code Status: CPR    Ht: 170.2 cm (67\")   Wt: 73 kg (161 lb)    Admission Cmt: None   Principal Problem: Mass of upper lobe of left lung [R91.8]                   Active Insurance as of 7/31/2023       Primary Coverage       Payor Plan Insurance Group Employer/Plan Group    [facility] MEDICARE REPLACEMENT [facility] MEDICARE REPLACEMENT [id number]       Payor Plan Address Payor Plan Phone Number Payor Plan Fax Number Effective Dates    [address] [address]   1/1/2020 - None Entered    [facility] [id number]         Subscriber Name Subscriber Birth Date Member ID       WILMER ACEVEDO 1940 [id number]                     Emergency Contacts        (Rel.) Home Phone Work Phone Mobile Phone    Amina Acevedo \"Emily\" (Spouse) -- -- [phone]    GibsonMayra (Daughter) [phone] -- [phone]              Emergency Contact Information       Name Relation Home Work Mobile    Amina Acevedo \"Emily\" Spouse   [phone]    Mayra Arreaga Daughter [phone]          "

## 2023-08-14 NOTE — PROGRESS NOTES
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    Review of Systems:   As noted above    Objective     Vitals:   Temp:  [97.3 øF (36.3 øC)-98.7 øF (37.1 øC)] 97.3 øF (36.3 øC)  Heart Rate:  [54-73] 73  Resp:  [16-20] 20  BP: (115-153)/(55-73) 153/55    Intake/Output Summary (Last 24 hours) at 2023 1042  Last data filed at 2023 0020  Gross per 24 hour   Intake --   Output 500 ml   Net -500 ml       Physical Exam:    General Appearance: alert, awake, chronically ill, no acute distress   Skin: warm and dry  HEENT: oral mucosa normal, nonicteric sclera  Neck: supple, no JVD  Lungs: Creased breath sounds bilaterally but more on the right and he has right chest tube  Heart: RRR, normal S1 and S2, no S3, no rub  Abdomen: soft, nontender, nondistended normoactive bowels  : no palpable bladder  Extremities: no edema, cyanosis or clubbing  Neuro: normal speech and mental status     Scheduled Meds:     acetaminophen, 1,000 mg, Oral, TID  amLODIPine, 5 mg, Oral, Daily  aspirin, 81 mg, Oral, Daily  carvedilol, 25 mg, Oral, BID  enoxaparin, 30 mg, Subcutaneous, Daily  gabapentin, 100 mg, Oral, Q12H  guaiFENesin, 600 mg, Oral, Q12H  hydrALAZINE, 50 mg, Oral, BID  ipratropium-albuterol, 3 mL, Nebulization, Q8H - RT  metoclopramide, 10 mg, Intravenous, Q6H  polyethylene glycol, 17 g, Oral, Daily  rosuvastatin, 20 mg, Oral, Daily  senna-docusate sodium, 2 tablet,  Oral, BID      IV Meds:        Results Reviewed:   I have personally reviewed the results from the time of this admission to 8/14/2023 10:42 EDT     Results from last 7 days   Lab Units 08/13/23  0453 08/12/23  0521   SODIUM mmol/L 129* 131*   POTASSIUM mmol/L 4.2 4.3   CHLORIDE mmol/L 94* 95*   CO2 mmol/L 25.9 25.8   BUN mg/dL 21 22   CREATININE mg/dL 1.52* 1.48*   CALCIUM mg/dL 9.0 9.3   GLUCOSE mg/dL 111* 108*       Estimated Creatinine Clearance: 36.7 mL/min (A) (by C-G formula based on SCr of 1.52 mg/dL (H)).    Results from last 7 days   Lab Units 08/13/23  0453   PHOSPHORUS mg/dL 3.6                         Assessment / Plan     ASSESSMENT:  Hyponatremia, sodium is 129, stable unable to correlate SIADH in the presence of abnormal renal function but the picture suggestive of that.  Also the patient had excessive oral intake according to his wife is trying to push fluid to help his condition based on his and her thoughts.  Chronic kidney disease stage IIIb kidney function very stable near baseline  Hypertension with chronic kidney disease with acceptable control  Dyslipidemia  History of non-small cell lung cancer of the left upper lobe status post left video-assisted thoracoscopy for partial decortication with lysis of adhesion and status post doxycycline chemical pleurodesis    PLAN:  Restrict of total fluid intake per day to 1200 cc  Continue the same treatment  Surveillance labs    Reviewed the chart and other providers note, I reviewed imaging and lab data.  I discussed the case with the patient's wife at the bedside.    Thank you for involving us in the care of Bayron Acevedo.  Please feel free to call with any questions.    Patrick Humphries MD  08/14/23  10:42 EDT    [facility]  [phone]    Please note that portions of this note were completed with a voice recognition program.

## 2023-08-14 NOTE — PROGRESS NOTES
"Nutrition Services    Patient Name:  Bayron Acevedo  YOB: 1940  MRN: [id number]  Admit Date:  7/31/2023    Assessment Date:  08/14/23    Comment: Nutrition follow up for calorie count.  No menus saved from yesterday. Pt reports he really dislikes the hospital food. Wife brought him a big mac one day this weekend and a [employer] burger in which he reports he all of that. He has able been drinking 1-2 Boost's per day. Wife plans to get him some pizza for dinner if he doesn't like what he gets. Hospital menu provided and encouraged him to keep trying and see if can find something he likes.  Po should improve once he is discharged.     Nutrient needs: 1825 kcal, 88 g protein     RD to cont to follow.     CLINICAL NUTRITION ASSESSMENT      Reason for Assessment Calorie Count, Follow-up Protocol     Diagnosis/Problem   s/p left VAT with da Ismael robot-assisted partial decortication and lysis of adhesions, non-small cell lung cancer of the left upper lobe of the lung      Encounter Information        Nutrition History:  Hasn't been drinking the boost but did drink one at lunch today.   Food Preferences:    Supplements:    Factors Affecting Intake: decreased appetite, dislikes hospital food     Anthropometrics        Current Height  Current Weight  BMI kg/m2 Height: 170.2 cm (67\")  Weight: 73 kg (161 lb) (07/31/23 2343)  Body mass index is 25.22 kg/mý.   Adjusted BMI (if applicable)    BMI Category Overweight (25 - 29.9)       Admission Weight 73kg       Ideal Body Weight (IBW) 66.1kg   Adjusted IBW (if applicable)        Usual Body Weight (UBW) 176lbs in April   Weight Change/Trend Loss, Amount/Timeframe: 14# (8%) x 4 months            --  Estimated/Assessed Needs        Current Weight  Weight: 73 kg (161 lb) (07/31/23 2343)       Energy Requirements    Weight for Calculation 73kg   Method for Estimation  25 kcal/kg, 30 kcal/kg   EST Needs (kcal/day) 4402-8124       Protein Requirements    Weight for Calculation " 73kg   EST Protein Needs (g/kg) 1.2 - 1.5 gm/kg   EST Daily Needs (g/day)        Fluid Requirements     Method for Estimation 1 mL/kcal    EST Needs (mL/day)      Tests/Procedures        Tests/Procedures X-Ray     Labs       Pertinent Labs    Results from last 7 days   Lab Units 08/14/23  0535 08/13/23  0453 08/12/23  0521   SODIUM mmol/L 130* 129* 131*   POTASSIUM mmol/L 4.3 4.2 4.3   CHLORIDE mmol/L 96* 94* 95*   CO2 mmol/L 22.0 25.9 25.8   BUN mg/dL 22 21 22   CREATININE mg/dL 1.49* 1.52* 1.48*   CALCIUM mg/dL 8.7 9.0 9.3   GLUCOSE mg/dL 100* 111* 108*       Results from last 7 days   Lab Units 08/14/23  0535   PHOSPHORUS mg/dL 3.6   ALBUMIN g/dL 3.2*           External COVID19   Date Value Ref Range Status   04/22/2022 Detected (A) Not Detected - Ref. Range Final     No results found for: HGBA1C       Medications           Scheduled Medications acetaminophen, 1,000 mg, Oral, TID  amLODIPine, 5 mg, Oral, Daily  aspirin, 81 mg, Oral, Daily  carvedilol, 25 mg, Oral, BID  enoxaparin, 30 mg, Subcutaneous, Daily  gabapentin, 100 mg, Oral, Q12H  guaiFENesin, 600 mg, Oral, Q12H  hydrALAZINE, 50 mg, Oral, BID  ipratropium-albuterol, 3 mL, Nebulization, Q8H - RT  metoclopramide, 10 mg, Intravenous, Q6H  polyethylene glycol, 17 g, Oral, Daily  rosuvastatin, 20 mg, Oral, Daily  senna-docusate sodium, 2 tablet, Oral, BID       Infusions     PRN Medications   senna-docusate sodium **AND** polyethylene glycol **AND** bisacodyl **AND** bisacodyl    bisacodyl    calcium carbonate    nitroglycerin    ondansetron **OR** ondansetron    simethicone     Physical Findings          Physical Appearance alert, oriented   Oral/Mouth Cavity dental appliance   Edema  1+ (trace)   Gastrointestinal constipation, normoactive, last bowel movement: 8/11, enema   Skin  surgical incision: chest, throat   Tubes/Drains/Lines chest tube   NFPE No clinical signs of muscle wasting or fat loss   --  Current Nutrition Orders & Evaluation of Intake        Oral Nutrition     Food Allergies NKFA   Current PO Diet Diet: Regular/House Diet, Fluid Restriction (240 mL/tray) Diets; Other (Specify mL/day) (1200); Texture: Regular Texture (IDDSI 7); Fluid Consistency: Thin (IDDSI 0)   Supplement Boost Plus, TID   PO Evaluation     % PO Intake Insufficient data (big mac and [employer] burger this weekend)    # of Days Evaluated 3    --  PES STATEMENT / NUTRITION DIAGNOSIS      Nutrition Dx Problem  Problem: Inadequate Oral Intake  Etiology: Medical Diagnosis L VATS  Signs/Symptoms: Report of Minimal PO Intake and Unintended Weight Change    Comment:    --  NUTRITION INTERVENTION / PLAN OF CARE      Intervention Goal(s) Maintain nutrition status, Reduce/improve symptoms, Meet estimated needs, Disease management/therapy, Tolerate PO , Increase intake, and Maintain weight         RD Intervention/Action Advise alternative selection, Advise available snack, Interview for preferences, Menu provided, Encourage intake, Follow Tx Progress, and Care plan reviewed         Prescription/Orders:       PO Diet       Supplements       Snacks       Enteral Nutrition       Parenteral Nutrition    New Prescription Ordered? Continue same per protocol   --      Monitor/Evaluation Per protocol, I&O, PO intake, Supplement intake, Pertinent labs, Weight, Skin status, GI status, Symptoms   Discharge Plan/Needs Pending clinical course   Education Will instruct as appropriate   --    RD to follow per protocol.    Yudith Masters RD  08/14/23 16:02 EDT

## 2023-08-15 ENCOUNTER — APPOINTMENT (OUTPATIENT)
Dept: GENERAL RADIOLOGY | Facility: HOSPITAL | Age: 83
DRG: 164 | End: 2023-08-15
Payer: MEDICARE

## 2023-08-15 ENCOUNTER — READMISSION MANAGEMENT (OUTPATIENT)
Dept: CALL CENTER | Facility: HOSPITAL | Age: 83
End: 2023-08-15
Payer: MEDICARE

## 2023-08-15 VITALS
WEIGHT: 161 LBS | BODY MASS INDEX: 25.27 KG/M2 | RESPIRATION RATE: 16 BRPM | DIASTOLIC BLOOD PRESSURE: 72 MMHG | SYSTOLIC BLOOD PRESSURE: 120 MMHG | HEART RATE: 73 BPM | OXYGEN SATURATION: 96 % | HEIGHT: 67 IN | TEMPERATURE: 97.9 F

## 2023-08-15 LAB
ALBUMIN SERPL-MCNC: 3.1 G/DL (ref 3.5–5.2)
ANION GAP SERPL CALCULATED.3IONS-SCNC: 8 MMOL/L (ref 5–15)
BUN SERPL-MCNC: 23 MG/DL (ref 8–23)
BUN/CREAT SERPL: 15.6 (ref 7–25)
CALCIUM SPEC-SCNC: 8.7 MG/DL (ref 8.6–10.5)
CHLORIDE SERPL-SCNC: 96 MMOL/L (ref 98–107)
CO2 SERPL-SCNC: 25 MMOL/L (ref 22–29)
CREAT SERPL-MCNC: 1.47 MG/DL (ref 0.76–1.27)
EGFRCR SERPLBLD CKD-EPI 2021: 46.5 ML/MIN/1.73
GLUCOSE SERPL-MCNC: 95 MG/DL (ref 65–99)
MAGNESIUM SERPL-MCNC: 2.2 MG/DL (ref 1.6–2.4)
PHOSPHATE SERPL-MCNC: 3.4 MG/DL (ref 2.5–4.5)
POTASSIUM SERPL-SCNC: 4.6 MMOL/L (ref 3.5–5.2)
SODIUM SERPL-SCNC: 129 MMOL/L (ref 136–145)
URATE SERPL-MCNC: 5.1 MG/DL (ref 3.4–7)

## 2023-08-15 PROCEDURE — 94760 N-INVAS EAR/PLS OXIMETRY 1: CPT

## 2023-08-15 PROCEDURE — 94664 DEMO&/EVAL PT USE INHALER: CPT

## 2023-08-15 PROCEDURE — 94799 UNLISTED PULMONARY SVC/PX: CPT

## 2023-08-15 PROCEDURE — 25010000002 METOCLOPRAMIDE PER 10 MG: Performed by: NURSE PRACTITIONER

## 2023-08-15 PROCEDURE — 84550 ASSAY OF BLOOD/URIC ACID: CPT | Performed by: INTERNAL MEDICINE

## 2023-08-15 PROCEDURE — 71045 X-RAY EXAM CHEST 1 VIEW: CPT

## 2023-08-15 PROCEDURE — 83735 ASSAY OF MAGNESIUM: CPT | Performed by: INTERNAL MEDICINE

## 2023-08-15 PROCEDURE — 25010000002 ENOXAPARIN PER 10 MG: Performed by: STUDENT IN AN ORGANIZED HEALTH CARE EDUCATION/TRAINING PROGRAM

## 2023-08-15 PROCEDURE — 80069 RENAL FUNCTION PANEL: CPT | Performed by: INTERNAL MEDICINE

## 2023-08-15 PROCEDURE — 94761 N-INVAS EAR/PLS OXIMETRY MLT: CPT

## 2023-08-15 PROCEDURE — 99024 POSTOP FOLLOW-UP VISIT: CPT | Performed by: NURSE PRACTITIONER

## 2023-08-15 RX ORDER — SIMETHICONE 80 MG
80 TABLET,CHEWABLE ORAL 3 TIMES DAILY PRN
Qty: 30 TABLET | Refills: 0 | Status: SHIPPED | OUTPATIENT
Start: 2023-08-15 | End: 2023-09-14

## 2023-08-15 RX ORDER — GABAPENTIN 100 MG/1
100 CAPSULE ORAL EVERY 12 HOURS SCHEDULED
Qty: 60 CAPSULE | Refills: 0 | Status: SHIPPED | OUTPATIENT
Start: 2023-08-15 | End: 2023-09-14

## 2023-08-15 RX ORDER — ACETAMINOPHEN 500 MG
1000 TABLET ORAL 3 TIMES DAILY
Qty: 6 TABLET | Refills: 0 | Status: SHIPPED | OUTPATIENT
Start: 2023-08-15 | End: 2023-08-16

## 2023-08-15 RX ORDER — GUAIFENESIN 600 MG/1
600 TABLET, EXTENDED RELEASE ORAL EVERY 12 HOURS SCHEDULED
Qty: 60 TABLET | Refills: 0 | Status: SHIPPED | OUTPATIENT
Start: 2023-08-15 | End: 2023-09-14

## 2023-08-15 RX ORDER — BUMETANIDE 0.25 MG/ML
2 INJECTION INTRAMUSCULAR; INTRAVENOUS EVERY 8 HOURS
Status: DISCONTINUED | OUTPATIENT
Start: 2023-08-15 | End: 2023-08-15 | Stop reason: HOSPADM

## 2023-08-15 RX ADMIN — CARVEDILOL 25 MG: 25 TABLET, FILM COATED ORAL at 08:17

## 2023-08-15 RX ADMIN — METOCLOPRAMIDE HYDROCHLORIDE 10 MG: 5 INJECTION INTRAMUSCULAR; INTRAVENOUS at 05:28

## 2023-08-15 RX ADMIN — ENOXAPARIN SODIUM 30 MG: 100 INJECTION SUBCUTANEOUS at 08:18

## 2023-08-15 RX ADMIN — POLYETHYLENE GLYCOL 3350 17 G: 17 POWDER, FOR SOLUTION ORAL at 08:17

## 2023-08-15 RX ADMIN — HYDRALAZINE HYDROCHLORIDE 50 MG: 50 TABLET, FILM COATED ORAL at 08:17

## 2023-08-15 RX ADMIN — AMLODIPINE BESYLATE 5 MG: 5 TABLET ORAL at 08:17

## 2023-08-15 RX ADMIN — GABAPENTIN 100 MG: 100 CAPSULE ORAL at 08:17

## 2023-08-15 RX ADMIN — ASPIRIN 81 MG: 81 TABLET, COATED ORAL at 08:17

## 2023-08-15 RX ADMIN — ACETAMINOPHEN 1000 MG: 500 TABLET, FILM COATED ORAL at 08:17

## 2023-08-15 RX ADMIN — GUAIFENESIN 600 MG: 600 TABLET, EXTENDED RELEASE ORAL at 08:17

## 2023-08-15 RX ADMIN — SENNOSIDES AND DOCUSATE SODIUM 2 TABLET: 50; 8.6 TABLET ORAL at 08:17

## 2023-08-15 RX ADMIN — IPRATROPIUM BROMIDE AND ALBUTEROL SULFATE 3 ML: 2.5; .5 SOLUTION RESPIRATORY (INHALATION) at 07:39

## 2023-08-15 RX ADMIN — METOCLOPRAMIDE HYDROCHLORIDE 10 MG: 5 INJECTION INTRAMUSCULAR; INTRAVENOUS at 10:13

## 2023-08-15 NOTE — DISCHARGE SUMMARY
Patient Care Team:  Low Sepulveda as PCP - General (Family Medicine)  Guru Simpson MD as Consulting Physician (Cardiology)  Karen Barrera Jr., MD as Consulting Physician (Vascular Surgery)  Bronson Blanc MD as Consulting Physician (Gastroenterology)  Cristian Reyes MD as Consulting Physician (Nephrology)  Hetal Watkins, RN as Nurse Navigator  Myles Santos Jr., MD as Consulting Physician (Hematology and Oncology)  Guru Jiang MD as Consulting Physician (Cardiology)  Low Sepulveda as Referring Physician (Family Medicine)    Date of Admission: 7/31/2023   Date of Discharge:  8/15/2023    Discharge Diagnosis: Left upper lobe lung nodule    Presenting Problem  Mass of upper lobe of left lung [R91.8]     History of Present Illness  Bayron Acevedo is a 83 y.o. 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  Patient underwent mediastinoscopy for staging for biopsy-proven adenocarcinoma of the left upper lobe.  Brain MRI was negative and CT PET scan did not reveal any evidence of metastatic disease.  Pulmonary function studies supported lobectomy and the patient agreed to proceed with surgery.    Hospital Course  Patient was admitted status post left VATS with da Ismael robot and partial decortication.  Unfortunately the tumor was able to be resected due to its proximity to the mediastinum.  For further details, please refer to the operative note.  He has had a prolonged postoperative course secondary to persistent airleak.  He has had several episodes of constipation that have been mitigated with bowel regimen.  He has been seen by cardiology and nephrology during his admission.    His chest tube has been transitioned to a mini 500 in preparation for discharge.  Home health has been ordered to assist with management of his chest tube.  He will follow-up with Dr. Kramer in clinic next week with a hospital performed chest x-ray to determine if chest tube can  be removed.  His pain is appropriately controlled and his appetite is improved.  He will discharge today in stable condition.  For further details, please refer to daily progress notes.    Procedures Performed  Procedure(s):  BRONCHOSCOPY, LEFT VAT WITH DAVINCI ROBOT, EXTENSIVE LYSIS OF ADHESIONS, PARTIAL PULMONARY DECORTICATION, INTERCOSTAL NERVE BLOCK       Consults:   Consults       Date and Time Order Name Status Description    8/12/2023  1:31 PM Inpatient Nephrology Consult Completed     8/3/2023 10:29 AM Inpatient Cardiology Consult Completed             Pertinent Test Results:     Imaging Results (Last 24 Hours)       Procedure Component Value Units Date/Time    XR Chest 1 View [[id number]] Collected: 08/15/23 0756     Updated: 08/15/23 0800    Narrative:      XR CHEST 1 VW-     HISTORY: Chest tube management.     COMPARISON: Chest radiograph 8/14/2023     FINDINGS:    A single view of the chest was obtained.   There is a left apical chest tube, similar to prior. The cardiac  silhouette and mediastinal and hilar contours are not significantly  changed. There is calcific aortic atherosclerosis. Pulmonary opacities  are overall similar.   There is an at least small left hydropneumothorax with slightly  increased air and fluid components. A small right pleural effusion is  not significantly changed.   There is multilevel degenerative disc disease. Subcutaneous emphysema in  the left neck is similar.     This report was finalized on 8/15/2023 7:57 AM by Dr. Karina Santana M.D.               Lab Results (last 24 hours)       Procedure Component Value Units Date/Time    Renal Function Panel [[id number]]  (Abnormal) Collected: 08/15/23 0502    Specimen: Blood Updated: 08/15/23 0554     Glucose 95 mg/dL      BUN 23 mg/dL      Creatinine 1.47 mg/dL      Sodium 129 mmol/L      Potassium 4.6 mmol/L      Chloride 96 mmol/L      CO2 25.0 mmol/L      Calcium 8.7 mg/dL      Albumin 3.1 g/dL      Phosphorus 3.4 mg/dL      Anion  Gap 8.0 mmol/L      BUN/Creatinine Ratio 15.6     eGFR 46.5 mL/min/1.73     Narrative:      GFR Normal >60  Chronic Kidney Disease <60  Kidney Failure <15    The GFR formula is only valid for adults with stable renal function between ages 18 and 70.    Magnesium [[id number]]  (Normal) Collected: 08/15/23 0502    Specimen: Blood Updated: 08/15/23 0554     Magnesium 2.2 mg/dL     Uric Acid [[id number]]  (Normal) Collected: 08/15/23 0502    Specimen: Blood Updated: 08/15/23 0554     Uric Acid 5.1 mg/dL     Renal Function Panel [[id number]]  (Abnormal) Collected: 08/14/23 0535    Specimen: Blood Updated: 08/14/23 1225     Glucose 100 mg/dL      BUN 22 mg/dL      Creatinine 1.49 mg/dL      Sodium 130 mmol/L      Potassium 4.3 mmol/L      Chloride 96 mmol/L      CO2 22.0 mmol/L      Calcium 8.7 mg/dL      Albumin 3.2 g/dL      Phosphorus 3.6 mg/dL      Anion Gap 12.0 mmol/L      BUN/Creatinine Ratio 14.8     eGFR 45.7 mL/min/1.73     Narrative:      GFR Normal >60  Chronic Kidney Disease <60  Kidney Failure <15    The GFR formula is only valid for adults with stable renal function between ages 18 and 70.              Condition on Discharge:  stable    Vital Signs  Temp:  [97.5 øF (36.4 øC)-97.9 øF (36.6 øC)] 97.8 øF (36.6 øC)  Heart Rate:  [63-85] 85  Resp:  [16-20] 16  BP: (116-149)/(61-71) 149/71    Physical Exam:    General Appearance:    Alert, cooperative, in no acute distress   Head:    Normocephalic, without obvious abnormality, atraumatic   Eyes:            Lids and lashes normal, conjunctivae and sclerae normal, no   icterus, no pallor, corneas clear, PERRLA   Ears:    Ears appear intact with no abnormalities noted   Throat:   No oral lesions, no thrush, oral mucosa moist   Neck:   No adenopathy, supple, trachea midline, no thyromegaly, no   carotid bruit, no JVD   Back:     No kyphosis present, no scoliosis present, no skin lesions,      erythema or scars, no tenderness to percussion or                   palpation,    range of motion normal   Lungs:     Clear to auscultation,respirations regular, even and                  unlabored    Heart:    Regular rhythm and normal rate, normal S1 and S2, no            murmur, no gallop, no rub, no click   Chest Wall:    No abnormalities observed   Abdomen:     Normal bowel sounds, no masses, no organomegaly, soft        non-tender, non-distended, no guarding, no rebound                tenderness   Rectal:     Deferred   Extremities:   Moves all extremities well, no edema, no cyanosis, no             redness   Pulses:   Pulses palpable and equal bilaterally   Skin:   No bleeding, bruising or rash.  Postoperative incisions are well approximated with Dermabond intact.  Chest tube site is without purulence or erythema.   Lymph nodes:   No palpable adenopathy   Neurologic:   Cranial nerves 2 - 12 grossly intact, sensation intact, DTR       present and equal bilaterally       Discharge Disposition  Home today    Discharge Medications     Discharge Medications        New Medications        Instructions Start Date   acetaminophen 500 MG tablet  Commonly known as: TYLENOL   1,000 mg, Oral, 3 Times Daily      gabapentin 100 MG capsule  Commonly known as: NEURONTIN   100 mg, Oral, Every 12 Hours Scheduled      guaiFENesin 600 MG 12 hr tablet  Commonly known as: MUCINEX   600 mg, Oral, Every 12 Hours Scheduled      simethicone 80 MG chewable tablet  Commonly known as: MYLICON   80 mg, Oral, 3 Times Daily PRN             Changes to Medications        Instructions Start Date   albuterol (2.5 MG/3ML) 0.083% nebulizer solution  Commonly known as: PROVENTIL  What changed: See the new instructions.   USE ONE VIAL BY NEBULIZATION EVERY FOUR HOURS AS NEEDED FOR FOR WHEEZING             Continue These Medications        Instructions Start Date   amLODIPine 5 MG tablet  Commonly known as: NORVASC   5 mg, Oral, Daily      aspirin 81 MG EC tablet   81 mg, Oral, Daily, PT TO CHECK WITH MD FOR HOLD DATE       carvedilol 25 MG tablet  Commonly known as: COREG   25 mg, Oral, 2 Times Daily      Chlorhexidine Gluconate Cloth 2 % pads   Apply externally, Take As Directed      hydrALAZINE 50 MG tablet  Commonly known as: APRESOLINE   50 mg, 2 Times Daily      nitroglycerin 0.4 MG SL tablet  Commonly known as: NITROSTAT   0.4 mg, Sublingual, Every 5 Minutes PRN, Take no more than 3 doses in 15 minutes.       rosuvastatin 20 MG tablet  Commonly known as: CRESTOR   20 mg, Oral, Daily      torsemide 20 MG tablet  Commonly known as: DEMADEX   40 mg, Oral, Daily               Discharge Instructions:  No heavy lifting, pushing, pulling greater than 10 pounds.  No driving up until 2 weeks after surgery and no longer taking narcotics.  Resume home diet as tolerated.  Continue incentive spirometer at least 4 times per day.  Remove dressing from post chest tube site after 48 hours, may shower and clean surgical sites with antibacterial soap or hydrogen peroxide, and apply gauze dressing or band-aid as needed for any drainage.  No dressing needed once no longer draining.          Follow-up Appointments  Future Appointments   Date Time Provider Department Center   8/23/2023  9:40 AM LAB CHAIR 5 [facility]  [facility] [place]   8/23/2023 10:00 AM Myles Santos Jr., MD [facility] [place]   8/23/2023  3:00 PM Malik Yanez MD PhD [facility]   11/2/2023 12:45 PM Guru Jiang MD [facility] CD [facility]     Additional Instructions for the Follow-ups that You Need to Schedule       Ambulatory Referral to Home Health ([facility])   As directed      Please apply DRY 4x4 from chest tube site and minimal tape.  No Vaseline gauze.    Order Comments: Please apply DRY 4x4 from chest tube site and minimal tape.  No Vaseline gauze.    Face to Face Visit Date: 8/14/2023   Follow-up provider for Plan of Care?: I treated the patient in an acute care facility and will not continue treatment after discharge.   Follow-up provider: MALIK YANEZ  [[id number]]   Reason/Clinical Findings: chest tube mgt   Describe mobility limitations that make leaving home difficult: chest tube mgt   Nursing/Therapeutic Services Requested: Skilled Nursing   Skilled nursing orders: Other   Frequency: 1 Week 1        XR Chest 2 View    Aug 24, 2023 (Approximate)      Exam reason: post-op                Test Results Pending at Discharge      For any questions regarding patient's stay, please refer to patient's chart.    Saritha Posey DNP, APRN  [facility]  08/15/23  11:29 EDT

## 2023-08-15 NOTE — PROGRESS NOTES
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    Review of Systems:   As noted above    Objective     Vitals:   Temp:  [97.5 øF (36.4 øC)-97.9 øF (36.6 øC)] 97.8 øF (36.6 øC)  Heart Rate:  [63-85] 85  Resp:  [16-20] 16  BP: (116-149)/(61-71) 149/71    Intake/Output Summary (Last 24 hours) at 8/15/2023 1032  Last data filed at 8/15/2023 0800  Gross per 24 hour   Intake 330 ml   Output 1270 ml   Net -940 ml         Physical Exam:    General Appearance: alert, awake, chronically ill, no acute distress   Skin: warm and dry  HEENT: oral mucosa normal, nonicteric sclera  Neck: supple, no JVD  Lungs: Creased breath sounds bilaterally but more on the right and he has right chest tube  Heart: RRR, normal S1 and S2, no S3, no rub  Abdomen: soft, nontender, nondistended normoactive bowels  : no palpable bladder  Extremities: 1+ pitting edema  Neuro: normal speech and mental status     Scheduled Meds:     acetaminophen, 1,000 mg, Oral, TID  amLODIPine, 5 mg, Oral, Daily  aspirin, 81 mg, Oral, Daily  carvedilol, 25 mg, Oral, BID  enoxaparin, 30 mg, Subcutaneous, Daily  gabapentin, 100 mg, Oral, Q12H  guaiFENesin, 600 mg, Oral, Q12H  hydrALAZINE, 50 mg, Oral, BID  ipratropium-albuterol, 3 mL, Nebulization, Q8H - RT  metoclopramide, 10 mg, Intravenous, Q6H  polyethylene glycol, 17 g, Oral, Daily  rosuvastatin, 20 mg, Oral, Daily  senna-docusate sodium, 2 tablet, Oral,  BID      IV Meds:        Results Reviewed:   I have personally reviewed the results from the time of this admission to 8/15/2023 10:32 EDT     Results from last 7 days   Lab Units 08/15/23  0502 08/14/23  0535 08/13/23  0453   SODIUM mmol/L 129* 130* 129*   POTASSIUM mmol/L 4.6 4.3 4.2   CHLORIDE mmol/L 96* 96* 94*   CO2 mmol/L 25.0 22.0 25.9   BUN mg/dL 23 22 21   CREATININE mg/dL 1.47* 1.49* 1.52*   CALCIUM mg/dL 8.7 8.7 9.0   GLUCOSE mg/dL 95 100* 111*         Estimated Creatinine Clearance: 37.9 mL/min (A) (by C-G formula based on SCr of 1.47 mg/dL (H)).    Results from last 7 days   Lab Units 08/15/23  0502 08/14/23 0535 08/13/23 0453   MAGNESIUM mg/dL 2.2  --   --    PHOSPHORUS mg/dL 3.4 3.6 3.6         Results from last 7 days   Lab Units 08/15/23  0502   URIC ACID mg/dL 5.1                   Assessment / Plan     ASSESSMENT:  Hyponatremia, sodium is 129, associate with fluid excess the patient has edema so it cannot be called SIADH.  Chronic kidney disease stage IIIb kidney function very stable near baseline  Chronic kidney disease stage IIIa appears to be at baseline  Hypertension with chronic kidney disease with acceptable control  Dyslipidemia  History of non-small cell lung cancer of the left upper lobe status post left video-assisted thoracoscopy for partial decortication with lysis of adhesion and status post doxycycline chemical pleurodesis    PLAN:  Restrict of total fluid intake per day to 1200 cc  Diuresis with the IV bumetanide  Continue the same treatment  Surveillance labs    Reviewed the chart and other providers note, I reviewed imaging and lab data.  I discussed the case with the patient's wife at the bedside.    Thank you for involving us in the care of Bayron Acevedo.  Please feel free to call with any questions.    Patrick Humphries MD  08/15/23  10:32 EDT    [facility]  [phone]    Please note that portions of this note were completed with a voice  recognition program.

## 2023-08-16 NOTE — OUTREACH NOTE
Prep Survey      Flowsheet Row Responses   [facility] facility patient discharged from? [facility]   Is LACE score < 7 ? No   Eligibility Readm Mgmt   Discharge diagnosis Mass of upper lobe of left lung /Bronchoscopy, left VAT w/ Davinci robot, extensive lysis of adhesions, partial pulmonary decortication, intercostal nerve block   Does the patient have one of the following disease processes/diagnoses(primary or secondary)? Other   Does the patient have Home health ordered? Yes   What is the Home health agency?  [facility] (chest tube care)   Is there a DME ordered? No   General alerts for this patient chest tube   Prep survey completed? Yes            Gia ZELAYA - Registered Nurse

## 2023-08-16 NOTE — CASE MANAGEMENT/SOCIAL WORK
Case Management Discharge Note      Final Note: Discharged home with [facility] and mini 500         Selected Continued Care - Discharged on 8/15/2023 Admission date: 7/31/2023 - Discharge disposition: Home or Self Care      Destination    No services have been selected for the patient.                Durable Medical Equipment    No services have been selected for the patient.                Dialysis/Infusion    No services have been selected for the patient.                Home Medical Care       Service Provider Selected Services Address Phone Fax Patient Preferred    [facility] [address] [place] [address], [address] [id number] [phone] --              Therapy    No services have been selected for the patient.                Community Resources    No services have been selected for the patient.                Community & DME    No services have been selected for the patient.                    Transportation Services  Private: Car    Final Discharge Disposition Code: 06 - home with home health care

## 2023-08-17 ENCOUNTER — READMISSION MANAGEMENT (OUTPATIENT)
Dept: CALL CENTER | Facility: HOSPITAL | Age: 83
End: 2023-08-17
Payer: MEDICARE

## 2023-08-17 NOTE — OUTREACH NOTE
Medical Week 1 Survey      Flowsheet Row Responses   [facility] patient discharged from? [place] [facility]   Does the patient have one of the following disease processes/diagnoses(primary or secondary)? Other   Week 1 attempt successful? Yes   Call start time 1036   Call end time 1041   General alerts for this patient chest tube   Discharge diagnosis Mass of upper lobe of left lung /Bronchoscopy, left VAT w/ Davinci robot, extensive lysis of adhesions, partial pulmonary decortication, intercostal nerve block   Meds reviewed with patient/caregiver? Yes   Is the patient having any side effects they believe may be caused by any medication additions or changes? No   Does the patient have all medications ordered at discharge? Yes   Is the patient taking all medications as directed (includes completed medication regime)? Yes   Does the patient have a primary care provider?  Yes   Does the patient have an appointment with their PCP within 7 days of discharge? Yes   Has the patient kept scheduled appointments due by today? Yes   What is the Home health agency?  [facility] (chest tube care)   Psychosocial issues? No   Did the patient receive a copy of their discharge instructions? Yes   What is the patient's perception of their health status since discharge? Improving   Is the patient/caregiver able to teach back signs and symptoms related to disease process for when to call PCP? Yes   Is the patient/caregiver able to teach back signs and symptoms related to disease process for when to call 911? Yes   Is the patient/caregiver able to teach back the hierarchy of who to call/visit for symptoms/problems? PCP, Specialist, Home health nurse, Urgent Care, ED, 911 Yes   If the patient is a current smoker, are they able to teach back resources for cessation? Not a smoker   Week 1 call completed? Yes   Wrap up additional comments pt still has chest tube, stated he is still coughing up mucous, chest tube seems to be  draining ok. HH coming tomorrow to see him.   Call end time 1041            SHERLYN VARELA - Registered Nurse

## 2023-08-21 ENCOUNTER — TELEPHONE (OUTPATIENT)
Dept: SURGERY | Facility: CLINIC | Age: 83
End: 2023-08-21

## 2023-08-21 NOTE — TELEPHONE ENCOUNTER
The [facility] received a fax that requires your attention. The document has been indexed to the patient’s chart for your review.      Reason for sending: EXTERNAL MEDICAL RECORD NOTIFICATION     Documents Description: EXTMEDREC-CLIENT COORDINATION NOTE REPORT-8.21.23    Name of Sender: [facility]     Date Indexed: 8.21.23

## 2023-08-22 NOTE — PROGRESS NOTES
"Chief Complaint  Clinical stage IIIA ([id number]) non-small cell lung cancer (adenocarcinoma) of left upper lobe    Subjective        History of Present Illness    Patient returns today in follow-up after undergoing left VATS with attempted resection of his left upper lobe malignancy.  Unfortunately, the mass was not resected, was found to be invading the mediastinum as well as pericardial fat pad and involving the phrenic nerve.  Sampling was obtained from the left pleura as well as station 5 and 10 R lymph nodes which were negative for malignancy.  Patient had a persistent air leak with left hydropneumothorax and was discharged from the hospital with chest tube remaining in place.  Today, the patient reports that he has been experiencing a slow recovery from surgery.  Fatigue is gradually improving.  He does have significant dyspnea on exertion at home.  His appetite is improving gradually.  He is maintaining adequate bowel function having bowel movement every other day.  Pain is controlled currently.  The chest tube however is very bothersome and he has been sleeping in his recliner.  He is seeing Dr. Kramer in follow-up today and did have chest x-ray performed.      Objective   Vital Signs:   /63   Pulse 77   Temp 97.5 øF (36.4 øC) (Temporal)   Resp 18   Ht 170.2 cm (67.01\")   Wt 67.8 kg (149 lb 8 oz)   SpO2 94%   BMI 23.41 kg/mý     Physical Exam  Constitutional:       Appearance: He is well-developed.      Comments: Patient in no distress   Eyes:      Conjunctiva/sclera: Conjunctivae normal.   Neck:      Thyroid: No thyromegaly.   Cardiovascular:      Rate and Rhythm: Normal rate and regular rhythm.      Heart sounds: No murmur heard.    No friction rub. No gallop.   Pulmonary:      Effort: No respiratory distress.      Comments: Diminished breath sounds bilaterally.  A few scattered rhonchi on the left.  Healing VATS incisions on the left, chest tube in place with dressing overlying.  Abdominal:      " General: Bowel sounds are normal. There is no distension.      Palpations: Abdomen is soft.      Tenderness: There is no abdominal tenderness.   Musculoskeletal:         General: No swelling.      Comments: No edema currently   Lymphadenopathy:      Head:      Right side of head: No submandibular adenopathy.      Cervical: No cervical adenopathy.      Upper Body:      Right upper body: No supraclavicular adenopathy.      Left upper body: No supraclavicular adenopathy.   Skin:     General: Skin is warm and dry.      Findings: No rash.   Neurological:      Mental Status: He is alert and oriented to person, place, and time.      Cranial Nerves: No cranial nerve deficit.      Motor: No abnormal muscle tone.      Deep Tendon Reflexes: Reflexes normal.   Psychiatric:         Behavior: Behavior normal.      Result Review : Reviewed CBC, CMP from today.  Reviewed records from hospitalization including operative report, pathology.       Assessment and Plan     *Clinical stage IIIA ([id number]) non-small cell lung cancer (adenocarcinoma) of left upper lobe  The patient has a history of smoking 1 pack/day x 55 years quit in 2000.  CT chest 5/26/2023 showed a spiculated mass in the anterior left upper lobe 3.7 x 4.3 x 4.2 cm along the anterior pleural surface and lateral aspect of the anterior mediastinum.  Additional 3 mm nodule right major fissure.  Bilateral small to moderate-sized pleural effusions.  Bullae formation consistent with COPD.  Mediastinal lymphadenopathy with largest node infracarinal region 2.6 x 1.2 cm.  PET/CT on 6/12/2023 showed 5.2 x 3.1 cm irregular pleural-based mass anterior left upper lobe abutting the pleura anteriorly and medially, hypermetabolic with SUV 14.4.  Mediastinal lymph node activity in the subcarinal region with a 2.2 x 1.1 cm lymph node with SUV 4.5.  Remainder of mediastinal lymph nodes less intensely hypermetabolic with maximal SUV 3.3.  Moderate size loculated pleural effusions  bilaterally with low-level activity along the pleura (SUV 2.5), no change in volume of pleural effusions since prior chest CT.  New small to moderate loculated right hydropneumothorax with air-fluid level 5.2 cm.   CT-guided left upper lobe lung biopsy 6/13/2023 with pathology that showed invasive poorly differentiated adenocarcinoma, PD-L1 less than 1%.  Caris analysis: TMB high (14 muts/Mb), mutations in KEAP1 and TP53, EGFR VUS, no targetable mutations.  Patient was hospitalized 6/13 - 6/15/2023 due to right hydropneumothorax, had CT-guided chest tube placement performed 6/13/2023.  Right pleural fluid cytology negative on 6/13/2023.  Staging MRI brain was negative for evidence of metastatic disease on 7/2/2023  PFTs on 6/20/2023 with FEV1 0.99 (44% predicted), DLCO corrected 14.09 (56% predicted).  Mediastinoscopy 7/17/2023 with 4R and station 7 lymph nodes negative for malignancy  Left VATS performed 7/31/2023.  Intraoperatively, primary tumor was unresectable, invading into the mediastinum and pericardial fat pad as well as with phrenic nerve involvement (T4 lesion).  Sampling of left pleura with fibrosis, chronic inflammation, no evidence of malignancy.  Station 5 and 10 R lymph nodes sampled, negative for malignancy.  Patient therefore with unresectable stage IIIA ([id number]) disease.  Plans to treat with radiation therapy.  Due to age, performance status, comorbidities, patient felt to be a poor candidate for concurrent chemotherapy.  Patient returns today in follow-up after recent left VATS and attempted resection of the primary tumor.  I did contact Dr. Kramer and he indicated that the tumor was found to be invading the mediastinum and pericardial fat pad as well as involving the phrenic nerve and was unresectable.  Surgery was quite difficult with extensive fibrosis and inflammatory change in the chest wall.  Patient currently with persistent hydropneumothorax with left chest tube that remains in place.   Patient will be seeing Dr. Kramer later today for postoperative follow-up.  Currently ECOG performance status of 1-2 with some difficulty with mobility related to the chest tube.  He reports that his fatigue and appetite are improving.  He continues with dyspnea on exertion.  We discussed further recommendations for treatment with radiation therapy since his primary tumor is unresectable at this point.  We did discuss the potential use of concurrent chemoradiation with low-dose weekly carboplatin and Taxol however I feel that given his age and comorbidities as well as current status following surgery he is not a good candidate for concurrent chemotherapy and he agrees.  Given the duration of time that we are out from his most recent imaging studies (PET scan 6/12/2023), we will obtain new baseline CT chest abdomen pelvis in 2 weeks prior to his return visit here in 3 to 4 weeks.    *Chronic bilateral pleural effusions with right hydropneumothorax and subsequent postoperative left hydropneumothorax  Patient appears to have longstanding evidence of small bilateral pleural effusions likely related to CHF  PET/CT 6/12/2023 identified right small to moderate hydropneumothorax new since 5/26/2023 CT chest  Patient was hospitalized 6/13 - 6/15/2023 due to right hydropneumothorax, had CT-guided chest tube placement performed 6/13/2023.  Right pleural fluid cytology negative on 6/13/2023.  As above, patient underwent left VATS with attempted resection of primary lung cancer on 7/31/2023.  Requirement of ongoing left chest tube with hydropneumothorax persistent on chest x-ray today.    *COPD  The patient has a history of smoking 1 pack/day x 55 years quit in 2000.  PFTs on 6/20/2023 with FEV1 0.99 (44% predicted), DLCO corrected 14.09 (56% predicted).  Patient has experienced increase in his chronic dyspnea on exertion postoperatively which does limit his level of activity currently.    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  Labs on 7/11/2023 with hemoglobin 10.6, MCV 82.8, iron 18, ferritin 504, iron saturation 6%, TIBC 295, B12 201, folate 6.79, CRP 1.07, ESR 27, erythropoietin level 17.8.  Labs consistent with anemia secondary to chronic disease/malignancy as well as anemia secondary to CKD3.  Patient initiated oral B12 1000 mcg daily for B12 deficiency  Hemoglobin today 9.5.  Anticipate that this will improve postoperatively.    *Coronary artery disease, CHF  Chronic bilateral pleural effusions presumably related to CHF  Status post cardiac stent in 2013  Preoperative echocardiogram on 7/19/2023 with ejection fraction 56 to 60%, dilated LA, LV  Patient continues on aspirin 81 mg daily    *Peripheral vascular disease  Patient is followed by Dr. Karen Barrera in vascular surgery  Abdominal aortic aneurysm status post stent graft repair 5/17/2021  Carotid stenosis status post carotid endarterectomy left, 2013.      Plan:  Patient with stage IIIA ([id number]) adenocarcinoma of the left upper lobe, unresectable  Referral to radiation therapy to consider primary radiation to the lesion in the left upper lobe  Given the patient's age, performance status, comorbidities, he does not appear to be a good candidate for concurrent chemotherapy with radiation.  Patient continuing on oral B12 1000 mcg daily for B12 deficiency  Patient will be seeing Dr. Kramer in follow-up later today, has left-sided chest tube that remains in place with persistent hydropneumothorax on current chest x-ray.  In 2 weeks new baseline CT chest abdomen pelvis  In 3 to 4 weeks return visit with CBC, CMP.    I did spend 50 minutes in  total time caring for the patient today, time spent in review of records, face-to-face consultation, coordination of care, placement of orders, completion of documentation.

## 2023-08-23 ENCOUNTER — TELEPHONE (OUTPATIENT)
Dept: SURGERY | Facility: CLINIC | Age: 83
End: 2023-08-23

## 2023-08-23 ENCOUNTER — OFFICE VISIT (OUTPATIENT)
Dept: ONCOLOGY | Facility: CLINIC | Age: 83
End: 2023-08-23
Payer: MEDICARE

## 2023-08-23 ENCOUNTER — LAB (OUTPATIENT)
Dept: LAB | Facility: HOSPITAL | Age: 83
End: 2023-08-23
Payer: MEDICARE

## 2023-08-23 ENCOUNTER — OFFICE VISIT (OUTPATIENT)
Dept: SURGERY | Facility: CLINIC | Age: 83
End: 2023-08-23
Payer: MEDICARE

## 2023-08-23 ENCOUNTER — PATIENT OUTREACH (OUTPATIENT)
Dept: OTHER | Facility: HOSPITAL | Age: 83
End: 2023-08-23
Payer: MEDICARE

## 2023-08-23 ENCOUNTER — HOSPITAL ENCOUNTER (OUTPATIENT)
Dept: GENERAL RADIOLOGY | Facility: HOSPITAL | Age: 83
Discharge: HOME | End: 2023-08-23
Admitting: NURSE PRACTITIONER
Payer: MEDICARE

## 2023-08-23 VITALS
DIASTOLIC BLOOD PRESSURE: 63 MMHG | TEMPERATURE: 97.5 F | HEART RATE: 77 BPM | BODY MASS INDEX: 23.46 KG/M2 | SYSTOLIC BLOOD PRESSURE: 118 MMHG | OXYGEN SATURATION: 94 % | WEIGHT: 149.5 LBS | RESPIRATION RATE: 18 BRPM | HEIGHT: 67 IN

## 2023-08-23 VITALS
DIASTOLIC BLOOD PRESSURE: 66 MMHG | SYSTOLIC BLOOD PRESSURE: 136 MMHG | WEIGHT: 149.4 LBS | OXYGEN SATURATION: 96 % | HEIGHT: 67 IN | HEART RATE: 60 BPM | BODY MASS INDEX: 23.45 KG/M2

## 2023-08-23 DIAGNOSIS — R91.8 MASS OF UPPER LOBE OF LEFT LUNG: Primary | ICD-10-CM

## 2023-08-23 DIAGNOSIS — C34.92 ADENOCARCINOMA, LUNG, LEFT: Primary | ICD-10-CM

## 2023-08-23 DIAGNOSIS — N18.32 STAGE 3B CHRONIC KIDNEY DISEASE: Primary | ICD-10-CM

## 2023-08-23 DIAGNOSIS — C34.92 ADENOCARCINOMA, LUNG, LEFT: ICD-10-CM

## 2023-08-23 LAB
ALBUMIN SERPL-MCNC: 3.6 G/DL (ref 3.5–5.2)
ALBUMIN SERPL-MCNC: 3.6 G/DL (ref 3.5–5.2)
ALBUMIN/GLOB SERPL: 1.3 G/DL
ALP SERPL-CCNC: 95 U/L (ref 39–117)
ALT SERPL W P-5'-P-CCNC: 22 U/L (ref 1–41)
ANION GAP SERPL CALCULATED.3IONS-SCNC: 11.2 MMOL/L (ref 5–15)
ANION GAP SERPL CALCULATED.3IONS-SCNC: 12.6 MMOL/L (ref 5–15)
AST SERPL-CCNC: 20 U/L (ref 1–40)
BASOPHILS # BLD AUTO: 0.05 10*3/MM3 (ref 0–0.2)
BASOPHILS NFR BLD AUTO: 0.7 % (ref 0–1.5)
BILIRUB SERPL-MCNC: 0.4 MG/DL (ref 0–1.2)
BUN SERPL-MCNC: 26 MG/DL (ref 8–23)
BUN SERPL-MCNC: 26 MG/DL (ref 8–23)
BUN/CREAT SERPL: 14 (ref 7–25)
BUN/CREAT SERPL: 14.1 (ref 7–25)
CALCIUM SPEC-SCNC: 9 MG/DL (ref 8.6–10.5)
CALCIUM SPEC-SCNC: 9.1 MG/DL (ref 8.6–10.5)
CHLORIDE SERPL-SCNC: 95 MMOL/L (ref 98–107)
CHLORIDE SERPL-SCNC: 95 MMOL/L (ref 98–107)
CO2 SERPL-SCNC: 27.4 MMOL/L (ref 22–29)
CO2 SERPL-SCNC: 27.8 MMOL/L (ref 22–29)
CREAT SERPL-MCNC: 1.85 MG/DL (ref 0.7–1.3)
CREAT SERPL-MCNC: 1.86 MG/DL (ref 0.7–1.3)
DEPRECATED RDW RBC AUTO: 50 FL (ref 37–54)
EGFRCR SERPLBLD CKD-EPI 2021: 35 ML/MIN/1.73
EGFRCR SERPLBLD CKD-EPI 2021: 35.3 ML/MIN/1.73
EOSINOPHIL # BLD AUTO: 0.22 10*3/MM3 (ref 0–0.4)
EOSINOPHIL NFR BLD AUTO: 3.1 % (ref 0.3–6.2)
ERYTHROCYTE [DISTWIDTH] IN BLOOD BY AUTOMATED COUNT: 16.7 % (ref 12.3–15.4)
GLOBULIN UR ELPH-MCNC: 2.7 GM/DL
GLUCOSE SERPL-MCNC: 112 MG/DL (ref 65–99)
GLUCOSE SERPL-MCNC: 113 MG/DL (ref 65–99)
HCT VFR BLD AUTO: 31.7 % (ref 37.5–51)
HGB BLD-MCNC: 9.5 G/DL (ref 13–17.7)
IMM GRANULOCYTES # BLD AUTO: 0.04 10*3/MM3 (ref 0–0.05)
IMM GRANULOCYTES NFR BLD AUTO: 0.6 % (ref 0–0.5)
LYMPHOCYTES # BLD AUTO: 0.96 10*3/MM3 (ref 0.7–3.1)
LYMPHOCYTES NFR BLD AUTO: 13.6 % (ref 19.6–45.3)
MCH RBC QN AUTO: 24.7 PG (ref 26.6–33)
MCHC RBC AUTO-ENTMCNC: 30 G/DL (ref 31.5–35.7)
MCV RBC AUTO: 82.6 FL (ref 79–97)
MONOCYTES # BLD AUTO: 0.65 10*3/MM3 (ref 0.1–0.9)
MONOCYTES NFR BLD AUTO: 9.2 % (ref 5–12)
NEUTROPHILS NFR BLD AUTO: 5.13 10*3/MM3 (ref 1.7–7)
NEUTROPHILS NFR BLD AUTO: 72.8 % (ref 42.7–76)
NRBC BLD AUTO-RTO: 0 /100 WBC (ref 0–0.2)
PHOSPHATE SERPL-MCNC: 3.4 MG/DL (ref 2.5–4.5)
PLATELET # BLD AUTO: 222 10*3/MM3 (ref 140–450)
PMV BLD AUTO: 8.6 FL (ref 6–12)
POTASSIUM SERPL-SCNC: 4.1 MMOL/L (ref 3.5–5.2)
POTASSIUM SERPL-SCNC: 4.3 MMOL/L (ref 3.5–5.2)
PROT SERPL-MCNC: 6.3 G/DL (ref 6–8.5)
RBC # BLD AUTO: 3.84 10*6/MM3 (ref 4.14–5.8)
SODIUM SERPL-SCNC: 134 MMOL/L (ref 136–145)
SODIUM SERPL-SCNC: 135 MMOL/L (ref 136–145)
WBC NRBC COR # BLD: 7.05 10*3/MM3 (ref 3.4–10.8)

## 2023-08-23 PROCEDURE — 99024 POSTOP FOLLOW-UP VISIT: CPT | Performed by: STUDENT IN AN ORGANIZED HEALTH CARE EDUCATION/TRAINING PROGRAM

## 2023-08-23 PROCEDURE — 80053 COMPREHEN METABOLIC PANEL: CPT

## 2023-08-23 PROCEDURE — 85025 COMPLETE CBC W/AUTO DIFF WBC: CPT

## 2023-08-23 PROCEDURE — 36415 COLL VENOUS BLD VENIPUNCTURE: CPT

## 2023-08-23 PROCEDURE — 3078F DIAST BP <80 MM HG: CPT | Performed by: STUDENT IN AN ORGANIZED HEALTH CARE EDUCATION/TRAINING PROGRAM

## 2023-08-23 PROCEDURE — 84100 ASSAY OF PHOSPHORUS: CPT

## 2023-08-23 PROCEDURE — 71046 X-RAY EXAM CHEST 2 VIEWS: CPT

## 2023-08-23 PROCEDURE — 3075F SYST BP GE 130 - 139MM HG: CPT | Performed by: STUDENT IN AN ORGANIZED HEALTH CARE EDUCATION/TRAINING PROGRAM

## 2023-08-23 RX ORDER — LANOLIN ALCOHOL/MO/W.PET/CERES
1000 CREAM (GRAM) TOPICAL DAILY
COMMUNITY

## 2023-08-23 RX ORDER — ACETAMINOPHEN 500 MG
1000 TABLET ORAL 3 TIMES DAILY
COMMUNITY

## 2023-08-23 NOTE — LETTER
"August 23, 2023     Low Sepulveda MD  [address]  [address]  [facility] [address]    Patient: Bayron Acevedo   YOB: 1940   Date of Visit: 8/23/2023     Dear Low Sepulveda:       Thank you for referring Bayron Acevedo to me for evaluation. Below are the relevant portions of my assessment and plan of care.    If you have questions, please do not hesitate to call me. I look forward to following Bayron along with you.         Sincerely,        Myles Santos MD        CC: Nemesio Kramer MD PhD  MD Tom Engel John L Jr., MD  08/23/23 2136  Sign when Signing Visit  Chief Complaint  Clinical stage IIIA ([id number]) non-small cell lung cancer (adenocarcinoma) of left upper lobe    Subjective        History of Present Illness    Patient returns today in follow-up after undergoing left VATS with attempted resection of his left upper lobe malignancy.  Unfortunately, the mass was not resected, was found to be invading the mediastinum as well as pericardial fat pad and involving the phrenic nerve.  Sampling was obtained from the left pleura as well as station 5 and 10 R lymph nodes which were negative for malignancy.  Patient had a persistent air leak with left hydropneumothorax and was discharged from the hospital with chest tube remaining in place.  Today, the patient reports that he has been experiencing a slow recovery from surgery.  Fatigue is gradually improving.  He does have significant dyspnea on exertion at home.  His appetite is improving gradually.  He is maintaining adequate bowel function having bowel movement every other day.  Pain is controlled currently.  The chest tube however is very bothersome and he has been sleeping in his recliner.  He is seeing Dr. Kramer in follow-up today and did have chest x-ray performed.      Objective   Vital Signs:   /63   Pulse 77   Temp 97.5 øF (36.4 øC) (Temporal)   Resp 18   Ht 170.2 cm (67.01\")   Wt 67.8 kg (149 lb 8 oz)   SpO2 94%   BMI " 23.41 kg/mý     Physical Exam  Constitutional:       Appearance: He is well-developed.      Comments: Patient in no distress   Eyes:      Conjunctiva/sclera: Conjunctivae normal.   Neck:      Thyroid: No thyromegaly.   Cardiovascular:      Rate and Rhythm: Normal rate and regular rhythm.      Heart sounds: No murmur heard.    No friction rub. No gallop.   Pulmonary:      Effort: No respiratory distress.      Comments: Diminished breath sounds bilaterally.  A few scattered rhonchi on the left.  Healing VATS incisions on the left, chest tube in place with dressing overlying.  Abdominal:      General: Bowel sounds are normal. There is no distension.      Palpations: Abdomen is soft.      Tenderness: There is no abdominal tenderness.   Musculoskeletal:         General: No swelling.      Comments: No edema currently   Lymphadenopathy:      Head:      Right side of head: No submandibular adenopathy.      Cervical: No cervical adenopathy.      Upper Body:      Right upper body: No supraclavicular adenopathy.      Left upper body: No supraclavicular adenopathy.   Skin:     General: Skin is warm and dry.      Findings: No rash.   Neurological:      Mental Status: He is alert and oriented to person, place, and time.      Cranial Nerves: No cranial nerve deficit.      Motor: No abnormal muscle tone.      Deep Tendon Reflexes: Reflexes normal.   Psychiatric:         Behavior: Behavior normal.      Result Review : Reviewed CBC, CMP from today.  Reviewed records from hospitalization including operative report, pathology.       Assessment and Plan     *Clinical stage IIIA ([id number]) non-small cell lung cancer (adenocarcinoma) of left upper lobe  The patient has a history of smoking 1 pack/day x 55 years quit in 2000.  CT chest 5/26/2023 showed a spiculated mass in the anterior left upper lobe 3.7 x 4.3 x 4.2 cm along the anterior pleural surface and lateral aspect of the anterior mediastinum.  Additional 3 mm nodule right major  fissure.  Bilateral small to moderate-sized pleural effusions.  Bullae formation consistent with COPD.  Mediastinal lymphadenopathy with largest node infracarinal region 2.6 x 1.2 cm.  PET/CT on 6/12/2023 showed 5.2 x 3.1 cm irregular pleural-based mass anterior left upper lobe abutting the pleura anteriorly and medially, hypermetabolic with SUV 14.4.  Mediastinal lymph node activity in the subcarinal region with a 2.2 x 1.1 cm lymph node with SUV 4.5.  Remainder of mediastinal lymph nodes less intensely hypermetabolic with maximal SUV 3.3.  Moderate size loculated pleural effusions bilaterally with low-level activity along the pleura (SUV 2.5), no change in volume of pleural effusions since prior chest CT.  New small to moderate loculated right hydropneumothorax with air-fluid level 5.2 cm.   CT-guided left upper lobe lung biopsy 6/13/2023 with pathology that showed invasive poorly differentiated adenocarcinoma, PD-L1 less than 1%.  Caris analysis: TMB high (14 muts/Mb), mutations in KEAP1 and TP53, EGFR VUS, no targetable mutations.  Patient was hospitalized 6/13 - 6/15/2023 due to right hydropneumothorax, had CT-guided chest tube placement performed 6/13/2023.  Right pleural fluid cytology negative on 6/13/2023.  Staging MRI brain was negative for evidence of metastatic disease on 7/2/2023  PFTs on 6/20/2023 with FEV1 0.99 (44% predicted), DLCO corrected 14.09 (56% predicted).  Mediastinoscopy 7/17/2023 with 4R and station 7 lymph nodes negative for malignancy  Left VATS performed 7/31/2023.  Intraoperatively, primary tumor was unresectable, invading into the mediastinum and pericardial fat pad as well as with phrenic nerve involvement (T4 lesion).  Sampling of left pleura with fibrosis, chronic inflammation, no evidence of malignancy.  Station 5 and 10 R lymph nodes sampled, negative for malignancy.  Patient therefore with unresectable stage IIIA ([id number]) disease.  Plans to treat with radiation therapy.  Due to  age, performance status, comorbidities, patient felt to be a poor candidate for concurrent chemotherapy.  Patient returns today in follow-up after recent left VATS and attempted resection of the primary tumor.  I did contact Dr. Kramer and he indicated that the tumor was found to be invading the mediastinum and pericardial fat pad as well as involving the phrenic nerve and was unresectable.  Surgery was quite difficult with extensive fibrosis and inflammatory change in the chest wall.  Patient currently with persistent hydropneumothorax with left chest tube that remains in place.  Patient will be seeing Dr. Kramer later today for postoperative follow-up.  Currently ECOG performance status of 1-2 with some difficulty with mobility related to the chest tube.  He reports that his fatigue and appetite are improving.  He continues with dyspnea on exertion.  We discussed further recommendations for treatment with radiation therapy since his primary tumor is unresectable at this point.  We did discuss the potential use of concurrent chemoradiation with low-dose weekly carboplatin and Taxol however I feel that given his age and comorbidities as well as current status following surgery he is not a good candidate for concurrent chemotherapy and he agrees.  Given the duration of time that we are out from his most recent imaging studies (PET scan 6/12/2023), we will obtain new baseline CT chest abdomen pelvis in 2 weeks prior to his return visit here in 3 to 4 weeks.    *Chronic bilateral pleural effusions with right hydropneumothorax and subsequent postoperative left hydropneumothorax  Patient appears to have longstanding evidence of small bilateral pleural effusions likely related to CHF  PET/CT 6/12/2023 identified right small to moderate hydropneumothorax new since 5/26/2023 CT chest  Patient was hospitalized 6/13 - 6/15/2023 due to right hydropneumothorax, had CT-guided chest tube placement performed 6/13/2023.  Right pleural  fluid cytology negative on 6/13/2023.  As above, patient underwent left VATS with attempted resection of primary lung cancer on 7/31/2023.  Requirement of ongoing left chest tube with hydropneumothorax persistent on chest x-ray today.    *COPD  The patient has a history of smoking 1 pack/day x 55 years quit in 2000.  PFTs on 6/20/2023 with FEV1 0.99 (44% predicted), DLCO corrected 14.09 (56% predicted).  Patient has experienced increase in his chronic dyspnea on exertion postoperatively which does limit his level of activity currently.    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  Labs on 7/11/2023 with hemoglobin 10.6, MCV 82.8, iron 18, ferritin 504, iron saturation 6%, TIBC 295, B12 201, folate 6.79, CRP 1.07, ESR 27, erythropoietin level 17.8.  Labs consistent with anemia secondary to chronic disease/malignancy as well as anemia secondary to CKD3.  Patient initiated oral B12 1000 mcg daily for B12 deficiency  Hemoglobin today 9.5.  Anticipate that this will improve postoperatively.    *Coronary artery disease, CHF  Chronic bilateral pleural effusions presumably related to CHF  Status post cardiac stent in 2013  Preoperative echocardiogram on 7/19/2023 with ejection fraction 56 to 60%, dilated LA, LV  Patient continues on aspirin 81 mg daily    *Peripheral vascular disease  Patient is followed by Dr. Karen Barrera in vascular surgery  Abdominal aortic aneurysm status post stent graft repair 5/17/2021  Carotid stenosis status post carotid endarterectomy left, 2013.      Plan:  Patient with stage IIIA ([id number]) adenocarcinoma of the left upper lobe, unresectable  Referral to radiation  therapy to consider primary radiation to the lesion in the left upper lobe  Given the patient's age, performance status, comorbidities, he does not appear to be a good candidate for concurrent chemotherapy with radiation.  Patient continuing on oral B12 1000 mcg daily for B12 deficiency  Patient will be seeing Dr. Kramer in follow-up later today, has left-sided chest tube that remains in place with persistent hydropneumothorax on current chest x-ray.  In 2 weeks new baseline CT chest abdomen pelvis  In 3 to 4 weeks return visit with CBC, CMP.    I did spend 50 minutes in total time caring for the patient today, time spent in review of records, face-to-face consultation, coordination of care, placement of orders, completion of documentation.

## 2023-08-23 NOTE — PROGRESS NOTES
"Chief Complaint  Post Op Visit     Subjective        Bayron Acevedo presents to [facility] THORACIC SURGERY  History of Present Illness  Mr. Acevedo is a pleasant 83-year-old gentleman who has biopsy-proven left upper lobe non-small cell lung cancer.  On 7/31/2023 he underwent a robotic assisted thoracoscopic surgery with extensive lysis of adhesion, partial pulmonary decortication and intercostal nerve block.  Upon evaluation of his tumor intraoperatively, he had gross invasion of his tumor into the mediastinum.  Involving the pericardial fat pad, the mediastinum itself and the phrenic nerve on the left side.  Due to the extensive involvement into the mediastinum, the attempted resection was aborted due to this gross invasion.  This would put his tumor stage at T4.  Unfortunately, his hospital stay was prolonged with an air leak.  He was discharged with a chest tube in place with a partial hydropneumothorax and a airleak with intermittent respiratory variation.  He presents today for his first postoperative visit and evaluation.  He states today he continues to be very weak and deconditioned.  He becomes somewhat short of breath with physical exertion.  He denies any fevers or chills at this moment.  He denied cough.    Objective   Vital Signs:  /66 (BP Location: Left arm, Patient Position: Sitting, Cuff Size: Adult)   Pulse 60   Ht 170.2 cm (67.01\")   Wt 67.8 kg (149 lb 6.4 oz)   SpO2 96%   BMI 23.39 kg/mý   Estimated body mass index is 23.39 kg/mý as calculated from the following:    Height as of this encounter: 170.2 cm (67.01\").    Weight as of this encounter: 67.8 kg (149 lb 6.4 oz).       BMI is within normal parameters. No other follow-up for BMI required.      Physical Exam   Result Review :  Chest x-ray from 8/23/2023 was visualized by myself.  There is a stable hydropneumothorax on the left side.  He has a chest tube in place.  His lung is otherwise appropriately expanded.         "   Assessment and Plan   Diagnoses and all orders for this visit:    1. Mass of upper lobe of left lung (Primary)  -     XR Chest 2 View; Future    Mr. Acevedo is a very pleasant 83-year-old gentleman who has biopsy-proven non-small cell lung cancer of the left upper lobe.  Unfortunately on attempted surgical resection, this tumor had gross invasion into the mediastinum.  This makes him at least a T4 lesion.  The lymph nodes that were sampled intraoperatively were negative and his cervical mediastinoscopy which was performed prior to attempted lobectomy was also negative for amy disease.  This places him at a T4 N0 M0 non-small cell lung cancer.  Ideally, he will need SBRT to this left upper lobe lesion.  I am going to refer him back to Dr. Painter with radiation oncology who he had previously seen in multidisciplinary clinic.  In regards to his chest tube, he still has a small air leak present on exam today.  I am going to keep his chest tube for now and see him back in 1 week's time with a 2 view chest x-ray.  I will see him in 1 week for evaluation of this continued intermittent airleak.       I spent 30 minutes caring for Bayron on this date of service. This time includes time spent by me in the following activities:preparing for the visit, reviewing tests, obtaining and/or reviewing a separately obtained history, performing a medically appropriate examination and/or evaluation , counseling and educating the patient/family/caregiver, ordering medications, tests, or procedures, referring and communicating with other health care professionals , documenting information in the medical record, independently interpreting results and communicating that information with the patient/family/caregiver, and care coordination  Follow Up   No follow-ups on file.  Patient was given instructions and counseling regarding his condition or for health maintenance advice. Please see specific information pulled into the AVS if appropriate.

## 2023-08-23 NOTE — TELEPHONE ENCOUNTER
The [facility] received a fax that requires your attention. The document has been indexed to the patient’s chart for your review.      Reason for sending: EXTERNAL MEDICAL RECORD NOTIFICATION     Documents Description: EXTMEDREC-SIGN & RETURN [facility]-8.22.23    Name of Sender: [facility] [place]     Date Indexed: 8.22.23

## 2023-08-23 NOTE — PROGRESS NOTES
I accompanied patient and wife to Dr. Kramer's follow up appointment. He continues to have a Chest tube in place for his persistent air leak.  He will have a repeat CXR then see Dr. Kramer again next Wednesday.    The patient met with Dr. Santos this morning. He will be referred back to Dr. Painter to discuss radiation therapy.    The patient complains of fatigue. Dr. Kramer offered a PT referral, although the patient declined at this time.  He has home care services at this time.    I will meet with patient week at Dr. Kramer's office if possible or call later that week.  Encouraged patient/family to call as needed.

## 2023-08-24 ENCOUNTER — CONSULT (OUTPATIENT)
Dept: RADIATION ONCOLOGY | Facility: HOSPITAL | Age: 83
End: 2023-08-24
Payer: MEDICARE

## 2023-08-24 ENCOUNTER — HOSPITAL ENCOUNTER (OUTPATIENT)
Dept: RADIATION ONCOLOGY | Facility: HOSPITAL | Age: 83
Setting detail: RADIATION/ONCOLOGY SERIES
End: 2023-08-24
Payer: MEDICARE

## 2023-08-24 VITALS
BODY MASS INDEX: 23.49 KG/M2 | OXYGEN SATURATION: 93 % | HEART RATE: 63 BPM | DIASTOLIC BLOOD PRESSURE: 52 MMHG | WEIGHT: 150 LBS | SYSTOLIC BLOOD PRESSURE: 107 MMHG

## 2023-08-24 DIAGNOSIS — C34.92 ADENOCARCINOMA, LUNG, LEFT: Primary | ICD-10-CM

## 2023-08-24 PROCEDURE — G0463 HOSPITAL OUTPT CLINIC VISIT: HCPCS

## 2023-08-24 NOTE — PROGRESS NOTES
[facility] Radiation Oncology   Consult      Chief Complaint  Left Lung Adenocarcinoma        Diagnosis: Left Lung Adenocarcinoma    Overall Stage IIIA    cT4: Mediastinal Invasion  pN0: per cervical mediastinoscopy, and intraoperative resection of 10R and 5 LNs  cM0: per PET CT, MRI Brain      Pacemaker: no  Prior History of Radiation: no  Contraindications to Radiation: no  Patient Requires Pregnancy Test: No, patient is male    HPI:    Bayron Acevedo is a 83 y.o. male with a history of CKD, COPD, CAD, PAD, AAA, CHF who underwent chest x-ray in April 2023 due to worsening shortness of breath.  The patient was found to have findings concerning for mass in the left upper lobe.  The patient underwent CT chest on 5/26/2023 which showed a left upper lobe lung mass highly concerning for malignancy with bilateral pleural effusions.  The patient underwent PET/CT which showed avidity in the 5.2 cm left upper lobe lung mass, with low-level activity and indeterminant mediastinal lymph nodes.  The patient underwent CT-guided biopsy of the primary lesion which showed poorly differentiated adenocarcinoma.  The patient has undergone MRI of the brain which was negative.  The patient was referred to the multidisciplinary thoracic clinic for consideration of further work-up and treatment.    I met with the patient on 7/11/2023 and discussed his work-up to date.  The patient had not yet undergone mediastinal evaluation this was planned with Dr. Kramer.  The patient underwent cervical Mediastinoscopy on 7/17/2023 which demonstrated a negative 4R and 7 lymph node.  The patient was taken by Dr. Kramer for left upper lobectomy on 7/31/2023.  During the procedure, Dr. Kramer noted that the mass appeared to invade the intercostal vessels on the anterior aspect of the chest wall.  He also had some invasion into the bony prominence of his anterior chest wall.  Lobectomy was abandoned due to these findings, however an additional level 10R level 5 lymph  node were negative for malignancy.  The patient had a prolonged hospital admission for over 2 weeks due to persistent air leak.  The patient has been referred for consideration of definitive radiation therapy.  The patient still has a chest tube in place.  He has met with Dr. Santos postoperatively who does not recommend concurrent chemotherapy.  New CT images have been ordered by Dr. Santos for 9/15/2023.      Imaging:      CT chest 5/26/2023     CONCLUSION: Left upper lobe lung mass as discussed above worrisome for  primary bronchogenic carcinoma. Bilateral pleural effusions causing  bibasilar atelectasis of the right and left lower lobes. Potential  malignant effusion. Tiny nodule is demonstrated along the right major  fissure. There is mediastinal adenopathy seen. Recommend referral to the  [facility] multidisciplinary pulmonary care clinic, follow-up PET/CT  and/or biopsy would be helpful as follow-up.        PET/CT 6/12/2023     IMPRESSION:  1. Hypermetabolic 5.2 x 3.1 cm irregular pleural-based left upper lobe  lung mass. There is low-level activity at the subcarinal nodes which is  indeterminate and should be followed with CT. The rest of the  mediastinal and hilar nodes have blood pool level activity.  2. New small-moderate right hydropneumothorax since the 05/26/2023 CT.  3. There is no convincing evidence for metastatic disease at the neck,  abdomen, or pelvis.        MRI brain 7/2/2023     IMPRESSION:     No evidence for intracranial metastatic disease.         Pathology:        [facility] CT-Guided Biopsy Pathology 6/13/2023        Final Diagnosis   1. Lung, Left Upper Lobe, CT-Guided Biopsies for a Mass: INVASIVE POORLY DIFFERENTIATED PULMONARY ADENOCARCINOMA.        Cervical Mediastinoscopy pathology 7/17/2023    Final Diagnosis   1. Lymph Node, 4R, Excision:               A. Benign anthracotic lymph node (0/1).     2. Lymph Node, #7, Excision:                A. Benign anthracotic lymph node (0/1).        Left pleural excisional biopsy pathology, station 5 and 10R excisional biopsy pathology 7/31/2023    Final Diagnosis   1. Pleura, Left, Excisional Biopsy:                 A. Pleural fibrosis and mild chronic inflammation; negative for malignancy.     2. Pleura, Left #2, Excisional Biopsy:               A. Fibroadipose tissue, skeletal muscle and pleura with fibrosis and mild scattered mixed but predominantly                     chronic inflammation; negative for malignancy.     3. Lymph Node, Station 5, Excision:               A. One benign lymph node (0/1).     4. Lymph Node, 10R, Excision:               A. Benign reactive anthracotic lymph node (0/1).     Labs:    Lab Results   Component Value Date    CREATININE 1.86 (C) 08/23/2023             Problem List:  Patient Active Problem List   Diagnosis    COPD (chronic obstructive pulmonary disease) with emphysema    Hypertension    Hyperlipidemia    S/P angioplasty with stent    Heart attack    Colon polyp    Carotid stenosis    CAD in native artery    Peripheral artery disease    History of hydrocele    Perennial allergic rhinitis    AAA (abdominal aortic aneurysm) without rupture    Acute on chronic systolic CHF (congestive heart failure)    Stage 3b chronic kidney disease    Hydropneumothorax    Mass of upper lobe of left lung    NICM (nonischemic cardiomyopathy)    Chronic systolic CHF (congestive heart failure)    Normocytic anemia    Adenocarcinoma, lung, left          Medications:  Current Outpatient Medications on File Prior to Visit   Medication Sig Dispense Refill    acetaminophen (TYLENOL) 500 MG tablet Take 2 tablets by mouth 3 (Three) Times a Day.      albuterol (PROVENTIL) (2.5 MG/3ML) 0.083% nebulizer solution USE ONE VIAL BY NEBULIZATION EVERY FOUR HOURS AS NEEDED FOR FOR WHEEZING (Patient taking differently: Take 2.5 mg by nebulization Every 4 (Four) Hours As Needed.) 180 mL 0    amLODIPine (NORVASC) 5 MG tablet Take 1 tablet by mouth Daily. 90  tablet 3    aspirin 81 MG EC tablet Take 1 tablet by mouth Daily.      carvedilol (COREG) 25 MG tablet Take 1 tablet by mouth 2 (Two) Times a Day. 180 tablet 0    Chlorhexidine Gluconate Cloth 2 % pads Apply  topically Take As Directed.      gabapentin (NEURONTIN) 100 MG capsule Take 1 capsule by mouth Every 12 (Twelve) Hours for 30 days. 60 capsule 0    guaiFENesin (MUCINEX) 600 MG 12 hr tablet Take 1 tablet by mouth Every 12 (Twelve) Hours for 30 days. 60 tablet 0    hydrALAZINE (APRESOLINE) 50 MG tablet 1 tablet 2 (Two) Times a Day.      nitroglycerin (NITROSTAT) 0.4 MG SL tablet Place 1 tablet under the tongue every 5 (five) minutes as needed for chest pain. Take no more than 3 doses in 15 minutes. 25 tablet 1    rosuvastatin (CRESTOR) 20 MG tablet Take 1 tablet by mouth Daily. 90 tablet 3    simethicone (MYLICON) 80 MG chewable tablet Chew 1 tablet 3 (Three) Times a Day As Needed for Flatulence for up to 30 days. 30 tablet 0    torsemide (DEMADEX) 20 MG tablet Take 2 tablets by mouth Daily. 180 tablet 3    vitamin B-12 (CYANOCOBALAMIN) 1000 MCG tablet Take 1 tablet by mouth Daily.       No current facility-administered medications on file prior to visit.          Allergies:  Allergies   Allergen Reactions    Lisinopril Anaphylaxis    Codeine Sulfate Hives    Contrast Dye (Echo Or Unknown Ct/Mr) Other (See Comments)     HYPERTENSION    Iodinated Contrast Media Other (See Comments)     HYPERTENSION    Penicillins Other (See Comments)     Passed out after a PCN shot- no other sx noted-per pateint         Family History:  The patient has no family history of conditions which would be contraindications to radiation therapy        Social History:  Former     Distance From Clinic: 30 minutes - 1 hour     Patient has someone who can assist with transportation: yes      Vital Signs:  /52   Pulse 63   Wt 68 kg (150 lb)   SpO2 93%   BMI 23.49 kg/mý   Estimated body mass index is 23.49 kg/mý as calculated from  "the following:    Height as of 8/23/23: 170.2 cm (67.01\").    Weight as of this encounter: 68 kg (150 lb).  Pain Score    08/24/23 1032   PainSc: 0-No pain         ECOG: Capable of only limited selfcare; confined to bed or chair more than 50% of waking hours = 3    Physical Exam  Vitals reviewed.   Constitutional:       General: He is not in acute distress.     Appearance: Normal appearance.   HENT:      Head: Normocephalic and atraumatic.   Eyes:      Extraocular Movements: Extraocular movements intact.      Pupils: Pupils are equal, round, and reactive to light.   Pulmonary:      Comments: Patient with left chest tube in place with approximately 25 cc of bloody fluid.  Abdominal:      General: Abdomen is flat.      Palpations: Abdomen is soft.   Musculoskeletal:      Cervical back: Normal range of motion.   Skin:     General: Skin is warm and dry.   Neurological:      General: No focal deficit present.      Mental Status: He is alert and oriented to person, place, and time.   Psychiatric:         Mood and Affect: Mood normal.         Behavior: Behavior normal.          Result Review :  The following data was reviewed by: Dakota Painter MD on 08/24/2023:  Labs: Last Creatinine   Data reviewed : Radiologic studies CT Chest, PET CT, MRI Brain             Diagnoses and all orders for this visit:    1. Adenocarcinoma, lung, left (Primary)  -     Ambulatory Referral to Multi-Disciplinary Clinic - Supportive Oncology        Assessment:    The patient is an 83-year-old male with CKD, COPD, CAD, PAD, AAA, CHF with locally advanced adenocarcinoma of the left upper lobe.  The patient has undergone cervical Mediastinoscopy which did not demonstrate amy disease.  The patient was taken for attempted lobectomy on 7/31/2023.  Unfortunately, partly through the surgery that had to be abandoned due to locally advanced disease greater than had been expected.  The patient had invasion of his phrenic nerve, mediastinum, and chest " poly.  The patient was hospitalized for several weeks after this procedure due to chest tube with persistent air leak.  The patient has since been discharged and evaluated postoperatively by Dr. Santos.  Dr. Santos does not feel patient is a chemotherapy candidate.  He has ordered new CT imaging to take place in approximately 2 weeks.  The patient has been referred back to radiation oncology for consideration of definitive radiation therapy for his locally advanced tumor.    I met with the patient and his wife and discussed his work-up and treatment today.  I discussed the risk, benefits, side effects, and logistics of radiation treatment planning and delivery.  I answered all the patient's questions to his satisfaction.  Overall, I am hesitant to initiate treatment of the patient with his chest tube in place.  I have spoken about the case with Dr. Kramer who is concerned that patient may be left with a chronic air leak due to his tumor.  I will plan to discuss the patient's case and peer review prior to finalizing treatment decisions.      Plan:    -Plan for definitive radiation therapy for locally advanced lung cancer.  I will discuss the patient's case and peer review prior to initiation of therapy       I spent 40 minutes caring for Bayron on this date of service. This time includes time spent by me in the following activities:preparing for the visit, reviewing tests, obtaining and/or reviewing a separately obtained history, referring and communicating with other health care professionals , documenting information in the medical record, independently interpreting results and communicating that information with the patient/family/caregiver, and care coordination  Follow Up   No follow-ups on file.  Patient was given instructions and counseling regarding his condition or for health maintenance advice. Please see specific information pulled into the AVS if appropriate.     Dakota Painter MD

## 2023-08-28 ENCOUNTER — TREATMENT (OUTPATIENT)
Dept: OTHER | Facility: HOSPITAL | Age: 83
End: 2023-08-28
Payer: MEDICARE

## 2023-08-28 NOTE — PROGRESS NOTES
Oncology Social Work     PHQ-9 Depression Screening  Little interest or pleasure in doing things? 0-->not at all   Feeling down, depressed, or hopeless? 0-->not at all   Trouble falling or staying asleep, or sleeping too much? 0-->not at all   Feeling tired or having little energy? 0-->not at all   Poor appetite or overeating? 0-->not at all   Feeling bad about yourself - or that you are a failure or have let yourself or your family down? 0-->not at all   Trouble concentrating on things, such as reading the newspaper or watching television? 0-->not at all   Moving or speaking so slowly that other people could have noticed? Or the opposite - being so fidgety or restless that you have been moving around a lot more than usual? 0-->not at all   Thoughts that you would be better off dead, or of hurting yourself in some way? 0-->not at all   PHQ-9 Total Score 0   If you checked off any problems, how difficult have these problems made it for you to do your work, take care of things at home, or get along with other people?  (OSW followed up with patient related to PHQ-9 given on 8/24/23.)          OSW reached out to patient related to PHQ-9 given to patient on 8/24/23. He scored 17 and indicated he had thoughts that life was not worth living. Patient told this OSW via telephone that he mistakenly marked that on the assessment. OSW completed another PHQ-9 with the patient via telephone and he scored a 0. Patient report that he is doing okay and he does have have any current or past SI. OSW did offer an appointment with behavioral health APRN but the patient declined. OSW informed patient that OSW information will be mailed to him if he needs anything.       Uzma Quinones W

## 2023-08-29 ENCOUNTER — TELEPHONE (OUTPATIENT)
Dept: SURGERY | Facility: CLINIC | Age: 83
End: 2023-08-29

## 2023-08-29 NOTE — TELEPHONE ENCOUNTER
The [facility] received a fax that requires your attention. The document has been indexed to the patient’s chart for your review.      Reason for sending: EXTERNAL MEDICAL RECORD NOTIFICATION     Documents Description: EXTMEDREC-CLIENT COORDINATION REPORT-8.28.23    Name of Sender: [facility]     Date Indexed: 8.28.23

## 2023-08-30 ENCOUNTER — PATIENT OUTREACH (OUTPATIENT)
Dept: OTHER | Facility: HOSPITAL | Age: 83
End: 2023-08-30
Payer: MEDICARE

## 2023-08-30 ENCOUNTER — OFFICE VISIT (OUTPATIENT)
Dept: SURGERY | Facility: CLINIC | Age: 83
End: 2023-08-30
Payer: MEDICARE

## 2023-08-30 ENCOUNTER — HOSPITAL ENCOUNTER (OUTPATIENT)
Dept: GENERAL RADIOLOGY | Facility: HOSPITAL | Age: 83
Discharge: HOME | End: 2023-08-30
Admitting: STUDENT IN AN ORGANIZED HEALTH CARE EDUCATION/TRAINING PROGRAM
Payer: MEDICARE

## 2023-08-30 VITALS
SYSTOLIC BLOOD PRESSURE: 122 MMHG | BODY MASS INDEX: 23.07 KG/M2 | DIASTOLIC BLOOD PRESSURE: 78 MMHG | HEIGHT: 67 IN | HEART RATE: 78 BPM | OXYGEN SATURATION: 96 % | WEIGHT: 147 LBS

## 2023-08-30 DIAGNOSIS — R91.8 MASS OF UPPER LOBE OF LEFT LUNG: ICD-10-CM

## 2023-08-30 DIAGNOSIS — C34.92 ADENOCARCINOMA, LUNG, LEFT: Primary | ICD-10-CM

## 2023-08-30 DIAGNOSIS — R91.8 MASS OF UPPER LOBE OF LEFT LUNG: Primary | ICD-10-CM

## 2023-08-30 PROCEDURE — 3078F DIAST BP <80 MM HG: CPT | Performed by: STUDENT IN AN ORGANIZED HEALTH CARE EDUCATION/TRAINING PROGRAM

## 2023-08-30 PROCEDURE — 3074F SYST BP LT 130 MM HG: CPT | Performed by: STUDENT IN AN ORGANIZED HEALTH CARE EDUCATION/TRAINING PROGRAM

## 2023-08-30 PROCEDURE — 99024 POSTOP FOLLOW-UP VISIT: CPT | Performed by: STUDENT IN AN ORGANIZED HEALTH CARE EDUCATION/TRAINING PROGRAM

## 2023-08-30 PROCEDURE — 71046 X-RAY EXAM CHEST 2 VIEWS: CPT

## 2023-08-30 NOTE — PROGRESS NOTES
I accompanied patient/wife to Dr. Kramer's follow up visit today. The patient continues to have a persistent air leak and his chest tube will remain in place for at least one additional week. He will see Dr. Kramer next Wednesday with a CXR prior to the visit.      The patient continues to complain of shortness of breath with activity. He states his oxygen saturations are running around 93%.  He also complains of ongoing fatigue.      The patient states his appetite has improved somewhat also he still has has a 40+ lb weight loss in the past year.  We discussed a referral to dietician again.  He previously declined  although is agreeable to the referral to dietician today.     I will meet with patient and his wife next week in Dr Kramer's office. Encouraged patient/wife to call as needed.

## 2023-08-30 NOTE — PROGRESS NOTES
"Chief Complaint  Continued postoperative airleak    Subjective        Bayron Acevedo presents to [facility] THORACIC SURGERY  History of Present Illness  Mr. Acevedo is a very pleasant 83-year-old gentleman has biopsy-proven left upper lobe non-small cell lung cancer.  He underwent a robotic assisted thoracoscopic evaluation, pulmonary decortication and ultimately an aborted left upper lobectomy due to the fact that this tumor had gross invasion into the mediastinum.  His hospital stay was prolonged by a prolonged air leak.  He ultimately was discharged home with a chest tube in place.  Today is his second postoperative visit in regards to evaluation for this airleak.  Overall he states that he does feel very weak and tired.  He is mildly short of breath with exertion but recovers and is not short of breath at rest.  He denies any other symptoms at this time.    Objective   Vital Signs:  /78 (BP Location: Left arm, Patient Position: Sitting, Cuff Size: Adult)   Pulse 78   Ht 170.2 cm (67.01\")   Wt 66.7 kg (147 lb)   SpO2 96%   BMI 23.02 kg/mý   Estimated body mass index is 23.02 kg/mý as calculated from the following:    Height as of this encounter: 170.2 cm (67.01\").    Weight as of this encounter: 66.7 kg (147 lb).       BMI is within normal parameters. No other follow-up for BMI required.      Physical Exam  Vitals reviewed.   Constitutional:       Appearance: Normal appearance.   Cardiovascular:      Rate and Rhythm: Normal rate and regular rhythm.      Pulses: Normal pulses.      Heart sounds: Normal heart sounds.   Pulmonary:      Comments: Small intermittent airleak with respiratory variation.  This appears slightly better this week compared to last  Skin:     General: Skin is warm.   Neurological:      General: No focal deficit present.      Mental Status: He is alert and oriented to person, place, and time. Mental status is at baseline.   Psychiatric:         Mood and Affect: Mood " normal.         Behavior: Behavior normal.         Thought Content: Thought content normal.         Judgment: Judgment normal.      Result Review :  Chest x-ray today was visualized by myself.  It appears similar to last week's film.           Assessment and Plan   Diagnoses and all orders for this visit:    1. Mass of upper lobe of left lung (Primary)  -     XR Chest 2 View; Future    Mr. Acevedo is a very pleasant 83-year-old gentleman who presents today for a continued postoperative air leak after undergoing a robotic assisted left-sided exploration, pulmonary decortication and ultimately aborted left upper lobectomy due to the tumor is gross invasion into the mediastinum.  He does have a small air leak today, it does appear slightly better compared to last week.  I did instruct him to next week clamp his chest tube in the morning when he wakes up on Wednesday.  This will allow his chest tube to be clamped for several hours before he gets a chest x-ray evaluation and is seen in clinic.  If his lung is up next week on chest x-ray and I do not appreciate a large rush of air in clinic, we will possibly remove his chest tube at that time.  I will see him next week in clinic with a 2 view chest x-ray and possible chest tube removal.       I spent 30 minutes caring for Bayron on this date of service. This time includes time spent by me in the following activities:preparing for the visit, reviewing tests, obtaining and/or reviewing a separately obtained history, performing a medically appropriate examination and/or evaluation , counseling and educating the patient/family/caregiver, ordering medications, tests, or procedures, referring and communicating with other health care professionals , documenting information in the medical record, independently interpreting results and communicating that information with the patient/family/caregiver, and care coordination  Follow Up   No follow-ups on file.  Patient was given  instructions and counseling regarding his condition or for health maintenance advice. Please see specific information pulled into the AVS if appropriate.

## 2023-08-31 ENCOUNTER — TELEMEDICINE - AUDIO (OUTPATIENT)
Dept: OTHER | Facility: HOSPITAL | Age: 83
End: 2023-08-31
Payer: MEDICARE

## 2023-08-31 NOTE — PROGRESS NOTES
OUTPATIENT ONCOLOGY NUTRITION ASSESSMENT    Patient Name: Bayron Acevedo  YOB: 1940  MRN: [id number]  Assessment Date: 8/31/2023    COMMENTS: Spoke to pt via phone, pt states he believes his appetite is improving, had some sausage biscuits and gravy this AM for breakfast. Pt states he has not yet had anything for lunch or a snack, typically eats 2x/day with a small snack in between. Pt states he has boost/ensure at home, but is not drinking any. Encouraged pt to try to get 1 in per day when he is eating his snack. Reviewed the importance of adequate nutrition and weight maintenance, plan is for possible radiation after removal of his chest tube. Discussed a high calorie, high protein diet: encouraged pt to add butter, cheese, cream, full fat dairy, honey, jams/jellies to his food to increase overall caloric intake. Pt denies any additional questions or concerns, encouraged to reach out if anything comes up. Will continue to follow pt for his plan of care.         Reason for Assessment New assessment, Nursing referral, Education      Diagnosis/Problem   L upper lobe NSCLC   Treatment Plan Radiation, Surgery   Aborted L upper lobectomy d/t mediastinum invasion  XRT after chest tube out      Encounter Information        Nutrition/Diet History:  Patient states his appetite is slowly improving    Oral Nutrition Supplements: None at this time    Factors/Symptoms Affecting Intake: Anorexia   Comments:      Medical/Surgical History Past Medical History:   Diagnosis Date    AAA (abdominal aortic aneurysm)     CAD in native artery     Carotid stenosis     s/p CEA Left    CKD (chronic kidney disease)     Colon polyp     COPD (chronic obstructive pulmonary disease)     H/O Acute myocardial infarction 2013    H/O PAD (peripheral artery disease)     Heart attack 2013    Heart failure     Hyperlipidemia     Hypertension     Inguinal hernia     RIGHT    Left ventricular dysfunction     Mass of left lung     SHOWS ON  MRI    Mitral valve regurgitation     Perennial allergic rhinitis 01/06/2017    PVD (peripheral vascular disease)     S/P angioplasty with stent     Tinnitus     Tricuspid regurgitation      Past Surgical History:   Procedure Laterality Date    APPENDECTOMY      ARTERIOGRAM AORTIC N/A 05/17/2021    Procedure: ZENITH AORTIC STENT GRAFT;  Surgeon: Karen Barrera Jr., MD;  Location: [facility] OR 18/19;  Service: Vascular;  Laterality: N/A;    CARDIAC CATHETERIZATION      X2    CARDIAC CATHETERIZATION N/A 04/11/2023    Procedure: Left Heart Cath;  Surgeon: Guru Jiang MD;  Location: [facility] CATH INVASIVE LOCATION;  Service: Cardiovascular;  Laterality: N/A;    CARDIAC CATHETERIZATION N/A 04/11/2023    Procedure: Right Heart Cath;  Surgeon: Guru Jiang MD;  Location: [facility] CATH INVASIVE LOCATION;  Service: Cardiovascular;  Laterality: N/A;    CARDIAC CATHETERIZATION N/A 04/11/2023    Procedure: Coronary angiography;  Surgeon: Guru Jiang MD;  Location: [facility] CATH INVASIVE LOCATION;  Service: Cardiovascular;  Laterality: N/A;    CARDIAC CATHETERIZATION N/A 04/11/2023    Procedure: Left ventriculography;  Surgeon: Guru Jiang MD;  Location: [facility] CATH INVASIVE LOCATION;  Service: Cardiovascular;  Laterality: N/A;    CARDIAC CATHETERIZATION  04/11/2023    Procedure: RESTING FULL CYCLE RATIO;  Surgeon: Guru Jiang MD;  Location: [facility] CATH INVASIVE LOCATION;  Service: Cardiovascular;;    CAROTID ENDARTERECTOMY Left 01/2013    Bruce Jones Jr, MD    CAROTID STENT Left     LAD    COLONOSCOPY      HERNIA REPAIR      HYDROCELE EXCISION / REPAIR      Dr. SINTIA Osorio    LOBECTOMY Left 7/31/2023    Procedure: BRONCHOSCOPY, LEFT VAT WITH DAVINCI ROBOT, EXTENSIVE LYSIS OF ADHESIONS, PARTIAL PULMONARY DECORTICATION, INTERCOSTAL NERVE BLOCK;  Surgeon: Nemesio Kramer MD PhD;  Location: [facility] OR;  Service: Robotics - DaVinci;  Laterality: Left;    MEDIASTINOSCOPY N/A 7/17/2023  "   Procedure: MEDIASTINOSCOPY;  Surgeon: Nemesio Kramer MD PhD;  Location: [facility];  Service: Thoracic;  Laterality: N/A;    UMBILICAL HERNIA REPAIR  2003        Anthropometrics        Current Height Ht Readings from Last 1 Encounters:   08/30/23 170.2 cm (67.01\")      Current Weight Wt Readings from Last 1 Encounters:   08/30/23 66.7 kg (147 lb)      BMI  23.0   Ideal Body Weight (IBW) 148#   Usual Body Weight (UBW) ~160#   Weight Change/Trend Loss; -14#/8.6% x 1 mo, significant    Weight History Wt Readings from Last 30 Encounters:   08/30/23 1351 66.7 kg (147 lb)   08/24/23 1032 68 kg (150 lb)   08/23/23 1124 67.8 kg (149 lb 6.4 oz)   08/23/23 1012 67.8 kg (149 lb 8 oz)   07/31/23 2343 73 kg (161 lb)   07/26/23 0857 73 kg (161 lb)   07/21/23 1009 73.6 kg (162 lb 3.2 oz)   07/21/23 0857 73.6 kg (162 lb 3.2 oz)   07/19/23 1239 72.6 kg (160 lb)   07/11/23 0852 72.6 kg (160 lb)   07/11/23 0759 72.6 kg (160 lb)   06/20/23 0801 72.6 kg (160 lb)   06/13/23 0841 72.6 kg (160 lb)   06/06/23 0834 76.7 kg (169 lb)   05/18/23 0916 76.7 kg (169 lb 3.2 oz)   04/27/23 0904 81.2 kg (179 lb)   04/19/23 1035 80.7 kg (178 lb)   04/12/23 0541 78.1 kg (172 lb 3.2 oz)   04/11/23 0618 78.9 kg (173 lb 14.4 oz)   04/10/23 0600 76.8 kg (169 lb 4.8 oz)   04/09/23 0549 79.8 kg (176 lb)   04/08/23 0531 79.8 kg (176 lb)   04/07/23 1908 79.8 kg (176 lb)   04/07/23 1506 79.8 kg (176 lb)   04/22/22 1500 86.2 kg (190 lb)   11/05/21 0842 87.5 kg (193 lb)   05/17/21 0555 90.2 kg (198 lb 13.7 oz)   05/14/21 1100 90.3 kg (199 lb)   12/30/20 0857 90.7 kg (200 lb)   11/19/19 1139 91.2 kg (201 lb 1.6 oz)   08/28/19 1047 90.9 kg (200 lb 6.4 oz)   03/18/19 0932 92.1 kg (203 lb)   09/10/18 0942 91.1 kg (200 lb 14.4 oz)   08/09/18 1428 92.5 kg (204 lb)   05/21/18 1424 94.6 kg (208 lb 8 oz)   04/17/17 1100 92.7 kg (204 lb 6.4 oz)          Medications           Current medications: Chlorhexidine Gluconate Cloth, acetaminophen, albuterol, amLODIPine, " "aspirin, carvedilol, gabapentin, guaiFENesin, hydrALAZINE, nitroglycerin, rosuvastatin, simethicone, torsemide, and vitamin B-12     Tests/Procedures        Tests/Procedures Other:  surgery     Labs       Pertinent Labs          Invalid input(s): LABALBU, PROT          External COVID19   Date Value Ref Range Status   04/22/2022 Detected (A) Not Detected - Ref. Range Final     No results found for: HGBA1C       Estimated/Assessed Needs        Energy Requirements    Height for Calculation   67\"   Weight for Calculation 66.7 kg   Method for Estimation  25 kcal/kg, 30 kcal/kg   EST Needs (kcal/day) 4546-6721 kcal/day       Protein Requirements    Weight for Calculation 66.7 kg   EST Protein Needs (g/kg) 1.0 - 1.2 gm/kg   EST Daily Needs (g/day) 67-80 g/day       Fluid Requirements     Method for Estimation 1 mL/kcal    Estimated Needs (mL/day) 1800 mL/day           PES STATEMENT / NUTRITION DIAGNOSIS      Nutrition Dx Problem Problem:    Underweight, Unintentional Weight Loss, Malnutrition (severe), Inadequate Oral Intake, and Increased Nutrient Needs    Etiology:  Medical diagnosis: Lung cancer  Factors affecting nutrition: Appetite    Signs/Symptoms:  Unintended Weight Change and Report/Observation    Comment:      NUTRITION INTERVENTION / PLAN OF CARE      Intervention Goal(s) Maintain nutrition status, Provide information, Meet estimated needs, Disease management/therapy, Increase intake, Maintain weight, and No significant weight loss         RD Intervention/Action Encouraged intake, Follow Tx progress         Recommendations:       PO Diet Increase caloric intake by following a high calorie, high protein diet--add peanut butter, butter, cheese, cream, honey, jams/jellies to food       Supplements Ensure/Boost at least 1/day       Snacks       Other          Monitor/Evaluation PO intake, Supplement intake, Weight, Symptoms   Education Education provided, Will provide eduction as needed   --    RD to follow "     Electronically signed by:  Carmen Knowles RD  08/31/23 13:42 EDT

## 2023-09-06 ENCOUNTER — OFFICE VISIT (OUTPATIENT)
Dept: SURGERY | Facility: CLINIC | Age: 83
End: 2023-09-06
Payer: MEDICARE

## 2023-09-06 ENCOUNTER — PATIENT OUTREACH (OUTPATIENT)
Dept: OTHER | Facility: HOSPITAL | Age: 83
End: 2023-09-06
Payer: MEDICARE

## 2023-09-06 ENCOUNTER — HOSPITAL ENCOUNTER (OUTPATIENT)
Dept: GENERAL RADIOLOGY | Facility: HOSPITAL | Age: 83
Discharge: HOME | End: 2023-09-06
Admitting: STUDENT IN AN ORGANIZED HEALTH CARE EDUCATION/TRAINING PROGRAM
Payer: MEDICARE

## 2023-09-06 VITALS
WEIGHT: 146 LBS | OXYGEN SATURATION: 100 % | SYSTOLIC BLOOD PRESSURE: 146 MMHG | HEIGHT: 67 IN | DIASTOLIC BLOOD PRESSURE: 68 MMHG | HEART RATE: 54 BPM | BODY MASS INDEX: 22.91 KG/M2

## 2023-09-06 DIAGNOSIS — R91.8 MASS OF UPPER LOBE OF LEFT LUNG: ICD-10-CM

## 2023-09-06 DIAGNOSIS — R91.8 MASS OF UPPER LOBE OF LEFT LUNG: Primary | ICD-10-CM

## 2023-09-06 PROCEDURE — 99024 POSTOP FOLLOW-UP VISIT: CPT | Performed by: STUDENT IN AN ORGANIZED HEALTH CARE EDUCATION/TRAINING PROGRAM

## 2023-09-06 PROCEDURE — 71046 X-RAY EXAM CHEST 2 VIEWS: CPT

## 2023-09-06 PROCEDURE — 3077F SYST BP >= 140 MM HG: CPT | Performed by: STUDENT IN AN ORGANIZED HEALTH CARE EDUCATION/TRAINING PROGRAM

## 2023-09-06 PROCEDURE — 3078F DIAST BP <80 MM HG: CPT | Performed by: STUDENT IN AN ORGANIZED HEALTH CARE EDUCATION/TRAINING PROGRAM

## 2023-09-06 NOTE — PROGRESS NOTES
Met patient and wife following his appt with Dr. Kramer. His chest tube was removed.  The patient will be seen again next week in Dr. Kramer's office.     The patient denies any resource/supportive care needs. I will meet next week or call in the next few weeks if I am unable to attend this appt. Encouraged patient/wife to call as needed.

## 2023-09-06 NOTE — PROGRESS NOTES
"Chief Complaint  Non-small cell lung cancer.  Postoperative leak    Subjective        Bayronchasity Acevedo presents to [facility] THORACIC SURGERY  History of Present Illness  Mr. Acevedo is a very pleasant 83-year-old gentleman who is well-known to the thoracic surgery team.  He underwent an attempted left upper lobe resection for non-small cell lung cancer back on 7/31/2023.  This operation was aborted due to the fact that he had gross mediastinal invasion.  He had a prolonged hospital course with a prolonged air leak.  He is seen me twice postoperatively and continues to have a postoperative air leak.  Last week, his air leak is drastically improved.  He clamped his chest tube this morning around 7 AM and presents today for evaluation for possible removal of chest tube.  Objective   Vital Signs:  /68 (BP Location: Left arm, Patient Position: Sitting, Cuff Size: Adult)   Pulse 54   Ht 170.2 cm (67.01\")   Wt 66.2 kg (146 lb)   SpO2 100%   BMI 22.86 kg/m²   Estimated body mass index is 22.86 kg/m² as calculated from the following:    Height as of this encounter: 170.2 cm (67.01\").    Weight as of this encounter: 66.2 kg (146 lb).       BMI is within normal parameters. No other follow-up for BMI required.      Physical Exam  Constitutional:       Appearance: Normal appearance.   Cardiovascular:      Rate and Rhythm: Normal rate and regular rhythm.      Pulses: Normal pulses.      Heart sounds: Normal heart sounds.   Pulmonary:      Effort: Pulmonary effort is normal.      Breath sounds: Normal breath sounds.      Comments: When I unclamped his chest tube, I did not appreciate an air leak.  Neurological:      Mental Status: He is alert.      Result Review :  's chest x-ray today was visualized by myself.  It appears that he has a stable chest x-ray the last week.           Assessment and Plan   Diagnoses and all orders for this visit:    1. Mass of upper lobe of left lung (Primary)  -     XR Chest 2 " View; Future    Mr. Acevedo is a very pleasant 83-year-old gentleman who presents today for continued follow-up for a postoperative airleak.  And his known non-small cell lung cancer of the left upper lobe.  After evaluation of his chest x-ray today, it appears stable compared to last week.  This is a clamped chest x-ray, his chest tube was then clamped since 7 AM this morning.  I did not appreciate an air leak when I open his clamped chest tube, I removed his chest tube at the bedside this morning.    I will see him back in 1 week's time with a 2 view chest x-ray.  If his lung is stable, we will send him back over to Dr. Painter and radiation oncology to start treatment.    We will see him back in 1 week.       I spent 40 minutes caring for Bayron on this date of service. This time includes time spent by me in the following activities:preparing for the visit, reviewing tests, obtaining and/or reviewing a separately obtained history, performing a medically appropriate examination and/or evaluation , counseling and educating the patient/family/caregiver, ordering medications, tests, or procedures, referring and communicating with other health care professionals , documenting information in the medical record, independently interpreting results and communicating that information with the patient/family/caregiver, and care coordination  Follow Up   No follow-ups on file.  Patient was given instructions and counseling regarding his condition or for health maintenance advice. Please see specific information pulled into the AVS if appropriate.

## 2023-09-11 ENCOUNTER — TELEPHONE (OUTPATIENT)
Dept: SURGERY | Facility: CLINIC | Age: 83
End: 2023-09-11

## 2023-09-11 NOTE — TELEPHONE ENCOUNTER
[facility] received a fax that requires your attention. The document has been indexed to the patient’s chart for your review.      Reason for sending: EXTERNAL MEDICAL RECORD NOTIFICATION     Documents Description: [facility] CLIENT COORDINATION REPORT-9.11.23    Name of Sender: [facility]     Date Indexed: 9.11.23

## 2023-09-13 ENCOUNTER — OFFICE VISIT (OUTPATIENT)
Dept: SURGERY | Facility: CLINIC | Age: 83
End: 2023-09-13
Payer: MEDICARE

## 2023-09-13 ENCOUNTER — HOSPITAL ENCOUNTER (OUTPATIENT)
Dept: GENERAL RADIOLOGY | Facility: HOSPITAL | Age: 83
Discharge: HOME | End: 2023-09-13
Admitting: STUDENT IN AN ORGANIZED HEALTH CARE EDUCATION/TRAINING PROGRAM
Payer: MEDICARE

## 2023-09-13 VITALS
HEART RATE: 60 BPM | BODY MASS INDEX: 22.91 KG/M2 | SYSTOLIC BLOOD PRESSURE: 132 MMHG | WEIGHT: 146 LBS | HEIGHT: 67 IN | OXYGEN SATURATION: 96 % | DIASTOLIC BLOOD PRESSURE: 54 MMHG

## 2023-09-13 DIAGNOSIS — R91.8 MASS OF UPPER LOBE OF LEFT LUNG: Primary | ICD-10-CM

## 2023-09-13 DIAGNOSIS — R91.8 MASS OF UPPER LOBE OF LEFT LUNG: ICD-10-CM

## 2023-09-13 PROCEDURE — 99024 POSTOP FOLLOW-UP VISIT: CPT | Performed by: STUDENT IN AN ORGANIZED HEALTH CARE EDUCATION/TRAINING PROGRAM

## 2023-09-13 PROCEDURE — 3078F DIAST BP <80 MM HG: CPT | Performed by: STUDENT IN AN ORGANIZED HEALTH CARE EDUCATION/TRAINING PROGRAM

## 2023-09-13 PROCEDURE — 71046 X-RAY EXAM CHEST 2 VIEWS: CPT

## 2023-09-13 PROCEDURE — 3075F SYST BP GE 130 - 139MM HG: CPT | Performed by: STUDENT IN AN ORGANIZED HEALTH CARE EDUCATION/TRAINING PROGRAM

## 2023-09-13 RX ORDER — GUAIFENESIN 600 MG/1
1200 TABLET, EXTENDED RELEASE ORAL 2 TIMES DAILY
Qty: 120 TABLET | Refills: 0 | Status: SHIPPED | OUTPATIENT
Start: 2023-09-13 | End: 2023-10-13

## 2023-09-13 NOTE — PROGRESS NOTES
"Chief Complaint  Status post chest tube removal 1 week ago.    Subjective        Bayron TOYIN Acevedo presents to [facility] THORACIC SURGERY  History of Present Illness  Mr. Acevedo is a very pleasant 83-year-old gentleman who presents 1 week after having his chest tube removed at the bedside during clinic on 9/6/2023.  To recap, he underwent a robotic left upper lobe pulmonary decortication and aborted lobectomy back on 7/31/2023 due to the fact that his biopsy-proven lung cancer grossly invaded his mediastinum and phrenic nerve.  This hospital course was prolonged with a extremely prolonged air leak.  He ultimately was discharged from the hospital and presented last week to clinic for clamp trial and a chest x-ray.  His chest x-ray was appropriately inflated when his chest tube has been clamped for almost 12 hours and he did not appreciate an air leak in clinic.  We removed his chest tube at the bedside last week and he presents today for stitch removal and follow-up chest x-ray.  He states he is slowly regaining his energy.  He still is slightly winded with prolonged exertion.  His complaint is one of his ribs in the anterior aspect of his left chest is slightly protruding, although it is not causing sufficient pain.  Overall he appears to be improved since last week.    Objective   Vital Signs:  /54 (BP Location: Left arm, Patient Position: Sitting, Cuff Size: Adult)   Pulse 60   Ht 170.2 cm (67.01\")   Wt 66.2 kg (146 lb)   SpO2 96%   BMI 22.86 kg/m²   Estimated body mass index is 22.86 kg/m² as calculated from the following:    Height as of this encounter: 170.2 cm (67.01\").    Weight as of this encounter: 66.2 kg (146 lb).       BMI is within normal parameters. No other follow-up for BMI required.      Physical Exam  Constitutional:       Appearance: Normal appearance.   Cardiovascular:      Rate and Rhythm: Normal rate and regular rhythm.      Pulses: Normal pulses.      Heart sounds: " Normal heart sounds.   Pulmonary:      Effort: Pulmonary effort is normal.      Breath sounds: Normal breath sounds.   Neurological:      General: No focal deficit present.      Mental Status: He is alert and oriented to person, place, and time.   Psychiatric:         Mood and Affect: Mood normal.         Behavior: Behavior normal.         Thought Content: Thought content normal.         Judgment: Judgment normal.      Result Review :  Chest x-ray from today was visualized.  No significant changes.  I do appreciate lung markings on the left side, his x-ray appears very similar compared to last week.         Assessment and Plan   Diagnoses and all orders for this visit:    1. Mass of upper lobe of left lung (Primary)  -     CT Chest Without Contrast; Future    Other orders  -     guaiFENesin (Mucinex) 600 MG 12 hr tablet; Take 2 tablets by mouth 2 (Two) Times a Day for 30 days.  Dispense: 120 tablet; Refill: 0    Mr. Acevedo is a very pleasant 83-year-old gentleman who presents 1 week after having his chest tube removed on the left side.  He has biopsy-proven non-small cell lung cancer of the left upper lobe.  He went attempted surgical resection of this lobe, but during the operation it was noted to be grossly invading his mediastinum.  I am going to reach out to Dr. Painter with radiation oncology.  He is scheduled to start radiation treatment to this left upper lobe after his chest tube has been removed.  We will reestablish contact and hopefully get him over to start radiation treatment to this primary lung cancer.    In regards to following up with us, he can see me back in 3 months time with a CT noncontrast of the chest.       I spent 30 minutes caring for Bayron on this date of service. This time includes time spent by me in the following activities:preparing for the visit, reviewing tests, obtaining and/or reviewing a separately obtained history, performing a medically appropriate examination and/or evaluation ,  counseling and educating the patient/family/caregiver, ordering medications, tests, or procedures, referring and communicating with other health care professionals , documenting information in the medical record, independently interpreting results and communicating that information with the patient/family/caregiver, and care coordination  Follow Up   No follow-ups on file.  Patient was given instructions and counseling regarding his condition or for health maintenance advice. Please see specific information pulled into the AVS if appropriate.

## 2023-09-19 ENCOUNTER — TELEPHONE (OUTPATIENT)
Dept: RADIATION ONCOLOGY | Facility: HOSPITAL | Age: 83
End: 2023-09-19
Payer: MEDICARE

## 2023-09-20 ENCOUNTER — OFFICE VISIT (OUTPATIENT)
Dept: RADIATION ONCOLOGY | Facility: HOSPITAL | Age: 83
End: 2023-09-20
Payer: MEDICARE

## 2023-09-20 ENCOUNTER — HOSPITAL ENCOUNTER (OUTPATIENT)
Dept: RADIATION ONCOLOGY | Facility: HOSPITAL | Age: 83
Setting detail: RADIATION/ONCOLOGY SERIES
End: 2023-09-20
Payer: MEDICARE

## 2023-09-20 ENCOUNTER — HOSPITAL ENCOUNTER (OUTPATIENT)
Dept: PET IMAGING | Facility: HOSPITAL | Age: 83
Discharge: HOME | End: 2023-09-20
Payer: MEDICARE

## 2023-09-20 ENCOUNTER — DOCUMENTATION (OUTPATIENT)
Dept: OTHER | Facility: HOSPITAL | Age: 83
End: 2023-09-20
Payer: MEDICARE

## 2023-09-20 VITALS
WEIGHT: 144.4 LBS | BODY MASS INDEX: 22.61 KG/M2 | DIASTOLIC BLOOD PRESSURE: 67 MMHG | OXYGEN SATURATION: 100 % | SYSTOLIC BLOOD PRESSURE: 126 MMHG | HEART RATE: 60 BPM

## 2023-09-20 DIAGNOSIS — C34.92 ADENOCARCINOMA, LUNG, LEFT: ICD-10-CM

## 2023-09-20 DIAGNOSIS — C34.92 ADENOCARCINOMA, LUNG, LEFT: Primary | ICD-10-CM

## 2023-09-20 PROCEDURE — 71250 CT THORAX DX C-: CPT

## 2023-09-20 PROCEDURE — 0 DIATRIZOATE MEGLUMINE & SODIUM PER 1 ML: Performed by: INTERNAL MEDICINE

## 2023-09-20 PROCEDURE — 77334 RADIATION TREATMENT AID(S): CPT | Performed by: STUDENT IN AN ORGANIZED HEALTH CARE EDUCATION/TRAINING PROGRAM

## 2023-09-20 PROCEDURE — 74176 CT ABD & PELVIS W/O CONTRAST: CPT

## 2023-09-20 PROCEDURE — 77263 THER RADIOLOGY TX PLNG CPLX: CPT | Performed by: STUDENT IN AN ORGANIZED HEALTH CARE EDUCATION/TRAINING PROGRAM

## 2023-09-20 RX ADMIN — DIATRIZOATE MEGLUMINE AND DIATRIZOATE SODIUM 30 ML: 660; 100 LIQUID ORAL; RECTAL at 09:58

## 2023-09-20 NOTE — PROGRESS NOTES
[facility] Radiation Oncology   Reevaluation    Chief Complaint  Left Lung Adenocarcinoma        Diagnosis: Left Lung Adenocarcinoma     Overall Stage IIIA     cT4: Mediastinal Invasion  pN0: per cervical mediastinoscopy, and intraoperative resection of 10R and 5 LNs  cM0: per PET CT, MRI Brain        Pacemaker: no  Prior History of Radiation: no  Contraindications to Radiation: no  Patient Requires Pregnancy Test: No, patient is male      HPI:    Bayron Acevedo is a 83 y.o. male with a history of CKD, COPD, CAD, PAD, AAA, CHF who underwent chest x-ray in April 2023 due to worsening shortness of breath.  The patient was found to have findings concerning for mass in the left upper lobe.  The patient underwent CT chest on 5/26/2023 which showed a left upper lobe lung mass highly concerning for malignancy with bilateral pleural effusions.  The patient underwent PET/CT which showed avidity in the 5.2 cm left upper lobe lung mass, with low-level activity and indeterminant mediastinal lymph nodes.  The patient underwent CT-guided biopsy of the primary lesion which showed poorly differentiated adenocarcinoma.  The patient has undergone MRI of the brain which was negative.  The patient was referred to the multidisciplinary thoracic clinic for consideration of further work-up and treatment.     I met with the patient on 7/11/2023 and discussed his work-up to date.  The patient had not yet undergone mediastinal evaluation this was planned with Dr. Kramer.  The patient underwent cervical Mediastinoscopy on 7/17/2023 which demonstrated a negative 4R and 7 lymph node.  The patient was taken by Dr. Kramer for left upper lobectomy on 7/31/2023.  During the procedure, Dr. Kramer noted that the mass appeared to invade the intercostal vessels on the anterior aspect of the chest wall.  He also had some invasion into the bony prominence of his anterior chest wall.  Lobectomy was abandoned due to these findings, however an additional level 10R  level 5 lymph node were negative for malignancy.  The patient had a prolonged hospital admission for over 2 weeks due to persistent air leak.  The patient has been referred for consideration of definitive radiation therapy.  The patient still has a chest tube in place.  He has met with Dr. Santos postoperatively who does not recommend concurrent chemotherapy.  New CT images have been ordered by Dr. Santos for 9/20/2023.    The patient continues to recuperate after his surgery.  He has had his chest tube out for approximately a week and a half.  He continues to struggle with shortness of breath and cough.      Imaging:      CT chest 5/26/2023     CONCLUSION: Left upper lobe lung mass as discussed above worrisome for  primary bronchogenic carcinoma. Bilateral pleural effusions causing  bibasilar atelectasis of the right and left lower lobes. Potential  malignant effusion. Tiny nodule is demonstrated along the right major  fissure. There is mediastinal adenopathy seen. Recommend referral to the  [facility] multidisciplinary pulmonary care clinic, follow-up PET/CT  and/or biopsy would be helpful as follow-up.        PET/CT 6/12/2023     IMPRESSION:  1. Hypermetabolic 5.2 x 3.1 cm irregular pleural-based left upper lobe  lung mass. There is low-level activity at the subcarinal nodes which is  indeterminate and should be followed with CT. The rest of the  mediastinal and hilar nodes have blood pool level activity.  2. New small-moderate right hydropneumothorax since the 05/26/2023 CT.  3. There is no convincing evidence for metastatic disease at the neck,  abdomen, or pelvis.        MRI brain 7/2/2023     IMPRESSION:     No evidence for intracranial metastatic disease.         Pathology:        [facility] CT-Guided Biopsy Pathology 6/13/2023        Final Diagnosis   1. Lung, Left Upper Lobe, CT-Guided Biopsies for a Mass: INVASIVE POORLY DIFFERENTIATED PULMONARY ADENOCARCINOMA.         Cervical Mediastinoscopy pathology  7/17/2023     Final Diagnosis   1. Lymph Node, 4R, Excision:               A. Benign anthracotic lymph node (0/1).     2. Lymph Node, #7, Excision:                A. Benign anthracotic lymph node (0/1).         Left pleural excisional biopsy pathology, station 5 and 10R excisional biopsy pathology 7/31/2023     Final Diagnosis   1. Pleura, Left, Excisional Biopsy:                 A. Pleural fibrosis and mild chronic inflammation; negative for malignancy.     2. Pleura, Left #2, Excisional Biopsy:               A. Fibroadipose tissue, skeletal muscle and pleura with fibrosis and mild scattered mixed but predominantly                     chronic inflammation; negative for malignancy.     3. Lymph Node, Station 5, Excision:               A. One benign lymph node (0/1).     4. Lymph Node, 10R, Excision:               A. Benign reactive anthracotic lymph node (0/1).       Labs:    Lab Results   Component Value Date    CREATININE 1.86 (C) 08/23/2023             Problem List:  Patient Active Problem List   Diagnosis    COPD (chronic obstructive pulmonary disease) with emphysema    Hypertension    Hyperlipidemia    S/P angioplasty with stent    Heart attack    Colon polyp    Carotid stenosis    CAD in native artery    Peripheral artery disease    History of hydrocele    Perennial allergic rhinitis    AAA (abdominal aortic aneurysm) without rupture    Acute on chronic systolic CHF (congestive heart failure)    Stage 3b chronic kidney disease    Hydropneumothorax    Mass of upper lobe of left lung    NICM (nonischemic cardiomyopathy)    Chronic systolic CHF (congestive heart failure)    Normocytic anemia    Adenocarcinoma, lung, left          Medications:  Current Outpatient Medications on File Prior to Visit   Medication Sig Dispense Refill    acetaminophen (TYLENOL) 500 MG tablet Take 2 tablets by mouth 3 (Three) Times a Day.      albuterol (PROVENTIL) (2.5 MG/3ML) 0.083% nebulizer solution USE ONE VIAL BY NEBULIZATION  EVERY FOUR HOURS AS NEEDED FOR FOR WHEEZING (Patient taking differently: Take 2.5 mg by nebulization Every 4 (Four) Hours As Needed.) 180 mL 0    amLODIPine (NORVASC) 5 MG tablet Take 1 tablet by mouth Daily. 90 tablet 3    aspirin 81 MG EC tablet Take 1 tablet by mouth Daily.      carvedilol (COREG) 25 MG tablet Take 1 tablet by mouth 2 (Two) Times a Day. 180 tablet 0    Chlorhexidine Gluconate Cloth 2 % pads Apply  topically Take As Directed.      gabapentin (NEURONTIN) 100 MG capsule Take 1 capsule by mouth Every 12 (Twelve) Hours for 30 days. 60 capsule 0    guaiFENesin (Mucinex) 600 MG 12 hr tablet Take 2 tablets by mouth 2 (Two) Times a Day for 30 days. 120 tablet 0    hydrALAZINE (APRESOLINE) 50 MG tablet 1 tablet 2 (Two) Times a Day.      nitroglycerin (NITROSTAT) 0.4 MG SL tablet Place 1 tablet under the tongue every 5 (five) minutes as needed for chest pain. Take no more than 3 doses in 15 minutes. 25 tablet 1    rosuvastatin (CRESTOR) 20 MG tablet Take 1 tablet by mouth Daily. 90 tablet 3    torsemide (DEMADEX) 20 MG tablet Take 2 tablets by mouth Daily. 180 tablet 3    vitamin B-12 (CYANOCOBALAMIN) 1000 MCG tablet Take 1 tablet by mouth Daily.       No current facility-administered medications on file prior to visit.          Allergies:  Allergies   Allergen Reactions    Lisinopril Anaphylaxis    Codeine Sulfate Hives    Contrast Dye (Echo Or Unknown Ct/Mr) Other (See Comments)     HYPERTENSION    Iodinated Contrast Media Other (See Comments)     HYPERTENSION    Losartan Unknown - High Severity    Penicillins Other (See Comments)     Passed out after a PCN shot- no other sx noted-per pateint         Family History:  The patient has no family history of conditions which would be contraindications to radiation therapy        Social History:  Former     Distance From Clinic: 30 minutes - 1 hour     Patient has someone who can assist with transportation: yes      Vital Signs:  /67   Pulse 60   Wt  "65.5 kg (144 lb 6.4 oz)   SpO2 100%   BMI 22.61 kg/m²   Estimated body mass index is 22.61 kg/m² as calculated from the following:    Height as of 9/13/23: 170.2 cm (67.01\").    Weight as of this encounter: 65.5 kg (144 lb 6.4 oz).  Pain Score    09/20/23 0937   PainSc: 0-No pain         ECOG: Capable of only limited selfcare; confined to bed or chair more than 50% of waking hours = 3    Physical Exam  Vitals reviewed.   Constitutional:       Appearance: He is ill-appearing.   HENT:      Head: Normocephalic and atraumatic.   Eyes:      Extraocular Movements: Extraocular movements intact.      Pupils: Pupils are equal, round, and reactive to light.   Pulmonary:      Effort: Pulmonary effort is normal.      Breath sounds: Rales present.   Abdominal:      General: Abdomen is flat.      Palpations: Abdomen is soft.   Musculoskeletal:      Cervical back: Normal range of motion.   Skin:     General: Skin is warm and dry.   Neurological:      General: No focal deficit present.      Mental Status: He is alert and oriented to person, place, and time.   Psychiatric:         Mood and Affect: Mood normal.         Behavior: Behavior normal.        Result Review :  The following data was reviewed by: Dakota Painter MD on 09/20/2023:  Labs: Last Creatinine   Data reviewed : Radiologic studies CT Chest, PET CT, MRI Brain             Diagnoses and all orders for this visit:    1. Adenocarcinoma, lung, left (Primary)  -     Ambulatory Referral to [facility] Nutrition Services        Assessment:    The patient is an 83-year-old male with a complex medical history including CKD, COPD, CAD, PAD, AAA, CHF with locally advanced non-small cell lung cancer the left upper lobe.  The patient had attempted resection of this lesion but was found to be T4 during surgery and surgery was abandoned.  The patient has been recuperating over the past month and a half.  He has had his chest tube out for the past week and a half.  I have discussed his " case with Dr. Kramer and Dr. Santos, the patient is not a candidate for further resection or for concurrent chemotherapy.    I met with the patient and discussed his work-up and treatment date.  I again discussed the risk, benefits, side effects, logistics of radiation treatment planning delivery.  Answered all the patient's questions to his satisfaction.  The end of her conversation the patient wished proceed with radiation therapy alone.  Consents were signed.  CT simulation today.    The patient is struggling with his appetite and maintaining his weight, we will place referral to dietitian.  The patient also may benefit from a pulmonology referral due to his ongoing respiratory issues.      Plan:    -Plan for definitive radiation therapy alone for locally advanced non-small cell lung cancer left upper lobe.  CT simulation today  -Referral to dietitian and pulmonology  -Patient continues to follow with Dr. Santos and is undergoing diagnostic reimaging as well today.       I spent 40 minutes caring for Bayron on this date of service. This time includes time spent by me in the following activities:preparing for the visit, reviewing tests, obtaining and/or reviewing a separately obtained history, referring and communicating with other health care professionals , documenting information in the medical record, independently interpreting results and communicating that information with the patient/family/caregiver, and care coordination  Follow Up   No follow-ups on file.  Patient was given instructions and counseling regarding his condition or for health maintenance advice. Please see specific information pulled into the AVS if appropriate.     Dakota Painter MD

## 2023-09-20 NOTE — PROGRESS NOTES
Oncology Social Work     Chief Complaint: [facility] stopped by [facility] for assistance with medical bills.     Subjective  Patient ID: Bayron Acevedo is a 83 y.o. male who [facility] for follow-up     Social History  Marital Status:   Children: Yes  Support Community: Significant Other  Highest Level of Education: high school diploma/GED  Career: Retired  Tobacco Use: He previously smoked 1-2 packs of cigarettes per day for the past 50 years.  Alcohol Use: does not drink  Marijuana/ Other drug Use: denied.    Medical History  Psychiatric History: None    Family History  Family Psychiatric History: There has been no family history of psychological problems  Family Cancer History: None      Objective   Mental Status Exam  Appearance:  clean and casually dressed, appropriate  Attitude toward clinician:  cooperative and agreeable   Speech:    Rate:  regular rate and rhythm   Volume:  normal  Motor:  no abnormal movements present  Mood:  Stable but anxious   Affect:  anxious  Thought Processes:  linear, logical, and goal directed  Thought Content:  normal  Suicidal Thoughts:  absent  Homicidal Thoughts:  absent  Perceptual Disturbance: no perceptual disturbance  Attention and Concentration:  good  Insight and Judgement:  good  Memory:  memory appears to be intact    Physical Exam  Station and gait observed to be unable to assist. Patient was using a wheelchair but he stated that he typically does not use any assistive device.     Lab Review:   not applicable      Plan of Care    Patient and his wife stopped by [facility] related to concern over medical bills. Patient has left lung adenocarcinoma, stage IIIA. He is followed by Hetal MORALEZ for nurse navigation and Jason ATKINSON. He recently got his radiation schedule. He and his wife are worried about insurance coverage and medical bills. OSW reviewed insurance coverage with the patient and discussed support from our financial  navigator. OSW sent referral. Patient and his wife are on a fixed income of social security. OSW will assist with applying for non-medical grants ( [place] [employer] and [employer]).   Patient's wife will be providing transportation and no concern was identified.    Patient was anxious related to upcoming radiation schedule and treatment plan. OSW offered support and is available to the patient for ongoing support.     Uzma VELASQUEZ

## 2023-09-21 ENCOUNTER — PATIENT OUTREACH (OUTPATIENT)
Dept: OTHER | Facility: HOSPITAL | Age: 83
End: 2023-09-21
Payer: MEDICARE

## 2023-09-21 NOTE — PROGRESS NOTES
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  He is preparing to start a course of radiation therapy with Dr. Painter to the primary lesion that is unresectable in the left upper lobe.  He underwent an attempted surgical resection on 7/31/2023 however the primary tumor was found to be invading the mediastinum and pericardial fat pad as well as phrenic nerve.  He had persistent air leak postoperatively with left hydropneumothorax and required ongoing chest tube on the left.  This was eventually removed by Dr. Kramer on 9/6/2023.  The patient reports that he has continued with difficulty regarding dyspnea on exertion.  This has been stable.  He does monitor his oxygen saturation which stays in the 90% range and he is not on oxygen.  Today he is 96% on room air.  He continues with alteration in taste and has continued to lose weight, down 5 pounds since his last visit here.  He is drinking Ensure 2 cans/day but realizes he needs to increase his caloric intake further.  He does note chronic constipation issues, goes 3 to 4 days without a bowel movement.  He has used MiraLAX in the past which has been helpful but has not used this recently.  He has ECOG performance status of 1-2.      Objective   Vital Signs:   /81   Pulse 61   Temp 97.8 °F (36.6 °C) (Temporal)   Resp 16   Ht 170.2 cm (67.01\")   Wt 65.7 kg (144 lb 14.4 oz)   SpO2 96%   BMI 22.69 kg/m²     Physical Exam  Constitutional:       Appearance: He is well-developed.      Comments: Patient in no distress   Eyes:      Conjunctiva/sclera: Conjunctivae normal.   Neck:      Thyroid: No thyromegaly.   Cardiovascular:      Rate and Rhythm: Normal rate and regular rhythm.      Heart sounds: No murmur heard.    No friction rub. No gallop.   Pulmonary:      Effort: No " respiratory distress.      Comments: Clear to auscultation bilaterally although diminished breath sounds bilaterally.  Chest tube has been removed.  Abdominal:      General: Bowel sounds are normal. There is no distension.      Palpations: Abdomen is soft.      Tenderness: There is no abdominal tenderness.   Musculoskeletal:         General: No swelling.      Comments: No edema currently   Lymphadenopathy:      Head:      Right side of head: No submandibular adenopathy.      Cervical: No cervical adenopathy.      Upper Body:      Right upper body: No supraclavicular adenopathy.      Left upper body: No supraclavicular adenopathy.   Skin:     General: Skin is warm and dry.      Findings: No rash.   Neurological:      Mental Status: He is alert and oriented to person, place, and time.      Cranial Nerves: No cranial nerve deficit.      Motor: No abnormal muscle tone.      Deep Tendon Reflexes: Reflexes normal.   Psychiatric:         Behavior: Behavior normal.      Result Review : Reviewed CBC, CMP from today.  Reviewed CT chest abdomen pelvis from 9/20/2023.  Reviewed radiation oncology and thoracic surgery records.       Assessment and Plan     *Clinical stage IIIA ([id number]) non-small cell lung cancer (adenocarcinoma) of left upper lobe  The patient has a history of smoking 1 pack/day x 55 years quit in 2000.  CT chest 5/26/2023 showed a spiculated mass in the anterior left upper lobe 3.7 x 4.3 x 4.2 cm along the anterior pleural surface and lateral aspect of the anterior mediastinum.  Additional 3 mm nodule right major fissure.  Bilateral small to moderate-sized pleural effusions.  Bullae formation consistent with COPD.  Mediastinal lymphadenopathy with largest node infracarinal region 2.6 x 1.2 cm.  PET/CT on 6/12/2023 showed 5.2 x 3.1 cm irregular pleural-based mass anterior left upper lobe abutting the pleura anteriorly and medially, hypermetabolic with SUV 14.4.  Mediastinal lymph node activity in the subcarinal  region with a 2.2 x 1.1 cm lymph node with SUV 4.5.  Remainder of mediastinal lymph nodes less intensely hypermetabolic with maximal SUV 3.3.  Moderate size loculated pleural effusions bilaterally with low-level activity along the pleura (SUV 2.5), no change in volume of pleural effusions since prior chest CT.  New small to moderate loculated right hydropneumothorax with air-fluid level 5.2 cm.   CT-guided left upper lobe lung biopsy 6/13/2023 with pathology that showed invasive poorly differentiated adenocarcinoma, PD-L1 less than 1%.  Caris analysis: TMB high (14 muts/Mb), mutations in KEAP1 and TP53, EGFR VUS, no targetable mutations.  Patient was hospitalized 6/13 - 6/15/2023 due to right hydropneumothorax, had CT-guided chest tube placement performed 6/13/2023.  Right pleural fluid cytology negative on 6/13/2023.  Staging MRI brain was negative for evidence of metastatic disease on 7/2/2023  PFTs on 6/20/2023 with FEV1 0.99 (44% predicted), DLCO corrected 14.09 (56% predicted).  Mediastinoscopy 7/17/2023 with 4R and station 7 lymph nodes negative for malignancy  Left VATS performed 7/31/2023.  Intraoperatively, primary tumor was unresectable, invading into the mediastinum and pericardial fat pad as well as with phrenic nerve involvement (T4 lesion).  Sampling of left pleura with fibrosis, chronic inflammation, no evidence of malignancy.  Station 5 and 10 R lymph nodes sampled, negative for malignancy.  Patient therefore with unresectable stage IIIA ([id number]) disease.  Plans to treat with radiation therapy.  Due to age, performance status, comorbidities, patient felt to be a poor candidate for concurrent chemotherapy.  New baseline CT chest abdomen pelvis prior to initiation of radiation therapy performed on 9/20/2023.  Persistent loculated left pneumothorax, trace left pleural effusion, primary tumor in partially collapsed left upper lobe appears relatively stable.  Small right pleural effusion no change in  borderline enlarged mediastinal lymph nodes.  Slight increase in right upper lobe nodule (for up to 7 mm), indeterminate.  Patient returns today in follow-up with CT scans to review.  He is preparing to begin primary radiation to the left upper lobe mass with Dr. Painter on 10/2/2023.  We reviewed his new baseline CT scan today which shows little change in his disease compared to prior studies.  It is somewhat difficult to assess the primary mass as the left upper lobe is partially collapsed related to the persistent loculated pneumothorax on the left following his recent surgery.  Overall however this has not changed significantly.  Previously had some borderline sized mediastinal lymph nodes that were negative by mediastinoscopy and they are unchanged in size.  Indeterminate slight increase in right upper lobe nodule 7 mm which we will need to monitor.  He does continue with dyspnea on exertion although is maintaining adequate O2 saturation of 96% on room air.  He continues with anorexia and has lost another 5 pounds.  He has been seen by oncology nutrition and will continue follow-up with him.  I did suggest that we prescribe Remeron 7.5 mg daily to help with appetite and weight gain.  We will see him back here in 3 weeks for NP visit and I will see him in 6 weeks for follow-up.  We will discuss timeframe for follow-up scans following radiation.    *Chronic bilateral pleural effusions with right hydropneumothorax and subsequent postoperative left hydropneumothorax  Patient appears to have longstanding evidence of small bilateral pleural effusions likely related to CHF  PET/CT 6/12/2023 identified right small to moderate hydropneumothorax new since 5/26/2023 CT chest  Patient was hospitalized 6/13 - 6/15/2023 due to right hydropneumothorax, had CT-guided chest tube placement performed 6/13/2023.  Right pleural fluid cytology negative on 6/13/2023.  As above, patient underwent left VATS with attempted resection of  primary lung cancer on 7/31/2023.    Patient required persistent left chest tube postoperatively, eventually removed on 9/6/2023  CT chest 9/20/2023 with persistent loculated left pneumothorax.    *COPD  The patient has a history of smoking 1 pack/day x 55 years quit in 2000.  PFTs on 6/20/2023 with FEV1 0.99 (44% predicted), DLCO corrected 14.09 (56% predicted).  Patient continues with chronic dyspnea on exertion that does limit his level of activity although maintains O2 saturation in the 90% range even with activity.    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  Labs on 7/11/2023 with hemoglobin 10.6, MCV 82.8, iron 18, ferritin 504, iron saturation 6%, TIBC 295, B12 201, folate 6.79, CRP 1.07, ESR 27, erythropoietin level 17.8.  Labs consistent with anemia secondary to chronic disease/malignancy as well as anemia secondary to CKD3.  Patient initiated oral B12 1000 mcg daily for B12 deficiency  Hemoglobin did decline postoperatively however today has improved at 10.9.    *Coronary artery disease, CHF  Chronic bilateral pleural effusions presumably related to CHF  Status post cardiac stent in 2013  Preoperative echocardiogram on 7/19/2023 with ejection fraction 56 to 60%, dilated LA, LV  Patient continues on aspirin 81 mg daily    *Peripheral vascular disease  Patient is followed by Dr. Karen Barrera in vascular surgery  Abdominal aortic aneurysm status post stent graft repair 5/17/2021  Carotid stenosis status post carotid endarterectomy left, 2013.    *Anorexia, weight loss  Patient continues with anorexia, has lost a further 5 pounds since his last visit here.  He has been seen by  oncology nutrition and is trying to drink 2 ensures per day.  I encouraged him to increase this to 3 to 4 cans/day.  We are also prescribing Remeron 7.5 mg nightly to help with appetite and weight gain.    *Constipation  Patient with chronic constipation, often goes 3 to 4 days before having a bowel movement.  Recommended that patient resume daily MiraLAX.    Plan:  Patient with stage IIIA ([id number]) adenocarcinoma of the left upper lobe, unresectable  Plans to begin primary radiation therapy to the left upper lobe mass, scheduled to begin on 10/2/2023.  Given the patient's age, performance status, comorbidities, he does not appear to be a good candidate for concurrent chemotherapy with radiation.  Patient continuing on oral B12 1000 mcg daily for B12 deficiency  Patient is continuing follow-up with oncology nutrition  Patient will increase his Ensure intake to 3 to 4 cans/day if possible  Begin Remeron 7.5 mg nightly to improve appetite and weight  Resume daily MiraLAX for constipation.  In 3 weeks NP visit with LAMAR DAVIS  In 6 weeks MD visit with LAMAR DAVIS    I did spend 55 minutes in total time caring for the patient today, time spent in review of records, face-to-face consultation, coordination of care, placement of orders, completion of documentation.

## 2023-09-21 NOTE — PROGRESS NOTES
Reviewed chart.    Called patient.  We discussed his appt with Dr. Painter tomorrow.  He is scheduled to start radiation on 10/2.  He was able to teach back his plan of care.    The patient meets with Dr. Santos tomorrow; he will also meet with Jason dietician.  The patient will have a CT scan in 3 months and is scheduled to meet with Dr. Kramer in December. His chest tube was removed last week. The patient complains of shortness of breath and generalized weakness/deconditioning postsurgery.      The patient met with URSULA Gusman yesterday. He denies any additional supportive care or resource needs.  I will call in several weeks; encouraged he or his wife to call if the need arises.

## 2023-09-22 ENCOUNTER — NUTRITION (OUTPATIENT)
Dept: OTHER | Facility: HOSPITAL | Age: 83
End: 2023-09-22
Payer: MEDICARE

## 2023-09-22 ENCOUNTER — LAB (OUTPATIENT)
Dept: LAB | Facility: HOSPITAL | Age: 83
End: 2023-09-22
Payer: MEDICARE

## 2023-09-22 ENCOUNTER — OFFICE VISIT (OUTPATIENT)
Dept: ONCOLOGY | Facility: CLINIC | Age: 83
End: 2023-09-22
Payer: MEDICARE

## 2023-09-22 VITALS
WEIGHT: 144.9 LBS | HEART RATE: 61 BPM | DIASTOLIC BLOOD PRESSURE: 81 MMHG | RESPIRATION RATE: 16 BRPM | BODY MASS INDEX: 22.74 KG/M2 | TEMPERATURE: 97.8 F | SYSTOLIC BLOOD PRESSURE: 167 MMHG | HEIGHT: 67 IN | OXYGEN SATURATION: 96 %

## 2023-09-22 DIAGNOSIS — C34.92 ADENOCARCINOMA, LUNG, LEFT: Primary | ICD-10-CM

## 2023-09-22 DIAGNOSIS — C34.92 ADENOCARCINOMA, LUNG, LEFT: ICD-10-CM

## 2023-09-22 LAB
ALBUMIN SERPL-MCNC: 3.8 G/DL (ref 3.5–5.2)
ALBUMIN/GLOB SERPL: 1.5 G/DL
ALP SERPL-CCNC: 87 U/L (ref 39–117)
ALT SERPL W P-5'-P-CCNC: 13 U/L (ref 1–41)
ANION GAP SERPL CALCULATED.3IONS-SCNC: 12.7 MMOL/L (ref 5–15)
AST SERPL-CCNC: 19 U/L (ref 1–40)
BASOPHILS # BLD AUTO: 0.03 10*3/MM3 (ref 0–0.2)
BASOPHILS NFR BLD AUTO: 0.6 % (ref 0–1.5)
BILIRUB SERPL-MCNC: 0.4 MG/DL (ref 0–1.2)
BUN SERPL-MCNC: 30 MG/DL (ref 8–23)
BUN/CREAT SERPL: 16.8 (ref 7–25)
CALCIUM SPEC-SCNC: 9 MG/DL (ref 8.6–10.5)
CHLORIDE SERPL-SCNC: 102 MMOL/L (ref 98–107)
CO2 SERPL-SCNC: 25.3 MMOL/L (ref 22–29)
CREAT SERPL-MCNC: 1.79 MG/DL (ref 0.7–1.3)
DEPRECATED RDW RBC AUTO: 49.8 FL (ref 37–54)
EGFRCR SERPLBLD CKD-EPI 2021: 36.7 ML/MIN/1.73
EOSINOPHIL # BLD AUTO: 0.22 10*3/MM3 (ref 0–0.4)
EOSINOPHIL NFR BLD AUTO: 4.1 % (ref 0.3–6.2)
ERYTHROCYTE [DISTWIDTH] IN BLOOD BY AUTOMATED COUNT: 16.5 % (ref 12.3–15.4)
GLOBULIN UR ELPH-MCNC: 2.6 GM/DL
GLUCOSE SERPL-MCNC: 102 MG/DL (ref 65–99)
HCT VFR BLD AUTO: 36.4 % (ref 37.5–51)
HGB BLD-MCNC: 10.9 G/DL (ref 13–17.7)
IMM GRANULOCYTES # BLD AUTO: 0.02 10*3/MM3 (ref 0–0.05)
IMM GRANULOCYTES NFR BLD AUTO: 0.4 % (ref 0–0.5)
LYMPHOCYTES # BLD AUTO: 0.99 10*3/MM3 (ref 0.7–3.1)
LYMPHOCYTES NFR BLD AUTO: 18.3 % (ref 19.6–45.3)
MCH RBC QN AUTO: 25.1 PG (ref 26.6–33)
MCHC RBC AUTO-ENTMCNC: 29.9 G/DL (ref 31.5–35.7)
MCV RBC AUTO: 83.7 FL (ref 79–97)
MONOCYTES # BLD AUTO: 0.49 10*3/MM3 (ref 0.1–0.9)
MONOCYTES NFR BLD AUTO: 9.1 % (ref 5–12)
NEUTROPHILS NFR BLD AUTO: 3.65 10*3/MM3 (ref 1.7–7)
NEUTROPHILS NFR BLD AUTO: 67.5 % (ref 42.7–76)
NRBC BLD AUTO-RTO: 0 /100 WBC (ref 0–0.2)
PLATELET # BLD AUTO: 154 10*3/MM3 (ref 140–450)
PMV BLD AUTO: 9.8 FL (ref 6–12)
POTASSIUM SERPL-SCNC: 4.3 MMOL/L (ref 3.5–5.2)
PROT SERPL-MCNC: 6.4 G/DL (ref 6–8.5)
RBC # BLD AUTO: 4.35 10*6/MM3 (ref 4.14–5.8)
SODIUM SERPL-SCNC: 140 MMOL/L (ref 136–145)
WBC NRBC COR # BLD: 5.4 10*3/MM3 (ref 3.4–10.8)

## 2023-09-22 PROCEDURE — 36415 COLL VENOUS BLD VENIPUNCTURE: CPT

## 2023-09-22 PROCEDURE — 80053 COMPREHEN METABOLIC PANEL: CPT

## 2023-09-22 PROCEDURE — 85025 COMPLETE CBC W/AUTO DIFF WBC: CPT

## 2023-09-22 RX ORDER — MIRTAZAPINE 7.5 MG/1
7.5 TABLET, FILM COATED ORAL NIGHTLY
Qty: 30 TABLET | Refills: 2 | Status: SHIPPED | OUTPATIENT
Start: 2023-09-22

## 2023-09-22 NOTE — PROGRESS NOTES
OUTPATIENT ONCOLOGY NUTRITION ASSESSMENT    Patient Name: Bayron Acevedo  YOB: 1940  MRN: [id number]  Assessment Date: 9/22/2023    COMMENTS: Follow up with pt in clinic, pt reporting low appetite and nothing tasting good. Pt reports he did not eat anything prior to coming in today. Pt does occasionally drink Ensure, only likes chocolate flavor, but not consistently, is going to try to consume on a regular basis. Pt spouse reports he eats better when they eat out, encouraged pt to try adding some salt to his foods to see if that helps with taste changes. Pt wife is also struggling because she does all the cooking, but does not want to waste food when he is not eating, encouraged microwaveable meals with added butter and cheese. Encouraged high calorie foods like butter, peanut butter, cheese, cream, honey, jams/jellies etc. Encouraged smaller, more frequent meals to ensure adequate PO intake. Reviewed the possibility of esophagitis with XRT. Pt and pts wife report that their daughter has cancer and is always encouraging him to eat. MD is starting pt on remeron to help with appetite, will continue to follow while pt receives XRT.         Reason for Assessment Follow up     Diagnosis/Problem   L upper lobe NSCLC   Treatment Plan Radiation; Aborted L upper lobectomy d/t mediastinum invasion   XRT starting 10/2     MST = 0 or 1 Patient not at risk for malnutrition    Encounter Information        Nutrition/Diet History:  Patient with a poor appetite, states nothing tastes good    Oral Nutrition Supplements: Ensure chocolate    Factors/Symptoms Affecting Intake: Anorexia, Taste changes   Comments:      Medical/Surgical History Past Medical History:   Diagnosis Date    AAA (abdominal aortic aneurysm)     CAD in native artery     Carotid stenosis     s/p CEA Left    CKD (chronic kidney disease)     Colon polyp     COPD (chronic obstructive pulmonary disease)     H/O Acute myocardial infarction 2013    H/O PAD  (peripheral artery disease)     Heart attack 2013    Heart failure     Hyperlipidemia     Hypertension     Inguinal hernia     RIGHT    Left ventricular dysfunction     Mass of left lung     SHOWS ON MRI    Mitral valve regurgitation     Perennial allergic rhinitis 01/06/2017    PVD (peripheral vascular disease)     S/P angioplasty with stent     Tinnitus     Tricuspid regurgitation      Past Surgical History:   Procedure Laterality Date    APPENDECTOMY      ARTERIOGRAM AORTIC N/A 05/17/2021    Procedure: ZENITH AORTIC STENT GRAFT;  Surgeon: Karen Barrera Jr., MD;  Location: [facility] OR 18/19;  Service: Vascular;  Laterality: N/A;    CARDIAC CATHETERIZATION      X2    CARDIAC CATHETERIZATION N/A 04/11/2023    Procedure: Left Heart Cath;  Surgeon: Guru Jiang MD;  Location: [facility] CATH INVASIVE LOCATION;  Service: Cardiovascular;  Laterality: N/A;    CARDIAC CATHETERIZATION N/A 04/11/2023    Procedure: Right Heart Cath;  Surgeon: Guru Jiang MD;  [facility]ASIVE [facility];  Service: Cardiovascular;  Laterality: N/A;    CARDIAC CATHETERIZATION N/A 04/11/2023    Procedure: Coronary angiography;  Surgeon: Guru Jiang MD;  Location: [facility] CATH INVASIVE LOCATION;  Service: Cardiovascular;  Laterality: N/A;    CARDIAC CATHETERIZATION N/A 04/11/2023    Procedure: Left ventriculography;  Surgeon: Guru Jiang MD;  Location: [facility] CATH INVASIVE LOCATION;  Service: Cardiovascular;  Laterality: N/A;    CARDIAC CATHETERIZATION  04/11/2023    Procedure: RESTING FULL CYCLE RATIO;  Surgeon: Guru Jiang MD;  Location: [facility] CATH INVASIVE LOCATION;  Service: Cardiovascular;;    CAROTID ENDARTERECTOMY Left 01/2013    Bruce Jones Jr, MD    CAROTID STENT Left     LAD    COLONOSCOPY      HERNIA REPAIR      HYDROCELE EXCISION / REPAIR      Dr. SINTIA Osorio    LOBECTOMY Left 7/31/2023    Procedure: BRONCHOSCOPY, LEFT VAT WITH DAVINCI ROBOT, EXTENSIVE LYSIS OF ADHESIONS,  "PARTIAL PULMONARY DECORTICATION, INTERCOSTAL NERVE BLOCK;  Surgeon: Nemesio Kramer MD PhD;  Location: [facility] MAIN OR;  Service: Robotics - DaVinci;  Laterality: Left;    MEDIASTINOSCOPY N/A 7/17/2023    Procedure: MEDIASTINOSCOPY;  Surgeon: Nemeiso Kramer MD PhD;  Location: [facility] MAIN OR;  Service: Thoracic;  Laterality: N/A;    UMBILICAL HERNIA REPAIR  2003        Anthropometrics        Current Height Ht Readings from Last 1 Encounters:   09/22/23 170.2 cm (67.01\")      Current Weight Wt Readings from Last 1 Encounters:   09/22/23 65.7 kg (144 lb 14.4 oz)      BMI  22.6   Ideal Body Weight (IBW) 148#   Usual Body Weight (UBW) ~160#   Weight Change/Trend Stable, Loss; -16#/10% x 3 mo, significant, stable for the month of September    Weight History Wt Readings from Last 30 Encounters:   09/22/23 0836 65.7 kg (144 lb 14.4 oz)   09/20/23 0937 65.5 kg (144 lb 6.4 oz)   09/13/23 1257 66.2 kg (146 lb)   09/06/23 1345 66.2 kg (146 lb)   08/30/23 1351 66.7 kg (147 lb)   08/24/23 1032 68 kg (150 lb)   08/23/23 1124 67.8 kg (149 lb 6.4 oz)   08/23/23 1012 67.8 kg (149 lb 8 oz)   07/31/23 2343 73 kg (161 lb)   07/26/23 0857 73 kg (161 lb)   07/21/23 1009 73.6 kg (162 lb 3.2 oz)   07/21/23 0857 73.6 kg (162 lb 3.2 oz)   07/19/23 1239 72.6 kg (160 lb)   07/11/23 0852 72.6 kg (160 lb)   07/11/23 0759 72.6 kg (160 lb)   06/20/23 0801 72.6 kg (160 lb)   06/13/23 0841 72.6 kg (160 lb)   06/06/23 0834 76.7 kg (169 lb)   05/18/23 0916 76.7 kg (169 lb 3.2 oz)   04/27/23 0904 81.2 kg (179 lb)   04/19/23 1035 80.7 kg (178 lb)   04/12/23 0541 78.1 kg (172 lb 3.2 oz)   04/11/23 0618 78.9 kg (173 lb 14.4 oz)   04/10/23 0600 76.8 kg (169 lb 4.8 oz)   04/09/23 0549 79.8 kg (176 lb)   04/08/23 0531 79.8 kg (176 lb)   04/07/23 1908 79.8 kg (176 lb)   04/07/23 1506 79.8 kg (176 lb)   04/22/22 1500 86.2 kg (190 lb)   11/05/21 0842 87.5 kg (193 lb)   05/17/21 0555 90.2 kg (198 lb 13.7 oz)   05/14/21 1100 90.3 kg (199 lb)   12/30/20 0857 " "90.7 kg (200 lb)   11/19/19 1139 91.2 kg (201 lb 1.6 oz)   08/28/19 1047 90.9 kg (200 lb 6.4 oz)   03/18/19 0932 92.1 kg (203 lb)          Medications           Current medications: Chlorhexidine Gluconate Cloth, acetaminophen, albuterol, amLODIPine, aspirin, carvedilol, guaiFENesin, hydrALAZINE, mirtazapine, nitroglycerin, rosuvastatin, torsemide, and vitamin B-12     Tests/Procedures        Tests/Procedures PET     Labs       Pertinent Labs    Results from last 7 days   Lab Units 09/22/23  0826   SODIUM mmol/L 140   POTASSIUM mmol/L 4.3   CHLORIDE mmol/L 102   CO2 mmol/L 25.3   BUN mg/dL 30*   CREATININE mg/dL 1.79*   CALCIUM mg/dL 9.0   BILIRUBIN mg/dL 0.4   ALK PHOS U/L 87   ALT (SGPT) U/L 13   AST (SGOT) U/L 19   GLUCOSE mg/dL 102*     Results from last 7 days   Lab Units 09/22/23  0826   HEMOGLOBIN g/dL 10.9*   HEMATOCRIT % 36.4*   WBC 10*3/mm3 5.40   ALBUMIN g/dL 3.8     Results from last 7 days   Lab Units 09/22/23  0826   PLATELETS 10*3/mm3 154     External COVID19   Date Value Ref Range Status   04/22/2022 Detected (A) Not Detected - Ref. Range Final     No results found for: HGBA1C       Physical Findings        Physical Appearance alert, frail, generalized wasting, oriented     Edema  no edema   Gastrointestinal None   Tubes/Drains none   Oral/Mouth Cavity WNL     Estimated/Assessed Needs        Energy Requirements    Height for Calculation   67\"   Weight for Calculation 66.7 kg   Method for Estimation  25 kcal/kg, 30 kcal/kg   EST Needs (kcal/day) 2379-4581 kcal/day       Protein Requirements    Weight for Calculation 66.7 kg   EST Protein Needs (g/kg) 1.0 - 1.2 gm/kg   EST Daily Needs (g/day) 67-80 g/day       Fluid Requirements     Method for Estimation 1 mL/kcal    Estimated Needs (mL/day) 1800 mL/day           PES STATEMENT / NUTRITION DIAGNOSIS      Nutrition Dx Problem Problem:    NutritionDiagnosisProblem: Underweight, Unintentional Weight Loss, Malnutrition, Inadequate Oral Intake, and Increased " Nutrient Needs    Etiology:  Medical diagnosis: Lung cancer  Factors affecting nutrition: Appetite, Oral    Signs/Symptoms:  Information Deficit, PO intake, Unintended Weight Change, and Report/Observation    Comment:      NUTRITION INTERVENTION / PLAN OF CARE      Intervention Goal(s) Maintain nutrition status, Provide information, Meet estimated needs, Disease management/therapy, Tolerate PO , Increase intake, Maintain weight, and No significant weight loss         RD Intervention/Action Encouraged intake, Follow Tx progress         Recommendations:       PO Diet Small, frequent meals, add salt to foods, high calorie, high protein diet       Supplements Ensure 2-3 daily       Snacks       Other          Monitor/Evaluation PO intake, Supplement intake, Weight, Symptoms   Education Will provide eduction as needed   --    RD to follow     Electronically signed by:  Carmen Knowles RD  09/22/23 11:15 EDT

## 2023-09-22 NOTE — LETTER
September 22, 2023     Low Sepulveda MD  [address]  [address]  [facility] [address]    Patient: Bayron Acevedo   YOB: 1940   Date of Visit: 9/22/2023     Dear Low Sepulveda:       Thank you for referring Bayron Acevedo to me for evaluation. Below are the relevant portions of my assessment and plan of care.    If you have questions, please do not hesitate to call me. I look forward to following Bayron along with you.         Sincerely,        Myles Santos MD        CC: MD oTm Engel John L Jr., MD  09/22/23 2140  Sign when Signing Visit  Chief Complaint  Clinical stage IIIA ([id number]) non-small cell lung cancer (adenocarcinoma) of left upper lobe    Subjective        History of Present Illness    Patient returns today in follow-up with laboratory studies and CT scans to review.  He is preparing to start a course of radiation therapy with Dr. Painter to the primary lesion that is unresectable in the left upper lobe.  He underwent an attempted surgical resection on 7/31/2023 however the primary tumor was found to be invading the mediastinum and pericardial fat pad as well as phrenic nerve.  He had persistent air leak postoperatively with left hydropneumothorax and required ongoing chest tube on the left.  This was eventually removed by Dr. Kramer on 9/6/2023.  The patient reports that he has continued with difficulty regarding dyspnea on exertion.  This has been stable.  He does monitor his oxygen saturation which stays in the 90% range and he is not on oxygen.  Today he is 96% on room air.  He continues with alteration in taste and has continued to lose weight, down 5 pounds since his last visit here.  He is drinking Ensure 2 cans/day but realizes he needs to increase his caloric intake further.  He does note chronic constipation issues, goes 3 to 4 days without a bowel movement.  He has used MiraLAX in the past which has been helpful but has not used this recently.  He has ECOG performance  "status of 1-2.      Objective   Vital Signs:   /81   Pulse 61   Temp 97.8 °F (36.6 °C) (Temporal)   Resp 16   Ht 170.2 cm (67.01\")   Wt 65.7 kg (144 lb 14.4 oz)   SpO2 96%   BMI 22.69 kg/m²     Physical Exam  Constitutional:       Appearance: He is well-developed.      Comments: Patient in no distress   Eyes:      Conjunctiva/sclera: Conjunctivae normal.   Neck:      Thyroid: No thyromegaly.   Cardiovascular:      Rate and Rhythm: Normal rate and regular rhythm.      Heart sounds: No murmur heard.    No friction rub. No gallop.   Pulmonary:      Effort: No respiratory distress.      Comments: Clear to auscultation bilaterally although diminished breath sounds bilaterally.  Chest tube has been removed.  Abdominal:      General: Bowel sounds are normal. There is no distension.      Palpations: Abdomen is soft.      Tenderness: There is no abdominal tenderness.   Musculoskeletal:         General: No swelling.      Comments: No edema currently   Lymphadenopathy:      Head:      Right side of head: No submandibular adenopathy.      Cervical: No cervical adenopathy.      Upper Body:      Right upper body: No supraclavicular adenopathy.      Left upper body: No supraclavicular adenopathy.   Skin:     General: Skin is warm and dry.      Findings: No rash.   Neurological:      Mental Status: He is alert and oriented to person, place, and time.      Cranial Nerves: No cranial nerve deficit.      Motor: No abnormal muscle tone.      Deep Tendon Reflexes: Reflexes normal.   Psychiatric:         Behavior: Behavior normal.      Result Review : Reviewed CBC, CMP from today.  Reviewed CT chest abdomen pelvis from 9/20/2023.  Reviewed radiation oncology and thoracic surgery records.       Assessment and Plan     *Clinical stage IIIA ([id number]) non-small cell lung cancer (adenocarcinoma) of left upper lobe  The patient has a history of smoking 1 pack/day x 55 years quit in 2000.  CT chest 5/26/2023 showed a spiculated " mass in the anterior left upper lobe 3.7 x 4.3 x 4.2 cm along the anterior pleural surface and lateral aspect of the anterior mediastinum.  Additional 3 mm nodule right major fissure.  Bilateral small to moderate-sized pleural effusions.  Bullae formation consistent with COPD.  Mediastinal lymphadenopathy with largest node infracarinal region 2.6 x 1.2 cm.  PET/CT on 6/12/2023 showed 5.2 x 3.1 cm irregular pleural-based mass anterior left upper lobe abutting the pleura anteriorly and medially, hypermetabolic with SUV 14.4.  Mediastinal lymph node activity in the subcarinal region with a 2.2 x 1.1 cm lymph node with SUV 4.5.  Remainder of mediastinal lymph nodes less intensely hypermetabolic with maximal SUV 3.3.  Moderate size loculated pleural effusions bilaterally with low-level activity along the pleura (SUV 2.5), no change in volume of pleural effusions since prior chest CT.  New small to moderate loculated right hydropneumothorax with air-fluid level 5.2 cm.   CT-guided left upper lobe lung biopsy 6/13/2023 with pathology that showed invasive poorly differentiated adenocarcinoma, PD-L1 less than 1%.  Caris analysis: TMB high (14 muts/Mb), mutations in KEAP1 and TP53, EGFR VUS, no targetable mutations.  Patient was hospitalized 6/13 - 6/15/2023 due to right hydropneumothorax, had CT-guided chest tube placement performed 6/13/2023.  Right pleural fluid cytology negative on 6/13/2023.  Staging MRI brain was negative for evidence of metastatic disease on 7/2/2023  PFTs on 6/20/2023 with FEV1 0.99 (44% predicted), DLCO corrected 14.09 (56% predicted).  Mediastinoscopy 7/17/2023 with 4R and station 7 lymph nodes negative for malignancy  Left VATS performed 7/31/2023.  Intraoperatively, primary tumor was unresectable, invading into the mediastinum and pericardial fat pad as well as with phrenic nerve involvement (T4 lesion).  Sampling of left pleura with fibrosis, chronic inflammation, no evidence of malignancy.   Station 5 and 10 R lymph nodes sampled, negative for malignancy.  Patient therefore with unresectable stage IIIA ([id number]) disease.  Plans to treat with radiation therapy.  Due to age, performance status, comorbidities, patient felt to be a poor candidate for concurrent chemotherapy.  New baseline CT chest abdomen pelvis prior to initiation of radiation therapy performed on 9/20/2023.  Persistent loculated left pneumothorax, trace left pleural effusion, primary tumor in partially collapsed left upper lobe appears relatively stable.  Small right pleural effusion no change in borderline enlarged mediastinal lymph nodes.  Slight increase in right upper lobe nodule (for up to 7 mm), indeterminate.  Patient returns today in follow-up with CT scans to review.  He is preparing to begin primary radiation to the left upper lobe mass with Dr. Painter on 10/2/2023.  We reviewed his new baseline CT scan today which shows little change in his disease compared to prior studies.  It is somewhat difficult to assess the primary mass as the left upper lobe is partially collapsed related to the persistent loculated pneumothorax on the left following his recent surgery.  Overall however this has not changed significantly.  Previously had some borderline sized mediastinal lymph nodes that were negative by mediastinoscopy and they are unchanged in size.  Indeterminate slight increase in right upper lobe nodule 7 mm which we will need to monitor.  He does continue with dyspnea on exertion although is maintaining adequate O2 saturation of 96% on room air.  He continues with anorexia and has lost another 5 pounds.  He has been seen by oncology nutrition and will continue follow-up with him.  I did suggest that we prescribe Remeron 7.5 mg daily to help with appetite and weight gain.  We will see him back here in 3 weeks for NP visit and I will see him in 6 weeks for follow-up.  We will discuss timeframe for follow-up scans following  radiation.    *Chronic bilateral pleural effusions with right hydropneumothorax and subsequent postoperative left hydropneumothorax  Patient appears to have longstanding evidence of small bilateral pleural effusions likely related to CHF  PET/CT 6/12/2023 identified right small to moderate hydropneumothorax new since 5/26/2023 CT chest  Patient was hospitalized 6/13 - 6/15/2023 due to right hydropneumothorax, had CT-guided chest tube placement performed 6/13/2023.  Right pleural fluid cytology negative on 6/13/2023.  As above, patient underwent left VATS with attempted resection of primary lung cancer on 7/31/2023.    Patient required persistent left chest tube postoperatively, eventually removed on 9/6/2023  CT chest 9/20/2023 with persistent loculated left pneumothorax.    *COPD  The patient has a history of smoking 1 pack/day x 55 years quit in 2000.  PFTs on 6/20/2023 with FEV1 0.99 (44% predicted), DLCO corrected 14.09 (56% predicted).  Patient continues with chronic dyspnea on exertion that does limit his level of activity although maintains O2 saturation in the 90% range even with activity.    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  Labs on 7/11/2023 with hemoglobin 10.6, MCV 82.8, iron 18, ferritin 504, iron saturation 6%, TIBC 295, B12 201, folate 6.79, CRP 1.07, ESR 27, erythropoietin level 17.8.  Labs consistent with anemia secondary to chronic disease/malignancy as well as anemia secondary to CKD3.  Patient initiated oral B12 1000 mcg daily for B12 deficiency  Hemoglobin did decline postoperatively however today has improved at 10.9.    *Coronary artery disease, CHF  Chronic  bilateral pleural effusions presumably related to CHF  Status post cardiac stent in 2013  Preoperative echocardiogram on 7/19/2023 with ejection fraction 56 to 60%, dilated LA, LV  Patient continues on aspirin 81 mg daily    *Peripheral vascular disease  Patient is followed by Dr. Karen Barrera in vascular surgery  Abdominal aortic aneurysm status post stent graft repair 5/17/2021  Carotid stenosis status post carotid endarterectomy left, 2013.    *Anorexia, weight loss  Patient continues with anorexia, has lost a further 5 pounds since his last visit here.  He has been seen by oncology nutrition and is trying to drink 2 ensures per day.  I encouraged him to increase this to 3 to 4 cans/day.  We are also prescribing Remeron 7.5 mg nightly to help with appetite and weight gain.    Plan:  Patient with stage IIIA ([id number]) adenocarcinoma of the left upper lobe, unresectable  Plans to begin primary radiation therapy to the left upper lobe mass, scheduled to begin on 10/2/2023.  Given the patient's age, performance status, comorbidities, he does not appear to be a good candidate for concurrent chemotherapy with radiation.  Patient continuing on oral B12 1000 mcg daily for B12 deficiency  Patient is continuing follow-up with oncology nutrition  Patient will increase his Ensure intake to 3 to 4 cans/day if possible  Begin Remeron 7.5 mg nightly to improve appetite and weight  In 3 weeks NP visit with LAMAR DAVIS  In 6 weeks MD visit with LAMAR DAVIS    I did spend 55 minutes in total time caring for the patient today, time spent in review of records, face-to-face consultation, coordination of care, placement of orders, completion of documentation.

## 2023-09-26 PROCEDURE — 77301 RADIOTHERAPY DOSE PLAN IMRT: CPT | Performed by: STUDENT IN AN ORGANIZED HEALTH CARE EDUCATION/TRAINING PROGRAM

## 2023-09-26 PROCEDURE — 77293 RESPIRATOR MOTION MGMT SIMUL: CPT | Performed by: STUDENT IN AN ORGANIZED HEALTH CARE EDUCATION/TRAINING PROGRAM

## 2023-09-26 PROCEDURE — 77300 RADIATION THERAPY DOSE PLAN: CPT | Performed by: STUDENT IN AN ORGANIZED HEALTH CARE EDUCATION/TRAINING PROGRAM

## 2023-09-26 PROCEDURE — 77338 DESIGN MLC DEVICE FOR IMRT: CPT | Performed by: STUDENT IN AN ORGANIZED HEALTH CARE EDUCATION/TRAINING PROGRAM

## 2023-10-01 ENCOUNTER — HOSPITAL ENCOUNTER (OUTPATIENT)
Dept: RADIATION ONCOLOGY | Facility: HOSPITAL | Age: 83
Setting detail: RADIATION/ONCOLOGY SERIES
End: 2023-10-01
Payer: MEDICARE

## 2023-10-02 ENCOUNTER — TELEPHONE (OUTPATIENT)
Dept: SURGERY | Facility: CLINIC | Age: 83
End: 2023-10-02

## 2023-10-02 NOTE — TELEPHONE ENCOUNTER
[facility] received a fax that requires your attention. The document has been indexed to the patient’s chart for your review.      Reason for sending: EXTERNAL MEDICAL RECORD NOTIFICATION    Documents Description: HOME HEALTH ORDER    Name of Sender: SHAMEKA    Date Indexed: 10/2/23    Notes (if needed): SEE FAX IN CHART UNDER HOME HEALTH

## 2023-10-04 ENCOUNTER — TELEPHONE (OUTPATIENT)
Dept: SURGERY | Facility: CLINIC | Age: 83
End: 2023-10-04

## 2023-10-04 NOTE — TELEPHONE ENCOUNTER
Caller: ANNALISE (HOME HEALTH)      Best call back number:     [phone]       What form or medical record are you requesting: ORDER NUMBER [id number] (DATE)    ORDER NUMBER [id number] (SIGN AND DATE)    Who is requesting this form or medical record from you: [facility]     Additional notes: HOME HEALTH IS FAXING ORDERS THAT NEED TO BE SIGNED AND DATED.

## 2023-10-09 ENCOUNTER — APPOINTMENT (OUTPATIENT)
Dept: OTHER | Facility: HOSPITAL | Age: 83
End: 2023-10-09
Payer: MEDICARE

## 2023-10-09 ENCOUNTER — HOSPITAL ENCOUNTER (OUTPATIENT)
Dept: RADIATION ONCOLOGY | Facility: HOSPITAL | Age: 83
Discharge: HOME | End: 2023-10-09
Payer: MEDICARE

## 2023-10-09 ENCOUNTER — RADIATION ONCOLOGY WEEKLY ASSESSMENT (OUTPATIENT)
Dept: RADIATION ONCOLOGY | Facility: HOSPITAL | Age: 83
End: 2023-10-09
Payer: MEDICARE

## 2023-10-09 VITALS
SYSTOLIC BLOOD PRESSURE: 156 MMHG | DIASTOLIC BLOOD PRESSURE: 73 MMHG | BODY MASS INDEX: 22.24 KG/M2 | WEIGHT: 142 LBS | OXYGEN SATURATION: 97 % | HEART RATE: 68 BPM

## 2023-10-09 DIAGNOSIS — C34.92 ADENOCARCINOMA, LUNG, LEFT: Primary | ICD-10-CM

## 2023-10-09 LAB
RAD ONC ARIA COURSE ID: NORMAL
RAD ONC ARIA COURSE INTENT: NORMAL
RAD ONC ARIA COURSE LAST TREATMENT DATE: NORMAL
RAD ONC ARIA COURSE START DATE: NORMAL
RAD ONC ARIA COURSE TREATMENT ELAPSED DAYS: 0
RAD ONC ARIA FIRST TREATMENT DATE: NORMAL
RAD ONC ARIA PLAN FRACTIONS TREATED TO DATE: 1
RAD ONC ARIA PLAN ID: NORMAL
RAD ONC ARIA PLAN PRESCRIBED DOSE PER FRACTION: 4 GY
RAD ONC ARIA PLAN PRIMARY REFERENCE POINT: NORMAL
RAD ONC ARIA PLAN TOTAL FRACTIONS PRESCRIBED: 15
RAD ONC ARIA PLAN TOTAL PRESCRIBED DOSE: 6000 CGY
RAD ONC ARIA REFERENCE POINT DOSAGE GIVEN TO DATE: 4 GY
RAD ONC ARIA REFERENCE POINT ID: NORMAL
RAD ONC ARIA REFERENCE POINT SESSION DOSAGE GIVEN: 4 GY

## 2023-10-09 PROCEDURE — 77014 CHG CT GUIDANCE RADIATION THERAPY FLDS PLACEMENT: CPT | Performed by: STUDENT IN AN ORGANIZED HEALTH CARE EDUCATION/TRAINING PROGRAM

## 2023-10-09 PROCEDURE — 77386: CPT | Performed by: STUDENT IN AN ORGANIZED HEALTH CARE EDUCATION/TRAINING PROGRAM

## 2023-10-09 PROCEDURE — 77427 RADIATION TX MANAGEMENT X5: CPT | Performed by: STUDENT IN AN ORGANIZED HEALTH CARE EDUCATION/TRAINING PROGRAM

## 2023-10-10 ENCOUNTER — HOSPITAL ENCOUNTER (OUTPATIENT)
Dept: RADIATION ONCOLOGY | Facility: HOSPITAL | Age: 83
Discharge: HOME | End: 2023-10-10
Payer: MEDICARE

## 2023-10-10 LAB
RAD ONC ARIA COURSE ID: NORMAL
RAD ONC ARIA COURSE INTENT: NORMAL
RAD ONC ARIA COURSE LAST TREATMENT DATE: NORMAL
RAD ONC ARIA COURSE START DATE: NORMAL
RAD ONC ARIA COURSE TREATMENT ELAPSED DAYS: 1
RAD ONC ARIA FIRST TREATMENT DATE: NORMAL
RAD ONC ARIA PLAN FRACTIONS TREATED TO DATE: 2
RAD ONC ARIA PLAN ID: NORMAL
RAD ONC ARIA PLAN PRESCRIBED DOSE PER FRACTION: 4 GY
RAD ONC ARIA PLAN PRIMARY REFERENCE POINT: NORMAL
RAD ONC ARIA PLAN TOTAL FRACTIONS PRESCRIBED: 15
RAD ONC ARIA PLAN TOTAL PRESCRIBED DOSE: 6000 CGY
RAD ONC ARIA REFERENCE POINT DOSAGE GIVEN TO DATE: 8 GY
RAD ONC ARIA REFERENCE POINT ID: NORMAL
RAD ONC ARIA REFERENCE POINT SESSION DOSAGE GIVEN: 4 GY

## 2023-10-10 PROCEDURE — 77014 CHG CT GUIDANCE RADIATION THERAPY FLDS PLACEMENT: CPT | Performed by: STUDENT IN AN ORGANIZED HEALTH CARE EDUCATION/TRAINING PROGRAM

## 2023-10-10 PROCEDURE — 77386: CPT | Performed by: STUDENT IN AN ORGANIZED HEALTH CARE EDUCATION/TRAINING PROGRAM

## 2023-10-10 NOTE — PROGRESS NOTES
Radiation Oncology  On-Treatment Note      Patient: Bayron Acevedo    MRN: [id number]    Attending Physician: Dakota Painter MD     Diagnosis:     ICD-10-CM ICD-9-CM   1. Adenocarcinoma, lung, left  C34.92 162.9       Radiation Therapy Visit:  Continue radiation therapy, Dosimetry plan remains acceptable, Films reviewed and remains acceptable, Pain assessed, Pain management planned, Radiation dose schedule reviewed and remains acceptable, Radiation technique remains acceptable, and Symptoms within expected range    Radiation Treatments       Active   Plans   KIET Lung   Most recent treatment: Dose planned: 400 cGy (fraction 1 on 10/9/2023)   Total: Dose planned: 6,000 cGy (15 fractions)   Elapsed Days: 0      Reference Points   Rx: KIET Lung   Most recent treatment: Dose given: 400 cGy (on 10/9/2023)   Total: Dose given: 400 cGy   Elapsed Days: 0                      Physical Examination:  Vitals: Blood pressure 156/73, pulse 68, weight 64.4 kg (142 lb), SpO2 97%.  Pain Score    10/09/23 1508   PainSc: 0-No pain       Ambulatory and capable of all selfcare but unable to carry out any work activities; up and about more than 50% of waking hours = 2    We examined the relevant areas: yes  Findings are within the expected range for this stage of treatment: yes  -------------------------------------------------------------------------------------------------------------------    ACTION ITEMS:  Patient tolerating treatment well and as expected for this stage in their treatment and Continue radiation therapy as planned    Estimated Completion Date: 10/27/2023      Dakota Painter MD  Radiation Oncology  
no

## 2023-10-11 ENCOUNTER — HOSPITAL ENCOUNTER (OUTPATIENT)
Dept: RADIATION ONCOLOGY | Facility: HOSPITAL | Age: 83
Discharge: HOME | End: 2023-10-11
Payer: MEDICARE

## 2023-10-11 LAB
RAD ONC ARIA COURSE ID: NORMAL
RAD ONC ARIA COURSE INTENT: NORMAL
RAD ONC ARIA COURSE LAST TREATMENT DATE: NORMAL
RAD ONC ARIA COURSE START DATE: NORMAL
RAD ONC ARIA COURSE TREATMENT ELAPSED DAYS: 2
RAD ONC ARIA FIRST TREATMENT DATE: NORMAL
RAD ONC ARIA PLAN FRACTIONS TREATED TO DATE: 3
RAD ONC ARIA PLAN ID: NORMAL
RAD ONC ARIA PLAN PRESCRIBED DOSE PER FRACTION: 4 GY
RAD ONC ARIA PLAN PRIMARY REFERENCE POINT: NORMAL
RAD ONC ARIA PLAN TOTAL FRACTIONS PRESCRIBED: 15
RAD ONC ARIA PLAN TOTAL PRESCRIBED DOSE: 6000 CGY
RAD ONC ARIA REFERENCE POINT DOSAGE GIVEN TO DATE: 12 GY
RAD ONC ARIA REFERENCE POINT ID: NORMAL
RAD ONC ARIA REFERENCE POINT SESSION DOSAGE GIVEN: 4 GY

## 2023-10-11 PROCEDURE — 77014 CHG CT GUIDANCE RADIATION THERAPY FLDS PLACEMENT: CPT | Performed by: STUDENT IN AN ORGANIZED HEALTH CARE EDUCATION/TRAINING PROGRAM

## 2023-10-11 PROCEDURE — 77386: CPT | Performed by: STUDENT IN AN ORGANIZED HEALTH CARE EDUCATION/TRAINING PROGRAM

## 2023-10-11 PROCEDURE — 77336 RADIATION PHYSICS CONSULT: CPT | Performed by: STUDENT IN AN ORGANIZED HEALTH CARE EDUCATION/TRAINING PROGRAM

## 2023-10-12 ENCOUNTER — HOSPITAL ENCOUNTER (OUTPATIENT)
Dept: RADIATION ONCOLOGY | Facility: HOSPITAL | Age: 83
Discharge: HOME | End: 2023-10-12
Payer: MEDICARE

## 2023-10-12 LAB
RAD ONC ARIA COURSE ID: NORMAL
RAD ONC ARIA COURSE INTENT: NORMAL
RAD ONC ARIA COURSE LAST TREATMENT DATE: NORMAL
RAD ONC ARIA COURSE START DATE: NORMAL
RAD ONC ARIA COURSE TREATMENT ELAPSED DAYS: 3
RAD ONC ARIA FIRST TREATMENT DATE: NORMAL
RAD ONC ARIA PLAN FRACTIONS TREATED TO DATE: 4
RAD ONC ARIA PLAN ID: NORMAL
RAD ONC ARIA PLAN PRESCRIBED DOSE PER FRACTION: 4 GY
RAD ONC ARIA PLAN PRIMARY REFERENCE POINT: NORMAL
RAD ONC ARIA PLAN TOTAL FRACTIONS PRESCRIBED: 15
RAD ONC ARIA PLAN TOTAL PRESCRIBED DOSE: 6000 CGY
RAD ONC ARIA REFERENCE POINT DOSAGE GIVEN TO DATE: 16 GY
RAD ONC ARIA REFERENCE POINT ID: NORMAL
RAD ONC ARIA REFERENCE POINT SESSION DOSAGE GIVEN: 4 GY

## 2023-10-12 PROCEDURE — 77014 CHG CT GUIDANCE RADIATION THERAPY FLDS PLACEMENT: CPT | Performed by: STUDENT IN AN ORGANIZED HEALTH CARE EDUCATION/TRAINING PROGRAM

## 2023-10-12 PROCEDURE — 77386: CPT | Performed by: STUDENT IN AN ORGANIZED HEALTH CARE EDUCATION/TRAINING PROGRAM

## 2023-10-12 NOTE — PROGRESS NOTES
"Chief Complaint  Clinical stage IIIA ([id number]) non-small cell lung cancer (adenocarcinoma) of left upper lobe    Subjective        History of Present Illness    Patient returns today in follow-up with laboratory studies.  Patient has started radiation therapy with Dr. Painter and currently the plan is to complete 15 fractions.  He denies any increased shortness of breath however he does still have shortness of breath with exertion.  Continues to have alterations in his taste recent appetite.  Thankfully his weight is stable today at 145 pounds.  Continues to try to consume Ensure/boost shakes multiple times daily but has been finding it difficult to drink 3-4 daily.  Most days he is still drinking at least 2.  Continues to have issues with chronic constipation and typically does not have a bowel movement for 3 to 4 days.  He has MiraLAX at home however he is still not using this on a consistent basis.  He has tried suppositories however he still finds that he is straining to use the restroom.  He has been encouraged to at least start with MiraLAX 1 capful daily and adjust from there.  Dietitian after his visit today.  No other concerns noted at this time.    Objective   Vital Signs:   Pulse 66   Temp 97.7 °F (36.5 °C) (Temporal)   Resp 16   Ht 170.2 cm (67.01\")   Wt 66.2 kg (145 lb 14.4 oz)   SpO2 96%   BMI 22.85 kg/m²     Physical Exam  Constitutional:       Appearance: He is well-developed.      Comments: Patient in no distress   Eyes:      Conjunctiva/sclera: Conjunctivae normal.   Neck:      Thyroid: No thyromegaly.   Cardiovascular:      Rate and Rhythm: Normal rate and regular rhythm.      Heart sounds: No murmur heard.     No friction rub. No gallop.   Pulmonary:      Effort: No respiratory distress.      Comments: Clear to auscultation bilaterally although diminished breath sounds bilaterally.  Chest tube has been removed.  Abdominal:      General: Bowel sounds are normal. There is no distension.      " Palpations: Abdomen is soft.      Tenderness: There is no abdominal tenderness.   Musculoskeletal:         General: No swelling.      Comments: No edema currently   Lymphadenopathy:      Head:      Right side of head: No submandibular adenopathy.      Cervical: No cervical adenopathy.      Upper Body:      Right upper body: No supraclavicular adenopathy.      Left upper body: No supraclavicular adenopathy.   Skin:     General: Skin is warm and dry.      Findings: No rash.   Neurological:      Mental Status: He is alert and oriented to person, place, and time.      Cranial Nerves: No cranial nerve deficit.      Motor: No abnormal muscle tone.      Deep Tendon Reflexes: Reflexes normal.   Psychiatric:         Behavior: Behavior normal.        Result Review : Reviewed CBC, CMP from today.  Reviewed radiation oncology records.       Assessment and Plan     *Clinical stage IIIA ([id number]) non-small cell lung cancer (adenocarcinoma) of left upper lobe  The patient has a history of smoking 1 pack/day x 55 years quit in 2000.  CT chest 5/26/2023 showed a spiculated mass in the anterior left upper lobe 3.7 x 4.3 x 4.2 cm along the anterior pleural surface and lateral aspect of the anterior mediastinum.  Additional 3 mm nodule right major fissure.  Bilateral small to moderate-sized pleural effusions.  Bullae formation consistent with COPD.  Mediastinal lymphadenopathy with largest node infracarinal region 2.6 x 1.2 cm.  PET/CT on 6/12/2023 showed 5.2 x 3.1 cm irregular pleural-based mass anterior left upper lobe abutting the pleura anteriorly and medially, hypermetabolic with SUV 14.4.  Mediastinal lymph node activity in the subcarinal region with a 2.2 x 1.1 cm lymph node with SUV 4.5.  Remainder of mediastinal lymph nodes less intensely hypermetabolic with maximal SUV 3.3.  Moderate size loculated pleural effusions bilaterally with low-level activity along the pleura (SUV 2.5), no change in volume of pleural effusions since  prior chest CT.  New small to moderate loculated right hydropneumothorax with air-fluid level 5.2 cm.   CT-guided left upper lobe lung biopsy 6/13/2023 with pathology that showed invasive poorly differentiated adenocarcinoma, PD-L1 less than 1%.  Caris analysis: TMB high (14 muts/Mb), mutations in KEAP1 and TP53, EGFR VUS, no targetable mutations.  Patient was hospitalized 6/13 - 6/15/2023 due to right hydropneumothorax, had CT-guided chest tube placement performed 6/13/2023.  Right pleural fluid cytology negative on 6/13/2023.  Staging MRI brain was negative for evidence of metastatic disease on 7/2/2023  PFTs on 6/20/2023 with FEV1 0.99 (44% predicted), DLCO corrected 14.09 (56% predicted).  Mediastinoscopy 7/17/2023 with 4R and station 7 lymph nodes negative for malignancy  Left VATS performed 7/31/2023.  Intraoperatively, primary tumor was unresectable, invading into the mediastinum and pericardial fat pad as well as with phrenic nerve involvement (T4 lesion).  Sampling of left pleura with fibrosis, chronic inflammation, no evidence of malignancy.  Station 5 and 10 R lymph nodes sampled, negative for malignancy.  Patient therefore with unresectable stage IIIA ([id number]) disease.  Plans to treat with radiation therapy.  Due to age, performance status, comorbidities, patient felt to be a poor candidate for concurrent chemotherapy.  New baseline CT chest abdomen pelvis prior to initiation of radiation therapy performed on 9/20/2023.  Persistent loculated left pneumothorax, trace left pleural effusion, primary tumor in partially collapsed left upper lobe appears relatively stable.  Small right pleural effusion no change in borderline enlarged mediastinal lymph nodes.  Slight increase in right upper lobe nodule (for up to 7 mm), indeterminate.  Patient returns today in follow-up with CT scans to review.  He is preparing to begin primary radiation to the left upper lobe mass with Dr. Painter on 10/2/2023.  We reviewed his  new baseline CT scan today which shows little change in his disease compared to prior studies.  It is somewhat difficult to assess the primary mass as the left upper lobe is partially collapsed related to the persistent loculated pneumothorax on the left following his recent surgery.  Overall however this has not changed significantly.  Previously had some borderline sized mediastinal lymph nodes that were negative by mediastinoscopy and they are unchanged in size.  Indeterminate slight increase in right upper lobe nodule 7 mm which we will need to monitor.  He does continue with dyspnea on exertion although is maintaining adequate O2 saturation of 96% on room air.  He continues with anorexia and has lost another 5 pounds.  He has been seen by oncology nutrition and will continue follow-up with him.  I did suggest that we prescribe Remeron 7.5 mg daily to help with appetite and weight gain.  We will see him back here in 3 weeks for NP visit and I will see him in 6 weeks for follow-up.  We will discuss timeframe for follow-up scans following radiation.  10/13/2023: Patient reports he is doing fairly well.  He has started radiation and is being followed by Dr. Painter.  His plan is for 15 fractions total.  Continues to be followed closely by our dietitians who will meet with him today after his visit.  He has started Remeron 7.5 mg p.o. daily.  He is to try to consume Ensure/boost shakes multiple times a day however he is finding it difficult to drink 3-4 shakes daily.  Thankfully his weight today is stable at 145 pounds.  He denies any increased shortness of breath.    *Chronic bilateral pleural effusions with right hydropneumothorax and subsequent postoperative left hydropneumothorax  Patient appears to have longstanding evidence of small bilateral pleural effusions likely related to CHF  PET/CT 6/12/2023 identified right small to moderate hydropneumothorax new since 5/26/2023 CT chest  Patient was hospitalized 6/13 -  6/15/2023 due to right hydropneumothorax, had CT-guided chest tube placement performed 6/13/2023.  Right pleural fluid cytology negative on 6/13/2023.  As above, patient underwent left VATS with attempted resection of primary lung cancer on 7/31/2023.    Patient required persistent left chest tube postoperatively, eventually removed on 9/6/2023  CT chest 9/20/2023 with persistent loculated left pneumothorax.    *COPD  The patient has a history of smoking 1 pack/day x 55 years quit in 2000.  PFTs on 6/20/2023 with FEV1 0.99 (44% predicted), DLCO corrected 14.09 (56% predicted).  Patient continues with chronic dyspnea on exertion that does limit his level of activity although maintains O2 saturation in the 90% range even with activity.    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  Labs on 7/11/2023 with hemoglobin 10.6, MCV 82.8, iron 18, ferritin 504, iron saturation 6%, TIBC 295, B12 201, folate 6.79, CRP 1.07, ESR 27, erythropoietin level 17.8.  Labs consistent with anemia secondary to chronic disease/malignancy as well as anemia secondary to CKD3.  Patient initiated oral B12 1000 mcg daily for B12 deficiency  9/22/2023: Hemoglobin did decline postoperatively however today has improved at 10.9.  10/13/2023: Hemoglobin today is 10.3.    *Coronary artery disease, CHF  Chronic bilateral pleural effusions presumably related to CHF  Status post cardiac stent in 2013  Preoperative echocardiogram on 7/19/2023 with ejection fraction 56 to 60%, dilated LA, LV  Patient continues on aspirin 81 mg daily    *Peripheral vascular disease  Patient is followed by Dr. Jones  Bruce in vascular surgery  Abdominal aortic aneurysm status post stent graft repair 5/17/2021  Carotid stenosis status post carotid endarterectomy left, 2013.    *Anorexia, weight loss  Patient continues with anorexia, has lost a further 5 pounds since his last visit here.  He has been seen by oncology nutrition and is trying to drink 2 ensures per day.  I encouraged him to increase this to 3 to 4 cans/day.  We are also prescribing Remeron 7.5 mg nightly to help with appetite and weight gain.    *Constipation  Patient with chronic constipation, often goes 3 to 4 days before having a bowel movement.  Recommended that patient resume daily MiraLAX.  10/13/2023: Patient reports he continues to have issues with constipation.  He has not started his MiraLAX but he has been encouraged to take it at least daily and adjust as needed.  Patient may require more than once daily dosing.    Plan:  Patient with stage IIIA ([id number]) adenocarcinoma of the left upper lobe, unresectable  Plans to begin primary radiation therapy to the left upper lobe mass, scheduled to begin on 10/2/2023. 15 fractions planned.  Given the patient's age, performance status, comorbidities, he does not appear to be a good candidate for concurrent chemotherapy with radiation.  Patient continuing on oral B12 1000 mcg daily for B12 deficiency  Patient is continuing follow-up with oncology nutrition- will be seen today  Patient will increase his Ensure intake to 3 to 4 cans/day if possible  Begin Remeron 7.5 mg nightly to improve appetite and weight  Resume daily MiraLAX for constipation.  In 3 weeks MD visit with CBC, CMP

## 2023-10-13 ENCOUNTER — HOSPITAL ENCOUNTER (OUTPATIENT)
Dept: RADIATION ONCOLOGY | Facility: HOSPITAL | Age: 83
Discharge: HOME | End: 2023-10-13
Payer: MEDICARE

## 2023-10-13 ENCOUNTER — OFFICE VISIT (OUTPATIENT)
Dept: ONCOLOGY | Facility: CLINIC | Age: 83
End: 2023-10-13
Payer: MEDICARE

## 2023-10-13 ENCOUNTER — LAB (OUTPATIENT)
Dept: LAB | Facility: HOSPITAL | Age: 83
End: 2023-10-13
Payer: MEDICARE

## 2023-10-13 ENCOUNTER — NUTRITION (OUTPATIENT)
Dept: OTHER | Facility: HOSPITAL | Age: 83
End: 2023-10-13
Payer: MEDICARE

## 2023-10-13 VITALS
OXYGEN SATURATION: 96 % | RESPIRATION RATE: 16 BRPM | HEIGHT: 67 IN | TEMPERATURE: 97.7 F | BODY MASS INDEX: 22.9 KG/M2 | HEART RATE: 66 BPM | WEIGHT: 145.9 LBS

## 2023-10-13 DIAGNOSIS — C34.92 ADENOCARCINOMA, LUNG, LEFT: Primary | ICD-10-CM

## 2023-10-13 DIAGNOSIS — K59.00 CONSTIPATION, UNSPECIFIED CONSTIPATION TYPE: ICD-10-CM

## 2023-10-13 DIAGNOSIS — C34.92 ADENOCARCINOMA, LUNG, LEFT: ICD-10-CM

## 2023-10-13 LAB
ALBUMIN SERPL-MCNC: 3.4 G/DL (ref 3.5–5.2)
ALBUMIN/GLOB SERPL: 1.3 G/DL
ALP SERPL-CCNC: 77 U/L (ref 39–117)
ALT SERPL W P-5'-P-CCNC: 15 U/L (ref 1–41)
ANION GAP SERPL CALCULATED.3IONS-SCNC: 12.9 MMOL/L (ref 5–15)
AST SERPL-CCNC: 24 U/L (ref 1–40)
BASOPHILS # BLD AUTO: 0.03 10*3/MM3 (ref 0–0.2)
BASOPHILS NFR BLD AUTO: 0.5 % (ref 0–1.5)
BILIRUB SERPL-MCNC: 0.3 MG/DL (ref 0–1.2)
BUN SERPL-MCNC: 32 MG/DL (ref 8–23)
BUN/CREAT SERPL: 18.1 (ref 7–25)
CALCIUM SPEC-SCNC: 8.7 MG/DL (ref 8.6–10.5)
CHLORIDE SERPL-SCNC: 103 MMOL/L (ref 98–107)
CO2 SERPL-SCNC: 24.1 MMOL/L (ref 22–29)
CREAT SERPL-MCNC: 1.77 MG/DL (ref 0.7–1.3)
DEPRECATED RDW RBC AUTO: 47.8 FL (ref 37–54)
EGFRCR SERPLBLD CKD-EPI 2021: 37.2 ML/MIN/1.73
EOSINOPHIL # BLD AUTO: 0.2 10*3/MM3 (ref 0–0.4)
EOSINOPHIL NFR BLD AUTO: 3.5 % (ref 0.3–6.2)
ERYTHROCYTE [DISTWIDTH] IN BLOOD BY AUTOMATED COUNT: 15.9 % (ref 12.3–15.4)
GLOBULIN UR ELPH-MCNC: 2.7 GM/DL
GLUCOSE SERPL-MCNC: 125 MG/DL (ref 65–99)
HCT VFR BLD AUTO: 33.9 % (ref 37.5–51)
HGB BLD-MCNC: 10.3 G/DL (ref 13–17.7)
IMM GRANULOCYTES # BLD AUTO: 0.01 10*3/MM3 (ref 0–0.05)
IMM GRANULOCYTES NFR BLD AUTO: 0.2 % (ref 0–0.5)
LYMPHOCYTES # BLD AUTO: 0.8 10*3/MM3 (ref 0.7–3.1)
LYMPHOCYTES NFR BLD AUTO: 13.9 % (ref 19.6–45.3)
MCH RBC QN AUTO: 24.9 PG (ref 26.6–33)
MCHC RBC AUTO-ENTMCNC: 30.4 G/DL (ref 31.5–35.7)
MCV RBC AUTO: 82.1 FL (ref 79–97)
MONOCYTES # BLD AUTO: 0.52 10*3/MM3 (ref 0.1–0.9)
MONOCYTES NFR BLD AUTO: 9 % (ref 5–12)
NEUTROPHILS NFR BLD AUTO: 4.19 10*3/MM3 (ref 1.7–7)
NEUTROPHILS NFR BLD AUTO: 72.9 % (ref 42.7–76)
NRBC BLD AUTO-RTO: 0 /100 WBC (ref 0–0.2)
PLATELET # BLD AUTO: 180 10*3/MM3 (ref 140–450)
PMV BLD AUTO: 9.3 FL (ref 6–12)
POTASSIUM SERPL-SCNC: 4.1 MMOL/L (ref 3.5–5.2)
PROT SERPL-MCNC: 6.1 G/DL (ref 6–8.5)
RAD ONC ARIA COURSE ID: NORMAL
RAD ONC ARIA COURSE INTENT: NORMAL
RAD ONC ARIA COURSE LAST TREATMENT DATE: NORMAL
RAD ONC ARIA COURSE START DATE: NORMAL
RAD ONC ARIA COURSE TREATMENT ELAPSED DAYS: 4
RAD ONC ARIA FIRST TREATMENT DATE: NORMAL
RAD ONC ARIA PLAN FRACTIONS TREATED TO DATE: 5
RAD ONC ARIA PLAN ID: NORMAL
RAD ONC ARIA PLAN PRESCRIBED DOSE PER FRACTION: 4 GY
RAD ONC ARIA PLAN PRIMARY REFERENCE POINT: NORMAL
RAD ONC ARIA PLAN TOTAL FRACTIONS PRESCRIBED: 15
RAD ONC ARIA PLAN TOTAL PRESCRIBED DOSE: 6000 CGY
RAD ONC ARIA REFERENCE POINT DOSAGE GIVEN TO DATE: 20 GY
RAD ONC ARIA REFERENCE POINT ID: NORMAL
RAD ONC ARIA REFERENCE POINT SESSION DOSAGE GIVEN: 4 GY
RBC # BLD AUTO: 4.13 10*6/MM3 (ref 4.14–5.8)
SODIUM SERPL-SCNC: 140 MMOL/L (ref 136–145)
WBC NRBC COR # BLD: 5.75 10*3/MM3 (ref 3.4–10.8)

## 2023-10-13 PROCEDURE — 77014 CHG CT GUIDANCE RADIATION THERAPY FLDS PLACEMENT: CPT | Performed by: STUDENT IN AN ORGANIZED HEALTH CARE EDUCATION/TRAINING PROGRAM

## 2023-10-13 PROCEDURE — 80053 COMPREHEN METABOLIC PANEL: CPT

## 2023-10-13 PROCEDURE — 85025 COMPLETE CBC W/AUTO DIFF WBC: CPT

## 2023-10-13 PROCEDURE — 77386: CPT | Performed by: STUDENT IN AN ORGANIZED HEALTH CARE EDUCATION/TRAINING PROGRAM

## 2023-10-13 PROCEDURE — 36415 COLL VENOUS BLD VENIPUNCTURE: CPT

## 2023-10-13 NOTE — PROGRESS NOTES
OUTPATIENT ONCOLOGY NUTRITION ASSESSMENT    Patient Name: Bayron Acevedo  YOB: 1940  MRN: [id number]  Assessment Date: 10/13/2023    COMMENTS: Met with pt and pts spouse in clinic prior to NP visit. Pt reports his appetite is good, has been able to gain a couple of pounds back. Pt continues to drink 2 Boost/Ensure per day, prefers Boost over Ensure and states he is eating a little bit of everything. Pt denies indigestion or esophagitis from XRT, tolerating well but still has a few weeks left. Pt reports feeling SOB, got a new med from pulmonologist yesterday and just started it today, states it made his throat hurt a little bit. Encouraged pt to continue with good PO intake, continue drinking supplements and to reach out with questions/concerns.         Reason for Assessment Follow up     Diagnosis/Problem   L upper lobe NSCLC   Treatment Plan Radiation  Aborted L upper lobectomy d/t mediastinum invasion      Encounter Information        Nutrition/Diet History:  Pt with a good appetite    Oral Nutrition Supplements: Boost 2 per day    Factors/Symptoms Affecting Intake: No factors at this time   Comments:      Medical/Surgical History Past Medical History:   Diagnosis Date    AAA (abdominal aortic aneurysm)     CAD in native artery     Carotid stenosis     s/p CEA Left    CKD (chronic kidney disease)     Colon polyp     COPD (chronic obstructive pulmonary disease)     H/O Acute myocardial infarction 2013    H/O PAD (peripheral artery disease)     Heart attack 2013    Heart failure     Hyperlipidemia     Hypertension     Inguinal hernia     RIGHT    Left ventricular dysfunction     Mass of left lung     SHOWS ON MRI    Mitral valve regurgitation     Perennial allergic rhinitis 01/06/2017    PVD (peripheral vascular disease)     S/P angioplasty with stent     Tinnitus     Tricuspid regurgitation      Past Surgical History:   Procedure Laterality Date    APPENDECTOMY      ARTERIOGRAM AORTIC N/A 05/17/2021  "   Procedure: ZENITH AORTIC STENT GRAFT;  Surgeon: Karen Barrera Jr., MD;  Location: [facility] OR 18/19;  Service: Vascular;  Laterality: N/A;    CARDIAC CATHETERIZATION      X2    CARDIAC CATHETERIZATION N/A 04/11/2023    Procedure: Left Heart Cath;  Surgeon: Guru Jiang MD;  [facility];  Service: Cardiovascular;  Laterality: N/A;    CARDIAC CATHETERIZATION N/A 04/11/2023    Procedure: Right Heart Cath;  Surgeon: Guru Jiang MD;  [facility];  Service: Cardiovascular;  Laterality: N/A;    CARDIAC CATHETERIZATION N/A 04/11/2023    Procedure: Coronary angiography;  Surgeon: Guru Jiang MD;  [facility];  Service: Cardiovascular;  Laterality: N/A;    CARDIAC CATHETERIZATION N/A 04/11/2023    Procedure: Left ventriculography;  Surgeon: Guru Jiang MD;  Location: [facility] CATH INVASIVE LOCATION;  Service: Cardiovascular;  Laterality: N/A;    CARDIAC CATHETERIZATION  04/11/2023    Procedure: RESTING FULL CYCLE RATIO;  Surgeon: Guru Jiang MD;  Location: [facility] CATH INVASIVE LOCATION;  Service: Cardiovascular;;    CAROTID ENDARTERECTOMY Left 01/2013    Bruce Jones Jr, MD    CAROTID STENT Left     LAD    COLONOSCOPY      HERNIA REPAIR      HYDROCELE EXCISION / REPAIR      Dr. SINTIA Osorio    LOBECTOMY Left 7/31/2023    Procedure: BRONCHOSCOPY, LEFT VAT WITH DAVINCI ROBOT, EXTENSIVE LYSIS OF ADHESIONS, PARTIAL PULMONARY DECORTICATION, INTERCOSTAL NERVE BLOCK;  Surgeon: Nemesio Kramer MD PhD;  Location: [facility] MAIN OR;  Service: Robotics - DaVinci;  Laterality: Left;    MEDIASTINOSCOPY N/A 7/17/2023    Procedure: MEDIASTINOSCOPY;  Surgeon: Nemesio Kramer MD PhD;  Location: [facility] MAIN OR;  Service: Thoracic;  Laterality: N/A;    UMBILICAL HERNIA REPAIR  2003        Anthropometrics        Current Height Ht Readings from Last 1 Encounters:   10/13/23 170.2 cm (67.01\")      Current Weight Wt " "Readings from Last 1 Encounters:   10/13/23 66.2 kg (145 lb 14.4 oz)      BMI  22.7   Ideal Body Weight (IBW) 148#   Usual Body Weight (UBW) ~160#   Weight Change/Trend Stable, Gain   Weight History Wt Readings from Last 30 Encounters:   10/13/23 1330 66.2 kg (145 lb 14.4 oz)   10/09/23 1508 64.4 kg (142 lb)   09/22/23 0836 65.7 kg (144 lb 14.4 oz)   09/20/23 0937 65.5 kg (144 lb 6.4 oz)   09/13/23 1257 66.2 kg (146 lb)   09/06/23 1345 66.2 kg (146 lb)   08/30/23 1351 66.7 kg (147 lb)   08/24/23 1032 68 kg (150 lb)   08/23/23 1124 67.8 kg (149 lb 6.4 oz)   08/23/23 1012 67.8 kg (149 lb 8 oz)   07/31/23 2343 73 kg (161 lb)   07/26/23 0857 73 kg (161 lb)   07/21/23 1009 73.6 kg (162 lb 3.2 oz)   07/21/23 0857 73.6 kg (162 lb 3.2 oz)   07/19/23 1239 72.6 kg (160 lb)   07/11/23 0852 72.6 kg (160 lb)   07/11/23 0759 72.6 kg (160 lb)   06/20/23 0801 72.6 kg (160 lb)   06/13/23 0841 72.6 kg (160 lb)   06/06/23 0834 76.7 kg (169 lb)   05/18/23 0916 76.7 kg (169 lb 3.2 oz)   04/27/23 0904 81.2 kg (179 lb)   04/19/23 1035 80.7 kg (178 lb)   04/12/23 0541 78.1 kg (172 lb 3.2 oz)   04/11/23 0618 78.9 kg (173 lb 14.4 oz)   04/10/23 0600 76.8 kg (169 lb 4.8 oz)   04/09/23 0549 79.8 kg (176 lb)   04/08/23 0531 79.8 kg (176 lb)   04/07/23 1908 79.8 kg (176 lb)   04/07/23 1506 79.8 kg (176 lb)   04/22/22 1500 86.2 kg (190 lb)   11/05/21 0842 87.5 kg (193 lb)   05/17/21 0555 90.2 kg (198 lb 13.7 oz)   05/14/21 1100 90.3 kg (199 lb)   12/30/20 0857 90.7 kg (200 lb)   11/19/19 1139 91.2 kg (201 lb 1.6 oz)          Medications           Current medications: Chlorhexidine Gluconate Cloth, acetaminophen, albuterol, amLODIPine, aspirin, carvedilol, guaiFENesin, hydrALAZINE, mirtazapine, nitroglycerin, rosuvastatin, torsemide, and vitamin B-12     Tests/Procedures        Tests/Procedures Radiation     Labs       Pertinent Labs          Invalid input(s): \"LABALBU\", \"PROT\"  Results from last 7 days   Lab Units 10/13/23  1317   HEMOGLOBIN g/dL " "10.3*   HEMATOCRIT % 33.9*   WBC 10*3/mm3 5.75     Results from last 7 days   Lab Units 10/13/23  1317   PLATELETS 10*3/mm3 180     External COVID19   Date Value Ref Range Status   04/22/2022 Detected (A) Not Detected - Ref. Range Final     No results found for: \"HGBA1C\"       Physical Findings        Physical Appearance alert, generalized wasting, oriented     Edema  no edema   Gastrointestinal None   Tubes/Drains none   Oral/Mouth Cavity WNL     Estimated/Assessed Needs        Energy Requirements    Height for Calculation   67\"   Weight for Calculation 66.7 kg   Method for Estimation  25 kcal/kg, 30 kcal/kg   EST Needs (kcal/day) 8429-0548 kcal/day       Protein Requirements    Weight for Calculation 66.7 kg   EST Protein Needs (g/kg) 1.0 - 1.2 gm/kg   EST Daily Needs (g/day) 67-80 g/day       Fluid Requirements     Method for Estimation 1 mL/kcal    Estimated Needs (mL/day) 1800 mL/day           PES STATEMENT / NUTRITION DIAGNOSIS      Nutrition Dx Problem Problem:    NutritionDiagnosisProblem: No Nutrition Diagnosis at this Time and Nutrition Appropriate for Condition at this Time    Etiology:  Medical diagnosis: Lung cancer  Factors affecting nutrition: Appetite     NUTRITION INTERVENTION / PLAN OF CARE      Intervention Goal(s) Maintain nutrition status, Provide information, Meet estimated needs, Disease management/therapy, Tolerate PO , Maintain intake, Maintain weight, and No significant weight loss         RD Intervention/Action Encouraged intake, Follow Tx progress         Recommendations:       PO Diet Continue current       Supplements Continue current       Snacks       Other          Monitor/Evaluation PO intake, Supplement intake, Weight, Symptoms   Education Will provide eduction as needed   --    RD to follow     Electronically signed by:  Carmen Knowles RD  10/13/23 13:47 EDT    "

## 2023-10-16 ENCOUNTER — HOSPITAL ENCOUNTER (OUTPATIENT)
Dept: RADIATION ONCOLOGY | Facility: HOSPITAL | Age: 83
Discharge: HOME | End: 2023-10-16
Payer: MEDICARE

## 2023-10-16 ENCOUNTER — RADIATION ONCOLOGY WEEKLY ASSESSMENT (OUTPATIENT)
Dept: RADIATION ONCOLOGY | Facility: HOSPITAL | Age: 83
End: 2023-10-16
Payer: MEDICARE

## 2023-10-16 VITALS
OXYGEN SATURATION: 99 % | DIASTOLIC BLOOD PRESSURE: 56 MMHG | SYSTOLIC BLOOD PRESSURE: 120 MMHG | BODY MASS INDEX: 22.49 KG/M2 | HEART RATE: 66 BPM | WEIGHT: 143.6 LBS

## 2023-10-16 DIAGNOSIS — C34.92 ADENOCARCINOMA, LUNG, LEFT: Primary | ICD-10-CM

## 2023-10-16 LAB
RAD ONC ARIA COURSE ID: NORMAL
RAD ONC ARIA COURSE INTENT: NORMAL
RAD ONC ARIA COURSE LAST TREATMENT DATE: NORMAL
RAD ONC ARIA COURSE START DATE: NORMAL
RAD ONC ARIA COURSE TREATMENT ELAPSED DAYS: 7
RAD ONC ARIA FIRST TREATMENT DATE: NORMAL
RAD ONC ARIA PLAN FRACTIONS TREATED TO DATE: 6
RAD ONC ARIA PLAN ID: NORMAL
RAD ONC ARIA PLAN PRESCRIBED DOSE PER FRACTION: 4 GY
RAD ONC ARIA PLAN PRIMARY REFERENCE POINT: NORMAL
RAD ONC ARIA PLAN TOTAL FRACTIONS PRESCRIBED: 15
RAD ONC ARIA PLAN TOTAL PRESCRIBED DOSE: 6000 CGY
RAD ONC ARIA REFERENCE POINT DOSAGE GIVEN TO DATE: 24 GY
RAD ONC ARIA REFERENCE POINT ID: NORMAL
RAD ONC ARIA REFERENCE POINT SESSION DOSAGE GIVEN: 4 GY

## 2023-10-16 PROCEDURE — 77014 CHG CT GUIDANCE RADIATION THERAPY FLDS PLACEMENT: CPT | Performed by: STUDENT IN AN ORGANIZED HEALTH CARE EDUCATION/TRAINING PROGRAM

## 2023-10-16 PROCEDURE — 77427 RADIATION TX MANAGEMENT X5: CPT | Performed by: STUDENT IN AN ORGANIZED HEALTH CARE EDUCATION/TRAINING PROGRAM

## 2023-10-16 PROCEDURE — 77386: CPT | Performed by: STUDENT IN AN ORGANIZED HEALTH CARE EDUCATION/TRAINING PROGRAM

## 2023-10-16 NOTE — PROGRESS NOTES
Radiation Oncology  On-Treatment Note      Patient: Bayron Acevedo    MRN: [id number]    Attending Physician: Dakota Painter MD     Diagnosis:     ICD-10-CM ICD-9-CM   1. Adenocarcinoma, lung, left  C34.92 162.9       Radiation Therapy Visit:  Continue radiation therapy, Dosimetry plan remains acceptable, Films reviewed and remains acceptable, Pain assessed, Pain management planned, Radiation dose schedule reviewed and remains acceptable, Radiation technique remains acceptable, and Symptoms within expected range    Radiation Treatments       Active   Plans   KIET Lung   Most recent treatment: Dose planned: 400 cGy (fraction 6 on 10/16/2023)   Total: Dose planned: 6,000 cGy (15 fractions)   Elapsed Days: 7      Reference Points   Rx: KIET Lung   Most recent treatment: Dose given: 400 cGy (on 10/16/2023)   Total: Dose given: 2,400 cGy   Elapsed Days: 7                      Physical Examination:  Vitals: Blood pressure 120/56, pulse 66, weight 65.1 kg (143 lb 9.6 oz), SpO2 99%.  There were no vitals filed for this visit.    Ambulatory and capable of all selfcare but unable to carry out any work activities; up and about more than 50% of waking hours = 2    We examined the relevant areas: yes  Findings are within the expected range for this stage of treatment: yes  -------------------------------------------------------------------------------------------------------------------    ACTION ITEMS:  Patient tolerating treatment well and as expected for this stage in their treatment and Continue radiation therapy as planned    Estimated Completion Date: 10/27/2023      Dakota Painter MD  Radiation Oncology

## 2023-10-17 ENCOUNTER — HOSPITAL ENCOUNTER (OUTPATIENT)
Dept: RADIATION ONCOLOGY | Facility: HOSPITAL | Age: 83
Discharge: HOME | End: 2023-10-17
Payer: MEDICARE

## 2023-10-17 LAB
RAD ONC ARIA COURSE ID: NORMAL
RAD ONC ARIA COURSE INTENT: NORMAL
RAD ONC ARIA COURSE LAST TREATMENT DATE: NORMAL
RAD ONC ARIA COURSE START DATE: NORMAL
RAD ONC ARIA COURSE TREATMENT ELAPSED DAYS: 8
RAD ONC ARIA FIRST TREATMENT DATE: NORMAL
RAD ONC ARIA PLAN FRACTIONS TREATED TO DATE: 7
RAD ONC ARIA PLAN ID: NORMAL
RAD ONC ARIA PLAN PRESCRIBED DOSE PER FRACTION: 4 GY
RAD ONC ARIA PLAN PRIMARY REFERENCE POINT: NORMAL
RAD ONC ARIA PLAN TOTAL FRACTIONS PRESCRIBED: 15
RAD ONC ARIA PLAN TOTAL PRESCRIBED DOSE: 6000 CGY
RAD ONC ARIA REFERENCE POINT DOSAGE GIVEN TO DATE: 28 GY
RAD ONC ARIA REFERENCE POINT ID: NORMAL
RAD ONC ARIA REFERENCE POINT SESSION DOSAGE GIVEN: 4 GY

## 2023-10-17 PROCEDURE — 77386: CPT | Performed by: STUDENT IN AN ORGANIZED HEALTH CARE EDUCATION/TRAINING PROGRAM

## 2023-10-17 PROCEDURE — 77014 CHG CT GUIDANCE RADIATION THERAPY FLDS PLACEMENT: CPT | Performed by: STUDENT IN AN ORGANIZED HEALTH CARE EDUCATION/TRAINING PROGRAM

## 2023-10-18 ENCOUNTER — HOSPITAL ENCOUNTER (OUTPATIENT)
Dept: RADIATION ONCOLOGY | Facility: HOSPITAL | Age: 83
Discharge: HOME | End: 2023-10-18

## 2023-10-18 ENCOUNTER — PATIENT OUTREACH (OUTPATIENT)
Dept: OTHER | Facility: HOSPITAL | Age: 83
End: 2023-10-18
Payer: MEDICARE

## 2023-10-18 LAB
RAD ONC ARIA COURSE ID: NORMAL
RAD ONC ARIA COURSE INTENT: NORMAL
RAD ONC ARIA COURSE LAST TREATMENT DATE: NORMAL
RAD ONC ARIA COURSE START DATE: NORMAL
RAD ONC ARIA COURSE TREATMENT ELAPSED DAYS: 9
RAD ONC ARIA FIRST TREATMENT DATE: NORMAL
RAD ONC ARIA PLAN FRACTIONS TREATED TO DATE: 8
RAD ONC ARIA PLAN ID: NORMAL
RAD ONC ARIA PLAN PRESCRIBED DOSE PER FRACTION: 4 GY
RAD ONC ARIA PLAN PRIMARY REFERENCE POINT: NORMAL
RAD ONC ARIA PLAN TOTAL FRACTIONS PRESCRIBED: 15
RAD ONC ARIA PLAN TOTAL PRESCRIBED DOSE: 6000 CGY
RAD ONC ARIA REFERENCE POINT DOSAGE GIVEN TO DATE: 32 GY
RAD ONC ARIA REFERENCE POINT ID: NORMAL
RAD ONC ARIA REFERENCE POINT SESSION DOSAGE GIVEN: 4 GY

## 2023-10-18 PROCEDURE — 77336 RADIATION PHYSICS CONSULT: CPT | Performed by: STUDENT IN AN ORGANIZED HEALTH CARE EDUCATION/TRAINING PROGRAM

## 2023-10-18 PROCEDURE — 77014 CHG CT GUIDANCE RADIATION THERAPY FLDS PLACEMENT: CPT | Performed by: STUDENT IN AN ORGANIZED HEALTH CARE EDUCATION/TRAINING PROGRAM

## 2023-10-18 PROCEDURE — 77386: CPT | Performed by: STUDENT IN AN ORGANIZED HEALTH CARE EDUCATION/TRAINING PROGRAM

## 2023-10-18 NOTE — PROGRESS NOTES
Reviewed chart    Called patient. He is doing well. The patient is on day 8 of 15 radiation treatments today. He states he is doing well so far. We discussed recent appts with Uzma, , Jason, dietician and Dr. Santos.  He was recently discharged from [facility].     The patient denied questions, concerns or ongoing resource needs.  I will f/u in several weeks; encouraged patient/wife to call as needed.

## 2023-10-19 ENCOUNTER — HOSPITAL ENCOUNTER (OUTPATIENT)
Dept: RADIATION ONCOLOGY | Facility: HOSPITAL | Age: 83
Discharge: HOME | End: 2023-10-19

## 2023-10-19 LAB
RAD ONC ARIA COURSE ID: NORMAL
RAD ONC ARIA COURSE INTENT: NORMAL
RAD ONC ARIA COURSE LAST TREATMENT DATE: NORMAL
RAD ONC ARIA COURSE START DATE: NORMAL
RAD ONC ARIA COURSE TREATMENT ELAPSED DAYS: 10
RAD ONC ARIA FIRST TREATMENT DATE: NORMAL
RAD ONC ARIA PLAN FRACTIONS TREATED TO DATE: 9
RAD ONC ARIA PLAN ID: NORMAL
RAD ONC ARIA PLAN PRESCRIBED DOSE PER FRACTION: 4 GY
RAD ONC ARIA PLAN PRIMARY REFERENCE POINT: NORMAL
RAD ONC ARIA PLAN TOTAL FRACTIONS PRESCRIBED: 15
RAD ONC ARIA PLAN TOTAL PRESCRIBED DOSE: 6000 CGY
RAD ONC ARIA REFERENCE POINT DOSAGE GIVEN TO DATE: 36 GY
RAD ONC ARIA REFERENCE POINT ID: NORMAL
RAD ONC ARIA REFERENCE POINT SESSION DOSAGE GIVEN: 4 GY

## 2023-10-19 PROCEDURE — 77386: CPT | Performed by: STUDENT IN AN ORGANIZED HEALTH CARE EDUCATION/TRAINING PROGRAM

## 2023-10-19 PROCEDURE — 77014 CHG CT GUIDANCE RADIATION THERAPY FLDS PLACEMENT: CPT | Performed by: STUDENT IN AN ORGANIZED HEALTH CARE EDUCATION/TRAINING PROGRAM

## 2023-10-20 ENCOUNTER — HOSPITAL ENCOUNTER (OUTPATIENT)
Dept: RADIATION ONCOLOGY | Facility: HOSPITAL | Age: 83
Discharge: HOME | End: 2023-10-20

## 2023-10-20 ENCOUNTER — TELEPHONE (OUTPATIENT)
Dept: SURGERY | Facility: CLINIC | Age: 83
End: 2023-10-20

## 2023-10-20 LAB
RAD ONC ARIA COURSE ID: NORMAL
RAD ONC ARIA COURSE INTENT: NORMAL
RAD ONC ARIA COURSE LAST TREATMENT DATE: NORMAL
RAD ONC ARIA COURSE START DATE: NORMAL
RAD ONC ARIA COURSE TREATMENT ELAPSED DAYS: 11
RAD ONC ARIA FIRST TREATMENT DATE: NORMAL
RAD ONC ARIA PLAN FRACTIONS TREATED TO DATE: 10
RAD ONC ARIA PLAN ID: NORMAL
RAD ONC ARIA PLAN PRESCRIBED DOSE PER FRACTION: 4 GY
RAD ONC ARIA PLAN PRIMARY REFERENCE POINT: NORMAL
RAD ONC ARIA PLAN TOTAL FRACTIONS PRESCRIBED: 15
RAD ONC ARIA PLAN TOTAL PRESCRIBED DOSE: 6000 CGY
RAD ONC ARIA REFERENCE POINT DOSAGE GIVEN TO DATE: 40 GY
RAD ONC ARIA REFERENCE POINT ID: NORMAL
RAD ONC ARIA REFERENCE POINT SESSION DOSAGE GIVEN: 4 GY

## 2023-10-20 PROCEDURE — 77014 CHG CT GUIDANCE RADIATION THERAPY FLDS PLACEMENT: CPT | Performed by: STUDENT IN AN ORGANIZED HEALTH CARE EDUCATION/TRAINING PROGRAM

## 2023-10-20 PROCEDURE — 77386: CPT | Performed by: STUDENT IN AN ORGANIZED HEALTH CARE EDUCATION/TRAINING PROGRAM

## 2023-10-20 NOTE — TELEPHONE ENCOUNTER
The [facility] received a fax that requires your attention. The document has been indexed to the patient’s chart for your review.      Reason for sending: EXTERNAL MEDICAL RECORD NOTIFICATION     [facility]    Name of Sender: [facility] [place]     Date Indexed: 10.20.23

## 2023-10-23 ENCOUNTER — RADIATION ONCOLOGY WEEKLY ASSESSMENT (OUTPATIENT)
Dept: RADIATION ONCOLOGY | Facility: HOSPITAL | Age: 83
End: 2023-10-23
Payer: MEDICARE

## 2023-10-23 ENCOUNTER — HOSPITAL ENCOUNTER (OUTPATIENT)
Dept: RADIATION ONCOLOGY | Facility: HOSPITAL | Age: 83
Discharge: HOME | End: 2023-10-23
Payer: MEDICARE

## 2023-10-23 VITALS
BODY MASS INDEX: 22.33 KG/M2 | WEIGHT: 142.6 LBS | DIASTOLIC BLOOD PRESSURE: 66 MMHG | OXYGEN SATURATION: 98 % | HEART RATE: 62 BPM | SYSTOLIC BLOOD PRESSURE: 139 MMHG

## 2023-10-23 DIAGNOSIS — C34.92 ADENOCARCINOMA, LUNG, LEFT: Primary | ICD-10-CM

## 2023-10-23 LAB
RAD ONC ARIA COURSE ID: NORMAL
RAD ONC ARIA COURSE INTENT: NORMAL
RAD ONC ARIA COURSE LAST TREATMENT DATE: NORMAL
RAD ONC ARIA COURSE START DATE: NORMAL
RAD ONC ARIA COURSE TREATMENT ELAPSED DAYS: 14
RAD ONC ARIA FIRST TREATMENT DATE: NORMAL
RAD ONC ARIA PLAN FRACTIONS TREATED TO DATE: 11
RAD ONC ARIA PLAN ID: NORMAL
RAD ONC ARIA PLAN PRESCRIBED DOSE PER FRACTION: 4 GY
RAD ONC ARIA PLAN PRIMARY REFERENCE POINT: NORMAL
RAD ONC ARIA PLAN TOTAL FRACTIONS PRESCRIBED: 15
RAD ONC ARIA PLAN TOTAL PRESCRIBED DOSE: 6000 CGY
RAD ONC ARIA REFERENCE POINT DOSAGE GIVEN TO DATE: 44 GY
RAD ONC ARIA REFERENCE POINT ID: NORMAL
RAD ONC ARIA REFERENCE POINT SESSION DOSAGE GIVEN: 4 GY

## 2023-10-23 PROCEDURE — 77386: CPT | Performed by: STUDENT IN AN ORGANIZED HEALTH CARE EDUCATION/TRAINING PROGRAM

## 2023-10-23 PROCEDURE — 77014 CHG CT GUIDANCE RADIATION THERAPY FLDS PLACEMENT: CPT | Performed by: STUDENT IN AN ORGANIZED HEALTH CARE EDUCATION/TRAINING PROGRAM

## 2023-10-23 PROCEDURE — 77427 RADIATION TX MANAGEMENT X5: CPT | Performed by: STUDENT IN AN ORGANIZED HEALTH CARE EDUCATION/TRAINING PROGRAM

## 2023-10-24 ENCOUNTER — HOSPITAL ENCOUNTER (OUTPATIENT)
Dept: RADIATION ONCOLOGY | Facility: HOSPITAL | Age: 83
Discharge: HOME | End: 2023-10-24

## 2023-10-24 ENCOUNTER — TELEPHONE (OUTPATIENT)
Dept: OTHER | Facility: HOSPITAL | Age: 83
End: 2023-10-24
Payer: MEDICARE

## 2023-10-24 LAB
RAD ONC ARIA COURSE ID: NORMAL
RAD ONC ARIA COURSE INTENT: NORMAL
RAD ONC ARIA COURSE LAST TREATMENT DATE: NORMAL
RAD ONC ARIA COURSE START DATE: NORMAL
RAD ONC ARIA COURSE TREATMENT ELAPSED DAYS: 15
RAD ONC ARIA FIRST TREATMENT DATE: NORMAL
RAD ONC ARIA PLAN FRACTIONS TREATED TO DATE: 12
RAD ONC ARIA PLAN ID: NORMAL
RAD ONC ARIA PLAN PRESCRIBED DOSE PER FRACTION: 4 GY
RAD ONC ARIA PLAN PRIMARY REFERENCE POINT: NORMAL
RAD ONC ARIA PLAN TOTAL FRACTIONS PRESCRIBED: 15
RAD ONC ARIA PLAN TOTAL PRESCRIBED DOSE: 6000 CGY
RAD ONC ARIA REFERENCE POINT DOSAGE GIVEN TO DATE: 48 GY
RAD ONC ARIA REFERENCE POINT ID: NORMAL
RAD ONC ARIA REFERENCE POINT SESSION DOSAGE GIVEN: 4 GY

## 2023-10-24 PROCEDURE — 77014 CHG CT GUIDANCE RADIATION THERAPY FLDS PLACEMENT: CPT | Performed by: STUDENT IN AN ORGANIZED HEALTH CARE EDUCATION/TRAINING PROGRAM

## 2023-10-24 PROCEDURE — 77386: CPT | Performed by: STUDENT IN AN ORGANIZED HEALTH CARE EDUCATION/TRAINING PROGRAM

## 2023-10-24 NOTE — PROGRESS NOTES
Radiation Oncology  On-Treatment Note      Patient: Bayron Acevedo    MRN: [id number]    Attending Physician: Dakota Painter MD     Diagnosis:     ICD-10-CM ICD-9-CM   1. Adenocarcinoma, lung, left  C34.92 162.9       Radiation Therapy Visit:  Continue radiation therapy, Dosimetry plan remains acceptable, Films reviewed and remains acceptable, Pain assessed, Pain management planned, Radiation dose schedule reviewed and remains acceptable, Radiation technique remains acceptable, and Symptoms within expected range    Radiation Treatments       Active   Plans   KIET Lung   Most recent treatment: Dose planned: 400 cGy (fraction 11 on 10/23/2023)   Total: Dose planned: 6,000 cGy (15 fractions)   Elapsed Days: 14      Reference Points   Rx: KIET Lung   Most recent treatment: Dose given: 400 cGy (on 10/23/2023)   Total: Dose given: 4,400 cGy   Elapsed Days: 14                      Physical Examination:  Vitals: Blood pressure 139/66, pulse 62, weight 64.7 kg (142 lb 9.6 oz), SpO2 98%.  Pain Score    10/23/23 1553   PainSc: 0-No pain       Ambulatory and capable of all selfcare but unable to carry out any work activities; up and about more than 50% of waking hours = 2    We examined the relevant areas: yes  Findings are within the expected range for this stage of treatment: yes  -------------------------------------------------------------------------------------------------------------------    ACTION ITEMS:  Patient tolerating treatment well and as expected for this stage in their treatment and Continue radiation therapy as planned    Estimated Completion Date: 10/27/2023      Dakota Painter MD  Radiation Oncology

## 2023-10-24 NOTE — TELEPHONE ENCOUNTER
Oncology Social Work     OSW received a message from radiation that patient's wife had questions about gas card they received in the mail from [place] [facility]. OSW called patient's wife but she did not answer and no voicemail was set up. OSW called patient and spoke to him via telephone. OSW answered questions about gas cards / [place] [facility] program. OSW told patient about [facility]'s club as another resource available to them. Patient will have his wife call this OSW.     Uzma Quinones W

## 2023-10-25 ENCOUNTER — HOSPITAL ENCOUNTER (OUTPATIENT)
Dept: RADIATION ONCOLOGY | Facility: HOSPITAL | Age: 83
Discharge: HOME | End: 2023-10-25

## 2023-10-25 DIAGNOSIS — C34.92 ADENOCARCINOMA, LUNG, LEFT: Primary | ICD-10-CM

## 2023-10-25 LAB
RAD ONC ARIA COURSE ID: NORMAL
RAD ONC ARIA COURSE INTENT: NORMAL
RAD ONC ARIA COURSE LAST TREATMENT DATE: NORMAL
RAD ONC ARIA COURSE START DATE: NORMAL
RAD ONC ARIA COURSE TREATMENT ELAPSED DAYS: 16
RAD ONC ARIA FIRST TREATMENT DATE: NORMAL
RAD ONC ARIA PLAN FRACTIONS TREATED TO DATE: 13
RAD ONC ARIA PLAN ID: NORMAL
RAD ONC ARIA PLAN PRESCRIBED DOSE PER FRACTION: 4 GY
RAD ONC ARIA PLAN PRIMARY REFERENCE POINT: NORMAL
RAD ONC ARIA PLAN TOTAL FRACTIONS PRESCRIBED: 15
RAD ONC ARIA PLAN TOTAL PRESCRIBED DOSE: 6000 CGY
RAD ONC ARIA REFERENCE POINT DOSAGE GIVEN TO DATE: 52 GY
RAD ONC ARIA REFERENCE POINT ID: NORMAL
RAD ONC ARIA REFERENCE POINT SESSION DOSAGE GIVEN: 4 GY

## 2023-10-25 PROCEDURE — 77336 RADIATION PHYSICS CONSULT: CPT | Performed by: STUDENT IN AN ORGANIZED HEALTH CARE EDUCATION/TRAINING PROGRAM

## 2023-10-25 PROCEDURE — 77014 CHG CT GUIDANCE RADIATION THERAPY FLDS PLACEMENT: CPT | Performed by: STUDENT IN AN ORGANIZED HEALTH CARE EDUCATION/TRAINING PROGRAM

## 2023-10-25 PROCEDURE — 77386: CPT | Performed by: STUDENT IN AN ORGANIZED HEALTH CARE EDUCATION/TRAINING PROGRAM

## 2023-10-26 ENCOUNTER — HOSPITAL ENCOUNTER (OUTPATIENT)
Dept: RADIATION ONCOLOGY | Facility: HOSPITAL | Age: 83
Discharge: HOME | End: 2023-10-26

## 2023-10-26 LAB
RAD ONC ARIA COURSE ID: NORMAL
RAD ONC ARIA COURSE INTENT: NORMAL
RAD ONC ARIA COURSE LAST TREATMENT DATE: NORMAL
RAD ONC ARIA COURSE START DATE: NORMAL
RAD ONC ARIA COURSE TREATMENT ELAPSED DAYS: 17
RAD ONC ARIA FIRST TREATMENT DATE: NORMAL
RAD ONC ARIA PLAN FRACTIONS TREATED TO DATE: 14
RAD ONC ARIA PLAN ID: NORMAL
RAD ONC ARIA PLAN PRESCRIBED DOSE PER FRACTION: 4 GY
RAD ONC ARIA PLAN PRIMARY REFERENCE POINT: NORMAL
RAD ONC ARIA PLAN TOTAL FRACTIONS PRESCRIBED: 15
RAD ONC ARIA PLAN TOTAL PRESCRIBED DOSE: 6000 CGY
RAD ONC ARIA REFERENCE POINT DOSAGE GIVEN TO DATE: 56 GY
RAD ONC ARIA REFERENCE POINT ID: NORMAL
RAD ONC ARIA REFERENCE POINT SESSION DOSAGE GIVEN: 4 GY

## 2023-10-26 PROCEDURE — 77386: CPT | Performed by: STUDENT IN AN ORGANIZED HEALTH CARE EDUCATION/TRAINING PROGRAM

## 2023-10-26 PROCEDURE — 77014 CHG CT GUIDANCE RADIATION THERAPY FLDS PLACEMENT: CPT | Performed by: STUDENT IN AN ORGANIZED HEALTH CARE EDUCATION/TRAINING PROGRAM

## 2023-10-27 ENCOUNTER — HOSPITAL ENCOUNTER (OUTPATIENT)
Dept: RADIATION ONCOLOGY | Facility: HOSPITAL | Age: 83
Discharge: HOME | End: 2023-10-27

## 2023-10-27 ENCOUNTER — TREATMENT (OUTPATIENT)
Dept: RADIATION ONCOLOGY | Facility: HOSPITAL | Age: 83
End: 2023-10-27
Payer: MEDICARE

## 2023-10-27 DIAGNOSIS — C34.92 ADENOCARCINOMA, LUNG, LEFT: Primary | ICD-10-CM

## 2023-10-27 LAB
RAD ONC ARIA COURSE END DATE: NORMAL
RAD ONC ARIA COURSE ID: NORMAL
RAD ONC ARIA COURSE ID: NORMAL
RAD ONC ARIA COURSE INTENT: NORMAL
RAD ONC ARIA COURSE INTENT: NORMAL
RAD ONC ARIA COURSE LAST TREATMENT DATE: NORMAL
RAD ONC ARIA COURSE LAST TREATMENT DATE: NORMAL
RAD ONC ARIA COURSE START DATE: NORMAL
RAD ONC ARIA COURSE START DATE: NORMAL
RAD ONC ARIA COURSE TREATMENT ELAPSED DAYS: 18
RAD ONC ARIA COURSE TREATMENT ELAPSED DAYS: 18
RAD ONC ARIA FIRST TREATMENT DATE: NORMAL
RAD ONC ARIA FIRST TREATMENT DATE: NORMAL
RAD ONC ARIA PLAN FRACTIONS TREATED TO DATE: 15
RAD ONC ARIA PLAN FRACTIONS TREATED TO DATE: 15
RAD ONC ARIA PLAN ID: NORMAL
RAD ONC ARIA PLAN ID: NORMAL
RAD ONC ARIA PLAN NAME: NORMAL
RAD ONC ARIA PLAN PRESCRIBED DOSE PER FRACTION: 4 GY
RAD ONC ARIA PLAN PRESCRIBED DOSE PER FRACTION: 4 GY
RAD ONC ARIA PLAN PRIMARY REFERENCE POINT: NORMAL
RAD ONC ARIA PLAN PRIMARY REFERENCE POINT: NORMAL
RAD ONC ARIA PLAN TOTAL FRACTIONS PRESCRIBED: 15
RAD ONC ARIA PLAN TOTAL FRACTIONS PRESCRIBED: 15
RAD ONC ARIA PLAN TOTAL PRESCRIBED DOSE: 6000 CGY
RAD ONC ARIA PLAN TOTAL PRESCRIBED DOSE: 6000 CGY
RAD ONC ARIA REFERENCE POINT DOSAGE GIVEN TO DATE: 60 GY
RAD ONC ARIA REFERENCE POINT DOSAGE GIVEN TO DATE: 60 GY
RAD ONC ARIA REFERENCE POINT ID: NORMAL
RAD ONC ARIA REFERENCE POINT ID: NORMAL
RAD ONC ARIA REFERENCE POINT SESSION DOSAGE GIVEN: 4 GY

## 2023-10-27 PROCEDURE — 77386: CPT | Performed by: STUDENT IN AN ORGANIZED HEALTH CARE EDUCATION/TRAINING PROGRAM

## 2023-10-27 PROCEDURE — 77014 CHG CT GUIDANCE RADIATION THERAPY FLDS PLACEMENT: CPT | Performed by: STUDENT IN AN ORGANIZED HEALTH CARE EDUCATION/TRAINING PROGRAM

## 2023-10-31 RX ORDER — CARVEDILOL 25 MG/1
25 TABLET ORAL 2 TIMES DAILY
Qty: 180 TABLET | Refills: 0 | Status: SHIPPED | OUTPATIENT
Start: 2023-10-31

## 2023-11-01 ENCOUNTER — TELEPHONE (OUTPATIENT)
Dept: OTHER | Facility: HOSPITAL | Age: 83
End: 2023-11-01
Payer: MEDICARE

## 2023-11-01 NOTE — PROGRESS NOTES
Radiation Treatment Summary Note      Patient Name: Bayron Acevedo  : 1940    Attending Provider: Dakota Painter MD      Diagnosis:     ICD-10-CM ICD-9-CM   1. Adenocarcinoma, lung, left  C34.92 162.9       Radiation Start Date: 10/9/2023    Radiation Completion Date: 10/27/2023      Prescription:     Site: KIET  Laterality: Left  Total Dose: 6000cGy  Dose per Fraction: 400cGy  Total Fractions: 15  Daily or BID: Daily  Modality: Photon  Technique: IMRT/VMAT/Rapid Arc  Bolus: No    Final Delivered Dose Deviated From Initially Prescribed Dose: No    Concurrent Chemotherapy: No    Patient Tolerated Treatment Without Unexpected Side Effects/Complications: Yes    ECOG: Ambulatory and capable of all selfcare but unable to carry out any work activities; up and about more than 50% of waking hours = 2    Pain Management Plan: None Indicated/PRN OTC    Follow-Up Plan: 3 months    Imaging Ordered for Follow-Up: Yes, describe: CT Chest        Dakota Painter MD

## 2023-11-01 NOTE — TELEPHONE ENCOUNTER
[facility] MULTIDISCIPLINARY CLINIC  SURVIVORSHIP SERVICES CARE COORDINATION NOTE  PHONE      Call placed to patient   RE: open referral to survivorship treatment summary visit.    Spoke with  patient family member, wife.   Unable to schedule at this time due to multiple appts.  Would like information mailed to them so they can schedule when time permits.      Introduced myself and reviewed purpose and goals of survivorship treatment summary visit as well as what to expect..    Patient mailed survivorship program informational brochure.    Patient encouraged to call the office at any point for additional information, resources or support.

## 2023-11-02 ENCOUNTER — OFFICE VISIT (OUTPATIENT)
Age: 83
End: 2023-11-02
Payer: MEDICARE

## 2023-11-02 VITALS
HEIGHT: 67 IN | SYSTOLIC BLOOD PRESSURE: 124 MMHG | DIASTOLIC BLOOD PRESSURE: 70 MMHG | BODY MASS INDEX: 22.54 KG/M2 | WEIGHT: 143.6 LBS | HEART RATE: 64 BPM

## 2023-11-02 DIAGNOSIS — I34.0 NONRHEUMATIC MITRAL VALVE REGURGITATION: ICD-10-CM

## 2023-11-02 DIAGNOSIS — I10 PRIMARY HYPERTENSION: ICD-10-CM

## 2023-11-02 DIAGNOSIS — I25.10 CORONARY ARTERY DISEASE INVOLVING NATIVE CORONARY ARTERY OF NATIVE HEART WITHOUT ANGINA PECTORIS: ICD-10-CM

## 2023-11-02 DIAGNOSIS — I48.0 PAROXYSMAL ATRIAL FIBRILLATION: Primary | ICD-10-CM

## 2023-11-02 DIAGNOSIS — I50.22 CHRONIC SYSTOLIC CHF (CONGESTIVE HEART FAILURE): ICD-10-CM

## 2023-11-02 RX ORDER — FLUTICASONE FUROATE, UMECLIDINIUM BROMIDE AND VILANTEROL TRIFENATATE 200; 62.5; 25 UG/1; UG/1; UG/1
1 POWDER RESPIRATORY (INHALATION) DAILY
COMMUNITY
Start: 2023-10-12

## 2023-11-02 NOTE — PROGRESS NOTES
[facility] Follow Up Office Note     Encounter Date:23  Patient:Bayron Acevedo  :1940  MRN:[id number]      Chief Complaint:   Chief Complaint   Patient presents with    Coronary Artery Disease     3 month f/u     History of Presenting Illness:      Mr. Acevedo is a 83 y.o. gentleman with past medical history notable for chronic kidney disease, essential hypertension, mixed hyperlipidemia, chronic systolic heart failure with recovered left ventricular function as well as coronary artery disease who present presents to our office for scheduled follow-up.  Since our last office visit he did undergo thoracic surgery for cancer removal unfortunately were not able to get all of the cancer as it had invaded into the phrenic nerve.  He subsequently underwent follow-up radiation therapy.  Postoperatively he did have a brief episode of atrial fibrillation and converted back to sinus rhythm quickly.      Review of Systems:  Review of Systems   Constitutional: Positive for malaise/fatigue and weight loss.   HENT: Negative.     Eyes: Negative.    Cardiovascular: Negative.    Respiratory: Negative.     Endocrine: Negative.    Hematologic/Lymphatic: Negative.    Skin: Negative.    Musculoskeletal: Negative.    Gastrointestinal: Negative.    Genitourinary: Negative.    Neurological: Negative.    Psychiatric/Behavioral: Negative.     Allergic/Immunologic: Negative.        Current Outpatient Medications on File Prior to Visit   Medication Sig Dispense Refill    acetaminophen (TYLENOL) 500 MG tablet Take 2 tablets by mouth 3 (Three) Times a Day As Needed.      albuterol (PROVENTIL) (2.5 MG/3ML) 0.083% nebulizer solution USE ONE VIAL BY NEBULIZATION EVERY FOUR HOURS AS NEEDED FOR FOR WHEEZING (Patient taking differently: Take 2.5 mg by nebulization Every 4 (Four) Hours As Needed.) 180 mL 0    amLODIPine (NORVASC) 5 MG tablet Take 1 tablet by mouth Daily. 90 tablet 3    aspirin 81 MG EC tablet Take 1 tablet by mouth  Daily.      carvedilol (COREG) 25 MG tablet TAKE ONE TABLET BY MOUTH TWICE DAILY 180 tablet 0    hydrALAZINE (APRESOLINE) 50 MG tablet 1 tablet 2 (Two) Times a Day.      nitroglycerin (NITROSTAT) 0.4 MG SL tablet Place 1 tablet under the tongue every 5 (five) minutes as needed for chest pain. Take no more than 3 doses in 15 minutes. 25 tablet 1    rosuvastatin (CRESTOR) 20 MG tablet Take 1 tablet by mouth Daily. 90 tablet 3    torsemide (DEMADEX) 20 MG tablet Take 2 tablets by mouth Daily. 180 tablet 3    Trelegy Ellipta 200-62.5-25 MCG/ACT aerosol powder  Inhale 1 puff Daily.      vitamin B-12 (CYANOCOBALAMIN) 1000 MCG tablet Take 1 tablet by mouth Daily.      [DISCONTINUED] Chlorhexidine Gluconate Cloth 2 % pads Apply  topically Take As Directed.      [DISCONTINUED] mirtazapine (REMERON) 7.5 MG tablet Take 1 tablet by mouth Every Night. 30 tablet 2     No current facility-administered medications on file prior to visit.       Allergies   Allergen Reactions    Lisinopril Anaphylaxis    Codeine Sulfate Hives    Contrast Dye (Echo Or Unknown Ct/Mr) Other (See Comments)     HYPERTENSION    Iodinated Contrast Media Other (See Comments)     HYPERTENSION    Losartan Unknown - High Severity    Penicillins Other (See Comments)     Passed out after a PCN shot- no other sx noted-per pateint       Past Medical History:   Diagnosis Date    AAA (abdominal aortic aneurysm)     CAD in native artery     Carotid stenosis     s/p CEA Left    CKD (chronic kidney disease)     Colon polyp     COPD (chronic obstructive pulmonary disease)     H/O Acute myocardial infarction 2013    H/O PAD (peripheral artery disease)     Heart attack 2013    Heart failure     Hyperlipidemia     Hypertension     Inguinal hernia     RIGHT    Left ventricular dysfunction     Mass of left lung     SHOWS ON MRI    Mitral valve regurgitation     Perennial allergic rhinitis 01/06/2017    PVD (peripheral vascular disease)     S/P angioplasty with stent      Tinnitus     Tricuspid regurgitation        Past Surgical History:   Procedure Laterality Date    APPENDECTOMY      ARTERIOGRAM AORTIC N/A 05/17/2021    Procedure: ZENITH AORTIC STENT GRAFT;  Surgeon: Karen Barrera Jr., MD;  Location: [facility] OR 18/19;  Service: Vascular;  Laterality: N/A;    CARDIAC CATHETERIZATION      X2    CARDIAC CATHETERIZATION N/A 04/11/2023    Procedure: Left Heart Cath;  Surgeon: Guru Jiang MD;  Location: [facility] CATH INVASIVE LOCATION;  Service: Cardiovascular;  Laterality: N/A;    CARDIAC CATHETERIZATION N/A 04/11/2023    Procedure: Right Heart Cath;  Surgeon: Guru Jiang MD;  Location: [facility] CATH INVASIVE LOCATION;  Service: Cardiovascular;  Laterality: N/A;    CARDIAC CATHETERIZATION N/A 04/11/2023    Procedure: Coronary angiography;  Surgeon: Guru Jiang MD;  Location: [facility] CATH INVASIVE LOCATION;  Service: Cardiovascular;  Laterality: N/A;    CARDIAC CATHETERIZATION N/A 04/11/2023    Procedure: Left ventriculography;  Surgeon: Guru Jiang MD;  Location: [facility] CATH INVASIVE LOCATION;  Service: Cardiovascular;  Laterality: N/A;    CARDIAC CATHETERIZATION  04/11/2023    Procedure: RESTING FULL CYCLE RATIO;  Surgeon: Guru Jiang MD;  Location: [facility] CATH INVASIVE LOCATION;  Service: Cardiovascular;;    CAROTID ENDARTERECTOMY Left 01/2013    Bruce Jones Jr, MD    CAROTID STENT Left     LAD    COLONOSCOPY      HERNIA REPAIR      HYDROCELE EXCISION / REPAIR      Dr. SINTIA Osorio    LOBECTOMY Left 7/31/2023    Procedure: BRONCHOSCOPY, LEFT VAT WITH DAVINCI ROBOT, EXTENSIVE LYSIS OF ADHESIONS, PARTIAL PULMONARY DECORTICATION, INTERCOSTAL NERVE BLOCK;  Surgeon: Nemesio Kramer MD PhD;  Location: [facility] OR;  Service: Robotics - DaVinci;  Laterality: Left;    MEDIASTINOSCOPY N/A 7/17/2023    Procedure: MEDIASTINOSCOPY;  Surgeon: Nemesio Kramer MD PhD;  Location: [facility] OR;  Service: Thoracic;  Laterality: N/A;    UMBILICAL  "HERNIA REPAIR  2003       Social History     Socioeconomic History    Marital status:      Spouse name: Amina   Tobacco Use    Smoking status: Former     Packs/day: 1.00     Years: 55.00     Additional pack years: 0.00     Total pack years: 55.00     Types: Cigarettes     Quit date:      Years since quittin.8    Smokeless tobacco: Never   Vaping Use    Vaping Use: Never used   Substance and Sexual Activity    Alcohol use: No    Drug use: No    Sexual activity: Defer       Family History   Problem Relation Age of Onset    No Known Problems Mother         complications of child birth    Cancer Father     Leukemia Father     Cancer Brother     Heart disease Maternal Uncle     Malig Hyperthermia Neg Hx        The following portions of the patient's history were reviewed and updated as appropriate: allergies, current medications, past family history, past medical history, past social history, past surgical history and problem list.       Objective:       Vitals:    23 1226   BP: 124/70   BP Location: Left arm   Patient Position: Sitting   Pulse: 64   Weight: 65.1 kg (143 lb 9.6 oz)   Height: 170.2 cm (67.01\")         Physical Exam:  Constitutional: Well appearing, Well-developed, No acute distress   HENT: Oropharynx clear and membrane moist  Eyes: Normal conjunctiva, no sclera icterus.  Neck: Supple, no carotid bruit bilaterally.  Cardiovascular: Regular rate and rhythm, Early peaking systolic murmur over the right upper sternal border, No bilateral lower extremity edema.  Pulmonary: Normal respiratory effort, normal lung sounds, no wheezing.  Neurological: Alert and orient x 3.   Skin: Warm, dry, no ecchymosis, no rash.  Psych: Appropriate mood and affect. Normal judgment and insight.          Lab Results   Component Value Date     10/13/2023     2023    K 4.1 10/13/2023    K 4.3 2023     10/13/2023     2023    CO2 24.1 10/13/2023    CO2 25.3 2023    " BUN 32 (H) 10/13/2023    BUN 30 (H) 09/22/2023    CREATININE 1.77 (H) 10/13/2023    CREATININE 1.79 (H) 09/22/2023    EGFRIFNONA 30 (L) 11/19/2021    EGFRIFNONA 75 05/18/2021    EGFRIFAFRI 34 (L) 11/19/2021    EGFRIFAFRI 66 03/11/2019    GLUCOSE 125 (H) 10/13/2023    GLUCOSE 102 (H) 09/22/2023    CALCIUM 8.7 10/13/2023    CALCIUM 9.0 09/22/2023    PROTENTOTREF 6.4 03/11/2019    PROTENTOTREF 6.5 08/02/2018    ALBUMIN 3.4 (L) 10/13/2023    ALBUMIN 3.8 09/22/2023    BILITOT 0.3 10/13/2023    BILITOT 0.4 09/22/2023    AST 24 10/13/2023    AST 19 09/22/2023    ALT 15 10/13/2023    ALT 13 09/22/2023     Lab Results   Component Value Date    WBC 5.75 10/13/2023    WBC 5.40 09/22/2023    HGB 10.3 (L) 10/13/2023    HGB 10.9 (L) 09/22/2023    HCT 33.9 (L) 10/13/2023    HCT 36.4 (L) 09/22/2023    MCV 82.1 10/13/2023    MCV 83.7 09/22/2023     10/13/2023     09/22/2023     Lab Results   Component Value Date    CHOL 123 04/11/2023    TRIG 98 04/11/2023    TRIG 86 09/09/2021    HDL 34 (L) 04/11/2023    HDL 44 09/09/2021    LDL 70 04/11/2023    LDL 70 09/09/2021     Lab Results   Component Value Date    PROBNP 5,806.0 (H) 04/27/2023    PROBNP 5,992.0 (H) 04/19/2023    .6 (H) 03/31/2023     Lab Results   Component Value Date    TROPONINT 41 (H) 04/07/2023     Lab Results   Component Value Date    TSH 5.290 (H) 08/14/2023           ECG 12 Lead    Date/Time: 11/2/2023 1:15 PM  Performed by: Guru Jiang MD    Authorized by: Guru Jiang MD  Comparison: compared with previous ECG from 8/2/2023  Similar to previous ECG  Rhythm: sinus rhythm  Other findings: left ventricular hypertrophy with strain          Echocardiogram 7/19/2023 with images reviewed by myself:  Left ventricular systolic function is normal. Left ventricular ejection fraction appears to be 56 - 60%.  The left ventricular cavity is mildly dilated.  The left atrial cavity is mildly dilated  Estimated right ventricular systolic pressure  from tricuspid regurgitation is normal (<35 mmHg). Calculated right ventricular systolic pressure from tricuspid regurgitation is 29 mmHg.    Cardiac catheterization 4/11/2023:  Moderate coronary artery disease with 40% proximal left main stenoses, patent stent within the proximal LAD, and 40% proximal RCA segment stenoses.  Normal right and left-sided filling pressures with normal pulmonary artery pressures.  Normal cardiac output of 4.4 L/min and index of 2.3 L/min/m2  RFR assessment of the left main confirm no significant hemodynamic stenoses at 0.91 thus PCI was deferred.    Echocardiogram 4/7/2023 [facility]  Overall left ventricular systolic function is moderately depressed. The estimated ejection fraction is 35-40%. There is diffuse global hypokinesis of the left ventricle. Moderate concentric left ventricular hypertrophy.   Mildly dilated left atrium.   Trace aortic regurgitation is noted.   Moderate to severe mitral regurgitation is present.   Right ventricular systolic pressure of 49 mmHg.   Mild to moderate tricuspid regurgitation.   There is a small (trivial) pericardial effusion. There is no 2D and/or doppler evidence of tamponade physiology.   A pleural effusion is visualized.     Holter monitor 4/14/2022:  This is a 48-hour recording done due to ventricular ectopy   The intrinsic rhythm is sinus rhythm with an average heart rate of 65 bpm with a range from 44 to 120 bpm   There are frequent PVCs totaling over 2200 representing 1.2% of all beats.  There was 134 beat run of a wide-complex tachycardia at 170 bpm consistent with ventricular tachycardia  There are frequent PACs totaling over 2900 representing 1.6% of all beats.  The computer tallied 7 runs of SVT.  The longest was 20 beats and fastest 156 bpm.  Most of these appear to be a slow atrial tachycardia of less than 120 bpm   There were no prolonged pauses   There were no symptoms     Echocardiogram 12/8/2014 [facility]:  Ejection  fraction estimated at 53% with basal inferior, mid inferior, and apical inferior wall hypokinesis  Mild aortic stenosis with a mean gradient of 12 mmHg across the aortic valve    Stress MPI 4/17/2014 [facility]:  Inferior lateral wall hypokinesis  Baseline ejection fraction 60% but decreases to 40% with stress with a 3 times daily of 1.3         Assessment:          Diagnosis Plan   1. Paroxysmal atrial fibrillation  ECG 12 Lead      2. Chronic systolic CHF (congestive heart failure)        3. Primary hypertension        4. Nonrheumatic mitral valve regurgitation        5. Coronary artery disease involving native coronary artery of native heart without angina pectoris                 Plan:       Mr. Acevedo is a 83 y.o. gentleman with past medical history notable for chronic kidney disease, essential hypertension, mixed hyperlipidemia, chronic systolic heart failure with recovered left ventricular function as well as coronary artery disease who present presents to our office for scheduled follow-up.  Overall from a cardiac standpoint patient is doing well.  His subsequent echocardiogram showed recovered left ventricular function and has no further episodes of atrial fibrillation.  He has pretty poor functional status and actually did not undergo any chemotherapy but only radiation therapy for his lungs.  He is at least stabilized with radiation therapy continue with current medical therapy.  Would defer starting anticoagulation given no recurrent atrial fibrillation it was brief shortly after thoracic surgery likely provoked from surgery rather than any underlying process but we will keep an eye out for any recurrence at that point would recommend initiation of anticoagulation.    Paroxysmal atrial fibrillation:  Provoked after thoracic surgery  No recurrent episodes  Was never started on anticoagulation would monitor for now risk and benefits probably favor monitoring rather than initiation of empiric  anticoagulation    Coronary artery disease without angina:  We will continue with aspirin  Continue carvedilol  Continue statin    Nonischemic cardiomyopathy/chronic systolic congestive heart failure:  Does have borderline left main disease but has also had issues with cardiomyopathies in the past  Continue to optimize blood pressure control as heart function has recovered with medical therapy  Continue carvedilol  Losartan stopped due to worsening renal function  Renal insufficiency limits further titration of ARB or considering spironolactone    Nonrheumatic mitral valve regurgitation:  Improved on repeat echocardiogram 7/2023      Follow-up:  3 Months      Guru Jiang MD  [facility]  11/02/23  13:19 EDT

## 2023-11-06 NOTE — PROGRESS NOTES
"Chief Complaint  Clinical stage IIIA ([id number]) non-small cell lung cancer (adenocarcinoma) of left upper lobe    Subjective        History of Present Illness    Patient returns today in follow-up with laboratory studies to review.  He did complete his course of radiation therapy with Dr. Painter, administered from 10/9 - 10/27/2023 to the left upper lobe, 6000 centigrade.  He tolerated this well, experienced some mild fatigue during treatment which has improved already.  His appetite has been improving and his weight has stabilized at 143 pounds.  He is continuing on Remeron 7.5 mg nightly.  He continues to use Ensure supplements.  He is getting back to working outdoors and reports that his stamina is improving.  He has only mild dyspnea on exertion, reports that Trelegy inhaler has helped.  Patient notes mild constipation relieved with stool softener.      Objective   Vital Signs:   /67   Pulse 65   Temp 98 °F (36.7 °C) (Temporal)   Resp 18   Ht 170.2 cm (67.01\")   Wt 65.1 kg (143 lb 8 oz)   SpO2 96%   BMI 22.47 kg/m²     Physical Exam  Constitutional:       Appearance: He is well-developed.      Comments: Patient in no distress   Eyes:      Conjunctiva/sclera: Conjunctivae normal.   Neck:      Thyroid: No thyromegaly.   Cardiovascular:      Rate and Rhythm: Normal rate and regular rhythm.      Heart sounds: No murmur heard.     No friction rub. No gallop.   Pulmonary:      Effort: No respiratory distress.      Comments: Diminished breath sounds bilaterally  Abdominal:      General: Bowel sounds are normal. There is no distension.      Palpations: Abdomen is soft.      Tenderness: There is no abdominal tenderness.   Musculoskeletal:         General: No swelling.      Comments: No edema currently   Lymphadenopathy:      Head:      Right side of head: No submandibular adenopathy.      Cervical: No cervical adenopathy.      Upper Body:      Right upper body: No supraclavicular adenopathy.      Left upper " body: No supraclavicular adenopathy.   Skin:     General: Skin is warm and dry.      Findings: No rash.   Neurological:      Mental Status: He is alert and oriented to person, place, and time.      Cranial Nerves: No cranial nerve deficit.      Motor: No abnormal muscle tone.      Deep Tendon Reflexes: Reflexes normal.   Psychiatric:         Behavior: Behavior normal.        Result Review : Reviewed CBC, CMP from today       Assessment and Plan     *Clinical stage IIIA ([id number]) non-small cell lung cancer (adenocarcinoma) of left upper lobe  The patient has a history of smoking 1 pack/day x 55 years quit in 2000.  CT chest 5/26/2023 showed a spiculated mass in the anterior left upper lobe 3.7 x 4.3 x 4.2 cm along the anterior pleural surface and lateral aspect of the anterior mediastinum.  Additional 3 mm nodule right major fissure.  Bilateral small to moderate-sized pleural effusions.  Bullae formation consistent with COPD.  Mediastinal lymphadenopathy with largest node infracarinal region 2.6 x 1.2 cm.  PET/CT on 6/12/2023 showed 5.2 x 3.1 cm irregular pleural-based mass anterior left upper lobe abutting the pleura anteriorly and medially, hypermetabolic with SUV 14.4.  Mediastinal lymph node activity in the subcarinal region with a 2.2 x 1.1 cm lymph node with SUV 4.5.  Remainder of mediastinal lymph nodes less intensely hypermetabolic with maximal SUV 3.3.  Moderate size loculated pleural effusions bilaterally with low-level activity along the pleura (SUV 2.5), no change in volume of pleural effusions since prior chest CT.  New small to moderate loculated right hydropneumothorax with air-fluid level 5.2 cm.   CT-guided left upper lobe lung biopsy 6/13/2023 with pathology that showed invasive poorly differentiated adenocarcinoma, PD-L1 less than 1%.  Caris analysis: TMB high (14 muts/Mb), mutations in KEAP1 and TP53, EGFR VUS, no targetable mutations.  Patient was hospitalized 6/13 - 6/15/2023 due to right  hydropneumothorax, had CT-guided chest tube placement performed 6/13/2023.  Right pleural fluid cytology negative on 6/13/2023.  Staging MRI brain was negative for evidence of metastatic disease on 7/2/2023  PFTs on 6/20/2023 with FEV1 0.99 (44% predicted), DLCO corrected 14.09 (56% predicted).  Mediastinoscopy 7/17/2023 with 4R and station 7 lymph nodes negative for malignancy  Left VATS performed 7/31/2023.  Intraoperatively, primary tumor was unresectable, invading into the mediastinum and pericardial fat pad as well as with phrenic nerve involvement (T4 lesion).  Sampling of left pleura with fibrosis, chronic inflammation, no evidence of malignancy.  Station 5 and 10 R lymph nodes sampled, negative for malignancy.  New baseline CT chest abdomen pelvis prior to initiation of radiation therapy performed on 9/20/2023.  Persistent loculated left pneumothorax, trace left pleural effusion, primary tumor in partially collapsed left upper lobe appears relatively stable.  Small right pleural effusion no change in borderline enlarged mediastinal lymph nodes.  Slight increase in right upper lobe nodule (for up to 7 mm), indeterminate.  Patient therefore with unresectable stage IIIA ([id number]) disease.  Plans to treat with radiation therapy.  Due to age, performance status, comorbidities, patient felt to be a poor candidate for concurrent chemotherapy.  Patient received radiation therapy 10/9 - 10/27/2023 to left upper lobe, 6000 cGy.  Patient returns today in follow-up doing quite well after completion of radiation therapy on 10/27/2023.  He experienced mild fatigue and some mild hoarseness on treatment which are both improving.  He is improving in terms of his stamina, has been back to working outdoors without difficulty.  He reports minimal dyspnea on exertion.  He reports that his appetite is improving and his weight has stabilized at 143 pounds.  Dr. Painter has ordered a follow-up CT chest on 1/23/2024 and he is seeing  patient back on 1/31/2024 for follow-up.  We will add CT abdomen pelvis on 1/23/2024 and I will see him back same day 1/31/2024 for follow-up.  We discussed the need to contact our office with any new or worsening issues in the interim.    *Chronic bilateral pleural effusions with right hydropneumothorax and subsequent postoperative left hydropneumothorax  Patient appears to have longstanding evidence of small bilateral pleural effusions likely related to CHF  PET/CT 6/12/2023 identified right small to moderate hydropneumothorax new since 5/26/2023 CT chest  Patient was hospitalized 6/13 - 6/15/2023 due to right hydropneumothorax, had CT-guided chest tube placement performed 6/13/2023.  Right pleural fluid cytology negative on 6/13/2023.  As above, patient underwent left VATS with attempted resection of primary lung cancer on 7/31/2023.    Patient required persistent left chest tube postoperatively, eventually removed on 9/6/2023  CT chest 9/20/2023 with persistent loculated left pneumothorax.    *COPD  The patient has a history of smoking 1 pack/day x 55 years quit in 2000.  PFTs on 6/20/2023 with FEV1 0.99 (44% predicted), DLCO corrected 14.09 (56% predicted).  Patient continues with chronic dyspnea on exertion that does limit his level of activity although maintains O2 saturation in the 90% range even with activity.  Patient notes that his chronic dyspnea on exertion is stable, respiratory status has improved on Trelegy inhaler.  He is scheduled for follow-up with pulmonary in January.    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  Labs on 7/11/2023 with hemoglobin 10.6, MCV 82.8, iron 18,  ferritin 504, iron saturation 6%, TIBC 295, B12 201, folate 6.79, CRP 1.07, ESR 27, erythropoietin level 17.8.  Labs consistent with anemia secondary to chronic disease/malignancy as well as anemia secondary to CKD3.  Patient initiated oral B12 1000 mcg daily for B12 deficiency  Hemoglobin today remains relatively unchanged at 10.3.  We discussed the multifactorial nature of his anemia secondary to chronic disease and CKD3.  We will perform additional anemia evaluation today with iron panel, ferritin, B12, folate.  Given the concurrent mild thrombocytopenia today with platelet count of 137,000, we will also check serum protein electrophoresis, immunoelectrophoresis, quantitative immunoglobulins, free serum light chains.  We will notify patient of any significant abnormal findings.    *Thrombocytopenia  Patient with new onset thrombocytopenia today with platelet count of 137,000.  Etiology unclear.  We will check additional labs today with IPF, B12, folate as well as serum protein electrophoresis, immunoelectrophoresis, quantitative immunoglobulins, free serum light chains.    *Coronary artery disease, CHF  Chronic bilateral pleural effusions presumably related to CHF  Status post cardiac stent in 2013  Preoperative echocardiogram on 7/19/2023 with ejection fraction 56 to 60%, dilated LA, LV  Patient continues on aspirin 81 mg daily    *Peripheral vascular disease  Patient is followed by Dr. Karen Barrera in vascular surgery  Abdominal aortic aneurysm status post stent graft repair 5/17/2021  Carotid stenosis status post carotid endarterectomy left, 2013.    *Anorexia, weight loss  Patient with anorexia associated with malignancy, mild degree of weight loss  Patient initiated Remeron 7.5 mg nightly on 9/22/2023  Patient reports that his appetite is gradually improving.  He continues to use Ensure supplements.  Weight has stabilized at 143 pounds today.  We will continue Remeron 7.5 mg nightly.      *Constipation  Patient continues with intermittent constipation, uses stool softener as needed.  We discussed use of Senokot and MiraLAX    Plan:  Patient with stage IIIA ([id number]) adenocarcinoma of the left upper lobe, unresectable  Patient completed primary radiation therapy to the left upper lobe on 10/27/2023  Patient continuing on oral B12 1000 mcg daily for B12 deficiency  Continue Remeron 7.5 mg nightly to improve appetite and weight  Additional labs today with iron panel, ferritin, B12, folate, IPF, serum protein electrophoresis, immunoelectrophoresis, quantitative immunoglobulins, free serum light chains.  We will contact patient with any significant abnormal findings.  Patient is currently scheduled for follow-up CT chest on 1/23/2023 by Dr. Painter.  We will add CT abdomen and pelvis same day  Return visit same day as patient is scheduled to see Dr. Painter on 1/31/2023 with CBC, CMP

## 2023-11-07 ENCOUNTER — OFFICE VISIT (OUTPATIENT)
Dept: ONCOLOGY | Facility: CLINIC | Age: 83
End: 2023-11-07
Payer: MEDICARE

## 2023-11-07 ENCOUNTER — LAB (OUTPATIENT)
Dept: LAB | Facility: HOSPITAL | Age: 83
End: 2023-11-07
Payer: MEDICARE

## 2023-11-07 VITALS
RESPIRATION RATE: 18 BRPM | DIASTOLIC BLOOD PRESSURE: 67 MMHG | TEMPERATURE: 98 F | BODY MASS INDEX: 22.52 KG/M2 | OXYGEN SATURATION: 96 % | HEIGHT: 67 IN | HEART RATE: 65 BPM | SYSTOLIC BLOOD PRESSURE: 119 MMHG | WEIGHT: 143.5 LBS

## 2023-11-07 DIAGNOSIS — D69.6 THROMBOCYTOPENIA: ICD-10-CM

## 2023-11-07 DIAGNOSIS — C34.92 ADENOCARCINOMA, LUNG, LEFT: ICD-10-CM

## 2023-11-07 DIAGNOSIS — D64.9 NORMOCYTIC ANEMIA: ICD-10-CM

## 2023-11-07 DIAGNOSIS — C34.92 ADENOCARCINOMA, LUNG, LEFT: Primary | ICD-10-CM

## 2023-11-07 LAB
ALBUMIN SERPL-MCNC: 3.5 G/DL (ref 3.5–5.2)
ALBUMIN/GLOB SERPL: 1.5 G/DL
ALP SERPL-CCNC: 82 U/L (ref 39–117)
ALT SERPL W P-5'-P-CCNC: 9 U/L (ref 1–41)
ANION GAP SERPL CALCULATED.3IONS-SCNC: 12.3 MMOL/L (ref 5–15)
AST SERPL-CCNC: 17 U/L (ref 1–40)
BASOPHILS # BLD AUTO: 0.03 10*3/MM3 (ref 0–0.2)
BASOPHILS NFR BLD AUTO: 0.6 % (ref 0–1.5)
BILIRUB SERPL-MCNC: 0.2 MG/DL (ref 0–1.2)
BUN SERPL-MCNC: 28 MG/DL (ref 8–23)
BUN/CREAT SERPL: 15.6 (ref 7–25)
CALCIUM SPEC-SCNC: 8.6 MG/DL (ref 8.6–10.5)
CHLORIDE SERPL-SCNC: 103 MMOL/L (ref 98–107)
CO2 SERPL-SCNC: 25.7 MMOL/L (ref 22–29)
CREAT SERPL-MCNC: 1.8 MG/DL (ref 0.7–1.3)
DEPRECATED RDW RBC AUTO: 49.2 FL (ref 37–54)
EGFRCR SERPLBLD CKD-EPI 2021: 36.4 ML/MIN/1.73
EOSINOPHIL # BLD AUTO: 0.25 10*3/MM3 (ref 0–0.4)
EOSINOPHIL NFR BLD AUTO: 5 % (ref 0.3–6.2)
ERYTHROCYTE [DISTWIDTH] IN BLOOD BY AUTOMATED COUNT: 16.6 % (ref 12.3–15.4)
FERRITIN SERPL-MCNC: 415 NG/ML (ref 30–400)
FOLATE SERPL-MCNC: 10.2 NG/ML (ref 4.78–24.2)
GLOBULIN UR ELPH-MCNC: 2.4 GM/DL
GLUCOSE SERPL-MCNC: 126 MG/DL (ref 65–99)
HCT VFR BLD AUTO: 33.8 % (ref 37.5–51)
HGB BLD-MCNC: 10.3 G/DL (ref 13–17.7)
IMM GRANULOCYTES # BLD AUTO: 0.01 10*3/MM3 (ref 0–0.05)
IMM GRANULOCYTES NFR BLD AUTO: 0.2 % (ref 0–0.5)
IRON 24H UR-MRATE: 21 MCG/DL (ref 59–158)
IRON SATN MFR SERPL: 8 % (ref 20–50)
LYMPHOCYTES # BLD AUTO: 0.65 10*3/MM3 (ref 0.7–3.1)
LYMPHOCYTES NFR BLD AUTO: 12.9 % (ref 19.6–45.3)
MCH RBC QN AUTO: 25.1 PG (ref 26.6–33)
MCHC RBC AUTO-ENTMCNC: 30.5 G/DL (ref 31.5–35.7)
MCV RBC AUTO: 82.4 FL (ref 79–97)
MONOCYTES # BLD AUTO: 0.46 10*3/MM3 (ref 0.1–0.9)
MONOCYTES NFR BLD AUTO: 9.1 % (ref 5–12)
NEUTROPHILS NFR BLD AUTO: 3.64 10*3/MM3 (ref 1.7–7)
NEUTROPHILS NFR BLD AUTO: 72.2 % (ref 42.7–76)
NRBC BLD AUTO-RTO: 0 /100 WBC (ref 0–0.2)
PLATELET # BLD AUTO: 137 10*3/MM3 (ref 140–450)
PLATELETS.RETICULATED NFR BLD AUTO: 3.3 % (ref 0.9–6.5)
PMV BLD AUTO: 9 FL (ref 6–12)
POTASSIUM SERPL-SCNC: 3.7 MMOL/L (ref 3.5–5.2)
PROT SERPL-MCNC: 5.9 G/DL (ref 6–8.5)
RBC # BLD AUTO: 4.1 10*6/MM3 (ref 4.14–5.8)
SODIUM SERPL-SCNC: 141 MMOL/L (ref 136–145)
TIBC SERPL-MCNC: 266 MCG/DL (ref 298–536)
TRANSFERRIN SERPL-MCNC: 190 MG/DL (ref 200–360)
VIT B12 BLD-MCNC: 398 PG/ML (ref 211–946)
WBC NRBC COR # BLD: 5.04 10*3/MM3 (ref 3.4–10.8)

## 2023-11-07 PROCEDURE — 1160F RVW MEDS BY RX/DR IN RCRD: CPT | Performed by: INTERNAL MEDICINE

## 2023-11-07 PROCEDURE — 85025 COMPLETE CBC W/AUTO DIFF WBC: CPT

## 2023-11-07 PROCEDURE — 36415 COLL VENOUS BLD VENIPUNCTURE: CPT

## 2023-11-07 PROCEDURE — 82746 ASSAY OF FOLIC ACID SERUM: CPT | Performed by: INTERNAL MEDICINE

## 2023-11-07 PROCEDURE — 83540 ASSAY OF IRON: CPT | Performed by: INTERNAL MEDICINE

## 2023-11-07 PROCEDURE — 82728 ASSAY OF FERRITIN: CPT | Performed by: INTERNAL MEDICINE

## 2023-11-07 PROCEDURE — 1126F AMNT PAIN NOTED NONE PRSNT: CPT | Performed by: INTERNAL MEDICINE

## 2023-11-07 PROCEDURE — 84466 ASSAY OF TRANSFERRIN: CPT | Performed by: INTERNAL MEDICINE

## 2023-11-07 PROCEDURE — 80053 COMPREHEN METABOLIC PANEL: CPT

## 2023-11-07 PROCEDURE — 82607 VITAMIN B-12: CPT | Performed by: INTERNAL MEDICINE

## 2023-11-07 PROCEDURE — 3074F SYST BP LT 130 MM HG: CPT | Performed by: INTERNAL MEDICINE

## 2023-11-07 PROCEDURE — 85055 RETICULATED PLATELET ASSAY: CPT | Performed by: INTERNAL MEDICINE

## 2023-11-07 PROCEDURE — 3078F DIAST BP <80 MM HG: CPT | Performed by: INTERNAL MEDICINE

## 2023-11-07 PROCEDURE — 99214 OFFICE O/P EST MOD 30 MIN: CPT | Performed by: INTERNAL MEDICINE

## 2023-11-07 PROCEDURE — 1159F MED LIST DOCD IN RCRD: CPT | Performed by: INTERNAL MEDICINE

## 2023-11-07 RX ORDER — TEMAZEPAM 7.5 MG/1
7.5 CAPSULE ORAL NIGHTLY PRN
Qty: 30 CAPSULE | Refills: 0 | OUTPATIENT
Start: 2023-11-07

## 2023-11-07 RX ORDER — MIRTAZAPINE 15 MG/1
7.5 TABLET, FILM COATED ORAL NIGHTLY
Qty: 30 TABLET | Refills: 4 | Status: SHIPPED | OUTPATIENT
Start: 2023-11-07

## 2023-11-07 NOTE — LETTER
"November 7, 2023     Low Sepulveda MD  [address]  [address]  [place] [id number]    Patient: Bayron Acevedo   YOB: 1940   Date of Visit: 11/7/2023     Dear Low Sepulveda:       Thank you for referring Bayron Acevedo to me for evaluation. Below are the relevant portions of my assessment and plan of care.    If you have questions, please do not hesitate to call me. I look forward to following Bayron along with you.         Sincerely,        Myles Santos MD        CC: MD Tom Engel John L Jr., MD  11/07/23 2203  Sign when Signing Visit  Chief Complaint  Clinical stage IIIA ([id number]) non-small cell lung cancer (adenocarcinoma) of left upper lobe    Subjective       History of Present Illness    Patient returns today in follow-up with laboratory studies to review.  He did complete his course of radiation therapy with Dr. Painter, administered from 10/9 - 10/27/2023 to the left upper lobe, 6000 centigrade.  He tolerated this well, experienced some mild fatigue during treatment which has improved already.  His appetite has been improving and his weight has stabilized at 143 pounds.  He is continuing on Remeron 7.5 mg nightly.  He continues to use Ensure supplements.  He is getting back to working outdoors and reports that his stamina is improving.  He has only mild dyspnea on exertion, reports that Trelegy inhaler has helped.  Patient notes mild constipation relieved with stool softener.      Objective  Vital Signs:   /67   Pulse 65   Temp 98 °F (36.7 °C) (Temporal)   Resp 18   Ht 170.2 cm (67.01\")   Wt 65.1 kg (143 lb 8 oz)   SpO2 96%   BMI 22.47 kg/m²     Physical Exam  Constitutional:       Appearance: He is well-developed.      Comments: Patient in no distress   Eyes:      Conjunctiva/sclera: Conjunctivae normal.   Neck:      Thyroid: No thyromegaly.   Cardiovascular:      Rate and Rhythm: Normal rate and regular rhythm.      Heart sounds: No murmur heard.     No friction rub. " No gallop.   Pulmonary:      Effort: No respiratory distress.      Comments: Diminished breath sounds bilaterally  Abdominal:      General: Bowel sounds are normal. There is no distension.      Palpations: Abdomen is soft.      Tenderness: There is no abdominal tenderness.   Musculoskeletal:         General: No swelling.      Comments: No edema currently   Lymphadenopathy:      Head:      Right side of head: No submandibular adenopathy.      Cervical: No cervical adenopathy.      Upper Body:      Right upper body: No supraclavicular adenopathy.      Left upper body: No supraclavicular adenopathy.   Skin:     General: Skin is warm and dry.      Findings: No rash.   Neurological:      Mental Status: He is alert and oriented to person, place, and time.      Cranial Nerves: No cranial nerve deficit.      Motor: No abnormal muscle tone.      Deep Tendon Reflexes: Reflexes normal.   Psychiatric:         Behavior: Behavior normal.        Result Review: Reviewed CBC, CMP from today       Assessment and Plan     *Clinical stage IIIA ([id number]) non-small cell lung cancer (adenocarcinoma) of left upper lobe  The patient has a history of smoking 1 pack/day x 55 years quit in 2000.  CT chest 5/26/2023 showed a spiculated mass in the anterior left upper lobe 3.7 x 4.3 x 4.2 cm along the anterior pleural surface and lateral aspect of the anterior mediastinum.  Additional 3 mm nodule right major fissure.  Bilateral small to moderate-sized pleural effusions.  Bullae formation consistent with COPD.  Mediastinal lymphadenopathy with largest node infracarinal region 2.6 x 1.2 cm.  PET/CT on 6/12/2023 showed 5.2 x 3.1 cm irregular pleural-based mass anterior left upper lobe abutting the pleura anteriorly and medially, hypermetabolic with SUV 14.4.  Mediastinal lymph node activity in the subcarinal region with a 2.2 x 1.1 cm lymph node with SUV 4.5.  Remainder of mediastinal lymph nodes less intensely hypermetabolic with maximal SUV 3.3.   Moderate size loculated pleural effusions bilaterally with low-level activity along the pleura (SUV 2.5), no change in volume of pleural effusions since prior chest CT.  New small to moderate loculated right hydropneumothorax with air-fluid level 5.2 cm.   CT-guided left upper lobe lung biopsy 6/13/2023 with pathology that showed invasive poorly differentiated adenocarcinoma, PD-L1 less than 1%.  Caris analysis: TMB high (14 muts/Mb), mutations in KEAP1 and TP53, EGFR VUS, no targetable mutations.  Patient was hospitalized 6/13 - 6/15/2023 due to right hydropneumothorax, had CT-guided chest tube placement performed 6/13/2023.  Right pleural fluid cytology negative on 6/13/2023.  Staging MRI brain was negative for evidence of metastatic disease on 7/2/2023  PFTs on 6/20/2023 with FEV1 0.99 (44% predicted), DLCO corrected 14.09 (56% predicted).  Mediastinoscopy 7/17/2023 with 4R and station 7 lymph nodes negative for malignancy  Left VATS performed 7/31/2023.  Intraoperatively, primary tumor was unresectable, invading into the mediastinum and pericardial fat pad as well as with phrenic nerve involvement (T4 lesion).  Sampling of left pleura with fibrosis, chronic inflammation, no evidence of malignancy.  Station 5 and 10 R lymph nodes sampled, negative for malignancy.  New baseline CT chest abdomen pelvis prior to initiation of radiation therapy performed on 9/20/2023.  Persistent loculated left pneumothorax, trace left pleural effusion, primary tumor in partially collapsed left upper lobe appears relatively stable.  Small right pleural effusion no change in borderline enlarged mediastinal lymph nodes.  Slight increase in right upper lobe nodule (for up to 7 mm), indeterminate.  Patient therefore with unresectable stage IIIA ([id number]) disease.  Plans to treat with radiation therapy.  Due to age, performance status, comorbidities, patient felt to be a poor candidate for concurrent chemotherapy.  Patient received  radiation therapy 10/9 - 10/27/2023 to left upper lobe, 6000 cGy.  Patient returns today in follow-up doing quite well after completion of radiation therapy on 10/27/2023.  He experienced mild fatigue and some mild hoarseness on treatment which are both improving.  He is improving in terms of his stamina, has been back to working outdoors without difficulty.  He reports minimal dyspnea on exertion.  He reports that his appetite is improving and his weight has stabilized at 143 pounds.  Dr. Painter has ordered a follow-up CT chest on 1/23/2024 and he is seeing patient back on 1/31/2024 for follow-up.  We will add CT abdomen pelvis on 1/23/2024 and I will see him back same day 1/31/2024 for follow-up.  We discussed the need to contact our office with any new or worsening issues in the interim.    *Chronic bilateral pleural effusions with right hydropneumothorax and subsequent postoperative left hydropneumothorax  Patient appears to have longstanding evidence of small bilateral pleural effusions likely related to CHF  PET/CT 6/12/2023 identified right small to moderate hydropneumothorax new since 5/26/2023 CT chest  Patient was hospitalized 6/13 - 6/15/2023 due to right hydropneumothorax, had CT-guided chest tube placement performed 6/13/2023.  Right pleural fluid cytology negative on 6/13/2023.  As above, patient underwent left VATS with attempted resection of primary lung cancer on 7/31/2023.    Patient required persistent left chest tube postoperatively, eventually removed on 9/6/2023  CT chest 9/20/2023 with persistent loculated left pneumothorax.    *COPD  The patient has a history of smoking 1 pack/day x 55 years quit in 2000.  PFTs on 6/20/2023 with FEV1 0.99 (44% predicted), DLCO corrected 14.09 (56% predicted).  Patient continues with chronic dyspnea on exertion that does limit his level of activity although maintains O2 saturation in the 90% range even with activity.  Patient notes that his chronic dyspnea on  exertion is stable, respiratory status has improved on Trelegy inhaler.  He is scheduled for follow-up with pulmonary in January.    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  Labs on 7/11/2023 with hemoglobin 10.6, MCV 82.8, iron 18, ferritin 504, iron saturation 6%, TIBC 295, B12 201, folate 6.79, CRP 1.07, ESR 27, erythropoietin level 17.8.  Labs consistent with anemia secondary to chronic disease/malignancy as well as anemia secondary to CKD3.  Patient initiated oral B12 1000 mcg daily for B12 deficiency  Hemoglobin today remains relatively unchanged at 10.3.  We discussed the multifactorial nature of his anemia secondary to chronic disease and CKD3.  We will perform additional anemia evaluation today with iron panel, ferritin, B12, folate.  Given the concurrent mild thrombocytopenia today with platelet count of 137,000, we will also check serum protein electrophoresis, immunoelectrophoresis, quantitative immunoglobulins, free serum light chains.  We will notify patient of any significant abnormal findings.    *Thrombocytopenia  Patient with new onset thrombocytopenia today with platelet count of 137,000.  Etiology unclear.  We will check additional labs today with IPF, B12, folate as well as serum protein electrophoresis, immunoelectrophoresis, quantitative immunoglobulins, free serum light chains.    *Coronary artery disease, CHF  Chronic bilateral pleural effusions presumably related to CHF  Status post cardiac stent in 2013  Preoperative echocardiogram on 7/19/2023 with ejection fraction 56 to 60%, dilated LA, LV  Patient continues on aspirin 81 mg daily    *Peripheral vascular  disease  Patient is followed by Dr. Karen Barrera in vascular surgery  Abdominal aortic aneurysm status post stent graft repair 5/17/2021  Carotid stenosis status post carotid endarterectomy left, 2013.    *Anorexia, weight loss  Patient with anorexia associated with malignancy, mild degree of weight loss  Patient initiated Remeron 7.5 mg nightly on 9/22/2023  Patient reports that his appetite is gradually improving.  He continues to use Ensure supplements.  Weight has stabilized at 143 pounds today.  We will continue Remeron 7.5 mg nightly.     *Constipation  Patient continues with intermittent constipation, uses stool softener as needed.  We discussed use of Senokot and MiraLAX    Plan:  Patient with stage IIIA ([id number]) adenocarcinoma of the left upper lobe, unresectable  Patient completed primary radiation therapy to the left upper lobe on 10/27/2023  Patient continuing on oral B12 1000 mcg daily for B12 deficiency  Continue Remeron 7.5 mg nightly to improve appetite and weight  Additional labs today with iron panel, ferritin, B12, folate, IPF, serum protein electrophoresis, immunoelectrophoresis, quantitative immunoglobulins, free serum light chains.  We will contact patient with any significant abnormal findings.  Patient is currently scheduled for follow-up CT chest on 1/23/2023 by Dr. Painter.  We will add CT abdomen and pelvis same day  Return visit same day as patient is scheduled to see Dr. Painter on 1/31/2023 with CBC, CMP

## 2023-11-08 LAB
ALBUMIN SERPL ELPH-MCNC: 3.2 G/DL (ref 2.9–4.4)
ALBUMIN/GLOB SERPL: 1.1 {RATIO} (ref 0.7–1.7)
ALPHA1 GLOB SERPL ELPH-MCNC: 0.3 G/DL (ref 0–0.4)
ALPHA2 GLOB SERPL ELPH-MCNC: 0.8 G/DL (ref 0.4–1)
B-GLOBULIN SERPL ELPH-MCNC: 1.1 G/DL (ref 0.7–1.3)
EPO SERPL-ACNC: 32.2 MIU/ML (ref 2.6–18.5)
GAMMA GLOB SERPL ELPH-MCNC: 0.7 G/DL (ref 0.4–1.8)
GLOBULIN SER-MCNC: 3 G/DL (ref 2.2–3.9)
IGA SERPL-MCNC: 209 MG/DL (ref 61–437)
IGG SERPL-MCNC: 829 MG/DL (ref 603–1613)
IGM SERPL-MCNC: 13 MG/DL (ref 15–143)
INTERPRETATION SERPL IEP-IMP: ABNORMAL
KAPPA LC FREE SER-MCNC: 50.4 MG/L (ref 3.3–19.4)
KAPPA LC FREE/LAMBDA FREE SER: 1.47 {RATIO} (ref 0.26–1.65)
LABORATORY COMMENT REPORT: ABNORMAL
LAMBDA LC FREE SERPL-MCNC: 34.2 MG/L (ref 5.7–26.3)
M PROTEIN SERPL ELPH-MCNC: ABNORMAL G/DL
PROT SERPL-MCNC: 6.2 G/DL (ref 6–8.5)

## 2023-11-17 ENCOUNTER — PATIENT OUTREACH (OUTPATIENT)
Dept: OTHER | Facility: HOSPITAL | Age: 83
End: 2023-11-17
Payer: MEDICARE

## 2023-11-17 NOTE — PROGRESS NOTES
Reviewed chart.    Called patient. S/w his wife then the patient. The patient is doing well since completing radiation (rec'd 3 weeks of treatment). He resumed driving and is back to his normal activities. The patient still complains of fatigue. We discussed [facility] program. They are declining at this time although will let me know if he would like to pursue in the future. The patient is eating well; his weight is stable.    We discussed his recent appt with Dr. Santos and upcoming appts.  His appt with Dr. Kramer will likely change since Dr. Kramer will be [place]. His office will call with any appt changes. Patient/wife verbalized understanding.     The patient denies any questions/concerns or ongoing resource needs. I will call in next month; encouraged patient to call as needed.

## 2023-12-22 ENCOUNTER — PATIENT OUTREACH (OUTPATIENT)
Dept: OTHER | Facility: HOSPITAL | Age: 83
End: 2023-12-22
Payer: MEDICARE

## 2023-12-22 NOTE — PROGRESS NOTES
Reviewed chart. Left VATS performed 7/31/2023.  Intraoperatively, primary tumor was unresectable, invading into the mediastinum and pericardial fat pad as well as with phrenic nerve involvement (T4 lesion). Diagnosed with unresectable stage IIIA ([id number]) disease, Patient received radiation therapy 10/9 - 10/27/2023 to left upper lobe, 6000 cGy. Has CT 1/23/24, sees Dr. Santos 1/31, Dr. Painter 2/2    Called patient. He states he is doing well. Denies shortness of breath.  We discussed his upcoming appts; provided dates/times/location of appts. Patient verbalized understanding. He will be seeing a surgeon in January for an inguinal hernia.    They are changing insurance to [facility] as of 1/1.    The patient denies any questions/concerns or ongoing resource needs. I will call in early February after his appts; encouraged patient to call as needed.

## 2024-01-11 ENCOUNTER — TELEPHONE (OUTPATIENT)
Dept: ONCOLOGY | Facility: CLINIC | Age: 84
End: 2024-01-11
Payer: MEDICARE

## 2024-01-11 NOTE — TELEPHONE ENCOUNTER
LEFT MSG ON PT VM - NEEDED TO ADJUST APPT TIME - HAD TO MOVE UP 1 HOUR - MAILED OUT UPDATED APPT AND ASKED PT TO CALL AND CONFIRM

## 2024-01-15 ENCOUNTER — OFFICE VISIT (OUTPATIENT)
Dept: SURGERY | Facility: CLINIC | Age: 84
End: 2024-01-15
Payer: MEDICARE

## 2024-01-15 VITALS
SYSTOLIC BLOOD PRESSURE: 135 MMHG | WEIGHT: 145 LBS | DIASTOLIC BLOOD PRESSURE: 78 MMHG | HEIGHT: 67 IN | BODY MASS INDEX: 22.76 KG/M2

## 2024-01-15 DIAGNOSIS — K40.90 RIGHT INGUINAL HERNIA: Primary | ICD-10-CM

## 2024-01-15 PROCEDURE — 99203 OFFICE O/P NEW LOW 30 MIN: CPT | Performed by: SURGERY

## 2024-01-15 PROCEDURE — 3078F DIAST BP <80 MM HG: CPT | Performed by: SURGERY

## 2024-01-15 PROCEDURE — 1160F RVW MEDS BY RX/DR IN RCRD: CPT | Performed by: SURGERY

## 2024-01-15 PROCEDURE — 1159F MED LIST DOCD IN RCRD: CPT | Performed by: SURGERY

## 2024-01-15 PROCEDURE — 3075F SYST BP GE 130 - 139MM HG: CPT | Performed by: SURGERY

## 2024-01-15 NOTE — LETTER
January 15, 2024     Susu Kramer MD  [address]  [address]  [facility] [address]    Patient: Bayron Acevedo   YOB: 1940   Date of Visit: 1/15/2024     Dear Susu Kramer MD:       Thank you for referring Bayron Acevedo to me for evaluation. Below are the relevant portions of my assessment and plan of care.    If you have questions, please do not hesitate to call me. I look forward to following Bayron along with you.         Sincerely,        Tejinder Hummel MD        CC: MD Dakota Gomez Jr., MD Englund, Tejinder WHITESIDE MD  01/15/24 0907  Sign when Signing Visit  Cc: Right inguinal hernia    History of presenting illness:   This is a nice 83-year-old gentleman with a history of left lung adenocarcinoma status post attempted resection, eventually identified as extending into the phrenic nerve and as such she has since received radiation.  He is lost about 60 pounds over this time.  He has completed his radiation treatments and is awaiting further evaluation.  In addition within the last year he has undergone endograft aortic repair and has recovered well from this.  He believes he has had an inguinal hernia present for about 20 to 25 years but feels it is grown larger more recently.  It causes him only minor discomfort.    Past Medical History: Coronary artery disease, congestive heart failure, chronic kidney disease, COPD, peripheral artery disease, hypertension, hyperlipidemia, lung cancer    Past Surgical History: He underwent a partial lobectomy on the left upper lobe in July 2023.  Cardiac catheterization prior to that.  He had a prior right hydrocele excision in 2014.  Left carotid enterectomy 2013, umbilical hernia repair 2007, remote appendectomy as a teenager.    Medications: Albuterol, amlodipine, aspirin, carvedilol, hydralazine, nitroglycerin, rosuvastatin, Demadex, Trelegy Ellipta, mirtazapine    Allergies: Reviewed and reconciled in epic, allergy to penicillin    Social  History: He is a former smoker who quit in 2000, smoked for 55 years prior to that    Family History: Negative for known colon or rectal cancer    Review of Systems:  Constitutional: For weight loss which has stabilized more recently, denies fever or chills  Neck: no swollen glands or dysphagia or odynophagia  Respiratory: negative for SOB, cough, hemoptysis or wheezing  Cardiovascular: negative for chest pain, palpitations or peripheral edema  Gastrointestinal: Denies nausea, vomiting, abdominal pain      Physical Exam:  BMI: 22.7  General: alert and oriented, appropriate, no acute distress  Eyes: No scleral icterus, extraocular movements are intact  Neck: Supple without lymphadenopathy or thyromegaly, trachea is in the midline  Respiratory: There is good bilateral chest expansion, no use of accessory muscles is noted  Cardiovascular: No jugular venous distention or peripheral edema is seen  Gastrointestinal: Soft and benign, no ventral hernia identified  Genitourinary: Confirmed reducible right inguinal hernia, suspect direct defect, does not extend into the inguinal canal  BMI is within normal parameters. No other follow-up for BMI required.      Laboratory data: Most recent labs show hemoglobin of 10.3, white count 5.0, platelets have been in the normal range.  Chemistries okay, creatinine hovering around 1.8.    Imaging data: CT abdomen pelvis from earlier this year reviewed and demonstrates uncomplicated inguinal hernia on the right, perhaps a small defect on the left.      Assessment and plan:   -Initial symptomatic and reducible right inguinal hernia  -Personal history of lung cancer, status unclear at this point  -Associated weight loss related to his lung cancer  -Options discussed with the patient.  I think he would be a good candidate for hernia repair as it does seem to be growing and causing more discomfort.  Probably a reasonable candidate for minimally invasive da Ismael robot assisted approach, but  he is planning to see his oncologist, radiation oncologist and his cardiologist within the next couple weeks.  I will allow him to follow-up with them and see what his progress looks like.  If all are satisfied with his progress we will then plan to schedule him for inguinal hernia repair.  We did begin a discussion of the nature of the operation with the risks, benefits and recovery might look like.    Risks associated with the procedure are noted to include, but not be limited to, bleeding, infection, injury to small or large intestine, major vascular structures, the bladder or ureters.  Possibility of postoperative inguinodynia, mesh migration or infection, hernia recurrence and seroma formation also discussed.      Tejinder Hummel MD, FACS  General, Minimally Invasive and Endoscopic Surgery  [facility], operated by [facility]    [address], [address]  [place], [place], [address]  P: [phone]  F: [phone]

## 2024-01-15 NOTE — PROGRESS NOTES
Cc: Right inguinal hernia    History of presenting illness:   This is a nice 83-year-old gentleman with a history of left lung adenocarcinoma status post attempted resection, eventually identified as extending into the phrenic nerve and as such she has since received radiation.  He is lost about 60 pounds over this time.  He has completed his radiation treatments and is awaiting further evaluation.  In addition within the last year he has undergone endograft aortic repair and has recovered well from this.  He believes he has had an inguinal hernia present for about 20 to 25 years but feels it is grown larger more recently.  It causes him only minor discomfort.    Past Medical History: Coronary artery disease, congestive heart failure, chronic kidney disease, COPD, peripheral artery disease, hypertension, hyperlipidemia, lung cancer    Past Surgical History: He underwent a partial lobectomy on the left upper lobe in July 2023.  Cardiac catheterization prior to that.  He had a prior right hydrocele excision in 2014.  Left carotid enterectomy 2013, umbilical hernia repair 2007, remote appendectomy as a teenager.    Medications: Albuterol, amlodipine, aspirin, carvedilol, hydralazine, nitroglycerin, rosuvastatin, Demadex, Trelegy Ellipta, mirtazapine    Allergies: Reviewed and reconciled in epic, allergy to penicillin    Social History: He is a former smoker who quit in 2000, smoked for 55 years prior to that    Family History: Negative for known colon or rectal cancer    Review of Systems:  Constitutional: For weight loss which has stabilized more recently, denies fever or chills  Neck: no swollen glands or dysphagia or odynophagia  Respiratory: negative for SOB, cough, hemoptysis or wheezing  Cardiovascular: negative for chest pain, palpitations or peripheral edema  Gastrointestinal: Denies nausea, vomiting, abdominal pain      Physical Exam:  BMI: 22.7  General: alert and oriented, appropriate, no acute  distress  Eyes: No scleral icterus, extraocular movements are intact  Neck: Supple without lymphadenopathy or thyromegaly, trachea is in the midline  Respiratory: There is good bilateral chest expansion, no use of accessory muscles is noted  Cardiovascular: No jugular venous distention or peripheral edema is seen  Gastrointestinal: Soft and benign, no ventral hernia identified  Genitourinary: Confirmed reducible right inguinal hernia, suspect direct defect, does not extend into the inguinal canal  BMI is within normal parameters. No other follow-up for BMI required.      Laboratory data: Most recent labs show hemoglobin of 10.3, white count 5.0, platelets have been in the normal range.  Chemistries okay, creatinine hovering around 1.8.    Imaging data: CT abdomen pelvis from earlier this year reviewed and demonstrates uncomplicated inguinal hernia on the right, perhaps a small defect on the left.      Assessment and plan:   -Initial symptomatic and reducible right inguinal hernia  -Personal history of lung cancer, status unclear at this point  -Associated weight loss related to his lung cancer  -Options discussed with the patient.  I think he would be a good candidate for hernia repair as it does seem to be growing and causing more discomfort.  Probably a reasonable candidate for minimally invasive da Ismael robot assisted approach, but he is planning to see his oncologist, radiation oncologist and his cardiologist within the next couple weeks.  I will allow him to follow-up with them and see what his progress looks like.  If all are satisfied with his progress we will then plan to schedule him for inguinal hernia repair.  We did begin a discussion of the nature of the operation with the risks, benefits and recovery might look like.    Risks associated with the procedure are noted to include, but not be limited to, bleeding, infection, injury to small or large intestine, major vascular structures, the bladder or  ureters.  Possibility of postoperative inguinodynia, mesh migration or infection, hernia recurrence and seroma formation also discussed.      Tejinder Hummel MD, FACS  General, Minimally Invasive and Endoscopic Surgery  [facility]    [address], [address]  [place], [place], [address]  P: [phone]  F: [phone]

## 2024-01-23 ENCOUNTER — HOSPITAL ENCOUNTER (OUTPATIENT)
Dept: CT IMAGING | Facility: HOSPITAL | Age: 84
Discharge: HOME | End: 2024-01-23
Admitting: STUDENT IN AN ORGANIZED HEALTH CARE EDUCATION/TRAINING PROGRAM
Payer: MEDICARE

## 2024-01-23 DIAGNOSIS — C34.92 ADENOCARCINOMA, LUNG, LEFT: ICD-10-CM

## 2024-01-23 PROCEDURE — 71250 CT THORAX DX C-: CPT

## 2024-01-23 PROCEDURE — 74176 CT ABD & PELVIS W/O CONTRAST: CPT

## 2024-01-29 RX ORDER — CARVEDILOL 25 MG/1
25 TABLET ORAL 2 TIMES DAILY
Qty: 180 TABLET | Refills: 0 | Status: SHIPPED | OUTPATIENT
Start: 2024-01-29

## 2024-01-30 NOTE — PROGRESS NOTES
"Chief Complaint  Clinical stage IIIA ([id number]) non-small cell lung cancer (adenocarcinoma) of left upper lobe    Subjective        History of Present Illness    Patient returns today in follow-up with laboratory studies and CT scans to review.  He received previous course of radiation therapy with Dr. Painter, administered from 10/9 - 10/27/2023 to the left upper lobe, 6000 cGy.  The patient tolerated treatment reasonably well, was felt to be a poor candidate for concurrent chemotherapy given his age and comorbidities.  He reports currently that his fatigue has persisted to some extent although has gradually improved over time.  He continues with ECOG performance status of 1.  He has some mild chronic dyspnea on exertion.  His breathing had improved on Trelegy inhaler however he has been out of this for 2 weeks, having trouble getting it from the pharmacy and does note some slight worsening of his respiratory status.  He reports today normal appetite, weight increased at 147 pounds.  He has no new complaints today.  He reports normal bowel function.  He does have ongoing difficulty in regards to right inguinal hernia that is always reducible but does produce some intermittent pain.  He is discussed with Dr. Hummel about potential hernia repair in the near future.  He and his wife have been dealing with the death of their daughter in December after a medical illness.      Objective   Vital Signs:   /62   Pulse 82   Temp 97.7 °F (36.5 °C) (Temporal)   Resp 18   Ht 170.2 cm (67.01\")   Wt 66.8 kg (147 lb 3.2 oz)   SpO2 97%   BMI 23.05 kg/m²     Physical Exam  Constitutional:       Appearance: He is well-developed.      Comments: Patient in no distress   Eyes:      Conjunctiva/sclera: Conjunctivae normal.   Neck:      Thyroid: No thyromegaly.   Cardiovascular:      Rate and Rhythm: Normal rate and regular rhythm.      Heart sounds: No murmur heard.     No friction rub. No gallop.   Pulmonary:      Effort: No " respiratory distress.      Comments: Diminished breath sounds bilaterally  Abdominal:      General: Bowel sounds are normal. There is no distension.      Palpations: Abdomen is soft.      Tenderness: There is no abdominal tenderness.   Musculoskeletal:         General: No swelling.      Comments: No edema currently   Lymphadenopathy:      Head:      Right side of head: No submandibular adenopathy.      Cervical: No cervical adenopathy.      Upper Body:      Right upper body: No supraclavicular adenopathy.      Left upper body: No supraclavicular adenopathy.   Skin:     General: Skin is warm and dry.      Findings: No rash.   Neurological:      Mental Status: He is alert and oriented to person, place, and time.      Cranial Nerves: No cranial nerve deficit.      Motor: No abnormal muscle tone.      Deep Tendon Reflexes: Reflexes normal.   Psychiatric:         Behavior: Behavior normal.       Patient was examined today, unchanged from above      Result Review : Reviewed CBC, CMP from today.  Reviewed CT chest abdomen pelvis from 1/23/2024.       Assessment and Plan     *Clinical stage IIIA ([id number]) non-small cell lung cancer (adenocarcinoma) of left upper lobe  The patient has a history of smoking 1 pack/day x 55 years quit in 2000.  CT chest 5/26/2023 showed a spiculated mass in the anterior left upper lobe 3.7 x 4.3 x 4.2 cm along the anterior pleural surface and lateral aspect of the anterior mediastinum.  Additional 3 mm nodule right major fissure.  Bilateral small to moderate-sized pleural effusions.  Bullae formation consistent with COPD.  Mediastinal lymphadenopathy with largest node infracarinal region 2.6 x 1.2 cm.  PET/CT on 6/12/2023 showed 5.2 x 3.1 cm irregular pleural-based mass anterior left upper lobe abutting the pleura anteriorly and medially, hypermetabolic with SUV 14.4.  Mediastinal lymph node activity in the subcarinal region with a 2.2 x 1.1 cm lymph node with SUV 4.5.  Remainder of mediastinal  lymph nodes less intensely hypermetabolic with maximal SUV 3.3.  Moderate size loculated pleural effusions bilaterally with low-level activity along the pleura (SUV 2.5), no change in volume of pleural effusions since prior chest CT.  New small to moderate loculated right hydropneumothorax with air-fluid level 5.2 cm.   CT-guided left upper lobe lung biopsy 6/13/2023 with pathology that showed invasive poorly differentiated adenocarcinoma, PD-L1 less than 1%.  Caris analysis: TMB high (14 muts/Mb), mutations in KEAP1 and TP53, EGFR VUS, no targetable mutations.  Patient was hospitalized 6/13 - 6/15/2023 due to right hydropneumothorax, had CT-guided chest tube placement performed 6/13/2023.  Right pleural fluid cytology negative on 6/13/2023.  Staging MRI brain was negative for evidence of metastatic disease on 7/2/2023  PFTs on 6/20/2023 with FEV1 0.99 (44% predicted), DLCO corrected 14.09 (56% predicted).  Mediastinoscopy 7/17/2023 with 4R and station 7 lymph nodes negative for malignancy  Left VATS performed 7/31/2023.  Intraoperatively, primary tumor was unresectable, invading into the mediastinum and pericardial fat pad as well as with phrenic nerve involvement (T4 lesion).  Sampling of left pleura with fibrosis, chronic inflammation, no evidence of malignancy.  Station 5 and 10 R lymph nodes sampled, negative for malignancy.  New baseline CT chest abdomen pelvis prior to initiation of radiation therapy performed on 9/20/2023.  Persistent loculated left pneumothorax, trace left pleural effusion, primary tumor in partially collapsed left upper lobe appears relatively stable.  Small right pleural effusion no change in borderline enlarged mediastinal lymph nodes.  Slight increase in right upper lobe nodule (for up to 7 mm), indeterminate.  Patient therefore with unresectable stage IIIA ([id number]) disease.  Plans to treat with radiation therapy.  Due to age, performance status, comorbidities, patient felt to be a  poor candidate for concurrent chemotherapy.  Patient received radiation therapy 10/9 - 10/27/2023 to left upper lobe, 6000 cGy.  CT chest abdomen pelvis 1/23/2024 showed decrease in left pneumothorax/hydropneumothorax, left upper lobe mass difficult to accurately assess due to change in configuration (shift in lung parenchyma due to pneumothorax improvement) but appears relatively stable, 2.9 x 4.3 versus current 3.3 x 3.5 cm.  No lymphadenopathy noted, no evidence of distant metastatic disease.  Patient returns today in follow-up with the above CT chest abdomen pelvis from 1/23/2024.  At this point the patient is doing quite well, does have some mild residual fatigue after his radiation but no other complaints.  He is fairly active at this time, ECOG performance status of 2.  He has some mild chronic dyspnea on exertion which is unchanged.  Appetite is normal and weight has increased at 147 pounds.  We reviewed his CT scan as noted above from 1/23/2024.  It is difficult to accurately compare the size of the left upper lobe mass since the compression of the left upper lobe has decreased significantly with improvement in the pneumothorax.  Overall it does appear to be relatively stable in size.  There is no evidence of lymphadenopathy or distant metastatic disease.  It is unclear to what extent there is active disease in the remaining mass, possible that much of this is scar/necrosis.  I did suggest that we obtain a follow-up PET scan in 2 months for further assessment and the patient agrees.  He will notify us of any issues in the interim.    *Chronic bilateral pleural effusions with right hydropneumothorax and subsequent postoperative left hydropneumothorax  Patient appears to have longstanding evidence of small bilateral pleural effusions likely related to CHF  PET/CT 6/12/2023 identified right small to moderate hydropneumothorax new since 5/26/2023 CT chest  Patient was hospitalized 6/13 - 6/15/2023 due to right  hydropneumothorax, had CT-guided chest tube placement performed 6/13/2023.  Right pleural fluid cytology negative on 6/13/2023.  As above, patient underwent left VATS with attempted resection of primary lung cancer on 7/31/2023.    Patient required persistent left chest tube postoperatively, eventually removed on 9/6/2023  CT chest 9/20/2023 with persistent loculated left pneumothorax.  CT chest 1/23/2024 with significant improvement in left pneumothorax/hydropneumothorax    *COPD  The patient has a history of smoking 1 pack/day x 55 years quit in 2000.  PFTs on 6/20/2023 with FEV1 0.99 (44% predicted), DLCO corrected 14.09 (56% predicted).  Patient continues with chronic dyspnea on exertion that does limit his level of activity although maintains O2 saturation in the 90% range even with activity.  Patient notes that his chronic dyspnea on exertion is stable, respiratory status.  He continues on Trelegy inhaler although has been out of it for the past 2 weeks due to pharmacy issues and hopefully will be obtaining this to resume soon.    *CKD3  Patient followed by nephrology  Baseline creatinine 1.6-2.2  Creatinine today 1.96 and we will forward this to nephrology.    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  Labs on 7/11/2023 with hemoglobin 10.6, MCV 82.8, iron 18, ferritin 504, iron saturation 6%, TIBC 295, B12 201, folate 6.79, CRP 1.07, ESR 27, erythropoietin level 17.8.  Labs consistent with anemia secondary to chronic disease/malignancy as well as anemia secondary to CKD3.  Patient initiated oral B12 1000 mcg daily for B12 deficiency  Hemoglobin on 11/7/2023 was relatively unchanged at 10.3.  Additional evaluation sent with iron 21, ferritin 415, iron  saturation 8%, TIBC 266, B12 398, folate 10.2, negative serum protein electrophoresis/immunoelectrophoresis, IgA 209, IgG 829, IgM 13, free kappa light chain 50.4, free lambda light chain 34.2, free light chain ratio 1.47 (normal).  Hemoglobin today is unchanged again at 10.3.  Review of his prior labs is consistent with anemia secondary to chronic disease as well as anemia secondary to CKD3.  He does not qualify for Procrit with hemoglobin above 10.  Recheck hemoglobin at return visit in 2 months.  He continues on oral B12 1000 mcg daily    *Transient thrombocytopenia  On 11/7/2024, new onset thrombocytopenia with platelet count of 137,000.  Additional labs with IPF low normal at 3.3%, B12 398, folate 10.2,negative serum protein electrophoresis/immunoelectrophoresis, IgA 209, IgG 829, IgM 13, free kappa light chain 50.4, free lambda light chain 34.2, free light chain ratio 1.47 (normal).  Platelet count today normal at 173,000    *Coronary artery disease, CHF  Chronic bilateral pleural effusions presumably related to CHF  Status post cardiac stent in 2013  Preoperative echocardiogram on 7/19/2023 with ejection fraction 56 to 60%, dilated LA, LV  Patient continues on aspirin 81 mg daily    *Peripheral vascular disease  Patient is followed by Dr. Karen Barrera in vascular surgery  Abdominal aortic aneurysm status post stent graft repair 5/17/2021  Carotid stenosis status post carotid endarterectomy left, 2013.    *Anorexia, weight loss  Patient with anorexia associated with malignancy, mild degree of weight loss  Patient initiated Remeron 7.5 mg nightly on 9/22/2023.  Subsequently discontinued with improvement in weight and appetite  Patient today notes that his appetite continues to improve.  Weight has increased to 147 pounds.    *Constipation  Patient continues with intermittent constipation, uses stool softener as needed.      Plan:  Patient with stage IIIA ([id number]) adenocarcinoma of the left upper lobe,  unresectable  Patient completed primary radiation therapy to the left upper lobe on 10/27/2023.  He is continuing on a course of observation/surveillance.  Patient continuing on oral B12 1000 mcg daily for B12 deficiency  There would be no contraindication from oncology standpoint to proceed with right inguinal hernia repair in the near future if needed  In 7 weeks PET scan  In 8 weeks MD visit with CBC, CMP, B12 level, stat iron panel/ferritin.    I did spend 40 minutes total time caring for the patient today, time spent in review of records, face-to-face consultation, coordination of care, placement of orders, completion of documentation.

## 2024-01-31 ENCOUNTER — LAB (OUTPATIENT)
Dept: LAB | Facility: HOSPITAL | Age: 84
End: 2024-01-31
Payer: MEDICARE

## 2024-01-31 ENCOUNTER — OFFICE VISIT (OUTPATIENT)
Dept: ONCOLOGY | Facility: CLINIC | Age: 84
End: 2024-01-31
Payer: MEDICARE

## 2024-01-31 VITALS
HEART RATE: 82 BPM | HEIGHT: 67 IN | RESPIRATION RATE: 18 BRPM | BODY MASS INDEX: 23.1 KG/M2 | WEIGHT: 147.2 LBS | OXYGEN SATURATION: 97 % | TEMPERATURE: 97.7 F | DIASTOLIC BLOOD PRESSURE: 62 MMHG | SYSTOLIC BLOOD PRESSURE: 115 MMHG

## 2024-01-31 DIAGNOSIS — D69.6 THROMBOCYTOPENIA: ICD-10-CM

## 2024-01-31 DIAGNOSIS — C34.12 MALIGNANT NEOPLASM OF UPPER LOBE, LEFT BRONCHUS OR LUNG: ICD-10-CM

## 2024-01-31 DIAGNOSIS — D64.9 NORMOCYTIC ANEMIA: ICD-10-CM

## 2024-01-31 DIAGNOSIS — C34.92 ADENOCARCINOMA, LUNG, LEFT: Primary | ICD-10-CM

## 2024-01-31 DIAGNOSIS — C34.92 ADENOCARCINOMA, LUNG, LEFT: ICD-10-CM

## 2024-01-31 LAB
ALBUMIN SERPL-MCNC: 3.7 G/DL (ref 3.5–5.2)
ALBUMIN/GLOB SERPL: 1.3 G/DL
ALP SERPL-CCNC: 90 U/L (ref 39–117)
ALT SERPL W P-5'-P-CCNC: 10 U/L (ref 1–41)
ANION GAP SERPL CALCULATED.3IONS-SCNC: 9.9 MMOL/L (ref 5–15)
AST SERPL-CCNC: 16 U/L (ref 1–40)
BASOPHILS # BLD AUTO: 0.04 10*3/MM3 (ref 0–0.2)
BASOPHILS NFR BLD AUTO: 0.6 % (ref 0–1.5)
BILIRUB SERPL-MCNC: 0.3 MG/DL (ref 0–1.2)
BUN SERPL-MCNC: 24 MG/DL (ref 8–23)
BUN/CREAT SERPL: 12.2 (ref 7–25)
CALCIUM SPEC-SCNC: 9.1 MG/DL (ref 8.6–10.5)
CHLORIDE SERPL-SCNC: 102 MMOL/L (ref 98–107)
CO2 SERPL-SCNC: 27.1 MMOL/L (ref 22–29)
CREAT SERPL-MCNC: 1.96 MG/DL (ref 0.76–1.27)
DEPRECATED RDW RBC AUTO: 53.6 FL (ref 37–54)
EGFRCR SERPLBLD CKD-EPI 2021: 32.9 ML/MIN/1.73
EOSINOPHIL # BLD AUTO: 0.39 10*3/MM3 (ref 0–0.4)
EOSINOPHIL NFR BLD AUTO: 6.3 % (ref 0.3–6.2)
ERYTHROCYTE [DISTWIDTH] IN BLOOD BY AUTOMATED COUNT: 17.7 % (ref 12.3–15.4)
GLOBULIN UR ELPH-MCNC: 2.9 GM/DL
GLUCOSE SERPL-MCNC: 78 MG/DL (ref 65–99)
HCT VFR BLD AUTO: 34.7 % (ref 37.5–51)
HGB BLD-MCNC: 10.3 G/DL (ref 13–17.7)
IMM GRANULOCYTES # BLD AUTO: 0.03 10*3/MM3 (ref 0–0.05)
IMM GRANULOCYTES NFR BLD AUTO: 0.5 % (ref 0–0.5)
LYMPHOCYTES # BLD AUTO: 0.61 10*3/MM3 (ref 0.7–3.1)
LYMPHOCYTES NFR BLD AUTO: 9.8 % (ref 19.6–45.3)
MCH RBC QN AUTO: 24.6 PG (ref 26.6–33)
MCHC RBC AUTO-ENTMCNC: 29.7 G/DL (ref 31.5–35.7)
MCV RBC AUTO: 83 FL (ref 79–97)
MONOCYTES # BLD AUTO: 0.63 10*3/MM3 (ref 0.1–0.9)
MONOCYTES NFR BLD AUTO: 10.1 % (ref 5–12)
NEUTROPHILS NFR BLD AUTO: 4.52 10*3/MM3 (ref 1.7–7)
NEUTROPHILS NFR BLD AUTO: 72.7 % (ref 42.7–76)
NRBC BLD AUTO-RTO: 0 /100 WBC (ref 0–0.2)
PLATELET # BLD AUTO: 173 10*3/MM3 (ref 140–450)
PMV BLD AUTO: 9.1 FL (ref 6–12)
POTASSIUM SERPL-SCNC: 3.8 MMOL/L (ref 3.5–5.2)
PROT SERPL-MCNC: 6.6 G/DL (ref 6–8.5)
RBC # BLD AUTO: 4.18 10*6/MM3 (ref 4.14–5.8)
SODIUM SERPL-SCNC: 139 MMOL/L (ref 136–145)
WBC NRBC COR # BLD AUTO: 6.22 10*3/MM3 (ref 3.4–10.8)

## 2024-01-31 PROCEDURE — 85025 COMPLETE CBC W/AUTO DIFF WBC: CPT

## 2024-01-31 PROCEDURE — 80053 COMPREHEN METABOLIC PANEL: CPT

## 2024-01-31 NOTE — LETTER
January 31, 2024     Low Sepulveda MD  [address]  [place] [address]  [facility] [address]    Patient: Bayron Acevedo   YOB: 1940   Date of Visit: 1/31/2024     Dear Low Sepulveda:       Thank you for referring Bayron Acevedo to me for evaluation. Below are the relevant portions of my assessment and plan of care.    If you have questions, please do not hesitate to call me. I look forward to following Bayron along with you.         Sincerely,        Myles Santos MD        CC: MD Tejinder Engel MD Huber, John L Jr., MD  01/31/24 0204  Sign when Signing Visit  Chief Complaint  Clinical stage IIIA ([id number]) non-small cell lung cancer (adenocarcinoma) of left upper lobe    Subjective       History of Present Illness    Patient returns today in follow-up with laboratory studies and CT scans to review.  He received previous course of radiation therapy with Dr. Painter, administered from 10/9 - 10/27/2023 to the left upper lobe, 6000 cGy.  The patient tolerated treatment reasonably well, was felt to be a poor candidate for concurrent chemotherapy given his age and comorbidities.  He reports currently that his fatigue has persisted to some extent although has gradually improved over time.  He continues with ECOG performance status of 1.  He has some mild chronic dyspnea on exertion.  His breathing had improved on Trelegy inhaler however he has been out of this for 2 weeks, having trouble getting it from the pharmacy and does note some slight worsening of his respiratory status.  He reports today normal appetite, weight increased at 147 pounds.  He has no new complaints today.  He reports normal bowel function.  He does have ongoing difficulty in regards to right inguinal hernia that is always reducible but does produce some intermittent pain.  He is discussed with Dr. Hummel about potential hernia repair in the near future.  He and his wife have been dealing with the death of their daughter in  "December after a medical illness.      Objective  Vital Signs:   /62   Pulse 82   Temp 97.7 °F (36.5 °C) (Temporal)   Resp 18   Ht 170.2 cm (67.01\")   Wt 66.8 kg (147 lb 3.2 oz)   SpO2 97%   BMI 23.05 kg/m²     Physical Exam  Constitutional:       Appearance: He is well-developed.      Comments: Patient in no distress   Eyes:      Conjunctiva/sclera: Conjunctivae normal.   Neck:      Thyroid: No thyromegaly.   Cardiovascular:      Rate and Rhythm: Normal rate and regular rhythm.      Heart sounds: No murmur heard.     No friction rub. No gallop.   Pulmonary:      Effort: No respiratory distress.      Comments: Diminished breath sounds bilaterally  Abdominal:      General: Bowel sounds are normal. There is no distension.      Palpations: Abdomen is soft.      Tenderness: There is no abdominal tenderness.   Musculoskeletal:         General: No swelling.      Comments: No edema currently   Lymphadenopathy:      Head:      Right side of head: No submandibular adenopathy.      Cervical: No cervical adenopathy.      Upper Body:      Right upper body: No supraclavicular adenopathy.      Left upper body: No supraclavicular adenopathy.   Skin:     General: Skin is warm and dry.      Findings: No rash.   Neurological:      Mental Status: He is alert and oriented to person, place, and time.      Cranial Nerves: No cranial nerve deficit.      Motor: No abnormal muscle tone.      Deep Tendon Reflexes: Reflexes normal.   Psychiatric:         Behavior: Behavior normal.       Patient was examined today, unchanged from above      Result Review: Reviewed CBC, CMP from today.  Reviewed CT chest abdomen pelvis from 1/23/2024.       Assessment and Plan     *Clinical stage IIIA ([id number]) non-small cell lung cancer (adenocarcinoma) of left upper lobe  The patient has a history of smoking 1 pack/day x 55 years quit in 2000.  CT chest 5/26/2023 showed a spiculated mass in the anterior left upper lobe 3.7 x 4.3 x 4.2 cm along " the anterior pleural surface and lateral aspect of the anterior mediastinum.  Additional 3 mm nodule right major fissure.  Bilateral small to moderate-sized pleural effusions.  Bullae formation consistent with COPD.  Mediastinal lymphadenopathy with largest node infracarinal region 2.6 x 1.2 cm.  PET/CT on 6/12/2023 showed 5.2 x 3.1 cm irregular pleural-based mass anterior left upper lobe abutting the pleura anteriorly and medially, hypermetabolic with SUV 14.4.  Mediastinal lymph node activity in the subcarinal region with a 2.2 x 1.1 cm lymph node with SUV 4.5.  Remainder of mediastinal lymph nodes less intensely hypermetabolic with maximal SUV 3.3.  Moderate size loculated pleural effusions bilaterally with low-level activity along the pleura (SUV 2.5), no change in volume of pleural effusions since prior chest CT.  New small to moderate loculated right hydropneumothorax with air-fluid level 5.2 cm.   CT-guided left upper lobe lung biopsy 6/13/2023 with pathology that showed invasive poorly differentiated adenocarcinoma, PD-L1 less than 1%.  Caris analysis: TMB high (14 muts/Mb), mutations in KEAP1 and TP53, EGFR VUS, no targetable mutations.  Patient was hospitalized 6/13 - 6/15/2023 due to right hydropneumothorax, had CT-guided chest tube placement performed 6/13/2023.  Right pleural fluid cytology negative on 6/13/2023.  Staging MRI brain was negative for evidence of metastatic disease on 7/2/2023  PFTs on 6/20/2023 with FEV1 0.99 (44% predicted), DLCO corrected 14.09 (56% predicted).  Mediastinoscopy 7/17/2023 with 4R and station 7 lymph nodes negative for malignancy  Left VATS performed 7/31/2023.  Intraoperatively, primary tumor was unresectable, invading into the mediastinum and pericardial fat pad as well as with phrenic nerve involvement (T4 lesion).  Sampling of left pleura with fibrosis, chronic inflammation, no evidence of malignancy.  Station 5 and 10 R lymph nodes sampled, negative for  malignancy.  New baseline CT chest abdomen pelvis prior to initiation of radiation therapy performed on 9/20/2023.  Persistent loculated left pneumothorax, trace left pleural effusion, primary tumor in partially collapsed left upper lobe appears relatively stable.  Small right pleural effusion no change in borderline enlarged mediastinal lymph nodes.  Slight increase in right upper lobe nodule (for up to 7 mm), indeterminate.  Patient therefore with unresectable stage IIIA ([id number]) disease.  Plans to treat with radiation therapy.  Due to age, performance status, comorbidities, patient felt to be a poor candidate for concurrent chemotherapy.  Patient received radiation therapy 10/9 - 10/27/2023 to left upper lobe, 6000 cGy.  CT chest abdomen pelvis 1/23/2024 showed decrease in left pneumothorax/hydropneumothorax, left upper lobe mass difficult to accurately assess due to change in configuration (shift in lung parenchyma due to pneumothorax improvement) but appears relatively stable, 2.9 x 4.3 versus current 3.3 x 3.5 cm.  No lymphadenopathy noted, no evidence of distant metastatic disease.  Patient returns today in follow-up with the above CT chest abdomen pelvis from 1/23/2024.  At this point the patient is doing quite well, does have some mild residual fatigue after his radiation but no other complaints.  He is fairly active at this time, ECOG performance status of 2.  He has some mild chronic dyspnea on exertion which is unchanged.  Appetite is normal and weight has increased at 147 pounds.  We reviewed his CT scan as noted above from 1/23/2024.  It is difficult to accurately compare the size of the left upper lobe mass since the compression of the left upper lobe has decreased significantly with improvement in the pneumothorax.  Overall it does appear to be relatively stable in size.  There is no evidence of lymphadenopathy or distant metastatic disease.  It is unclear to what extent there is active disease in  the remaining mass, possible that much of this is scar/necrosis.  I did suggest that we obtain a follow-up PET scan in 2 months for further assessment and the patient agrees.  He will notify us of any issues in the interim.    *Chronic bilateral pleural effusions with right hydropneumothorax and subsequent postoperative left hydropneumothorax  Patient appears to have longstanding evidence of small bilateral pleural effusions likely related to CHF  PET/CT 6/12/2023 identified right small to moderate hydropneumothorax new since 5/26/2023 CT chest  Patient was hospitalized 6/13 - 6/15/2023 due to right hydropneumothorax, had CT-guided chest tube placement performed 6/13/2023.  Right pleural fluid cytology negative on 6/13/2023.  As above, patient underwent left VATS with attempted resection of primary lung cancer on 7/31/2023.    Patient required persistent left chest tube postoperatively, eventually removed on 9/6/2023  CT chest 9/20/2023 with persistent loculated left pneumothorax.  CT chest 1/23/2024 with significant improvement in left pneumothorax/hydropneumothorax    *COPD  The patient has a history of smoking 1 pack/day x 55 years quit in 2000.  PFTs on 6/20/2023 with FEV1 0.99 (44% predicted), DLCO corrected 14.09 (56% predicted).  Patient continues with chronic dyspnea on exertion that does limit his level of activity although maintains O2 saturation in the 90% range even with activity.  Patient notes that his chronic dyspnea on exertion is stable, respiratory status.  He continues on Trelegy inhaler although has been out of it for the past 2 weeks due to pharmacy issues and hopefully will be obtaining this to resume soon.    *CKD3  Patient followed by nephrology  Baseline creatinine 1.6-2.2  Creatinine today 1.96 and we will forward this to nephrology.    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  Labs on 7/11/2023 with hemoglobin 10.6, MCV 82.8, iron 18, ferritin 504, iron saturation 6%, TIBC 295, B12 201, folate 6.79, CRP 1.07, ESR 27, erythropoietin level 17.8.  Labs consistent with anemia secondary to chronic disease/malignancy as well as anemia secondary to CKD3.  Patient initiated oral B12 1000 mcg daily for B12 deficiency  Hemoglobin on 11/7/2023 was relatively unchanged at 10.3.  Additional evaluation sent with iron 21, ferritin 415, iron saturation 8%, TIBC 266, B12 398, folate 10.2, negative serum protein electrophoresis/immunoelectrophoresis, IgA 209, IgG 829, IgM 13, free kappa light chain 50.4, free lambda light chain 34.2, free light chain ratio 1.47 (normal).  Hemoglobin today is unchanged again at 10.3.  Review of his prior labs is consistent with anemia secondary to chronic disease as well as anemia secondary to CKD3.  He does not qualify for Procrit with hemoglobin above 10.  Recheck hemoglobin at return visit in 2 months.  He continues on oral B12 1000 mcg daily    *Transient thrombocytopenia  On 11/7/2024, new onset thrombocytopenia with platelet count of 137,000.  Additional labs with IPF low normal at 3.3%, B12 398, folate 10.2,negative serum protein electrophoresis/immunoelectrophoresis, IgA 209, IgG 829, IgM 13, free kappa light chain 50.4, free lambda light chain 34.2, free light chain ratio 1.47 (normal).  Platelet count today normal at 173,000    *Coronary artery disease, CHF  Chronic bilateral pleural effusions presumably related to CHF  Status post cardiac stent in 2013  Preoperative echocardiogram on 7/19/2023 with ejection fraction 56 to 60%, dilated LA, LV  Patient continues on aspirin 81 mg daily    *Peripheral vascular disease  Patient is followed by Dr. Karen Barrera in vascular surgery  Abdominal aortic aneurysm status post stent  graft repair 5/17/2021  Carotid stenosis status post carotid endarterectomy left, 2013.    *Anorexia, weight loss  Patient with anorexia associated with malignancy, mild degree of weight loss  Patient initiated Remeron 7.5 mg nightly on 9/22/2023.  Subsequently discontinued with improvement in weight and appetite  Patient today notes that his appetite continues to improve.  Weight has increased to 147 pounds.    *Constipation  Patient continues with intermittent constipation, uses stool softener as needed.      Plan:  Patient with stage IIIA ([id number]) adenocarcinoma of the left upper lobe, unresectable  Patient completed primary radiation therapy to the left upper lobe on 10/27/2023.  He is continuing on a course of observation/surveillance.  Patient continuing on oral B12 1000 mcg daily for B12 deficiency  There would be no contraindication from oncology standpoint to proceed with right inguinal hernia repair in the near future if needed  In 7 weeks PET scan  In 8 weeks MD visit with CBC, CMP, B12 level, stat iron panel/ferritin.    I did spend 40 minutes total time caring for the patient today, time spent in review of records, face-to-face consultation, coordination of care, placement of orders, completion of documentation.

## 2024-02-01 ENCOUNTER — TELEPHONE (OUTPATIENT)
Dept: RADIATION ONCOLOGY | Facility: HOSPITAL | Age: 84
End: 2024-02-01
Payer: MEDICARE

## 2024-02-02 ENCOUNTER — OFFICE VISIT (OUTPATIENT)
Dept: RADIATION ONCOLOGY | Facility: HOSPITAL | Age: 84
End: 2024-02-02
Payer: MEDICARE

## 2024-02-02 VITALS
SYSTOLIC BLOOD PRESSURE: 110 MMHG | HEART RATE: 60 BPM | OXYGEN SATURATION: 96 % | DIASTOLIC BLOOD PRESSURE: 61 MMHG | WEIGHT: 148.2 LBS | BODY MASS INDEX: 23.21 KG/M2

## 2024-02-02 DIAGNOSIS — C34.92 ADENOCARCINOMA, LUNG, LEFT: Primary | ICD-10-CM

## 2024-02-02 NOTE — PROGRESS NOTES
[facility]   Follow Up    Chief Complaint  Left Lung Adenocarcinoma        Diagnosis: Left Lung Adenocarcinoma     Overall Stage IIIA     cT4: Mediastinal Invasion  pN0: per cervical mediastinoscopy, and intraoperative resection of 10R and 5 LNs  cM0: per PET CT, MRI Brain      Radiation Completion Date: 10/27/2023        Prescription:      Site: KIET  Laterality: Left  Total Dose: 6000cGy  Dose per Fraction: 400cGy  Total Fractions: 15  Daily or BID: Daily  Modality: Photon  Technique: IMRT/VMAT/Rapid Arc  Bolus: No      Interval History:    Bayron Acevedo presents for regularly scheduled follow-up approximately 3 months after completion of chemoradiation for locally advanced adenocarcinoma following aborted resection.  The patient tolerated treatment fairly well and has done fairly well in the interim.  He continues to struggle with his breathing but does not require supplemental oxygen.  He is on Trelegy.  He has no new or concerning complaints related to his radiation treatment or radiation toxicity.  He is not currently on any adjuvant systemic therapy due to his neoadjuvant therapy.      Imaging:      CT CAP 1/23/2024    IMPRESSION:  1. Decrease in size of the left-sided pneumothorax, now a small  anterolateral hydropneumothorax     2. Left upper lobe mass, difficult to compare, probably stable     3. Stable small pulmonary nodules     4. Stable infrarenal abdominal aortic aneurysm status post aortobiiliac  stent grafting     5. Stable incidental findings as above.      Pathology:      No new relevant pathology      Labs:    Lab Results   Component Value Date    CREATININE 1.96 (C) 01/31/2024             Problem List:  Patient Active Problem List   Diagnosis    COPD (chronic obstructive pulmonary disease) with emphysema    Hypertension    Hyperlipidemia    S/P angioplasty with stent    Heart attack    Colon polyp    Carotid stenosis    CAD in native artery    Peripheral artery disease    History  of hydrocele    Perennial allergic rhinitis    AAA (abdominal aortic aneurysm) without rupture    Acute on chronic systolic CHF (congestive heart failure)    Stage 3b chronic kidney disease    Hydropneumothorax    Mass of upper lobe of left lung    NICM (nonischemic cardiomyopathy)    Chronic systolic CHF (congestive heart failure)    Normocytic anemia    Adenocarcinoma, lung, left          Medications:  Current Outpatient Medications on File Prior to Visit   Medication Sig Dispense Refill    albuterol (PROVENTIL) (2.5 MG/3ML) 0.083% nebulizer solution USE ONE VIAL BY NEBULIZATION EVERY FOUR HOURS AS NEEDED FOR FOR WHEEZING (Patient taking differently: Take 2.5 mg by nebulization Every 4 (Four) Hours As Needed.) 180 mL 0    amLODIPine (NORVASC) 5 MG tablet Take 1 tablet by mouth Daily. 90 tablet 3    aspirin 81 MG EC tablet Take 1 tablet by mouth Daily.      carvedilol (COREG) 25 MG tablet TAKE ONE TABLET BY MOUTH TWICE DAILY 180 tablet 0    hydrALAZINE (APRESOLINE) 50 MG tablet 1 tablet 2 (Two) Times a Day.      nitroglycerin (NITROSTAT) 0.4 MG SL tablet Place 1 tablet under the tongue every 5 (five) minutes as needed for chest pain. Take no more than 3 doses in 15 minutes. 25 tablet 1    rosuvastatin (CRESTOR) 20 MG tablet Take 1 tablet by mouth Daily. 90 tablet 3    torsemide (DEMADEX) 20 MG tablet Take 2 tablets by mouth Daily. 180 tablet 3    Trelegy Ellipta 200-62.5-25 MCG/ACT aerosol powder  Inhale 1 puff Daily.      vitamin B-12 (CYANOCOBALAMIN) 1000 MCG tablet Take 1 tablet by mouth Daily.       No current facility-administered medications on file prior to visit.          Allergies:  Allergies   Allergen Reactions    Lisinopril Anaphylaxis    Codeine Sulfate Hives    Contrast Dye (Echo Or Unknown Ct/Mr) Other (See Comments)     HYPERTENSION    Iodinated Contrast Media Other (See Comments)     HYPERTENSION    Losartan Unknown - High Severity    Penicillins Other (See Comments)     Passed out after a PCN  "shot- no other sx noted-per pateint           Vital Signs:  /61   Pulse 60   Wt 67.2 kg (148 lb 3.2 oz)   SpO2 96%   BMI 23.21 kg/m²   Estimated body mass index is 23.21 kg/m² as calculated from the following:    Height as of 1/31/24: 170.2 cm (67.01\").    Weight as of this encounter: 67.2 kg (148 lb 3.2 oz).  Pain Score    02/02/24 1130   PainSc: 0-No pain         ECOG: Ambulatory and capable of all selfcare but unable to carry out any work activities; up and about more than 50% of waking hours = 2    Physical Exam       Result Review :  The following data was reviewed by: Dakota Painter MD on 02/02/2024:  Labs: Last Creatinine   Data reviewed : Radiologic studies CT CAP             Diagnoses and all orders for this visit:    1. Adenocarcinoma, lung, left (Primary)        Assessment:    Bayron Acevedo presents for regularly scheduled follow-up approximately 3 months after completion of chemoradiation for locally advanced adenocarcinoma following aborted resection.  The patient tolerated treatment fairly well and has done fairly well in the interim.  He continues to struggle with his breathing but does not require supplemental oxygen.  He is on Trelegy.  He has no new or concerning complaints related to his radiation treatment or radiation toxicity.  He is not currently on any adjuvant systemic therapy due to his neoadjuvant therapy.    The patient continues to follow with Dr. Santos who is planning on PET/CT which is currently scheduled for 3/20/2024.  I would like to see the patient back in follow-up following the scan.  His most recent CT chest on 1/23/2024 does not show any obvious evidence of progression, and does show significant improvement of his previous pneumothorax.      Plan:    -Follow-up in approximately 6 weeks after the patient completes his next PET/CT already scheduled by Dr. Santos       I spent 30 minutes caring for Bayron on this date of service. This time includes time spent by me in the " following activities:preparing for the visit, reviewing tests, obtaining and/or reviewing a separately obtained history, documenting information in the medical record, independently interpreting results and communicating that information with the patient/family/caregiver, and care coordination  Follow Up   No follow-ups on file.  Patient was given instructions and counseling regarding his condition or for health maintenance advice. Please see specific information pulled into the AVS if appropriate.     Dakota Painter MD

## 2024-02-05 ENCOUNTER — OFFICE VISIT (OUTPATIENT)
Dept: CARDIOLOGY | Facility: CLINIC | Age: 84
End: 2024-02-05
Payer: MEDICARE

## 2024-02-05 VITALS
HEIGHT: 67 IN | OXYGEN SATURATION: 96 % | DIASTOLIC BLOOD PRESSURE: 60 MMHG | HEART RATE: 58 BPM | WEIGHT: 147 LBS | SYSTOLIC BLOOD PRESSURE: 120 MMHG | BODY MASS INDEX: 23.07 KG/M2

## 2024-02-05 DIAGNOSIS — I71.40 ABDOMINAL AORTIC ANEURYSM (AAA) WITHOUT RUPTURE, UNSPECIFIED PART: ICD-10-CM

## 2024-02-05 DIAGNOSIS — I10 PRIMARY HYPERTENSION: ICD-10-CM

## 2024-02-05 DIAGNOSIS — I42.8 NICM (NONISCHEMIC CARDIOMYOPATHY): ICD-10-CM

## 2024-02-05 DIAGNOSIS — I50.23 ACUTE ON CHRONIC SYSTOLIC CHF (CONGESTIVE HEART FAILURE): ICD-10-CM

## 2024-02-05 DIAGNOSIS — I50.22 CHRONIC SYSTOLIC CHF (CONGESTIVE HEART FAILURE): Primary | ICD-10-CM

## 2024-02-05 PROCEDURE — 99214 OFFICE O/P EST MOD 30 MIN: CPT | Performed by: NURSE PRACTITIONER

## 2024-02-05 PROCEDURE — 1160F RVW MEDS BY RX/DR IN RCRD: CPT | Performed by: NURSE PRACTITIONER

## 2024-02-05 PROCEDURE — 3078F DIAST BP <80 MM HG: CPT | Performed by: NURSE PRACTITIONER

## 2024-02-05 PROCEDURE — 93000 ELECTROCARDIOGRAM COMPLETE: CPT | Performed by: NURSE PRACTITIONER

## 2024-02-05 PROCEDURE — 3074F SYST BP LT 130 MM HG: CPT | Performed by: NURSE PRACTITIONER

## 2024-02-05 PROCEDURE — 1159F MED LIST DOCD IN RCRD: CPT | Performed by: NURSE PRACTITIONER

## 2024-02-05 RX ORDER — HYDRALAZINE HYDROCHLORIDE 25 MG/1
25 TABLET, FILM COATED ORAL 2 TIMES DAILY
Qty: 180 TABLET | Refills: 3 | Status: SHIPPED | OUTPATIENT
Start: 2024-02-05

## 2024-02-05 RX ORDER — HYDRALAZINE HYDROCHLORIDE 50 MG/1
25 TABLET, FILM COATED ORAL 2 TIMES DAILY
Start: 2024-02-05 | End: 2024-02-05 | Stop reason: SDUPTHER

## 2024-02-05 NOTE — PROGRESS NOTES
[facility] Follow Up Office Note     Encounter Date:24  Patient:Bayron Acevedo  :1940  MRN:[id number]      Chief Complaint:   No chief complaint on file.        History of Presenting Illness:        Bayron Acevedo is a 83 y.o. male who is here for follow-up.  He is a patient of Dr Jiang.    Patient has past medical history significant for ischemic cardiomyopathy with recovered LVEF, coronary artery disease with prior PCI, PAD status post carotid endarterectomy and AAA repair, essential hypertension, moderate to severe mitral regurgitation, essential hyperlipidemia, CKD, lung cancer s/p surgery, radiation.  Patient had short-lived atrial fibrillation postoperatively from lung surgery in August.  No known recurrence and he has not been chronically anticoagulated due to procedures and frailty.    Patient was last seen by Dr. Jiang in November at which time he was stable from a cardiac perspective.      Patient has completed 19 radiation treatments and is about to get a repeat PET scan.  Over the past year he has lost about 60 pounds.  He reports he typically feels good, the past several days he is felt a little lightheaded after his morning medications for about 1/2-hour.  He is not having chest pain, he has some mild dyspnea with exertion that stable.  He denies lower extremity edema, PND orthopnea.  Unfortunately, patient and wife lost their daughter to cancer in December.    Review of Systems:  Review of Systems   Cardiovascular:  Positive for dyspnea on exertion, leg swelling and orthopnea. Negative for chest pain and palpitations.   Respiratory:  Negative for shortness of breath.        Current Outpatient Medications on File Prior to Visit   Medication Sig Dispense Refill    albuterol (PROVENTIL) (2.5 MG/3ML) 0.083% nebulizer solution USE ONE VIAL BY NEBULIZATION EVERY FOUR HOURS AS NEEDED FOR FOR WHEEZING (Patient taking differently: Take 2.5 mg by nebulization Every 4 (Four) Hours As  Needed.) 180 mL 0    amLODIPine (NORVASC) 5 MG tablet Take 1 tablet by mouth Daily. 90 tablet 3    aspirin 81 MG EC tablet Take 1 tablet by mouth Daily.      carvedilol (COREG) 25 MG tablet TAKE ONE TABLET BY MOUTH TWICE DAILY 180 tablet 0    nitroglycerin (NITROSTAT) 0.4 MG SL tablet Place 1 tablet under the tongue every 5 (five) minutes as needed for chest pain. Take no more than 3 doses in 15 minutes. 25 tablet 1    rosuvastatin (CRESTOR) 20 MG tablet Take 1 tablet by mouth Daily. 90 tablet 3    torsemide (DEMADEX) 20 MG tablet Take 2 tablets by mouth Daily. 180 tablet 3    Trelegy Ellipta 200-62.5-25 MCG/ACT aerosol powder  Inhale 1 puff Daily.      vitamin B-12 (CYANOCOBALAMIN) 1000 MCG tablet Take 1 tablet by mouth Daily.      [DISCONTINUED] hydrALAZINE (APRESOLINE) 50 MG tablet 1 tablet 2 (Two) Times a Day.       No current facility-administered medications on file prior to visit.       Allergies   Allergen Reactions    Lisinopril Anaphylaxis    Codeine Sulfate Hives    Contrast Dye (Echo Or Unknown Ct/Mr) Other (See Comments)     HYPERTENSION    Iodinated Contrast Media Other (See Comments)     HYPERTENSION    Losartan Unknown - High Severity    Penicillins Other (See Comments)     Passed out after a PCN shot- no other sx noted-per pateint       Past Medical History:   Diagnosis Date    AAA (abdominal aortic aneurysm)     CAD in native artery     Carotid stenosis     s/p CEA Left    CKD (chronic kidney disease)     Colon polyp     COPD (chronic obstructive pulmonary disease)     H/O Acute myocardial infarction 2013    H/O PAD (peripheral artery disease)     Heart attack 2013    Heart failure     Hyperlipidemia     Hypertension     Inguinal hernia     RIGHT    Left ventricular dysfunction     Mass of left lung     SHOWS ON MRI    Mitral valve regurgitation     Perennial allergic rhinitis 01/06/2017    PVD (peripheral vascular disease)     S/P angioplasty with stent     Tinnitus     Tricuspid regurgitation         Past Surgical History:   Procedure Laterality Date    APPENDECTOMY      ARTERIOGRAM AORTIC N/A 05/17/2021    Procedure: ZENITH AORTIC STENT GRAFT;  Surgeon: Karen Barrera Jr., MD;  Location: [facility] HYBRID OR 18/19;  Service: Vascular;  Laterality: N/A;    CARDIAC CATHETERIZATION      X2    CARDIAC CATHETERIZATION N/A 04/11/2023    Procedure: Left Heart Cath;  Surgeon: Guru Jiang MD;  Location: [facility] CATH INVASIVE LOCATION;  Service: Cardiovascular;  Laterality: N/A;    CARDIAC CATHETERIZATION N/A 04/11/2023    Procedure: Right Heart Cath;  Surgeon: Guru Jiang MD;  Location: [facility] CATH INVASIVE LOCATION;  Service: Cardiovascular;  Laterality: N/A;    CARDIAC CATHETERIZATION N/A 04/11/2023    Procedure: Coronary angiography;  Surgeon: Guru Jiang MD;  Location: [facility] CATH INVASIVE LOCATION;  Service: Cardiovascular;  Laterality: N/A;    CARDIAC CATHETERIZATION N/A 04/11/2023    Procedure: Left ventriculography;  Surgeon: Guru Jiang MD;  Location: [facility] CATH INVASIVE LOCATION;  Service: Cardiovascular;  Laterality: N/A;    CARDIAC CATHETERIZATION  04/11/2023    Procedure: RESTING FULL CYCLE RATIO;  Surgeon: Guru Jiang MD;  Location: [facility] CATH INVASIVE LOCATION;  Service: Cardiovascular;;    CAROTID ENDARTERECTOMY Left 01/28/2013    Bruce Jones Jr, MD    CAROTID STENT Left     LAD    COLONOSCOPY N/A 10/17/2011    Diverticulosis sigmoid colon, two 4-5 mm polyps in the transverse colon and in the ascending colon, internal hemorrhoids, otherwise normal-Dr. Bronson Blanc    HYDROCELE EXCISION / REPAIR Right 05/28/2014    Dr. STANLEY Osorio    LOBECTOMY Left 07/31/2023    Procedure: BRONCHOSCOPY, LEFT VAT WITH DAVINCI ROBOT, EXTENSIVE LYSIS OF ADHESIONS, PARTIAL PULMONARY DECORTICATION, INTERCOSTAL NERVE BLOCK;  Surgeon: Nemesio Kramer MD PhD;  Location: [facility] OR;  Service: Robotics - DaVinci;  Laterality: Left;    MEDIASTINOSCOPY N/A 07/17/2023     "Procedure: MEDIASTINOSCOPY;  Surgeon: Nemesio Kramer MD PhD;  Location: [facility] OR;  Service: Thoracic;  Laterality: N/A;    UMBILICAL HERNIA REPAIR N/A 2007    Umbilical hernia repair with Ventralax mesh-Dr. Prabhu Freedman       Social History     Socioeconomic History    Marital status:      Spouse name: Amina   Tobacco Use    Smoking status: Former     Packs/day: 1.00     Years: 55.00     Additional pack years: 0.00     Total pack years: 55.00     Types: Cigarettes     Quit date:      Years since quittin.1    Smokeless tobacco: Never   Vaping Use    Vaping Use: Never used   Substance and Sexual Activity    Alcohol use: No    Drug use: No    Sexual activity: Defer       Family History   Problem Relation Age of Onset    No Known Problems Mother         complications of child birth    Cancer Father     Leukemia Father     Cancer Brother     Heart disease Maternal Uncle     Malig Hyperthermia Neg Hx        The following portions of the patient's history were reviewed and updated as appropriate: allergies, current medications, past family history, past medical history, past social history, past surgical history and problem list.       Objective:       Vitals:    24 1257   BP: 120/60   Pulse: 58   SpO2: 96%   Weight: 66.7 kg (147 lb)   Height: 170.2 cm (67.01\")           Physical Exam:  Constitutional:  Alert and conversant, pleasant  HENT: Oropharynx clear and membrane moist  Eyes: Normal conjunctiva, no sclera icterus  Neck: Supple, no carotid bruit bilaterally  Cardiovascular: Regular rate and rhythm, Early peaking systolic murmur over the right upper sternal border, trace bilateral lower extremity edema  Pulmonary: Normal respiratory effort, faint bibasilar crackles  Neurological: Alert and orient x 3  Skin: Warm, dry, no ecchymosis, no rash  Psych: Appropriate mood and affect. Normal judgment and insight         Lab Results   Component Value Date     2024     " 11/07/2023    K 3.8 01/31/2024    K 3.7 11/07/2023     01/31/2024     11/07/2023    CO2 27.1 01/31/2024    CO2 25.7 11/07/2023    BUN 24 (H) 01/31/2024    BUN 28 (H) 11/07/2023    CREATININE 1.96 (C) 01/31/2024    CREATININE 1.80 (H) 11/07/2023    EGFRIFNONA 30 (L) 11/19/2021    EGFRIFNONA 75 05/18/2021    EGFRIFAFRI 34 (L) 11/19/2021    EGFRIFAFRI 66 03/11/2019    GLUCOSE 78 01/31/2024    GLUCOSE 126 (H) 11/07/2023    CALCIUM 9.1 01/31/2024    CALCIUM 8.6 11/07/2023    PROTENTOTREF 6.2 11/07/2023    PROTENTOTREF 6.4 03/11/2019    ALBUMIN 3.7 01/31/2024    ALBUMIN 3.2 11/07/2023    BILITOT 0.3 01/31/2024    BILITOT 0.2 11/07/2023    AST 16 01/31/2024    AST 17 11/07/2023    ALT 10 01/31/2024    ALT 9 11/07/2023     Lab Results   Component Value Date    WBC 6.22 01/31/2024    WBC 5.04 11/07/2023    HGB 10.3 (L) 01/31/2024    HGB 10.3 (L) 11/07/2023    HCT 34.7 (L) 01/31/2024    HCT 33.8 (L) 11/07/2023    MCV 83.0 01/31/2024    MCV 82.4 11/07/2023     01/31/2024     (L) 11/07/2023     Lab Results   Component Value Date    CHOL 123 04/11/2023    TRIG 98 04/11/2023    TRIG 86 09/09/2021    HDL 34 (L) 04/11/2023    HDL 44 09/09/2021    LDL 70 04/11/2023    LDL 70 09/09/2021     Lab Results   Component Value Date    PROBNP 5,806.0 (H) 04/27/2023    PROBNP 5,992.0 (H) 04/19/2023    .6 (H) 03/31/2023     Lab Results   Component Value Date    TROPONINT 41 (H) 04/07/2023     Lab Results   Component Value Date    TSH 5.290 (H) 08/14/2023           ECG 12 Lead    Date/Time: 2/5/2024 1:10 PM  Performed by: Esperanza Zarate APRN    Authorized by: Esperanza Zarate APRN  Comparison: compared with previous ECG from 11/2/2023  Similar to previous ECG  Rhythm: sinus rhythm  Rate: normal  BPM: 58  Conduction: non-specific intraventricular conduction delay  Other findings: non-specific ST-T wave changes             Assessment:          Diagnosis Plan   1. Chronic systolic CHF (congestive heart  failure)  ECG 12 Lead      2. Primary hypertension        3. Abdominal aortic aneurysm (AAA) without rupture, unspecified part        4. Acute on chronic systolic CHF (congestive heart failure)        5. NICM (nonischemic cardiomyopathy)                 Plan:       Coronary artery disease without angina -  prior PCI of LAD.  Patent stent with moderate CAD on most recent left heart catheterization.  Continue aspirin, beta-blocker and statin    Chronic systolic heart failure // Non-ischemic cardiomyopathy -recovered LVEF.  Guideline directed therapies limited due to renal disease.  Continues on carvedilol    Paroxysmal atrial fibrillation -August 2023, provoked after thoracic surgery without known recurrence.  Was never started on anticoagulation and risk probably out way benefits given frailty, procedures with malignancy.  He denies palpitations. Continue to monitor, would start anticoagulation with recurrence    PAD - s/p carotid endarterectomy. ASA, statin    AAA - s/p repair    Essential hypertension -I actually think with weight loss he may be overtreated at this point.  Reduce hydralazine to 25 mg twice daily and continue other meds.  Discussed notification parameters    Mixed hyperlipidemia - 4/11/23 labs with LDL at goal.  Continue rosuvastatin 20 mg daily    Nonrheumatic mitral valve regurgitation -improved on most recent echocardiogram 7/2023 when volume was improved    Preoperative clearance -patient is not having anginal symptoms and is not in acute heart failure.  I think he is of acceptable risk to undergo hernia surgery      Patient is seen today for follow-up.  I think he is stable from a cardiac perspective and not recommending any changes.  Follow-up with Dr. Martinez in 6 months, earlier with problems    Orders Placed This Encounter   Procedures    ECG 12 Lead     This order was created via procedure documentation     Order Specific Question:   Release to patient     Answer:   Routine Release  [[id number]]            DASHA Mccartney  [facility]  02/05/24  13:50 EST

## 2024-02-06 ENCOUNTER — PATIENT OUTREACH (OUTPATIENT)
Dept: OTHER | Facility: HOSPITAL | Age: 84
End: 2024-02-06
Payer: MEDICARE

## 2024-02-06 NOTE — PROGRESS NOTES
Reviewed chart. Met with Dr. Painter 2/2, 3 months after completion of chemoradiation for locally advanced adenocarcinoma following aborted resection.     Called patient. We discussed his recent appts.  He is scheduled to meet with a surgeon tomorrow to discuss possible hernia repair. He met with the cardiologist yesterday. The patient is having a PET scan 3/20 and meets with Dr. Painter 3/26. We went over the instructions/prep for the PET scan. Patient verbalized understanding and states he was given instructions.     The patient denies any questions/concerns or ongoing resource needs. I will call in 1-2 months; encouraged patient to call as needed.

## 2024-02-12 ENCOUNTER — OFFICE VISIT (OUTPATIENT)
Dept: SURGERY | Facility: CLINIC | Age: 84
End: 2024-02-12
Payer: MEDICARE

## 2024-02-12 VITALS
DIASTOLIC BLOOD PRESSURE: 66 MMHG | HEIGHT: 67 IN | WEIGHT: 148 LBS | SYSTOLIC BLOOD PRESSURE: 114 MMHG | BODY MASS INDEX: 23.23 KG/M2

## 2024-02-12 DIAGNOSIS — K40.90 RIGHT INGUINAL HERNIA: Primary | ICD-10-CM

## 2024-02-12 PROCEDURE — 1159F MED LIST DOCD IN RCRD: CPT | Performed by: SURGERY

## 2024-02-12 PROCEDURE — 99214 OFFICE O/P EST MOD 30 MIN: CPT | Performed by: SURGERY

## 2024-02-12 PROCEDURE — 1160F RVW MEDS BY RX/DR IN RCRD: CPT | Performed by: SURGERY

## 2024-02-12 PROCEDURE — 3074F SYST BP LT 130 MM HG: CPT | Performed by: SURGERY

## 2024-02-12 PROCEDURE — 3078F DIAST BP <80 MM HG: CPT | Performed by: SURGERY

## 2024-02-12 RX ORDER — SODIUM CHLORIDE 9 MG/ML
40 INJECTION, SOLUTION INTRAVENOUS AS NEEDED
OUTPATIENT
Start: 2024-02-12

## 2024-02-12 RX ORDER — SODIUM CHLORIDE 0.9 % (FLUSH) 0.9 %
10 SYRINGE (ML) INJECTION AS NEEDED
OUTPATIENT
Start: 2024-02-12

## 2024-02-12 RX ORDER — SODIUM CHLORIDE 0.9 % (FLUSH) 0.9 %
10 SYRINGE (ML) INJECTION EVERY 12 HOURS SCHEDULED
OUTPATIENT
Start: 2024-02-12

## 2024-02-12 NOTE — H&P (VIEW-ONLY)
Cc: Right inguinal hernia     History of presenting illness:   This is a nice 83-year-old gentleman with a history of left lung adenocarcinoma status post attempted resection, eventually identified as extending into the phrenic nerve and as such he has since received radiation.  He drake lost about 60 pounds over this time.  He has completed his radiation treatments and is awaiting further evaluation.  In addition within the last year he has undergone endograft aortic repair and has recovered well from this.  He believes he has had an inguinal hernia present for about 20 to 25 years but feels it is grown larger more recently.  It causes him only minor discomfort.  Since I last saw him, he has seen his medical oncologist, radiation oncologist and his cardiologist, all of which have suggested he is appropriate for elective hernia repair.     Past Medical History: Coronary artery disease, congestive heart failure, chronic kidney disease, COPD, peripheral artery disease, hypertension, hyperlipidemia, lung cancer     Past Surgical History: He underwent a partial lobectomy on the left upper lobe in July 2023.  Cardiac catheterization prior to that.  He had a prior right hydrocele excision in 2014.  Left carotid enterectomy 2013, umbilical hernia repair 2007, remote appendectomy as a teenager.     Medications: Albuterol, amlodipine, aspirin, carvedilol, hydralazine, nitroglycerin, rosuvastatin, Demadex, Trelegy Ellipta, mirtazapine     Allergies: Reviewed and reconciled in epic, allergy to penicillin     Social History: He is a former smoker who quit in 2000, smoked for 55 years prior to that     Family History: Negative for known colon or rectal cancer     Review of Systems:  Constitutional: For weight loss which has stabilized more recently, denies fever or chills  Neck: no swollen glands or dysphagia or odynophagia  Respiratory: negative for SOB, cough, hemoptysis or wheezing  Cardiovascular: negative for chest pain,  palpitations or peripheral edema  Gastrointestinal: Denies nausea, vomiting, abdominal pain        Physical Exam:  BMI: 23.2  General: alert and oriented, appropriate, no acute distress  Eyes: No scleral icterus, extraocular movements are intact  Neck: Supple without lymphadenopathy or thyromegaly, trachea is in the midline  Respiratory: There is good bilateral chest expansion, no use of accessory muscles is noted  Cardiovascular: No jugular venous distention or peripheral edema is seen  Gastrointestinal: Soft and benign, no ventral hernia identified  Genitourinary: Confirmed reducible right inguinal hernia, suspect indirect defect, does not extend into the inguinal canal        Laboratory data: Most recent labs show hemoglobin of 10.3, white count 4.8, both in the same range they have been.  Platelets 173.  Chemistries largely unchanged, creatinine slightly higher at 1.96 with a GFR of 33, down from about 36 earlier.     Imaging data: Most recent CT reviewed and demonstrates evidence of a right inguinal hernia, likely indirect, perhaps a small indirect left inguinal hernia although I am not able to palpate this clinically.    Assessment and plan:   -Initial symptomatic and reducible right inguinal hernia  -Personal history of lung cancer, felt to be in good shape at this time by both his medical oncologist and radiation oncologist  -Associated weight loss related to his lung cancer, without clear evidence of pulmonary compromise at this time  -Options discussed with patient and family.  I have recommended proceeding with minimally invasive da Ismael robot assisted right inguinal hernia repair with mesh.  He is not having any symptoms on the left side and there is no clear hernia clinically and as such I recommend observation for that side.  Will ask him to hold his aspirin preoperatively.     Risks associated with the procedure are noted to include, but not be limited to, bleeding, infection, injury to small or  large intestine, major vascular structures, the bladder or ureters.  Possibility of postoperative inguinodynia, mesh migration or infection, hernia recurrence and seroma formation also discussed.        Tejinder Hummel MD, FACS  General, Minimally Invasive and Endoscopic Surgery  [facility]     [address], [address]  [place], [place], [address]  P: [phone]  F: [phone]    This note is copied, pasted, brought forward, edited and updated as appropriate from prior office visit.

## 2024-02-27 ENCOUNTER — PRE-ADMISSION TESTING (OUTPATIENT)
Dept: PREADMISSION TESTING | Facility: HOSPITAL | Age: 84
End: 2024-02-27
Payer: MEDICARE

## 2024-02-27 VITALS
DIASTOLIC BLOOD PRESSURE: 66 MMHG | TEMPERATURE: 97.1 F | HEIGHT: 66 IN | WEIGHT: 150.5 LBS | BODY MASS INDEX: 24.19 KG/M2 | OXYGEN SATURATION: 95 % | HEART RATE: 63 BPM | SYSTOLIC BLOOD PRESSURE: 142 MMHG | RESPIRATION RATE: 18 BRPM

## 2024-02-27 LAB
ANION GAP SERPL CALCULATED.3IONS-SCNC: 10 MMOL/L (ref 5–15)
BUN SERPL-MCNC: 23 MG/DL (ref 8–23)
BUN/CREAT SERPL: 13.1 (ref 7–25)
CALCIUM SPEC-SCNC: 8.9 MG/DL (ref 8.6–10.5)
CHLORIDE SERPL-SCNC: 107 MMOL/L (ref 98–107)
CO2 SERPL-SCNC: 27 MMOL/L (ref 22–29)
CREAT SERPL-MCNC: 1.75 MG/DL (ref 0.76–1.27)
DEPRECATED RDW RBC AUTO: 50.2 FL (ref 37–54)
EGFRCR SERPLBLD CKD-EPI 2021: 37.7 ML/MIN/1.73
ERYTHROCYTE [DISTWIDTH] IN BLOOD BY AUTOMATED COUNT: 16.8 % (ref 12.3–15.4)
GLUCOSE SERPL-MCNC: 102 MG/DL (ref 65–99)
HCT VFR BLD AUTO: 35.2 % (ref 37.5–51)
HGB BLD-MCNC: 10.4 G/DL (ref 13–17.7)
MCH RBC QN AUTO: 24.1 PG (ref 26.6–33)
MCHC RBC AUTO-ENTMCNC: 29.5 G/DL (ref 31.5–35.7)
MCV RBC AUTO: 81.7 FL (ref 79–97)
PLATELET # BLD AUTO: 142 10*3/MM3 (ref 140–450)
PMV BLD AUTO: 9.8 FL (ref 6–12)
POTASSIUM SERPL-SCNC: 3.8 MMOL/L (ref 3.5–5.2)
RBC # BLD AUTO: 4.31 10*6/MM3 (ref 4.14–5.8)
SODIUM SERPL-SCNC: 144 MMOL/L (ref 136–145)
WBC NRBC COR # BLD AUTO: 5.74 10*3/MM3 (ref 3.4–10.8)

## 2024-02-27 PROCEDURE — 36415 COLL VENOUS BLD VENIPUNCTURE: CPT

## 2024-02-27 PROCEDURE — 80048 BASIC METABOLIC PNL TOTAL CA: CPT

## 2024-02-27 PROCEDURE — 85027 COMPLETE CBC AUTOMATED: CPT

## 2024-02-27 NOTE — DISCHARGE INSTRUCTIONS
Take the following medications the morning of surgery:  TRELEGY, HYDRALAZINE, AMLODIPINE, CARVEDILOL      Arrive to hospital on your day of surgery at 6:00 AM.      If you are on prescription narcotic pain medication to control your pain you may also take that medication the morning of surgery.    General Instructions:  Do not eat solid food after midnight the night before surgery.  You may drink clear liquids day of surgery but must stop at least one hour before your hospital arrival time.  It is beneficial for you to have a clear drink that contains carbohydrates the day of surgery.  We suggest a 12 to 20 ounce bottle of Gatorade or Powerade for non-diabetic patients or a 12 to 20 ounce bottle of G2 or Powerade Zero for diabetic patients. (Pediatric patients, are not advised to drink a 12 to 20 ounce carbohydrate drink)    Clear liquids are liquids you can see through.  Nothing red in color.     Plain water                               Sports drinks  Sodas                                   Gelatin (Jell-O)  Fruit juices without pulp such as white grape juice and apple juice  Popsicles that contain no fruit or yogurt  Tea or coffee (no cream or milk added)  Gatorade / Powerade  G2 / Powerade Zero    Infants may have breast milk up to four hours before surgery.  Infants drinking formula may drink formula up to six hours before surgery.   Patients who avoid smoking, chewing tobacco and alcohol for 4 weeks prior to surgery have a reduced risk of post-operative complications.  Quit smoking as many days before surgery as you can.  Do not smoke, use chewing tobacco or drink alcohol the day of surgery.   If applicable bring your C-PAP/ BI-PAP machine in with you to preop day of surgery.  Bring any papers given to you in the doctor’s office.  Wear clean comfortable clothes.  Do not wear contact lenses, false eyelashes or make-up.  Bring a case for your glasses.   Bring crutches or walker if applicable.  Remove all  piercings.  Leave jewelry and any other valuables at home.  Hair extensions with metal clips must be removed prior to surgery.  The Pre-Admission Testing nurse will instruct you to bring medications if unable to obtain an accurate list in Pre-Admission Testing.        If you were given a blood bank ID arm band remember to bring it with you the day of surgery.    Preventing a Surgical Site Infection:  For 2 to 3 days before surgery, avoid shaving with a razor because the razor can irritate skin and make it easier to develop an infection.    Any areas of open skin can increase the risk of a post-operative wound infection by allowing bacteria to enter and travel throughout the body.  Notify your surgeon if you have any skin wounds / rashes even if it is not near the expected surgical site.  The area will need assessed to determine if surgery should be delayed until it is healed.  The night prior to surgery shower using a fresh bar of anti-bacterial soap (such as Dial) and clean washcloth.  Sleep in a clean bed with clean clothing.  Do not allow pets to sleep with you.  Shower on the morning of surgery using a fresh bar of anti-bacterial soap (such as Dial) and clean washcloth.  Dry with a clean towel and dress in clean clothing.  Ask your surgeon if you will be receiving antibiotics prior to surgery.  Make sure you, your family, and all healthcare providers clean their hands with soap and water or an alcohol based hand  before caring for you or your wound.    Day of surgery:  Your arrival time is approximately two hours before your scheduled surgery time.  Upon arrival, a Pre-op nurse and Anesthesiologist will review your health history, obtain vital signs, and answer questions you may have.  The only belongings needed at this time will be a list of your home medications and if applicable your C-PAP/BI-PAP machine.  A Pre-op nurse will start an IV and you may receive medication in preparation for surgery,  including something to help you relax.     Please be aware that surgery does come with discomfort.  We want to make every effort to control your discomfort so please discuss any uncontrolled symptoms with your nurse.   Your doctor will most likely have prescribed pain medications.      If you are going home after surgery you will receive individualized written care instructions before being discharged.  A responsible adult must drive you to and from the hospital on the day of your surgery and ideally stay with you through the night.   .  Discharge prescriptions can be filled by the hospital pharmacy during regular pharmacy hours.  If you are having surgery late in the day/evening your prescription may be e-prescribed to your pharmacy.  Please verify your pharmacy hours or chose a 24 hour pharmacy to avoid not having access to your prescription because your pharmacy has closed for the day.    If you are staying overnight following surgery, you will be transported to your hospital room following the recovery period.  [facility] has all private rooms.    If you have any questions please call Pre-Admission Testing at [phone].  Deductibles and co-payments are collected on the day of service. Please be prepared to pay the required co-pay, deductible or deposit on the day of service as defined by your plan.    Call your surgeon immediately if you experience any of the following symptoms:  Sore Throat  Shortness of Breath or difficulty breathing  Cough  Chills  Body soreness or muscle pain  Headache  Fever  New loss of taste or smell  Do not arrive for your surgery ill.  Your procedure will need to be rescheduled to another time.  You will need to call your physician before the day of surgery to avoid any unnecessary exposure to hospital staff as well as other patients.    CHLORHEXIDINE CLOTH INSTRUCTIONS  The morning of surgery follow these instructions using the Chlorhexidine cloths you've been given.   These steps reduce bacteria on the body.  Do not use the cloths near your eyes, ears mouth, genitalia or on open wounds.  Throw the cloths away after use but do not try to flush them down a toilet.      Open and remove one cloth at a time from the package.    Leave the cloth unfolded and begin the bathing.  Massage the skin with the cloths using gentle pressure to remove bacteria.  Do not scrub harshly.   Follow the steps below with one 2% CHG cloth per area (6 total cloths).  One cloth for neck, shoulders and chest.  One cloth for both arms, hands, fingers and underarms (do underarms last).  One cloth for the abdomen followed by groin.  One cloth for right leg and foot including between the toes.  One cloth for left leg and foot including between the toes.  The last cloth is to be used for the back of the neck, back and buttocks.    Allow the CHG to air dry 3 minutes on the skin which will give it time to work and decrease the chance of irritation.  The skin may feel sticky until it is dry.  Do not rinse with water or any other liquid or you will lose the beneficial effects of the CHG.  If mild skin irritation occurs, do rinse the skin to remove the CHG.  Report this to the nurse at time of admission.  Do not apply lotions, creams, ointments, deodorants or perfumes after using the clothes. Dress in clean clothes before coming to the hospital.

## 2024-03-05 ENCOUNTER — ANESTHESIA (OUTPATIENT)
Dept: PERIOP | Facility: HOSPITAL | Age: 84
End: 2024-03-05
Payer: MEDICARE

## 2024-03-05 ENCOUNTER — ANESTHESIA EVENT (OUTPATIENT)
Dept: PERIOP | Facility: HOSPITAL | Age: 84
End: 2024-03-05
Payer: MEDICARE

## 2024-03-05 ENCOUNTER — HOSPITAL ENCOUNTER (OUTPATIENT)
Facility: HOSPITAL | Age: 84
Setting detail: HOSPITAL OUTPATIENT SURGERY
Discharge: HOME | End: 2024-03-05
Attending: SURGERY | Admitting: SURGERY
Payer: MEDICARE

## 2024-03-05 VITALS
HEIGHT: 67 IN | RESPIRATION RATE: 16 BRPM | OXYGEN SATURATION: 96 % | BODY MASS INDEX: 23.57 KG/M2 | SYSTOLIC BLOOD PRESSURE: 136 MMHG | DIASTOLIC BLOOD PRESSURE: 69 MMHG | TEMPERATURE: 97.8 F | HEART RATE: 65 BPM

## 2024-03-05 DIAGNOSIS — K40.90 RIGHT INGUINAL HERNIA: ICD-10-CM

## 2024-03-05 PROCEDURE — C1781 MESH (IMPLANTABLE): HCPCS | Performed by: SURGERY

## 2024-03-05 PROCEDURE — 25010000002 ONDANSETRON PER 1 MG

## 2024-03-05 PROCEDURE — 25810000003 LACTATED RINGERS PER 1000 ML: Performed by: ANESTHESIOLOGY

## 2024-03-05 PROCEDURE — 25010000002 FENTANYL CITRATE (PF) 50 MCG/ML SOLUTION

## 2024-03-05 PROCEDURE — 49650 LAP ING HERNIA REPAIR INIT: CPT | Performed by: SURGERY

## 2024-03-05 PROCEDURE — 25010000002 PROPOFOL 10 MG/ML EMULSION

## 2024-03-05 PROCEDURE — 25010000002 CEFAZOLIN IN DEXTROSE 2-4 GM/100ML-% SOLUTION: Performed by: SURGERY

## 2024-03-05 PROCEDURE — 25010000002 DEXAMETHASONE SODIUM PHOSPHATE 20 MG/5ML SOLUTION

## 2024-03-05 PROCEDURE — 49591 RPR AA HRN 1ST < 3 CM RDC: CPT | Performed by: SURGERY

## 2024-03-05 PROCEDURE — 25810000003 SODIUM CHLORIDE PER 500 ML: Performed by: SURGERY

## 2024-03-05 PROCEDURE — 25810000003 LACTATED RINGERS PER 1000 ML

## 2024-03-05 PROCEDURE — 25010000002 SUGAMMADEX 200 MG/2ML SOLUTION

## 2024-03-05 PROCEDURE — 25010000002 PROPOFOL 200 MG/20ML EMULSION

## 2024-03-05 DEVICE — DEV WND/CLS CONTRL TISS STRATAFIX SPIRAL MNCRYL SH 2/0 20CM: Type: IMPLANTABLE DEVICE | Site: ABDOMEN | Status: FUNCTIONAL

## 2024-03-05 DEVICE — 3DMAX™ MID ANATOMICAL MESH, LARGE, RIGHT, 4” X 6”, 10 X 16 CM
Type: IMPLANTABLE DEVICE | Site: INGUINAL | Status: FUNCTIONAL
Brand: 3DMAX™ MID ANATOMICAL MESH

## 2024-03-05 RX ORDER — ROCURONIUM BROMIDE 10 MG/ML
INJECTION, SOLUTION INTRAVENOUS AS NEEDED
Status: DISCONTINUED | OUTPATIENT
Start: 2024-03-05 | End: 2024-03-05 | Stop reason: SURG

## 2024-03-05 RX ORDER — SODIUM CHLORIDE 0.9 % (FLUSH) 0.9 %
3 SYRINGE (ML) INJECTION EVERY 12 HOURS SCHEDULED
Status: DISCONTINUED | OUTPATIENT
Start: 2024-03-05 | End: 2024-03-05 | Stop reason: HOSPADM

## 2024-03-05 RX ORDER — HYDROCODONE BITARTRATE AND ACETAMINOPHEN 5; 325 MG/1; MG/1
1 TABLET ORAL EVERY 6 HOURS PRN
Qty: 20 TABLET | Refills: 0 | Status: SHIPPED | OUTPATIENT
Start: 2024-03-05

## 2024-03-05 RX ORDER — HYDROCODONE BITARTRATE AND ACETAMINOPHEN 7.5; 325 MG/1; MG/1
1 TABLET ORAL EVERY 4 HOURS PRN
Status: DISCONTINUED | OUTPATIENT
Start: 2024-03-05 | End: 2024-03-05 | Stop reason: HOSPADM

## 2024-03-05 RX ORDER — EPHEDRINE SULFATE 50 MG/ML
5 INJECTION, SOLUTION INTRAVENOUS ONCE AS NEEDED
Status: DISCONTINUED | OUTPATIENT
Start: 2024-03-05 | End: 2024-03-05 | Stop reason: HOSPADM

## 2024-03-05 RX ORDER — ONDANSETRON 2 MG/ML
4 INJECTION INTRAMUSCULAR; INTRAVENOUS ONCE AS NEEDED
Status: DISCONTINUED | OUTPATIENT
Start: 2024-03-05 | End: 2024-03-05 | Stop reason: HOSPADM

## 2024-03-05 RX ORDER — LIDOCAINE HYDROCHLORIDE 20 MG/ML
INJECTION, SOLUTION INFILTRATION; PERINEURAL AS NEEDED
Status: DISCONTINUED | OUTPATIENT
Start: 2024-03-05 | End: 2024-03-05 | Stop reason: SURG

## 2024-03-05 RX ORDER — NALOXONE HCL 0.4 MG/ML
0.2 VIAL (ML) INJECTION AS NEEDED
Status: DISCONTINUED | OUTPATIENT
Start: 2024-03-05 | End: 2024-03-05 | Stop reason: HOSPADM

## 2024-03-05 RX ORDER — DEXAMETHASONE SODIUM PHOSPHATE 4 MG/ML
INJECTION, SOLUTION INTRA-ARTICULAR; INTRALESIONAL; INTRAMUSCULAR; INTRAVENOUS; SOFT TISSUE AS NEEDED
Status: DISCONTINUED | OUTPATIENT
Start: 2024-03-05 | End: 2024-03-05 | Stop reason: SURG

## 2024-03-05 RX ORDER — AMOXICILLIN 250 MG
1 CAPSULE ORAL 2 TIMES DAILY PRN
Qty: 30 TABLET | Refills: 0 | Status: SHIPPED | OUTPATIENT
Start: 2024-03-05

## 2024-03-05 RX ORDER — ONDANSETRON 2 MG/ML
INJECTION INTRAMUSCULAR; INTRAVENOUS AS NEEDED
Status: DISCONTINUED | OUTPATIENT
Start: 2024-03-05 | End: 2024-03-05 | Stop reason: SURG

## 2024-03-05 RX ORDER — SODIUM CHLORIDE 0.9 % (FLUSH) 0.9 %
10 SYRINGE (ML) INJECTION EVERY 12 HOURS SCHEDULED
Status: DISCONTINUED | OUTPATIENT
Start: 2024-03-05 | End: 2024-03-05 | Stop reason: HOSPADM

## 2024-03-05 RX ORDER — FENTANYL CITRATE 50 UG/ML
50 INJECTION, SOLUTION INTRAMUSCULAR; INTRAVENOUS ONCE AS NEEDED
Status: DISCONTINUED | OUTPATIENT
Start: 2024-03-05 | End: 2024-03-05 | Stop reason: HOSPADM

## 2024-03-05 RX ORDER — TAMSULOSIN HYDROCHLORIDE 0.4 MG/1
0.4 CAPSULE ORAL DAILY
Status: DISCONTINUED | OUTPATIENT
Start: 2024-03-05 | End: 2024-03-05

## 2024-03-05 RX ORDER — FAMOTIDINE 10 MG/ML
20 INJECTION, SOLUTION INTRAVENOUS ONCE
Status: COMPLETED | OUTPATIENT
Start: 2024-03-05 | End: 2024-03-05

## 2024-03-05 RX ORDER — HYDROCODONE BITARTRATE AND ACETAMINOPHEN 5; 325 MG/1; MG/1
1 TABLET ORAL ONCE AS NEEDED
Status: COMPLETED | OUTPATIENT
Start: 2024-03-05 | End: 2024-03-05

## 2024-03-05 RX ORDER — TAMSULOSIN HYDROCHLORIDE 0.4 MG/1
0.4 CAPSULE ORAL ONCE
Status: COMPLETED | OUTPATIENT
Start: 2024-03-05 | End: 2024-03-05

## 2024-03-05 RX ORDER — SODIUM CHLORIDE 0.9 % (FLUSH) 0.9 %
10 SYRINGE (ML) INJECTION AS NEEDED
Status: DISCONTINUED | OUTPATIENT
Start: 2024-03-05 | End: 2024-03-05 | Stop reason: HOSPADM

## 2024-03-05 RX ORDER — SODIUM CHLORIDE 9 MG/ML
INJECTION, SOLUTION INTRAVENOUS AS NEEDED
Status: DISCONTINUED | OUTPATIENT
Start: 2024-03-05 | End: 2024-03-05 | Stop reason: HOSPADM

## 2024-03-05 RX ORDER — PROMETHAZINE HYDROCHLORIDE 25 MG/1
25 TABLET ORAL ONCE AS NEEDED
Status: DISCONTINUED | OUTPATIENT
Start: 2024-03-05 | End: 2024-03-05 | Stop reason: HOSPADM

## 2024-03-05 RX ORDER — PROPOFOL 10 MG/ML
INJECTION, EMULSION INTRAVENOUS AS NEEDED
Status: DISCONTINUED | OUTPATIENT
Start: 2024-03-05 | End: 2024-03-05 | Stop reason: SURG

## 2024-03-05 RX ORDER — LIDOCAINE HYDROCHLORIDE 10 MG/ML
0.5 INJECTION, SOLUTION INFILTRATION; PERINEURAL ONCE AS NEEDED
Status: DISCONTINUED | OUTPATIENT
Start: 2024-03-05 | End: 2024-03-05 | Stop reason: HOSPADM

## 2024-03-05 RX ORDER — MIDAZOLAM HYDROCHLORIDE 1 MG/ML
0.5 INJECTION INTRAMUSCULAR; INTRAVENOUS
Status: DISCONTINUED | OUTPATIENT
Start: 2024-03-05 | End: 2024-03-05 | Stop reason: HOSPADM

## 2024-03-05 RX ORDER — IPRATROPIUM BROMIDE AND ALBUTEROL SULFATE 2.5; .5 MG/3ML; MG/3ML
3 SOLUTION RESPIRATORY (INHALATION) ONCE AS NEEDED
Status: DISCONTINUED | OUTPATIENT
Start: 2024-03-05 | End: 2024-03-05 | Stop reason: HOSPADM

## 2024-03-05 RX ORDER — SODIUM CHLORIDE, SODIUM LACTATE, POTASSIUM CHLORIDE, CALCIUM CHLORIDE 600; 310; 30; 20 MG/100ML; MG/100ML; MG/100ML; MG/100ML
9 INJECTION, SOLUTION INTRAVENOUS CONTINUOUS
Status: DISCONTINUED | OUTPATIENT
Start: 2024-03-05 | End: 2024-03-05 | Stop reason: HOSPADM

## 2024-03-05 RX ORDER — BUPIVACAINE HYDROCHLORIDE AND EPINEPHRINE 2.5; 5 MG/ML; UG/ML
INJECTION, SOLUTION EPIDURAL; INFILTRATION; INTRACAUDAL; PERINEURAL AS NEEDED
Status: DISCONTINUED | OUTPATIENT
Start: 2024-03-05 | End: 2024-03-05 | Stop reason: HOSPADM

## 2024-03-05 RX ORDER — LABETALOL HYDROCHLORIDE 5 MG/ML
5 INJECTION, SOLUTION INTRAVENOUS
Status: DISCONTINUED | OUTPATIENT
Start: 2024-03-05 | End: 2024-03-05 | Stop reason: HOSPADM

## 2024-03-05 RX ORDER — SODIUM CHLORIDE 0.9 % (FLUSH) 0.9 %
3-10 SYRINGE (ML) INJECTION AS NEEDED
Status: DISCONTINUED | OUTPATIENT
Start: 2024-03-05 | End: 2024-03-05 | Stop reason: HOSPADM

## 2024-03-05 RX ORDER — FENTANYL CITRATE 50 UG/ML
25 INJECTION, SOLUTION INTRAMUSCULAR; INTRAVENOUS
Status: DISCONTINUED | OUTPATIENT
Start: 2024-03-05 | End: 2024-03-05 | Stop reason: HOSPADM

## 2024-03-05 RX ORDER — PROMETHAZINE HYDROCHLORIDE 25 MG/1
25 SUPPOSITORY RECTAL ONCE AS NEEDED
Status: DISCONTINUED | OUTPATIENT
Start: 2024-03-05 | End: 2024-03-05 | Stop reason: HOSPADM

## 2024-03-05 RX ORDER — DROPERIDOL 2.5 MG/ML
0.62 INJECTION, SOLUTION INTRAMUSCULAR; INTRAVENOUS
Status: DISCONTINUED | OUTPATIENT
Start: 2024-03-05 | End: 2024-03-05 | Stop reason: HOSPADM

## 2024-03-05 RX ORDER — DIPHENHYDRAMINE HYDROCHLORIDE 50 MG/ML
12.5 INJECTION INTRAMUSCULAR; INTRAVENOUS
Status: DISCONTINUED | OUTPATIENT
Start: 2024-03-05 | End: 2024-03-05 | Stop reason: HOSPADM

## 2024-03-05 RX ORDER — FENTANYL CITRATE 50 UG/ML
INJECTION, SOLUTION INTRAMUSCULAR; INTRAVENOUS AS NEEDED
Status: DISCONTINUED | OUTPATIENT
Start: 2024-03-05 | End: 2024-03-05 | Stop reason: SURG

## 2024-03-05 RX ORDER — SODIUM CHLORIDE 9 MG/ML
40 INJECTION, SOLUTION INTRAVENOUS AS NEEDED
Status: DISCONTINUED | OUTPATIENT
Start: 2024-03-05 | End: 2024-03-05 | Stop reason: HOSPADM

## 2024-03-05 RX ORDER — CEFAZOLIN SODIUM 2 G/100ML
2000 INJECTION, SOLUTION INTRAVENOUS ONCE
Status: COMPLETED | OUTPATIENT
Start: 2024-03-05 | End: 2024-03-05

## 2024-03-05 RX ORDER — SODIUM CHLORIDE, SODIUM LACTATE, POTASSIUM CHLORIDE, CALCIUM CHLORIDE 600; 310; 30; 20 MG/100ML; MG/100ML; MG/100ML; MG/100ML
INJECTION, SOLUTION INTRAVENOUS CONTINUOUS PRN
Status: DISCONTINUED | OUTPATIENT
Start: 2024-03-05 | End: 2024-03-05 | Stop reason: SURG

## 2024-03-05 RX ORDER — HYDRALAZINE HYDROCHLORIDE 20 MG/ML
5 INJECTION INTRAMUSCULAR; INTRAVENOUS
Status: DISCONTINUED | OUTPATIENT
Start: 2024-03-05 | End: 2024-03-05 | Stop reason: HOSPADM

## 2024-03-05 RX ORDER — FLUMAZENIL 0.1 MG/ML
0.2 INJECTION INTRAVENOUS AS NEEDED
Status: DISCONTINUED | OUTPATIENT
Start: 2024-03-05 | End: 2024-03-05 | Stop reason: HOSPADM

## 2024-03-05 RX ORDER — HYDROMORPHONE HYDROCHLORIDE 1 MG/ML
0.25 INJECTION, SOLUTION INTRAMUSCULAR; INTRAVENOUS; SUBCUTANEOUS
Status: DISCONTINUED | OUTPATIENT
Start: 2024-03-05 | End: 2024-03-05 | Stop reason: HOSPADM

## 2024-03-05 RX ADMIN — FENTANYL CITRATE 25 MCG: 50 INJECTION, SOLUTION INTRAMUSCULAR; INTRAVENOUS at 08:20

## 2024-03-05 RX ADMIN — FENTANYL CITRATE 25 MCG: 50 INJECTION, SOLUTION INTRAMUSCULAR; INTRAVENOUS at 08:34

## 2024-03-05 RX ADMIN — LIDOCAINE HYDROCHLORIDE 70 MG: 20 INJECTION, SOLUTION INFILTRATION; PERINEURAL at 08:04

## 2024-03-05 RX ADMIN — CEFAZOLIN SODIUM 2000 MG: 2 INJECTION, SOLUTION INTRAVENOUS at 07:51

## 2024-03-05 RX ADMIN — TAMSULOSIN HYDROCHLORIDE 0.4 MG: 0.4 CAPSULE ORAL at 16:09

## 2024-03-05 RX ADMIN — HYDROCODONE BITARTRATE AND ACETAMINOPHEN 1 TABLET: 5; 325 TABLET ORAL at 10:06

## 2024-03-05 RX ADMIN — DEXAMETHASONE SODIUM PHOSPHATE 8 MG: 4 INJECTION, SOLUTION INTRAMUSCULAR; INTRAVENOUS at 08:15

## 2024-03-05 RX ADMIN — PROPOFOL 150 MG: 10 INJECTION, EMULSION INTRAVENOUS at 08:04

## 2024-03-05 RX ADMIN — ROCURONIUM BROMIDE 40 MG: 10 INJECTION, SOLUTION INTRAVENOUS at 08:04

## 2024-03-05 RX ADMIN — ONDANSETRON 4 MG: 2 INJECTION INTRAMUSCULAR; INTRAVENOUS at 09:15

## 2024-03-05 RX ADMIN — PROPOFOL 50 MG: 10 INJECTION, EMULSION INTRAVENOUS at 08:34

## 2024-03-05 RX ADMIN — PROPOFOL 125 MCG/KG/MIN: 10 INJECTION, EMULSION INTRAVENOUS at 08:11

## 2024-03-05 RX ADMIN — SODIUM CHLORIDE, POTASSIUM CHLORIDE, SODIUM LACTATE AND CALCIUM CHLORIDE: 600; 310; 30; 20 INJECTION, SOLUTION INTRAVENOUS at 07:57

## 2024-03-05 RX ADMIN — FAMOTIDINE 20 MG: 10 INJECTION INTRAVENOUS at 06:40

## 2024-03-05 RX ADMIN — SODIUM CHLORIDE, POTASSIUM CHLORIDE, SODIUM LACTATE AND CALCIUM CHLORIDE 9 ML/HR: 600; 310; 30; 20 INJECTION, SOLUTION INTRAVENOUS at 06:40

## 2024-03-05 RX ADMIN — ROCURONIUM BROMIDE 10 MG: 10 INJECTION, SOLUTION INTRAVENOUS at 08:34

## 2024-03-05 RX ADMIN — SUGAMMADEX 200 MG: 100 INJECTION, SOLUTION INTRAVENOUS at 09:23

## 2024-03-05 NOTE — ANESTHESIA POSTPROCEDURE EVALUATION
Patient: Bayron Acevedo    Procedure Summary       Date: 03/05/24 Room / Location: [facility] OR [address] [place] [address] MAIN OR    Anesthesia Start: 0757 Anesthesia Stop: 0937    Procedure: right INGUINAL HERNIA REPAIR LAPAROSCOPIC WITH DAVINCI ROBOT (Right: Abdomen) Diagnosis:       Right inguinal hernia      (Right inguinal hernia [K40.90])    Surgeons: Tejinder Hummel MD Provider: Jakub Rodriguez MD    Anesthesia Type: general ASA Status: 4            Anesthesia Type: general    Vitals  Vitals Value Taken Time   /95 03/05/24 1000   Temp 36.6 °C (97.8 °F) 03/05/24 0935   Pulse 70 03/05/24 1015   Resp 16 03/05/24 1000   SpO2 96 % 03/05/24 1015           Anesthesia Post Evaluation

## 2024-03-05 NOTE — OP NOTE
Operative Note :   Tejinder Hummel MD    Bayron Acevedo  1940    Procedure Date: 03/05/24    Pre-op Diagnosis:  Right inguinal hernia [K40.90]    Post-Op Diagnosis:  Same    Procedure:   Laparoscopic right inguinal hernia repair with da Ismael robot assistance    Surgeon: Tejinder Hummel MD    Assistant: Zoe Johnson CSA was responsible for performing the following activities: Retraction, introduction of needles and mesh, exchange of instruments at bedside, skin closure and placement of dressings and manipulation of camera and their skilled assistance was necessary for the success of this case.      Anesthesia: General    EBL:   minimal    Specimens:   none    Indications:  83-year-old chronically ill gentleman with an initial and symptomatic and reducible right inguinal hernia    Findings:   Moderate sized direct defect on the right  Tiny additional ventral defect estimated at less than 1 cm containing preperitoneal fat superior and lateral to the internal ring, likely asymptomatic    Recommendations:   Routine post hernia care    Description of procedure:    After obtaining informed consent, patient was brought to the operating room and placed under a general anesthetic.  The abdomen was sterilely prepped and draped.  Supraumbilical skin incision was made and dissected through the subcutaneous tissue.  Fascia was incised in the midline.  #1 Vicryl suture was placed on each side of the fascia in a U stitch pattern.  The peritoneum was bluntly penetrated.  8 mm robotic Oswald trocar was introduced and the abdomen was insufflated with CO2.  Initial examination of the abdomen demonstrated evidence of a direct defect of moderate size on the right side.  Additional 8 mm robotic trocars were introduced on the left and right at the level of the supraumbilical trocar.  The Status4i Xi robot was then brought up and docked.  The monopolar scissors were introduced on the right and the force bipolar grasper on the left under  direct visualization.  The patient was placed approximately 10 degrees head down.  The peritoneum overlying the anterior superior iliac spine on the right was incised and this peritoneal incision was then carried across medially to the level of the obliterated median umbilical vein.  This preperitoneal dissection was carried along the medial aspect until I worked my way all the way down onto Dakota's ligament medially.  The direct defect was identified and the contents were  away from the pseudo sac.  The medial space was dissected completely.  The peritoneum was then peeled down off of the cord structures and the lateral space was dissected.  Interestingly there was a separate, tiny ventral hernia probably less than 1 cm along the right lower quadrant, perhaps related to his prior appendectomy.  This was superior and lateral to the inguinal ring.  It appeared to extend really only through the posterior sheath.  The preperitoneal fat was dissected out of this and it was clear that the space would be covered by the mesh.  I then positioned the large 10 x 16 cm Bard mid weight 3D polypropylene mesh with excellent coverage of the real and potential defects.  The mesh was secured along Dakota's ligament into positions with 2-0 Vicryl and then along the anterior abdominal wall both medial and lateral to the epigastric vessels, again using 2-0 Vicryl suture.  The peritoneal flap was then lifted up and I ensured that it did not interfere with the inferior aspect of the mesh.  The flap was then closed with a running barbed 2-0 Monocryl suture, incorporating the hernia sac into the closure.  It should be noted that the quality of the peritoneum was extremely thin and fragile and there was a small rent within this which was closed with a 2-0 Vicryl suture.  Needles were retrieved.  The robot was undocked.  The abdomen was desufflated.  The midline fascial incision was reapproximated with #1 Vicryl suture.  Skin  incisions were closed with 4-0 Monocryl.  Sterile dressings were applied.    Tejinder Hummel MD  General and Endoscopic Surgery  [facility]    [address], [address]  [place], [place], [address]  P: [phone]

## 2024-03-05 NOTE — ANESTHESIA PREPROCEDURE EVALUATION
Anesthesia Evaluation     Patient summary reviewed and Nursing notes reviewed   NPO Solid Status: > 8 hours  NPO Liquid Status: > 4 hours           Airway   Mallampati: II  Neck ROM: full  No difficulty expected  Dental    (+) lower dentures and upper dentures    Pulmonary     breath sounds clear to auscultation  (+) pleural effusion, a smoker Former, lung cancer, COPD,  Cardiovascular     Rhythm: regular    (+) hypertension, valvular problems/murmurs MR, past MI , CAD, cardiac stents , dysrhythmias Atrial Fib, CHF , PVD, hyperlipidemia,  carotid artery disease left carotid      Neuro/Psych  GI/Hepatic/Renal/Endo    (+) GERD, renal disease-    Musculoskeletal     Abdominal    Substance History      OB/GYN          Other      history of cancer active                Anesthesia Plan    ASA 4     general     intravenous induction     Anesthetic plan, risks, benefits, and alternatives have been provided, discussed and informed consent has been obtained with: patient.    CODE STATUS:

## 2024-03-05 NOTE — NURSING NOTE
Pt unable to urinate post inguinal hernia surgery, per Dr. Hummel place catheter, Catheter placed after 3 attempts by Monserrat Blevins RN. Pt tolerated well.  Dr. Hummel also ordered Flomax 0.4 mg po to be given before pt goes home.  Pt to have catheter removed in a couple of days at Dr. Hummel's office.

## 2024-03-05 NOTE — ANESTHESIA PROCEDURE NOTES
Airway  Urgency: elective    Date/Time: 3/5/2024 8:08 AM  Airway not difficult    General Information and Staff    Patient location during procedure: OR  Anesthesiologist: Jakub Rodriguez MD  CRNA/CAA: Shantelle Flores CRNA    Indications and Patient Condition  Indications for airway management: airway protection    Preoxygenated: yes  MILS not maintained throughout  Mask difficulty assessment: 1 - vent by mask    Final Airway Details  Final airway type: endotracheal airway      Successful airway: ETT  Cuffed: yes   Successful intubation technique: direct laryngoscopy  Facilitating devices/methods: intubating stylet  Endotracheal tube insertion site: oral  Blade: Danisha  Blade size: 4  ETT size (mm): 7.5  Cormack-Lehane Classification: grade I - full view of glottis  Placement verified by: chest auscultation and capnometry   Cuff volume (mL): 8  Measured from: gums  ETT/EBT to gums (cm): 22  Number of attempts at approach: 1  Assessment: lips, teeth, and gum same as pre-op and atraumatic intubation

## 2024-03-05 NOTE — PERIOPERATIVE NURSING NOTE
Notified Dr. Hummel of pt not being able to void, will continue to monitor and assess. Will rescan bladder in 1 hour.

## 2024-03-06 ENCOUNTER — TELEPHONE (OUTPATIENT)
Dept: SURGERY | Facility: CLINIC | Age: 84
End: 2024-03-06
Payer: MEDICARE

## 2024-03-06 NOTE — TELEPHONE ENCOUNTER
Patient is S/P right INGUINAL HERNIA REPAIR LAPAROSCOPIC WITH DAVINCI ROBOT  03/05/24. His wife called stating he had a catheter placed and they were instructed to contact Dr. Hummel to see if he can remove it on Thursday?

## 2024-03-08 ENCOUNTER — CLINICAL SUPPORT (OUTPATIENT)
Dept: SURGERY | Facility: CLINIC | Age: 84
End: 2024-03-08
Payer: MEDICARE

## 2024-03-08 NOTE — PROGRESS NOTES
Patient presented to the office this afternoon for removal of kovacs catheter.  A 5cc syringe was used to draw out fluid from the balloon before removal of catheter.  Four draw backs with syringe were made prior to removal.  Slight resistance was felt once pulling on the catheter for removal.  Asked Dr. Han who was in office to come to the room and assist.   He used the syringe and was able to remove more fluid.  Catheter was then easily removed.

## 2024-03-09 ENCOUNTER — APPOINTMENT (OUTPATIENT)
Dept: CT IMAGING | Facility: HOSPITAL | Age: 84
End: 2024-03-09
Payer: MEDICARE

## 2024-03-09 ENCOUNTER — HOSPITAL ENCOUNTER (EMERGENCY)
Facility: HOSPITAL | Age: 84
Discharge: HOME | End: 2024-03-09
Attending: EMERGENCY MEDICINE
Payer: MEDICARE

## 2024-03-09 VITALS
HEART RATE: 59 BPM | RESPIRATION RATE: 18 BRPM | WEIGHT: 150.57 LBS | SYSTOLIC BLOOD PRESSURE: 171 MMHG | OXYGEN SATURATION: 96 % | BODY MASS INDEX: 23.63 KG/M2 | HEIGHT: 67 IN | TEMPERATURE: 96.8 F | DIASTOLIC BLOOD PRESSURE: 81 MMHG

## 2024-03-09 DIAGNOSIS — R31.9 HEMATURIA, UNSPECIFIED TYPE: Primary | ICD-10-CM

## 2024-03-09 DIAGNOSIS — N18.9 CHRONIC KIDNEY DISEASE, UNSPECIFIED CKD STAGE: ICD-10-CM

## 2024-03-09 LAB
ALBUMIN SERPL-MCNC: 4.2 G/DL (ref 3.5–5.2)
ALBUMIN/GLOB SERPL: 1.6 G/DL
ALP SERPL-CCNC: 87 U/L (ref 39–117)
ALT SERPL W P-5'-P-CCNC: 6 U/L (ref 1–41)
ANION GAP SERPL CALCULATED.3IONS-SCNC: 12.7 MMOL/L (ref 5–15)
AST SERPL-CCNC: 16 U/L (ref 1–40)
BACTERIA UR QL AUTO: ABNORMAL /HPF
BASOPHILS # BLD AUTO: 0.03 10*3/MM3 (ref 0–0.2)
BASOPHILS NFR BLD AUTO: 0.4 % (ref 0–1.5)
BILIRUB SERPL-MCNC: 0.5 MG/DL (ref 0–1.2)
BILIRUB UR QL STRIP: NEGATIVE
BUN SERPL-MCNC: 24 MG/DL (ref 8–23)
BUN/CREAT SERPL: 14.1 (ref 7–25)
CALCIUM SPEC-SCNC: 9 MG/DL (ref 8.6–10.5)
CHLORIDE SERPL-SCNC: 99 MMOL/L (ref 98–107)
CLARITY UR: CLEAR
CO2 SERPL-SCNC: 27.3 MMOL/L (ref 22–29)
COLOR UR: ABNORMAL
CREAT SERPL-MCNC: 1.7 MG/DL (ref 0.76–1.27)
DEPRECATED RDW RBC AUTO: 48.5 FL (ref 37–54)
EGFRCR SERPLBLD CKD-EPI 2021: 39 ML/MIN/1.73
EOSINOPHIL # BLD AUTO: 0.68 10*3/MM3 (ref 0–0.4)
EOSINOPHIL NFR BLD AUTO: 9.4 % (ref 0.3–6.2)
ERYTHROCYTE [DISTWIDTH] IN BLOOD BY AUTOMATED COUNT: 16.6 % (ref 12.3–15.4)
GLOBULIN UR ELPH-MCNC: 2.6 GM/DL
GLUCOSE SERPL-MCNC: 101 MG/DL (ref 65–99)
GLUCOSE UR STRIP-MCNC: NEGATIVE MG/DL
HCT VFR BLD AUTO: 38 % (ref 37.5–51)
HGB BLD-MCNC: 11.8 G/DL (ref 13–17.7)
HGB UR QL STRIP.AUTO: ABNORMAL
HYALINE CASTS UR QL AUTO: ABNORMAL /LPF
IMM GRANULOCYTES # BLD AUTO: 0.02 10*3/MM3 (ref 0–0.05)
IMM GRANULOCYTES NFR BLD AUTO: 0.3 % (ref 0–0.5)
KETONES UR QL STRIP: NEGATIVE
LEUKOCYTE ESTERASE UR QL STRIP.AUTO: ABNORMAL
LYMPHOCYTES # BLD AUTO: 0.88 10*3/MM3 (ref 0.7–3.1)
LYMPHOCYTES NFR BLD AUTO: 12.2 % (ref 19.6–45.3)
MCH RBC QN AUTO: 25.3 PG (ref 26.6–33)
MCHC RBC AUTO-ENTMCNC: 31.1 G/DL (ref 31.5–35.7)
MCV RBC AUTO: 81.5 FL (ref 79–97)
MONOCYTES # BLD AUTO: 0.55 10*3/MM3 (ref 0.1–0.9)
MONOCYTES NFR BLD AUTO: 7.6 % (ref 5–12)
NEUTROPHILS NFR BLD AUTO: 5.04 10*3/MM3 (ref 1.7–7)
NEUTROPHILS NFR BLD AUTO: 70.1 % (ref 42.7–76)
NITRITE UR QL STRIP: NEGATIVE
NRBC BLD AUTO-RTO: 0 /100 WBC (ref 0–0.2)
PH UR STRIP.AUTO: 6 [PH] (ref 5–8)
PLATELET # BLD AUTO: 146 10*3/MM3 (ref 140–450)
PMV BLD AUTO: 10.2 FL (ref 6–12)
POTASSIUM SERPL-SCNC: 3.8 MMOL/L (ref 3.5–5.2)
PROT SERPL-MCNC: 6.8 G/DL (ref 6–8.5)
PROT UR QL STRIP: ABNORMAL
RBC # BLD AUTO: 4.66 10*6/MM3 (ref 4.14–5.8)
RBC # UR STRIP: ABNORMAL /HPF
REF LAB TEST METHOD: ABNORMAL
SODIUM SERPL-SCNC: 139 MMOL/L (ref 136–145)
SP GR UR STRIP: 1.01 (ref 1–1.03)
SQUAMOUS #/AREA URNS HPF: ABNORMAL /HPF
UROBILINOGEN UR QL STRIP: ABNORMAL
WBC # UR STRIP: ABNORMAL /HPF
WBC NRBC COR # BLD AUTO: 7.2 10*3/MM3 (ref 3.4–10.8)

## 2024-03-09 PROCEDURE — 85025 COMPLETE CBC W/AUTO DIFF WBC: CPT | Performed by: NURSE PRACTITIONER

## 2024-03-09 PROCEDURE — 80053 COMPREHEN METABOLIC PANEL: CPT | Performed by: NURSE PRACTITIONER

## 2024-03-09 PROCEDURE — 36415 COLL VENOUS BLD VENIPUNCTURE: CPT

## 2024-03-09 PROCEDURE — 99284 EMERGENCY DEPT VISIT MOD MDM: CPT

## 2024-03-09 PROCEDURE — 74176 CT ABD & PELVIS W/O CONTRAST: CPT

## 2024-03-09 PROCEDURE — 81001 URINALYSIS AUTO W/SCOPE: CPT | Performed by: NURSE PRACTITIONER

## 2024-03-09 NOTE — ED PROVIDER NOTES
EMERGENCY DEPARTMENT ENCOUNTER  Room Number:  04/04  PCP: Low Sepulveda  Independent Historians: Patient and Family      HPI:  Chief Complaint: had concerns including Blood in Urine.     A complete HPI/ROS/PMH/PSH/SH/FH are unobtainable due to: None    Chronic or social conditions impacting patient care (Social Determinants of Health): None      Context: Bayron Acevedo is a 83 y.o. male with a medical history of HTN, hyperlipidemia, CAD, COPD, CKD, A-fib, lung cancer who presents to the ED c/o acute hematuria.  Patient states that he had hernia repair surgery Tuesday of last week.  In postop he was not able to void so they placed a Almodovar catheter.  He states that it took multiple attempts before the Almodovar catheter was placed.  He did later find out that he had a catheter during surgery that was removed and then a second catheter when he could not urinate at discharge.  The Almodovar was removed yesterday and initially he was urinating fine.  He states later yesterday afternoon he passed a blood clot and then noted his urine to be red.  That has persisted until today.  He is not anticoagulated.  He denies fever, chills, nausea, vomiting.  He is having some abdominal soreness from his surgery.      Review of prior external notes (non-ED) -and- Review of prior external test results outside of this encounter:  Medical records reviewed in [facility], patient had right inguinal hernia repair 3/5/2024.    Prescription drug monitoring program review:     N/A    PAST MEDICAL HISTORY  Active Ambulatory Problems     Diagnosis Date Noted    COPD (chronic obstructive pulmonary disease) with emphysema 02/25/2016    Hypertension     Hyperlipidemia     S/P angioplasty with stent     Heart attack     Colon polyp     Carotid stenosis     CAD in native artery     Peripheral artery disease 04/19/2016    History of hydrocele 04/19/2016    Perennial allergic rhinitis 01/06/2017    AAA (abdominal aortic aneurysm) without rupture 05/17/2021    Acute  on chronic systolic CHF (congestive heart failure) 04/10/2023    Stage 3b chronic kidney disease 04/10/2023    Hydropneumothorax 06/13/2023    Mass of upper lobe of left lung 06/14/2023    NICM (nonischemic cardiomyopathy) 06/14/2023    Chronic systolic CHF (congestive heart failure) 06/14/2023    Normocytic anemia 07/11/2023    Adenocarcinoma, lung, left 07/11/2023    Right inguinal hernia 02/12/2024     Resolved Ambulatory Problems     Diagnosis Date Noted    No Resolved Ambulatory Problems     Past Medical History:   Diagnosis Date    AAA (abdominal aortic aneurysm)     Acid reflux     Cataract     CKD (chronic kidney disease)     COPD (chronic obstructive pulmonary disease)     H/O Acute myocardial infarction 2013    H/O PAD (peripheral artery disease)     Heart failure     History of atrial fibrillation     Inguinal hernia     Left ventricular dysfunction     Mass of left lung     Mitral valve regurgitation     Non-small cell lung cancer (NSCLC) 2023    PVD (peripheral vascular disease)     Tinnitus     Tricuspid regurgitation          PAST SURGICAL HISTORY  Past Surgical History:   Procedure Laterality Date    APPENDECTOMY      ARTERIOGRAM AORTIC N/A 05/17/2021    Procedure: ZENITH AORTIC STENT GRAFT;  Surgeon: Karen Barrera Jr., MD;  Location: [facility] OR 18/19;  Service: Vascular;  Laterality: N/A;    CARDIAC CATHETERIZATION      X2    CARDIAC CATHETERIZATION N/A 04/11/2023    Procedure: Left Heart Cath;  Surgeon: Guru Jiang MD;  Location: [facility] INVASIVE LOCATION;  Service: Cardiovascular;  Laterality: N/A;    CARDIAC CATHETERIZATION N/A 04/11/2023    Procedure: Right Heart Cath;  Surgeon: Guru Jiang MD;  Location: [facility] CATH INVASIVE LOCATION;  Service: Cardiovascular;  Laterality: N/A;    CARDIAC CATHETERIZATION N/A 04/11/2023    Procedure: Coronary angiography;  Surgeon: Guru Jiang MD;  Location: [facility] CATH INVASIVE LOCATION;  Service: Cardiovascular;   Laterality: N/A;    CARDIAC CATHETERIZATION N/A 04/11/2023    Procedure: Left ventriculography;  Surgeon: Guru Jiang MD;  Location:  [facility] CATH INVASIVE LOCATION;  Service: Cardiovascular;  Laterality: N/A;    CARDIAC CATHETERIZATION  04/11/2023    Procedure: RESTING FULL CYCLE RATIO;  Surgeon: Guru Jiang MD;  Location:  [facility] CATH INVASIVE LOCATION;  Service: Cardiovascular;;    CAROTID ENDARTERECTOMY Left 01/28/2013    Bruce Jones Jr, MD    COLONOSCOPY N/A 10/17/2011    Diverticulosis sigmoid colon, two 4-5 mm polyps in the transverse colon and in the ascending colon, internal hemorrhoids, otherwise normal-Dr. Bronson Blanc    CORONARY STENT PLACEMENT      HYDROCELE EXCISION / REPAIR Right 05/28/2014    Dr. STANLEY Osorio    INGUINAL HERNIA REPAIR Right 3/5/2024    Procedure: right INGUINAL HERNIA REPAIR LAPAROSCOPIC WITH DAVINCI ROBOT;  Surgeon: Tejinder Hummel MD;  Location: [facility] MAIN OR;  Service: Robotics - DaVinci;  Laterality: Right;    LOBECTOMY Left 07/31/2023    Procedure: BRONCHOSCOPY, LEFT VAT WITH DAVINCI ROBOT, EXTENSIVE LYSIS OF ADHESIONS, PARTIAL PULMONARY DECORTICATION, INTERCOSTAL NERVE BLOCK;  Surgeon: Nemesio Kramer MD PhD;  Location: [facility] MAIN OR;  Service: Robotics - DaVinci;  Laterality: Left;    MEDIASTINOSCOPY N/A 07/17/2023    Procedure: MEDIASTINOSCOPY;  Surgeon: Nemesio Kramer MD PhD;  Location: [facility] MAIN OR;  Service: Thoracic;  Laterality: N/A;    UMBILICAL HERNIA REPAIR N/A 05/18/2007    Umbilical hernia repair with Ventralax mesh-Dr. Prabhu Freedman         FAMILY HISTORY  Family History   Problem Relation Age of Onset    No Known Problems Mother         complications of child birth    Cancer Father     Leukemia Father     Cancer Brother     Heart disease Maternal Uncle     Malig Hyperthermia Neg Hx          SOCIAL HISTORY  Social History     Socioeconomic History    Marital status:      Spouse name: Amina   Tobacco Use    Smoking status:  Former     Current packs/day: 0.00     Average packs/day: 1 pack/day for 55.0 years (55.0 ttl pk-yrs)     Types: Cigarettes     Start date:      Quit date:      Years since quittin.2    Smokeless tobacco: Never   Vaping Use    Vaping status: Never Used   Substance and Sexual Activity    Alcohol use: No    Drug use: No    Sexual activity: Defer         ALLERGIES  Lisinopril, Codeine sulfate, Contrast dye (echo or unknown ct/mr), Iodinated contrast media, Losartan, and Penicillins      REVIEW OF SYSTEMS  Review of Systems  Included in HPI  All systems reviewed and negative except for those discussed in HPI.      PHYSICAL EXAM    I have reviewed the triage vital signs and nursing notes.    ED Triage Vitals   Temp Heart Rate Resp BP SpO2   24 1159 24 1159 24 1159 24 1216 24 1159   96.8 °F (36 °C) 67 20 170/79 97 %       Physical Exam    GENERAL: Well appearing, nontoxic appearing, not distressed  HENT: normocephalic, atraumatic  EYES: no scleral icterus, PERRL  CV: regular rhythm, regular rate, no murmur  RESPIRATORY: normal effort, CTAB  ABDOMEN: soft, normal bowel sounds, diffuse soreness in abdomen, patient is status post laparoscopic hernia repair, laparoscopic incisions with Steri-Strips in place are well-appearing.  MUSCULOSKELETAL: no deformity  NEURO: alert, moves all extremities, follows commands, mental status normal/baseline  SKIN: warm, dry, no rash   Psych: Appropriate mood and affect  Nursing notes and vital signs reviewed    Vital signs and nursing notes reviewed.                LAB RESULTS  Recent Results (from the past 24 hour(s))   Comprehensive Metabolic Panel    Collection Time: 24  1:05 PM    Specimen: Blood   Result Value Ref Range    Glucose 101 (H) 65 - 99 mg/dL    BUN 24 (H) 8 - 23 mg/dL    Creatinine 1.70 (H) 0.76 - 1.27 mg/dL    Sodium 139 136 - 145 mmol/L    Potassium 3.8 3.5 - 5.2 mmol/L    Chloride 99 98 - 107 mmol/L    CO2 27.3 22.0 - 29.0  mmol/L    Calcium 9.0 8.6 - 10.5 mg/dL    Total Protein 6.8 6.0 - 8.5 g/dL    Albumin 4.2 3.5 - 5.2 g/dL    ALT (SGPT) 6 1 - 41 U/L    AST (SGOT) 16 1 - 40 U/L    Alkaline Phosphatase 87 39 - 117 U/L    Total Bilirubin 0.5 0.0 - 1.2 mg/dL    Globulin 2.6 gm/dL    A/G Ratio 1.6 g/dL    BUN/Creatinine Ratio 14.1 7.0 - 25.0    Anion Gap 12.7 5.0 - 15.0 mmol/L    eGFR 39.0 (L) >60.0 mL/min/1.73   Urinalysis With Microscopic If Indicated (No Culture) - Urine, Clean Catch    Collection Time: 03/09/24  1:05 PM    Specimen: Urine, Clean Catch   Result Value Ref Range    Color, UA Red (A) Yellow, Straw    Appearance, UA Clear Clear    pH, UA 6.0 5.0 - 8.0    Specific Gravity, UA 1.006 1.005 - 1.030    Glucose, UA Negative Negative    Ketones, UA Negative Negative    Bilirubin, UA Negative Negative    Blood, UA Large (3+) (A) Negative    Protein, UA Trace (A) Negative    Leuk Esterase, UA Trace (A) Negative    Nitrite, UA Negative Negative    Urobilinogen, UA 0.2 E.U./dL 0.2 - 1.0 E.U./dL   CBC Auto Differential    Collection Time: 03/09/24  1:05 PM    Specimen: Blood   Result Value Ref Range    WBC 7.20 3.40 - 10.80 10*3/mm3    RBC 4.66 4.14 - 5.80 10*6/mm3    Hemoglobin 11.8 (L) 13.0 - 17.7 g/dL    Hematocrit 38.0 37.5 - 51.0 %    MCV 81.5 79.0 - 97.0 fL    MCH 25.3 (L) 26.6 - 33.0 pg    MCHC 31.1 (L) 31.5 - 35.7 g/dL    RDW 16.6 (H) 12.3 - 15.4 %    RDW-SD 48.5 37.0 - 54.0 fl    MPV 10.2 6.0 - 12.0 fL    Platelets 146 140 - 450 10*3/mm3    Neutrophil % 70.1 42.7 - 76.0 %    Lymphocyte % 12.2 (L) 19.6 - 45.3 %    Monocyte % 7.6 5.0 - 12.0 %    Eosinophil % 9.4 (H) 0.3 - 6.2 %    Basophil % 0.4 0.0 - 1.5 %    Immature Grans % 0.3 0.0 - 0.5 %    Neutrophils, Absolute 5.04 1.70 - 7.00 10*3/mm3    Lymphocytes, Absolute 0.88 0.70 - 3.10 10*3/mm3    Monocytes, Absolute 0.55 0.10 - 0.90 10*3/mm3    Eosinophils, Absolute 0.68 (H) 0.00 - 0.40 10*3/mm3    Basophils, Absolute 0.03 0.00 - 0.20 10*3/mm3    Immature Grans, Absolute 0.02  0.00 - 0.05 10*3/mm3    nRBC 0.0 0.0 - 0.2 /100 WBC   Urinalysis, Microscopic Only - Urine, Clean Catch    Collection Time: 03/09/24  1:05 PM    Specimen: Urine, Clean Catch   Result Value Ref Range    RBC, UA Too Numerous to Count (A) None Seen, 0-2 /HPF    WBC, UA 3-5 (A) None Seen, 0-2 /HPF    Bacteria, UA None Seen None Seen /HPF    Squamous Epithelial Cells, UA 0-2 None Seen, 0-2 /HPF    Hyaline Casts, UA 0-2 None Seen /LPF    Methodology Automated Microscopy          RADIOLOGY  CT Abdomen Pelvis Without Contrast    Result Date: 3/9/2024  CT ABDOMEN PELVIS WO CONTRAST-  INDICATIONS: Hematuria. Recent hernia repair surgery.  TECHNIQUE: Radiation dose reduction techniques were utilized, including automated exposure control and exposure modulation based on body size. Unenhanced ABDOMEN AND PELVIS CT  COMPARISON: 1/23/2024  FINDINGS:  Small free intraperitoneal gas, for example axial image 35, although nonspecific, is probably related to the recent surgery. Fluid and gas are seen in the right inguinal hernia. A left inguinal hernia fat is noted.  Numerous colonic diverticula are seen that do not appear inflamed.    Exophytic hyperdense lesion of the left kidney appears stable. Small nonobstructive left renal hilar calcification may be arterial in location, no hydronephrosis. Asymmetric right renal atrophy is noted.  Small gas in the urinary bladder could be from catheterization or could be evidence of urinary infection. The prostate is enlarged, 5.4 cm.  Otherwise unremarkable appearance of the liver, spleen, adrenal glands, pancreas, kidneys, bladder.  No bowel obstruction or abnormal bowel thickening is identified.  No free intraperitoneal gas or free fluid.  Scattered small mesenteric and para-aortic lymph nodes are seen that are not significant by size criteria.  Abdominal aorta is aneurysmal, 5.6 cm on axial image 55, with aortobiiliac stents in place and (patency of which is not characterized without  intravenous contrast material). Aortic and other arterial calcifications are present.  The lung bases show atelectasis/scarring with more consolidative opacity in the left lower lung, but appears grossly similar from 1/23/2024, though more prominent from 9/20/2023, follow-up advised as indicated to characterize continued stability. The heart size is borderline.  Degenerative changes are seen in the spine. No acute fracture is identified.          1. Small obstructive left renal calcification, may be arterial in location. No hydronephrosis. Gas in the urinary bladder, correlate clinically. In a patient this age with hematuria, follow-up evaluation with CT urogram is advised if not contraindicated to exclude possibility of an occult urinary lesion.  2. Colonic diverticulosis. No acute inflammatory process of bowel identified. Mild free intraperitoneal gas, probably related to recent surgery, but correlate clinically.  3. Atelectasis/scarring with more consolidative opacity in the left lower lung, but appears grossly similar from 1/23/2024, though more prominent from 9/20/2023, follow-up advised as indicated to characterize continued stability.  This report was finalized on 3/9/2024 3:32 PM by Dr. Bruce Johnson M.D on Workstation: [id number]         MEDICATIONS GIVEN IN ER  Medications - No data to display      ORDERS PLACED DURING THIS VISIT:  Orders Placed This Encounter   Procedures    CT Abdomen Pelvis Without Contrast    Comprehensive Metabolic Panel    Urinalysis With Microscopic If Indicated (No Culture) - Urine, Clean Catch    CBC Auto Differential    Urinalysis, Microscopic Only - Urine, Clean Catch    CBC & Differential         DIFFERENTIAL DIAGNOSIS:  Differential diagnosis include but are not limited to the following: Hematuria, UTI, trauma from Almodovar catheter insertion and removal      OUTPATIENT MEDICATION MANAGEMENT:  No current Epic-ordered facility-administered medications on file.     Current  Outpatient Medications Ordered in Epic   Medication Sig Dispense Refill    albuterol (PROVENTIL) (2.5 MG/3ML) 0.083% nebulizer solution USE ONE VIAL BY NEBULIZATION EVERY FOUR HOURS AS NEEDED FOR FOR WHEEZING (Patient taking differently: Take 2.5 mg by nebulization Every 4 (Four) Hours As Needed.) 180 mL 0    amLODIPine (NORVASC) 5 MG tablet Take 1 tablet by mouth Daily. 90 tablet 3    aspirin 81 MG EC tablet Take 1 tablet by mouth Daily. PT HOLDING 5 DAYS PRIOR TO SURGERY      carvedilol (COREG) 25 MG tablet TAKE ONE TABLET BY MOUTH TWICE DAILY 180 tablet 0    hydrALAZINE (APRESOLINE) 25 MG tablet Take 1 tablet by mouth 2 (Two) Times a Day. 180 tablet 3    HYDROcodone-acetaminophen (NORCO) 5-325 MG per tablet Take 1 tablet by mouth Every 6 (Six) Hours As Needed for Moderate Pain. 20 tablet 0    nitroglycerin (NITROSTAT) 0.4 MG SL tablet Place 1 tablet under the tongue every 5 (five) minutes as needed for chest pain. Take no more than 3 doses in 15 minutes. 25 tablet 1    rosuvastatin (CRESTOR) 20 MG tablet Take 1 tablet by mouth Daily. 90 tablet 3    sennosides-docusate (senna-docusate sodium) 8.6-50 MG per tablet Take 1 tablet by mouth 2 (Two) Times a Day As Needed for Constipation. 30 tablet 0    torsemide (DEMADEX) 20 MG tablet Take 2 tablets by mouth Daily. 180 tablet 3    Trelegy Ellipta 200-62.5-25 MCG/ACT aerosol powder  Inhale 1 puff Daily.      vitamin B-12 (CYANOCOBALAMIN) 1000 MCG tablet Take 1 tablet by mouth Daily.           PROCEDURES  Procedures            PROGRESS, DATA ANALYSIS, CONSULTS, AND MEDICAL DECISION MAKING  All labs have been independently interpreted by me.  All radiology studies have been reviewed by me. All EKG's have been independently viewed and interpreted by me.  Discussion below represents my analysis of pertinent findings related to patient's condition, differential diagnosis, treatment plan and final disposition              ED Course as of 03/09/24 1557   Sat Mar 09, 2024    1319 83-year-old male who presents today with hematuria.  He has had 2 Almodovar catheter placed on Tuesday and removed yesterday.  Noted hematuria last night and today.  We will check labs, urinalysis and a CT of the abdomen pelvis.  This very well could be related to trauma from the Almodovar catheter insertion since they did take multiple attempts. [MS]   1417 Reviewed pt's history and workup with Dr. Masters.  After a bedside evaluation, he agrees with the plan of care.     [MS]   1417 BUN(!): 24 [MS]   1417 Creatinine(!): 1.70  Bun & creatinine are baseline for patient [MS]   1556 Patient updated on workup done today which showed no acute abnormalities.  Patient has voided on several occasions while in the ER and his urine is now a light yellow color and patient feels much better.  He will be discharged home with close follow-up with his primary care physician and urologist.  He was given strict return to ER precautions. [MS]      ED Course User Index  [MS] Alina Bragg APRN       Discussed plan for discharge, as there is no emergent indication for admission. Pt/family is agreeable and understands need for follow up and repeat testing.  Pt is aware that discharge does not mean that nothing is wrong but it indicates no emergency is present that requires admission and they must continue care with follow-up as given below or physician of their choice.   Patient/Family voiced understanding of above instructions.  Patient discharged in stable condition.    FOLLOW UP   Low Sepulveda  [address]  [address]  [place] [id number] [phone]    Call in 2 days        RX     Medication List        Changed      albuterol (2.5 MG/3ML) 0.083% nebulizer solution  Commonly known as: PROVENTIL  USE ONE VIAL BY NEBULIZATION EVERY FOUR HOURS AS NEEDED FOR FOR WHEEZING  What changed: See the new instructions.              AS OF 15:57 EST VITALS:    BP - 171/81  HR - 59  TEMP - 96.8 °F (36 °C)  O2 SATS -  96%        DIAGNOSIS  Final diagnoses:   Hematuria, unspecified type   Chronic kidney disease, unspecified CKD stage         DISPOSITION  ED Disposition       ED Disposition   Discharge    Condition   Stable    Comment   --                Please note that portions of this document were completed with a voice recognition program.    Note Disclaimer: At [facility], we believe that sharing information builds trust and better relationships. You are receiving this note because you recently visited [facility]. It is possible you will see health information before a provider has talked with you about it. This kind of information can be easy to misunderstand. To help you fully understand what it means for your health, we urge you to discuss this note with your provider.         Alina Bragg, APRN  03/09/24 1558

## 2024-03-20 ENCOUNTER — OFFICE VISIT (OUTPATIENT)
Dept: SURGERY | Facility: CLINIC | Age: 84
End: 2024-03-20
Payer: MEDICARE

## 2024-03-20 ENCOUNTER — HOSPITAL ENCOUNTER (OUTPATIENT)
Dept: PET IMAGING | Facility: HOSPITAL | Age: 84
Discharge: HOME | End: 2024-03-20
Payer: MEDICARE

## 2024-03-20 VITALS
BODY MASS INDEX: 23.61 KG/M2 | DIASTOLIC BLOOD PRESSURE: 84 MMHG | HEIGHT: 67 IN | SYSTOLIC BLOOD PRESSURE: 140 MMHG | WEIGHT: 150.4 LBS

## 2024-03-20 DIAGNOSIS — C34.92 ADENOCARCINOMA, LUNG, LEFT: ICD-10-CM

## 2024-03-20 DIAGNOSIS — C34.12 MALIGNANT NEOPLASM OF UPPER LOBE, LEFT BRONCHUS OR LUNG: ICD-10-CM

## 2024-03-20 DIAGNOSIS — K40.90 RIGHT INGUINAL HERNIA: Primary | ICD-10-CM

## 2024-03-20 LAB — GLUCOSE BLDC GLUCOMTR-MCNC: 100 MG/DL (ref 70–130)

## 2024-03-20 PROCEDURE — 3077F SYST BP >= 140 MM HG: CPT | Performed by: SURGERY

## 2024-03-20 PROCEDURE — 1160F RVW MEDS BY RX/DR IN RCRD: CPT | Performed by: SURGERY

## 2024-03-20 PROCEDURE — A9552 F18 FDG: HCPCS | Performed by: INTERNAL MEDICINE

## 2024-03-20 PROCEDURE — 99024 POSTOP FOLLOW-UP VISIT: CPT | Performed by: SURGERY

## 2024-03-20 PROCEDURE — 1159F MED LIST DOCD IN RCRD: CPT | Performed by: SURGERY

## 2024-03-20 PROCEDURE — 78815 PET IMAGE W/CT SKULL-THIGH: CPT

## 2024-03-20 PROCEDURE — 0 FLUDEOXYGLUCOSE F18 SOLUTION: Performed by: INTERNAL MEDICINE

## 2024-03-20 PROCEDURE — 82948 REAGENT STRIP/BLOOD GLUCOSE: CPT

## 2024-03-20 PROCEDURE — 3079F DIAST BP 80-89 MM HG: CPT | Performed by: SURGERY

## 2024-03-20 RX ADMIN — FLUDEOXYGLUCOSE F 18 1 DOSE: 200 INJECTION, SOLUTION INTRAVENOUS at 11:21

## 2024-03-20 NOTE — PROGRESS NOTES
Postop robotic right inguinal hernia repair    Subjective:  Feels well, minimal discomfort, bowels functioning well and now voiding well.    Objective:  BMI 23.6  General: Awake and alert without distress  Abdomen: Soft and benign, trocar incisions healing nicely  Genitourinary: Minimal seroma noted in the right groin, minimal discomfort    Assessment and plan:  -Status post robotic right inguinal hernia repair, overall recovering well from that standpoint (patient used only 2 Norco postop)  -He did develop postop urinary retention requiring Almodovar catheter placement.  This was removed 2 days later and afterwards he did pass a clot of blood, prompting an ER visit.  CT was obtained and no concerning features identified (hernia repair looks in good order and clinically the amount of fluid present seems to be less than was the case approximately 11 days ago)  -From my standpoint he is now greater than 2 weeks out and his activity may be as tolerated  -Follow-up here as needed    Tejinder Hummel MD  General and Endoscopic Surgery  [facility]    [address], [address]  [place], [place], [address]  P: [phone]  F: [phone]

## 2024-03-25 ENCOUNTER — TELEPHONE (OUTPATIENT)
Dept: RADIATION ONCOLOGY | Facility: HOSPITAL | Age: 84
End: 2024-03-25
Payer: MEDICARE

## 2024-03-26 ENCOUNTER — OFFICE VISIT (OUTPATIENT)
Dept: RADIATION ONCOLOGY | Facility: HOSPITAL | Age: 84
End: 2024-03-26
Payer: MEDICARE

## 2024-03-26 ENCOUNTER — PATIENT OUTREACH (OUTPATIENT)
Dept: OTHER | Facility: HOSPITAL | Age: 84
End: 2024-03-26
Payer: MEDICARE

## 2024-03-26 VITALS
OXYGEN SATURATION: 97 % | BODY MASS INDEX: 23.9 KG/M2 | HEART RATE: 61 BPM | DIASTOLIC BLOOD PRESSURE: 76 MMHG | SYSTOLIC BLOOD PRESSURE: 157 MMHG | WEIGHT: 152.6 LBS

## 2024-03-26 DIAGNOSIS — C34.92 ADENOCARCINOMA, LUNG, LEFT: Primary | ICD-10-CM

## 2024-03-26 NOTE — PROGRESS NOTES
[facility] Radiation Oncology   Follow Up    Chief Complaint  Left Lung Adenocarcinoma        Diagnosis: Left Lung Adenocarcinoma     Overall Stage IIIA     cT4: Mediastinal Invasion  pN0: per cervical mediastinoscopy, and intraoperative resection of 10R and 5 LNs  cM0: per PET CT, MRI Brain        Radiation Completion Date: 10/27/2023        Prescription:      Site: KIET  Laterality: Left  Total Dose: 6000cGy  Dose per Fraction: 400cGy  Total Fractions: 15  Daily or BID: Daily  Modality: Photon  Technique: IMRT/VMAT/Rapid Arc  Bolus: No      Interval History:    Bayron Acevedo presents for regularly scheduled follow-up approximately 5 months after completion of  radiation therapy for locally advanced left lung adenocarcinoma after aborted resection.  Patient tolerated treatment well and has done well in the interim.  He is not requiring oxygen.  He has no new or concerning complaints with regards to his breathing.      Imaging:      PET CT 3/20/2024    IMPRESSION:  1. Left upper lobe mass/adenocarcinoma has decreased in size post  radiation. Much milder hypermetabolism remains throughout, slightly  greater than blood pool, inflammatory versus metabolically active  disease.  2. Two growing adjacent right upper lobe 1 and 0.8 cm solid nodules  surrounding a bullae are concerning for pulmonary malignancy.  3. Two mildly hypermetabolic ill-defined left upper lobe opacities are  favored postradiation, attention on follow-up.  4. Resolution of prior hypermetabolic subcarinal lymphadenopathy and  subcentimeter mediastinal and left hilar lymph nodes.  5. Small bilateral pleural effusions with intrinsic metabolism.  Recommend correlation with prior fluid sampling.  6. Persistent focal transverse colon hypermetabolism with additional  focal ascending colon hypermetabolism. Recommend correlation with  colonoscopy if clinically appropriate.  7. A new subcentimeter left supraclavicular lymph node with uptake near  blood pool is  nonspecific. Attention on follow-up.  8. Tubular hypermetabolism throughout the esophagus suggesting  esophagitis.      Pathology:      No new relevant pathology      Labs:    Lab Results   Component Value Date    CREATININE 1.70 (H) 03/09/2024             Problem List:  Patient Active Problem List   Diagnosis    COPD (chronic obstructive pulmonary disease) with emphysema    Hypertension    Hyperlipidemia    S/P angioplasty with stent    Heart attack    Colon polyp    Carotid stenosis    CAD in native artery    Peripheral artery disease    History of hydrocele    Perennial allergic rhinitis    AAA (abdominal aortic aneurysm) without rupture    Acute on chronic systolic CHF (congestive heart failure)    Stage 3b chronic kidney disease    Hydropneumothorax    Mass of upper lobe of left lung    NICM (nonischemic cardiomyopathy)    Chronic systolic CHF (congestive heart failure)    Normocytic anemia    Adenocarcinoma, lung, left    Right inguinal hernia          Medications:  Current Outpatient Medications on File Prior to Visit   Medication Sig Dispense Refill    albuterol (PROVENTIL) (2.5 MG/3ML) 0.083% nebulizer solution USE ONE VIAL BY NEBULIZATION EVERY FOUR HOURS AS NEEDED FOR FOR WHEEZING (Patient taking differently: Take 2.5 mg by nebulization Every 4 (Four) Hours As Needed.) 180 mL 0    amLODIPine (NORVASC) 5 MG tablet Take 1 tablet by mouth Daily. 90 tablet 3    aspirin 81 MG EC tablet Take 1 tablet by mouth Daily. PT HOLDING 5 DAYS PRIOR TO SURGERY      carvedilol (COREG) 25 MG tablet TAKE ONE TABLET BY MOUTH TWICE DAILY 180 tablet 0    hydrALAZINE (APRESOLINE) 25 MG tablet Take 1 tablet by mouth 2 (Two) Times a Day. 180 tablet 3    nitroglycerin (NITROSTAT) 0.4 MG SL tablet Place 1 tablet under the tongue every 5 (five) minutes as needed for chest pain. Take no more than 3 doses in 15 minutes. 25 tablet 1    rosuvastatin (CRESTOR) 20 MG tablet Take 1 tablet by mouth Daily. 90 tablet 3    sennosides-docusate  "(senna-docusate sodium) 8.6-50 MG per tablet Take 1 tablet by mouth 2 (Two) Times a Day As Needed for Constipation. 30 tablet 0    torsemide (DEMADEX) 20 MG tablet Take 2 tablets by mouth Daily. 180 tablet 3    Trelegy Ellipta 200-62.5-25 MCG/ACT aerosol powder  Inhale 1 puff Daily.      vitamin B-12 (CYANOCOBALAMIN) 1000 MCG tablet Take 1 tablet by mouth Daily.       No current facility-administered medications on file prior to visit.          Allergies:  Allergies   Allergen Reactions    Lisinopril Anaphylaxis    Codeine Sulfate Hives    Contrast Dye (Echo Or Unknown Ct/Mr) Other (See Comments)     HYPERTENSION    Iodinated Contrast Media Other (See Comments)     HYPERTENSION    Losartan Swelling     LIP    Penicillins Other (See Comments)     Passed out after a PCN shot- no other sx noted-per pateint  Beta lactam allergy details  Antibiotic reaction: unknown  Age at reaction: adult  Dose to reaction time: unknown  Reason for antibiotic: other  Epinephrine required for reaction?: no  Tolerated antibiotics: unknown              Vital Signs:  There were no vitals taken for this visit.  Estimated body mass index is 23.56 kg/m² as calculated from the following:    Height as of 3/20/24: 170.2 cm (67\").    Weight as of 3/20/24: 68.2 kg (150 lb 6.4 oz).  There were no vitals filed for this visit.      ECOG: Ambulatory and capable of all selfcare but unable to carry out any work activities; up and about more than 50% of waking hours = 2    Physical Exam  Vitals reviewed.   Constitutional:       General: He is not in acute distress.     Appearance: Normal appearance.   HENT:      Head: Normocephalic and atraumatic.   Eyes:      Extraocular Movements: Extraocular movements intact.      Pupils: Pupils are equal, round, and reactive to light.   Pulmonary:      Effort: Pulmonary effort is normal.   Abdominal:      General: Abdomen is flat.      Palpations: Abdomen is soft.   Musculoskeletal:      Cervical back: Normal range of " motion.   Skin:     General: Skin is warm and dry.   Neurological:      General: No focal deficit present.      Mental Status: He is alert and oriented to person, place, and time.   Psychiatric:         Mood and Affect: Mood normal.         Behavior: Behavior normal.          Result Review :  The following data was reviewed by: Dakota Painter MD on 03/26/2024:  Labs: Last Creatinine   Data reviewed : Radiologic studies PET CT             Diagnoses and all orders for this visit:    1. Adenocarcinoma, lung, left (Primary)        Assessment:    Bayron Acevedo presents for regularly scheduled follow-up approximately 5 months after completion of  radiation therapy for locally advanced left lung adenocarcinoma after aborted resection.  Patient tolerated treatment well and has done well in the interim.  He is not requiring oxygen.  He has no new or concerning complaints with regards to his breathing.    I reviewed the results of his most recent pet imaging which shows decrease in size of his treated left upper lobe lesion with significantly reduced metabolic activity.  He has 2 right upper lobe adjacent growing nodules that are avid and concerning for malignancy.    The patient's case has subsequently been discussed at multidisciplinary tumor board with recommendation for empiric treatment with SBRT without the need for biopsy.  We will set the patient up for radiation therapy to these lesions.      Plan:    -Plan for SBRT to growing avid right upper lobe adjacent lung nodules       I spent 30 minutes caring for Bayron on this date of service. This time includes time spent by me in the following activities:preparing for the visit, reviewing tests, obtaining and/or reviewing a separately obtained history, documenting information in the medical record, independently interpreting results and communicating that information with the patient/family/caregiver, and care coordination  Follow Up   No follow-ups on file.  Patient was  given instructions and counseling regarding his condition or for health maintenance advice. Please see specific information pulled into the AVS if appropriate.     Dakota Painter MD

## 2024-03-26 NOTE — PROGRESS NOTES
"Chief Complaint  Clinical stage IIIA ([id number]) non-small cell lung cancer (adenocarcinoma) of left upper lobe    Subjective        History of Present Illness    Patient returns today in follow-up with laboratory studies and PET scan to review.  He received previous course of radiation therapy with Dr. Painter, administered from 10/9 - 10/27/2023 to the left upper lobe, 6000 cGy.  The patient tolerated treatment reasonably well, was felt to be a poor candidate for concurrent chemotherapy given his age and comorbidities.  Patient reports that since his last visit he has done relatively well.  He has some very mild fatigue but remains relatively active, ECOG performance status of 1.  He has some mild chronic dyspnea on exertion which is unchanged although has been off of Trelegy for the past 2 to 3 weeks and feels that he was doing better while receiving Trelegy.  He is going to fill a prescription for Anoro which should be better in terms of cost.  He does have some mild chronic constipation.  He reports very occasional reflux and does not take any medication for this.  In the interval since his last visit he did undergo laparoscopic right inguinal hernia repair with Dr. Hummel on 3/5/2024 and is recovering well from this.  Patient does note that over the past few weeks he has been experiencing pain in his left humeral region, unsure of the etiology as he has had no injury to this area.      Objective   Vital Signs:   /70   Pulse 62   Temp 98 °F (36.7 °C) (Temporal)   Resp 16   Ht 170 cm (66.93\")   Wt 69 kg (152 lb 3.2 oz)   SpO2 97%   BMI 23.89 kg/m²     Physical Exam  Constitutional:       Appearance: He is well-developed.      Comments: Patient in no distress   Eyes:      Conjunctiva/sclera: Conjunctivae normal.   Neck:      Thyroid: No thyromegaly.   Cardiovascular:      Rate and Rhythm: Normal rate and regular rhythm.      Heart sounds: No murmur heard.     No friction rub. No gallop.   Pulmonary:      " Effort: No respiratory distress.      Comments: Diminished breath sounds bilaterally  Abdominal:      General: Bowel sounds are normal. There is no distension.      Palpations: Abdomen is soft.      Tenderness: There is no abdominal tenderness.   Musculoskeletal:         General: No swelling.      Comments: No edema currently   Lymphadenopathy:      Head:      Right side of head: No submandibular adenopathy.      Cervical: No cervical adenopathy.      Upper Body:      Right upper body: No supraclavicular adenopathy.      Left upper body: No supraclavicular adenopathy.   Skin:     General: Skin is warm and dry.      Findings: No rash.   Neurological:      Mental Status: He is alert and oriented to person, place, and time.      Cranial Nerves: No cranial nerve deficit.      Motor: No abnormal muscle tone.      Deep Tendon Reflexes: Reflexes normal.   Psychiatric:         Behavior: Behavior normal.       Patient was examined today, unchanged from above      Result Review : Reviewed CBC, CMP, iron panel, ferritin from today.  Reviewed PET scan 3/20/2024       Assessment and Plan     *Clinical stage IIIA ([id number]) non-small cell lung cancer (adenocarcinoma) of left upper lobe  The patient has a history of smoking 1 pack/day x 55 years quit in 2000.  CT chest 5/26/2023 showed a spiculated mass in the anterior left upper lobe 3.7 x 4.3 x 4.2 cm along the anterior pleural surface and lateral aspect of the anterior mediastinum.  Additional 3 mm nodule right major fissure.  Bilateral small to moderate-sized pleural effusions.  Bullae formation consistent with COPD.  Mediastinal lymphadenopathy with largest node infracarinal region 2.6 x 1.2 cm.  PET/CT on 6/12/2023 showed 5.2 x 3.1 cm irregular pleural-based mass anterior left upper lobe abutting the pleura anteriorly and medially, hypermetabolic with SUV 14.4.  Mediastinal lymph node activity in the subcarinal region with a 2.2 x 1.1 cm lymph node with SUV 4.5.  Remainder of  mediastinal lymph nodes less intensely hypermetabolic with maximal SUV 3.3.  Moderate size loculated pleural effusions bilaterally with low-level activity along the pleura (SUV 2.5), no change in volume of pleural effusions since prior chest CT.  New small to moderate loculated right hydropneumothorax with air-fluid level 5.2 cm.   CT-guided left upper lobe lung biopsy 6/13/2023 with pathology that showed invasive poorly differentiated adenocarcinoma, PD-L1 less than 1%.  Caris analysis: TMB high (14 muts/Mb), mutations in KEAP1 and TP53, EGFR VUS, no targetable mutations.  Patient was hospitalized 6/13 - 6/15/2023 due to right hydropneumothorax, had CT-guided chest tube placement performed 6/13/2023.  Right pleural fluid cytology negative on 6/13/2023.  Staging MRI brain was negative for evidence of metastatic disease on 7/2/2023  PFTs on 6/20/2023 with FEV1 0.99 (44% predicted), DLCO corrected 14.09 (56% predicted).  Mediastinoscopy 7/17/2023 with 4R and station 7 lymph nodes negative for malignancy  Left VATS performed 7/31/2023.  Intraoperatively, primary tumor was unresectable, invading into the mediastinum and pericardial fat pad as well as with phrenic nerve involvement (T4 lesion).  Sampling of left pleura with fibrosis, chronic inflammation, no evidence of malignancy.  Station 5 and 10 R lymph nodes sampled, negative for malignancy.  New baseline CT chest abdomen pelvis prior to initiation of radiation therapy performed on 9/20/2023.  Persistent loculated left pneumothorax, trace left pleural effusion, primary tumor in partially collapsed left upper lobe appears relatively stable.  Small right pleural effusion no change in borderline enlarged mediastinal lymph nodes.  Slight increase in right upper lobe nodule (for up to 7 mm), indeterminate.  Patient therefore with unresectable stage IIIA ([id number]) disease.  Plans to treat with radiation therapy.  Due to age, performance status, comorbidities, patient felt  to be a poor candidate for concurrent chemotherapy.  Patient received radiation therapy 10/9 - 10/27/2023 to left upper lobe, 6000 cGy.  CT chest abdomen pelvis 1/23/2024 showed decrease in left pneumothorax/hydropneumothorax, left upper lobe mass difficult to accurately assess due to change in configuration (shift in lung parenchyma due to pneumothorax improvement) but appears relatively stable, 2.9 x 4.3 versus current 3.3 x 3.5 cm.  No lymphadenopathy noted, no evidence of distant metastatic disease.  PET scan 3/20/2024 with subcentimeter left supraclavicular lymph node with new uptake (SUV 2.4).  Decrease in treated left upper lobe mass from 4.4 x 3.8 down to 3.4 x 2 (decrease in SUV from 14.4 down to 3.2).  Increase in 2 mildly hypermetabolic left upper lobe ill-defined opacities with linear consolidation felt to represent postradiation change (SUV 3.4).  Resolution of hypermetabolic activity and subcarinal lymph node as well as subcentimeter mediastinal and left hilar lymph nodes.  Increase and 2 adjacent right upper lobe solid nodules surrounding bullae measuring 1 and 0.8 cm with SUV 5.1 and 2.5 respectively.  Decrease in small loculated pleural effusions with SUV 3 on the left and 2.4 on the right.  Tubular hypermetabolic activity throughout the esophagus.  Persistent focal transverse colon hypermetabolism with SUV 6.9 and additional foci of the ascending colon hypermetabolism SUV 5.6.  Patient returns today in follow-up with the above-mentioned PET scan to review.  He received primary radiation therapy to the unresectable T4 primary left upper lobe mass completed on 10/27/2023.  He was felt to be a poor candidate for concurrent chemotherapy given his age and comorbidities.  He has recovered from the effects from prior radiation therapy at this point and maintain ECOG performance as of 1.  We reviewed his PET scan from 3/20/2024.  The treated mass has decreased significantly in size and hypermetabolic  activity with residual SUV of only 3.2, just above blood pool level.  Therefore it appears he had good response to prior treatment.  Previous activity in subcarinal as well as small mediastinal and left hilar lymph nodes resolved.  He does however have slight enlargement of 2 adjacent hypermetabolic right upper lobe nodules measuring 1 and 0.8 cm with SUV 5.1 and 2.5 respectively.  These nodules have increased slowly since June 2023 and are highly suspicious for malignancy, unclear whether this represents metastasis from the previous malignancy or possibly new primary lung cancer.  The nodules are borderline size for biopsy and likely would have increased risk for pneumothorax given the proximity to bullae.  A negative biopsy would not rule out malignancy.  It might be more prudent to proceed with presumptive SBRT to this area.  We will discuss the issue at thoracic oncology tumor board tomorrow and patient will follow-up with Dr. Painter and radiation oncology if it is recommended to proceed with radiation.  There are multiple other findings on the scan of uncertain significance.  The small bilateral pleural effusions were pre-existing and previous sampling of the right pleural fluid was negative for malignancy.  Pleural effusions have actually decreased in size, had borderline activity on PET scan and are likely not malignant in nature and will need to be monitored over time.  The left subcentimeter supraclavicular lymph node with minimal hypermetabolic activity SUV 2.4 is indeterminate and is too small for biopsy at this time and will need to be monitored on subsequent scans.  The esophageal activity may be related to muscular activity or possibly reflux esophagitis however the patient notes only very minimal and intermittent symptoms of reflux.  The colonic activity does raise suspicion for possible underlying malignancy.  We did discuss potential referral to GI to evaluate these issues however the patient does not  wish to pursue endoscopic evaluation and would prefer to follow these areas up on future scans.  Hopefully we will proceed with radiation to the right sided hypermetabolic lung lesions with SBRT in the near future.  Consider repeat PET scan 3 months after completion of radiation to assess this area as well as the additional areas mentioned above.  I will see the patient back in 6 weeks for follow-up.    *Chronic bilateral pleural effusions with right hydropneumothorax and subsequent postoperative left hydropneumothorax  Patient appears to have longstanding evidence of small bilateral pleural effusions likely related to CHF  PET/CT 6/12/2023 identified right small to moderate hydropneumothorax new since 5/26/2023 CT chest  Patient was hospitalized 6/13 - 6/15/2023 due to right hydropneumothorax, had CT-guided chest tube placement performed 6/13/2023.  Right pleural fluid cytology negative on 6/13/2023.  As above, patient underwent left VATS with attempted resection of primary lung cancer on 7/31/2023.    Patient required persistent left chest tube postoperatively, eventually removed on 9/6/2023  CT chest 9/20/2023 with persistent loculated left pneumothorax.  CT chest 1/23/2024 with significant improvement in left pneumothorax/hydropneumothorax  PET scan 3/20/2024 with decrease in small loculated pleural effusions with SUV 3 on the left and 2.4 on the right.  Pleural effusions felt to likely benign in nature at this point.      *COPD  The patient has a history of smoking 1 pack/day x 55 years quit in 2000.  PFTs on 6/20/2023 with FEV1 0.99 (44% predicted), DLCO corrected 14.09 (56% predicted).  Patient continues with chronic dyspnea on exertion that does limit his level of activity although maintains O2 saturation in the 90% range even with activity.  Patient notes that his chronic dyspnea on exertion is stable, respiratory status.  He continues follow-up with pulmonary, had difficulty affording Trelegy inhaler and  has been off of this for a few weeks, plans to begin Anoro inhaler soon.    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  Labs on 7/11/2023 with hemoglobin 10.6, MCV 82.8, iron 18, ferritin 504, iron saturation 6%, TIBC 295, B12 201, folate 6.79, CRP 1.07, ESR 27, erythropoietin level 17.8.  Labs consistent with anemia secondary to chronic disease/malignancy as well as anemia secondary to CKD3.  Patient initiated oral B12 1000 mcg daily for B12 deficiency  Hemoglobin on 11/7/2023 was relatively unchanged at 10.3.  Additional evaluation sent with iron 21, ferritin 415, iron saturation 8%, TIBC 266, B12 398, folate 10.2, negative serum protein electrophoresis/immunoelectrophoresis, IgA 209, IgG 829, IgM 13, free kappa light chain 50.4, free lambda light chain 34.2, free light chain ratio 1.47 (normal).  Labs did consistent with anemia secondary to chronic disease as well as anemia secondary to CKD3.  He does not qualify for Procrit with hemoglobin above 10.  Patient continued on oral B12 1000 mcg daily  Patient returns today with hemoglobin 11.4.  Additional labs with iron 29, ferritin 315, iron saturation 9%, TIBC 319, B12 level 578.  Labs fairly stable currently.  With ferritin 315, would not necessarily treat the iron saturation of 9%.    *Transient thrombocytopenia  On 11/7/2024, new onset thrombocytopenia with platelet count of 137,000.  Additional labs with IPF low normal at 3.3%, B12 398, folate 10.2,negative serum protein electrophoresis/immunoelectrophoresis, IgA 209, IgG 829, IgM 13, free kappa light chain 50.4, free lambda light chain 34.2, free light chain ratio  1.47 (normal).  Platelet count today normal at 143,000    *Coronary artery disease, CHF  Chronic bilateral pleural effusions presumably related to CHF  Status post cardiac stent in 2013  Preoperative echocardiogram on 7/19/2023 with ejection fraction 56 to 60%, dilated LA, LV  Patient continues on aspirin 81 mg daily    *Peripheral vascular disease  Patient is followed by Dr. Karen Barrera in vascular surgery  Abdominal aortic aneurysm status post stent graft repair 5/17/2021  Carotid stenosis status post carotid endarterectomy left, 2013.    *Anorexia, weight loss  Patient with anorexia associated with malignancy, mild degree of weight loss  Patient initiated Remeron 7.5 mg nightly on 9/22/2023.  Subsequently discontinued with improvement in weight and appetite  Patient with improvement in appetite, weight improved at 152 pounds    *Constipation  Patient continues with intermittent constipation, uses stool softener as needed.      *Left upper arm pain  Patient reports approximately 2-week history of pain in the left upper arm.  There is no palpable abnormality on exam today.  PET scan did not show any significant abnormality in this area although was incompletely visualized.  I did recommend plain film of the left humerus today.      Plan:  Patient with stage IIIA ([id number]) adenocarcinoma of the left upper lobe, unresectable  Patient completed primary radiation therapy to the left upper lobe on 10/27/2023.  He is continuing on a course of observation/surveillance.  Patient continuing on oral B12 1000 mcg daily for B12 deficiency  Patient to be discussed at thoracic oncology tumor board tomorrow regarding enlarging adjacent right upper lobe hypermetabolic nodules.  Given the small size and risk for pneumothorax, will likely recommend to forego biopsy.  Anticipate likely proceeding with SBRT to the 2 adjacent enlarging hypermetabolic right upper lobe nodules.  The patient would like to forego endoscopic procedures at  this time in regards to the esophageal and colonic activity on PET scan.  Plain film left humerus today and will notify patient of the result.  MD visit in 6 weeks with CBC, CMP    I did spend 60 minutes total time caring for the patient today, time spent in review of records, face-to-face consultation, coordination of care, placement of orders, completion of documentation.    Addendum: Patient was discussed at thoracic tumor conference on 3/28/2024.  There was consensus regarding not pursuing biopsy of the right-sided nodule given risks of pneumothorax and small size of the lesion.  There was high enough suspicion regarding the growth and activity to proceed with treatment with SBRT for the 2 adjacent right-sided pulmonary nodules.  It is felt that these represent additional primary lesions.  Dr. Painter will be arranging treatment with SBRT in the near future.  Patient will follow-up here as scheduled in 6 weeks and we will anticipate 3-month interval follow-up PET scan after completion of radiation.  I discussed this with patient on telephone today, 3/28/2024.

## 2024-03-26 NOTE — PROGRESS NOTES
Reviewed chart.  Patient had PET scan 3/20/24.    Met patient and wife in radiation prior to Dr. Painter's appt.  The patient complains of left arm pain after listing a container of water with his wife. He plans to discuss this with Dr. Painter today.    The patient voiced concern having trouble paying his medication copays. He states his responsibility is higher with his current insurance plan. Due to having a Medicare replacement, he is likely not eligible for drug  assistance programs.  We discussed a referral to social work to discuss this and possible financial resources; the patient agreed.     The patient denies questions or concerns.  He and his wife are a little anxious to hear his PET scan results and next steps.    I will f/u in several weeks; encouraged them to call as needed.

## 2024-03-27 ENCOUNTER — OFFICE VISIT (OUTPATIENT)
Dept: ONCOLOGY | Facility: CLINIC | Age: 84
End: 2024-03-27
Payer: MEDICARE

## 2024-03-27 ENCOUNTER — LAB (OUTPATIENT)
Dept: LAB | Facility: HOSPITAL | Age: 84
End: 2024-03-27
Payer: MEDICARE

## 2024-03-27 VITALS
SYSTOLIC BLOOD PRESSURE: 148 MMHG | HEIGHT: 67 IN | RESPIRATION RATE: 16 BRPM | HEART RATE: 62 BPM | TEMPERATURE: 98 F | BODY MASS INDEX: 23.89 KG/M2 | OXYGEN SATURATION: 97 % | WEIGHT: 152.2 LBS | DIASTOLIC BLOOD PRESSURE: 70 MMHG

## 2024-03-27 DIAGNOSIS — C34.92 ADENOCARCINOMA, LUNG, LEFT: ICD-10-CM

## 2024-03-27 DIAGNOSIS — M79.602 LEFT ARM PAIN: ICD-10-CM

## 2024-03-27 DIAGNOSIS — C34.92 ADENOCARCINOMA, LUNG, LEFT: Primary | ICD-10-CM

## 2024-03-27 LAB
ALBUMIN SERPL-MCNC: 4.2 G/DL (ref 3.5–5.2)
ALBUMIN/GLOB SERPL: 1.6 G/DL
ALP SERPL-CCNC: 95 U/L (ref 39–117)
ALT SERPL W P-5'-P-CCNC: 7 U/L (ref 1–41)
ANION GAP SERPL CALCULATED.3IONS-SCNC: 9.4 MMOL/L (ref 5–15)
AST SERPL-CCNC: 15 U/L (ref 1–40)
BASOPHILS # BLD AUTO: 0.05 10*3/MM3 (ref 0–0.2)
BASOPHILS NFR BLD AUTO: 0.8 % (ref 0–1.5)
BILIRUB SERPL-MCNC: 0.4 MG/DL (ref 0–1.2)
BUN SERPL-MCNC: 26 MG/DL (ref 8–23)
BUN/CREAT SERPL: 13.8 (ref 7–25)
CALCIUM SPEC-SCNC: 9.5 MG/DL (ref 8.6–10.5)
CHLORIDE SERPL-SCNC: 104 MMOL/L (ref 98–107)
CO2 SERPL-SCNC: 28.6 MMOL/L (ref 22–29)
CREAT SERPL-MCNC: 1.89 MG/DL (ref 0.76–1.27)
DEPRECATED RDW RBC AUTO: 53.2 FL (ref 37–54)
EGFRCR SERPLBLD CKD-EPI 2021: 34.4 ML/MIN/1.73
EOSINOPHIL # BLD AUTO: 0.51 10*3/MM3 (ref 0–0.4)
EOSINOPHIL NFR BLD AUTO: 8.2 % (ref 0.3–6.2)
ERYTHROCYTE [DISTWIDTH] IN BLOOD BY AUTOMATED COUNT: 17.6 % (ref 12.3–15.4)
FERRITIN SERPL-MCNC: 316 NG/ML (ref 30–400)
GLOBULIN UR ELPH-MCNC: 2.7 GM/DL
GLUCOSE SERPL-MCNC: 98 MG/DL (ref 65–99)
HCT VFR BLD AUTO: 37 % (ref 37.5–51)
HGB BLD-MCNC: 11.4 G/DL (ref 13–17.7)
IMM GRANULOCYTES # BLD AUTO: 0.02 10*3/MM3 (ref 0–0.05)
IMM GRANULOCYTES NFR BLD AUTO: 0.3 % (ref 0–0.5)
IRON 24H UR-MRATE: 29 MCG/DL (ref 59–158)
IRON SATN MFR SERPL: 9 % (ref 20–50)
LYMPHOCYTES # BLD AUTO: 0.73 10*3/MM3 (ref 0.7–3.1)
LYMPHOCYTES NFR BLD AUTO: 11.8 % (ref 19.6–45.3)
MCH RBC QN AUTO: 25.7 PG (ref 26.6–33)
MCHC RBC AUTO-ENTMCNC: 30.8 G/DL (ref 31.5–35.7)
MCV RBC AUTO: 83.3 FL (ref 79–97)
MONOCYTES # BLD AUTO: 0.56 10*3/MM3 (ref 0.1–0.9)
MONOCYTES NFR BLD AUTO: 9 % (ref 5–12)
NEUTROPHILS NFR BLD AUTO: 4.34 10*3/MM3 (ref 1.7–7)
NEUTROPHILS NFR BLD AUTO: 69.9 % (ref 42.7–76)
NRBC BLD AUTO-RTO: 0 /100 WBC (ref 0–0.2)
PLATELET # BLD AUTO: 143 10*3/MM3 (ref 140–450)
PMV BLD AUTO: 9.2 FL (ref 6–12)
POTASSIUM SERPL-SCNC: 3.7 MMOL/L (ref 3.5–5.2)
PROT SERPL-MCNC: 6.9 G/DL (ref 6–8.5)
RBC # BLD AUTO: 4.44 10*6/MM3 (ref 4.14–5.8)
SODIUM SERPL-SCNC: 142 MMOL/L (ref 136–145)
TIBC SERPL-MCNC: 319 MCG/DL (ref 298–536)
TRANSFERRIN SERPL-MCNC: 214 MG/DL (ref 200–360)
VIT B12 BLD-MCNC: 578 PG/ML (ref 211–946)
WBC NRBC COR # BLD AUTO: 6.21 10*3/MM3 (ref 3.4–10.8)

## 2024-03-27 PROCEDURE — 36415 COLL VENOUS BLD VENIPUNCTURE: CPT

## 2024-03-27 PROCEDURE — 83540 ASSAY OF IRON: CPT

## 2024-03-27 PROCEDURE — 82607 VITAMIN B-12: CPT | Performed by: INTERNAL MEDICINE

## 2024-03-27 PROCEDURE — 84466 ASSAY OF TRANSFERRIN: CPT

## 2024-03-27 PROCEDURE — 80053 COMPREHEN METABOLIC PANEL: CPT

## 2024-03-27 PROCEDURE — 82728 ASSAY OF FERRITIN: CPT

## 2024-03-27 PROCEDURE — 85025 COMPLETE CBC W/AUTO DIFF WBC: CPT

## 2024-03-27 NOTE — LETTER
March 27, 2024       No Recipients    Patient: Bayron Acevedo   YOB: 1940   Date of Visit: 3/27/2024     Dear Low Sepulveda:       Thank you for referring Bayron Acevedo to me for evaluation. Below are the relevant portions of my assessment and plan of care.    If you have questions, please do not hesitate to call me. I look forward to following Bayron along with you.         Sincerely,        Myles Santos MD        CC:   No Recipients    Myles Santos Jr., MD  03/27/24 2207  Sign when Signing Visit  Chief Complaint  Clinical stage IIIA ([id number]) non-small cell lung cancer (adenocarcinoma) of left upper lobe    Subjective       History of Present Illness    Patient returns today in follow-up with laboratory studies and PET scan to review.  He received previous course of radiation therapy with Dr. Painter, administered from 10/9 - 10/27/2023 to the left upper lobe, 6000 cGy.  The patient tolerated treatment reasonably well, was felt to be a poor candidate for concurrent chemotherapy given his age and comorbidities.  Patient reports that since his last visit he has done relatively well.  He has some very mild fatigue but remains relatively active, ECOG performance status of 1.  He has some mild chronic dyspnea on exertion which is unchanged although has been off of Trelegy for the past 2 to 3 weeks and feels that he was doing better while receiving Trelegy.  He is going to fill a prescription for Anoro which should be better in terms of cost.  He does have some mild chronic constipation.  He reports very occasional reflux and does not take any medication for this.  In the interval since his last visit he did undergo laparoscopic right inguinal hernia repair with Dr. Hummel on 3/5/2024 and is recovering well from this.  Patient does note that over the past few weeks he has been experiencing pain in his left humeral region, unsure of the etiology as he has had no injury to this area.      Objective  Vital Signs:  "  /70   Pulse 62   Temp 98 °F (36.7 °C) (Temporal)   Resp 16   Ht 170 cm (66.93\")   Wt 69 kg (152 lb 3.2 oz)   SpO2 97%   BMI 23.89 kg/m²     Physical Exam  Constitutional:       Appearance: He is well-developed.      Comments: Patient in no distress   Eyes:      Conjunctiva/sclera: Conjunctivae normal.   Neck:      Thyroid: No thyromegaly.   Cardiovascular:      Rate and Rhythm: Normal rate and regular rhythm.      Heart sounds: No murmur heard.     No friction rub. No gallop.   Pulmonary:      Effort: No respiratory distress.      Comments: Diminished breath sounds bilaterally  Abdominal:      General: Bowel sounds are normal. There is no distension.      Palpations: Abdomen is soft.      Tenderness: There is no abdominal tenderness.   Musculoskeletal:         General: No swelling.      Comments: No edema currently   Lymphadenopathy:      Head:      Right side of head: No submandibular adenopathy.      Cervical: No cervical adenopathy.      Upper Body:      Right upper body: No supraclavicular adenopathy.      Left upper body: No supraclavicular adenopathy.   Skin:     General: Skin is warm and dry.      Findings: No rash.   Neurological:      Mental Status: He is alert and oriented to person, place, and time.      Cranial Nerves: No cranial nerve deficit.      Motor: No abnormal muscle tone.      Deep Tendon Reflexes: Reflexes normal.   Psychiatric:         Behavior: Behavior normal.       Patient was examined today, unchanged from above      Result Review: Reviewed CBC, CMP, iron panel, ferritin from today.  Reviewed PET scan 3/20/2024       Assessment and Plan     *Clinical stage IIIA ([id number]) non-small cell lung cancer (adenocarcinoma) of left upper lobe  The patient has a history of smoking 1 pack/day x 55 years quit in 2000.  CT chest 5/26/2023 showed a spiculated mass in the anterior left upper lobe 3.7 x 4.3 x 4.2 cm along the anterior pleural surface and lateral aspect of the anterior " mediastinum.  Additional 3 mm nodule right major fissure.  Bilateral small to moderate-sized pleural effusions.  Bullae formation consistent with COPD.  Mediastinal lymphadenopathy with largest node infracarinal region 2.6 x 1.2 cm.  PET/CT on 6/12/2023 showed 5.2 x 3.1 cm irregular pleural-based mass anterior left upper lobe abutting the pleura anteriorly and medially, hypermetabolic with SUV 14.4.  Mediastinal lymph node activity in the subcarinal region with a 2.2 x 1.1 cm lymph node with SUV 4.5.  Remainder of mediastinal lymph nodes less intensely hypermetabolic with maximal SUV 3.3.  Moderate size loculated pleural effusions bilaterally with low-level activity along the pleura (SUV 2.5), no change in volume of pleural effusions since prior chest CT.  New small to moderate loculated right hydropneumothorax with air-fluid level 5.2 cm.   CT-guided left upper lobe lung biopsy 6/13/2023 with pathology that showed invasive poorly differentiated adenocarcinoma, PD-L1 less than 1%.  Caris analysis: TMB high (14 muts/Mb), mutations in KEAP1 and TP53, EGFR VUS, no targetable mutations.  Patient was hospitalized 6/13 - 6/15/2023 due to right hydropneumothorax, had CT-guided chest tube placement performed 6/13/2023.  Right pleural fluid cytology negative on 6/13/2023.  Staging MRI brain was negative for evidence of metastatic disease on 7/2/2023  PFTs on 6/20/2023 with FEV1 0.99 (44% predicted), DLCO corrected 14.09 (56% predicted).  Mediastinoscopy 7/17/2023 with 4R and station 7 lymph nodes negative for malignancy  Left VATS performed 7/31/2023.  Intraoperatively, primary tumor was unresectable, invading into the mediastinum and pericardial fat pad as well as with phrenic nerve involvement (T4 lesion).  Sampling of left pleura with fibrosis, chronic inflammation, no evidence of malignancy.  Station 5 and 10 R lymph nodes sampled, negative for malignancy.  New baseline CT chest abdomen pelvis prior to initiation of  radiation therapy performed on 9/20/2023.  Persistent loculated left pneumothorax, trace left pleural effusion, primary tumor in partially collapsed left upper lobe appears relatively stable.  Small right pleural effusion no change in borderline enlarged mediastinal lymph nodes.  Slight increase in right upper lobe nodule (for up to 7 mm), indeterminate.  Patient therefore with unresectable stage IIIA ([id number]) disease.  Plans to treat with radiation therapy.  Due to age, performance status, comorbidities, patient felt to be a poor candidate for concurrent chemotherapy.  Patient received radiation therapy 10/9 - 10/27/2023 to left upper lobe, 6000 cGy.  CT chest abdomen pelvis 1/23/2024 showed decrease in left pneumothorax/hydropneumothorax, left upper lobe mass difficult to accurately assess due to change in configuration (shift in lung parenchyma due to pneumothorax improvement) but appears relatively stable, 2.9 x 4.3 versus current 3.3 x 3.5 cm.  No lymphadenopathy noted, no evidence of distant metastatic disease.  PET scan 3/20/2024 with subcentimeter left supraclavicular lymph node with new uptake (SUV 2.4).  Decrease in treated left upper lobe mass from 4.4 x 3.8 down to 3.4 x 2 (decrease in SUV from 14.4 down to 3.2).  Increase in 2 mildly hypermetabolic left upper lobe ill-defined opacities with linear consolidation felt to represent postradiation change (SUV 3.4).  Resolution of hypermetabolic activity and subcarinal lymph node as well as subcentimeter mediastinal and left hilar lymph nodes.  Increase and 2 adjacent right upper lobe solid nodules surrounding bullae measuring 1 and 0.8 cm with SUV 5.1 and 2.5 respectively.  Decrease in small loculated pleural effusions with SUV 3 on the left and 2.4 on the right.  Tubular hypermetabolic activity throughout the esophagus.  Persistent focal transverse colon hypermetabolism with SUV 6.9 and additional foci of the ascending colon hypermetabolism SUV  5.6.  Patient returns today in follow-up with the above-mentioned PET scan to review.  He received primary radiation therapy to the unresectable T4 primary left upper lobe mass completed on 10/27/2023.  He was felt to be a poor candidate for concurrent chemotherapy given his age and comorbidities.  He has recovered from the effects from prior radiation therapy at this point and maintain ECOG performance as of 1.  We reviewed his PET scan from 3/20/2024.  The treated mass has decreased significantly in size and hypermetabolic activity with residual SUV of only 3.2, just above blood pool level.  Therefore it appears he had good response to prior treatment.  Previous activity in subcarinal as well as small mediastinal and left hilar lymph nodes resolved.  He does however have slight enlargement of 2 adjacent hypermetabolic right upper lobe nodules measuring 1 and 0.8 cm with SUV 5.1 and 2.5 respectively.  These nodules have increased slowly since June 2023 and are highly suspicious for malignancy, unclear whether this represents metastasis from the previous malignancy or possibly new primary lung cancer.  The nodules are borderline size for biopsy and likely would have increased risk for pneumothorax given the proximity to bullae.  A negative biopsy would not rule out malignancy.  It might be more prudent to proceed with presumptive SBRT to this area.  We will discuss the issue at thoracic oncology tumor board tomorrow and patient will follow-up with Dr. Painter and radiation oncology if it is recommended to proceed with radiation.  There are multiple other findings on the scan of uncertain significance.  The small bilateral pleural effusions were pre-existing and previous sampling of the right pleural fluid was negative for malignancy.  Pleural effusions have actually decreased in size, had borderline activity on PET scan and are likely not malignant in nature and will need to be monitored over time.  The left  subcentimeter supraclavicular lymph node with minimal hypermetabolic activity SUV 2.4 is indeterminate and is too small for biopsy at this time and will need to be monitored on subsequent scans.  The esophageal activity may be related to muscular activity or possibly reflux esophagitis however the patient notes only very minimal and intermittent symptoms of reflux.  The colonic activity does raise suspicion for possible underlying malignancy.  We did discuss potential referral to GI to evaluate these issues however the patient does not wish to pursue endoscopic evaluation and would prefer to follow these areas up on future scans.  Hopefully we will proceed with radiation to the right sided hypermetabolic lung lesions with SBRT in the near future.  Consider repeat PET scan 3 months after completion of radiation to assess this area as well as the additional areas mentioned above.  I will see the patient back in 6 weeks for follow-up.    *Chronic bilateral pleural effusions with right hydropneumothorax and subsequent postoperative left hydropneumothorax  Patient appears to have longstanding evidence of small bilateral pleural effusions likely related to CHF  PET/CT 6/12/2023 identified right small to moderate hydropneumothorax new since 5/26/2023 CT chest  Patient was hospitalized 6/13 - 6/15/2023 due to right hydropneumothorax, had CT-guided chest tube placement performed 6/13/2023.  Right pleural fluid cytology negative on 6/13/2023.  As above, patient underwent left VATS with attempted resection of primary lung cancer on 7/31/2023.    Patient required persistent left chest tube postoperatively, eventually removed on 9/6/2023  CT chest 9/20/2023 with persistent loculated left pneumothorax.  CT chest 1/23/2024 with significant improvement in left pneumothorax/hydropneumothorax  PET scan 3/20/2024 with decrease in small loculated pleural effusions with SUV 3 on the left and 2.4 on the right.  Pleural effusions felt to  likely benign in nature at this point.      *COPD  The patient has a history of smoking 1 pack/day x 55 years quit in 2000.  PFTs on 6/20/2023 with FEV1 0.99 (44% predicted), DLCO corrected 14.09 (56% predicted).  Patient continues with chronic dyspnea on exertion that does limit his level of activity although maintains O2 saturation in the 90% range even with activity.  Patient notes that his chronic dyspnea on exertion is stable, respiratory status.  He continues follow-up with pulmonary, had difficulty affording Trelegy inhaler and has been off of this for a few weeks, plans to begin Anoro inhaler soon.    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  Labs on 7/11/2023 with hemoglobin 10.6, MCV 82.8, iron 18, ferritin 504, iron saturation 6%, TIBC 295, B12 201, folate 6.79, CRP 1.07, ESR 27, erythropoietin level 17.8.  Labs consistent with anemia secondary to chronic disease/malignancy as well as anemia secondary to CKD3.  Patient initiated oral B12 1000 mcg daily for B12 deficiency  Hemoglobin on 11/7/2023 was relatively unchanged at 10.3.  Additional evaluation sent with iron 21, ferritin 415, iron saturation 8%, TIBC 266, B12 398, folate 10.2, negative serum protein electrophoresis/immunoelectrophoresis, IgA 209, IgG 829, IgM 13, free kappa light chain 50.4, free lambda light chain 34.2, free light chain ratio 1.47 (normal).  Labs did consistent with anemia secondary to chronic disease as well as anemia secondary to CKD3.  He does not qualify for Procrit with hemoglobin above 10.  Patient continued on oral B12 1000 mcg daily  Patient returns today with hemoglobin  11.4.  Additional labs with iron 29, ferritin 315, iron saturation 9%, TIBC 319, B12 level 578.  Labs fairly stable currently.  With ferritin 315, would not necessarily treat the iron saturation of 9%.    *Transient thrombocytopenia  On 11/7/2024, new onset thrombocytopenia with platelet count of 137,000.  Additional labs with IPF low normal at 3.3%, B12 398, folate 10.2,negative serum protein electrophoresis/immunoelectrophoresis, IgA 209, IgG 829, IgM 13, free kappa light chain 50.4, free lambda light chain 34.2, free light chain ratio 1.47 (normal).  Platelet count today normal at 143,000    *Coronary artery disease, CHF  Chronic bilateral pleural effusions presumably related to CHF  Status post cardiac stent in 2013  Preoperative echocardiogram on 7/19/2023 with ejection fraction 56 to 60%, dilated LA, LV  Patient continues on aspirin 81 mg daily    *Peripheral vascular disease  Patient is followed by Dr. Karen Barrera in vascular surgery  Abdominal aortic aneurysm status post stent graft repair 5/17/2021  Carotid stenosis status post carotid endarterectomy left, 2013.    *Anorexia, weight loss  Patient with anorexia associated with malignancy, mild degree of weight loss  Patient initiated Remeron 7.5 mg nightly on 9/22/2023.  Subsequently discontinued with improvement in weight and appetite  Patient with improvement in appetite, weight improved at 152 pounds    *Constipation  Patient continues with intermittent constipation, uses stool softener as needed.      *Left upper arm pain  Patient reports approximately 2-week history of pain in the left upper arm.  There is no palpable abnormality on exam today.  PET scan did not show any significant abnormality in this area although was incompletely visualized.  I did recommend plain film of the left humerus today.      Plan:  Patient with stage IIIA ([id number]) adenocarcinoma of the left upper lobe, unresectable  Patient completed primary radiation therapy to the left  upper lobe on 10/27/2023.  He is continuing on a course of observation/surveillance.  Patient continuing on oral B12 1000 mcg daily for B12 deficiency  Patient to be discussed at thoracic oncology tumor board tomorrow regarding enlarging adjacent right upper lobe hypermetabolic nodules.  Given the small size and risk for pneumothorax, will likely recommend to forego biopsy.  Anticipate likely proceeding with SBRT to the 2 adjacent enlarging hypermetabolic right upper lobe nodules.  The patient would like to forego endoscopic procedures at this time in regards to the esophageal and colonic activity on PET scan.  Plain film left humerus today and will notify patient of the result.  MD visit in 6 weeks with CBC, CMP    I did spend 60 minutes total time caring for the patient today, time spent in review of records, face-to-face consultation, coordination of care, placement of orders, completion of documentation.

## 2024-04-01 ENCOUNTER — HOSPITAL ENCOUNTER (OUTPATIENT)
Dept: RADIATION ONCOLOGY | Facility: HOSPITAL | Age: 84
Setting detail: RADIATION/ONCOLOGY SERIES
End: 2024-04-01
Payer: MEDICARE

## 2024-04-01 PROCEDURE — 77334 RADIATION TREATMENT AID(S): CPT | Performed by: STUDENT IN AN ORGANIZED HEALTH CARE EDUCATION/TRAINING PROGRAM

## 2024-04-01 PROCEDURE — 77470 SPECIAL RADIATION TREATMENT: CPT | Performed by: STUDENT IN AN ORGANIZED HEALTH CARE EDUCATION/TRAINING PROGRAM

## 2024-04-01 PROCEDURE — 77263 THER RADIOLOGY TX PLNG CPLX: CPT | Performed by: STUDENT IN AN ORGANIZED HEALTH CARE EDUCATION/TRAINING PROGRAM

## 2024-04-02 ENCOUNTER — TELEPHONE (OUTPATIENT)
Dept: OTHER | Facility: HOSPITAL | Age: 84
End: 2024-04-02
Payer: MEDICARE

## 2024-04-02 NOTE — TELEPHONE ENCOUNTER
Oncology Social Work     OSW reached out to patient related to OSW referral. Patient and OSW agreed to meet before his radiation tx on 4/11 at 2:30.    Uzma VELASQUEZ

## 2024-04-05 LAB
RAD ONC ARIA COURSE END DATE: NORMAL
RAD ONC ARIA COURSE ID: NORMAL
RAD ONC ARIA COURSE INTENT: NORMAL
RAD ONC ARIA COURSE LAST TREATMENT DATE: NORMAL
RAD ONC ARIA COURSE START DATE: NORMAL
RAD ONC ARIA COURSE TREATMENT ELAPSED DAYS: 18
RAD ONC ARIA FIRST TREATMENT DATE: NORMAL
RAD ONC ARIA PLAN FRACTIONS TREATED TO DATE: 15
RAD ONC ARIA PLAN ID: NORMAL
RAD ONC ARIA PLAN NAME: NORMAL
RAD ONC ARIA PLAN PRESCRIBED DOSE PER FRACTION: 4 GY
RAD ONC ARIA PLAN PRIMARY REFERENCE POINT: NORMAL
RAD ONC ARIA PLAN TOTAL FRACTIONS PRESCRIBED: 15
RAD ONC ARIA PLAN TOTAL PRESCRIBED DOSE: 6000 CGY
RAD ONC ARIA REFERENCE POINT DOSAGE GIVEN TO DATE: 60 GY
RAD ONC ARIA REFERENCE POINT ID: NORMAL

## 2024-04-05 PROCEDURE — 77338 DESIGN MLC DEVICE FOR IMRT: CPT | Performed by: STUDENT IN AN ORGANIZED HEALTH CARE EDUCATION/TRAINING PROGRAM

## 2024-04-05 PROCEDURE — 77300 RADIATION THERAPY DOSE PLAN: CPT | Performed by: STUDENT IN AN ORGANIZED HEALTH CARE EDUCATION/TRAINING PROGRAM

## 2024-04-05 PROCEDURE — 77293 RESPIRATOR MOTION MGMT SIMUL: CPT | Performed by: STUDENT IN AN ORGANIZED HEALTH CARE EDUCATION/TRAINING PROGRAM

## 2024-04-05 PROCEDURE — 77301 RADIOTHERAPY DOSE PLAN IMRT: CPT | Performed by: STUDENT IN AN ORGANIZED HEALTH CARE EDUCATION/TRAINING PROGRAM

## 2024-04-11 ENCOUNTER — PATIENT OUTREACH (OUTPATIENT)
Dept: OTHER | Facility: HOSPITAL | Age: 84
End: 2024-04-11
Payer: MEDICARE

## 2024-04-11 ENCOUNTER — DOCUMENTATION (OUTPATIENT)
Dept: OTHER | Facility: HOSPITAL | Age: 84
End: 2024-04-11
Payer: MEDICARE

## 2024-04-11 ENCOUNTER — HOSPITAL ENCOUNTER (OUTPATIENT)
Dept: RADIATION ONCOLOGY | Facility: HOSPITAL | Age: 84
Discharge: HOME | End: 2024-04-11

## 2024-04-11 LAB
RAD ONC ARIA COURSE ID: NORMAL
RAD ONC ARIA COURSE INTENT: NORMAL
RAD ONC ARIA COURSE LAST TREATMENT DATE: NORMAL
RAD ONC ARIA COURSE START DATE: NORMAL
RAD ONC ARIA COURSE TREATMENT ELAPSED DAYS: 0
RAD ONC ARIA FIRST TREATMENT DATE: NORMAL
RAD ONC ARIA PLAN FRACTIONS TREATED TO DATE: 1
RAD ONC ARIA PLAN ID: NORMAL
RAD ONC ARIA PLAN PRESCRIBED DOSE PER FRACTION: 10 GY
RAD ONC ARIA PLAN PRIMARY REFERENCE POINT: NORMAL
RAD ONC ARIA PLAN TOTAL FRACTIONS PRESCRIBED: 5
RAD ONC ARIA PLAN TOTAL PRESCRIBED DOSE: 5000 CGY
RAD ONC ARIA REFERENCE POINT DOSAGE GIVEN TO DATE: 10 GY
RAD ONC ARIA REFERENCE POINT ID: NORMAL
RAD ONC ARIA REFERENCE POINT SESSION DOSAGE GIVEN: 10 GY

## 2024-04-11 PROCEDURE — 77435 SBRT MANAGEMENT: CPT | Performed by: STUDENT IN AN ORGANIZED HEALTH CARE EDUCATION/TRAINING PROGRAM

## 2024-04-11 PROCEDURE — 77373 STRTCTC BDY RAD THER TX DLVR: CPT | Performed by: STUDENT IN AN ORGANIZED HEALTH CARE EDUCATION/TRAINING PROGRAM

## 2024-04-11 NOTE — PROGRESS NOTES
Reviewed chart. Patient with clinical stage IIIA ([id number]) non-small cell lung cancer (adenocarcinoma) of left upper lobe. Proceeding with SBRT to the 2 adjacent enlarging hypermetabolic right upper lobe nodules.     Met patient and wife in [facility] following his appt with Uzma . The patient complains of left upper arm pain, which he has discussed with Dr. Santos. He had an xray today @ [facility] which was unremarkable.  The patient states he was told the pain could be related to his previous radiation. He is managing his pain with oral pain medication.    Walked patient/wife to radiation center for his appt today. He denies any questions, concerns or additional resource needs.     I will follow up in May; encouraged patient/wife to call as needed.

## 2024-04-11 NOTE — PROGRESS NOTES
Oncology Social Work   Chief Complaint: Financial Distress     Subjective  Patient ID: Bayron Acevedo is a 83 y.o. male who presents to [facility] for follow-up     Plan of Care     OSW met with patient and his wife in the cancer resource [place] related to financial distress. Patient has left lung adenocarcinoma, stage IIIA. He is followed by Hetal MORALEZ for nurse navigation. He is under the care of Dr. Painter and Dr. Santos.   Patient lives with his wife. She typically provides the transportation. The patient and his wife are on fixed incomes. The patient is concern about the cost of Anoro medication. He stated he cost around $42-47 per month. OSW reviewed the [employer] program and gave them a card. OSW will inquire about a assistance program through [employer].   OSW and patient discussed medical bills. Patient voiced that he switched health insurance plans during [facility] open enrollment. He is concerned about his OPM. OSW provided patient the [employer] Financial Aid Application and briefly reviewed the process. Patient will complete the application and return it to this OSW.     Patient voiced that he is struggling with extreme pain. OSW encouraged patient to inform the radiation nurses about this pain. Patient stated that he is starting another round of radiation on the other lung. OSW provided active listening and support. Patient's wife remains his main support through this process.      Interventions:  Financial Needs: medication assistance; insurance assistance ([employer], [employer] Financial Aid Application)  Emotional Needs: emotional suppport/coping strategies       Uzma VELASQUEZ                          
no

## 2024-04-12 ENCOUNTER — HOSPITAL ENCOUNTER (OUTPATIENT)
Dept: RADIATION ONCOLOGY | Facility: HOSPITAL | Age: 84
Discharge: HOME | End: 2024-04-12

## 2024-04-12 LAB

## 2024-04-12 PROCEDURE — 77373 STRTCTC BDY RAD THER TX DLVR: CPT | Performed by: STUDENT IN AN ORGANIZED HEALTH CARE EDUCATION/TRAINING PROGRAM

## 2024-04-15 ENCOUNTER — RADIATION ONCOLOGY WEEKLY ASSESSMENT (OUTPATIENT)
Dept: RADIATION ONCOLOGY | Facility: HOSPITAL | Age: 84
End: 2024-04-15
Payer: MEDICARE

## 2024-04-15 ENCOUNTER — HOSPITAL ENCOUNTER (OUTPATIENT)
Dept: RADIATION ONCOLOGY | Facility: HOSPITAL | Age: 84
Discharge: HOME | End: 2024-04-15
Payer: MEDICARE

## 2024-04-15 VITALS
SYSTOLIC BLOOD PRESSURE: 158 MMHG | DIASTOLIC BLOOD PRESSURE: 75 MMHG | OXYGEN SATURATION: 97 % | HEART RATE: 79 BPM | BODY MASS INDEX: 23.54 KG/M2 | WEIGHT: 150 LBS

## 2024-04-15 DIAGNOSIS — C34.11 MALIGNANT NEOPLASM OF UPPER LOBE OF RIGHT LUNG: Primary | ICD-10-CM

## 2024-04-15 LAB
RAD ONC ARIA COURSE ID: NORMAL
RAD ONC ARIA COURSE INTENT: NORMAL
RAD ONC ARIA COURSE LAST TREATMENT DATE: NORMAL
RAD ONC ARIA COURSE START DATE: NORMAL
RAD ONC ARIA COURSE TREATMENT ELAPSED DAYS: 4
RAD ONC ARIA FIRST TREATMENT DATE: NORMAL
RAD ONC ARIA PLAN FRACTIONS TREATED TO DATE: 3
RAD ONC ARIA PLAN ID: NORMAL
RAD ONC ARIA PLAN PRESCRIBED DOSE PER FRACTION: 10 GY
RAD ONC ARIA PLAN PRIMARY REFERENCE POINT: NORMAL
RAD ONC ARIA PLAN TOTAL FRACTIONS PRESCRIBED: 5
RAD ONC ARIA PLAN TOTAL PRESCRIBED DOSE: 5000 CGY
RAD ONC ARIA REFERENCE POINT DOSAGE GIVEN TO DATE: 30 GY
RAD ONC ARIA REFERENCE POINT ID: NORMAL
RAD ONC ARIA REFERENCE POINT SESSION DOSAGE GIVEN: 10 GY

## 2024-04-15 PROCEDURE — 77373 STRTCTC BDY RAD THER TX DLVR: CPT | Performed by: STUDENT IN AN ORGANIZED HEALTH CARE EDUCATION/TRAINING PROGRAM

## 2024-04-16 ENCOUNTER — HOSPITAL ENCOUNTER (OUTPATIENT)
Dept: RADIATION ONCOLOGY | Facility: HOSPITAL | Age: 84
Discharge: HOME | End: 2024-04-16

## 2024-04-16 ENCOUNTER — DOCUMENTATION (OUTPATIENT)
Dept: OTHER | Facility: HOSPITAL | Age: 84
End: 2024-04-16
Payer: MEDICARE

## 2024-04-16 LAB
RAD ONC ARIA COURSE ID: NORMAL
RAD ONC ARIA COURSE INTENT: NORMAL
RAD ONC ARIA COURSE LAST TREATMENT DATE: NORMAL
RAD ONC ARIA COURSE START DATE: NORMAL
RAD ONC ARIA COURSE TREATMENT ELAPSED DAYS: 5
RAD ONC ARIA FIRST TREATMENT DATE: NORMAL
RAD ONC ARIA PLAN FRACTIONS TREATED TO DATE: 4
RAD ONC ARIA PLAN ID: NORMAL
RAD ONC ARIA PLAN PRESCRIBED DOSE PER FRACTION: 10 GY
RAD ONC ARIA PLAN PRIMARY REFERENCE POINT: NORMAL
RAD ONC ARIA PLAN TOTAL FRACTIONS PRESCRIBED: 5
RAD ONC ARIA PLAN TOTAL PRESCRIBED DOSE: 5000 CGY
RAD ONC ARIA REFERENCE POINT DOSAGE GIVEN TO DATE: 40 GY
RAD ONC ARIA REFERENCE POINT ID: NORMAL
RAD ONC ARIA REFERENCE POINT SESSION DOSAGE GIVEN: 10 GY

## 2024-04-16 PROCEDURE — 77336 RADIATION PHYSICS CONSULT: CPT | Performed by: STUDENT IN AN ORGANIZED HEALTH CARE EDUCATION/TRAINING PROGRAM

## 2024-04-16 PROCEDURE — 77373 STRTCTC BDY RAD THER TX DLVR: CPT | Performed by: STUDENT IN AN ORGANIZED HEALTH CARE EDUCATION/TRAINING PROGRAM

## 2024-04-16 NOTE — PROGRESS NOTES
Radiation Oncology  On-Treatment Note      Patient: Bayron Acevedo    MRN: [id number]    Attending Physician: Dakota Painter MD     Diagnosis:     ICD-10-CM ICD-9-CM   1. Malignant neoplasm of upper lobe of right lung  C34.11 162.3       Radiation Therapy Visit:  Continue radiation therapy, Dosimetry plan remains acceptable, Films reviewed and remains acceptable, Pain assessed, Pain management planned, Radiation dose schedule reviewed and remains acceptable, Radiation technique remains acceptable, and Symptoms within expected range    Radiation Treatments       Active   Plans   RUL SBRT   Most recent treatment: Dose planned: 1,000 cGy (fraction 3 on 4/15/2024)   Total: Dose planned: 5,000 cGy (5 fractions)   Elapsed Days: 4      Reference Points   RX: RUL   Most recent treatment: Dose given: 1,000 cGy (on 4/15/2024)   Total: Dose given: 3,000 cGy   Elapsed Days: 4                      Physical Examination:  Vitals: Blood pressure 158/75, pulse 79, weight 68 kg (150 lb), SpO2 97%.  Pain Score    04/15/24 1557   PainSc:   2   PainLoc: Arm  Comment: LEFT       Ambulatory and capable of all selfcare but unable to carry out any work activities; up and about more than 50% of waking hours = 2    We examined the relevant areas: yes  Findings are within the expected range for this stage of treatment: yes  -------------------------------------------------------------------------------------------------------------------    ACTION ITEMS:  Patient tolerating treatment well and as expected for this stage in their treatment and Continue radiation therapy as planned    Estimated Completion Date: 4/17/2024    Follow up in ~2-3 months with PET CT, to be ordered by Dr. Tom Painter MD  Radiation Oncology

## 2024-04-16 NOTE — PROGRESS NOTES
Oncology Social Work     OSW meet the patient and his wife in radiation before his appointment related to the drug assistance for his Anoro Ellipta. OSW had gathered the financial assistance application for the drug. Patient voiced that Anoro was no longer helping him and he was probably going to switch the medication.     Patient's wife had questions about the [facility] financial assistance program. OSW answered questions.     Uzma VELASQUEZ

## 2024-04-17 ENCOUNTER — HOSPITAL ENCOUNTER (OUTPATIENT)
Dept: RADIATION ONCOLOGY | Facility: HOSPITAL | Age: 84
Discharge: HOME | End: 2024-04-17

## 2024-04-17 ENCOUNTER — TREATMENT (OUTPATIENT)
Dept: RADIATION ONCOLOGY | Facility: HOSPITAL | Age: 84
End: 2024-04-17
Payer: MEDICARE

## 2024-04-17 DIAGNOSIS — C34.11 MALIGNANT NEOPLASM OF UPPER LOBE OF RIGHT LUNG: Primary | ICD-10-CM

## 2024-04-17 LAB
RAD ONC ARIA COURSE ID: NORMAL
RAD ONC ARIA COURSE INTENT: NORMAL
RAD ONC ARIA COURSE LAST TREATMENT DATE: NORMAL
RAD ONC ARIA COURSE START DATE: NORMAL
RAD ONC ARIA COURSE TREATMENT ELAPSED DAYS: 6
RAD ONC ARIA FIRST TREATMENT DATE: NORMAL
RAD ONC ARIA PLAN FRACTIONS TREATED TO DATE: 5
RAD ONC ARIA PLAN ID: NORMAL
RAD ONC ARIA PLAN PRESCRIBED DOSE PER FRACTION: 10 GY
RAD ONC ARIA PLAN PRIMARY REFERENCE POINT: NORMAL
RAD ONC ARIA PLAN TOTAL FRACTIONS PRESCRIBED: 5
RAD ONC ARIA PLAN TOTAL PRESCRIBED DOSE: 5000 CGY
RAD ONC ARIA REFERENCE POINT DOSAGE GIVEN TO DATE: 50 GY
RAD ONC ARIA REFERENCE POINT ID: NORMAL
RAD ONC ARIA REFERENCE POINT SESSION DOSAGE GIVEN: 10 GY

## 2024-04-17 PROCEDURE — 77373 STRTCTC BDY RAD THER TX DLVR: CPT | Performed by: STUDENT IN AN ORGANIZED HEALTH CARE EDUCATION/TRAINING PROGRAM

## 2024-04-22 NOTE — PROGRESS NOTES
Radiation Treatment Summary Note      Patient Name: Bayron Acevedo  : 1940    Attending Provider: Dakota Painter MD      Diagnosis:     ICD-10-CM ICD-9-CM   1. Malignant neoplasm of upper lobe of right lung  C34.11 162.3       Radiation Start Date: 2024    Radiation Completion Date: 2024      Prescription:     Site: Right Upper Lobe  Laterality: Right  Total Dose: 5000cGy  Dose per Fraction: 1000cGy  Total Fractions: 5  Daily or BID: Daily  Modality: Photon  Technique: SBRT (2-5fx)  Bolus: No    Final Delivered Dose Deviated From Initially Prescribed Dose: No    Concurrent Chemotherapy: No    Patient Tolerated Treatment Without Unexpected Side Effects/Complications: Yes    ECOG: Ambulatory and capable of all selfcare but unable to carry out any work activities; up and about more than 50% of waking hours = 2    Pain Management Plan: None Indicated/PRN OTC    Follow-Up Plan: 3 months    Imaging Ordered for Follow-Up: Yes, PET CT to be ordered by Dr. Tom Painter MD

## 2024-04-23 LAB
RAD ONC ARIA COURSE END DATE: NORMAL
RAD ONC ARIA COURSE ID: NORMAL
RAD ONC ARIA COURSE INTENT: NORMAL
RAD ONC ARIA COURSE LAST TREATMENT DATE: NORMAL
RAD ONC ARIA COURSE START DATE: NORMAL
RAD ONC ARIA COURSE TREATMENT ELAPSED DAYS: 6
RAD ONC ARIA FIRST TREATMENT DATE: NORMAL
RAD ONC ARIA PLAN FRACTIONS TREATED TO DATE: 5
RAD ONC ARIA PLAN ID: NORMAL
RAD ONC ARIA PLAN NAME: NORMAL
RAD ONC ARIA PLAN PRESCRIBED DOSE PER FRACTION: 10 GY
RAD ONC ARIA PLAN PRIMARY REFERENCE POINT: NORMAL
RAD ONC ARIA PLAN TOTAL FRACTIONS PRESCRIBED: 5
RAD ONC ARIA PLAN TOTAL PRESCRIBED DOSE: 5000 CGY
RAD ONC ARIA REFERENCE POINT DOSAGE GIVEN TO DATE: 50 GY
RAD ONC ARIA REFERENCE POINT ID: NORMAL

## 2024-04-29 RX ORDER — CARVEDILOL 25 MG/1
25 TABLET ORAL 2 TIMES DAILY
Qty: 180 TABLET | Refills: 0 | Status: SHIPPED | OUTPATIENT
Start: 2024-04-29

## 2024-05-13 NOTE — PROGRESS NOTES
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  He previously received a course of radiation therapy with Dr. Painter, administered from 10/9 - 10/27/2023 to the left upper lobe, 6000 cGy.  The patient today returns reporting that he has had some significant difficulties recently.  He has had pain in the left upper arm around his biceps radiating into his left pectoral region.  He believes that the pain was exacerbated when he was pulled up in the apparatus for his recent radiation therapy.  The pain however has been present prior to this.  The pain is limiting his level of activity and is a constant issue, exacerbated with movement of his left upper extremity.  He denies any pain in his neck or upper back.  He notes a normal appetite, somewhat frustrated that he cannot gain weight, drinks at least 1 boost per day.  He did follow-up with pulmonary, has been receiving an Anoro inhaler but would like to switch back to [facility] and is going to be discussing this with his PCP.  In regards to his left arm pain he did undergo plain film on 4/11/2024 with his PCP in the [facility] system which showed no significant bony abnormality.  Patient maintains ECOG performance status of 1        Objective   Vital Signs:   /82   Pulse 61   Temp 98 °F (36.7 °C) (Oral)   Resp 16   Ht 170 cm (66.93\")   Wt 68.2 kg (150 lb 4.8 oz)   SpO2 97%   BMI 23.59 kg/m²     Physical Exam  Constitutional:       Appearance: He is well-developed.      Comments: Patient in no distress   Eyes:      Conjunctiva/sclera: Conjunctivae normal.   Neck:      Thyroid: No thyromegaly.   Cardiovascular:      Rate and Rhythm: Normal rate and regular rhythm.      Heart sounds: No murmur " heard.     No friction rub. No gallop.   Pulmonary:      Effort: No respiratory distress.      Comments: Few scattered crackles in the lung bases bilaterally  Abdominal:      General: Bowel sounds are normal. There is no distension.      Palpations: Abdomen is soft.      Tenderness: There is no abdominal tenderness.   Musculoskeletal:         General: No swelling.      Comments: Trace bilateral lower extremity edema   Lymphadenopathy:      Head:      Right side of head: No submandibular adenopathy.      Cervical: No cervical adenopathy.      Upper Body:      Right upper body: No supraclavicular adenopathy.      Left upper body: No supraclavicular adenopathy.   Skin:     General: Skin is warm and dry.      Findings: No rash.   Neurological:      Mental Status: He is alert and oriented to person, place, and time.      Cranial Nerves: No cranial nerve deficit.      Motor: No abnormal muscle tone.      Deep Tendon Reflexes: Reflexes normal.   Psychiatric:         Behavior: Behavior normal.             Result Review : Reviewed CBC, CMP from today.  Reviewed radiation records, reviewed plain film left humerus 4/11/24       Assessment and Plan     *Clinical stage IIIA ([id number]) non-small cell lung cancer (adenocarcinoma) of left upper lobe.  Subsequent clinical stageIAI ([id number]) right upper lobe non-small cell lung cancer (biopsy deferred)  The patient has a history of smoking 1 pack/day x 55 years quit in 2000.  CT chest 5/26/2023 showed a spiculated mass in the anterior left upper lobe 3.7 x 4.3 x 4.2 cm along the anterior pleural surface and lateral aspect of the anterior mediastinum.  Additional 3 mm nodule right major fissure.  Bilateral small to moderate-sized pleural effusions.  Bullae formation consistent with COPD.  Mediastinal lymphadenopathy with largest node infracarinal region 2.6 x 1.2 cm.  PET/CT on 6/12/2023 showed 5.2 x 3.1 cm irregular pleural-based mass anterior left upper lobe abutting the pleura  anteriorly and medially, hypermetabolic with SUV 14.4.  Mediastinal lymph node activity in the subcarinal region with a 2.2 x 1.1 cm lymph node with SUV 4.5.  Remainder of mediastinal lymph nodes less intensely hypermetabolic with maximal SUV 3.3.  Moderate size loculated pleural effusions bilaterally with low-level activity along the pleura (SUV 2.5), no change in volume of pleural effusions since prior chest CT.  New small to moderate loculated right hydropneumothorax with air-fluid level 5.2 cm.   CT-guided left upper lobe lung biopsy 6/13/2023 with pathology that showed invasive poorly differentiated adenocarcinoma, PD-L1 less than 1%.  Caris analysis: TMB high (14 muts/Mb), mutations in KEAP1 and TP53, EGFR VUS, no targetable mutations.  Patient was hospitalized 6/13 - 6/15/2023 due to right hydropneumothorax, had CT-guided chest tube placement performed 6/13/2023.  Right pleural fluid cytology negative on 6/13/2023.  Staging MRI brain was negative for evidence of metastatic disease on 7/2/2023  PFTs on 6/20/2023 with FEV1 0.99 (44% predicted), DLCO corrected 14.09 (56% predicted).  Mediastinoscopy 7/17/2023 with 4R and station 7 lymph nodes negative for malignancy  Left VATS performed 7/31/2023.  Intraoperatively, primary tumor was unresectable, invading into the mediastinum and pericardial fat pad as well as with phrenic nerve involvement (T4 lesion).  Sampling of left pleura with fibrosis, chronic inflammation, no evidence of malignancy.  Station 5 and 10 R lymph nodes sampled, negative for malignancy.  New baseline CT chest abdomen pelvis prior to initiation of radiation therapy performed on 9/20/2023.  Persistent loculated left pneumothorax, trace left pleural effusion, primary tumor in partially collapsed left upper lobe appears relatively stable.  Small right pleural effusion no change in borderline enlarged mediastinal lymph nodes.  Slight increase in right upper lobe nodule (for up to 7 mm),  indeterminate.  Patient therefore with unresectable stage IIIA ([id number]) disease.  Plans to treat with radiation therapy.  Due to age, performance status, comorbidities, patient felt to be a poor candidate for concurrent chemotherapy.  Patient received radiation therapy 10/9 - 10/27/2023 to left upper lobe, 6000 cGy.  CT chest abdomen pelvis 1/23/2024 showed decrease in left pneumothorax/hydropneumothorax, left upper lobe mass difficult to accurately assess due to change in configuration (shift in lung parenchyma due to pneumothorax improvement) but appears relatively stable, 2.9 x 4.3 versus current 3.3 x 3.5 cm.  No lymphadenopathy noted, no evidence of distant metastatic disease.  PET scan 3/20/2024 with subcentimeter left supraclavicular lymph node with new uptake (SUV 2.4).  Decrease in treated left upper lobe mass from 4.4 x 3.8 down to 3.4 x 2 (decrease in SUV from 14.4 down to 3.2).  Increase in 2 mildly hypermetabolic left upper lobe ill-defined opacities with linear consolidation felt to represent postradiation change (SUV 3.4).  Resolution of hypermetabolic activity and subcarinal lymph node as well as subcentimeter mediastinal and left hilar lymph nodes.  Increase and 2 adjacent right upper lobe solid nodules surrounding bullae measuring 1 and 0.8 cm with SUV 5.1 and 2.5 respectively.  Decrease in small loculated pleural effusions with SUV 3 on the left and 2.4 on the right.  Tubular hypermetabolic activity throughout the esophagus.  Persistent focal transverse colon hypermetabolism with SUV 6.9 and additional foci of the ascending colon hypermetabolism SUV 5.6.  Patient discussed at thoracic oncology conference 3/28/2024.  Consensus regarding not pursuing biopsy due to risk of pneumothorax and small size of the lesions.  Given the growth rate and activity, lesions were felt to represent additional primary sites of disease.  Recommendation to treat with SBRT.  Patient received SBRT to both right upper  lobe lesions x 5 fractions each from 4/11 - 4/17/2024  Patient returns today in follow-up after completion of right upper lobe SBRT to 2 presumed new primary lesions.  Elected not to biopsy lesion due to risk pneumothorax.  There was enough clinical concern given the increase in size and activity in the nodules to treat presumptively with SBRT which was completed on 4/17/2024.  We did discuss pursuit of follow-up PET scan at the 3-month interval from completion of radiation which will be 2 months from now.  The patient is complaining of ongoing pain in the left arm radiating into the left pectoral region.  He reports that the pain is persistent, increased slightly with activity.  He feels that his arm was strained when being pulled out of the mold for his radiation therapy.  He did undergo plain films of the left humerus on 4/11/2024 which was negative for evidence of malignancy.  Unclear whether there could be some late effect from radiation therapy to the left upper lobe mass or whether there may have been short-term interval progression of the mass following his pain with effective brachial plexus.  We will pursue a short interval CT chest next week.  I will prescribe a Medrol Dosepak to see if this produces any improvement in his symptoms.  He will have an NP visit in 2 weeks to review the scan results and reassess his symptoms and response to Medrol Dosepak.  Will go ahead and schedule his PET scan in 7 weeks and I will see him back in 8 weeks to review results.    *Chronic bilateral pleural effusions with right hydropneumothorax and subsequent postoperative left hydropneumothorax  Patient appears to have longstanding evidence of small bilateral pleural effusions likely related to CHF  PET/CT 6/12/2023 identified right small to moderate hydropneumothorax new since 5/26/2023 CT chest  Patient was hospitalized 6/13 - 6/15/2023 due to right hydropneumothorax, had CT-guided chest tube placement performed 6/13/2023.   Right pleural fluid cytology negative on 6/13/2023.  As above, patient underwent left VATS with attempted resection of primary lung cancer on 7/31/2023.    Patient required persistent left chest tube postoperatively, eventually removed on 9/6/2023  CT chest 9/20/2023 with persistent loculated left pneumothorax.  CT chest 1/23/2024 with significant improvement in left pneumothorax/hydropneumothorax  PET scan 3/20/2024 with decrease in small loculated pleural effusions with SUV 3 on the left and 2.4 on the right.  Pleural effusions felt to likely benign in nature at this point.    *COPD  The patient has a history of smoking 1 pack/day x 55 years quit in 2000.  PFTs on 6/20/2023 with FEV1 0.99 (44% predicted), DLCO corrected 14.09 (56% predicted).  Patient continues with chronic dyspnea on exertion that does limit his level of activity although maintains O2 saturation in the 90% range even with activity.  Patient notes that his chronic dyspnea on exertion is stable.  He is contacting his PCP in order to transition from Anoro inhaler back to Trelegy.    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  Labs on 7/11/2023 with hemoglobin 10.6, MCV 82.8, iron 18, ferritin 504, iron saturation 6%, TIBC 295, B12 201, folate 6.79, CRP 1.07, ESR 27, erythropoietin level 17.8.  Labs consistent with anemia secondary to chronic disease/malignancy as well as anemia secondary to CKD3.  Patient initiated oral B12 1000 mcg daily for B12 deficiency  Hemoglobin on 11/7/2023 was relatively unchanged at 10.3.  Additional evaluation sent with iron 21, ferritin 415, iron saturation 8%, TIBC 266, B12  398, folate 10.2, negative serum protein electrophoresis/immunoelectrophoresis, IgA 209, IgG 829, IgM 13, free kappa light chain 50.4, free lambda light chain 34.2, free light chain ratio 1.47 (normal).  Labs did consistent with anemia secondary to chronic disease as well as anemia secondary to CKD3.  He does not qualify for Procrit with hemoglobin above 10.  Patient continued on oral B12 1000 mcg daily  Patient returns today with hemoglobin that has declined to 10.5.  Additional labs with iron 20, ferritin 394, iron saturation 7%, TIBC 299.  Check Reticulated hemoglobin.    *Transient thrombocytopenia  On 11/7/2024, new onset thrombocytopenia with platelet count of 137,000.  Additional labs with IPF low normal at 3.3%, B12 398, folate 10.2,negative serum protein electrophoresis/immunoelectrophoresis, IgA 209, IgG 829, IgM 13, free kappa light chain 50.4, free lambda light chain 34.2, free light chain ratio 1.47 (normal).  Platelet count today normal at 144,000    *Coronary artery disease, CHF  Chronic bilateral pleural effusions presumably related to CHF  Status post cardiac stent in 2013  Preoperative echocardiogram on 7/19/2023 with ejection fraction 56 to 60%, dilated LA, LV  Patient continues on aspirin 81 mg daily    *Peripheral vascular disease  Patient is followed by Dr. Karen Barrera in vascular surgery  Abdominal aortic aneurysm status post stent graft repair 5/17/2021  Carotid stenosis status post carotid endarterectomy left, 2013.    *Anorexia, weight loss  Patient with anorexia associated with malignancy, mild degree of weight loss  Patient initiated Remeron 7.5 mg nightly on 9/22/2023.  Subsequently discontinued with improvement in weight and appetite  Patient with improvement in appetite, weight fairly stable at 150 pounds.  Patient reports that he is drinking approximately 1 can of boost per day.  We discussed increasing this to 2 to 3 cans/day.    *Constipation  Patient continues with intermittent  constipation, uses stool softener as needed.      *Pain control  Patient has been taking Tylenol alone for his pain.  We discussed that he could use his previous supply of hydrocodone 5/325 every 6 hours as needed and I encouraged him to begin using half tablet to full tablet tolerated.      Plan:  Patient with stage IIIA ([id number]) adenocarcinoma of the left upper lobe, unresectable.  Received primary radiation therapy to the left upper lobe completed on 10/27/2023  Patient with 2 additional hypermetabolic right upper lobe nodules felt to be new primary lesions.  High risk for pneumothorax with biopsy.  Both lesions treated with SBRT 4/11 - 4/17/2024.  Patient continuing on oral B12 1000 mcg daily for B12 deficiency  The patient declined endoscopic procedures to evaluate the esophageal and colonic activity on PET scan.  Left upper extremity pain possibly related to effects from prior radiation therapy to the left chest wall  CT chest in 1 week  Prescription sent for Medrol Dosepak  Patient will use hydrocodone 5/325 every 6 hours as needed for pain in the interim (has previous prescription, advised patient to begin with half tablet)  NP visit in 2 weeks with CBC, CMP.  Will review CT and determine any additional evaluation needed  MD visit in 2 months with CBC, CMP, stat iron panel/ferritin.  1 week prior PET scan.    I did spend 40 minutes total time caring for the patient today, time spent in review of records, face-to-face consultation, coordination of care, placement of orders, completion of documentation.

## 2024-05-14 ENCOUNTER — LAB (OUTPATIENT)
Dept: LAB | Facility: HOSPITAL | Age: 84
End: 2024-05-14
Payer: MEDICARE

## 2024-05-14 ENCOUNTER — OFFICE VISIT (OUTPATIENT)
Dept: ONCOLOGY | Facility: CLINIC | Age: 84
End: 2024-05-14
Payer: MEDICARE

## 2024-05-14 VITALS
HEIGHT: 67 IN | OXYGEN SATURATION: 97 % | BODY MASS INDEX: 23.59 KG/M2 | WEIGHT: 150.3 LBS | RESPIRATION RATE: 16 BRPM | DIASTOLIC BLOOD PRESSURE: 82 MMHG | HEART RATE: 61 BPM | SYSTOLIC BLOOD PRESSURE: 164 MMHG | TEMPERATURE: 98 F

## 2024-05-14 DIAGNOSIS — C34.92 ADENOCARCINOMA, LUNG, LEFT: Primary | ICD-10-CM

## 2024-05-14 DIAGNOSIS — C34.92 ADENOCARCINOMA, LUNG, LEFT: ICD-10-CM

## 2024-05-14 DIAGNOSIS — C34.81 MALIGNANT NEOPLASM OF OVERLAPPING SITES OF RIGHT BRONCHUS AND LUNG: ICD-10-CM

## 2024-05-14 DIAGNOSIS — D64.9 NORMOCYTIC ANEMIA: ICD-10-CM

## 2024-05-14 LAB
ALBUMIN SERPL-MCNC: 3.9 G/DL (ref 3.5–5.2)
ALBUMIN/GLOB SERPL: 1.3 G/DL
ALP SERPL-CCNC: 83 U/L (ref 39–117)
ALT SERPL W P-5'-P-CCNC: 8 U/L (ref 1–41)
ANION GAP SERPL CALCULATED.3IONS-SCNC: 12.7 MMOL/L (ref 5–15)
AST SERPL-CCNC: 15 U/L (ref 1–40)
BASOPHILS # BLD AUTO: 0.04 10*3/MM3 (ref 0–0.2)
BASOPHILS NFR BLD AUTO: 0.7 % (ref 0–1.5)
BILIRUB SERPL-MCNC: 0.3 MG/DL (ref 0–1.2)
BUN SERPL-MCNC: 23 MG/DL (ref 8–23)
BUN/CREAT SERPL: 13.5 (ref 7–25)
CALCIUM SPEC-SCNC: 9 MG/DL (ref 8.6–10.5)
CHLORIDE SERPL-SCNC: 99 MMOL/L (ref 98–107)
CO2 SERPL-SCNC: 25.3 MMOL/L (ref 22–29)
CREAT SERPL-MCNC: 1.7 MG/DL (ref 0.76–1.27)
DEPRECATED RDW RBC AUTO: 52.1 FL (ref 37–54)
EGFRCR SERPLBLD CKD-EPI 2021: 39 ML/MIN/1.73
EOSINOPHIL # BLD AUTO: 0.37 10*3/MM3 (ref 0–0.4)
EOSINOPHIL NFR BLD AUTO: 6.3 % (ref 0.3–6.2)
ERYTHROCYTE [DISTWIDTH] IN BLOOD BY AUTOMATED COUNT: 17.2 % (ref 12.3–15.4)
FERRITIN SERPL-MCNC: 394 NG/ML (ref 30–400)
GLOBULIN UR ELPH-MCNC: 2.9 GM/DL
GLUCOSE SERPL-MCNC: 93 MG/DL (ref 65–99)
HCT VFR BLD AUTO: 34.7 % (ref 37.5–51)
HGB BLD-MCNC: 10.5 G/DL (ref 13–17.7)
IMM GRANULOCYTES # BLD AUTO: 0.02 10*3/MM3 (ref 0–0.05)
IMM GRANULOCYTES NFR BLD AUTO: 0.3 % (ref 0–0.5)
IRON 24H UR-MRATE: 20 MCG/DL (ref 59–158)
IRON SATN MFR SERPL: 7 % (ref 20–50)
LYMPHOCYTES # BLD AUTO: 0.71 10*3/MM3 (ref 0.7–3.1)
LYMPHOCYTES NFR BLD AUTO: 12.1 % (ref 19.6–45.3)
MCH RBC QN AUTO: 25.4 PG (ref 26.6–33)
MCHC RBC AUTO-ENTMCNC: 30.3 G/DL (ref 31.5–35.7)
MCV RBC AUTO: 83.8 FL (ref 79–97)
MONOCYTES # BLD AUTO: 0.66 10*3/MM3 (ref 0.1–0.9)
MONOCYTES NFR BLD AUTO: 11.2 % (ref 5–12)
NEUTROPHILS NFR BLD AUTO: 4.07 10*3/MM3 (ref 1.7–7)
NEUTROPHILS NFR BLD AUTO: 69.4 % (ref 42.7–76)
NRBC BLD AUTO-RTO: 0 /100 WBC (ref 0–0.2)
PLATELET # BLD AUTO: 144 10*3/MM3 (ref 140–450)
PMV BLD AUTO: 8.6 FL (ref 6–12)
POTASSIUM SERPL-SCNC: 4.4 MMOL/L (ref 3.5–5.2)
PROT SERPL-MCNC: 6.8 G/DL (ref 6–8.5)
RBC # BLD AUTO: 4.14 10*6/MM3 (ref 4.14–5.8)
SODIUM SERPL-SCNC: 137 MMOL/L (ref 136–145)
TIBC SERPL-MCNC: 299 MCG/DL (ref 298–536)
TRANSFERRIN SERPL-MCNC: 201 MG/DL (ref 200–360)
WBC NRBC COR # BLD AUTO: 5.87 10*3/MM3 (ref 3.4–10.8)

## 2024-05-14 PROCEDURE — 3077F SYST BP >= 140 MM HG: CPT | Performed by: INTERNAL MEDICINE

## 2024-05-14 PROCEDURE — 1160F RVW MEDS BY RX/DR IN RCRD: CPT | Performed by: INTERNAL MEDICINE

## 2024-05-14 PROCEDURE — 84466 ASSAY OF TRANSFERRIN: CPT | Performed by: INTERNAL MEDICINE

## 2024-05-14 PROCEDURE — 85046 RETICYTE/HGB CONCENTRATE: CPT | Performed by: INTERNAL MEDICINE

## 2024-05-14 PROCEDURE — 36415 COLL VENOUS BLD VENIPUNCTURE: CPT

## 2024-05-14 PROCEDURE — 82728 ASSAY OF FERRITIN: CPT | Performed by: INTERNAL MEDICINE

## 2024-05-14 PROCEDURE — 1125F AMNT PAIN NOTED PAIN PRSNT: CPT | Performed by: INTERNAL MEDICINE

## 2024-05-14 PROCEDURE — 99215 OFFICE O/P EST HI 40 MIN: CPT | Performed by: INTERNAL MEDICINE

## 2024-05-14 PROCEDURE — 83540 ASSAY OF IRON: CPT | Performed by: INTERNAL MEDICINE

## 2024-05-14 PROCEDURE — 3079F DIAST BP 80-89 MM HG: CPT | Performed by: INTERNAL MEDICINE

## 2024-05-14 PROCEDURE — 1159F MED LIST DOCD IN RCRD: CPT | Performed by: INTERNAL MEDICINE

## 2024-05-14 PROCEDURE — 85025 COMPLETE CBC W/AUTO DIFF WBC: CPT

## 2024-05-14 PROCEDURE — 80053 COMPREHEN METABOLIC PANEL: CPT

## 2024-05-14 RX ORDER — METHYLPREDNISOLONE 4 MG/1
TABLET ORAL
Qty: 21 TABLET | Refills: 0 | Status: SHIPPED | OUTPATIENT
Start: 2024-05-14

## 2024-05-14 NOTE — LETTER
May 14, 2024       No Recipients    Patient: Bayron Acevedo   YOB: 1940   Date of Visit: 5/14/2024     Dear Low Sepulveda:       Thank you for referring Bayron Acevedo to me for evaluation. Below are the relevant portions of my assessment and plan of care.    If you have questions, please do not hesitate to call me. I look forward to following Bayron along with you.         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  He previously received a course of radiation therapy with Dr. Painter, administered from 10/9 - 10/27/2023 to the left upper lobe, 6000 cGy.  The patient today returns reporting that he has had some significant difficulties recently.  He has had pain in the left upper arm around his biceps radiating into his left pectoral region.  He believes that the pain was exacerbated when he was pulled up in the apparatus for his recent radiation therapy.  The pain however has been present prior to this.  The pain is limiting his level of activity and is a constant issue, exacerbated with movement of his left upper extremity.  He denies any pain in his neck or upper back.  He notes a normal appetite, somewhat frustrated that he cannot gain weight, drinks at least 1 boost per day.  He did follow-up with pulmonary, has been receiving an Anoro inhaler but would like to switch back to Trelegy and is going to be discussing this with his PCP.  In regards to his left arm pain he did undergo plain film on 4/11/2024 with his PCP in the [facility] system which showed no significant bony abnormality.  Patient maintains ECOG  "performance status of 1        Objective  Vital Signs:   /82   Pulse 61   Temp 98 °F (36.7 °C) (Oral)   Resp 16   Ht 170 cm (66.93\")   Wt 68.2 kg (150 lb 4.8 oz)   SpO2 97%   BMI 23.59 kg/m²     Physical Exam  Constitutional:       Appearance: He is well-developed.      Comments: Patient in no distress   Eyes:      Conjunctiva/sclera: Conjunctivae normal.   Neck:      Thyroid: No thyromegaly.   Cardiovascular:      Rate and Rhythm: Normal rate and regular rhythm.      Heart sounds: No murmur heard.     No friction rub. No gallop.   Pulmonary:      Effort: No respiratory distress.      Comments: Few scattered crackles in the lung bases bilaterally  Abdominal:      General: Bowel sounds are normal. There is no distension.      Palpations: Abdomen is soft.      Tenderness: There is no abdominal tenderness.   Musculoskeletal:         General: No swelling.      Comments: Trace bilateral lower extremity edema   Lymphadenopathy:      Head:      Right side of head: No submandibular adenopathy.      Cervical: No cervical adenopathy.      Upper Body:      Right upper body: No supraclavicular adenopathy.      Left upper body: No supraclavicular adenopathy.   Skin:     General: Skin is warm and dry.      Findings: No rash.   Neurological:      Mental Status: He is alert and oriented to person, place, and time.      Cranial Nerves: No cranial nerve deficit.      Motor: No abnormal muscle tone.      Deep Tendon Reflexes: Reflexes normal.   Psychiatric:         Behavior: Behavior normal.             Result Review: Reviewed CBC, CMP from today.  Reviewed radiation records, reviewed plain film left humerus 4/11/24       Assessment and Plan     *Clinical stage IIIA ([id number]) non-small cell lung cancer (adenocarcinoma) of left upper lobe.  Subsequent clinical stageIAI ([id number]) right upper lobe non-small cell lung cancer (biopsy deferred)  The patient has a history of smoking 1 pack/day x 55 years quit in 2000.  CT chest " 5/26/2023 showed a spiculated mass in the anterior left upper lobe 3.7 x 4.3 x 4.2 cm along the anterior pleural surface and lateral aspect of the anterior mediastinum.  Additional 3 mm nodule right major fissure.  Bilateral small to moderate-sized pleural effusions.  Bullae formation consistent with COPD.  Mediastinal lymphadenopathy with largest node infracarinal region 2.6 x 1.2 cm.  PET/CT on 6/12/2023 showed 5.2 x 3.1 cm irregular pleural-based mass anterior left upper lobe abutting the pleura anteriorly and medially, hypermetabolic with SUV 14.4.  Mediastinal lymph node activity in the subcarinal region with a 2.2 x 1.1 cm lymph node with SUV 4.5.  Remainder of mediastinal lymph nodes less intensely hypermetabolic with maximal SUV 3.3.  Moderate size loculated pleural effusions bilaterally with low-level activity along the pleura (SUV 2.5), no change in volume of pleural effusions since prior chest CT.  New small to moderate loculated right hydropneumothorax with air-fluid level 5.2 cm.   CT-guided left upper lobe lung biopsy 6/13/2023 with pathology that showed invasive poorly differentiated adenocarcinoma, PD-L1 less than 1%.  Caris analysis: TMB high (14 muts/Mb), mutations in KEAP1 and TP53, EGFR VUS, no targetable mutations.  Patient was hospitalized 6/13 - 6/15/2023 due to right hydropneumothorax, had CT-guided chest tube placement performed 6/13/2023.  Right pleural fluid cytology negative on 6/13/2023.  Staging MRI brain was negative for evidence of metastatic disease on 7/2/2023  PFTs on 6/20/2023 with FEV1 0.99 (44% predicted), DLCO corrected 14.09 (56% predicted).  Mediastinoscopy 7/17/2023 with 4R and station 7 lymph nodes negative for malignancy  Left VATS performed 7/31/2023.  Intraoperatively, primary tumor was unresectable, invading into the mediastinum and pericardial fat pad as well as with phrenic nerve involvement (T4 lesion).  Sampling of left pleura with fibrosis, chronic inflammation, no  evidence of malignancy.  Station 5 and 10 R lymph nodes sampled, negative for malignancy.  New baseline CT chest abdomen pelvis prior to initiation of radiation therapy performed on 9/20/2023.  Persistent loculated left pneumothorax, trace left pleural effusion, primary tumor in partially collapsed left upper lobe appears relatively stable.  Small right pleural effusion no change in borderline enlarged mediastinal lymph nodes.  Slight increase in right upper lobe nodule (for up to 7 mm), indeterminate.  Patient therefore with unresectable stage IIIA ([id number]) disease.  Plans to treat with radiation therapy.  Due to age, performance status, comorbidities, patient felt to be a poor candidate for concurrent chemotherapy.  Patient received radiation therapy 10/9 - 10/27/2023 to left upper lobe, 6000 cGy.  CT chest abdomen pelvis 1/23/2024 showed decrease in left pneumothorax/hydropneumothorax, left upper lobe mass difficult to accurately assess due to change in configuration (shift in lung parenchyma due to pneumothorax improvement) but appears relatively stable, 2.9 x 4.3 versus current 3.3 x 3.5 cm.  No lymphadenopathy noted, no evidence of distant metastatic disease.  PET scan 3/20/2024 with subcentimeter left supraclavicular lymph node with new uptake (SUV 2.4).  Decrease in treated left upper lobe mass from 4.4 x 3.8 down to 3.4 x 2 (decrease in SUV from 14.4 down to 3.2).  Increase in 2 mildly hypermetabolic left upper lobe ill-defined opacities with linear consolidation felt to represent postradiation change (SUV 3.4).  Resolution of hypermetabolic activity and subcarinal lymph node as well as subcentimeter mediastinal and left hilar lymph nodes.  Increase and 2 adjacent right upper lobe solid nodules surrounding bullae measuring 1 and 0.8 cm with SUV 5.1 and 2.5 respectively.  Decrease in small loculated pleural effusions with SUV 3 on the left and 2.4 on the right.  Tubular hypermetabolic activity throughout  the esophagus.  Persistent focal transverse colon hypermetabolism with SUV 6.9 and additional foci of the ascending colon hypermetabolism SUV 5.6.  Patient discussed at thoracic oncology conference 3/28/2024.  Consensus regarding not pursuing biopsy due to risk of pneumothorax and small size of the lesions.  Given the growth rate and activity, lesions were felt to represent additional primary sites of disease.  Recommendation to treat with SBRT.  Patient received SBRT to both right upper lobe lesions x 5 fractions each from 4/11 - 4/17/2024  Patient returns today in follow-up after completion of right upper lobe SBRT to 2 presumed new primary lesions.  Elected not to biopsy lesion due to risk pneumothorax.  There was enough clinical concern given the increase in size and activity in the nodules to treat presumptively with SBRT which was completed on 4/17/2024.  We did discuss pursuit of follow-up PET scan at the 3-month interval from completion of radiation which will be 2 months from now.  The patient is complaining of ongoing pain in the left arm radiating into the left pectoral region.  He reports that the pain is persistent, increased slightly with activity.  He feels that his arm was strained when being pulled out of the mold for his radiation therapy.  He did undergo plain films of the left humerus on 4/11/2024 which was negative for evidence of malignancy.  Unclear whether there could be some late effect from radiation therapy to the left upper lobe mass or whether there may have been short-term interval progression of the mass following his pain with effective brachial plexus.  We will pursue a short interval CT chest next week.  I will prescribe a Medrol Dosepak to see if this produces any improvement in his symptoms.  He will have an NP visit in 2 weeks to review the scan results and reassess his symptoms and response to Medrol Dosepak.  Will go ahead and schedule his PET scan in 7 weeks and I will see him  back in 8 weeks to review results.    *Chronic bilateral pleural effusions with right hydropneumothorax and subsequent postoperative left hydropneumothorax  Patient appears to have longstanding evidence of small bilateral pleural effusions likely related to CHF  PET/CT 6/12/2023 identified right small to moderate hydropneumothorax new since 5/26/2023 CT chest  Patient was hospitalized 6/13 - 6/15/2023 due to right hydropneumothorax, had CT-guided chest tube placement performed 6/13/2023.  Right pleural fluid cytology negative on 6/13/2023.  As above, patient underwent left VATS with attempted resection of primary lung cancer on 7/31/2023.    Patient required persistent left chest tube postoperatively, eventually removed on 9/6/2023  CT chest 9/20/2023 with persistent loculated left pneumothorax.  CT chest 1/23/2024 with significant improvement in left pneumothorax/hydropneumothorax  PET scan 3/20/2024 with decrease in small loculated pleural effusions with SUV 3 on the left and 2.4 on the right.  Pleural effusions felt to likely benign in nature at this point.    *COPD  The patient has a history of smoking 1 pack/day x 55 years quit in 2000.  PFTs on 6/20/2023 with FEV1 0.99 (44% predicted), DLCO corrected 14.09 (56% predicted).  Patient continues with chronic dyspnea on exertion that does limit his level of activity although maintains O2 saturation in the 90% range even with activity.  Patient notes that his chronic dyspnea on exertion is stable.  He is contacting his PCP in order to transition from Anoro inhaler back to Trelegy.    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  Labs on 7/11/2023 with hemoglobin 10.6, MCV 82.8, iron 18, ferritin 504, iron saturation 6%, TIBC 295, B12 201, folate 6.79, CRP 1.07, ESR 27, erythropoietin level 17.8.  Labs consistent with anemia secondary to chronic disease/malignancy as well as anemia secondary to CKD3.  Patient initiated oral B12 1000 mcg daily for B12 deficiency  Hemoglobin on 11/7/2023 was relatively unchanged at 10.3.  Additional evaluation sent with iron 21, ferritin 415, iron saturation 8%, TIBC 266, B12 398, folate 10.2, negative serum protein electrophoresis/immunoelectrophoresis, IgA 209, IgG 829, IgM 13, free kappa light chain 50.4, free lambda light chain 34.2, free light chain ratio 1.47 (normal).  Labs did consistent with anemia secondary to chronic disease as well as anemia secondary to CKD3.  He does not qualify for Procrit with hemoglobin above 10.  Patient continued on oral B12 1000 mcg daily  Patient returns today with hemoglobin that has declined to 10.5.  Additional labs with iron 20, ferritin 394, iron saturation 7%, TIBC 299.  Check Reticulated hemoglobin.    *Transient thrombocytopenia  On 11/7/2024, new onset thrombocytopenia with platelet count of 137,000.  Additional labs with IPF low normal at 3.3%, B12 398, folate 10.2,negative serum protein electrophoresis/immunoelectrophoresis, IgA 209, IgG 829, IgM 13, free kappa light chain 50.4, free lambda light chain 34.2, free light chain ratio 1.47 (normal).  Platelet count today normal at 144,000    *Coronary artery disease, CHF  Chronic bilateral pleural effusions presumably related to CHF  Status post cardiac stent in 2013  Preoperative echocardiogram on 7/19/2023 with ejection fraction 56 to 60%, dilated LA, LV  Patient continues on aspirin 81 mg daily    *Peripheral vascular disease  Patient is followed by Dr. Karen Barrera in vascular surgery  Abdominal aortic aneurysm status post stent graft repair 5/17/2021  Carotid stenosis status post carotid endarterectomy  left, 2013.    *Anorexia, weight loss  Patient with anorexia associated with malignancy, mild degree of weight loss  Patient initiated Remeron 7.5 mg nightly on 9/22/2023.  Subsequently discontinued with improvement in weight and appetite  Patient with improvement in appetite, weight fairly stable at 150 pounds.  Patient reports that he is drinking approximately 1 can of boost per day.  We discussed increasing this to 2 to 3 cans/day.    *Constipation  Patient continues with intermittent constipation, uses stool softener as needed.      *Pain control  Patient has been taking Tylenol alone for his pain.  We discussed that he could use his previous supply of hydrocodone 5/325 every 6 hours as needed and I encouraged him to begin using half tablet to full tablet tolerated.      Plan:  Patient with stage IIIA ([id number]) adenocarcinoma of the left upper lobe, unresectable.  Received primary radiation therapy to the left upper lobe completed on 10/27/2023  Patient with 2 additional hypermetabolic right upper lobe nodules felt to be new primary lesions.  High risk for pneumothorax with biopsy.  Both lesions treated with SBRT 4/11 - 4/17/2024.  Patient continuing on oral B12 1000 mcg daily for B12 deficiency  The patient declined endoscopic procedures to evaluate the esophageal and colonic activity on PET scan.  Left upper extremity pain possibly related to effects from prior radiation therapy to the left chest wall  CT chest in 1 week  Prescription sent for Physihome  Patient will use hydrocodone 5/325 every 6 hours as needed for pain in the interim (has previous prescription, advised patient to begin with half tablet)  NP visit in 2 weeks with CBC, CMP.  Will review CT and determine any additional evaluation needed  MD visit in 2 months with CBC, CMP, stat iron panel/ferritin.  1 week prior PET scan.    I did spend 40 minutes total time caring for the patient today, time spent in review of records, face-to-face  consultation, coordination of care, placement of orders, completion of documentation.

## 2024-05-15 LAB
HGB RETIC QN AUTO: 27.3 PG (ref 29.8–36.1)
IMM RETICS NFR: 13.5 % (ref 3–15.8)
RETICS # AUTO: 0.04 10*6/MM3 (ref 0.02–0.13)
RETICS/RBC NFR AUTO: 1.09 % (ref 0.7–1.9)

## 2024-05-16 RX ORDER — AMLODIPINE BESYLATE 5 MG/1
5 TABLET ORAL DAILY
Qty: 90 TABLET | Refills: 3 | Status: SHIPPED | OUTPATIENT
Start: 2024-05-16

## 2024-05-17 ENCOUNTER — PATIENT OUTREACH (OUTPATIENT)
Dept: OTHER | Facility: HOSPITAL | Age: 84
End: 2024-05-17
Payer: MEDICARE

## 2024-05-17 NOTE — PROGRESS NOTES
Reviewed chart.  Patient with Clinical stage IIIA ([id number]) non-small cell lung cancer (adenocarcinoma) of left upper lobe. Patient with 2 additional hypermetabolic right upper lobe nodules felt to be new primary lesions. Rec'd SBRT x5 due to new hypermetabolic lesions in the right lung from 4/11 - 4/17/2024.  He previously received a course of radiation therapy with Dr. Painter, administered from 10/9 - 10/27/2023 to the left upper lobe, 6000 cGy. Met with Dr. Santos 5/14. Patient complained of ongoing pain in the left arm radiating into the left pectoral region. He did undergo plain films of the left humerus on 4/11/2024 which was negative for evidence of malignancy   Dr. Santos ordered Medrol dose pack. Patient will have CT scan 5/22 and see APRN 5/31.  Scheduled for PET 7/10, sees Dr. Santos 7/17.     Called patient. Left message that I was just touching base to see how he was doing since he finished radiation treatment. Wanted to know if he had any questions/concerns after his recent appt with Dr. Santos or resource/supportive care needs; requested cb

## 2024-05-21 NOTE — PROGRESS NOTES
Chief Complaint  Aortic Aneurysm (ASG ), Carotid Artery Disease, and Peripheral Vascular Disease    Subjective        Bayron Acevedo presents to [facility] VASCULAR SURGERY  History of Present Illness  The patient is status post endovascular aortic stent graft repair of a large infrarenal abdominal aortic aneurysm (5.6 cm) with a Zenith aortic stent graft,performed on May 17, 2021. He had a left carotid endarterectomy performed in January 2013.   He was seen again on May 17, 2023 with repeat studies. Aortic duplex scan demonstrated no endoleak and measured 5.05 cm, decreased in size. ABIs were 0.72 and 0.93, both improved.Carotid duplex scan demonstrated less than 50% stenosis on the right and 50-69% stenosis on the left, unchanged.  He returned on May 22, 2024 with repeat studies.  Carotid duplex scan demonstrated less than 50% stenosis on the right and 50 to 69% stenosis on the left, unchanged.  Aortic duplex scan demonstrated no endoleak and the aorta measured 5.1 cm, unchanged.  ABIs were 0.68 on the right and 0.93 on the left, unchanged.  Unfortunately, he has been diagnosed with lung cancer bilaterally and has undergone radiation therapy.  He has lost about 60 pounds and just feels poorly.    Past History:  Medical History: has a past medical history of AAA (abdominal aortic aneurysm) (03/31/2021), Acid reflux, Atherosclerosis of native arteries of extremities with intermittent claudication, bilateral legs (11/13/2019), CAD in native artery, Carotid stenosis (05/11/2016), Cataract, CKD (chronic kidney disease), Claudication, Colon polyp, COPD (chronic obstructive pulmonary disease), H/O Acute myocardial infarction (2013), H/O PAD (peripheral artery disease), Heart attack (2013), Heart failure, History of atrial fibrillation, Hypercholesterolemia, Hyperlipidemia, Hypertension, Inguinal hernia, Left ventricular dysfunction, Mass of left lung, Mitral valve regurgitation, Non-small cell lung cancer  "(NSCLC) (2023), Perennial allergic rhinitis (01/06/2017), PVD (peripheral vascular disease), S/P angioplasty with stent, Tinnitus, and Tricuspid regurgitation.   Surgical History: has a past surgical history that includes Carotid Endarterectomy (Left, 01/28/2013); Hydrocele excision / repair (Right, 05/28/2014); Cardiac catheterization; Umbilical hernia repair (N/A, 05/18/2007); Colonoscopy (N/A, 10/17/2011); Arteriogram Aortic (N/A, 05/17/2021); Cardiac catheterization (N/A, 04/11/2023); Cardiac catheterization (N/A, 04/11/2023); Cardiac catheterization (N/A, 04/11/2023); Cardiac catheterization (N/A, 04/11/2023); Cardiac catheterization (04/11/2023); Appendectomy; Mediastinoscopy (N/A, 07/17/2023); lobectomy (Left, 07/31/2023); Coronary stent placement; Inguinal Hernia Repair (Right, 03/05/2024); Cardiac catheterization (2002); Colonoscopy (2012); and Cardiac catheterization.   Family History: family history includes Bleeding Disorder in an other family member; Cancer in his brother, father, and another family member; Heart disease in his maternal uncle and another family member; Leukemia in his father; No Known Problems in his mother; Other in an other family member.   Social History: reports that he quit smoking about 24 years ago. His smoking use included cigarettes. He started smoking about 79 years ago. He has a 55 pack-year smoking history. He has never used smokeless tobacco. He reports that he does not drink alcohol and does not use drugs.    (Not in a hospital admission)     Allergies: Lisinopril, Codeine sulfate, Contrast dye (echo or unknown ct/mr), Iodinated contrast media, Losartan, and Penicillins   Objective   Vital Signs:  /76   Ht 170 cm (66.93\")   Wt 68 kg (150 lb)   BMI 23.54 kg/m²   Estimated body mass index is 23.54 kg/m² as calculated from the following:    Height as of this encounter: 170 cm (66.93\").    Weight as of this encounter: 68 kg (150 lb).     BMI is within normal " parameters. No other follow-up for BMI required.    Bayron Acevedo  reports that he quit smoking about 24 years ago. His smoking use included cigarettes. He started smoking about 79 years ago. He has a 55 pack-year smoking history. He has never used smokeless tobacco.          Physical Exam   Result Review :    The following data was reviewed by: Karen Barrera Jr., MD on 05/22/2024:    Data reviewed : Aortic duplex scan, carotid duplex scan, ABIs performed on May 17, 2023 and again on May 22, 2024, findings as described above             Assessment and Plan     Diagnoses and all orders for this visit:    1. Abdominal aortic aneurysm (AAA) without rupture, unspecified part (Primary)  -     Duplex Aorta IVC Iliac Graft Complete CAR; Future  -     Duplex Carotid Ultrasound CAR; Future    2. Bilateral carotid artery stenosis    3. Primary hypertension    4. Hyperlipidemia, unspecified hyperlipidemia type    5. S/P angioplasty with stent    6. Chronic systolic CHF (congestive heart failure)    7. Peripheral vascular disease  -     Doppler Ankle Brachial Index Single Level CAR; Future    8. Infrarenal abdominal aortic aneurysm (AAA) without rupture  -     Duplex Aorta IVC Iliac Graft Complete CAR; Future    9. Asymptomatic carotid artery stenosis, left  -     Duplex Carotid Ultrasound CAR; Future    From a vascular standpoint he is doing quite well.  He has had no progression of disease of his carotid stenosis and remains asymptomatic.  His aneurysm repair remained stable with no endoleak and no increase in the aneurysm sac size.  His lower extremity claudication is no longer present since he is not ambulating much after his lung cancer diagnosis and treatment.  I believe his lung cancer is going to be his biggest issue in the foreseeable future.  Otherwise I will see him in follow-up in a year.         Follow Up     Return in about 1 year (around 5/22/2025) for With studies.  Patient was given instructions and  counseling regarding his condition or for health maintenance advice. Please see specific information pulled into the AVS if appropriate.

## 2024-05-22 ENCOUNTER — OFFICE VISIT (OUTPATIENT)
Age: 84
End: 2024-05-22
Payer: MEDICARE

## 2024-05-22 ENCOUNTER — HOSPITAL ENCOUNTER (OUTPATIENT)
Dept: CT IMAGING | Facility: HOSPITAL | Age: 84
Discharge: HOME | End: 2024-05-22
Payer: MEDICARE

## 2024-05-22 ENCOUNTER — HOSPITAL ENCOUNTER (OUTPATIENT)
Facility: HOSPITAL | Age: 84
Discharge: HOME | End: 2024-05-22
Payer: MEDICARE

## 2024-05-22 VITALS
DIASTOLIC BLOOD PRESSURE: 76 MMHG | BODY MASS INDEX: 23.54 KG/M2 | HEIGHT: 67 IN | WEIGHT: 150 LBS | SYSTOLIC BLOOD PRESSURE: 180 MMHG

## 2024-05-22 DIAGNOSIS — I50.22 CHRONIC SYSTOLIC CHF (CONGESTIVE HEART FAILURE): ICD-10-CM

## 2024-05-22 DIAGNOSIS — I65.23 BILATERAL CAROTID ARTERY OCCLUSION: ICD-10-CM

## 2024-05-22 DIAGNOSIS — I65.23 BILATERAL CAROTID ARTERY STENOSIS: ICD-10-CM

## 2024-05-22 DIAGNOSIS — I73.9 PERIPHERAL VASCULAR DISEASE: ICD-10-CM

## 2024-05-22 DIAGNOSIS — I73.9 PERIPHERAL VASCULAR DISEASE, UNSPECIFIED: ICD-10-CM

## 2024-05-22 DIAGNOSIS — I71.40 ABDOMINAL AORTIC ANEURYSM (AAA) WITHOUT RUPTURE, UNSPECIFIED PART: Primary | ICD-10-CM

## 2024-05-22 DIAGNOSIS — Z95.828 HISTORY OF ENDOVASCULAR STENT GRAFT FOR ABDOMINAL AORTIC ANEURYSM: ICD-10-CM

## 2024-05-22 DIAGNOSIS — E78.5 HYPERLIPIDEMIA, UNSPECIFIED HYPERLIPIDEMIA TYPE: ICD-10-CM

## 2024-05-22 DIAGNOSIS — I65.22 ASYMPTOMATIC CAROTID ARTERY STENOSIS, LEFT: ICD-10-CM

## 2024-05-22 DIAGNOSIS — C34.92 ADENOCARCINOMA, LUNG, LEFT: ICD-10-CM

## 2024-05-22 DIAGNOSIS — I71.43 INFRARENAL ABDOMINAL AORTIC ANEURYSM (AAA) WITHOUT RUPTURE: ICD-10-CM

## 2024-05-22 DIAGNOSIS — Z95.820 S/P ANGIOPLASTY WITH STENT: ICD-10-CM

## 2024-05-22 DIAGNOSIS — I10 PRIMARY HYPERTENSION: ICD-10-CM

## 2024-05-22 LAB
ABDOMINAL AORTA AORTIC GRAFT BODY PSV: 31.3 CM/S
ABDOMINAL AORTA LEFT LIMB GRAFT PSV: 85.3 CM/S
ABDOMINAL AORTA RIGHT LIMB GRAFT PSV: 53.6 CM/S
ABDOMINAL LT COM ILIAC AP: 1.52 CM
ABDOMINAL LT COM ILIAC VEL: 125 CM/S
ABDOMINAL LT EXT ILIAC VEL: 159 CM/S
ABDOMINAL PROX AORTA AP: 2.99 CM
ABDOMINAL PROX AORTA VEL: 57.5 CM/S
ABDOMINAL RT COM ILIAC AP: 1.37 CM
ABDOMINAL RT COM ILIAC VEL: 59.5 CM/S
ABDOMINAL RT EXT ILIAC VEL: 182 CM/S
BH CV LOWER ARTERIAL LEFT ABI RATIO: 0.93
BH CV LOWER ARTERIAL LEFT DORSALIS PEDIS SYS MAX: 162
BH CV LOWER ARTERIAL LEFT POST TIBIAL SYS MAX: 168
BH CV LOWER ARTERIAL RIGHT ABI RATIO: 0.68
BH CV LOWER ARTERIAL RIGHT DORSALIS PEDIS SYS MAX: 114
BH CV LOWER ARTERIAL RIGHT POST TIBIAL SYS MAX: 122
BH CV VAS ABD AO LT EXTERNAL ILIAC AP: 1.09 CM
BH CV VAS ABD AO RT EXTERNAL ILIAC AP: 0.89 CM
BH CV VAS ABDOMINAL AO RESIDUAL LUMEN AP MEASURE: 5.09 CM
BH CV VAS ABDOMINAL AO RESIDUAL LUMEN TRANS MEASURE: 5.18 CM
BH CV VAS ABDOMINAL AORTA DISTAL LEFT LIMB GRAFT PSV: 149 CM/S
BH CV VAS ABDOMINAL AORTA DISTAL RIGHT LIMB GRAFT PSV: 64.4 CM/S
BH CV XLRA MEAS LEFT CAROTID BULB EDV: 23.2 CM/SEC
BH CV XLRA MEAS LEFT CAROTID BULB PSV: 145.3 CM/SEC
BH CV XLRA MEAS LEFT DIST CCA EDV: -19.9 CM/SEC
BH CV XLRA MEAS LEFT DIST CCA PSV: -99.4 CM/SEC
BH CV XLRA MEAS LEFT DIST ICA EDV: -30.8 CM/SEC
BH CV XLRA MEAS LEFT DIST ICA PSV: -98.8 CM/SEC
BH CV XLRA MEAS LEFT ICA/CCA RATIO: 1.9
BH CV XLRA MEAS LEFT MID CCA EDV: 21.4 CM/SEC
BH CV XLRA MEAS LEFT MID CCA PSV: 99.3 CM/SEC
BH CV XLRA MEAS LEFT MID ICA EDV: -37.3 CM/SEC
BH CV XLRA MEAS LEFT MID ICA PSV: -128.2 CM/SEC
BH CV XLRA MEAS LEFT PROX CCA EDV: 17.6 CM/SEC
BH CV XLRA MEAS LEFT PROX CCA PSV: 95.5 CM/SEC
BH CV XLRA MEAS LEFT PROX ECA EDV: -11.8 CM/SEC
BH CV XLRA MEAS LEFT PROX ECA PSV: -116.8 CM/SEC
BH CV XLRA MEAS LEFT PROX ICA EDV: -36.6 CM/SEC
BH CV XLRA MEAS LEFT PROX ICA PSV: -189 CM/SEC
BH CV XLRA MEAS LEFT PROX SCLA PSV: 137.2 CM/SEC
BH CV XLRA MEAS LEFT VERTEBRAL A EDV: 16.2 CM/SEC
BH CV XLRA MEAS LEFT VERTEBRAL A PSV: 74.2 CM/SEC
BH CV XLRA MEAS RIGHT CAROTID BULB EDV: -31.5 CM/SEC
BH CV XLRA MEAS RIGHT CAROTID BULB PSV: -130.3 CM/SEC
BH CV XLRA MEAS RIGHT DIST CCA EDV: -12.2 CM/SEC
BH CV XLRA MEAS RIGHT DIST CCA PSV: -53.3 CM/SEC
BH CV XLRA MEAS RIGHT DIST ICA EDV: -20.1 CM/SEC
BH CV XLRA MEAS RIGHT DIST ICA PSV: -88.5 CM/SEC
BH CV XLRA MEAS RIGHT ICA/CCA RATIO: 2.44
BH CV XLRA MEAS RIGHT MID CCA EDV: 13.8 CM/SEC
BH CV XLRA MEAS RIGHT MID CCA PSV: 76.7 CM/SEC
BH CV XLRA MEAS RIGHT MID ICA EDV: -24.1 CM/SEC
BH CV XLRA MEAS RIGHT MID ICA PSV: -89.9 CM/SEC
BH CV XLRA MEAS RIGHT PROX CCA EDV: 13.8 CM/SEC
BH CV XLRA MEAS RIGHT PROX CCA PSV: 70.8 CM/SEC
BH CV XLRA MEAS RIGHT PROX ECA EDV: -14 CM/SEC
BH CV XLRA MEAS RIGHT PROX ECA PSV: -127.5 CM/SEC
BH CV XLRA MEAS RIGHT PROX ICA EDV: -33.6 CM/SEC
BH CV XLRA MEAS RIGHT PROX ICA PSV: -130.3 CM/SEC
BH CV XLRA MEAS RIGHT PROX SCLA PSV: 158.8 CM/SEC
BH CV XLRA MEAS RIGHT VERTEBRAL A EDV: 20.1 CM/SEC
BH CV XLRA MEAS RIGHT VERTEBRAL A PSV: 78.1 CM/SEC
LEFT ARM BP: NORMAL MMHG
RIGHT ARM BP: NORMAL MMHG
UPPER ARTERIAL LEFT ARM BRACHIAL SYS MAX: NORMAL
UPPER ARTERIAL RIGHT ARM BRACHIAL SYS MAX: NORMAL

## 2024-05-22 PROCEDURE — 93978 VASCULAR STUDY: CPT

## 2024-05-22 PROCEDURE — 93922 UPR/L XTREMITY ART 2 LEVELS: CPT

## 2024-05-22 PROCEDURE — 93880 EXTRACRANIAL BILAT STUDY: CPT

## 2024-05-22 PROCEDURE — 71250 CT THORAX DX C-: CPT

## 2024-05-30 ENCOUNTER — TELEPHONE (OUTPATIENT)
Dept: RADIATION ONCOLOGY | Facility: HOSPITAL | Age: 84
End: 2024-05-30
Payer: MEDICARE

## 2024-05-31 ENCOUNTER — LAB (OUTPATIENT)
Dept: LAB | Facility: HOSPITAL | Age: 84
End: 2024-05-31
Payer: MEDICARE

## 2024-05-31 ENCOUNTER — OFFICE VISIT (OUTPATIENT)
Dept: RADIATION ONCOLOGY | Facility: HOSPITAL | Age: 84
End: 2024-05-31
Payer: MEDICARE

## 2024-05-31 ENCOUNTER — OFFICE VISIT (OUTPATIENT)
Dept: ONCOLOGY | Facility: CLINIC | Age: 84
End: 2024-05-31
Payer: MEDICARE

## 2024-05-31 VITALS
DIASTOLIC BLOOD PRESSURE: 70 MMHG | HEIGHT: 67 IN | TEMPERATURE: 97.6 F | OXYGEN SATURATION: 96 % | RESPIRATION RATE: 16 BRPM | HEART RATE: 57 BPM | BODY MASS INDEX: 23.4 KG/M2 | SYSTOLIC BLOOD PRESSURE: 148 MMHG | WEIGHT: 149.1 LBS

## 2024-05-31 VITALS
BODY MASS INDEX: 23.39 KG/M2 | DIASTOLIC BLOOD PRESSURE: 87 MMHG | WEIGHT: 149 LBS | SYSTOLIC BLOOD PRESSURE: 136 MMHG | OXYGEN SATURATION: 96 % | HEART RATE: 57 BPM

## 2024-05-31 DIAGNOSIS — C34.92 ADENOCARCINOMA, LUNG, LEFT: Primary | ICD-10-CM

## 2024-05-31 DIAGNOSIS — C34.92 ADENOCARCINOMA, LUNG, LEFT: ICD-10-CM

## 2024-05-31 DIAGNOSIS — M79.602 LEFT ARM PAIN: ICD-10-CM

## 2024-05-31 DIAGNOSIS — D64.9 NORMOCYTIC ANEMIA: ICD-10-CM

## 2024-05-31 LAB
ALBUMIN SERPL-MCNC: 3.8 G/DL (ref 3.5–5.2)
ALBUMIN/GLOB SERPL: 1.3 G/DL
ALP SERPL-CCNC: 83 U/L (ref 39–117)
ALT SERPL W P-5'-P-CCNC: 11 U/L (ref 1–41)
ANION GAP SERPL CALCULATED.3IONS-SCNC: 12.3 MMOL/L (ref 5–15)
AST SERPL-CCNC: 16 U/L (ref 1–40)
BASOPHILS # BLD AUTO: 0.03 10*3/MM3 (ref 0–0.2)
BASOPHILS NFR BLD AUTO: 0.4 % (ref 0–1.5)
BILIRUB SERPL-MCNC: 0.4 MG/DL (ref 0–1.2)
BUN SERPL-MCNC: 32 MG/DL (ref 8–23)
BUN/CREAT SERPL: 18 (ref 7–25)
CALCIUM SPEC-SCNC: 9.1 MG/DL (ref 8.6–10.5)
CHLORIDE SERPL-SCNC: 100 MMOL/L (ref 98–107)
CO2 SERPL-SCNC: 27.7 MMOL/L (ref 22–29)
CREAT SERPL-MCNC: 1.78 MG/DL (ref 0.76–1.27)
DEPRECATED RDW RBC AUTO: 54.4 FL (ref 37–54)
EGFRCR SERPLBLD CKD-EPI 2021: 36.9 ML/MIN/1.73
EOSINOPHIL # BLD AUTO: 0.33 10*3/MM3 (ref 0–0.4)
EOSINOPHIL NFR BLD AUTO: 4.8 % (ref 0.3–6.2)
ERYTHROCYTE [DISTWIDTH] IN BLOOD BY AUTOMATED COUNT: 17.6 % (ref 12.3–15.4)
GLOBULIN UR ELPH-MCNC: 2.9 GM/DL
GLUCOSE SERPL-MCNC: 96 MG/DL (ref 65–99)
HCT VFR BLD AUTO: 36.3 % (ref 37.5–51)
HGB BLD-MCNC: 11.1 G/DL (ref 13–17.7)
IMM GRANULOCYTES # BLD AUTO: 0.02 10*3/MM3 (ref 0–0.05)
IMM GRANULOCYTES NFR BLD AUTO: 0.3 % (ref 0–0.5)
LYMPHOCYTES # BLD AUTO: 0.85 10*3/MM3 (ref 0.7–3.1)
LYMPHOCYTES NFR BLD AUTO: 12.2 % (ref 19.6–45.3)
MCH RBC QN AUTO: 26.1 PG (ref 26.6–33)
MCHC RBC AUTO-ENTMCNC: 30.6 G/DL (ref 31.5–35.7)
MCV RBC AUTO: 85.2 FL (ref 79–97)
MONOCYTES # BLD AUTO: 0.73 10*3/MM3 (ref 0.1–0.9)
MONOCYTES NFR BLD AUTO: 10.5 % (ref 5–12)
NEUTROPHILS NFR BLD AUTO: 4.98 10*3/MM3 (ref 1.7–7)
NEUTROPHILS NFR BLD AUTO: 71.8 % (ref 42.7–76)
NRBC BLD AUTO-RTO: 0 /100 WBC (ref 0–0.2)
PLATELET # BLD AUTO: 148 10*3/MM3 (ref 140–450)
PMV BLD AUTO: 9.4 FL (ref 6–12)
POTASSIUM SERPL-SCNC: 4.8 MMOL/L (ref 3.5–5.2)
PROT SERPL-MCNC: 6.7 G/DL (ref 6–8.5)
RBC # BLD AUTO: 4.26 10*6/MM3 (ref 4.14–5.8)
SODIUM SERPL-SCNC: 140 MMOL/L (ref 136–145)
WBC NRBC COR # BLD AUTO: 6.94 10*3/MM3 (ref 3.4–10.8)

## 2024-05-31 PROCEDURE — 80053 COMPREHEN METABOLIC PANEL: CPT

## 2024-05-31 PROCEDURE — 36415 COLL VENOUS BLD VENIPUNCTURE: CPT

## 2024-05-31 PROCEDURE — G0463 HOSPITAL OUTPT CLINIC VISIT: HCPCS | Performed by: STUDENT IN AN ORGANIZED HEALTH CARE EDUCATION/TRAINING PROGRAM

## 2024-05-31 PROCEDURE — 85025 COMPLETE CBC W/AUTO DIFF WBC: CPT

## 2024-05-31 RX ORDER — HYDROCODONE BITARTRATE AND ACETAMINOPHEN 5; 325 MG/1; MG/1
1 TABLET ORAL EVERY 6 HOURS PRN
COMMUNITY
Start: 2024-03-05

## 2024-05-31 NOTE — PROGRESS NOTES
"Chief Complaint  Clinical stage IIIA ([id number]) non-small cell lung cancer (adenocarcinoma) of left upper lobe    Subjective        History of Present Illness  Patient is an 83-year-old male with the above-mentioned history who is seen today for short interval follow-up on his left upper extremity pain.  He did have a CT scan of his chest.  Patient actually was seen by Dr. Painter earlier today, who reviewed the CT scan results.  Patient was placed last week by Dr. Santos on a Medrol Dosepak to see if that would improve his pain.  Patient states that his pain did improve while he was on the steroids however once he finished it the pain came back.  He is supposed to be prescribed a medication to help with nerve pain by Dr. Painter.  Patient describes it as a dull achy pain.  He denies any weakness in his upper extremity.  He states that he is breathing is okay but he does get short of breath with activity.  He is using his Trelegy inhaler, which she feels like is helping.  He has been quite active, and has been shoveling 2 truck loads of dirt, as well is mowing the grass.  Patient does have hydrocodone to take for pain control however he does not like to use this medication as it makes him have strange dreams, even on half tablet.      Objective   Vital Signs:   Vitals:    05/31/24 1504   BP: 148/70   Pulse: 57   Resp: 16   Temp: 97.6 °F (36.4 °C)   TempSrc: Oral   SpO2: 96%   Weight: 67.6 kg (149 lb 1.6 oz)   Height: 170 cm (66.93\")   PainSc: 0-No pain       Physical Exam  Constitutional:       Appearance: He is well-developed.      Comments: Patient in no distress   Eyes:      Conjunctiva/sclera: Conjunctivae normal.   Neck:      Thyroid: No thyromegaly.   Cardiovascular:      Rate and Rhythm: Normal rate and regular rhythm.      Heart sounds: No murmur heard.     No friction rub. No gallop.   Pulmonary:      Effort: No respiratory distress.      Breath sounds: No wheezing, rhonchi or rales.   Abdominal:      General: " Bowel sounds are normal. There is no distension.      Palpations: Abdomen is soft.      Tenderness: There is no abdominal tenderness.   Musculoskeletal:         General: No swelling.      Comments: Trace bilateral lower extremity edema   Lymphadenopathy:      Head:      Right side of head: No submandibular adenopathy.      Cervical: No cervical adenopathy.      Upper Body:      Right upper body: No supraclavicular adenopathy.      Left upper body: No supraclavicular adenopathy.   Skin:     General: Skin is warm and dry.      Findings: No rash.   Neurological:      Mental Status: He is alert and oriented to person, place, and time.      Cranial Nerves: No cranial nerve deficit.      Motor: No abnormal muscle tone.      Deep Tendon Reflexes: Reflexes normal.   Psychiatric:         Behavior: Behavior normal.             Result Review :   Lab Results   Component Value Date    WBC 6.94 05/31/2024    HGB 11.1 (L) 05/31/2024    HCT 36.3 (L) 05/31/2024    MCV 85.2 05/31/2024     05/31/2024     Lab Results   Component Value Date    GLUCOSE 96 05/31/2024    BUN 32 (H) 05/31/2024    CREATININE 1.78 (H) 05/31/2024    EGFR 36.9 (L) 05/31/2024    BCR 18.0 05/31/2024    K 4.8 05/31/2024    CO2 27.7 05/31/2024    CALCIUM 9.1 05/31/2024    PROTENTOTREF 6.2 11/07/2023    ALBUMIN 3.8 05/31/2024    BILITOT 0.4 05/31/2024    AST 16 05/31/2024    ALT 11 05/31/2024            Assessment and Plan     *Clinical stage IIIA ([id number]) non-small cell lung cancer (adenocarcinoma) of left upper lobe.  Subsequent clinical stageIAI ([id number]) right upper lobe non-small cell lung cancer (biopsy deferred)  The patient has a history of smoking 1 pack/day x 55 years quit in 2000.  CT chest 5/26/2023 showed a spiculated mass in the anterior left upper lobe 3.7 x 4.3 x 4.2 cm along the anterior pleural surface and lateral aspect of the anterior mediastinum.  Additional 3 mm nodule right major fissure.  Bilateral small to moderate-sized pleural  effusions.  Bullae formation consistent with COPD.  Mediastinal lymphadenopathy with largest node infracarinal region 2.6 x 1.2 cm.  PET/CT on 6/12/2023 showed 5.2 x 3.1 cm irregular pleural-based mass anterior left upper lobe abutting the pleura anteriorly and medially, hypermetabolic with SUV 14.4.  Mediastinal lymph node activity in the subcarinal region with a 2.2 x 1.1 cm lymph node with SUV 4.5.  Remainder of mediastinal lymph nodes less intensely hypermetabolic with maximal SUV 3.3.  Moderate size loculated pleural effusions bilaterally with low-level activity along the pleura (SUV 2.5), no change in volume of pleural effusions since prior chest CT.  New small to moderate loculated right hydropneumothorax with air-fluid level 5.2 cm.   CT-guided left upper lobe lung biopsy 6/13/2023 with pathology that showed invasive poorly differentiated adenocarcinoma, PD-L1 less than 1%.  Caris analysis: TMB high (14 muts/Mb), mutations in KEAP1 and TP53, EGFR VUS, no targetable mutations.  Patient was hospitalized 6/13 - 6/15/2023 due to right hydropneumothorax, had CT-guided chest tube placement performed 6/13/2023.  Right pleural fluid cytology negative on 6/13/2023.  Staging MRI brain was negative for evidence of metastatic disease on 7/2/2023  PFTs on 6/20/2023 with FEV1 0.99 (44% predicted), DLCO corrected 14.09 (56% predicted).  Mediastinoscopy 7/17/2023 with 4R and station 7 lymph nodes negative for malignancy  Left VATS performed 7/31/2023.  Intraoperatively, primary tumor was unresectable, invading into the mediastinum and pericardial fat pad as well as with phrenic nerve involvement (T4 lesion).  Sampling of left pleura with fibrosis, chronic inflammation, no evidence of malignancy.  Station 5 and 10 R lymph nodes sampled, negative for malignancy.  New baseline CT chest abdomen pelvis prior to initiation of radiation therapy performed on 9/20/2023.  Persistent loculated left pneumothorax, trace left pleural  effusion, primary tumor in partially collapsed left upper lobe appears relatively stable.  Small right pleural effusion no change in borderline enlarged mediastinal lymph nodes.  Slight increase in right upper lobe nodule (for up to 7 mm), indeterminate.  Patient therefore with unresectable stage IIIA ([id number]) disease.  Plans to treat with radiation therapy.  Due to age, performance status, comorbidities, patient felt to be a poor candidate for concurrent chemotherapy.  Patient received radiation therapy 10/9 - 10/27/2023 to left upper lobe, 6000 cGy.  CT chest abdomen pelvis 1/23/2024 showed decrease in left pneumothorax/hydropneumothorax, left upper lobe mass difficult to accurately assess due to change in configuration (shift in lung parenchyma due to pneumothorax improvement) but appears relatively stable, 2.9 x 4.3 versus current 3.3 x 3.5 cm.  No lymphadenopathy noted, no evidence of distant metastatic disease.  PET scan 3/20/2024 with subcentimeter left supraclavicular lymph node with new uptake (SUV 2.4).  Decrease in treated left upper lobe mass from 4.4 x 3.8 down to 3.4 x 2 (decrease in SUV from 14.4 down to 3.2).  Increase in 2 mildly hypermetabolic left upper lobe ill-defined opacities with linear consolidation felt to represent postradiation change (SUV 3.4).  Resolution of hypermetabolic activity and subcarinal lymph node as well as subcentimeter mediastinal and left hilar lymph nodes.  Increase and 2 adjacent right upper lobe solid nodules surrounding bullae measuring 1 and 0.8 cm with SUV 5.1 and 2.5 respectively.  Decrease in small loculated pleural effusions with SUV 3 on the left and 2.4 on the right.  Tubular hypermetabolic activity throughout the esophagus.  Persistent focal transverse colon hypermetabolism with SUV 6.9 and additional foci of the ascending colon hypermetabolism SUV 5.6.  Patient discussed at thoracic oncology conference 3/28/2024.  Consensus regarding not pursuing biopsy due  to risk of pneumothorax and small size of the lesions.  Given the growth rate and activity, lesions were felt to represent additional primary sites of disease.  Recommendation to treat with SBRT.  Patient received SBRT to both right upper lobe lesions x 5 fractions each from 4/11 - 4/17/2024  Patient returns today in follow-up after completion of right upper lobe SBRT to 2 presumed new primary lesions.  Elected not to biopsy lesion due to risk pneumothorax.  There was enough clinical concern given the increase in size and activity in the nodules to treat presumptively with SBRT which was completed on 4/17/2024.  We did discuss pursuit of follow-up PET scan at the 3-month interval from completion of radiation which will be 2 months from now.  The patient is complaining of ongoing pain in the left arm radiating into the left pectoral region.  He reports that the pain is persistent, increased slightly with activity.  He feels that his arm was strained when being pulled out of the mold for his radiation therapy.  He did undergo plain films of the left humerus on 4/11/2024 which was negative for evidence of malignancy.  Unclear whether there could be some late effect from radiation therapy to the left upper lobe mass or whether there may have been short-term interval progression of the mass following his pain with effective brachial plexus.  We will pursue a short interval CT chest next week.  I will prescribe a Medrol Dosepak to see if this produces any improvement in his symptoms.  He will have an NP visit in 2 weeks to review the scan results and reassess his symptoms and response to Medrol Dosepak.  Will go ahead and schedule his PET scan in 7 weeks and I will see him back in 8 weeks to review results.  5/31/2024 patient reports that pain improved while he was on the Medrol Dosepak however returned once he was finished with the steroids.  He did have CT scan of his chest without contrast on 5/22/2024 showing an  ill-defined density in the anterior left lung apex, regular consolidation anterior left upper lobe appears stable, stable areas of atelectasis/consolidation in both lung bases.  New small groundglass density in the periphery of the left upper lobe and a couple of new tiny nodules in the right upper lobe.  Otherwise stable additional findings.  He was seen earlier today by Dr. Painter, who i reviewed the scans with the patient, and s supposed to be prescribing a medication for neuropathic pain.  Mr. Acevedo is scheduled for follow-up PET scan in about a month.    *Chronic bilateral pleural effusions with right hydropneumothorax and subsequent postoperative left hydropneumothorax  Patient appears to have longstanding evidence of small bilateral pleural effusions likely related to CHF  PET/CT 6/12/2023 identified right small to moderate hydropneumothorax new since 5/26/2023 CT chest  Patient was hospitalized 6/13 - 6/15/2023 due to right hydropneumothorax, had CT-guided chest tube placement performed 6/13/2023.  Right pleural fluid cytology negative on 6/13/2023.  As above, patient underwent left VATS with attempted resection of primary lung cancer on 7/31/2023.    Patient required persistent left chest tube postoperatively, eventually removed on 9/6/2023  CT chest 9/20/2023 with persistent loculated left pneumothorax.  CT chest 1/23/2024 with significant improvement in left pneumothorax/hydropneumothorax  PET scan 3/20/2024 with decrease in small loculated pleural effusions with SUV 3 on the left and 2.4 on the right.  Pleural effusions felt to likely benign in nature at this point.    *COPD  The patient has a history of smoking 1 pack/day x 55 years quit in 2000.  PFTs on 6/20/2023 with FEV1 0.99 (44% predicted), DLCO corrected 14.09 (56% predicted).  Patient continues with chronic dyspnea on exertion that does limit his level of activity although maintains O2 saturation in the 90% range even with activity.  Patient notes  that his chronic dyspnea on exertion is stable.  He is contacting his PCP in order to transition from Anoro inhaler back to Trelegy.      *CKD3  Patient followed by nephrology  Baseline creatinine 1.6-2.2  Creatinine today 1.78.    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  Labs on 7/11/2023 with hemoglobin 10.6, MCV 82.8, iron 18, ferritin 504, iron saturation 6%, TIBC 295, B12 201, folate 6.79, CRP 1.07, ESR 27, erythropoietin level 17.8.  Labs consistent with anemia secondary to chronic disease/malignancy as well as anemia secondary to CKD3.  Patient initiated oral B12 1000 mcg daily for B12 deficiency  Hemoglobin on 11/7/2023 was relatively unchanged at 10.3.  Additional evaluation sent with iron 21, ferritin 415, iron saturation 8%, TIBC 266, B12 398, folate 10.2, negative serum protein electrophoresis/immunoelectrophoresis, IgA 209, IgG 829, IgM 13, free kappa light chain 50.4, free lambda light chain 34.2, free light chain ratio 1.47 (normal).  Labs did consistent with anemia secondary to chronic disease as well as anemia secondary to CKD3.  He does not qualify for Procrit with hemoglobin above 10.  Patient continued on oral B12 1000 mcg daily  Patient returns today with hemoglobin that has declined to 10.5.  Additional labs with iron 20, ferritin 394, iron saturation 7%, TIBC 299.  Check Reticulated hemoglobin.  5/31/2024 Hgb 11.1.    *Transient thrombocytopenia  On 11/7/2024, new onset thrombocytopenia with platelet count of 137,000.  Additional labs with IPF low normal at 3.3%, B12 398, folate 10.2,negative serum protein electrophoresis/immunoelectrophoresis, IgA 209, IgG 829, IgM 13, free kappa light chain 50.4, free lambda light chain 34.2, free  light chain ratio 1.47 (normal).  Platelet count today normal at  148,000.    *Coronary artery disease, CHF  Chronic bilateral pleural effusions presumably related to CHF  Status post cardiac stent in 2013  Preoperative echocardiogram on 7/19/2023 with ejection fraction 56 to 60%, dilated LA, LV  Patient continues on aspirin 81 mg daily    *Peripheral vascular disease  Patient is followed by Dr. Karen Barrera in vascular surgery  Abdominal aortic aneurysm status post stent graft repair 5/17/2021  Carotid stenosis status post carotid endarterectomy left, 2013.    *Anorexia, weight loss  Patient with anorexia associated with malignancy, mild degree of weight loss  Patient initiated Remeron 7.5 mg nightly on 9/22/2023.  Subsequently discontinued with improvement in weight and appetite  Patient with improvement in appetite, weight fairly stable at 150 pounds.  Patient reports that he is drinking approximately 1 can of boost per day.  We discussed increasing this to 2 to 3 cans/day.    *Constipation  Patient continues with intermittent constipation, uses stool softener as needed.      *Pain control  Patient has been taking Tylenol alone for his pain.  We discussed that he could use his previous supply of hydrocodone 5/325 every 6 hours as needed and I encouraged him to begin using half tablet to full tablet tolerated.      Plan:  Patient with stage IIIA ([id number]) adenocarcinoma of the left upper lobe, unresectable.  Received primary radiation therapy to the left upper lobe completed on 10/27/2023  Patient with 2 additional hypermetabolic right upper lobe nodules felt to be new primary lesions.  High risk for pneumothorax with biopsy.  Both lesions treated with SBRT 4/11 - 4/17/2024.  Patient scheduled on 7/10/2024 for PET scan  Follow-up 7/17/2024 with Dr. Santos to review scan results with repeat CBC, CMP, stat iron panel, ferritin.  Medication as prescribed by Dr. Painter for neuropathic pain.  Continue oral B12 1000 mcg  daily for B12 deficiency.  Call/ return sooner should the patient develop any new concerns or problems.

## 2024-05-31 NOTE — PROGRESS NOTES
[facility] Radiation Oncology   Follow Up    Chief Complaint  Multiple prior lung cancers s/p RT        Diagnosis: Multiple prior lung cancers s/p RT     Overall Stage IIIA     cT4: Mediastinal Invasion  pN0: per cervical mediastinoscopy, and intraoperative resection of 10R and 5 LNs  cM0: per PET CT, MRI Brain        Radiation Completion Date: 10/27/2023        Prescription:      Site: KIET  Laterality: Left  Total Dose: 6000cGy  Dose per Fraction: 400cGy  Total Fractions: 15  Daily or BID: Daily  Modality: Photon  Technique: IMRT/VMAT/Rapid Arc  Bolus: No      2.  Overall Stage IA2 RUL    Radiation Completion Date: 4/17/2024        Prescription:      Site: Right Upper Lobe  Laterality: Right  Total Dose: 5000cGy  Dose per Fraction: 1000cGy  Total Fractions: 5  Daily or BID: Daily  Modality: Photon  Technique: SBRT (2-5fx)  Bolus: No      Interval History:    Bayron Acevedo presents for regularly scheduled follow-up approximately 6 weeks after completion of radiation therapy for right upper lobe lung cancer.  The patient also had had previous hypofractionated radiation therapy in the left upper lobe for very locally advanced non-small cell lung cancer completed on 10/27/2023.  He has been having some left upper chest wall and upper extremity pain.  He was given a Medrol Dosepak by Dr. Santos which helped with the pain, however after completing it is returned.  He is not having any weakness in his upper extremity. He is using Trelegy.      Imaging:      CT Chest 5/22/2024    IMPRESSION:  1. Increased ill-defined density anterior left lung apex may reflect  postradiation change. Continued follow-up recommended  2. Irregular consolidation anterior left upper lobe appears stable.  Stable areas of atelectasis/consolidation in both lung bases  3. New small groundglass density in the periphery of the left upper lobe  and a couple of new tiny nodules in the right upper lobe. Attention on  follow-up recommended.  4. Stable  additional findings      Pathology:      No new relevant pathology      Labs:    Lab Results   Component Value Date    CREATININE 1.70 (H) 05/14/2024             Problem List:  Patient Active Problem List   Diagnosis    COPD (chronic obstructive pulmonary disease) with emphysema    Hypertension    Hyperlipidemia    S/P angioplasty with stent    Heart attack    Colon polyp    Carotid stenosis    CAD in native artery    Peripheral artery disease    History of hydrocele    Perennial allergic rhinitis    AAA (abdominal aortic aneurysm) without rupture    Acute on chronic systolic CHF (congestive heart failure)    Stage 3b chronic kidney disease    Hydropneumothorax    Mass of upper lobe of left lung    NICM (nonischemic cardiomyopathy)    Chronic systolic CHF (congestive heart failure)    Normocytic anemia    Adenocarcinoma, lung, left    Right inguinal hernia          Medications:  Current Outpatient Medications on File Prior to Visit   Medication Sig Dispense Refill    albuterol (PROVENTIL) (2.5 MG/3ML) 0.083% nebulizer solution USE ONE VIAL BY NEBULIZATION EVERY FOUR HOURS AS NEEDED FOR FOR WHEEZING (Patient taking differently: Take 2.5 mg by nebulization Every 4 (Four) Hours As Needed.) 180 mL 0    amLODIPine (NORVASC) 5 MG tablet Take 1 tablet by mouth Daily. 90 tablet 3    aspirin 81 MG EC tablet Take 1 tablet by mouth Daily. PT HOLDING 5 DAYS PRIOR TO SURGERY      carvedilol (COREG) 25 MG tablet TAKE ONE TABLET BY MOUTH TWICE DAILY 180 tablet 0    coenzyme Q10 100 MG capsule Take 1 capsule by mouth Daily.      furosemide (LASIX) 20 MG tablet Take 1 tablet by mouth Daily.      hydrALAZINE (APRESOLINE) 25 MG tablet Take 1 tablet by mouth 2 (Two) Times a Day. 180 tablet 3    lisinopril (PRINIVIL,ZESTRIL) 20 MG tablet Take 1 tablet by mouth Daily.      methylPREDNISolone (MEDROL) 4 MG dose pack Take as directed on package instructions. 21 tablet 0    nitroglycerin (NITROSTAT) 0.4 MG SL tablet Place 1 tablet under the  "tongue every 5 (five) minutes as needed for chest pain. Take no more than 3 doses in 15 minutes. 25 tablet 1    pravastatin (PRAVACHOL) 20 MG tablet Take 1 tablet by mouth Daily.      rosuvastatin (CRESTOR) 20 MG tablet Take 1 tablet by mouth Daily. 90 tablet 3    sennosides-docusate (senna-docusate sodium) 8.6-50 MG per tablet Take 1 tablet by mouth 2 (Two) Times a Day As Needed for Constipation. 30 tablet 0    torsemide (DEMADEX) 20 MG tablet Take 2 tablets by mouth Daily. 180 tablet 3    Trelegy Ellipta 200-62.5-25 MCG/ACT aerosol powder  Inhale 1 puff Daily.      Umeclidinium-Vilanterol (ANORO ELLIPTA IN) Inhale.      vitamin B-12 (CYANOCOBALAMIN) 1000 MCG tablet Take 1 tablet by mouth Daily.       No current facility-administered medications on file prior to visit.          Allergies:  Allergies   Allergen Reactions    Lisinopril Anaphylaxis    Codeine Sulfate Hives    Contrast Dye (Echo Or Unknown Ct/Mr) Other (See Comments)     HYPERTENSION    Iodinated Contrast Media Other (See Comments)     HYPERTENSION    Losartan Swelling     LIP    Penicillins Other (See Comments)     Passed out after a PCN shot- no other sx noted-per pateint  Beta lactam allergy details  Antibiotic reaction: unknown  Age at reaction: adult  Dose to reaction time: unknown  Reason for antibiotic: other  Epinephrine required for reaction?: no  Tolerated antibiotics: unknown              Vital Signs:  There were no vitals taken for this visit.  Estimated body mass index is 23.54 kg/m² as calculated from the following:    Height as of 5/22/24: 170 cm (66.93\").    Weight as of 5/22/24: 68 kg (150 lb).  There were no vitals filed for this visit.      ECOG: Ambulatory and capable of all selfcare but unable to carry out any work activities; up and about more than 50% of waking hours = 2    Physical Exam  Vitals reviewed.   Constitutional:       General: He is not in acute distress.     Appearance: Normal appearance.   HENT:      Head: " Normocephalic and atraumatic.   Eyes:      Extraocular Movements: Extraocular movements intact.      Pupils: Pupils are equal, round, and reactive to light.   Pulmonary:      Effort: Pulmonary effort is normal.   Abdominal:      General: Abdomen is flat.      Palpations: Abdomen is soft.   Musculoskeletal:      Cervical back: Normal range of motion.   Skin:     General: Skin is warm and dry.   Neurological:      General: No focal deficit present.      Mental Status: He is alert and oriented to person, place, and time.   Psychiatric:         Mood and Affect: Mood normal.         Behavior: Behavior normal.            Result Review :  The following data was reviewed by: Dakota Painter MD on 05/31/2024:  Labs: Last Creatinine   Data reviewed : Radiologic studies CT Chest             Diagnoses and all orders for this visit:    1. Adenocarcinoma, lung, left (Primary)        Assessment:    Bayron Acevedo presents for regularly scheduled follow-up approximately 6 weeks after completion of radiation therapy for right upper lobe lung cancer.  The patient also had had previous hypofractionated radiation therapy in the left upper lobe for very locally advanced non-small cell lung cancer completed on 10/27/2023.  He has been having some left upper chest wall and upper extremity pain.  He was given a Medrol Dosepak by Dr. Santos which helped with the pain, however after completing it is returned.  He is not having any weakness in his upper extremity. He is using Trelegy.    I reviewed the results of the patient's most recent CT chest.  Overall, this is consistent with stability of his previously treated lesions.  He will be undergoingPET/CT on 7/10/2024.  For his left sided pain we will try gabapentin 100 mg 3 times daily and see him back after his PET/CT.      Plan:    -Plan for 100 mg gabapentin 3 times daily to help with left-sided chest wall pain  -The patient can follow-up with me after his next PET/CT in July       I spent 30  minutes caring for Bayron on this date of service. This time includes time spent by me in the following activities:preparing for the visit, reviewing tests, obtaining and/or reviewing a separately obtained history, documenting information in the medical record, independently interpreting results and communicating that information with the patient/family/caregiver, and care coordination  Follow Up   No follow-ups on file.  Patient was given instructions and counseling regarding his condition or for health maintenance advice. Please see specific information pulled into the AVS if appropriate.     Dakota Painter MD

## 2024-06-03 ENCOUNTER — PATIENT OUTREACH (OUTPATIENT)
Dept: OTHER | Facility: HOSPITAL | Age: 84
End: 2024-06-03
Payer: MEDICARE

## 2024-06-03 ENCOUNTER — TELEPHONE (OUTPATIENT)
Dept: RADIATION ONCOLOGY | Facility: HOSPITAL | Age: 84
End: 2024-06-03
Payer: MEDICARE

## 2024-06-03 RX ORDER — GABAPENTIN 100 MG/1
300 CAPSULE ORAL 3 TIMES DAILY
Qty: 90 CAPSULE | Refills: 2 | Status: SHIPPED | OUTPATIENT
Start: 2024-06-03

## 2024-06-03 NOTE — PROGRESS NOTES
Reviewed chart. Patient with stage IIIA ([id number]) adenocarcinoma of the left upper lobe, unresectable.  Received primary radiation therapy to the left upper lobe completed on 10/27/2023.  Patient with 2 additional hypermetabolic right upper lobe nodules felt to be new primary lesions.  High risk for pneumothorax with biopsy.  Both lesions treated with SBRT 4/11 - 4/17/2024. Met with oncology APRN and Dr. Painter 5/31.  Dr. Painter ordered gabapentin 100 mg 3 times daily for patient's left sided pain.  Patient scheduled on 7/10/2024 for PET scan.  Follow-up 7/17/2024 with Dr. Santos     Called patient. Left message that I was just touching base to see how he was doing. Wanted to know if he had any questions/concerns or resource/supportive care needs; requested cb

## 2024-06-07 NOTE — CONSULTS
[facility] Consult Note      Patient Name: Bayron Acevedo  : 1940  MRN: [id number]  Primary Care Physician:  Low Sepulveda  Referring Physician: Nemesio Kramer MD PhD  Date of admission: 2023    Subjective     Reason for Consult: Chronic kidney disease and hyponatremia    HPI:   Bayron Acevedo is a 83 y.o. male with past medical history of hypertension, dyslipidemia and chronic kidney disease stage IIIb/IV with new diagnosis of biopsy-proven invasive poorly differentiated adenocarcinoma of left upper lobe who underwent  left video-assisted thoracoscopy for partial decortication with lysis of adhesion about 12 days ago.  His creatinine has been around 1.5 mg/dL and latest sodium is 129.  We were consulted to help with management of his kidney dysfunction and hyponatremia.    Review of Systems:   14 point review of systems is otherwise negative except for mentioned above on HPI    Personal History     Past Medical History:   Diagnosis Date    AAA (abdominal aortic aneurysm)     CAD in native artery     Carotid stenosis     s/p CEA Left    CKD (chronic kidney disease)     Colon polyp     COPD (chronic obstructive pulmonary disease)     H/O Acute myocardial infarction     H/O PAD (peripheral artery disease)     Heart attack 2013    Heart failure     Hyperlipidemia     Hypertension     Inguinal hernia     RIGHT    Left ventricular dysfunction     Mass of left lung     SHOWS ON MRI    Mitral valve regurgitation     Perennial allergic rhinitis 2017    PVD (peripheral vascular disease)     S/P angioplasty with stent     Tinnitus     Tricuspid regurgitation        Past Surgical History:   Procedure Laterality Date    APPENDECTOMY      ARTERIOGRAM AORTIC N/A 2021    Procedure: ZENITH AORTIC STENT GRAFT;  Surgeon: Karen Barrera Jr., MD;  Location: [facility] OR ;  Service: Vascular;  Laterality: N/A;    CARDIAC CATHETERIZATION      X2    CARDIAC  CATHETERIZATION N/A 04/11/2023    Procedure: Left Heart Cath;  Surgeon: Guru Jiang MD;  [facility];  Service: Cardiovascular;  Laterality: N/A;    CARDIAC CATHETERIZATION N/A 04/11/2023    Procedure: Right Heart Cath;  Surgeon: Guru Jiang MD;  [facility];  Service: Cardiovascular;  Laterality: N/A;    CARDIAC CATHETERIZATION N/A 04/11/2023    Procedure: Coronary angiography;  Surgeon: Guru Jiang MD;  [facility];  Service: Cardiovascular;  Laterality: N/A;    CARDIAC CATHETERIZATION N/A 04/11/2023    Procedure: Left ventriculography;  Surgeon: Guru Jiang MD;  [facility]: [facility];  Service: Cardiovascular;  Laterality: N/A;    CARDIAC CATHETERIZATION  04/11/2023    Procedure: RESTING FULL CYCLE RATIO;  Surgeon: Guru Jiang MD;  Location: [facility] CATH INVASIVE LOCATION;  Service: Cardiovascular;;    CAROTID ENDARTERECTOMY Left 01/2013    Bruce Jones Jr, MD    CAROTID STENT Left     LAD    COLONOSCOPY      HERNIA REPAIR      HYDROCELE EXCISION / REPAIR      Dr. SINTIA Osorio    LOBECTOMY Left 7/31/2023    Procedure: BRONCHOSCOPY, LEFT VAT WITH DAVINCI ROBOT, EXTENSIVE LYSIS OF ADHESIONS, PARTIAL PULMONARY DECORTICATION, INTERCOSTAL NERVE BLOCK;  Surgeon: Nemesio Kramer MD PhD;  Location: [facility];  Service: Robotics - DaVinci;  Laterality: Left;    MEDIASTINOSCOPY N/A 7/17/2023    Procedure: MEDIASTINOSCOPY;  Surgeon: Nemesio Kramer MD PhD;  Location: [facility];  Service: Thoracic;  Laterality: N/A;    UMBILICAL HERNIA REPAIR  2003       Family History: family history includes Cancer in his brother and father; Heart disease in his maternal uncle; Leukemia in his father; No Known Problems in his mother.    Social History:  reports that he quit smoking about 23 years ago. His smoking use included cigarettes. He has a 55.00 pack-year smoking history. He has never  used smokeless tobacco. He reports that he does not drink alcohol and does not use drugs.    Home Medications:  Prior to Admission medications    Medication Sig Start Date End Date Taking? Authorizing Provider   albuterol (PROVENTIL) (2.5 MG/3ML) 0.083% nebulizer solution USE ONE VIAL BY NEBULIZATION EVERY FOUR HOURS AS NEEDED FOR FOR WHEEZING  Patient taking differently: Take 2.5 mg by nebulization Every 4 (Four) Hours As Needed. 3/22/21  Yes Denise Foley APRN   amLODIPine (NORVASC) 5 MG tablet Take 1 tablet by mouth Daily. 5/18/23  Yes Guru Jiang MD   carvedilol (COREG) 25 MG tablet Take 1 tablet by mouth 2 (Two) Times a Day. 7/21/23  Yes Jessenia Otero APRN   Chlorhexidine Gluconate Cloth 2 % pads Apply  topically Take As Directed.   Yes Jose Dominguez MD   hydrALAZINE (APRESOLINE) 50 MG tablet 1 tablet 2 (Two) Times a Day. 4/7/23  Yes Jose Dominguez MD   rosuvastatin (CRESTOR) 20 MG tablet Take 1 tablet by mouth Daily. 5/18/23  Yes Guru Jiang MD   torsemide (DEMADEX) 20 MG tablet Take 2 tablets by mouth Daily. 5/18/23  Yes Guru Jiang MD   aspirin 81 MG EC tablet Take 1 tablet by mouth Daily. PT TO CHECK WITH MD FOR HOLD DATE    Jose Dominguez MD   nitroglycerin (NITROSTAT) 0.4 MG SL tablet Place 1 tablet under the tongue every 5 (five) minutes as needed for chest pain. Take no more than 3 doses in 15 minutes. 5/5/16   Mario Bennett MD       Allergies:  Allergies   Allergen Reactions    Lisinopril Anaphylaxis    Codeine Sulfate Hives    Contrast Dye (Echo Or Unknown Ct/Mr) Other (See Comments)     HYPERTENSION    Penicillins Other (See Comments)     Passed out after a PCN shot- no other sx noted-per pateint       Objective     Vitals:   Temp:  [97.3 øF (36.3 øC)-99.2 øF (37.3 øC)] 97.7 øF (36.5 øC)  Heart Rate:  [61-74] 74  Resp:  [18] 18  BP: (117-167)/(49-84) 927/84    Intake/Output Summary (Last 24 hours) at 8/13/2023 1038  Last data filed at  8/13/2023 0600  Gross per 24 hour   Intake --   Output 950 ml   Net -950 ml       Physical Exam:    General Appearance: alert, oriented x 3, no acute distress   Skin: warm and dry  HEENT: oral mucosa normal, nonicteric sclera  Neck: supple, no JVD  Lungs: CTA  Heart: RRR, normal S1 and S2  Abdomen: soft, nontender, nondistended  : no palpable bladder  Extremities: no edema, cyanosis or clubbing  Neuro: normal speech and mental status     Scheduled Meds:     acetaminophen, 1,000 mg, Oral, TID  amLODIPine, 5 mg, Oral, Daily  aspirin, 81 mg, Oral, Daily  carvedilol, 25 mg, Oral, BID  enoxaparin, 30 mg, Subcutaneous, Daily  gabapentin, 100 mg, Oral, Q12H  guaiFENesin, 600 mg, Oral, Q12H  hydrALAZINE, 50 mg, Oral, BID  ipratropium-albuterol, 3 mL, Nebulization, Q8H - RT  metoclopramide, 10 mg, Intravenous, Q6H  polyethylene glycol, 17 g, Oral, Daily  rosuvastatin, 20 mg, Oral, Daily  senna-docusate sodium, 2 tablet, Oral, BID  torsemide, 40 mg, Oral, Daily      IV Meds:        Results Reviewed:   I have personally reviewed the results from the time of this admission to 8/13/2023 10:38 EDT     Lab Results   Component Value Date    GLUCOSE 111 (H) 08/13/2023    CALCIUM 9.0 08/13/2023     (L) 08/13/2023    K 4.2 08/13/2023    CO2 25.9 08/13/2023    CL 94 (L) 08/13/2023    BUN 21 08/13/2023    CREATININE 1.52 (H) 08/13/2023    EGFRIFAFRI 34 (L) 11/19/2021    EGFRIFNONA 30 (L) 11/19/2021    BCR 13.8 08/13/2023    ANIONGAP 9.1 08/13/2023      Lab Results   Component Value Date    MG 2.2 04/12/2023    PHOS 3.6 08/13/2023    ALBUMIN 3.2 (L) 08/13/2023           Assessment / Plan     ASSESSMENT:    Hyponatremia: Etiology might be related to intravascular volume depletion due to decreased oral intake however SIADH might be contributing as well given his history of lung cancer.  Patient currently on torsemide his urine electrolytes will not be very helpful however we will go ahead and order urine electrolytes today and  tomorrow.  We will discontinue torsemide.  Encourage patient to increase protein and oral intake.  Will check cortisol and TSH level in the morning.  Chronic kidney disease stage IIIb: His kidney function is essentially better than his baseline.  We will continue to monitor  History of hypertension: We will increase amlodipine to 10 mg p.o. daily  History of dyslipidemia  History of non-small cell lung cancer of left upper lobe status post left video-assisted thoracoscopy for partial decortication with lysis of adhesions and status post doxycycline chemical pleurodesis    PLAN:    Discontinue diuretic and encourage oral intake  Will obtain urine electrolytes today and tomorrow for further clarify his volume status  We will continue to monitor sodium level very closely  Surveillance labs    Thank you for involving us in the care of Bayron Acevedo.  Please feel free to call with any questions.    Patrice Poole MD  08/13/23  10:38 EDT    [facility]  [phone]   Home

## 2024-06-24 ENCOUNTER — PATIENT OUTREACH (OUTPATIENT)
Dept: OTHER | Facility: HOSPITAL | Age: 84
End: 2024-06-24
Payer: MEDICARE

## 2024-06-24 NOTE — PROGRESS NOTES
Reviewed chart. Patient with stage IIIA ([id number]) adenocarcinoma of the left upper lobe, unresectable.  Received primary radiation therapy to the left upper lobe completed on 10/27/2023.  Patient with 2 additional hypermetabolic right upper lobe nodules felt to be new primary lesions.  High risk for pneumothorax with biopsy.  Both lesions treated with SBRT 4/11 - 4/17/2024. Met with oncology APRN and Dr. Painter 5/31.  Dr. Painter ordered gabapentin 100 mg 3 times daily for patient's left sided pain.  Patient scheduled on 7/10/2024 for PET scan.  Follow-up 7/17/2024 with Dr. Santos, Dr. Painter 7/18     Called patient. He reports decreased endurance and fatigue, which he attributes to his gabapentin.  The patient is taking Tylenol with good relief of his pain.  The patient is eating well and reports his weight has increased to 155 lbs.  We discussed PET restrictions/instructions. Patient verbalized understanding.     We again discussed integrative therapies and [facility]. The patient denies questions/concerns or ongoing resource needs. I will call next month; encouraged patient to call as needed.    Dupixent Pregnancy And Lactation Text: This medication likely crosses the placenta but the risk for the fetus is uncertain. This medication is excreted in breast milk.

## 2024-07-03 RX ORDER — TORSEMIDE 20 MG/1
40 TABLET ORAL DAILY
Qty: 180 TABLET | Refills: 3 | Status: SHIPPED | OUTPATIENT
Start: 2024-07-03

## 2024-07-03 NOTE — TELEPHONE ENCOUNTER
Finally able to get in contact with pt. He went through his medication with me on the phone. He stated that he is taking the Torsemide two tablets daily. No longer taking Lasix.     I've updated his medication list.

## 2024-07-10 ENCOUNTER — HOSPITAL ENCOUNTER (OUTPATIENT)
Dept: PET IMAGING | Facility: HOSPITAL | Age: 84
Discharge: HOME | End: 2024-07-10
Payer: MEDICARE

## 2024-07-10 DIAGNOSIS — C34.81 MALIGNANT NEOPLASM OF OVERLAPPING SITES OF RIGHT BRONCHUS AND LUNG: ICD-10-CM

## 2024-07-10 DIAGNOSIS — C34.92 ADENOCARCINOMA, LUNG, LEFT: ICD-10-CM

## 2024-07-10 LAB — GLUCOSE BLDC GLUCOMTR-MCNC: 100 MG/DL (ref 70–130)

## 2024-07-10 PROCEDURE — 0 FLUDEOXYGLUCOSE F18 SOLUTION: Performed by: INTERNAL MEDICINE

## 2024-07-10 PROCEDURE — A9552 F18 FDG: HCPCS | Performed by: INTERNAL MEDICINE

## 2024-07-10 PROCEDURE — 82948 REAGENT STRIP/BLOOD GLUCOSE: CPT

## 2024-07-10 PROCEDURE — 78815 PET IMAGE W/CT SKULL-THIGH: CPT

## 2024-07-10 RX ADMIN — FLUDEOXYGLUCOSE F 18 1 DOSE: 200 INJECTION, SOLUTION INTRAVENOUS at 08:59

## 2024-07-16 NOTE — PROGRESS NOTES
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  The patient has received treatment with primary radiation therapy (was not felt to be a good candidate for concurrent chemoradiation) to unresectable stage IIIA ([id number]) left upper lobe adenocarcinoma that was completed on 10/27/2023.  During follow-up, he was found to have 2 small enlarging right upper lobe nodules that were highly suspicious for malignancy with synchronous primary lesions.  Nodules of borderline size for biopsy with significant risk for pneumothorax and biopsy was not pursued.  Patient received presumptive treatment with SBRT to both nodules completed on 4/17/2024.  Follow-up CT chest on 5/22/2024 showed increased ill-defined density anterior left apex, irregular consolidation left upper lobe that was stable, groundglass density in the periphery of the left upper lobe as well as new small nodules in the right upper lobe.  Changes in the left lung were felt to be likely radiation related.  Patient returns today with follow-up PET scan to review after SBRT that was completed 4/17/2024 to the right lung.  Unfortunately the patient has experienced some significant difficulties recently with ongoing left anterior, posterior chest wall pain as well as pain in the left shoulder and proximal left upper extremity.  This was felt to be potentially related to prior radiation and he did receive a Medrol Dosepak with transient improvement in pain.  Pain then returned and he was prescribed gabapentin 100 mg 3 times daily by radiation oncology.  He reports that the gabapentin was not effective and caused side effects with anorexia, weight loss, reduced level of activity.  He discontinued gabapentin around 2 weeks ago and reports that his symptoms have improved.   "The pain however does persist and is quite bothersome, aggravated when he is more active working outdoors.  Pain is aggravated significantly when he raises his left arm above his head as he had to do with recent PET scan and with his prior radiation therapy.  His weight is down slightly 147 pounds today.  He does continue to drink 1 to 2 cans of boost per day.  He has some mild constipation.  He maintains ECOG performance status of 1.  He notes that his COPD is fairly stable.  He does have some hoarseness related to his inhalers.        Objective   Vital Signs:   /75   Pulse 57   Temp 98.1 °F (36.7 °C) (Oral)   Resp 16   Ht 170 cm (66.93\")   Wt 67 kg (147 lb 9.6 oz)   SpO2 96%   BMI 23.17 kg/m²     Physical Exam  Constitutional:       Appearance: He is well-developed.      Comments: Patient in no distress   Eyes:      Conjunctiva/sclera: Conjunctivae normal.   Neck:      Thyroid: No thyromegaly.   Cardiovascular:      Rate and Rhythm: Normal rate and regular rhythm.      Heart sounds: No murmur heard.     No friction rub. No gallop.   Pulmonary:      Effort: No respiratory distress.      Comments: Few scattered crackles in the lung bases bilaterally  Abdominal:      General: Bowel sounds are normal. There is no distension.      Palpations: Abdomen is soft.      Tenderness: There is no abdominal tenderness.   Musculoskeletal:         General: No swelling.      Comments: Trace bilateral lower extremity edema   Lymphadenopathy:      Head:      Right side of head: No submandibular adenopathy.      Cervical: No cervical adenopathy.      Upper Body:      Right upper body: No supraclavicular adenopathy.      Left upper body: No supraclavicular adenopathy.   Skin:     General: Skin is warm and dry.      Findings: No rash.   Neurological:      Mental Status: He is alert and oriented to person, place, and time.      Cranial Nerves: No cranial nerve deficit.      Motor: No abnormal muscle tone.      Deep Tendon " Reflexes: Reflexes normal.   Psychiatric:         Behavior: Behavior normal.       Patient was examined today, unchanged from above      Result Review : Reviewed CBC, CMP from today.  Reviewed PET scan 7/10/2024.       Assessment and Plan     *Clinical stage IIIA ([id number]) non-small cell lung cancer (adenocarcinoma) of left upper lobe.  Subsequent clinical stageIAI ([id number]) right upper lobe non-small cell lung cancer (biopsy deferred)  The patient has a history of smoking 1 pack/day x 55 years quit in 2000.  CT chest 5/26/2023 showed a spiculated mass in the anterior left upper lobe 3.7 x 4.3 x 4.2 cm along the anterior pleural surface and lateral aspect of the anterior mediastinum.  Additional 3 mm nodule right major fissure.  Bilateral small to moderate-sized pleural effusions.  Bullae formation consistent with COPD.  Mediastinal lymphadenopathy with largest node infracarinal region 2.6 x 1.2 cm.  PET/CT on 6/12/2023 showed 5.2 x 3.1 cm irregular pleural-based mass anterior left upper lobe abutting the pleura anteriorly and medially, hypermetabolic with SUV 14.4.  Mediastinal lymph node activity in the subcarinal region with a 2.2 x 1.1 cm lymph node with SUV 4.5.  Remainder of mediastinal lymph nodes less intensely hypermetabolic with maximal SUV 3.3.  Moderate size loculated pleural effusions bilaterally with low-level activity along the pleura (SUV 2.5), no change in volume of pleural effusions since prior chest CT.  New small to moderate loculated right hydropneumothorax with air-fluid level 5.2 cm.   CT-guided left upper lobe lung biopsy 6/13/2023 with pathology that showed invasive poorly differentiated adenocarcinoma, PD-L1 less than 1%.  Caris analysis: TMB high (14 muts/Mb), mutations in KEAP1 and TP53, EGFR VUS, no targetable mutations.  Patient was hospitalized 6/13 - 6/15/2023 due to right hydropneumothorax, had CT-guided chest tube placement performed 6/13/2023.  Right pleural fluid cytology  negative on 6/13/2023.  Staging MRI brain was negative for evidence of metastatic disease on 7/2/2023  PFTs on 6/20/2023 with FEV1 0.99 (44% predicted), DLCO corrected 14.09 (56% predicted).  Mediastinoscopy 7/17/2023 with 4R and station 7 lymph nodes negative for malignancy  Left VATS performed 7/31/2023.  Intraoperatively, primary tumor was unresectable, invading into the mediastinum and pericardial fat pad as well as with phrenic nerve involvement (T4 lesion).  Sampling of left pleura with fibrosis, chronic inflammation, no evidence of malignancy.  Station 5 and 10 R lymph nodes sampled, negative for malignancy.  New baseline CT chest abdomen pelvis prior to initiation of radiation therapy performed on 9/20/2023.  Persistent loculated left pneumothorax, trace left pleural effusion, primary tumor in partially collapsed left upper lobe appears relatively stable.  Small right pleural effusion no change in borderline enlarged mediastinal lymph nodes.  Slight increase in right upper lobe nodule (for up to 7 mm), indeterminate.  Patient therefore with unresectable stage IIIA ([id number]) disease.  Plans to treat with radiation therapy.  Due to age, performance status, comorbidities, patient felt to be a poor candidate for concurrent chemotherapy.  Patient received radiation therapy 10/9 - 10/27/2023 to left upper lobe, 6000 cGy.  CT chest abdomen pelvis 1/23/2024 showed decrease in left pneumothorax/hydropneumothorax, left upper lobe mass difficult to accurately assess due to change in configuration (shift in lung parenchyma due to pneumothorax improvement) but appears relatively stable, 2.9 x 4.3 versus current 3.3 x 3.5 cm.  No lymphadenopathy noted, no evidence of distant metastatic disease.  PET scan 3/20/2024 with subcentimeter left supraclavicular lymph node with new uptake (SUV 2.4).  Decrease in treated left upper lobe mass from 4.4 x 3.8 down to 3.4 x 2 (decrease in SUV from 14.4 down to 3.2).  Increase in 2  mildly hypermetabolic left upper lobe ill-defined opacities with linear consolidation felt to represent postradiation change (SUV 3.4).  Resolution of hypermetabolic activity and subcarinal lymph node as well as subcentimeter mediastinal and left hilar lymph nodes.  Increase and 2 adjacent right upper lobe solid nodules surrounding bullae measuring 1 and 0.8 cm with SUV 5.1 and 2.5 respectively.  Decrease in small loculated pleural effusions with SUV 3 on the left and 2.4 on the right.  Tubular hypermetabolic activity throughout the esophagus.  Persistent focal transverse colon hypermetabolism with SUV 6.9 and additional foci of the ascending colon hypermetabolism SUV 5.6.  Patient discussed at thoracic oncology conference 3/28/2024.  Consensus regarding not pursuing biopsy due to risk of pneumothorax and small size of the lesions.  Given the growth rate and activity, lesions were felt to represent additional primary sites of disease.  Recommendation to treat with SBRT.  Patient received SBRT to both right upper lobe lesions x 5 fractions each from 4/11 - 4/17/2024  CT chest 5/22/2024 showed irregular consolidation left upper lobe unchanged 3.5 x 3.2 cm, new small groundglass density left upper lobe.  Stable consolidation left base.  Nodularity posterior right upper lobe largest nodule 8 mm unchanged.  New nodule fissure right upper lobe and new right upper lobe micronodule.  PET scan 7/10/2024 with stable radiated left upper lobe mass with uptake near blood pool.  Left apical 3.3 x 1.3 cm bandlike consolidation with SUV 10.1 (previously few linear foci of consolidation and SUV 3.4).  Left upper lobe 1.6 cm focal consolidation with SUV 8.2 (previously few linear foci of consolidation SUV 4.3).  Right upper lobe nodules x 2 around bullae decreased in size (1 cm down to 0.6 cm and 0.8 down to 0.5 cm), no activity.  New surrounding consolidation and groundglass opacity.  Persistent focal transverse colon  hypermetabolism (SUV decreased from 6.9 down to 4.6) with generalized tubular hypermetabolism in the distal colon.  Patient returns today in follow-up with the above-mentioned PET scan to review.  PET scan shows significant response in the recently radiated right lung nodules with decrease in size and resolution of hypermetabolic activity in both lesions surrounding right upper lobe bulla.  There is however concern regarding changes in the left upper lobe.  There is an increasing bandlike area of consolidation measuring 3.3 cm and with hypermetabolic activity with SUV 10.1 and an additional 1.6 cm focal consolidation with SUV up to 8.2.  These appear to be within the previous radiation portal.  I did discuss with Dr. Painter on the telephone today whether these could represent radiation change however we both agree that the degree of uptake and recent enlargement is concerning for possible recurrent malignancy in this area.  The patient has considerable underlying COPD with multiple bullae and would be at risk for pneumothorax as before with CT-guided biopsy.  Therefore we discussed proceeding with referral back to thoracic surgery and proceeding with Ion bronchoscopy for further evaluation and sampling of this area.  I discussed this at length with the patient and his wife.  They had multiple questions which were answered to their satisfaction.  Will make referral to thoracic surgery.  Patient will be seeing Dr. Painter as well tomorrow.  I will see the patient back in approximately 1 month to review results from bronchoscopy.  If this is unrevealing or if the areas and accessible via bronchoscopy we will plan short interval follow-up CT.  Patient does have ongoing chest wall pain both anterior and posteriorly on the left as well as pain in the left axilla and in the left upper arm.  We have proceeded plain film the left upper arm which showed no evidence of bone metastasis and current PET scan shows no evidence of bony  involvement.  Unclear whether symptoms are related to prior radiation therapy or possibly related to pleural involvement from the hypermetabolic changes seen in the left upper lobe currently.    *Chronic bilateral pleural effusions with right hydropneumothorax and subsequent postoperative left hydropneumothorax  Patient appears to have longstanding evidence of small bilateral pleural effusions likely related to CHF  PET/CT 6/12/2023 identified right small to moderate hydropneumothorax new since 5/26/2023 CT chest  Patient was hospitalized 6/13 - 6/15/2023 due to right hydropneumothorax, had CT-guided chest tube placement performed 6/13/2023.  Right pleural fluid cytology negative on 6/13/2023.  As above, patient underwent left VATS with attempted resection of primary lung cancer on 7/31/2023.    Patient required persistent left chest tube postoperatively, eventually removed on 9/6/2023  CT chest 9/20/2023 with persistent loculated left pneumothorax.  CT chest 1/23/2024 with significant improvement in left pneumothorax/hydropneumothorax  PET scan 3/20/2024 with decrease in small loculated pleural effusions with SUV 3 on the left and 2.4 on the right.  Pleural effusions felt to likely benign in nature at this point.  PET scan 7/10/2024 with unchanged small bilateral pleural effusions    *COPD  The patient has a history of smoking 1 pack/day x 55 years quit in 2000.  PFTs on 6/20/2023 with FEV1 0.99 (44% predicted), DLCO corrected 14.09 (56% predicted).  Patient continues with chronic dyspnea on exertion that does limit his level of activity although maintains O2 saturation in the 90% range even with activity.  Patient notes that his chronic dyspnea on exertion is stable.  He continues on Trelegy inhaler and albuterol inhaler as needed along with albuterol nebulizer at home.    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  Labs on 7/11/2023 with hemoglobin 10.6, MCV 82.8, iron 18, ferritin 504, iron saturation 6%, TIBC 295, B12 201, folate 6.79, CRP 1.07, ESR 27, erythropoietin level 17.8.  Labs consistent with anemia secondary to chronic disease/malignancy as well as anemia secondary to CKD3.  Patient initiated oral B12 1000 mcg daily for B12 deficiency  Hemoglobin on 11/7/2023 was relatively unchanged at 10.3.  Additional evaluation sent with iron 21, ferritin 415, iron saturation 8%, TIBC 266, B12 398, folate 10.2, negative serum protein electrophoresis/immunoelectrophoresis, IgA 209, IgG 829, IgM 13, free kappa light chain 50.4, free lambda light chain 34.2, free light chain ratio 1.47 (normal).  Labs did consistent with anemia secondary to chronic disease as well as anemia secondary to CKD3.  He does not qualify for Procrit with hemoglobin above 10.  Patient continued on oral B12 1000 mcg daily  Labs on 5/14/2024 with hemoglobin that declined to 10.5.  Additional labs with iron 20, ferritin 394, iron saturation 7%, TIBC 299, reticulated hemoglobin low at 27.3.  Low reticulated hemoglobin potentially indicative of iron deficiency.  Patient returns today with hemoglobin stable at 11.5, has been primarily in the 11-12 range.  His residual anemia is felt to be related to his underlying CKD3a.  We did recheck his iron studies today which are stable with iron 21, ferritin 333, iron saturation 7%, TIBC 311.  With a low iron saturation and previously low reticulated hemoglobin, appears that he may have a mild component of iron deficiency.  The patient however has underlying constipation and would be intolerant of oral iron.  With a ferritin of 333 I would not recommend IV iron in  this situation however we will continue to follow his hemoglobin and iron status.    *Transient thrombocytopenia  On 11/7/2024, new onset thrombocytopenia with platelet count of 137,000.  Additional labs with IPF low normal at 3.3%, B12 398, folate 10.2,negative serum protein electrophoresis/immunoelectrophoresis, IgA 209, IgG 829, IgM 13, free kappa light chain 50.4, free lambda light chain 34.2, free light chain ratio 1.47 (normal).  Platelet count today normal at 152,000    *Coronary artery disease, CHF  Chronic bilateral pleural effusions presumably related to CHF  Status post cardiac stent in 2013  Preoperative echocardiogram on 7/19/2023 with ejection fraction 56 to 60%, dilated LA, LV  Patient continues on aspirin 81 mg daily    *Peripheral vascular disease  Patient is followed by Dr. Karen Barrera in vascular surgery  Abdominal aortic aneurysm status post stent graft repair 5/17/2021  Carotid stenosis status post carotid endarterectomy left, 2013.    *Anorexia, weight loss  Patient with anorexia associated with malignancy, mild degree of weight loss  Patient initiated Remeron 7.5 mg nightly on 9/22/2023.  Subsequently discontinued with improvement in weight and appetite  Patient notes that his appetite has fluctuated significantly.  Appetite did diminish after he attempted treatment with gabapentin 100 mg 3 times daily which he stopped around 2 weeks ago.  He reports that appetite is now gradually improving.  His weight has declined slightly at 147 pounds today.  He does use 1 to 2 cans of boost per day.    *Constipation  Patient continues with intermittent constipation, uses stool softener as needed.      *Pain control  The patient continues with significant pain in his chest wall on the left both anteriorly and posteriorly as well as pain in the left axilla and extending into the left upper arm.  Patient is reluctant to use hydrocodone 5/325 although does have a prescription to use if needed.  Patient was  intolerant of gabapentin 100 mg 3 times daily due to anorexia, weight loss, somnolence  Tylenol does have some effect and patient was encouraged to continue using this for now.      Plan:  Patient with stage IIIA ([id number]) adenocarcinoma of the left upper lobe, unresectable.  Received primary radiation therapy to the left upper lobe completed on 10/27/2023  Patient with 2 additional hypermetabolic right upper lobe nodules felt to be new primary lesions.  High risk for pneumothorax with biopsy.  Both lesions treated with SBRT 4/11 - 4/17/2024.  Patient continuing on oral B12 1000 mcg daily for B12 deficiency  The patient declined endoscopic procedures to evaluate the esophageal and colonic activity on PET scan.  Left upper extremity pain possibly related to effects from prior radiation therapy to the left chest wall versus recurrent malignancy  Patient is intolerant of gabapentin.  Patient reluctant to use hydrocodone 5/325  Patient will use Tylenol as needed for pain for now  Referral to thoracic surgery to consider bronchoscopy to evaluate increasing areas of PET positive consolidation in the left upper lobe with concern for recurrent malignancy.  Patient is seeing Dr. Painter in radiation oncology tomorrow  Return visit in approximately 1 month with CBC, CMP.  We will review hopefully results from bronchoscopy performed by thoracic surgery.    I did spend 60 minutes total time caring for the patient today, time spent in review of records, face-to-face consultation, coordination of care, placement of orders, completion of documentation.

## 2024-07-17 ENCOUNTER — LAB (OUTPATIENT)
Dept: LAB | Facility: HOSPITAL | Age: 84
End: 2024-07-17
Payer: MEDICARE

## 2024-07-17 ENCOUNTER — OFFICE VISIT (OUTPATIENT)
Dept: ONCOLOGY | Facility: CLINIC | Age: 84
End: 2024-07-17
Payer: MEDICARE

## 2024-07-17 ENCOUNTER — TELEPHONE (OUTPATIENT)
Dept: RADIATION ONCOLOGY | Facility: HOSPITAL | Age: 84
End: 2024-07-17
Payer: MEDICARE

## 2024-07-17 VITALS
BODY MASS INDEX: 23.17 KG/M2 | WEIGHT: 147.6 LBS | DIASTOLIC BLOOD PRESSURE: 75 MMHG | TEMPERATURE: 98.1 F | RESPIRATION RATE: 16 BRPM | HEIGHT: 67 IN | HEART RATE: 57 BPM | OXYGEN SATURATION: 96 % | SYSTOLIC BLOOD PRESSURE: 131 MMHG

## 2024-07-17 DIAGNOSIS — C34.92 ADENOCARCINOMA, LUNG, LEFT: ICD-10-CM

## 2024-07-17 DIAGNOSIS — C34.92 ADENOCARCINOMA, LUNG, LEFT: Primary | ICD-10-CM

## 2024-07-17 DIAGNOSIS — D64.9 NORMOCYTIC ANEMIA: ICD-10-CM

## 2024-07-17 LAB
ALBUMIN SERPL-MCNC: 4.1 G/DL (ref 3.5–5.2)
ALBUMIN/GLOB SERPL: 1.4 G/DL
ALP SERPL-CCNC: 88 U/L (ref 39–117)
ALT SERPL W P-5'-P-CCNC: 7 U/L (ref 1–41)
ANION GAP SERPL CALCULATED.3IONS-SCNC: 10.3 MMOL/L (ref 5–15)
AST SERPL-CCNC: 16 U/L (ref 1–40)
BASOPHILS # BLD AUTO: 0.02 10*3/MM3 (ref 0–0.2)
BASOPHILS NFR BLD AUTO: 0.3 % (ref 0–1.5)
BILIRUB SERPL-MCNC: 0.4 MG/DL (ref 0–1.2)
BUN SERPL-MCNC: 27 MG/DL (ref 8–23)
BUN/CREAT SERPL: 14.6 (ref 7–25)
CALCIUM SPEC-SCNC: 9.2 MG/DL (ref 8.6–10.5)
CHLORIDE SERPL-SCNC: 102 MMOL/L (ref 98–107)
CO2 SERPL-SCNC: 26.7 MMOL/L (ref 22–29)
CREAT SERPL-MCNC: 1.85 MG/DL (ref 0.76–1.27)
DEPRECATED RDW RBC AUTO: 51.4 FL (ref 37–54)
EGFRCR SERPLBLD CKD-EPI 2021: 35.3 ML/MIN/1.73
EOSINOPHIL # BLD AUTO: 0.35 10*3/MM3 (ref 0–0.4)
EOSINOPHIL NFR BLD AUTO: 6.1 % (ref 0.3–6.2)
ERYTHROCYTE [DISTWIDTH] IN BLOOD BY AUTOMATED COUNT: 16.1 % (ref 12.3–15.4)
FERRITIN SERPL-MCNC: 333 NG/ML (ref 30–400)
GLOBULIN UR ELPH-MCNC: 2.9 GM/DL
GLUCOSE SERPL-MCNC: 92 MG/DL (ref 65–99)
HCT VFR BLD AUTO: 38.6 % (ref 37.5–51)
HGB BLD-MCNC: 11.5 G/DL (ref 13–17.7)
IMM GRANULOCYTES # BLD AUTO: 0.02 10*3/MM3 (ref 0–0.05)
IMM GRANULOCYTES NFR BLD AUTO: 0.3 % (ref 0–0.5)
IRON 24H UR-MRATE: 21 MCG/DL (ref 59–158)
IRON SATN MFR SERPL: 7 % (ref 20–50)
LYMPHOCYTES # BLD AUTO: 0.95 10*3/MM3 (ref 0.7–3.1)
LYMPHOCYTES NFR BLD AUTO: 16.5 % (ref 19.6–45.3)
MCH RBC QN AUTO: 26.1 PG (ref 26.6–33)
MCHC RBC AUTO-ENTMCNC: 29.8 G/DL (ref 31.5–35.7)
MCV RBC AUTO: 87.5 FL (ref 79–97)
MONOCYTES # BLD AUTO: 0.55 10*3/MM3 (ref 0.1–0.9)
MONOCYTES NFR BLD AUTO: 9.5 % (ref 5–12)
NEUTROPHILS NFR BLD AUTO: 3.87 10*3/MM3 (ref 1.7–7)
NEUTROPHILS NFR BLD AUTO: 67.3 % (ref 42.7–76)
NRBC BLD AUTO-RTO: 0 /100 WBC (ref 0–0.2)
PLATELET # BLD AUTO: 152 10*3/MM3 (ref 140–450)
PMV BLD AUTO: 8.7 FL (ref 6–12)
POTASSIUM SERPL-SCNC: 4.3 MMOL/L (ref 3.5–5.2)
PROT SERPL-MCNC: 7 G/DL (ref 6–8.5)
RBC # BLD AUTO: 4.41 10*6/MM3 (ref 4.14–5.8)
SODIUM SERPL-SCNC: 139 MMOL/L (ref 136–145)
TIBC SERPL-MCNC: 311 MCG/DL (ref 298–536)
TRANSFERRIN SERPL-MCNC: 209 MG/DL (ref 200–360)
WBC NRBC COR # BLD AUTO: 5.76 10*3/MM3 (ref 3.4–10.8)

## 2024-07-17 PROCEDURE — 82728 ASSAY OF FERRITIN: CPT

## 2024-07-17 PROCEDURE — 80053 COMPREHEN METABOLIC PANEL: CPT

## 2024-07-17 PROCEDURE — 36415 COLL VENOUS BLD VENIPUNCTURE: CPT

## 2024-07-17 PROCEDURE — 83540 ASSAY OF IRON: CPT

## 2024-07-17 PROCEDURE — 85025 COMPLETE CBC W/AUTO DIFF WBC: CPT

## 2024-07-17 PROCEDURE — 84466 ASSAY OF TRANSFERRIN: CPT

## 2024-07-17 NOTE — LETTER
July 17, 2024       No Recipients    Patient: Bayron Acevedo   YOB: 1940   Date of Visit: 7/17/2024     Dear Low Sepulveda:       Thank you for referring Bayron Acevedo to me for evaluation. Below are the relevant portions of my assessment and plan of care.    If you have questions, please do not hesitate to call me. I look forward to following Bayron along with you.         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  The patient has received treatment with primary radiation therapy (was not felt to be a good candidate for concurrent chemoradiation) to unresectable stage IIIA ([id number]) left upper lobe adenocarcinoma that was completed on 10/27/2023.  During follow-up, he was found to have 2 small enlarging right upper lobe nodules that were highly suspicious for malignancy with synchronous primary lesions.  Nodules of borderline size for biopsy with significant risk for pneumothorax and biopsy was not pursued.  Patient received presumptive treatment with SBRT to both nodules completed on 4/17/2024.  Follow-up CT chest on 5/22/2024 showed increased ill-defined density anterior left apex, irregular consolidation left upper lobe that was stable, groundglass density in the periphery of the left upper lobe as well as new small nodules in the right upper lobe.  Changes in the left lung were felt to be likely radiation related.  Patient returns today with follow-up PET scan to review after SBRT that was completed 4/17/2024 to the right lung.  Unfortunately the patient has experienced some significant difficulties recently with ongoing left anterior, posterior chest wall pain as well as pain in the  "left shoulder and proximal left upper extremity.  This was felt to be potentially related to prior radiation and he did receive a Medrol Dosepak with transient improvement in pain.  Pain then returned and he was prescribed gabapentin 100 mg 3 times daily by radiation oncology.  He reports that the gabapentin was not effective and caused side effects with anorexia, weight loss, reduced level of activity.  He discontinued gabapentin around 2 weeks ago and reports that his symptoms have improved.  The pain however does persist and is quite bothersome, aggravated when he is more active working outdoors.  Pain is aggravated significantly when he raises his left arm above his head as he had to do with recent PET scan and with his prior radiation therapy.  His weight is down slightly 147 pounds today.  He does continue to drink 1 to 2 cans of boost per day.  He has some mild constipation.  He maintains ECOG performance status of 1.  He notes that his COPD is fairly stable.  He does have some hoarseness related to his inhalers.        Objective  Vital Signs:   /75   Pulse 57   Temp 98.1 °F (36.7 °C) (Oral)   Resp 16   Ht 170 cm (66.93\")   Wt 67 kg (147 lb 9.6 oz)   SpO2 96%   BMI 23.17 kg/m²     Physical Exam  Constitutional:       Appearance: He is well-developed.      Comments: Patient in no distress   Eyes:      Conjunctiva/sclera: Conjunctivae normal.   Neck:      Thyroid: No thyromegaly.   Cardiovascular:      Rate and Rhythm: Normal rate and regular rhythm.      Heart sounds: No murmur heard.     No friction rub. No gallop.   Pulmonary:      Effort: No respiratory distress.      Comments: Few scattered crackles in the lung bases bilaterally  Abdominal:      General: Bowel sounds are normal. There is no distension.      Palpations: Abdomen is soft.      Tenderness: There is no abdominal tenderness.   Musculoskeletal:         General: No swelling.      Comments: Trace bilateral lower extremity edema "   Lymphadenopathy:      Head:      Right side of head: No submandibular adenopathy.      Cervical: No cervical adenopathy.      Upper Body:      Right upper body: No supraclavicular adenopathy.      Left upper body: No supraclavicular adenopathy.   Skin:     General: Skin is warm and dry.      Findings: No rash.   Neurological:      Mental Status: He is alert and oriented to person, place, and time.      Cranial Nerves: No cranial nerve deficit.      Motor: No abnormal muscle tone.      Deep Tendon Reflexes: Reflexes normal.   Psychiatric:         Behavior: Behavior normal.       Patient was examined today, unchanged from above      Result Review: Reviewed CBC, CMP from today.  Reviewed PET scan 7/10/2024.       Assessment and Plan     *Clinical stage IIIA ([id number]) non-small cell lung cancer (adenocarcinoma) of left upper lobe.  Subsequent clinical stageIAI ([id number]) right upper lobe non-small cell lung cancer (biopsy deferred)  The patient has a history of smoking 1 pack/day x 55 years quit in 2000.  CT chest 5/26/2023 showed a spiculated mass in the anterior left upper lobe 3.7 x 4.3 x 4.2 cm along the anterior pleural surface and lateral aspect of the anterior mediastinum.  Additional 3 mm nodule right major fissure.  Bilateral small to moderate-sized pleural effusions.  Bullae formation consistent with COPD.  Mediastinal lymphadenopathy with largest node infracarinal region 2.6 x 1.2 cm.  PET/CT on 6/12/2023 showed 5.2 x 3.1 cm irregular pleural-based mass anterior left upper lobe abutting the pleura anteriorly and medially, hypermetabolic with SUV 14.4.  Mediastinal lymph node activity in the subcarinal region with a 2.2 x 1.1 cm lymph node with SUV 4.5.  Remainder of mediastinal lymph nodes less intensely hypermetabolic with maximal SUV 3.3.  Moderate size loculated pleural effusions bilaterally with low-level activity along the pleura (SUV 2.5), no change in volume of pleural effusions since prior chest CT.   New small to moderate loculated right hydropneumothorax with air-fluid level 5.2 cm.   CT-guided left upper lobe lung biopsy 6/13/2023 with pathology that showed invasive poorly differentiated adenocarcinoma, PD-L1 less than 1%.  Caris analysis: TMB high (14 muts/Mb), mutations in KEAP1 and TP53, EGFR VUS, no targetable mutations.  Patient was hospitalized 6/13 - 6/15/2023 due to right hydropneumothorax, had CT-guided chest tube placement performed 6/13/2023.  Right pleural fluid cytology negative on 6/13/2023.  Staging MRI brain was negative for evidence of metastatic disease on 7/2/2023  PFTs on 6/20/2023 with FEV1 0.99 (44% predicted), DLCO corrected 14.09 (56% predicted).  Mediastinoscopy 7/17/2023 with 4R and station 7 lymph nodes negative for malignancy  Left VATS performed 7/31/2023.  Intraoperatively, primary tumor was unresectable, invading into the mediastinum and pericardial fat pad as well as with phrenic nerve involvement (T4 lesion).  Sampling of left pleura with fibrosis, chronic inflammation, no evidence of malignancy.  Station 5 and 10 R lymph nodes sampled, negative for malignancy.  New baseline CT chest abdomen pelvis prior to initiation of radiation therapy performed on 9/20/2023.  Persistent loculated left pneumothorax, trace left pleural effusion, primary tumor in partially collapsed left upper lobe appears relatively stable.  Small right pleural effusion no change in borderline enlarged mediastinal lymph nodes.  Slight increase in right upper lobe nodule (for up to 7 mm), indeterminate.  Patient therefore with unresectable stage IIIA ([id number]) disease.  Plans to treat with radiation therapy.  Due to age, performance status, comorbidities, patient felt to be a poor candidate for concurrent chemotherapy.  Patient received radiation therapy 10/9 - 10/27/2023 to left upper lobe, 6000 cGy.  CT chest abdomen pelvis 1/23/2024 showed decrease in left pneumothorax/hydropneumothorax, left upper lobe  mass difficult to accurately assess due to change in configuration (shift in lung parenchyma due to pneumothorax improvement) but appears relatively stable, 2.9 x 4.3 versus current 3.3 x 3.5 cm.  No lymphadenopathy noted, no evidence of distant metastatic disease.  PET scan 3/20/2024 with subcentimeter left supraclavicular lymph node with new uptake (SUV 2.4).  Decrease in treated left upper lobe mass from 4.4 x 3.8 down to 3.4 x 2 (decrease in SUV from 14.4 down to 3.2).  Increase in 2 mildly hypermetabolic left upper lobe ill-defined opacities with linear consolidation felt to represent postradiation change (SUV 3.4).  Resolution of hypermetabolic activity and subcarinal lymph node as well as subcentimeter mediastinal and left hilar lymph nodes.  Increase and 2 adjacent right upper lobe solid nodules surrounding bullae measuring 1 and 0.8 cm with SUV 5.1 and 2.5 respectively.  Decrease in small loculated pleural effusions with SUV 3 on the left and 2.4 on the right.  Tubular hypermetabolic activity throughout the esophagus.  Persistent focal transverse colon hypermetabolism with SUV 6.9 and additional foci of the ascending colon hypermetabolism SUV 5.6.  Patient discussed at thoracic oncology conference 3/28/2024.  Consensus regarding not pursuing biopsy due to risk of pneumothorax and small size of the lesions.  Given the growth rate and activity, lesions were felt to represent additional primary sites of disease.  Recommendation to treat with SBRT.  Patient received SBRT to both right upper lobe lesions x 5 fractions each from 4/11 - 4/17/2024  CT chest 5/22/2024 showed irregular consolidation left upper lobe unchanged 3.5 x 3.2 cm, new small groundglass density left upper lobe.  Stable consolidation left base.  Nodularity posterior right upper lobe largest nodule 8 mm unchanged.  New nodule fissure right upper lobe and new right upper lobe micronodule.  PET scan 7/10/2024 with stable radiated left upper lobe  mass with uptake near blood pool.  Left apical 3.3 x 1.3 cm bandlike consolidation with SUV 10.1 (previously few linear foci of consolidation and SUV 3.4).  Left upper lobe 1.6 cm focal consolidation with SUV 8.2 (previously few linear foci of consolidation SUV 4.3).  Right upper lobe nodules x 2 around bullae decreased in size (1 cm down to 0.6 cm and 0.8 down to 0.5 cm), no activity.  New surrounding consolidation and groundglass opacity.  Persistent focal transverse colon hypermetabolism (SUV decreased from 6.9 down to 4.6) with generalized tubular hypermetabolism in the distal colon.  Patient returns today in follow-up with the above-mentioned PET scan to review.  PET scan shows significant response in the recently radiated right lung nodules with decrease in size and resolution of hypermetabolic activity in both lesions surrounding right upper lobe bulla.  There is however concern regarding changes in the left upper lobe.  There is an increasing bandlike area of consolidation measuring 3.3 cm and with hypermetabolic activity with SUV 10.1 and an additional 1.6 cm focal consolidation with SUV up to 8.2.  These appear to be within the previous radiation portal.  I did discuss with Dr. Painter on the telephone today whether these could represent radiation change however we both agree that the degree of uptake and recent enlargement is concerning for possible recurrent malignancy in this area.  The patient has considerable underlying COPD with multiple bullae and would be at risk for pneumothorax as before with CT-guided biopsy.  Therefore we discussed proceeding with referral back to thoracic surgery and proceeding with Ion bronchoscopy for further evaluation and sampling of this area.  I discussed this at length with the patient and his wife.  They had multiple questions which were answered to their satisfaction.  Will make referral to thoracic surgery.  Patient will be seeing Dr. Painter as well tomorrow.  I will see  the patient back in approximately 1 month to review results from bronchoscopy.  If this is unrevealing or if the areas and accessible via bronchoscopy we will plan short interval follow-up CT.    *Chronic bilateral pleural effusions with right hydropneumothorax and subsequent postoperative left hydropneumothorax  Patient appears to have longstanding evidence of small bilateral pleural effusions likely related to CHF  PET/CT 6/12/2023 identified right small to moderate hydropneumothorax new since 5/26/2023 CT chest  Patient was hospitalized 6/13 - 6/15/2023 due to right hydropneumothorax, had CT-guided chest tube placement performed 6/13/2023.  Right pleural fluid cytology negative on 6/13/2023.  As above, patient underwent left VATS with attempted resection of primary lung cancer on 7/31/2023.    Patient required persistent left chest tube postoperatively, eventually removed on 9/6/2023  CT chest 9/20/2023 with persistent loculated left pneumothorax.  CT chest 1/23/2024 with significant improvement in left pneumothorax/hydropneumothorax  PET scan 3/20/2024 with decrease in small loculated pleural effusions with SUV 3 on the left and 2.4 on the right.  Pleural effusions felt to likely benign in nature at this point.  PET scan 7/10/2024 with unchanged small bilateral pleural effusions    *COPD  The patient has a history of smoking 1 pack/day x 55 years quit in 2000.  PFTs on 6/20/2023 with FEV1 0.99 (44% predicted), DLCO corrected 14.09 (56% predicted).  Patient continues with chronic dyspnea on exertion that does limit his level of activity although maintains O2 saturation in the 90% range even with activity.  Patient notes that his chronic dyspnea on exertion is stable.  He continues on Trelegy inhaler and albuterol inhaler as needed along with albuterol nebulizer at home.    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  Labs on 7/11/2023 with hemoglobin 10.6, MCV 82.8, iron 18, ferritin 504, iron saturation 6%, TIBC 295, B12 201, folate 6.79, CRP 1.07, ESR 27, erythropoietin level 17.8.  Labs consistent with anemia secondary to chronic disease/malignancy as well as anemia secondary to CKD3.  Patient initiated oral B12 1000 mcg daily for B12 deficiency  Hemoglobin on 11/7/2023 was relatively unchanged at 10.3.  Additional evaluation sent with iron 21, ferritin 415, iron saturation 8%, TIBC 266, B12 398, folate 10.2, negative serum protein electrophoresis/immunoelectrophoresis, IgA 209, IgG 829, IgM 13, free kappa light chain 50.4, free lambda light chain 34.2, free light chain ratio 1.47 (normal).  Labs did consistent with anemia secondary to chronic disease as well as anemia secondary to CKD3.  He does not qualify for Procrit with hemoglobin above 10.  Patient continued on oral B12 1000 mcg daily  Patient returns today with hemoglobin that has declined to 10.5.  Additional labs with iron 20, ferritin 394, iron saturation 7%, TIBC 299.  Check Reticulated hemoglobin.    *Transient thrombocytopenia  On 11/7/2024, new onset thrombocytopenia with platelet count of 137,000.  Additional labs with IPF low normal at 3.3%, B12 398, folate 10.2,negative serum protein electrophoresis/immunoelectrophoresis, IgA 209, IgG 829, IgM 13, free kappa light chain 50.4, free lambda light chain 34.2, free light chain ratio 1.47 (normal).  Platelet count today normal at 144,000    *Coronary artery disease, CHF  Chronic bilateral pleural effusions presumably related to CHF  Status post cardiac stent in 2013  Preoperative echocardiogram on 7/19/2023 with ejection fraction 56 to 60%, dilated LA,  LV  Patient continues on aspirin 81 mg daily    *Peripheral vascular disease  Patient is followed by Dr. Karen Barrera in vascular surgery  Abdominal aortic aneurysm status post stent graft repair 5/17/2021  Carotid stenosis status post carotid endarterectomy left, 2013.    *Anorexia, weight loss  Patient with anorexia associated with malignancy, mild degree of weight loss  Patient initiated Remeron 7.5 mg nightly on 9/22/2023.  Subsequently discontinued with improvement in weight and appetite  Patient with improvement in appetite, weight fairly stable at 150 pounds.  Patient reports that he is drinking approximately 1 can of boost per day.  We discussed increasing this to 2 to 3 cans/day.    *Constipation  Patient continues with intermittent constipation, uses stool softener as needed.      *Pain control  Patient has been taking Tylenol alone for his pain.  We discussed that he could use his previous supply of hydrocodone 5/325 every 6 hours as needed and I encouraged him to begin using half tablet to full tablet tolerated.      Plan:  Patient with stage IIIA ([id number]) adenocarcinoma of the left upper lobe, unresectable.  Received primary radiation therapy to the left upper lobe completed on 10/27/2023  Patient with 2 additional hypermetabolic right upper lobe nodules felt to be new primary lesions.  High risk for pneumothorax with biopsy.  Both lesions treated with SBRT 4/11 - 4/17/2024.  Patient continuing on oral B12 1000 mcg daily for B12 deficiency  The patient declined endoscopic procedures to evaluate the esophageal and colonic activity on PET scan.  Left upper extremity pain possibly related to effects from prior radiation therapy to the left chest wall versus recurrent malignancy  Patient is intolerant of gabapentin.  Patient reluctant to use hydrocodone 5/325  Patient will use Tylenol as needed for pain for now  Referral to thoracic surgery to consider bronchoscopy to evaluate increasing areas of PET  positive consolidation in the left upper lobe with concern for recurrent malignancy.  Patient is seeing Dr. Painter in radiation oncology tomorrow  Return visit in approximately 1 month with CBC, CMP.  We will review hopefully results from bronchoscopy performed by thoracic surgery.    I did spend 50 minutes total time caring for the patient today, time spent in review of records, face-to-face consultation, coordination of care, placement of orders, completion of documentation.

## 2024-07-18 ENCOUNTER — OFFICE VISIT (OUTPATIENT)
Dept: RADIATION ONCOLOGY | Facility: HOSPITAL | Age: 84
End: 2024-07-18
Payer: MEDICARE

## 2024-07-18 VITALS
WEIGHT: 149 LBS | OXYGEN SATURATION: 99 % | DIASTOLIC BLOOD PRESSURE: 67 MMHG | BODY MASS INDEX: 23.39 KG/M2 | SYSTOLIC BLOOD PRESSURE: 130 MMHG | HEART RATE: 55 BPM

## 2024-07-18 DIAGNOSIS — C34.92 ADENOCARCINOMA, LUNG, LEFT: Primary | ICD-10-CM

## 2024-07-18 NOTE — PROGRESS NOTES
[facility] Radiation Oncology   Follow Up    Chief Complaint  Multiple prior lung cancers s/p RT        Diagnosis: Multiple prior lung cancers s/p RT     Overall Stage IIIA     cT4: Mediastinal Invasion  pN0: per cervical mediastinoscopy, and intraoperative resection of 10R and 5 LNs  cM0: per PET CT, MRI Brain        Radiation Completion Date: 10/27/2023        Prescription:      Site: KIET  Laterality: Left  Total Dose: 6000cGy  Dose per Fraction: 400cGy  Total Fractions: 15  Daily or BID: Daily  Modality: Photon  Technique: IMRT/VMAT/Rapid Arc  Bolus: No        2.         Overall Stage IA2 RUL     Radiation Completion Date: 4/17/2024        Prescription:      Site: Right Upper Lobe  Laterality: Right  Total Dose: 5000cGy  Dose per Fraction: 1000cGy  Total Fractions: 5  Daily or BID: Daily  Modality: Photon  Technique: SBRT (2-5fx)  Bolus: No      Interval History:    Bayron Acevedo presents for regularly scheduled follow-up approximately 9 months after completion of radiation therapy for locally advanced left upper lobe lung cancer.  Additionally, the patient required SBRT to a right upper lobe lung cancer 3 months ago.  The patient tolerated treatment fairly well, he is struggling somewhat with left-sided rib pain which may be related to his prior radiation therapy.  He has used gabapentin with minimal effect.  He has stopped taking this and is now taking Tylenol which is helping.      Imaging:      PET CT 7/10/2024    IMPRESSION:  1. 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  2. Two right upper lobe solid nodule surrounding a bullae have decreased  in size and are now too small for accurate PET characterization without  overt hypermetabolism. Small amount of surrounding likely post radiation  pneumonitis.  3. Hypermetabolic left apical bandlike consolidation and separate  hypermetabolic peripheral left upper lobe focal consolidation have  increased in size  since March FDG PET/CT and may CT chest.  Infectious/inflammatory/postradiation (favored) versus malignancy.  Recommend close interval follow-up.  4. Stable small bilateral pleural effusions and presumed left lower lobe  round atelectasis.  5. Persistent focal transverse colon hypermetabolism.  6. Stable subcentimeter left supraclavicular lymph node with uptake  blood pool.      Pathology:      No new relevant pathology      Labs:    Lab Results   Component Value Date    CREATININE 1.85 (C) 07/17/2024       CMP          5/14/2024    15:15 5/31/2024    14:20 7/17/2024    15:15   CMP   Glucose 93  96  92    BUN 23  32  27    Creatinine 1.70  1.78  1.85    EGFR 39.0  36.9  35.3    Sodium 137  140  139    Potassium 4.4  4.8  4.3    Chloride 99  100  102    Calcium 9.0  9.1  9.2    Total Protein 6.8  6.7  7.0    Albumin 3.9  3.8  4.1    Globulin 2.9  2.9  2.9    Total Bilirubin 0.3  0.4  0.4    Alkaline Phosphatase 83  83  88    AST (SGOT) 15  16  16    ALT (SGPT) 8  11  7    Albumin/Globulin Ratio 1.3  1.3  1.4    BUN/Creatinine Ratio 13.5  18.0  14.6    Anion Gap 12.7  12.3  10.3      CBC          5/14/2024    15:15 5/31/2024    14:20 7/17/2024    15:13   CBC   WBC 5.87  6.94  5.76    RBC 4.14  4.26  4.41    Hemoglobin 10.5  11.1  11.5    Hematocrit 34.7  36.3  38.6    MCV 83.8  85.2  87.5    MCH 25.4  26.1  26.1    MCHC 30.3  30.6  29.8    RDW 17.2  17.6  16.1    Platelets 144  148  152              Problem List:  Patient Active Problem List   Diagnosis    COPD (chronic obstructive pulmonary disease) with emphysema    Hypertension    Hyperlipidemia    S/P angioplasty with stent    Heart attack    Colon polyp    Carotid stenosis    CAD in native artery    Peripheral artery disease    History of hydrocele    Perennial allergic rhinitis    AAA (abdominal aortic aneurysm) without rupture    Acute on chronic systolic CHF (congestive heart failure)    Stage 3b chronic kidney disease    Hydropneumothorax    Mass of upper  lobe of left lung    NICM (nonischemic cardiomyopathy)    Chronic systolic CHF (congestive heart failure)    Normocytic anemia    Adenocarcinoma, lung, left    Right inguinal hernia          Medications:  Current Outpatient Medications on File Prior to Visit   Medication Sig Dispense Refill    albuterol (PROVENTIL) (2.5 MG/3ML) 0.083% nebulizer solution USE ONE VIAL BY NEBULIZATION EVERY FOUR HOURS AS NEEDED FOR FOR WHEEZING (Patient taking differently: Take 2.5 mg by nebulization Every 4 (Four) Hours As Needed.) 180 mL 0    amLODIPine (NORVASC) 5 MG tablet Take 1 tablet by mouth Daily. 90 tablet 3    aspirin 81 MG EC tablet Take 1 tablet by mouth Daily. PT HOLDING 5 DAYS PRIOR TO SURGERY      carvedilol (COREG) 25 MG tablet TAKE ONE TABLET BY MOUTH TWICE DAILY 180 tablet 0    coenzyme Q10 100 MG capsule Take 1 capsule by mouth Daily.      gabapentin (NEURONTIN) 100 MG capsule Take 3 capsules by mouth 3 (Three) Times a Day. 90 capsule 2    hydrALAZINE (APRESOLINE) 25 MG tablet Take 1 tablet by mouth 2 (Two) Times a Day. 180 tablet 3    HYDROcodone-acetaminophen (NORCO) 5-325 MG per tablet Take 1 tablet by mouth Every 6 (Six) Hours As Needed.      lisinopril (PRINIVIL,ZESTRIL) 20 MG tablet Take 1 tablet by mouth Daily.      methylPREDNISolone (MEDROL) 4 MG dose pack Take as directed on package instructions. 21 tablet 0    nitroglycerin (NITROSTAT) 0.4 MG SL tablet Place 1 tablet under the tongue every 5 (five) minutes as needed for chest pain. Take no more than 3 doses in 15 minutes. 25 tablet 1    pravastatin (PRAVACHOL) 20 MG tablet Take 1 tablet by mouth Daily.      rosuvastatin (CRESTOR) 20 MG tablet Take 1 tablet by mouth Daily. 90 tablet 3    sennosides-docusate (senna-docusate sodium) 8.6-50 MG per tablet Take 1 tablet by mouth 2 (Two) Times a Day As Needed for Constipation. 30 tablet 0    torsemide (DEMADEX) 20 MG tablet Take 2 tablets by mouth Daily. 180 tablet 3    Trelegy Ellipta 200-62.5-25 MCG/ACT  "aerosol powder  Inhale 1 puff Daily.      Umeclidinium-Vilanterol (ANORO ELLIPTA IN) Inhale.      vitamin B-12 (CYANOCOBALAMIN) 1000 MCG tablet Take 1 tablet by mouth Daily.       No current facility-administered medications on file prior to visit.          Allergies:  Allergies   Allergen Reactions    Lisinopril Anaphylaxis    Codeine Sulfate Hives    Contrast Dye (Echo Or Unknown Ct/Mr) Other (See Comments)     HYPERTENSION    Iodinated Contrast Media Other (See Comments)     HYPERTENSION    Losartan Swelling     LIP    Penicillins Other (See Comments)     Passed out after a PCN shot- no other sx noted-per pateint  Beta lactam allergy details  Antibiotic reaction: unknown  Age at reaction: adult  Dose to reaction time: unknown  Reason for antibiotic: other  Epinephrine required for reaction?: no  Tolerated antibiotics: unknown              Vital Signs:  /67   Pulse 55   Wt 67.6 kg (149 lb)   SpO2 99%   BMI 23.39 kg/m²   Estimated body mass index is 23.39 kg/m² as calculated from the following:    Height as of 7/17/24: 170 cm (66.93\").    Weight as of this encounter: 67.6 kg (149 lb).  Pain Score    07/18/24 1129   PainSc: 0-No pain  Comment: took pain medication         ECOG: Restricted in physically strenuous activity but ambulatory and able to carry out work of a light or sedentary nature, e.g., light house work, office work = 1    Physical Exam  Vitals reviewed.   Constitutional:       General: He is not in acute distress.     Appearance: Normal appearance.   HENT:      Head: Normocephalic and atraumatic.   Eyes:      Extraocular Movements: Extraocular movements intact.      Pupils: Pupils are equal, round, and reactive to light.   Pulmonary:      Effort: Pulmonary effort is normal.   Abdominal:      General: Abdomen is flat.      Palpations: Abdomen is soft.   Musculoskeletal:      Cervical back: Normal range of motion.   Skin:     General: Skin is warm and dry.   Neurological:      General: No focal " deficit present.      Mental Status: He is alert and oriented to person, place, and time.   Psychiatric:         Mood and Affect: Mood normal.         Behavior: Behavior normal.          Result Review :  The following data was reviewed by: Dakota Painter MD on 07/18/2024:  Labs: Last Creatinine, CBC, CMP  Data reviewed : Radiologic studies PET CT             Diagnoses and all orders for this visit:    1. Adenocarcinoma, lung, left (Primary)        Assessment:    Bayron Acevedo presents for regularly scheduled follow-up approximately 9 months after completion of radiation therapy for locally advanced left upper lobe lung cancer.  Additionally, the patient required SBRT to a right upper lobe lung cancer 3 months ago.  The patient tolerated treatment fairly well, he is struggling somewhat with left-sided rib pain which may be related to his prior radiation therapy.  He has used gabapentin with minimal effect.  He has stopped taking this and is now taking Tylenol which is helping.    I met with the patient and reviewed the results of his most recent PET/CT in detail.  I also spoke over the phone with Dr. Guerrero with radiology.  I have also spoken with Dr. Santos.  Overall, there is enough concern for possible progressive disease in his left lung to warrant consideration of bronchoscopy and biopsy.  Dr. Santos is referred the patient to thoracic surgery for consideration.  Radiation pneumonitis/inflammation is still possible as a source for this avidity.  There will be limited options from a radiation standpoint should he have recurrent disease at the site of his previous locally advanced disease.    Plan:    -Patient has been referred for consideration of bronchoscopy and biopsy of avidity in his previously treated left lung lesion which is concerning for recurrent disease.       I spent 30 minutes caring for Bayron on this date of service. This time includes time spent by me in the following activities:preparing for the  visit, reviewing tests, obtaining and/or reviewing a separately obtained history, referring and communicating with other health care professionals , documenting information in the medical record, independently interpreting results and communicating that information with the patient/family/caregiver, and care coordination  Follow Up   No follow-ups on file.  Patient was given instructions and counseling regarding his condition or for health maintenance advice. Please see specific information pulled into the AVS if appropriate.     Dakota Painter MD

## 2024-07-22 ENCOUNTER — PATIENT OUTREACH (OUTPATIENT)
Dept: OTHER | Facility: HOSPITAL | Age: 84
End: 2024-07-22
Payer: MEDICARE

## 2024-07-22 NOTE — PROGRESS NOTES
Reviewed chart.  Patient met with Dr. Santos 7/17 and Dr. Painter 7/18 and is 9 months postradiation therapy for locally advanced left upper lobe lung cancer.  Additionally, the patient required SBRT to a right upper lobe lung cancer 3 months ago.  7/10/24 PET scan shows significant response in the recently radiated right lung nodules with decrease in size and resolution of hypermetabolic activity in both lesions surrounding right upper lobe bulla. There is however concern regarding changes in the left upper lobe. There is an increasing bandlike area of consolidation measuring 3.3 cm and with hypermetabolic activity with SUV 10.1 and an additional 1.6 cm focal consolidation with SUV up to 8.2.  Referred to thoracic surgery for possible biopsy.     Called patient. Left message that I was just touching base to see how he was doing. Wanted to know if he had any questions/concerns after his appts last week or resource/supportive care needs; requested cb

## 2024-07-23 ENCOUNTER — TELEPHONE (OUTPATIENT)
Dept: SURGERY | Facility: CLINIC | Age: 84
End: 2024-07-23
Payer: MEDICARE

## 2024-07-23 DIAGNOSIS — I25.10 CORONARY ARTERY DISEASE INVOLVING NATIVE CORONARY ARTERY OF NATIVE HEART WITHOUT ANGINA PECTORIS: Primary | ICD-10-CM

## 2024-07-23 RX ORDER — ROSUVASTATIN CALCIUM 20 MG/1
20 TABLET, COATED ORAL DAILY
Qty: 90 TABLET | Refills: 1 | Status: CANCELLED | OUTPATIENT
Start: 2024-07-23

## 2024-07-24 RX ORDER — ROSUVASTATIN CALCIUM 20 MG/1
20 TABLET, COATED ORAL DAILY
Qty: 90 TABLET | Refills: 3 | Status: SHIPPED | OUTPATIENT
Start: 2024-07-24

## 2024-07-24 NOTE — TELEPHONE ENCOUNTER
Spoke with pt and he is not taking Pravastatin.  He is only taking Rosuvastatin and would like this sent in to [facility]

## 2024-08-01 ENCOUNTER — OFFICE VISIT (OUTPATIENT)
Dept: SURGERY | Facility: CLINIC | Age: 84
End: 2024-08-01
Payer: MEDICARE

## 2024-08-01 ENCOUNTER — PATIENT OUTREACH (OUTPATIENT)
Dept: OTHER | Facility: HOSPITAL | Age: 84
End: 2024-08-01
Payer: MEDICARE

## 2024-08-01 VITALS
WEIGHT: 148.6 LBS | BODY MASS INDEX: 23.32 KG/M2 | HEART RATE: 59 BPM | HEIGHT: 67 IN | SYSTOLIC BLOOD PRESSURE: 118 MMHG | DIASTOLIC BLOOD PRESSURE: 60 MMHG | OXYGEN SATURATION: 96 %

## 2024-08-01 DIAGNOSIS — Z85.118 HISTORY OF LUNG CANCER: Primary | ICD-10-CM

## 2024-08-01 DIAGNOSIS — M25.512 LEFT SHOULDER PAIN, UNSPECIFIED CHRONICITY: ICD-10-CM

## 2024-08-01 NOTE — PROGRESS NOTES
Reviewed chart. Patient with stage IIIA ([id number]) adenocarcinoma of the left upper lobe, unresectable. Received primary radiation therapy to the left upper lobe which completed on 10/27/2023. Required SBRT to a right upper lobe lung cancer 4/2024.  Patient met with Dr. Santos 7/17 and Dr. Painter 7/18 and is 9 months postradiation therapy for locally advanced left upper lobe lung cancer.  7/10/24 PET scan shows significant response in the recently radiated right lung nodules with decrease in size and resolution of hypermetabolic activity in both lesions surrounding right upper lobe bulla. There is an increasing bandlike area of consolidation measuring 3.3 cm in KIET and with hypermetabolic activity with SUV 10.1 and an additional 1.6 cm focal consolidation with SUV up to 8.2.  Meeting with Dr. Chávez today. Sees Dr. Santos again 8/9    I accompanied patient and wife to Dr. Chávez's office appt today. Dr. Chávez discussed CT guided biopsy of the KIET consolidation vs bronchoscopy.  The patient states he would like to wait 2-3 months before pursing biopsy due to ongoing left upper chest, shoulder and arm pain which developed several months ago. The patients states he had side effects to gabapentin (no energy, decreased appetite) and is no longer taking the medication. His pain is being managed with Tylenol 500mg.  Dr. Chávez ordered a MRI of the upper spine, brachial plexus and will discuss the patient's desire to post-pone the lung biopsy with Dr. Santos and Dr. Painter.  Dr. Chávez answered all questions.  The patient will f/u with Dr. Chávez on 9/5.     The patient/wife deny additional questions or concerns.  I will continue to follow. Encouraged patient/wife to call as needed.

## 2024-08-01 NOTE — PROGRESS NOTES
THORACIC SURGERY CLINIC CONSULT NOTE    REASON FOR CONSULT: Clinical stage IIIA ([id number]) non-small cell lung cancer (adenocarcinoma) of left upper lobe     REFERRING PROVIDER: Myles Santos Jr., MD    Subjective   HISTORY OF PRESENTING ILLNESS:   Bayron Acevedo is a 83 y.o. male who has significant medical problems as mentioned in the medical chart.     History of Present Illness  The patient is an 84-year-old male who presents for evaluation of shoulder and arm pain.    He underwent a robotic left upper lobe pulmonary decortication and aborted lobectomy back on 7/31/2023 by Dr. Nemesio Kramer due to the fact that his biopsy-proven lung cancer grossly invaded his mediastinum and phrenic nerve. This hospital course was prolonged with a extremely prolonged air leak. During follow-up, he was found to have 2 small enlarging right upper lobe nodules that were highly suspicious for malignancy with synchronous primary lesions. Nodules of borderline size for biopsy with significant risk for pneumothorax and biopsy was not pursued. Patient received presumptive treatment with SBRT to both nodules completed on 4/17/2024. Follow-up CT chest on 5/22/2024 showed increased ill-defined density anterior left apex, irregular consolidation left upper lobe that was stable, groundglass density in the periphery of the left upper lobe as well as new small nodules in the right upper lobe. Changes in the left lung were felt to be likely radiation related. PET scan 7/10/2024 with stable radiated left upper lobe mass with uptake near blood pool. Left apical 3.3 x 1.3 cm bandlike consolidation with SUV 10.1 (previously few linear foci of consolidation and SUV 3.4). Left upper lobe 1.6 cm focal consolidation with SUV 8.2 (previously few linear foci of consolidation SUV 4.3). Right upper lobe nodules x 2 around bullae decreased in size (1 cm down to 0.6 cm and 0.8 down to 0.5 cm), no activity. New surrounding consolidation and groundglass  opacity. Persistent focal transverse colon hypermetabolism (SUV decreased from 6.9 down to 4.6) with generalized tubular hypermetabolism in the distal colon.  He was referred to thoracic surgery for concerning lung biopsy.    He reports experiencing pain in his shoulders and arm, which intensifies with use. The pain extends to his chest and shoulder muscles, but he denies any numbness or tingling. This pain began following treatment for his right lung and hernia surgery. He underwent 15 rounds of radiation therapy on his right lung and 5 on his left lung, but did not undergo chemotherapy. His surgery was performed on the left side. He has lost 60 pounds and his appetite has decreased. He took a Dosepak, which provided relief. He discontinued gabapentin, which improved his symptoms. He has been using Tylenol every 4 to 5 hours, which provides some relief. He has not tried tramadol, but has tried Oxycodone in the past.    Past Medical History:   Diagnosis Date    AAA (abdominal aortic aneurysm) 03/31/2021    without rupture    Acid reflux     Atherosclerosis of native arteries of extremities with intermittent claudication, bilateral legs 11/13/2019    CAD in native artery     STENT    Carotid stenosis 05/11/2016    gonzalez. s/p CEA Left    Cataract     CKD (chronic kidney disease)     Claudication     Colon polyp     COPD (chronic obstructive pulmonary disease)     H/O Acute myocardial infarction 2013    H/O PAD (peripheral artery disease)     Heart attack 2013    Heart failure     History of atrial fibrillation     AFTER LUNG SURGERY    Hypercholesterolemia     Hyperlipidemia     Hypertension     Inguinal hernia     RIGHT    Left ventricular dysfunction     Mass of left lung     SHOWS ON MRI    Mitral valve regurgitation     Non-small cell lung cancer (NSCLC) 2023    LEFT UPPER LOBE, HAD SURGERY AND RADIATION    Perennial allergic rhinitis 01/06/2017    PVD (peripheral vascular disease)     S/P angioplasty with stent      Tinnitus     Tricuspid regurgitation        Past Surgical History:   Procedure Laterality Date    APPENDECTOMY      ARTERIOGRAM AORTIC N/A 05/17/2021    Procedure: ZENITH AORTIC STENT GRAFT;  Surgeon: Karen Barrera Jr., MD;  Location: [facility] OR 18/19;  Service: Vascular;  Laterality: N/A;    CARDIAC CATHETERIZATION      X2    CARDIAC CATHETERIZATION N/A 04/11/2023    Procedure: Left Heart Cath;  Surgeon: Guru Jiang MD;  Location: [facility] CATH INVASIVE LOCATION;  Service: Cardiovascular;  Laterality: N/A;    CARDIAC CATHETERIZATION N/A 04/11/2023    Procedure: Right Heart Cath;  Surgeon: Guru Jiang MD;  Location: [facility] CATH INVASIVE LOCATION;  Service: Cardiovascular;  Laterality: N/A;    CARDIAC CATHETERIZATION N/A 04/11/2023    Procedure: Coronary angiography;  Surgeon: Guru Jiang MD;  Location: [facility] CATH INVASIVE LOCATION;  Service: Cardiovascular;  Laterality: N/A;    CARDIAC CATHETERIZATION N/A 04/11/2023    Procedure: Left ventriculography;  Surgeon: Guru Jiang MD;  Location: [facility] CATH INVASIVE LOCATION;  Service: Cardiovascular;  Laterality: N/A;    CARDIAC CATHETERIZATION  04/11/2023    Procedure: RESTING FULL CYCLE RATIO;  Surgeon: Guru Jiang MD;  Location: [facility] CATH INVASIVE LOCATION;  Service: Cardiovascular;;    CARDIAC CATHETERIZATION  2002    CARDIAC CATHETERIZATION      CAROTID ENDARTERECTOMY Left 01/28/2013    Bruce Jones Jr, MD    COLONOSCOPY N/A 10/17/2011    Diverticulosis sigmoid colon, two 4-5 mm polyps in the transverse colon and in the ascending colon, internal hemorrhoids, otherwise normal-Dr. Bronson Blanc    COLONOSCOPY  2012    CORONARY STENT PLACEMENT      HYDROCELE EXCISION / REPAIR Right 05/28/2014    Dr. STANLEY Osorio    INGUINAL HERNIA REPAIR Right 03/05/2024    Procedure: right INGUINAL HERNIA REPAIR LAPAROSCOPIC WITH DAVINCI ROBOT;  Surgeon: Tejinder Hummel MD;  Location: [facility] OR;  Service: Robotics -  DaVinci;  Laterality: Right;    LOBECTOMY Left 2023    Procedure: BRONCHOSCOPY, LEFT VAT WITH DAVINCI ROBOT, EXTENSIVE LYSIS OF ADHESIONS, PARTIAL PULMONARY DECORTICATION, INTERCOSTAL NERVE BLOCK;  Surgeon: Nemesio Kramer MD PhD;  Location: [facility] OR;  Service: Robotics - DaVinci;  Laterality: Left;    MEDIASTINOSCOPY N/A 2023    Procedure: MEDIASTINOSCOPY;  Surgeon: Nemesio Kramer MD PhD;  Location: [facility] OR;  Service: Thoracic;  Laterality: N/A;    UMBILICAL HERNIA REPAIR N/A 2007    Umbilical hernia repair with Ventralax mesh-Dr. Prabhu Freedman       Family History   Problem Relation Age of Onset    No Known Problems Mother         complications of child birth    Cancer Father     Leukemia Father     Cancer Brother     Heart disease Maternal Uncle     Heart disease Other     Cancer Other     Other Other         blood clotts    Bleeding Disorder Other     Malig Hyperthermia Neg Hx        Social History     Socioeconomic History    Marital status:      Spouse name: Amina   Tobacco Use    Smoking status: Former     Current packs/day: 0.00     Average packs/day: 1 pack/day for 55.0 years (55.0 ttl pk-yrs)     Types: Cigarettes     Start date:      Quit date:      Years since quittin.6    Smokeless tobacco: Never   Vaping Use    Vaping status: Never Used   Substance and Sexual Activity    Alcohol use: No    Drug use: No    Sexual activity: Defer         Current Outpatient Medications:     albuterol (PROVENTIL) (2.5 MG/3ML) 0.083% nebulizer solution, USE ONE VIAL BY NEBULIZATION EVERY FOUR HOURS AS NEEDED FOR FOR WHEEZING (Patient taking differently: Take 2.5 mg by nebulization Every 4 (Four) Hours As Needed.), Disp: 180 mL, Rfl: 0    amLODIPine (NORVASC) 5 MG tablet, Take 1 tablet by mouth Daily., Disp: 90 tablet, Rfl: 3    aspirin 81 MG EC tablet, Take 1 tablet by mouth Daily. PT HOLDING 5 DAYS PRIOR TO SURGERY, Disp: , Rfl:     gabapentin (NEURONTIN) 100 MG  capsule, Take 3 capsules by mouth 3 (Three) Times a Day., Disp: 90 capsule, Rfl: 2    hydrALAZINE (APRESOLINE) 25 MG tablet, Take 1 tablet by mouth 2 (Two) Times a Day., Disp: 180 tablet, Rfl: 3    HYDROcodone-acetaminophen (NORCO) 5-325 MG per tablet, Take 1 tablet by mouth Every 6 (Six) Hours As Needed., Disp: , Rfl:     lisinopril (PRINIVIL,ZESTRIL) 20 MG tablet, Take 1 tablet by mouth Daily., Disp: , Rfl:     methylPREDNISolone (MEDROL) 4 MG dose pack, Take as directed on package instructions., Disp: 21 tablet, Rfl: 0    nitroglycerin (NITROSTAT) 0.4 MG SL tablet, Place 1 tablet under the tongue every 5 (five) minutes as needed for chest pain. Take no more than 3 doses in 15 minutes., Disp: 25 tablet, Rfl: 1    rosuvastatin (CRESTOR) 20 MG tablet, Take 1 tablet by mouth Daily., Disp: 90 tablet, Rfl: 3    sennosides-docusate (senna-docusate sodium) 8.6-50 MG per tablet, Take 1 tablet by mouth 2 (Two) Times a Day As Needed for Constipation., Disp: 30 tablet, Rfl: 0    torsemide (DEMADEX) 20 MG tablet, Take 2 tablets by mouth Daily., Disp: 180 tablet, Rfl: 3    Umeclidinium-Vilanterol (ANORO ELLIPTA IN), Inhale., Disp: , Rfl:     vitamin B-12 (CYANOCOBALAMIN) 1000 MCG tablet, Take 1 tablet by mouth Daily., Disp: , Rfl:     carvedilol (COREG) 25 MG tablet, Take 1 tablet by mouth 2 (Two) Times a Day., Disp: 180 tablet, Rfl: 3    coenzyme Q10 100 MG capsule, Take 1 capsule by mouth Daily. (Patient not taking: Reported on 8/1/2024), Disp: , Rfl:     Trelegy Ellipta 200-62.5-25 MCG/ACT aerosol powder , Inhale 1 puff Daily. (Patient not taking: Reported on 8/1/2024), Disp: , Rfl:      Allergies   Allergen Reactions    Lisinopril Anaphylaxis    Codeine Sulfate Hives    Contrast Dye (Echo Or Unknown Ct/Mr) Other (See Comments)     HYPERTENSION    Iodinated Contrast Media Other (See Comments)     HYPERTENSION    Losartan Swelling     LIP    Penicillins Other (See Comments)     Passed out after a PCN shot- no other sx  "noted-per pateint  Beta lactam allergy details  Antibiotic reaction: unknown  Age at reaction: adult  Dose to reaction time: unknown  Reason for antibiotic: other  Epinephrine required for reaction?: no  Tolerated antibiotics: unknown                Objective    OBJECTIVE:     VITAL SIGNS:  /60 (BP Location: Left arm, Patient Position: Sitting, Cuff Size: Adult)   Pulse 59   Ht 170 cm (66.93\")   Wt 67.4 kg (148 lb 9.6 oz)   SpO2 96%   BMI 23.32 kg/m²     PHYSICAL EXAM:  Normal appearance.   Head is normocephalic.   Nose appears normal.   No obvious deformity of the mouth and throat.  Conjunctivae normal.   Heart rate and rhythm is normal.  Pulmonary effort is normal.   Moving all 4 extremities.  Extremities warm.  No focal deficit present.   He is alert and oriented to person, place, and time.     LAB RESULTS:  I have reviewed all the available laboratory results in the chart.    RESULTS REVIEW:  I have reviewed the patient's all relevant laboratory and imaging findings.     Results  Imaging  PET CT scan shows suspicious areas on the left side of the lung.    ASSESSMENT & PLAN:  Bayron Acevedo is a 83 y.o. male with significant medical conditions as mentioned above presented to my clinic.    Diagnosis: Clinical stage IIIA ([id number]) non-small cell lung cancer (adenocarcinoma) of left upper lobe     Assessment & Plan  1. Suspected lung cancer.  A PET CT scan revealed a couple of suspicious areas on the left side of his lung. Various lung biopsies options were discussed with him. He opted not to proceed with a biopsy at this time, opting for a 2-month waiting period before proceeding with a scan. I will consult with Dr. Santos and Dr. Duncan regarding whether to proceed with waiting rather than a biopsy. An MRI of his spine and brachial plexus of his left upper extremity will be obtained to rule out spinal compression.  The location of the PET avid masses in the left upper lobe.  It is unclear to me if his left " shoulder and upper extremity symptoms are related to tumor compression of the brachial plexus.  Oxycodone will be prescribed. He was advised to use ice packs or warm compression for muscle stiffness. Physical therapy was suggested, pending an MRI results.    I discussed the patients findings and my recommendations with the patient. The patient was given adequate time to ask questions and all questions were answered to patient satisfaction. Thank you for this consult and allowing us to participate in the care of your patient.      Andrei Chávez MD  Thoracic Surgeon  [facility] and [place]        Dictated utilizing Dragon dictation    I spent 40 minutes caring for Bayron on this date of service. This time includes time spent by me in the following activities:preparing for the visit, reviewing tests, obtaining and/or reviewing a separately obtained history, performing a medically appropriate examination and/or evaluation , counseling and educating the patient/family/caregiver, ordering medications, tests, or procedures, referring and communicating with other health care professionals , documenting information in the medical record, independently interpreting results and communicating that information with the patient/family/caregiver, and care coordination and more than half the time was spent in direct face to face evaluation and decision making.     Patient or patient representative verbalized consent for the use of Ambient Listening during the visit with  Andrei Chávez MD for chart documentation. 8/8/2024  08:27 EDT

## 2024-08-07 RX ORDER — CARVEDILOL 25 MG/1
25 TABLET ORAL 2 TIMES DAILY
Qty: 180 TABLET | Refills: 3 | Status: SHIPPED | OUTPATIENT
Start: 2024-08-07

## 2024-08-09 ENCOUNTER — TELEPHONE (OUTPATIENT)
Dept: SURGERY | Facility: CLINIC | Age: 84
End: 2024-08-09
Payer: MEDICARE

## 2024-08-09 NOTE — TELEPHONE ENCOUNTER
"  Caller: Amina Acevedo Emily \"Emily\"    Relationship: Emergency Contact    Best call back number: [phone]  What is the best time to reach you: ANY DURING THE DAY    Who are you requesting to speak with (clinical staff, provider,  specific staff member): CLINICAL    Do you know the name of the person who called: PT'S WIFE EMILY    What was the call regarding: PT WAS SEEN BY DR. YANEZ WHO ORDERED A CT SCAN BEFORE PT'S NEXT FOLLOW UP. CT IS SCHED STILL FOR 9.6.24. PT WAS TRANSFERRED TO DR. MONTES DE OCA'S CARE. DR MONTES DE OCA PUT ORDERS IN FOR MRI CSPINE AND MRI BRACHIAL PLEXUS/CLW . PT IS NEEDING TO KNOW IF THEY STILL NEED THE CT SCAN OR IF IT NEEDS TO BE CANCELLED. ALSO THE TESTING ORDERED BY [facility] WAS SCHEDULED FOR 9.5.24 AT 9PM. THE F/U APPT IS FOR EARLIER THAT DAY. PT COULD NOT SCHEDULE ANY EARLIER TESTING. PLEASE CALL PT TO ADVISE ON THE TESTING THAT IS NEEDED AND WHEN IT SHOULD BE COMPLETED AS WELL AS TO HELP WITH SCHEDULING ONCE TESTING NEEDS ARE IDENTIFIED.           "

## 2024-08-09 NOTE — TELEPHONE ENCOUNTER
I was able to address pt concerns by having him rescheduled to 8/12/2024    Db 3:35  8/9/2024    Caller: Bayron Acevedo    Relationship: Self    Best call back number: [phone]  What is the best time to reach you: ANY DURING THE DAY    Who are you requesting to speak with (clinical staff, provider,  specific staff member): CLINICAL    Do you know the name of the person who called: PT'S WIFE WENDY    What was the call regarding: PT WAS SEEN BY DR. YANEZ WHO ORDERED A CT SCAN BEFORE PT'S NEXT FOLLOW UP. CT IS SCHED STILL FOR 9.6.24. PT WAS TRANSFERRED TO DR. MONTES DE OCA'S CARE. DR MONTES DE OCA PUT ORDERS IN FOR MRI CSPINE AND MRI BRACHIAL PLEXUS/CLW . PT IS NEEDING TO KNOW IF THEY STILL NEED THE CT SCAN OR IF IT NEEDS TO BE CANCELLED. ALSO THE TESTING ORDERED BY [facility] WAS SCHEDULED FOR 9.5.24 AT 9PM. THE F/U APPT IS FOR EARLIER THAT DAY. PT COULD NOT SCHEDULE ANY EARLIER TESTING. PLEASE CALL PT TO ADVISE ON THE TESTING THAT IS NEEDED AND WHEN IT SHOULD BE COMPLETED AS WELL AS TO HELP WITH SCHEDULING ONCE TESTING NEEDS ARE IDENTIFIED.

## 2024-08-22 ENCOUNTER — OFFICE VISIT (OUTPATIENT)
Age: 84
End: 2024-08-22
Payer: MEDICARE

## 2024-08-22 VITALS
HEART RATE: 55 BPM | BODY MASS INDEX: 23.64 KG/M2 | HEIGHT: 67 IN | DIASTOLIC BLOOD PRESSURE: 60 MMHG | SYSTOLIC BLOOD PRESSURE: 140 MMHG | WEIGHT: 150.6 LBS

## 2024-08-22 DIAGNOSIS — E78.5 HYPERLIPIDEMIA, UNSPECIFIED HYPERLIPIDEMIA TYPE: ICD-10-CM

## 2024-08-22 DIAGNOSIS — I34.0 NONRHEUMATIC MITRAL VALVE REGURGITATION: ICD-10-CM

## 2024-08-22 DIAGNOSIS — I10 PRIMARY HYPERTENSION: ICD-10-CM

## 2024-08-22 DIAGNOSIS — I42.8 NICM (NONISCHEMIC CARDIOMYOPATHY): ICD-10-CM

## 2024-08-22 DIAGNOSIS — I50.22 CHRONIC SYSTOLIC CHF (CONGESTIVE HEART FAILURE): ICD-10-CM

## 2024-08-22 DIAGNOSIS — I25.10 CORONARY ARTERY DISEASE INVOLVING NATIVE CORONARY ARTERY OF NATIVE HEART WITHOUT ANGINA PECTORIS: Primary | ICD-10-CM

## 2024-08-22 DIAGNOSIS — I48.0 PAROXYSMAL ATRIAL FIBRILLATION: ICD-10-CM

## 2024-08-22 NOTE — PROGRESS NOTES
[facility] Follow Up Office Note     Encounter Date:24  Patient:Bayron Acevedo  :1940  MRN:[id number]      Chief Complaint:   Chief Complaint   Patient presents with    Congestive Heart Failure     6 month f/u     History of Presenting Illness:      Mr. Acevedo is a 84 y.o. gentleman with past medical history notable for chronic kidney disease, essential hypertension, mixed hyperlipidemia, chronic systolic heart failure with recovered left ventricular function as well as coronary artery disease who present presents to our office for scheduled follow-up.  Overall patient is doing great from a cardiac standpoint.  He is getting a little bit stronger after his cancer diagnosis he is gaining some weight which is a good thing.      Review of Systems:  Review of Systems   Constitutional: Negative.   HENT: Negative.     Eyes: Negative.    Cardiovascular: Negative.    Respiratory:  Positive for shortness of breath.    Endocrine: Negative.    Hematologic/Lymphatic: Negative.    Skin: Negative.    Musculoskeletal: Negative.    Gastrointestinal: Negative.    Genitourinary: Negative.    Neurological: Negative.    Psychiatric/Behavioral: Negative.     Allergic/Immunologic: Negative.        Current Outpatient Medications on File Prior to Visit   Medication Sig Dispense Refill    albuterol (PROVENTIL) (2.5 MG/3ML) 0.083% nebulizer solution USE ONE VIAL BY NEBULIZATION EVERY FOUR HOURS AS NEEDED FOR FOR WHEEZING (Patient taking differently: Take 2.5 mg by nebulization Every 4 (Four) Hours As Needed.) 180 mL 0    amLODIPine (NORVASC) 5 MG tablet Take 1 tablet by mouth Daily. 90 tablet 3    aspirin 81 MG EC tablet Take 1 tablet by mouth Daily. PT HOLDING 5 DAYS PRIOR TO SURGERY      carvedilol (COREG) 25 MG tablet Take 1 tablet by mouth 2 (Two) Times a Day. 180 tablet 3    hydrALAZINE (APRESOLINE) 25 MG tablet Take 1 tablet by mouth 2 (Two) Times a Day. 180 tablet 3    lisinopril (PRINIVIL,ZESTRIL) 20 MG tablet  Take 1 tablet by mouth Daily.      nitroglycerin (NITROSTAT) 0.4 MG SL tablet Place 1 tablet under the tongue every 5 (five) minutes as needed for chest pain. Take no more than 3 doses in 15 minutes. 25 tablet 1    rosuvastatin (CRESTOR) 20 MG tablet Take 1 tablet by mouth Daily. 90 tablet 3    sennosides-docusate (senna-docusate sodium) 8.6-50 MG per tablet Take 1 tablet by mouth 2 (Two) Times a Day As Needed for Constipation. 30 tablet 0    torsemide (DEMADEX) 20 MG tablet Take 2 tablets by mouth Daily. (Patient taking differently: Take 1 tablet by mouth Daily.) 180 tablet 3    Umeclidinium-Vilanterol (ANORO ELLIPTA IN) Inhale.      vitamin B-12 (CYANOCOBALAMIN) 1000 MCG tablet Take 1 tablet by mouth Daily.      [DISCONTINUED] coenzyme Q10 100 MG capsule Take 1 capsule by mouth Daily.      [DISCONTINUED] gabapentin (NEURONTIN) 100 MG capsule Take 3 capsules by mouth 3 (Three) Times a Day. 90 capsule 2    [DISCONTINUED] HYDROcodone-acetaminophen (NORCO) 5-325 MG per tablet Take 1 tablet by mouth Every 6 (Six) Hours As Needed.      [DISCONTINUED] methylPREDNISolone (MEDROL) 4 MG dose pack Take as directed on package instructions. 21 tablet 0    [DISCONTINUED] Trelegy Ellipta 200-62.5-25 MCG/ACT aerosol powder  Inhale 1 puff Daily.       No current facility-administered medications on file prior to visit.       Allergies   Allergen Reactions    Lisinopril Anaphylaxis    Codeine Sulfate Hives    Contrast Dye (Echo Or Unknown Ct/Mr) Other (See Comments)     HYPERTENSION    Iodinated Contrast Media Other (See Comments)     HYPERTENSION    Losartan Swelling     LIP    Penicillins Other (See Comments)     Passed out after a PCN shot- no other sx noted-per pateint  Beta lactam allergy details  Antibiotic reaction: unknown  Age at reaction: adult  Dose to reaction time: unknown  Reason for antibiotic: other  Epinephrine required for reaction?: no  Tolerated antibiotics: unknown          Past Medical History:   Diagnosis  Date    AAA (abdominal aortic aneurysm) 03/31/2021    without rupture    Acid reflux     Atherosclerosis of native arteries of extremities with intermittent claudication, bilateral legs 11/13/2019    CAD in native artery     STENT    Carotid stenosis 05/11/2016    gonzalez. s/p CEA Left    Cataract     CKD (chronic kidney disease)     Claudication     Colon polyp     COPD (chronic obstructive pulmonary disease)     H/O Acute myocardial infarction 2013    H/O PAD (peripheral artery disease)     Heart attack 2013    Heart failure     History of atrial fibrillation     AFTER LUNG SURGERY    Hypercholesterolemia     Hyperlipidemia     Hypertension     Inguinal hernia     RIGHT    Left ventricular dysfunction     Mass of left lung     SHOWS ON MRI    Mitral valve regurgitation     Non-small cell lung cancer (NSCLC) 2023    LEFT UPPER LOBE, HAD SURGERY AND RADIATION    Perennial allergic rhinitis 01/06/2017    PVD (peripheral vascular disease)     S/P angioplasty with stent     Tinnitus     Tricuspid regurgitation        Past Surgical History:   Procedure Laterality Date    APPENDECTOMY      ARTERIOGRAM AORTIC N/A 05/17/2021    Procedure: ZENITH AORTIC STENT GRAFT;  Surgeon: Karen Barrera Jr., MD;  Location: [facility] OR 18/19;  Service: Vascular;  Laterality: N/A;    CARDIAC CATHETERIZATION      X2    CARDIAC CATHETERIZATION N/A 04/11/2023    Procedure: Left Heart Cath;  Surgeon: Guru Jiang MD;  Location: [facility] INVASIVE LOCATION;  Service: Cardiovascular;  Laterality: N/A;    CARDIAC CATHETERIZATION N/A 04/11/2023    Procedure: Right Heart Cath;  Surgeon: Guru Jiang MD;  Location: [facility] CATH INVASIVE LOCATION;  Service: Cardiovascular;  Laterality: N/A;    CARDIAC CATHETERIZATION N/A 04/11/2023    Procedure: Coronary angiography;  Surgeon: Guru Jiang MD;  Location: [facility] INVASIVE LOCATION;  Service: Cardiovascular;  Laterality: N/A;    CARDIAC CATHETERIZATION N/A 04/11/2023     Procedure: Left ventriculography;  Surgeon: Guru Jiang MD;  Location: [facility] CATH INVASIVE LOCATION;  Service: Cardiovascular;  Laterality: N/A;    CARDIAC CATHETERIZATION  2023    Procedure: RESTING FULL CYCLE RATIO;  Surgeon: Guru Jiang MD;  Location:  [facility] CATH INVASIVE LOCATION;  Service: Cardiovascular;;    CARDIAC CATHETERIZATION      CARDIAC CATHETERIZATION      CAROTID ENDARTERECTOMY Left 2013    Bruce Jones Jr, MD    COLONOSCOPY N/A 10/17/2011    Diverticulosis sigmoid colon, two 4-5 mm polyps in the transverse colon and in the ascending colon, internal hemorrhoids, otherwise normal-Dr. Bronson Blanc    COLONOSCOPY  2012    CORONARY STENT PLACEMENT      HYDROCELE EXCISION / REPAIR Right 2014    Dr. STANLEY Osorio    INGUINAL HERNIA REPAIR Right 2024    Procedure: right INGUINAL HERNIA REPAIR LAPAROSCOPIC WITH DAVINCI ROBOT;  Surgeon: Tejinder Hummel MD;  Location: [facility] MAIN OR;  Service: Robotics - DaVinci;  Laterality: Right;    LOBECTOMY Left 2023    Procedure: BRONCHOSCOPY, LEFT VAT WITH DAVINCI ROBOT, EXTENSIVE LYSIS OF ADHESIONS, PARTIAL PULMONARY DECORTICATION, INTERCOSTAL NERVE BLOCK;  Surgeon: Nemesio Kramer MD PhD;  Location: [facility] MAIN OR;  Service: Robotics - DaVinci;  Laterality: Left;    MEDIASTINOSCOPY N/A 2023    Procedure: MEDIASTINOSCOPY;  Surgeon: Nemesio Kramer MD PhD;  Location: [facility] MAIN OR;  Service: Thoracic;  Laterality: N/A;    UMBILICAL HERNIA REPAIR N/A 2007    Umbilical hernia repair with Ventralax mesh-Dr. Prabhu Freedman       Social History     Socioeconomic History    Marital status:      Spouse name: Amina   Tobacco Use    Smoking status: Former     Current packs/day: 0.00     Average packs/day: 1 pack/day for 55.0 years (55.0 ttl pk-yrs)     Types: Cigarettes     Start date:      Quit date: 2000     Years since quittin.6    Smokeless tobacco: Never   Vaping Use    Vaping  "status: Never Used   Substance and Sexual Activity    Alcohol use: No    Drug use: No    Sexual activity: Defer       Family History   Problem Relation Age of Onset    No Known Problems Mother         complications of child birth    Cancer Father     Leukemia Father     Cancer Brother     Heart disease Maternal Uncle     Heart disease Other     Cancer Other     Other Other         blood clotts    Bleeding Disorder Other     Malig Hyperthermia Neg Hx        The following portions of the patient's history were reviewed and updated as appropriate: allergies, current medications, past family history, past medical history, past social history, past surgical history and problem list.       Objective:       Vitals:    08/22/24 1035   BP: 140/60   BP Location: Left arm   Patient Position: Sitting   Pulse: 55   Weight: 68.3 kg (150 lb 9.6 oz)   Height: 170 cm (66.93\")         Physical Exam:  Constitutional: Well appearing, Frail, No acute distress   HENT: Oropharynx clear and membrane moist  Eyes: Normal conjunctiva, no sclera icterus.  Neck: Supple, no carotid bruit bilaterally.  Cardiovascular: Regular rate and rhythm, Early peaking systolic murmur over the right upper sternal border, No bilateral lower extremity edema.  Pulmonary: Normal respiratory effort, normal lung sounds, no wheezing.  Neurological: Alert and orient x 3.   Skin: Warm, dry, no ecchymosis, no rash.  Psych: Appropriate mood and affect. Normal judgment and insight.          Lab Results   Component Value Date     07/17/2024     05/31/2024    K 4.3 07/17/2024    K 4.8 05/31/2024     07/17/2024     05/31/2024    CO2 26.7 07/17/2024    CO2 27.7 05/31/2024    BUN 27 (H) 07/17/2024    BUN 32 (H) 05/31/2024    CREATININE 1.85 (C) 07/17/2024    CREATININE 1.78 (H) 05/31/2024    EGFRIFNONA 30 (L) 11/19/2021    EGFRIFNONA 75 05/18/2021    EGFRIFAFRI 34 (L) 11/19/2021    EGFRIFAFRI 66 03/11/2019    GLUCOSE 92 07/17/2024    GLUCOSE 96 " 05/31/2024    CALCIUM 9.2 07/17/2024    CALCIUM 9.1 05/31/2024    PROTENTOTREF 6.2 11/07/2023    PROTENTOTREF 6.4 03/11/2019    ALBUMIN 4.1 07/17/2024    ALBUMIN 3.8 05/31/2024    BILITOT 0.4 07/17/2024    BILITOT 0.4 05/31/2024    AST 16 07/17/2024    AST 16 05/31/2024    ALT 7 07/17/2024    ALT 11 05/31/2024     Lab Results   Component Value Date    WBC 5.76 07/17/2024    WBC 6.94 05/31/2024    HGB 11.5 (L) 07/17/2024    HGB 11.1 (L) 05/31/2024    HCT 38.6 07/17/2024    HCT 36.3 (L) 05/31/2024    MCV 87.5 07/17/2024    MCV 85.2 05/31/2024     07/17/2024     05/31/2024     Lab Results   Component Value Date    CHOL 123 04/11/2023    TRIG 98 04/11/2023    TRIG 86 09/09/2021    HDL 34 (L) 04/11/2023    HDL 44 09/09/2021    LDL 70 04/11/2023    LDL 70 09/09/2021     Lab Results   Component Value Date    PROBNP 5,806.0 (H) 04/27/2023    PROBNP 5,992.0 (H) 04/19/2023    .6 (H) 03/31/2023     Lab Results   Component Value Date    TROPONINT 41 (H) 04/07/2023     Lab Results   Component Value Date    TSH 5.290 (H) 08/14/2023           ECG 12 Lead    Date/Time: 8/22/2024 11:01 AM  Performed by: Guru Jiang MD    Authorized by: Guru Jiang MD  Comparison: compared with previous ECG from 2/27/2024  Similar to previous ECG  Rhythm: sinus rhythm  Conduction: left anterior fascicular block          Echocardiogram 7/19/2023:  Left ventricular systolic function is normal. Left ventricular ejection fraction appears to be 56 - 60%.  The left ventricular cavity is mildly dilated.  The left atrial cavity is mildly dilated  Estimated right ventricular systolic pressure from tricuspid regurgitation is normal (<35 mmHg). Calculated right ventricular systolic pressure from tricuspid regurgitation is 29 mmHg.    Cardiac catheterization 4/11/2023:  Moderate coronary artery disease with 40% proximal left main stenoses, patent stent within the proximal LAD, and 40% proximal RCA segment stenoses.  Normal  right and left-sided filling pressures with normal pulmonary artery pressures.  Normal cardiac output of 4.4 L/min and index of 2.3 L/min/m2  RFR assessment of the left main confirm no significant hemodynamic stenoses at 0.91 thus PCI was deferred.    Echocardiogram 4/7/2023 [facility]  Overall left ventricular systolic function is moderately depressed. The estimated ejection fraction is 35-40%. There is diffuse global hypokinesis of the left ventricle. Moderate concentric left ventricular hypertrophy.   Mildly dilated left atrium.   Trace aortic regurgitation is noted.   Moderate to severe mitral regurgitation is present.   Right ventricular systolic pressure of 49 mmHg.   Mild to moderate tricuspid regurgitation.   There is a small (trivial) pericardial effusion. There is no 2D and/or doppler evidence of tamponade physiology.   A pleural effusion is visualized.     Holter monitor 4/14/2022:  This is a 48-hour recording done due to ventricular ectopy   The intrinsic rhythm is sinus rhythm with an average heart rate of 65 bpm with a range from 44 to 120 bpm   There are frequent PVCs totaling over 2200 representing 1.2% of all beats.  There was 134 beat run of a wide-complex tachycardia at 170 bpm consistent with ventricular tachycardia  There are frequent PACs totaling over 2900 representing 1.6% of all beats.  The computer tallied 7 runs of SVT.  The longest was 20 beats and fastest 156 bpm.  Most of these appear to be a slow atrial tachycardia of less than 120 bpm   There were no prolonged pauses   There were no symptoms     Echocardiogram 12/8/2014 [facility]:  Ejection fraction estimated at 53% with basal inferior, mid inferior, and apical inferior wall hypokinesis  Mild aortic stenosis with a mean gradient of 12 mmHg across the aortic valve    Stress MPI 4/17/2014 [facility]:  Inferior lateral wall hypokinesis  Baseline ejection fraction 60% but decreases to 40% with stress with a 3 times daily  of 1.3         Assessment:          Diagnosis Plan   1. Coronary artery disease involving native coronary artery of native heart without angina pectoris  ECG 12 Lead      2. Paroxysmal atrial fibrillation        3. Nonrheumatic mitral valve regurgitation        4. NICM (nonischemic cardiomyopathy)        5. Chronic systolic CHF (congestive heart failure)        6. Hyperlipidemia, unspecified hyperlipidemia type        7. Primary hypertension                   Plan:       Mr. Acevedo is a 84 y.o. gentleman with past medical history notable for chronic kidney disease, essential hypertension, mixed hyperlipidemia, chronic systolic heart failure with recovered left ventricular function as well as coronary artery disease who present presents to our office for scheduled follow-up.  Overall he is doing great no changes are needed we will see back in 6 months.    Paroxysmal atrial fibrillation:  Provoked after thoracic surgery  No recurrent episodes  Was never started on anticoagulation would monitor for now risk and benefits probably favor monitoring rather than initiation of empiric anticoagulation given age and frailty    Coronary artery disease without angina:  We will continue with aspirin  Continue carvedilol  Continue statin    Nonischemic cardiomyopathy/chronic systolic congestive heart failure:  Ejection fraction normalized 7/2023  Does have borderline left main disease but has also had issues with cardiomyopathies in the past  Continue to optimize blood pressure control as heart function has recovered with medical therapy  Continue carvedilol  Losartan stopped due to worsening renal function now currently on lisinopril  Renal insufficiency limits further titration of ARB or considering spironolactone    Nonrheumatic mitral valve regurgitation:  Improved on repeat echocardiogram 7/2023    Essential hypertension:  Blood pressure mildly elevated but given advanced age and frailty would hold off on further titration of  medications    Mixed hyperlipidemia:  Continue statin  Lipid panel 4/2023 with good control total cholesterol LDL  CMP 7/2024 with normal ALT and AST      Follow-up:  6 Months      Guru Jiang MD  [facility]  08/22/24  11:06 EDT

## 2024-09-06 ENCOUNTER — HOSPITAL ENCOUNTER (OUTPATIENT)
Dept: CT IMAGING | Facility: HOSPITAL | Age: 84
Discharge: HOME | End: 2024-09-06
Payer: MEDICARE

## 2024-09-06 DIAGNOSIS — R91.8 MASS OF UPPER LOBE OF LEFT LUNG: ICD-10-CM

## 2024-09-06 PROCEDURE — 71250 CT THORAX DX C-: CPT

## 2024-09-11 ENCOUNTER — PATIENT OUTREACH (OUTPATIENT)
Dept: OTHER | Facility: HOSPITAL | Age: 84
End: 2024-09-11
Payer: MEDICARE

## 2024-09-13 ENCOUNTER — TELEPHONE (OUTPATIENT)
Dept: RADIATION ONCOLOGY | Facility: HOSPITAL | Age: 84
End: 2024-09-13
Payer: MEDICARE

## 2024-09-13 RX ORDER — NITROGLYCERIN 0.4 MG/1
0.4 TABLET SUBLINGUAL
Qty: 25 TABLET | Refills: 3 | Status: SHIPPED | OUTPATIENT
Start: 2024-09-13

## 2024-09-25 ENCOUNTER — LAB (OUTPATIENT)
Dept: LAB | Facility: HOSPITAL | Age: 84
End: 2024-09-25
Payer: MEDICARE

## 2024-09-25 ENCOUNTER — TELEPHONE (OUTPATIENT)
Dept: RADIATION ONCOLOGY | Facility: HOSPITAL | Age: 84
End: 2024-09-25
Payer: MEDICARE

## 2024-09-25 ENCOUNTER — OFFICE VISIT (OUTPATIENT)
Dept: ONCOLOGY | Facility: CLINIC | Age: 84
End: 2024-09-25
Payer: MEDICARE

## 2024-09-25 VITALS
RESPIRATION RATE: 12 BRPM | OXYGEN SATURATION: 97 % | SYSTOLIC BLOOD PRESSURE: 167 MMHG | DIASTOLIC BLOOD PRESSURE: 76 MMHG | TEMPERATURE: 97 F | HEART RATE: 56 BPM | WEIGHT: 151 LBS | BODY MASS INDEX: 23.7 KG/M2

## 2024-09-25 DIAGNOSIS — C34.92 ADENOCARCINOMA, LUNG, LEFT: Primary | ICD-10-CM

## 2024-09-25 DIAGNOSIS — D64.9 NORMOCYTIC ANEMIA: ICD-10-CM

## 2024-09-25 DIAGNOSIS — C34.92 ADENOCARCINOMA, LUNG, LEFT: ICD-10-CM

## 2024-09-25 LAB
ALBUMIN SERPL-MCNC: 3.9 G/DL (ref 3.5–5.2)
ALBUMIN/GLOB SERPL: 1.4 G/DL
ALP SERPL-CCNC: 90 U/L (ref 39–117)
ALT SERPL W P-5'-P-CCNC: 6 U/L (ref 1–41)
ANION GAP SERPL CALCULATED.3IONS-SCNC: 10.5 MMOL/L (ref 5–15)
AST SERPL-CCNC: 15 U/L (ref 1–40)
BASOPHILS # BLD AUTO: 0.04 10*3/MM3 (ref 0–0.2)
BASOPHILS NFR BLD AUTO: 0.7 % (ref 0–1.5)
BILIRUB SERPL-MCNC: 0.4 MG/DL (ref 0–1.2)
BUN SERPL-MCNC: 27 MG/DL (ref 8–23)
BUN/CREAT SERPL: 13.6 (ref 7–25)
CALCIUM SPEC-SCNC: 9.1 MG/DL (ref 8.6–10.5)
CHLORIDE SERPL-SCNC: 103 MMOL/L (ref 98–107)
CO2 SERPL-SCNC: 27.5 MMOL/L (ref 22–29)
CREAT SERPL-MCNC: 1.98 MG/DL (ref 0.76–1.27)
DEPRECATED RDW RBC AUTO: 47.8 FL (ref 37–54)
EGFRCR SERPLBLD CKD-EPI 2021: 32.3 ML/MIN/1.73
EOSINOPHIL # BLD AUTO: 0.25 10*3/MM3 (ref 0–0.4)
EOSINOPHIL NFR BLD AUTO: 4.2 % (ref 0.3–6.2)
ERYTHROCYTE [DISTWIDTH] IN BLOOD BY AUTOMATED COUNT: 15.2 % (ref 12.3–15.4)
FERRITIN SERPL-MCNC: 298 NG/ML (ref 30–400)
GLOBULIN UR ELPH-MCNC: 2.7 GM/DL
GLUCOSE SERPL-MCNC: 100 MG/DL (ref 65–99)
HCT VFR BLD AUTO: 37.4 % (ref 37.5–51)
HGB BLD-MCNC: 11.4 G/DL (ref 13–17.7)
IMM GRANULOCYTES # BLD AUTO: 0.03 10*3/MM3 (ref 0–0.05)
IMM GRANULOCYTES NFR BLD AUTO: 0.5 % (ref 0–0.5)
IRON 24H UR-MRATE: 26 MCG/DL (ref 59–158)
IRON SATN MFR SERPL: 8 % (ref 20–50)
LYMPHOCYTES # BLD AUTO: 0.79 10*3/MM3 (ref 0.7–3.1)
LYMPHOCYTES NFR BLD AUTO: 13.3 % (ref 19.6–45.3)
MCH RBC QN AUTO: 26.3 PG (ref 26.6–33)
MCHC RBC AUTO-ENTMCNC: 30.5 G/DL (ref 31.5–35.7)
MCV RBC AUTO: 86.2 FL (ref 79–97)
MONOCYTES # BLD AUTO: 0.55 10*3/MM3 (ref 0.1–0.9)
MONOCYTES NFR BLD AUTO: 9.2 % (ref 5–12)
NEUTROPHILS NFR BLD AUTO: 4.3 10*3/MM3 (ref 1.7–7)
NEUTROPHILS NFR BLD AUTO: 72.1 % (ref 42.7–76)
NRBC BLD AUTO-RTO: 0 /100 WBC (ref 0–0.2)
PLATELET # BLD AUTO: 141 10*3/MM3 (ref 140–450)
PMV BLD AUTO: 9.2 FL (ref 6–12)
POTASSIUM SERPL-SCNC: 4.5 MMOL/L (ref 3.5–5.2)
PROT SERPL-MCNC: 6.6 G/DL (ref 6–8.5)
RBC # BLD AUTO: 4.34 10*6/MM3 (ref 4.14–5.8)
SODIUM SERPL-SCNC: 141 MMOL/L (ref 136–145)
TIBC SERPL-MCNC: 320 MCG/DL (ref 298–536)
TRANSFERRIN SERPL-MCNC: 215 MG/DL (ref 200–360)
WBC NRBC COR # BLD AUTO: 5.96 10*3/MM3 (ref 3.4–10.8)

## 2024-09-25 PROCEDURE — 84466 ASSAY OF TRANSFERRIN: CPT | Performed by: INTERNAL MEDICINE

## 2024-09-25 PROCEDURE — 80053 COMPREHEN METABOLIC PANEL: CPT

## 2024-09-25 PROCEDURE — 36415 COLL VENOUS BLD VENIPUNCTURE: CPT

## 2024-09-25 PROCEDURE — 83540 ASSAY OF IRON: CPT | Performed by: INTERNAL MEDICINE

## 2024-09-25 PROCEDURE — 85025 COMPLETE CBC W/AUTO DIFF WBC: CPT

## 2024-09-25 PROCEDURE — 82728 ASSAY OF FERRITIN: CPT | Performed by: INTERNAL MEDICINE

## 2024-09-26 ENCOUNTER — OFFICE VISIT (OUTPATIENT)
Dept: RADIATION ONCOLOGY | Facility: HOSPITAL | Age: 84
End: 2024-09-26
Payer: MEDICARE

## 2024-09-26 VITALS
OXYGEN SATURATION: 96 % | SYSTOLIC BLOOD PRESSURE: 148 MMHG | WEIGHT: 152.2 LBS | BODY MASS INDEX: 23.89 KG/M2 | DIASTOLIC BLOOD PRESSURE: 71 MMHG | HEART RATE: 54 BPM

## 2024-09-26 DIAGNOSIS — C34.92 ADENOCARCINOMA, LUNG, LEFT: Primary | ICD-10-CM

## 2024-09-26 PROCEDURE — G0463 HOSPITAL OUTPT CLINIC VISIT: HCPCS | Performed by: STUDENT IN AN ORGANIZED HEALTH CARE EDUCATION/TRAINING PROGRAM

## 2024-10-25 ENCOUNTER — PATIENT OUTREACH (OUTPATIENT)
Dept: OTHER | Facility: HOSPITAL | Age: 84
End: 2024-10-25
Payer: MEDICARE

## 2024-10-25 NOTE — PROGRESS NOTES
Reviewed chart.  CT scheduled 11/29, sees Dr. Santos and Dr. Painter 12/6    Called patient. He is doing well. His pain subsided about 1 month ago, he is eating well and has gained a few pounds.  He saw his nephrologist recently and doesn't see him again for 4 months.  We discussed his upcoming appts.     The patient denies any questions/concerns or ongoing resource needs.  Since he is stable with no active cancer treatment and no ongoing resource needs, I will close his case to navigation although reminded him that he can access the services in the [facility] even with his navigation case being closed. Encouraged patient to call in the future if the need arises. Patient verbalized understanding.

## 2024-11-29 ENCOUNTER — HOSPITAL ENCOUNTER (OUTPATIENT)
Dept: CT IMAGING | Facility: HOSPITAL | Age: 84
Discharge: HOME | End: 2024-11-29
Payer: MEDICARE

## 2024-11-29 DIAGNOSIS — C34.92 ADENOCARCINOMA, LUNG, LEFT: ICD-10-CM

## 2024-11-29 PROCEDURE — 71250 CT THORAX DX C-: CPT

## 2024-12-05 ENCOUNTER — TELEPHONE (OUTPATIENT)
Dept: RADIATION ONCOLOGY | Facility: HOSPITAL | Age: 84
End: 2024-12-05
Payer: MEDICARE

## 2024-12-05 NOTE — PROGRESS NOTES
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  Subsequent clinical stageIAI ([id number]) right upper lobe non-small cell lung cancer (biopsy deferred)    Subjective        History of Present Illness    Patient returns today in follow-up with laboratory studies and CT chest to review.  The patient today reports that he is fairly stable from his last visit here.  He does continue with chronic fatigue that is unchanged.  He remains reasonably active for his age however.  He does note some chronic dyspnea on exertion that is again unchanged.  He reports a stable appetite, weight is stable at 151 pounds.      Objective   Vital Signs:   /80   Pulse 57   Temp 97.5 °F (36.4 °C) (Oral)   Resp 16   Ht 170 cm (66.93\")   Wt 68.8 kg (151 lb 11.2 oz)   SpO2 97%   BMI 23.81 kg/m²     Physical Exam  Constitutional:       Appearance: He is well-developed.      Comments: Patient in no distress   Eyes:      Conjunctiva/sclera: Conjunctivae normal.   Neck:      Thyroid: No thyromegaly.   Cardiovascular:      Rate and Rhythm: Normal rate and regular rhythm.      Heart sounds: No murmur heard.     No friction rub. No gallop.   Pulmonary:      Effort: No respiratory distress.      Breath sounds: Normal breath sounds.   Abdominal:      General: Bowel sounds are normal. There is no distension.      Palpations: Abdomen is soft.      Tenderness: There is no abdominal tenderness.   Musculoskeletal:         General: No swelling.   Lymphadenopathy:      Head:      Right side of head: No submandibular adenopathy.      Cervical: No cervical adenopathy.      Upper Body:      Right upper body: No supraclavicular adenopathy.      Left upper body: No supraclavicular adenopathy.   Skin:     General: Skin is warm and dry.      Findings: No rash.   Neurological:      Mental Status: He is alert and oriented to person, place, and time.      Cranial Nerves: No cranial nerve deficit. "      Motor: No abnormal muscle tone.      Deep Tendon Reflexes: Reflexes normal.   Psychiatric:         Behavior: Behavior normal.       Patient was examined today, unchanged from above      Result Review : Reviewed CBC, CMP, iron panel, ferritin from today.  Reviewed CT chest 11/29/2024.       Assessment and Plan     *Clinical stage IIIA ([id number]) non-small cell lung cancer (adenocarcinoma) of left upper lobe.  Subsequent clinical stageIAI ([id number]) right upper lobe non-small cell lung cancer (biopsy deferred)  The patient has a history of smoking 1 pack/day x 55 years quit in 2000.  CT chest 5/26/2023 showed a spiculated mass in the anterior left upper lobe 3.7 x 4.3 x 4.2 cm along the anterior pleural surface and lateral aspect of the anterior mediastinum.  Additional 3 mm nodule right major fissure.  Bilateral small to moderate-sized pleural effusions.  Bullae formation consistent with COPD.  Mediastinal lymphadenopathy with largest node infracarinal region 2.6 x 1.2 cm.  PET/CT on 6/12/2023 showed 5.2 x 3.1 cm irregular pleural-based mass anterior left upper lobe abutting the pleura anteriorly and medially, hypermetabolic with SUV 14.4.  Mediastinal lymph node activity in the subcarinal region with a 2.2 x 1.1 cm lymph node with SUV 4.5.  Remainder of mediastinal lymph nodes less intensely hypermetabolic with maximal SUV 3.3.  Moderate size loculated pleural effusions bilaterally with low-level activity along the pleura (SUV 2.5), no change in volume of pleural effusions since prior chest CT.  New small to moderate loculated right hydropneumothorax with air-fluid level 5.2 cm.   CT-guided left upper lobe lung biopsy 6/13/2023 with pathology that showed invasive poorly differentiated adenocarcinoma, PD-L1 less than 1%.  Caris analysis: TMB high (14 muts/Mb), mutations in KEAP1 and TP53, EGFR VUS, no targetable mutations.  Patient was hospitalized 6/13 - 6/15/2023 due to right hydropneumothorax, had CT-guided  chest tube placement performed 6/13/2023.  Right pleural fluid cytology negative on 6/13/2023.  Staging MRI brain was negative for evidence of metastatic disease on 7/2/2023  PFTs on 6/20/2023 with FEV1 0.99 (44% predicted), DLCO corrected 14.09 (56% predicted).  Mediastinoscopy 7/17/2023 with 4R and station 7 lymph nodes negative for malignancy  Left VATS performed 7/31/2023.  Intraoperatively, primary tumor was unresectable, invading into the mediastinum and pericardial fat pad as well as with phrenic nerve involvement (T4 lesion).  Sampling of left pleura with fibrosis, chronic inflammation, no evidence of malignancy.  Station 5 and 10 R lymph nodes sampled, negative for malignancy.  New baseline CT chest abdomen pelvis prior to initiation of radiation therapy performed on 9/20/2023.  Persistent loculated left pneumothorax, trace left pleural effusion, primary tumor in partially collapsed left upper lobe appears relatively stable.  Small right pleural effusion no change in borderline enlarged mediastinal lymph nodes.  Slight increase in right upper lobe nodule (for up to 7 mm), indeterminate.  Patient therefore with unresectable stage IIIA ([id number]) disease.  Plans to treat with radiation therapy.  Due to age, performance status, comorbidities, patient felt to be a poor candidate for concurrent chemotherapy.  Patient received radiation therapy 10/9 - 10/27/2023 to left upper lobe, 6000 cGy.  CT chest abdomen pelvis 1/23/2024 showed decrease in left pneumothorax/hydropneumothorax, left upper lobe mass difficult to accurately assess due to change in configuration (shift in lung parenchyma due to pneumothorax improvement) but appears relatively stable, 2.9 x 4.3 versus current 3.3 x 3.5 cm.  No lymphadenopathy noted, no evidence of distant metastatic disease.  PET scan 3/20/2024 with subcentimeter left supraclavicular lymph node with new uptake (SUV 2.4).  Decrease in treated left upper lobe mass from 4.4 x 3.8 down  to 3.4 x 2 (decrease in SUV from 14.4 down to 3.2).  Increase in 2 mildly hypermetabolic left upper lobe ill-defined opacities with linear consolidation felt to represent postradiation change (SUV 3.4).  Resolution of hypermetabolic activity and subcarinal lymph node as well as subcentimeter mediastinal and left hilar lymph nodes.  Increase and 2 adjacent right upper lobe solid nodules surrounding bullae measuring 1 and 0.8 cm with SUV 5.1 and 2.5 respectively.  Decrease in small loculated pleural effusions with SUV 3 on the left and 2.4 on the right.  Tubular hypermetabolic activity throughout the esophagus.  Persistent focal transverse colon hypermetabolism with SUV 6.9 and additional foci of the ascending colon hypermetabolism SUV 5.6.  Patient discussed at thoracic oncology conference 3/28/2024.  Consensus regarding not pursuing biopsy due to risk of pneumothorax and small size of the lesions.  Given the growth rate and activity, lesions were felt to represent additional primary sites of disease.  Recommendation to treat with SBRT.  Patient received SBRT to both right upper lobe lesions x 5 fractions each from 4/11 - 4/17/2024  CT chest 5/22/2024 showed irregular consolidation left upper lobe unchanged 3.5 x 3.2 cm, new small groundglass density left upper lobe.  Stable consolidation left base.  Nodularity posterior right upper lobe largest nodule 8 mm unchanged.  New nodule fissure right upper lobe and new right upper lobe micronodule.  PET scan 7/10/2024 with stable radiated left upper lobe mass with uptake near blood pool.  Left apical 3.3 x 1.3 cm bandlike consolidation with SUV 10.1 (previously few linear foci of consolidation and SUV 3.4).  Left upper lobe 1.6 cm focal consolidation with SUV 8.2 (previously few linear foci of consolidation SUV 4.3).  Right upper lobe nodules x 2 around bullae decreased in size (1 cm down to 0.6 cm and 0.8 down to 0.5 cm), no activity.  New surrounding consolidation and  groundglass opacity.  Persistent focal transverse colon hypermetabolism (SUV decreased from 6.9 down to 4.6) with generalized tubular hypermetabolism in the distal colon.  Discussed with Dr. Painter in radiation oncology and agreed that the degree of uptake and enlargement of left upper lobe focal consolidation was concerning for possible recurrent malignancy in this area.    Patient was referred back to thoracic surgery Dr. Chávez.  Discussed potential Ion bronchoscopy however patient was reluctant to proceed and elected to continue on a course of observation.  CT chest on 9/6/2024 showed decrease in residual left upper lobe mass (3.5 x 3.1 down to 3.2 x 3.0 cm), increased patchy consolidation in the left upper lobe around mass possibly reflective of postradiation change.  Stable nodules right upper lobe with increased groundglass opacity in this with radiation change  Chest 11/29/2024 with decrease in treated mass left upper lobe from 3.2 x 3.0 down to 2.7 x 2.6 cm.  Increasing consolidation and surrounding left upper lobe lung favored to represent postradiation change.  Stable pleural thickening on the left.  Stable rounded density left lower lobe favored to represent rounded atelectasis.  Stable nodular density right upper lobe.  Patient returns today in follow-up with CT chest to review from 11/29/2024.  The patient clinically is stable, reports stable mild chronic fatigue and dyspnea on exertion.  He has stable weight at 151 pounds.  He does remain reasonably active for his age, ECOG performance status of 1.  We reviewed his CT scan as noted above and I discussed scan findings with Dr. Painter in radiation oncology.  The previously treated mass in the left upper lobe continues to decrease in size.  There is fairly extensive change however in the left upper lobe, some of which is nodular in appearance.  The nodular change in the left upper lobe did have activity on prior PET scan and there is still suspicion for  potential recurrent malignancy in this area.  The previously treated right upper lobe disease is unchanged.  I discussed with the patient and his wife today pursuing repeat PET scan and again referral back to thoracic surgery to discuss possible Ion bronchoscopy.  Patient is amenable to consider bronchoscopy at this point.  Dr. Painter feels that if recurrent disease identified in left upper lobe there would be significant overlap with previous radiation field and he did not recommend pursuit of further radiation therapy.  If malignancy identified we will need to consider pursuit of palliative systemic therapy.  Patient is aware of this issue.  I will see him back in approximately 1 month with PET scan and potentially Ion bronchoscopy pathology results to review.    *Chronic bilateral pleural effusions with right hydropneumothorax and subsequent postoperative left hydropneumothorax  Patient appears to have longstanding evidence of small bilateral pleural effusions likely related to CHF  PET/CT 6/12/2023 identified right small to moderate hydropneumothorax new since 5/26/2023 CT chest  Patient was hospitalized 6/13 - 6/15/2023 due to right hydropneumothorax, had CT-guided chest tube placement performed 6/13/2023.  Right pleural fluid cytology negative on 6/13/2023.  As above, patient underwent left VATS with attempted resection of primary lung cancer on 7/31/2023.    Patient required persistent left chest tube postoperatively, eventually removed on 9/6/2023  CT chest 9/20/2023 with persistent loculated left pneumothorax.  CT chest 1/23/2024 with significant improvement in left pneumothorax/hydropneumothorax  PET scan 3/20/2024 with decrease in small loculated pleural effusions with SUV 3 on the left and 2.4 on the right.  Pleural effusions felt to likely benign in nature at this point.  PET scan 7/10/2024 with unchanged small bilateral pleural effusions  CT 9/6/24 with trace pleural fluid on the left, stable minimal  pleural fluid right base  CT chest 11/29/2024 showed stable pleural thickening on the left, no significant residual effusion    *COPD  The patient has a history of smoking 1 pack/day x 55 years quit in 2000.  PFTs on 6/20/2023 with FEV1 0.99 (44% predicted), DLCO corrected 14.09 (56% predicted).  Patient continues with chronic dyspnea on exertion that does limit his level of activity although maintains O2 saturation in the 90% range even with activity.  Patient notes that his chronic dyspnea on exertion is stable.  He is no longer using Trelegy inhaler as he could not get it refilled.  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  Labs on 7/11/2023 with hemoglobin 10.6, MCV 82.8, iron 18, ferritin 504, iron saturation 6%, TIBC 295, B12 201, folate 6.79, CRP 1.07, ESR 27, erythropoietin level 17.8.  Labs consistent with anemia secondary to chronic disease/malignancy as well as anemia secondary to CKD3.  Patient initiated oral B12 1000 mcg daily for B12 deficiency  Hemoglobin on 11/7/2023 was relatively unchanged at 10.3.  Additional evaluation sent with iron 21, ferritin 415, iron saturation 8%, TIBC 266, B12 398, folate 10.2, negative serum protein electrophoresis/immunoelectrophoresis, IgA 209, IgG 829, IgM 13, free kappa light chain 50.4, free lambda light chain 34.2, free light chain ratio 1.47 (normal).  Labs did consistent with anemia secondary to chronic disease as well as anemia secondary to CKD3.  He does not qualify for Procrit with hemoglobin above 10.  Patient continued on oral B12 1000 mcg  daily  Labs on 5/14/2024 with hemoglobin that declined to 10.5.  Additional labs with iron 20, ferritin 394, iron saturation 7%, TIBC 299, reticulated hemoglobin low at 27.3.  Low reticulated hemoglobin potentially indicative of iron deficiency.  Patient returns today with hemoglobin stable at 11.2, has been primarily in the 11-12 range.  His residual anemia is felt to be related to his underlying CKD3a.  We did recheck his iron studies today which are stable with iron 22, ferritin 292, iron saturation 7%, TIBC 313.  With low iron saturation and previously low reticulated hemoglobin, appears that he may have a mild component of iron deficiency.  The patient however has underlying constipation and would be intolerant of oral iron.  With a ferritin that remains between 90 to, would not recommend IV iron in this situation however we will continue to follow his hemoglobin and iron status.    *Transient thrombocytopenia  On 11/7/2024, new onset thrombocytopenia with platelet count of 137,000.  Additional labs with IPF low normal at 3.3%, B12 398, folate 10.2,negative serum protein electrophoresis/immunoelectrophoresis, IgA 209, IgG 829, IgM 13, free kappa light chain 50.4, free lambda light chain 34.2, free light chain ratio 1.47 (normal).  Platelet count today normal at 164,000    *Coronary artery disease, CHF  Chronic bilateral pleural effusions presumably related to CHF  Status post cardiac stent in 2013  Preoperative echocardiogram on 7/19/2023 with ejection fraction 56 to 60%, dilated LA, LV  Patient continues on aspirin 81 mg daily    *Peripheral vascular disease  Patient is followed by Dr. Karen Barrera in vascular surgery  Abdominal aortic aneurysm status post stent graft repair 5/17/2021  Carotid stenosis status post carotid endarterectomy left, 2013.    *Anorexia, weight loss  Patient with anorexia associated with malignancy, mild degree of weight loss  Patient initiated Remeron 7.5 mg nightly on 9/22/2023.   Subsequently discontinued with improvement in weight and appetite  Weight did improve and is subsequently stable at 151 pounds.  He does use 1 to 2 cans of boost per day.    *Constipation  Patient continues with intermittent constipation, uses stool softener as needed.      *Pain control  The patient was experiencing significant pain in his chest wall on the left both anteriorly and posteriorly as well as pain in the left axilla and extending into the left upper arm.  Patient is reluctant to use hydrocodone 5/325 although does have a prescription to use if needed.  Patient was intolerant of gabapentin 100 mg 3 times daily due to anorexia, weight loss, somnolence  The patient's pain did spontaneously resolve.  He did not pursue MRI of the cervical spine and brachial plexus which were ordered by thoracic surgery.      Plan:  Patient with stage IIIA ([id number]) adenocarcinoma of the left upper lobe, unresectable.  Received primary radiation therapy to the left upper lobe completed on 10/27/2023  Patient with 2 additional hypermetabolic right upper lobe nodules felt to be new primary lesions.  High risk for pneumothorax with biopsy.  Both lesions treated with SBRT 4/11 - 4/17/2024.  Patient continuing on oral B12 1000 mcg daily for B12 deficiency  PET scan in 1 week  Referral back to Dr. Chávez in thoracic surgery to again discuss pursuit of Ion bronchoscopy  Return visit in approximately 1 month with CBC, CMP    I did spend 55 minutes total time caring for the patient today, time spent in review of records, face-to-face consultation, coordination of care, placement of orders, completion of documentation.

## 2024-12-06 ENCOUNTER — LAB (OUTPATIENT)
Dept: LAB | Facility: HOSPITAL | Age: 84
End: 2024-12-06
Payer: MEDICARE

## 2024-12-06 ENCOUNTER — OFFICE VISIT (OUTPATIENT)
Dept: ONCOLOGY | Facility: CLINIC | Age: 84
End: 2024-12-06
Payer: MEDICARE

## 2024-12-06 ENCOUNTER — OFFICE VISIT (OUTPATIENT)
Dept: RADIATION ONCOLOGY | Facility: HOSPITAL | Age: 84
End: 2024-12-06
Payer: MEDICARE

## 2024-12-06 VITALS
RESPIRATION RATE: 16 BRPM | HEIGHT: 67 IN | SYSTOLIC BLOOD PRESSURE: 124 MMHG | DIASTOLIC BLOOD PRESSURE: 80 MMHG | HEART RATE: 57 BPM | TEMPERATURE: 97.5 F | BODY MASS INDEX: 23.81 KG/M2 | WEIGHT: 151.7 LBS | OXYGEN SATURATION: 97 %

## 2024-12-06 VITALS
WEIGHT: 152 LBS | SYSTOLIC BLOOD PRESSURE: 139 MMHG | BODY MASS INDEX: 23.86 KG/M2 | HEART RATE: 62 BPM | OXYGEN SATURATION: 97 % | DIASTOLIC BLOOD PRESSURE: 87 MMHG

## 2024-12-06 DIAGNOSIS — C34.92 ADENOCARCINOMA, LUNG, LEFT: ICD-10-CM

## 2024-12-06 DIAGNOSIS — C34.92 ADENOCARCINOMA, LUNG, LEFT: Primary | ICD-10-CM

## 2024-12-06 DIAGNOSIS — C34.82 MALIGNANT NEOPLASM OF OVERLAPPING SITES OF LEFT BRONCHUS AND LUNG: ICD-10-CM

## 2024-12-06 DIAGNOSIS — D64.9 NORMOCYTIC ANEMIA: ICD-10-CM

## 2024-12-06 DIAGNOSIS — C34.11 MALIGNANT NEOPLASM OF UPPER LOBE OF RIGHT LUNG: ICD-10-CM

## 2024-12-06 LAB
ALBUMIN SERPL-MCNC: 3.8 G/DL (ref 3.5–5.2)
ALBUMIN/GLOB SERPL: 1.3 G/DL
ALP SERPL-CCNC: 86 U/L (ref 39–117)
ALT SERPL W P-5'-P-CCNC: 8 U/L (ref 1–41)
ANION GAP SERPL CALCULATED.3IONS-SCNC: 12.8 MMOL/L (ref 5–15)
AST SERPL-CCNC: 15 U/L (ref 1–40)
BASOPHILS # BLD AUTO: 0.03 10*3/MM3 (ref 0–0.2)
BASOPHILS NFR BLD AUTO: 0.4 % (ref 0–1.5)
BILIRUB SERPL-MCNC: 0.4 MG/DL (ref 0–1.2)
BUN SERPL-MCNC: 30 MG/DL (ref 8–23)
BUN/CREAT SERPL: 16.7 (ref 7–25)
CALCIUM SPEC-SCNC: 8.8 MG/DL (ref 8.6–10.5)
CHLORIDE SERPL-SCNC: 100 MMOL/L (ref 98–107)
CO2 SERPL-SCNC: 25.2 MMOL/L (ref 22–29)
CREAT SERPL-MCNC: 1.8 MG/DL (ref 0.76–1.27)
DEPRECATED RDW RBC AUTO: 46.7 FL (ref 37–54)
EGFRCR SERPLBLD CKD-EPI 2021: 36.2 ML/MIN/1.73
EOSINOPHIL # BLD AUTO: 0.23 10*3/MM3 (ref 0–0.4)
EOSINOPHIL NFR BLD AUTO: 3.4 % (ref 0.3–6.2)
ERYTHROCYTE [DISTWIDTH] IN BLOOD BY AUTOMATED COUNT: 15.6 % (ref 12.3–15.4)
FERRITIN SERPL-MCNC: 292 NG/ML (ref 30–400)
GLOBULIN UR ELPH-MCNC: 3 GM/DL
GLUCOSE SERPL-MCNC: 94 MG/DL (ref 65–99)
HCT VFR BLD AUTO: 37.3 % (ref 37.5–51)
HGB BLD-MCNC: 11.2 G/DL (ref 13–17.7)
IMM GRANULOCYTES # BLD AUTO: 0.03 10*3/MM3 (ref 0–0.05)
IMM GRANULOCYTES NFR BLD AUTO: 0.4 % (ref 0–0.5)
IRON 24H UR-MRATE: 22 MCG/DL (ref 59–158)
IRON SATN MFR SERPL: 7 % (ref 20–50)
LYMPHOCYTES # BLD AUTO: 0.78 10*3/MM3 (ref 0.7–3.1)
LYMPHOCYTES NFR BLD AUTO: 11.4 % (ref 19.6–45.3)
MCH RBC QN AUTO: 25.3 PG (ref 26.6–33)
MCHC RBC AUTO-ENTMCNC: 30 G/DL (ref 31.5–35.7)
MCV RBC AUTO: 84.2 FL (ref 79–97)
MONOCYTES # BLD AUTO: 0.62 10*3/MM3 (ref 0.1–0.9)
MONOCYTES NFR BLD AUTO: 9.1 % (ref 5–12)
NEUTROPHILS NFR BLD AUTO: 5.14 10*3/MM3 (ref 1.7–7)
NEUTROPHILS NFR BLD AUTO: 75.3 % (ref 42.7–76)
NRBC BLD AUTO-RTO: 0 /100 WBC (ref 0–0.2)
PLATELET # BLD AUTO: 164 10*3/MM3 (ref 140–450)
PMV BLD AUTO: 9.7 FL (ref 6–12)
POTASSIUM SERPL-SCNC: 4.2 MMOL/L (ref 3.5–5.2)
PROT SERPL-MCNC: 6.8 G/DL (ref 6–8.5)
RBC # BLD AUTO: 4.43 10*6/MM3 (ref 4.14–5.8)
SODIUM SERPL-SCNC: 138 MMOL/L (ref 136–145)
TIBC SERPL-MCNC: 313 MCG/DL (ref 298–536)
TRANSFERRIN SERPL-MCNC: 210 MG/DL (ref 200–360)
WBC NRBC COR # BLD AUTO: 6.83 10*3/MM3 (ref 3.4–10.8)

## 2024-12-06 PROCEDURE — G0463 HOSPITAL OUTPT CLINIC VISIT: HCPCS | Performed by: STUDENT IN AN ORGANIZED HEALTH CARE EDUCATION/TRAINING PROGRAM

## 2024-12-06 PROCEDURE — 82728 ASSAY OF FERRITIN: CPT

## 2024-12-06 PROCEDURE — 84466 ASSAY OF TRANSFERRIN: CPT

## 2024-12-06 PROCEDURE — 80053 COMPREHEN METABOLIC PANEL: CPT

## 2024-12-06 PROCEDURE — 36415 COLL VENOUS BLD VENIPUNCTURE: CPT

## 2024-12-06 PROCEDURE — 83540 ASSAY OF IRON: CPT

## 2024-12-06 PROCEDURE — 85025 COMPLETE CBC W/AUTO DIFF WBC: CPT

## 2024-12-06 NOTE — LETTER
"December 7, 2024     Low Sepulveda MD  [address]  [address]  [place] [id number]    Patient: Bayron Acevedo   YOB: 1940   Date of Visit: 12/6/2024     Dear Low Sepulveda:       Thank you for referring Bayron Acevedo to me for evaluation. Below are the relevant portions of my assessment and plan of care.    If you have questions, please do not hesitate to call me. I look forward to following Bayron along with you.         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  Subsequent clinical stageIAI ([id number]) right upper lobe non-small cell lung cancer (biopsy deferred)    Subjective       History of Present Illness    Patient returns today in follow-up with laboratory studies and CT chest to review.  The patient today reports that he is fairly stable from his last visit here.  He does continue with chronic fatigue that is unchanged.  He remains reasonably active for his age however.  He does note some chronic dyspnea on exertion that is again unchanged.  He reports a stable appetite, weight is stable at 151 pounds.      Objective  Vital Signs:   /80   Pulse 57   Temp 97.5 °F (36.4 °C) (Oral)   Resp 16   Ht 170 cm (66.93\")   Wt 68.8 kg (151 lb 11.2 oz)   SpO2 97%   BMI 23.81 kg/m²     Physical Exam  Constitutional:       Appearance: He is well-developed.      Comments: Patient in no distress   Eyes:      Conjunctiva/sclera: Conjunctivae normal.   Neck:      Thyroid: No thyromegaly.   Cardiovascular:      Rate and Rhythm: Normal rate and regular rhythm.      Heart sounds: No murmur heard.     No friction rub. No gallop.   Pulmonary:      Effort: No respiratory distress.      Breath sounds: Normal breath sounds.   Abdominal:      General: Bowel sounds are normal. There is no " distension.      Palpations: Abdomen is soft.      Tenderness: There is no abdominal tenderness.   Musculoskeletal:         General: No swelling.   Lymphadenopathy:      Head:      Right side of head: No submandibular adenopathy.      Cervical: No cervical adenopathy.      Upper Body:      Right upper body: No supraclavicular adenopathy.      Left upper body: No supraclavicular adenopathy.   Skin:     General: Skin is warm and dry.      Findings: No rash.   Neurological:      Mental Status: He is alert and oriented to person, place, and time.      Cranial Nerves: No cranial nerve deficit.      Motor: No abnormal muscle tone.      Deep Tendon Reflexes: Reflexes normal.   Psychiatric:         Behavior: Behavior normal.       Patient was examined today, unchanged from above      Result Review: Reviewed CBC, CMP, iron panel, ferritin from today.  Reviewed CT chest 11/29/2024.       Assessment and Plan     *Clinical stage IIIA ([id number]) non-small cell lung cancer (adenocarcinoma) of left upper lobe.  Subsequent clinical stageIAI ([id number]) right upper lobe non-small cell lung cancer (biopsy deferred)  The patient has a history of smoking 1 pack/day x 55 years quit in 2000.  CT chest 5/26/2023 showed a spiculated mass in the anterior left upper lobe 3.7 x 4.3 x 4.2 cm along the anterior pleural surface and lateral aspect of the anterior mediastinum.  Additional 3 mm nodule right major fissure.  Bilateral small to moderate-sized pleural effusions.  Bullae formation consistent with COPD.  Mediastinal lymphadenopathy with largest node infracarinal region 2.6 x 1.2 cm.  PET/CT on 6/12/2023 showed 5.2 x 3.1 cm irregular pleural-based mass anterior left upper lobe abutting the pleura anteriorly and medially, hypermetabolic with SUV 14.4.  Mediastinal lymph node activity in the subcarinal region with a 2.2 x 1.1 cm lymph node with SUV 4.5.  Remainder of mediastinal lymph nodes less intensely hypermetabolic with maximal SUV 3.3.   Moderate size loculated pleural effusions bilaterally with low-level activity along the pleura (SUV 2.5), no change in volume of pleural effusions since prior chest CT.  New small to moderate loculated right hydropneumothorax with air-fluid level 5.2 cm.   CT-guided left upper lobe lung biopsy 6/13/2023 with pathology that showed invasive poorly differentiated adenocarcinoma, PD-L1 less than 1%.  Caris analysis: TMB high (14 muts/Mb), mutations in KEAP1 and TP53, EGFR VUS, no targetable mutations.  Patient was hospitalized 6/13 - 6/15/2023 due to right hydropneumothorax, had CT-guided chest tube placement performed 6/13/2023.  Right pleural fluid cytology negative on 6/13/2023.  Staging MRI brain was negative for evidence of metastatic disease on 7/2/2023  PFTs on 6/20/2023 with FEV1 0.99 (44% predicted), DLCO corrected 14.09 (56% predicted).  Mediastinoscopy 7/17/2023 with 4R and station 7 lymph nodes negative for malignancy  Left VATS performed 7/31/2023.  Intraoperatively, primary tumor was unresectable, invading into the mediastinum and pericardial fat pad as well as with phrenic nerve involvement (T4 lesion).  Sampling of left pleura with fibrosis, chronic inflammation, no evidence of malignancy.  Station 5 and 10 R lymph nodes sampled, negative for malignancy.  New baseline CT chest abdomen pelvis prior to initiation of radiation therapy performed on 9/20/2023.  Persistent loculated left pneumothorax, trace left pleural effusion, primary tumor in partially collapsed left upper lobe appears relatively stable.  Small right pleural effusion no change in borderline enlarged mediastinal lymph nodes.  Slight increase in right upper lobe nodule (for up to 7 mm), indeterminate.  Patient therefore with unresectable stage IIIA ([id number]) disease.  Plans to treat with radiation therapy.  Due to age, performance status, comorbidities, patient felt to be a poor candidate for concurrent chemotherapy.  Patient received  radiation therapy 10/9 - 10/27/2023 to left upper lobe, 6000 cGy.  CT chest abdomen pelvis 1/23/2024 showed decrease in left pneumothorax/hydropneumothorax, left upper lobe mass difficult to accurately assess due to change in configuration (shift in lung parenchyma due to pneumothorax improvement) but appears relatively stable, 2.9 x 4.3 versus current 3.3 x 3.5 cm.  No lymphadenopathy noted, no evidence of distant metastatic disease.  PET scan 3/20/2024 with subcentimeter left supraclavicular lymph node with new uptake (SUV 2.4).  Decrease in treated left upper lobe mass from 4.4 x 3.8 down to 3.4 x 2 (decrease in SUV from 14.4 down to 3.2).  Increase in 2 mildly hypermetabolic left upper lobe ill-defined opacities with linear consolidation felt to represent postradiation change (SUV 3.4).  Resolution of hypermetabolic activity and subcarinal lymph node as well as subcentimeter mediastinal and left hilar lymph nodes.  Increase and 2 adjacent right upper lobe solid nodules surrounding bullae measuring 1 and 0.8 cm with SUV 5.1 and 2.5 respectively.  Decrease in small loculated pleural effusions with SUV 3 on the left and 2.4 on the right.  Tubular hypermetabolic activity throughout the esophagus.  Persistent focal transverse colon hypermetabolism with SUV 6.9 and additional foci of the ascending colon hypermetabolism SUV 5.6.  Patient discussed at thoracic oncology conference 3/28/2024.  Consensus regarding not pursuing biopsy due to risk of pneumothorax and small size of the lesions.  Given the growth rate and activity, lesions were felt to represent additional primary sites of disease.  Recommendation to treat with SBRT.  Patient received SBRT to both right upper lobe lesions x 5 fractions each from 4/11 - 4/17/2024  CT chest 5/22/2024 showed irregular consolidation left upper lobe unchanged 3.5 x 3.2 cm, new small groundglass density left upper lobe.  Stable consolidation left base.  Nodularity posterior right  upper lobe largest nodule 8 mm unchanged.  New nodule fissure right upper lobe and new right upper lobe micronodule.  PET scan 7/10/2024 with stable radiated left upper lobe mass with uptake near blood pool.  Left apical 3.3 x 1.3 cm bandlike consolidation with SUV 10.1 (previously few linear foci of consolidation and SUV 3.4).  Left upper lobe 1.6 cm focal consolidation with SUV 8.2 (previously few linear foci of consolidation SUV 4.3).  Right upper lobe nodules x 2 around bullae decreased in size (1 cm down to 0.6 cm and 0.8 down to 0.5 cm), no activity.  New surrounding consolidation and groundglass opacity.  Persistent focal transverse colon hypermetabolism (SUV decreased from 6.9 down to 4.6) with generalized tubular hypermetabolism in the distal colon.  Discussed with Dr. Painter in radiation oncology and agreed that the degree of uptake and enlargement of left upper lobe focal consolidation was concerning for possible recurrent malignancy in this area.    Patient was referred back to thoracic surgery Dr. Chávez.  Discussed potential Ion bronchoscopy however patient was reluctant to proceed and elected to continue on a course of observation.  CT chest on 9/6/2024 showed decrease in residual left upper lobe mass (3.5 x 3.1 down to 3.2 x 3.0 cm), increased patchy consolidation in the left upper lobe around mass possibly reflective of postradiation change.  Stable nodules right upper lobe with increased groundglass opacity in this with radiation change  Chest 11/29/2024 with decrease in treated mass left upper lobe from 3.2 x 3.0 down to 2.7 x 2.6 cm.  Increasing consolidation and surrounding left upper lobe lung favored to represent postradiation change.  Stable pleural thickening on the left.  Stable rounded density left lower lobe favored to represent rounded atelectasis.  Stable nodular density right upper lobe.  Patient returns today in follow-up with CT chest to review from 11/29/2024.  The patient clinically is  stable, reports stable mild chronic fatigue and dyspnea on exertion.  He has stable weight at 151 pounds.  He does remain reasonably active for his age, ECOG performance status of 1.  We reviewed his CT scan as noted above and I discussed scan findings with Dr. Painter in radiation oncology.  The previously treated mass in the left upper lobe continues to decrease in size.  There is fairly extensive change however in the left upper lobe, some of which is nodular in appearance.  The nodular change in the left upper lobe did have activity on prior PET scan and there is still suspicion for potential recurrent malignancy in this area.  The previously treated right upper lobe disease is unchanged.  I discussed with the patient and his wife today pursuing repeat PET scan and again referral back to thoracic surgery to discuss possible Ion bronchoscopy.  Patient is amenable to consider bronchoscopy at this point.  Dr. Painter feels that if recurrent disease identified in left upper lobe there would be significant overlap with previous radiation field and he did not recommend pursuit of further radiation therapy.  If malignancy identified we will need to consider pursuit of palliative systemic therapy.  Patient is aware of this issue.  I will see him back in approximately 1 month with PET scan and potentially Ion bronchoscopy pathology results to review.    *Chronic bilateral pleural effusions with right hydropneumothorax and subsequent postoperative left hydropneumothorax  Patient appears to have longstanding evidence of small bilateral pleural effusions likely related to CHF  PET/CT 6/12/2023 identified right small to moderate hydropneumothorax new since 5/26/2023 CT chest  Patient was hospitalized 6/13 - 6/15/2023 due to right hydropneumothorax, had CT-guided chest tube placement performed 6/13/2023.  Right pleural fluid cytology negative on 6/13/2023.  As above, patient underwent left VATS with attempted resection of primary  lung cancer on 7/31/2023.    Patient required persistent left chest tube postoperatively, eventually removed on 9/6/2023  CT chest 9/20/2023 with persistent loculated left pneumothorax.  CT chest 1/23/2024 with significant improvement in left pneumothorax/hydropneumothorax  PET scan 3/20/2024 with decrease in small loculated pleural effusions with SUV 3 on the left and 2.4 on the right.  Pleural effusions felt to likely benign in nature at this point.  PET scan 7/10/2024 with unchanged small bilateral pleural effusions  CT 9/6/24 with trace pleural fluid on the left, stable minimal pleural fluid right base  CT chest 11/29/2024 showed stable pleural thickening on the left, no significant residual effusion    *COPD  The patient has a history of smoking 1 pack/day x 55 years quit in 2000.  PFTs on 6/20/2023 with FEV1 0.99 (44% predicted), DLCO corrected 14.09 (56% predicted).  Patient continues with chronic dyspnea on exertion that does limit his level of activity although maintains O2 saturation in the 90% range even with activity.  Patient notes that his chronic dyspnea on exertion is stable.  He is no longer using Trelegy inhaler as he could not get it refilled.  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  Labs on 7/11/2023 with hemoglobin 10.6, MCV 82.8, iron 18, ferritin 504, iron saturation 6%, TIBC 295, B12 201, folate 6.79, CRP 1.07, ESR 27, erythropoietin level 17.8.  Labs consistent with anemia secondary to chronic disease/malignancy as well as anemia secondary to CKD3.  Patient initiated oral B12  1000 mcg daily for B12 deficiency  Hemoglobin on 11/7/2023 was relatively unchanged at 10.3.  Additional evaluation sent with iron 21, ferritin 415, iron saturation 8%, TIBC 266, B12 398, folate 10.2, negative serum protein electrophoresis/immunoelectrophoresis, IgA 209, IgG 829, IgM 13, free kappa light chain 50.4, free lambda light chain 34.2, free light chain ratio 1.47 (normal).  Labs did consistent with anemia secondary to chronic disease as well as anemia secondary to CKD3.  He does not qualify for Procrit with hemoglobin above 10.  Patient continued on oral B12 1000 mcg daily  Labs on 5/14/2024 with hemoglobin that declined to 10.5.  Additional labs with iron 20, ferritin 394, iron saturation 7%, TIBC 299, reticulated hemoglobin low at 27.3.  Low reticulated hemoglobin potentially indicative of iron deficiency.  Patient returns today with hemoglobin stable at 11.2, has been primarily in the 11-12 range.  His residual anemia is felt to be related to his underlying CKD3a.  We did recheck his iron studies today which are stable with iron 22, ferritin 292, iron saturation 7%, TIBC 313.  With low iron saturation and previously low reticulated hemoglobin, appears that he may have a mild component of iron deficiency.  The patient however has underlying constipation and would be intolerant of oral iron.  With a ferritin that remains between 90 to, would not recommend IV iron in this situation however we will continue to follow his hemoglobin and iron status.    *Transient thrombocytopenia  On 11/7/2024, new onset thrombocytopenia with platelet count of 137,000.  Additional labs with IPF low normal at 3.3%, B12 398, folate 10.2,negative serum protein electrophoresis/immunoelectrophoresis, IgA 209, IgG 829, IgM 13, free kappa light chain 50.4, free lambda light chain 34.2, free light chain ratio 1.47 (normal).  Platelet count today normal at 164,000    *Coronary artery disease, CHF  Chronic bilateral pleural effusions  presumably related to CHF  Status post cardiac stent in 2013  Preoperative echocardiogram on 7/19/2023 with ejection fraction 56 to 60%, dilated LA, LV  Patient continues on aspirin 81 mg daily    *Peripheral vascular disease  Patient is followed by Dr. Karen Barrera in vascular surgery  Abdominal aortic aneurysm status post stent graft repair 5/17/2021  Carotid stenosis status post carotid endarterectomy left, 2013.    *Anorexia, weight loss  Patient with anorexia associated with malignancy, mild degree of weight loss  Patient initiated Remeron 7.5 mg nightly on 9/22/2023.  Subsequently discontinued with improvement in weight and appetite  Weight did improve and is subsequently stable at 151 pounds.  He does use 1 to 2 cans of boost per day.    *Constipation  Patient continues with intermittent constipation, uses stool softener as needed.      *Pain control  The patient was experiencing significant pain in his chest wall on the left both anteriorly and posteriorly as well as pain in the left axilla and extending into the left upper arm.  Patient is reluctant to use hydrocodone 5/325 although does have a prescription to use if needed.  Patient was intolerant of gabapentin 100 mg 3 times daily due to anorexia, weight loss, somnolence  The patient's pain did spontaneously resolve.  He did not pursue MRI of the cervical spine and brachial plexus which were ordered by thoracic surgery.      Plan:  Patient with stage IIIA ([id number]) adenocarcinoma of the left upper lobe, unresectable.  Received primary radiation therapy to the left upper lobe completed on 10/27/2023  Patient with 2 additional hypermetabolic right upper lobe nodules felt to be new primary lesions.  High risk for pneumothorax with biopsy.  Both lesions treated with SBRT 4/11 - 4/17/2024.  Patient continuing on oral B12 1000 mcg daily for B12 deficiency  PET scan in 1 week  Referral back to Dr. Chávez in thoracic surgery to again discuss pursuit of Ion  bronchoscopy  Return visit in approximately 1 month with CBC, CMP    I did spend 55 minutes total time caring for the patient today, time spent in review of records, face-to-face consultation, coordination of care, placement of orders, completion of documentation.

## 2024-12-06 NOTE — PROGRESS NOTES
[facility] Radiation Oncology   Follow Up    Chief Complaint  Multiple prior lung cancers s/p RT        Diagnosis: Multiple prior lung cancers s/p RT     Overall Stage IIIA     cT4: Mediastinal Invasion  pN0: per cervical mediastinoscopy, and intraoperative resection of 10R and 5 LNs  cM0: per PET CT, MRI Brain        Radiation Completion Date: 10/27/2023        Prescription:      Site: KIET  Laterality: Left  Total Dose: 6000cGy  Dose per Fraction: 400cGy  Total Fractions: 15  Daily or BID: Daily  Modality: Photon  Technique: IMRT/VMAT/Rapid Arc  Bolus: No        2.   Overall Stage IA2 RUL     Radiation Completion Date: 4/17/2024        Prescription:      Site: Right Upper Lobe  Laterality: Right  Total Dose: 5000cGy  Dose per Fraction: 1000cGy  Total Fractions: 5  Daily or BID: Daily  Modality: Photon  Technique: SBRT (2-5fx)  Bolus: No      Interval History:    Bayron Acevedo presents for regularly scheduled follow-up approximately 13 months after completion of radiation therapy for locally advanced left upper lobe lung cancer.  Additionally, the patient required SBRT to a right upper lobe lung cancer 8 months ago.  The patient tolerated treatment fairly well. He did have some left sided rib pain which ultimately resolved.  Patient had workup including PET/CT in July which raise concern for potential recurrent disease in the left lung.  I discussed this with him and Dr. Santos.  Dr. Santos worked towards arranging biopsy of the suspicious PET avid area, but patient ultimately declined favoring surveillance.  The patient returns for follow-up after interval CT chest.  He has no new or concerning pulmonary symptoms.      Imaging:      CT Chest 11/29/2024    FINDINGS: The treated mass in the anterior left upper lobe measures  slightly smaller at 2.7 x 2.6 cm, previously measuring about 3.2 x 3.0  cm when measured in similar fashion. There is some increasing  consolidation of the surrounding lung tissue in the left upper  lobe  which is favored to reflect increasing postradiation change. Stable area  of rounded density in the base of the left lower lobe favored to reflect  areas of rounded atelectasis. Stable areas of pleural thickening on the  left. Nodular density in the posterior right upper lobe is stable.  Stable presumed scarring/atelectasis in the base of the right lower  lobe. Stable mediastinal lymph nodes. Coronary artery calcifications are  present. Limited imaging of the upper abdomen shows partially imaged  aortic stent graft. There is no acute bony abnormality     IMPRESSION:  1. Slight decrease in size of treated mass in the anterior left upper  lobe  2. Increasing surrounding lung consolidation likely postradiation change  3. Additional findings as described. Continued follow-up is recommended         Pathology:      No new relevant pathology      Labs:    Lab Results   Component Value Date    CREATININE 1.98 (C) 09/25/2024             Problem List:  Patient Active Problem List   Diagnosis    COPD (chronic obstructive pulmonary disease) with emphysema    Hypertension    Hyperlipidemia    S/P angioplasty with stent    Heart attack    Colon polyp    Carotid stenosis    CAD in native artery    Peripheral artery disease    History of hydrocele    Perennial allergic rhinitis    AAA (abdominal aortic aneurysm) without rupture    Acute on chronic systolic CHF (congestive heart failure)    Stage 3b chronic kidney disease    Hydropneumothorax    Mass of upper lobe of left lung    NICM (nonischemic cardiomyopathy)    Chronic systolic CHF (congestive heart failure)    Normocytic anemia    Adenocarcinoma, lung, left    Right inguinal hernia          Medications:  Current Outpatient Medications on File Prior to Visit   Medication Sig Dispense Refill    albuterol (PROVENTIL) (2.5 MG/3ML) 0.083% nebulizer solution USE ONE VIAL BY NEBULIZATION EVERY FOUR HOURS AS NEEDED FOR FOR WHEEZING (Patient taking differently: Take 2.5 mg by  nebulization Every 4 (Four) Hours As Needed.) 180 mL 0    amLODIPine (NORVASC) 5 MG tablet Take 1 tablet by mouth Daily. 90 tablet 3    aspirin 81 MG EC tablet Take 1 tablet by mouth Daily. PT HOLDING 5 DAYS PRIOR TO SURGERY      carvedilol (COREG) 25 MG tablet Take 1 tablet by mouth 2 (Two) Times a Day. 180 tablet 3    hydrALAZINE (APRESOLINE) 25 MG tablet Take 1 tablet by mouth 2 (Two) Times a Day. 180 tablet 3    lisinopril (PRINIVIL,ZESTRIL) 20 MG tablet Take 1 tablet by mouth Daily.      nitroglycerin (NITROSTAT) 0.4 MG SL tablet Place 1 tablet under the tongue Every 5 (Five) Minutes As Needed for Chest Pain. Take no more than 3 doses in 15 minutes. 25 tablet 3    rosuvastatin (CRESTOR) 20 MG tablet Take 1 tablet by mouth Daily. 90 tablet 3    sennosides-docusate (senna-docusate sodium) 8.6-50 MG per tablet Take 1 tablet by mouth 2 (Two) Times a Day As Needed for Constipation. 30 tablet 0    torsemide (DEMADEX) 20 MG tablet Take 2 tablets by mouth Daily. (Patient taking differently: Take 1 tablet by mouth Daily.) 180 tablet 3    Umeclidinium-Vilanterol (ANORO ELLIPTA IN) Inhale.      vitamin B-12 (CYANOCOBALAMIN) 1000 MCG tablet Take 1 tablet by mouth Daily.       No current facility-administered medications on file prior to visit.          Allergies:  Allergies   Allergen Reactions    Lisinopril Anaphylaxis    Codeine Sulfate Hives    Contrast Dye (Echo Or Unknown Ct/Mr) Other (See Comments)     HYPERTENSION    Iodinated Contrast Media Other (See Comments)     HYPERTENSION    Losartan Swelling     LIP    Penicillins Other (See Comments)     Passed out after a PCN shot- no other sx noted-per pateint  Beta lactam allergy details  Antibiotic reaction: unknown  Age at reaction: adult  Dose to reaction time: unknown  Reason for antibiotic: other  Epinephrine required for reaction?: no  Tolerated antibiotics: unknown              Vital Signs:  There were no vitals taken for this visit.  Estimated body mass index is  "23.89 kg/m² as calculated from the following:    Height as of 8/22/24: 170 cm (66.93\").    Weight as of 9/26/24: 69 kg (152 lb 3.2 oz).  There were no vitals filed for this visit.      ECOG: Ambulatory and capable of all selfcare but unable to carry out any work activities; up and about more than 50% of waking hours = 2    Physical Exam  Vitals reviewed.   Constitutional:       General: He is not in acute distress.     Appearance: Normal appearance.   HENT:      Head: Normocephalic and atraumatic.   Eyes:      Extraocular Movements: Extraocular movements intact.      Pupils: Pupils are equal, round, and reactive to light.   Pulmonary:      Effort: Pulmonary effort is normal.   Abdominal:      General: Abdomen is flat.      Palpations: Abdomen is soft.   Musculoskeletal:      Cervical back: Normal range of motion.   Skin:     General: Skin is warm and dry.   Neurological:      General: No focal deficit present.      Mental Status: He is alert and oriented to person, place, and time.   Psychiatric:         Mood and Affect: Mood normal.         Behavior: Behavior normal.          Result Review :  The following data was reviewed by: Dakota Painter MD on 12/06/2024:  Labs: Last Creatinine   Data reviewed : Radiologic studies CT Chest             Diagnoses and all orders for this visit:    1. Adenocarcinoma, lung, left (Primary)    2. Malignant neoplasm of upper lobe of right lung        Assessment:    Bayron Acevedo presents for regularly scheduled follow-up approximately 13 months after completion of radiation therapy for locally advanced left upper lobe lung cancer.  Additionally, the patient required SBRT to a right upper lobe lung cancer 8 months ago.  The patient tolerated treatment fairly well. He did have some left sided rib pain which ultimately resolved.  Patient had workup including PET/CT in July which raise concern for potential recurrent disease in the left lung.  I discussed this with him and Dr. Santos.  Dr." Tom worked towards arranging biopsy of the suspicious PET avid area, but patient ultimately declined favoring surveillance.  The patient returns for follow-up after interval CT chest.  He has no new or concerning pulmonary symptoms.    I met with the patient and reviewed the results of his most recent CT chest.  Overall, he has increased consolidation which again raises concern for potential progressive disease in the areas of previous PET avidity in July.  It is possible that this is related to postradiation inflammation and scarring, but I worry that it is progression of disease.  I have reached out to Dr. Santos who is seen later today.  I think a reasonable approach could be to repeat PET/CT and consider Ion biopsy.  The patient is systemic therapy naïve and could potentially be treated with palliative chemo or immunotherapy.  Radiation may be possible but I am somewhat skeptical that he would tolerate it and I think there would be significant overlap from his prior radiation field.      Plan:    -Patient is going to meet with Dr. Santos later today who will discuss possible PET/CT and biopsy which could lead to potential systemic therapy options.       I spent 30 minutes caring for Bayron on this date of service. This time includes time spent by me in the following activities:preparing for the visit, reviewing tests, obtaining and/or reviewing a separately obtained history, referring and communicating with other health care professionals , documenting information in the medical record, independently interpreting results and communicating that information with the patient/family/caregiver, and care coordination  Follow Up   No follow-ups on file.  Patient was given instructions and counseling regarding his condition or for health maintenance advice. Please see specific information pulled into the AVS if appropriate.     Dakota Painter MD

## 2024-12-13 ENCOUNTER — HOSPITAL ENCOUNTER (OUTPATIENT)
Dept: PET IMAGING | Facility: HOSPITAL | Age: 84
Discharge: HOME | End: 2024-12-13
Payer: MEDICARE

## 2024-12-13 ENCOUNTER — PATIENT OUTREACH (OUTPATIENT)
Dept: OTHER | Facility: HOSPITAL | Age: 84
End: 2024-12-13
Payer: MEDICARE

## 2024-12-13 DIAGNOSIS — C34.82 MALIGNANT NEOPLASM OF OVERLAPPING SITES OF LEFT BRONCHUS AND LUNG: ICD-10-CM

## 2024-12-13 DIAGNOSIS — C34.92 ADENOCARCINOMA, LUNG, LEFT: ICD-10-CM

## 2024-12-13 LAB — GLUCOSE BLDC GLUCOMTR-MCNC: 103 MG/DL (ref 70–130)

## 2024-12-13 PROCEDURE — 82948 REAGENT STRIP/BLOOD GLUCOSE: CPT

## 2024-12-13 PROCEDURE — 34310000005 FLUDEOXYGLUCOSE F18 SOLUTION: Performed by: INTERNAL MEDICINE

## 2024-12-13 PROCEDURE — 78815 PET IMAGE W/CT SKULL-THIGH: CPT

## 2024-12-13 PROCEDURE — A9552 F18 FDG: HCPCS | Performed by: INTERNAL MEDICINE

## 2024-12-13 RX ADMIN — FLUDEOXYGLUCOSE F 18 1 DOSE: 200 INJECTION, SOLUTION INTRAVENOUS at 09:24

## 2024-12-13 NOTE — PROGRESS NOTES
Reopened case.  Dr. Santos is referring patient back to Dr. Chávez to discuss possible biopsy. Patient having PET scan today.     Called patient, got vm. Called wife, reintroduced myself. Patient is having Pet scan now. Discussed reopening his case to navigation and [place] [facility] 1pm with Dr. Chávez to discuss PET results and possible biopsy.    Appt scheduled.    Navigation will continue to follow

## 2024-12-19 ENCOUNTER — PATIENT OUTREACH (OUTPATIENT)
Dept: OTHER | Facility: HOSPITAL | Age: 84
End: 2024-12-19
Payer: MEDICARE

## 2024-12-19 ENCOUNTER — OFFICE VISIT (OUTPATIENT)
Dept: OTHER | Facility: HOSPITAL | Age: 84
End: 2024-12-19
Payer: MEDICARE

## 2024-12-19 VITALS
WEIGHT: 150.5 LBS | OXYGEN SATURATION: 100 % | SYSTOLIC BLOOD PRESSURE: 115 MMHG | BODY MASS INDEX: 23.62 KG/M2 | DIASTOLIC BLOOD PRESSURE: 56 MMHG | HEART RATE: 57 BPM

## 2024-12-19 DIAGNOSIS — R91.8 MASS OF UPPER LOBE OF LEFT LUNG: Primary | ICD-10-CM

## 2024-12-19 PROCEDURE — G0463 HOSPITAL OUTPT CLINIC VISIT: HCPCS | Performed by: SURGERY

## 2024-12-19 NOTE — PROGRESS NOTES
I accompanied patient and wife to Dr. Chávez's [facility] visit.  Dr. Chávez reviewed the patient's PET scan results and discussed ION bronchoscopy for tissue sample to assess for disease recurrence.  The patient and spouse voiced concerns about this due to his previous post-procedure complications. Dr. Chávez consulted with Dr Santos during the appt.  Additional tissue would be needed to confirm cancer prior to initiating systemic therapy.    Dr. Chávez answered all questions. The patient will communicate with Dr. Chávez in the next few days with his decision regarding bronchoscopy.     Provided Dr. Chávez's business card as well as navigation contact information.  Navigation will continue to follow. Encouraged patient to call as needed.

## 2024-12-20 ENCOUNTER — PREP FOR SURGERY (OUTPATIENT)
Dept: OTHER | Facility: HOSPITAL | Age: 84
End: 2024-12-20
Payer: MEDICARE

## 2024-12-20 ENCOUNTER — PATIENT OUTREACH (OUTPATIENT)
Dept: OTHER | Facility: HOSPITAL | Age: 84
End: 2024-12-20
Payer: MEDICARE

## 2024-12-20 DIAGNOSIS — R91.8 MASS OF UPPER LOBE OF LEFT LUNG: Primary | ICD-10-CM

## 2024-12-20 NOTE — PROGRESS NOTES
Call from patient's wife. Patient is interested in pursuing biopsy on 12/27 if possible. She called Dr. Chávez's office although wanted me to communicate this with him as well.  Explained Dr. Chávez is in surgery today although I will contact him.  Patient's wife verbalized appreciation    Message to Dr. Chávez that patient is interested in biopsy on 12/27 if possible    Dr. Chávez will perform the biopsy on 12/27; his office will call the patient to schedule.  Navigation contacted PET scan department; they can do the CT with ION protocol 12/26 @ 8AM. Order is not in yet; Dr. Chávez will place

## 2024-12-23 ENCOUNTER — PATIENT OUTREACH (OUTPATIENT)
Dept: OTHER | Facility: HOSPITAL | Age: 84
End: 2024-12-23
Payer: MEDICARE

## 2024-12-23 DIAGNOSIS — Z85.118 HISTORY OF LUNG CANCER: Primary | ICD-10-CM

## 2024-12-23 NOTE — PROGRESS NOTES
Call from patient's wife. She asked about CT with ION protocol. Explained we are waiting on the order from Dr. Chávez. He will have the CT scan in the PET department 12/26 @ 8am although we can't schedule until the order is rec'd.  Patient's wife verbalized understanding.     Shabnam will call patient and confirm appt after order is rec'd.

## 2024-12-26 ENCOUNTER — HOSPITAL ENCOUNTER (OUTPATIENT)
Dept: PET IMAGING | Facility: HOSPITAL | Age: 84
Discharge: HOME | End: 2024-12-26
Admitting: NURSE PRACTITIONER
Payer: MEDICARE

## 2024-12-26 DIAGNOSIS — Z85.118 HISTORY OF LUNG CANCER: ICD-10-CM

## 2024-12-26 PROCEDURE — 71250 CT THORAX DX C-: CPT

## 2024-12-27 ENCOUNTER — HOSPITAL ENCOUNTER (OUTPATIENT)
Facility: HOSPITAL | Age: 84
Setting detail: HOSPITAL OUTPATIENT SURGERY
Discharge: HOME | End: 2024-12-27
Attending: SURGERY | Admitting: SURGERY
Payer: MEDICARE

## 2024-12-27 ENCOUNTER — ANESTHESIA EVENT (OUTPATIENT)
Dept: GASTROENTEROLOGY | Facility: HOSPITAL | Age: 84
End: 2024-12-27
Payer: MEDICARE

## 2024-12-27 ENCOUNTER — APPOINTMENT (OUTPATIENT)
Dept: GENERAL RADIOLOGY | Facility: HOSPITAL | Age: 84
End: 2024-12-27
Payer: MEDICARE

## 2024-12-27 ENCOUNTER — ANESTHESIA (OUTPATIENT)
Dept: GASTROENTEROLOGY | Facility: HOSPITAL | Age: 84
End: 2024-12-27
Payer: MEDICARE

## 2024-12-27 VITALS
OXYGEN SATURATION: 95 % | SYSTOLIC BLOOD PRESSURE: 155 MMHG | RESPIRATION RATE: 20 BRPM | HEART RATE: 58 BPM | DIASTOLIC BLOOD PRESSURE: 86 MMHG | HEIGHT: 67 IN | WEIGHT: 150.2 LBS | BODY MASS INDEX: 23.57 KG/M2

## 2024-12-27 DIAGNOSIS — I65.23 BILATERAL CAROTID ARTERY STENOSIS: ICD-10-CM

## 2024-12-27 DIAGNOSIS — I73.9 PERIPHERAL VASCULAR DISEASE: ICD-10-CM

## 2024-12-27 DIAGNOSIS — R91.8 MASS OF UPPER LOBE OF LEFT LUNG: ICD-10-CM

## 2024-12-27 DIAGNOSIS — I71.40 ABDOMINAL AORTIC ANEURYSM (AAA) WITHOUT RUPTURE, UNSPECIFIED PART: Primary | ICD-10-CM

## 2024-12-27 DIAGNOSIS — I71.43 INFRARENAL ABDOMINAL AORTIC ANEURYSM (AAA) WITHOUT RUPTURE: ICD-10-CM

## 2024-12-27 PROCEDURE — 25010000002 SUGAMMADEX 200 MG/2ML SOLUTION

## 2024-12-27 PROCEDURE — 88173 CYTOPATH EVAL FNA REPORT: CPT | Performed by: SURGERY

## 2024-12-27 PROCEDURE — 25010000002 ONDANSETRON PER 1 MG

## 2024-12-27 PROCEDURE — 25010000002 PHENYLEPHRINE 10 MG/ML SOLUTION

## 2024-12-27 PROCEDURE — 88112 CYTOPATH CELL ENHANCE TECH: CPT | Performed by: SURGERY

## 2024-12-27 PROCEDURE — 71045 X-RAY EXAM CHEST 1 VIEW: CPT

## 2024-12-27 PROCEDURE — 31627 NAVIGATIONAL BRONCHOSCOPY: CPT | Performed by: SURGERY

## 2024-12-27 PROCEDURE — 25810000003 SODIUM CHLORIDE 0.9 % SOLUTION: Performed by: SURGERY

## 2024-12-27 PROCEDURE — 88305 TISSUE EXAM BY PATHOLOGIST: CPT | Performed by: SURGERY

## 2024-12-27 PROCEDURE — 25010000002 DEXAMETHASONE PER 1 MG

## 2024-12-27 PROCEDURE — 76000 FLUOROSCOPY <1 HR PHYS/QHP: CPT

## 2024-12-27 PROCEDURE — 31624 DX BRONCHOSCOPE/LAVAGE: CPT | Performed by: SURGERY

## 2024-12-27 PROCEDURE — 88172 CYTP DX EVAL FNA 1ST EA SITE: CPT | Performed by: SURGERY

## 2024-12-27 PROCEDURE — 31629 BRONCHOSCOPY/NEEDLE BX EACH: CPT | Performed by: SURGERY

## 2024-12-27 PROCEDURE — 25010000002 PROPOFOL 10 MG/ML EMULSION

## 2024-12-27 PROCEDURE — 31641 BRONCHOSCOPY TREAT BLOCKAGE: CPT | Performed by: SURGERY

## 2024-12-27 PROCEDURE — 25010000002 LIDOCAINE 2% SOLUTION

## 2024-12-27 RX ORDER — PROPOFOL 10 MG/ML
VIAL (ML) INTRAVENOUS AS NEEDED
Status: DISCONTINUED | OUTPATIENT
Start: 2024-12-27 | End: 2024-12-27 | Stop reason: SURG

## 2024-12-27 RX ORDER — DEXAMETHASONE SODIUM PHOSPHATE 4 MG/ML
INJECTION, SOLUTION INTRA-ARTICULAR; INTRALESIONAL; INTRAMUSCULAR; INTRAVENOUS; SOFT TISSUE AS NEEDED
Status: DISCONTINUED | OUTPATIENT
Start: 2024-12-27 | End: 2024-12-27 | Stop reason: SURG

## 2024-12-27 RX ORDER — ALBUTEROL SULFATE 90 UG/1
INHALANT RESPIRATORY (INHALATION) AS NEEDED
Status: DISCONTINUED | OUTPATIENT
Start: 2024-12-27 | End: 2024-12-27 | Stop reason: SURG

## 2024-12-27 RX ORDER — LIDOCAINE HYDROCHLORIDE 20 MG/ML
INJECTION, SOLUTION INFILTRATION; PERINEURAL AS NEEDED
Status: DISCONTINUED | OUTPATIENT
Start: 2024-12-27 | End: 2024-12-27 | Stop reason: SURG

## 2024-12-27 RX ORDER — PHENYLEPHRINE HYDROCHLORIDE 10 MG/ML
INJECTION INTRAVENOUS AS NEEDED
Status: DISCONTINUED | OUTPATIENT
Start: 2024-12-27 | End: 2024-12-27 | Stop reason: SURG

## 2024-12-27 RX ORDER — EPHEDRINE SULFATE 50 MG/ML
INJECTION, SOLUTION INTRAVENOUS AS NEEDED
Status: DISCONTINUED | OUTPATIENT
Start: 2024-12-27 | End: 2024-12-27 | Stop reason: SURG

## 2024-12-27 RX ORDER — ROCURONIUM BROMIDE 10 MG/ML
INJECTION, SOLUTION INTRAVENOUS AS NEEDED
Status: DISCONTINUED | OUTPATIENT
Start: 2024-12-27 | End: 2024-12-27 | Stop reason: SURG

## 2024-12-27 RX ORDER — SODIUM CHLORIDE 9 MG/ML
30 INJECTION, SOLUTION INTRAVENOUS CONTINUOUS PRN
Status: ACTIVE | OUTPATIENT
Start: 2024-12-27 | End: 2024-12-27

## 2024-12-27 RX ORDER — ONDANSETRON 2 MG/ML
INJECTION INTRAMUSCULAR; INTRAVENOUS AS NEEDED
Status: DISCONTINUED | OUTPATIENT
Start: 2024-12-27 | End: 2024-12-27 | Stop reason: SURG

## 2024-12-27 RX ADMIN — EPHEDRINE SULFATE 10 MG: 50 INJECTION INTRAMUSCULAR; INTRAVENOUS; SUBCUTANEOUS at 11:14

## 2024-12-27 RX ADMIN — PHENYLEPHRINE HYDROCHLORIDE 100 MCG: 10 INJECTION INTRAVENOUS at 11:10

## 2024-12-27 RX ADMIN — DEXAMETHASONE SODIUM PHOSPHATE 6 MG: 4 INJECTION, SOLUTION INTRA-ARTICULAR; INTRALESIONAL; INTRAMUSCULAR; INTRAVENOUS; SOFT TISSUE at 11:29

## 2024-12-27 RX ADMIN — ALBUTEROL SULFATE 4 PUFF: 90 AEROSOL, METERED RESPIRATORY (INHALATION) at 11:51

## 2024-12-27 RX ADMIN — PROPOFOL 130 MG: 10 INJECTION, EMULSION INTRAVENOUS at 10:54

## 2024-12-27 RX ADMIN — LIDOCAINE HYDROCHLORIDE 60 MG: 20 INJECTION, SOLUTION INFILTRATION; PERINEURAL at 10:54

## 2024-12-27 RX ADMIN — ROCURONIUM BROMIDE 10 MG: 10 INJECTION, SOLUTION INTRAVENOUS at 11:21

## 2024-12-27 RX ADMIN — SUGAMMADEX 200 MG: 100 INJECTION, SOLUTION INTRAVENOUS at 11:48

## 2024-12-27 RX ADMIN — PROPOFOL 140 MCG/KG/MIN: 10 INJECTION, EMULSION INTRAVENOUS at 11:01

## 2024-12-27 RX ADMIN — EPHEDRINE SULFATE 10 MG: 50 INJECTION INTRAMUSCULAR; INTRAVENOUS; SUBCUTANEOUS at 11:26

## 2024-12-27 RX ADMIN — ROCURONIUM BROMIDE 70 MG: 10 INJECTION, SOLUTION INTRAVENOUS at 10:55

## 2024-12-27 RX ADMIN — SODIUM CHLORIDE 30 ML/HR: 9 INJECTION, SOLUTION INTRAVENOUS at 09:46

## 2024-12-27 RX ADMIN — ONDANSETRON 4 MG: 2 INJECTION INTRAMUSCULAR; INTRAVENOUS at 11:29

## 2024-12-27 RX ADMIN — ALBUTEROL SULFATE 4 PUFF: 90 AEROSOL, METERED RESPIRATORY (INHALATION) at 11:06

## 2024-12-27 NOTE — ANESTHESIA POSTPROCEDURE EVALUATION
Patient: Bayron Acevedo    Procedure Summary       Date: 12/27/24 Room / Location:  [facility]    Anesthesia Start: 1048 Anesthesia Stop: 1201    Procedure: BRONCHOSCOPY AND ION ROBOT NAVIGATION and Cryoprobe with FNA, biopsies, & BAL WITH ENDOBRONCHIAL ULTRASOUND (Chest) Diagnosis:       Mass of upper lobe of left lung      (Mass of upper lobe of left lung [R91.8])    Surgeons: Andrei Chávez MD Provider: Raul Shannon MD    Anesthesia Type: general ASA Status: 3            Anesthesia Type: general    Vitals  Vitals Value Taken Time   /86 12/27/24 1330   Temp     Pulse 58 12/27/24 1330   Resp 20 12/27/24 1330   SpO2 95 % 12/27/24 1330           Anesthesia Post Evaluation

## 2024-12-27 NOTE — ADDENDUM NOTE
Addendum  created 12/27/24 1352 by Alyssa Merrill CRNA    Clinical Note Signed, Intraprocedure Blocks edited, SmartForm saved

## 2024-12-27 NOTE — PROGRESS NOTES
THORACIC SURGERY CLINIC CONSULT NOTE    REASON FOR CONSULT: Clinical stage IIIA ([id number]) non-small cell lung cancer (adenocarcinoma) of left upper lobe     REFERRING PROVIDER: Myles Santos Jr., MD    Subjective   HISTORY OF PRESENTING ILLNESS:   Bayron Acevedo is a 84 y.o. male who has significant medical problems as mentioned in the medical chart.     History of Present Illness  The patient is an 84-year-old male who presents for evaluation of shoulder and arm pain.    He underwent a robotic left upper lobe pulmonary decortication and aborted lobectomy back on 7/31/2023 by Dr. Nemesio Kramer due to the fact that his biopsy-proven lung cancer grossly invaded his mediastinum and phrenic nerve. This hospital course was prolonged with a extremely prolonged air leak. During follow-up, he was found to have 2 small enlarging right upper lobe nodules that were highly suspicious for malignancy with synchronous primary lesions. Nodules of borderline size for biopsy with significant risk for pneumothorax and biopsy was not pursued. Patient received presumptive treatment with SBRT to both nodules completed on 4/17/2024. Follow-up CT chest on 5/22/2024 showed increased ill-defined density anterior left apex, irregular consolidation left upper lobe that was stable, groundglass density in the periphery of the left upper lobe as well as new small nodules in the right upper lobe. Changes in the left lung were felt to be likely radiation related. PET scan 7/10/2024 with stable radiated left upper lobe mass with uptake near blood pool. Left apical 3.3 x 1.3 cm bandlike consolidation with SUV 10.1 (previously few linear foci of consolidation and SUV 3.4). Left upper lobe 1.6 cm focal consolidation with SUV 8.2 (previously few linear foci of consolidation SUV 4.3). Right upper lobe nodules x 2 around bullae decreased in size (1 cm down to 0.6 cm and 0.8 down to 0.5 cm), no activity. New surrounding consolidation and groundglass  opacity. Persistent focal transverse colon hypermetabolism (SUV decreased from 6.9 down to 4.6) with generalized tubular hypermetabolism in the distal colon.  He was seen by me in August 2024 and did not want to proceed with biopsy at that time.  He had repeat CT scan of the chest on 11/29/2024 which reported slight decrease in size of the treated mass in the anterior left upper lobe with surrounding increasing consolidation likely postradiation changes.  PET CT scan on 12/13/2024 reported irregular pulmonary consultation with anterior aspect of the left upper lobe which has definitely increased in size as well as degree of FDG activity.  There were a cluster of prominent left supraclavicular for noticeable and prominent left internal mammary lymph nodes which have increased in size since 2023.  There is a cystic lesion in the right upper lobe which is slightly increased in size and degree of wall thickening.    He was referred to me for lung biopsy and medius lymph node sampling.    Past Medical History:   Diagnosis Date    AAA (abdominal aortic aneurysm) 03/31/2021    without rupture    Acid reflux     Atherosclerosis of native arteries of extremities with intermittent claudication, bilateral legs 11/13/2019    CAD in native artery     STENT    Carotid stenosis 05/11/2016    gonzalez. s/p CEA Left    Cataract     CKD (chronic kidney disease)     Claudication     Colon polyp     COPD (chronic obstructive pulmonary disease)     H/O Acute myocardial infarction 2013    H/O PAD (peripheral artery disease)     Heart attack 2013    Heart failure     History of atrial fibrillation     AFTER LUNG SURGERY    Hypercholesterolemia     Hyperlipidemia     Hypertension     Inguinal hernia     RIGHT    Left ventricular dysfunction     Mass of left lung     SHOWS ON MRI    Mitral valve regurgitation     Non-small cell lung cancer (NSCLC) 2023    LEFT UPPER LOBE, HAD SURGERY AND RADIATION    Perennial allergic rhinitis 01/06/2017    PVD  (peripheral vascular disease)     S/P angioplasty with stent     Tinnitus     Tricuspid regurgitation        Past Surgical History:   Procedure Laterality Date    APPENDECTOMY      ARTERIOGRAM AORTIC N/A 05/17/2021    Procedure: ZENITH AORTIC STENT GRAFT;  Surgeon: Karen Barrera Jr., MD;  Location: [facility] OR 18/19;  Service: Vascular;  Laterality: N/A;    CARDIAC CATHETERIZATION      X2    CARDIAC CATHETERIZATION N/A 04/11/2023    Procedure: Left Heart Cath;  Surgeon: Guru Jiang MD;  [facility];  Service: Cardiovascular;  Laterality: N/A;    CARDIAC CATHETERIZATION N/A 04/11/2023    Procedure: Right Heart Cath;  Surgeon: Guru Jiang MD;  [facility];  Service: Cardiovascular;  Laterality: N/A;    CARDIAC CATHETERIZATION N/A 04/11/2023    Procedure: Coronary angiography;  Surgeon: Guru Jiang MD;  Location: [facility] CATH INVASIVE LOCATION;  Service: Cardiovascular;  Laterality: N/A;    CARDIAC CATHETERIZATION N/A 04/11/2023    Procedure: Left ventriculography;  Surgeon: Guru Jiang MD;  Location: [facility] CATH INVASIVE LOCATION;  Service: Cardiovascular;  Laterality: N/A;    CARDIAC CATHETERIZATION  04/11/2023    Procedure: RESTING FULL CYCLE RATIO;  Surgeon: Guru Jiang MD;  Location: [facility] CATH INVASIVE LOCATION;  Service: Cardiovascular;;    CARDIAC CATHETERIZATION  2002    CARDIAC CATHETERIZATION      CAROTID ENDARTERECTOMY Left 01/28/2013    Bruce Jones Jr, MD    COLONOSCOPY N/A 10/17/2011    Diverticulosis sigmoid colon, two 4-5 mm polyps in the transverse colon and in the ascending colon, internal hemorrhoids, otherwise normal-Dr. Bronson Blanc    COLONOSCOPY  2012    CORONARY STENT PLACEMENT      HYDROCELE EXCISION / REPAIR Right 05/28/2014    Dr. STANLEY Osorio    INGUINAL HERNIA REPAIR Right 03/05/2024    Procedure: right INGUINAL HERNIA REPAIR LAPAROSCOPIC WITH DAVINCI ROBOT;  Surgeon: Estephanie  Tejinder WHITESIDE MD;  Location: [facility] MAIN OR;  Service: Robotics - DaVinci;  Laterality: Right;    LOBECTOMY Left 2023    Procedure: BRONCHOSCOPY, LEFT VAT WITH DAVINCI ROBOT, EXTENSIVE LYSIS OF ADHESIONS, PARTIAL PULMONARY DECORTICATION, INTERCOSTAL NERVE BLOCK;  Surgeon: Nemesio Kramer MD PhD;  Location: [facility] MAIN OR;  Service: Robotics - DaVinci;  Laterality: Left;    MEDIASTINOSCOPY N/A 2023    Procedure: MEDIASTINOSCOPY;  Surgeon: Nemesio Kramer MD PhD;  Location: [facility] MAIN OR;  Service: Thoracic;  Laterality: N/A;    UMBILICAL HERNIA REPAIR N/A 2007    Umbilical hernia repair with Ventralax mesh-Dr. Prabhu Freedman       Family History   Problem Relation Age of Onset    No Known Problems Mother         complications of child birth    Cancer Father     Leukemia Father     Cancer Brother     Heart disease Maternal Uncle     Heart disease Other     Cancer Other     Other Other         blood clotts    Bleeding Disorder Other     Malig Hyperthermia Neg Hx        Social History     Socioeconomic History    Marital status:      Spouse name: Amina   Tobacco Use    Smoking status: Former     Current packs/day: 0.00     Average packs/day: 1 pack/day for 55.0 years (55.0 ttl pk-yrs)     Types: Cigarettes     Start date:      Quit date: 2000     Years since quittin.0    Smokeless tobacco: Never   Vaping Use    Vaping status: Never Used   Substance and Sexual Activity    Alcohol use: No    Drug use: No    Sexual activity: Defer       No current facility-administered medications for this visit.  No current outpatient medications on file.    Facility-Administered Medications Ordered in Other Visits:     sodium chloride 0.9 % infusion, 30 mL/hr, Intravenous, Continuous JESUSN, Andrei Chávez MD, Last Rate: 30 mL/hr at 2446, 30 mL/hr at 24     Allergies   Allergen Reactions    Lisinopril Anaphylaxis    Codeine Sulfate Hives    Contrast Dye (Echo Or Unknown Ct/Mr) Other  (See Comments)     HYPERTENSION    Iodinated Contrast Media Other (See Comments)     HYPERTENSION    Losartan Swelling     LIP    Penicillins Other (See Comments)     Passed out after a PCN shot- no other sx noted-per pateint  Beta lactam allergy details  Antibiotic reaction: unknown  Age at reaction: adult  Dose to reaction time: unknown  Reason for antibiotic: other  Epinephrine required for reaction?: no  Tolerated antibiotics: unknown                Objective    OBJECTIVE:     VITAL SIGNS:  /56   Pulse 57   Wt 68.3 kg (150 lb 8 oz)   SpO2 100%   BMI 23.62 kg/m²     PHYSICAL EXAM:  Normal appearance.   Head is normocephalic.   Nose appears normal.   No obvious deformity of the mouth and throat.  Conjunctivae normal.   Heart rate and rhythm is normal.  Pulmonary effort is normal.   Moving all 4 extremities.  Extremities warm.  No focal deficit present.   He is alert and oriented to person, place, and time.     LAB RESULTS:  I have reviewed all the available laboratory results in the chart.    RESULTS REVIEW:  I have reviewed the patient's all relevant laboratory and imaging findings.     Results  Imaging  PET CT scan shows suspicious areas on the left side of the lung.    ASSESSMENT & PLAN:  Bayron Acevedo is a 84 y.o. male with significant medical conditions as mentioned above presented to my clinic.    Diagnosis: Clinical stage IIIA ([id number]) non-small cell lung cancer (adenocarcinoma) of left upper lobe     Assessment & Plan  He has evidence of increased size of the pulmonary consolidation in the left upper lobe and lymph nodes concerning for recurrent disease.  I discussed with him in depth the rationale for biopsy.  He was concerned about the potential complications from any procedure and was not sure whether he like to proceed or not.  He called our clinic after leaving the visit and was agreeable to proceed with biopsy.  He will be scheduled for robotic navigational bronchoscopy, endobronchial  ultrasound and biopsy.    I discussed the patients findings and my recommendations with the patient. The patient was given adequate time to ask questions and all questions were answered to patient satisfaction. Thank you for this consult and allowing us to participate in the care of your patient.      Andrei Chávez MD  Thoracic Surgeon  [facility]        Dictated utilizing Dragon dictation    I spent 60 minutes caring for Bayron on this date of service. This time includes time spent by me in the following activities:preparing for the visit, reviewing tests, obtaining and/or reviewing a separately obtained history, performing a medically appropriate examination and/or evaluation , counseling and educating the patient/family/caregiver, ordering medications, tests, or procedures, referring and communicating with other health care professionals , documenting information in the medical record, independently interpreting results and communicating that information with the patient/family/caregiver, and care coordination and more than half the time was spent in direct face to face evaluation and decision making.

## 2024-12-27 NOTE — H&P (VIEW-ONLY)
THORACIC SURGERY CLINIC CONSULT NOTE    REASON FOR CONSULT: Clinical stage IIIA ([id number]) non-small cell lung cancer (adenocarcinoma) of left upper lobe     REFERRING PROVIDER: Myles Santos Jr., MD    Subjective   HISTORY OF PRESENTING ILLNESS:   Bayron Acevedo is a 84 y.o. male who has significant medical problems as mentioned in the medical chart.     History of Present Illness  The patient is an 84-year-old male who presents for evaluation of shoulder and arm pain.    He underwent a robotic left upper lobe pulmonary decortication and aborted lobectomy back on 7/31/2023 by Dr. Nemesio Kramer due to the fact that his biopsy-proven lung cancer grossly invaded his mediastinum and phrenic nerve. This hospital course was prolonged with a extremely prolonged air leak. During follow-up, he was found to have 2 small enlarging right upper lobe nodules that were highly suspicious for malignancy with synchronous primary lesions. Nodules of borderline size for biopsy with significant risk for pneumothorax and biopsy was not pursued. Patient received presumptive treatment with SBRT to both nodules completed on 4/17/2024. Follow-up CT chest on 5/22/2024 showed increased ill-defined density anterior left apex, irregular consolidation left upper lobe that was stable, groundglass density in the periphery of the left upper lobe as well as new small nodules in the right upper lobe. Changes in the left lung were felt to be likely radiation related. PET scan 7/10/2024 with stable radiated left upper lobe mass with uptake near blood pool. Left apical 3.3 x 1.3 cm bandlike consolidation with SUV 10.1 (previously few linear foci of consolidation and SUV 3.4). Left upper lobe 1.6 cm focal consolidation with SUV 8.2 (previously few linear foci of consolidation SUV 4.3). Right upper lobe nodules x 2 around bullae decreased in size (1 cm down to 0.6 cm and 0.8 down to 0.5 cm), no activity. New surrounding consolidation and groundglass  opacity. Persistent focal transverse colon hypermetabolism (SUV decreased from 6.9 down to 4.6) with generalized tubular hypermetabolism in the distal colon.  He was seen by me in August 2024 and did not want to proceed with biopsy at that time.  He had repeat CT scan of the chest on 11/29/2024 which reported slight decrease in size of the treated mass in the anterior left upper lobe with surrounding increasing consolidation likely postradiation changes.  PET CT scan on 12/13/2024 reported irregular pulmonary consultation with anterior aspect of the left upper lobe which has definitely increased in size as well as degree of FDG activity.  There were a cluster of prominent left supraclavicular for noticeable and prominent left internal mammary lymph nodes which have increased in size since 2023.  There is a cystic lesion in the right upper lobe which is slightly increased in size and degree of wall thickening.    He was referred to me for lung biopsy and medius lymph node sampling.    Past Medical History:   Diagnosis Date    AAA (abdominal aortic aneurysm) 03/31/2021    without rupture    Acid reflux     Atherosclerosis of native arteries of extremities with intermittent claudication, bilateral legs 11/13/2019    CAD in native artery     STENT    Carotid stenosis 05/11/2016    gonzalez. s/p CEA Left    Cataract     CKD (chronic kidney disease)     Claudication     Colon polyp     COPD (chronic obstructive pulmonary disease)     H/O Acute myocardial infarction 2013    H/O PAD (peripheral artery disease)     Heart attack 2013    Heart failure     History of atrial fibrillation     AFTER LUNG SURGERY    Hypercholesterolemia     Hyperlipidemia     Hypertension     Inguinal hernia     RIGHT    Left ventricular dysfunction     Mass of left lung     SHOWS ON MRI    Mitral valve regurgitation     Non-small cell lung cancer (NSCLC) 2023    LEFT UPPER LOBE, HAD SURGERY AND RADIATION    Perennial allergic rhinitis 01/06/2017    PVD  (peripheral vascular disease)     S/P angioplasty with stent     Tinnitus     Tricuspid regurgitation        Past Surgical History:   Procedure Laterality Date    APPENDECTOMY      ARTERIOGRAM AORTIC N/A 05/17/2021    Procedure: ZENITH AORTIC STENT GRAFT;  Surgeon: Karen Barrera Jr., MD;  Location: [facility] OR 18/19;  Service: Vascular;  Laterality: N/A;    CARDIAC CATHETERIZATION      X2    CARDIAC CATHETERIZATION N/A 04/11/2023    Procedure: Left Heart Cath;  Surgeon: Guru Jiang MD;  [facility];  Service: Cardiovascular;  Laterality: N/A;    CARDIAC CATHETERIZATION N/A 04/11/2023    Procedure: Right Heart Cath;  Surgeon: Guru Jiang MD;  [facility];  Service: Cardiovascular;  Laterality: N/A;    CARDIAC CATHETERIZATION N/A 04/11/2023    Procedure: Coronary angiography;  Surgeon: Guru Jiang MD;  Location: [facility] CATH INVASIVE LOCATION;  Service: Cardiovascular;  Laterality: N/A;    CARDIAC CATHETERIZATION N/A 04/11/2023    Procedure: Left ventriculography;  Surgeon: Guru Jiang MD;  Location: [facility] CATH INVASIVE LOCATION;  Service: Cardiovascular;  Laterality: N/A;    CARDIAC CATHETERIZATION  04/11/2023    Procedure: RESTING FULL CYCLE RATIO;  Surgeon: Guru Jiang MD;  Location: [facility] CATH INVASIVE LOCATION;  Service: Cardiovascular;;    CARDIAC CATHETERIZATION  2002    CARDIAC CATHETERIZATION      CAROTID ENDARTERECTOMY Left 01/28/2013    Bruce Jones Jr, MD    COLONOSCOPY N/A 10/17/2011    Diverticulosis sigmoid colon, two 4-5 mm polyps in the transverse colon and in the ascending colon, internal hemorrhoids, otherwise normal-Dr. Bronson Balnc    COLONOSCOPY  2012    CORONARY STENT PLACEMENT      HYDROCELE EXCISION / REPAIR Right 05/28/2014    Dr. STANLEY Osorio    INGUINAL HERNIA REPAIR Right 03/05/2024    Procedure: right INGUINAL HERNIA REPAIR LAPAROSCOPIC WITH DAVINCI ROBOT;  Surgeon: Estephanie  Tejinder WHITESIDE MD;  Location: [facility] MAIN OR;  Service: Robotics - DaVinci;  Laterality: Right;    LOBECTOMY Left 2023    Procedure: BRONCHOSCOPY, LEFT VAT WITH DAVINCI ROBOT, EXTENSIVE LYSIS OF ADHESIONS, PARTIAL PULMONARY DECORTICATION, INTERCOSTAL NERVE BLOCK;  Surgeon: Nemesio Kramer MD PhD;  Location: [facility] MAIN OR;  Service: Robotics - DaVinci;  Laterality: Left;    MEDIASTINOSCOPY N/A 2023    Procedure: MEDIASTINOSCOPY;  Surgeon: Nemesio Kramer MD PhD;  Location: [facility] MAIN OR;  Service: Thoracic;  Laterality: N/A;    UMBILICAL HERNIA REPAIR N/A 2007    Umbilical hernia repair with Ventralax mesh-Dr. Prabhu Freedman       Family History   Problem Relation Age of Onset    No Known Problems Mother         complications of child birth    Cancer Father     Leukemia Father     Cancer Brother     Heart disease Maternal Uncle     Heart disease Other     Cancer Other     Other Other         blood clotts    Bleeding Disorder Other     Malig Hyperthermia Neg Hx        Social History     Socioeconomic History    Marital status:      Spouse name: Amina   Tobacco Use    Smoking status: Former     Current packs/day: 0.00     Average packs/day: 1 pack/day for 55.0 years (55.0 ttl pk-yrs)     Types: Cigarettes     Start date:      Quit date: 2000     Years since quittin.0    Smokeless tobacco: Never   Vaping Use    Vaping status: Never Used   Substance and Sexual Activity    Alcohol use: No    Drug use: No    Sexual activity: Defer       No current facility-administered medications for this visit.  No current outpatient medications on file.    Facility-Administered Medications Ordered in Other Visits:     sodium chloride 0.9 % infusion, 30 mL/hr, Intravenous, Continuous JESUSN, Andrei Chávez MD, Last Rate: 30 mL/hr at 2446, 30 mL/hr at 24     Allergies   Allergen Reactions    Lisinopril Anaphylaxis    Codeine Sulfate Hives    Contrast Dye (Echo Or Unknown Ct/Mr) Other  (See Comments)     HYPERTENSION    Iodinated Contrast Media Other (See Comments)     HYPERTENSION    Losartan Swelling     LIP    Penicillins Other (See Comments)     Passed out after a PCN shot- no other sx noted-per pateint  Beta lactam allergy details  Antibiotic reaction: unknown  Age at reaction: adult  Dose to reaction time: unknown  Reason for antibiotic: other  Epinephrine required for reaction?: no  Tolerated antibiotics: unknown                Objective    OBJECTIVE:     VITAL SIGNS:  /56   Pulse 57   Wt 68.3 kg (150 lb 8 oz)   SpO2 100%   BMI 23.62 kg/m²     PHYSICAL EXAM:  Normal appearance.   Head is normocephalic.   Nose appears normal.   No obvious deformity of the mouth and throat.  Conjunctivae normal.   Heart rate and rhythm is normal.  Pulmonary effort is normal.   Moving all 4 extremities.  Extremities warm.  No focal deficit present.   He is alert and oriented to person, place, and time.     LAB RESULTS:  I have reviewed all the available laboratory results in the chart.    RESULTS REVIEW:  I have reviewed the patient's all relevant laboratory and imaging findings.     Results  Imaging  PET CT scan shows suspicious areas on the left side of the lung.    ASSESSMENT & PLAN:  Bayron Acevedo is a 84 y.o. male with significant medical conditions as mentioned above presented to my clinic.    Diagnosis: Clinical stage IIIA ([id number]) non-small cell lung cancer (adenocarcinoma) of left upper lobe     Assessment & Plan  He has evidence of increased size of the pulmonary consolidation in the left upper lobe and lymph nodes concerning for recurrent disease.  I discussed with him in depth the rationale for biopsy.  He was concerned about the potential complications from any procedure and was not sure whether he like to proceed or not.  He called our clinic after leaving the visit and was agreeable to proceed with biopsy.  He will be scheduled for robotic navigational bronchoscopy, endobronchial  ultrasound and biopsy.    I discussed the patients findings and my recommendations with the patient. The patient was given adequate time to ask questions and all questions were answered to patient satisfaction. Thank you for this consult and allowing us to participate in the care of your patient.      Andrei Chávez MD  Thoracic Surgeon  [facility]        Dictated utilizing Dragon dictation    I spent 60 minutes caring for Bayron on this date of service. This time includes time spent by me in the following activities:preparing for the visit, reviewing tests, obtaining and/or reviewing a separately obtained history, performing a medically appropriate examination and/or evaluation , counseling and educating the patient/family/caregiver, ordering medications, tests, or procedures, referring and communicating with other health care professionals , documenting information in the medical record, independently interpreting results and communicating that information with the patient/family/caregiver, and care coordination and more than half the time was spent in direct face to face evaluation and decision making.

## 2024-12-27 NOTE — OP NOTE
Operative Note     Date of procedure: 12/27/2024     Patient name: Bayron Acevedo  MRN: [id number]    Pre OP diagnosis:    Mass of upper lobe of left lung    Post OP diagnosis:  Same as above    Procedure performed:   Flexible bronchoscopy.  Navigational bronchoscopy with ION robot with C-arm.  Interpretation of fluoroscopy images.  Radial endobronchial ultrasound.  Fine-needle aspiration of the left upper lobe mass.  Cryo biopsy of the the left upper lobe mass using 1.1 mm ERBECRYO®  cryoprobe.  Left upper lobe segmental bronchoalveolar lavage.  Fine-needle aspiration of the left lower lobe nodule  Cryo biopsy of the the left lower lobe nodule using 1.1 mm ERBECRYO®  cryoprobe.  Left lower lobe segmental bronchoalveolar lavage.    ION Synoptic report:  Date of radiological diagnosis: 11/29/2024  Lesions biopsied: Multiple  Location: Left Upper Lobe, left lower lobe  Peripheral 1/3: Yes  Appearance: Solid  PET avidity: Yes  Bronchial sign: Yes  Prior biopsy: No  Radial EBUS: Concentric  Tools utilized: 23-Gauge Needle, cryoprobe, BAL  CAROL result: No evidence of malignancy  EBUS performed: No    Indications:   Bayron Acevedo is a 84 y.o. male who has significant medical problems as mentioned in the medical chart.      He underwent a robotic left upper lobe pulmonary decortication and aborted lobectomy back on 7/31/2023 by Dr. Nemesio Kramer due to the fact that his biopsy-proven lung cancer grossly invaded his mediastinum and phrenic nerve. This hospital course was prolonged with a extremely prolonged air leak. During follow-up, he was found to have 2 small enlarging right upper lobe nodules that were highly suspicious for malignancy with synchronous primary lesions. Nodules of borderline size for biopsy with significant risk for pneumothorax and biopsy was not pursued. Patient received presumptive treatment with SBRT to both nodules completed on 4/17/2024. Follow-up CT chest on 5/22/2024 showed increased ill-defined  density anterior left apex, irregular consolidation left upper lobe that was stable, groundglass density in the periphery of the left upper lobe as well as new small nodules in the right upper lobe. Changes in the left lung were felt to be likely radiation related. PET scan 7/10/2024 with stable radiated left upper lobe mass with uptake near blood pool. Left apical 3.3 x 1.3 cm bandlike consolidation with SUV 10.1 (previously few linear foci of consolidation and SUV 3.4). Left upper lobe 1.6 cm focal consolidation with SUV 8.2 (previously few linear foci of consolidation SUV 4.3). Right upper lobe nodules x 2 around bullae decreased in size (1 cm down to 0.6 cm and 0.8 down to 0.5 cm), no activity. New surrounding consolidation and groundglass opacity. Persistent focal transverse colon hypermetabolism (SUV decreased from 6.9 down to 4.6) with generalized tubular hypermetabolism in the distal colon.  He was seen by me in August 2024 and did not want to proceed with biopsy at that time.  He had repeat CT scan of the chest on 11/29/2024 which reported slight decrease in size of the treated mass in the anterior left upper lobe with surrounding increasing consolidation likely postradiation changes.  PET CT scan on 12/13/2024 reported irregular pulmonary consultation with anterior aspect of the left upper lobe which has definitely increased in size as well as degree of FDG activity.  There were a cluster of prominent left supraclavicular for noticeable and prominent left internal mammary lymph nodes which have increased in size since 2023.  There is a cystic lesion in the right upper lobe which is slightly increased in size and degree of wall thickening.     He was referred to me for lung biopsy and medius lymph node sampling.       Surgeon: Andrei Chávez MD     Assistants: No qualified assistant was available for this procedure.      Anesthesia: General endotracheal anesthesia    ASA Class: 4    Procedure Details   On  12/27/2024, the patient was brought to the operating room and placed in the supine position on the operating room table. Following an uneventful induction of general anesthesia, patient was intubated with a single endotracheal tube without incident.  Antibiotic for surgical prophylaxis was not indicated due to the nature of the procedure.  Prior to beginning the operation, a time-out was conducted with all members of surgical team present. The patient was identified as Bayron Acevedo, the procedure and the correct site were verified.     I began by performing flexible bronchoscopy.  A flexible adult bronchoscope was advanced through the endotracheal tube.  A complete examination of the distal trachea and bilateral mainstem and lobar bronchi and all segmental bronchial orifices was performed.  The patient has normal endobronchial anatomy.  All the tracheobronchial tree had moderate mucus secretion.  There was no blood, endoluminal lesions or other abnormal findings.  The bronchoscope was removed.    Prior to the procedure, the patient CT scan was integrated into the PlanPoint interface that generated the patient's 3D airway tree.  The target nodule was identified and its anatomical borders were mapped.  A path to the target nodule was generated through the PlanPoint interface.  After the plan was finalized, the patient image and plan guide was transferred to the Picurio robotic platform.  Using the Ion's controller, the 3.5 mm robotic catheter was advanced with the Ion's vision probe and navigated to the target along the preplanned path.  The catheter was parked next to the target lesion.  The Ion vision probe was removed and radial EBUS was used to confirm the presence of the target lesion.  The radial EBUS was removed and under fluoroscopic guidance, a 23 gauge Ion’s Flexision needle was passed into the robotic catheter.  The needle was advanced into the target lesion (left upper and lower lobe) and the location of the  needle was confirmed with the C-arm. Multiple passes of the needle were made into the target lesion and the aspirate was sent for Rapid on Site Evaluation (CAROL). The needle was removed. I then performed cryo biopsy using 1.1 mm ERBECRYO®  cryoprobe through the working channel in the same location. The bronchioloalveolar lavage was performed in the segmental bronchi.  The aspirate was sent for cytology.  The robotic catheter was removed and an adult flexible bronchoscope was inserted.  There was no pooling of blood into the bronchial tree.  All tracheobronchial tree were cleared from secretions.    The patient was awakened from anesthesia, was extubated without incident, and was transported to the Post Anesthesia Care Unit in stable condition.    Findings:  Using 23-gauge needle, fine-needle aspiration of the left upper lobe mass and left lower lobe nodule was performed.  Cryo biopsy of the left upper lobe mass and left lower lobe nodule was performed using 1.1 mm ERBECRYO®  cryoprobe.  Bronchoalveolar lavage of the left upper lobe and lower lobe segments was performed.  The pathology reported atypical cells from the left upper lobe.  No evidence of significant active bleeding at the end of the procedure.    Estimated Blood Loss:  Minimal           Drains: None                 Specimens:   ID Type Source Tests Collected by Time   A (Not marked as sent) : KIET FNA Fine Needle Aspirate Lung, Left Upper Lobe FINE NEEDLE ASPIRATION Andrei Chávez MD 12/27/2024 1113   B (Not marked as sent) : KIET Biopsies Tissue Lung, Left Upper Lobe TISSUE PATHOLOGY EXAM Andrei Chávez MD 12/27/2024 1114   C (Not marked as sent) : KIET BAL Lavage Lung, Left Upper Lobe NON-GYNECOLOGIC CYTOLOGY Andrei Chávez MD 12/27/2024 1124   D (Not marked as sent) : LLL FNA Fine Needle Aspirate Lung, Left Lower Lobe FINE NEEDLE ASPIRATION Andrei Chávez MD 12/27/2024 1135   E (Not marked as sent) : LLL biopsies Tissue Lung, Left Lower Lobe TISSUE PATHOLOGY  EXAM Andrei Chávez MD 12/27/2024 1142   F (Not marked as sent) : LLL BAL Lavage Lung, Left Lower Lobe NON-GYNECOLOGIC CYTOLOGY Andrei Chávez MD 12/27/2024 1142              Implants: None           Complications: None           Disposition: PACU - hemodynamically stable.           Condition: Stable     Andrei Chávez MD   Thoracic Surgeon  [facility]

## 2024-12-27 NOTE — DISCHARGE INSTRUCTIONS
Endoscopic ultrasound and Robotic bronchoscopy with fine needle aspiration    Samples (biopsies) of the lymph nodes are taken from inside the lungs and sent for diagnostic testing.    Final results of your biopsy take up to 5 business days to process; your endoscopic physician will contact you with your results.    EBUS and robotic bronchoscopy is recommended in the following instances:  To diagnose different types of lung disorders (such as sarcoidosis or tuberculosis)  To diagnose or 'stage' cancer   To investigate enlarged lymph  nodes in the chest    Due to effects of sedation, do not drive or operate heavy machinery for 24 hours.    Avoid heavy lifting (>10 lbs.) or strenuous activity for 48 hours.    Continue to avoid non-steroidal anti-inflammatory medications (NSAIDS) for 5-7 days after the procedure unless told otherwise by your endoscopic and primary care physicians.    Some patients have a temporary sore throat after the procedure; this is a normal finding and over-the-counter lozenges help soothe symptoms.    You may resume normal diet once you are discharged.     Avoid red-colored foods for 24 hours.     You may resume your blood thinner medications (if applicable) as directed by your endoscopic and primary care physicians.    It is normal to have a very small amount of blood-tinged sputum immediately after the procedure. This should resolve within 3-4 days.    Although complications are rare, there is a small risk of pain, collapsed lung, bleeding and infection. Contact your endoscopic physician if you have these signs or symptoms (Dr. Chávez):  Temperature greater than 102°F  Worsening pain not relieved by medications  Go to your nearest emergency room is you experience:  Shaking, chills or a temperature over 102°F.    New, sudden difficulty breathing.    New pain when taking a deep breath.    New, sudden chest pain.  Worsening cough that produces large amounts of blood.

## 2024-12-27 NOTE — ANESTHESIA PREPROCEDURE EVALUATION
Anesthesia Evaluation     Patient summary reviewed and Nursing notes reviewed   no history of anesthetic complications:   NPO Solid Status: > 8 hours  NPO Liquid Status: > 2 hours           Airway   Mallampati: II  TM distance: >3 FB  Neck ROM: full  Dental      Pulmonary    (+) lung cancer, COPD,  Cardiovascular     (+) hypertension, past MI  >12 months, CAD, cardiac stents , dysrhythmias Paroxysmal Atrial Fib,  carotid artery disease (s/p L CEA) carotid bilateral      Neuro/Psych  GI/Hepatic/Renal/Endo    (+) GERD, renal disease- CRI    Musculoskeletal     Abdominal    Substance History      OB/GYN          Other      history of cancer active                      Anesthesia Plan    ASA 3     general     intravenous induction     Anesthetic plan, risks, benefits, and alternatives have been provided, discussed and informed consent has been obtained with: patient.        CODE STATUS:

## 2024-12-27 NOTE — ANESTHESIA PROCEDURE NOTES
Airway  Urgency: elective    Date/Time: 12/27/2024 10:59 AM  Airway not difficult    General Information and Staff    Patient location during procedure: OR  Anesthesiologist: Raul Shannon MD  CRNA/CAA: Alyssa Merrill CRNA    Indications and Patient Condition  Indications for airway management: airway protection    Preoxygenated: yes  Mask difficulty assessment: 1 - vent by mask    Final Airway Details  Final airway type: endotracheal airway      Successful airway: ETT  Cuffed: yes   Successful intubation technique: direct laryngoscopy  Facilitating devices/methods: intubating stylet  Endotracheal tube insertion site: oral  Blade: Danisha  Blade size: 4  ETT size (mm): 9.0  Cormack-Lehane Classification: grade IIa - partial view of glottis  Placement verified by: chest auscultation and capnometry   Cuff volume (mL): 6  Measured from: lips  ETT/EBT  to lips (cm): 23  Number of attempts at approach: 1  Assessment: lips, teeth, and gum same as pre-op and atraumatic intubation

## 2024-12-30 LAB
CYTO UR: NORMAL
LAB AP CASE REPORT: NORMAL
LAB AP NON-GYN SPECIMEN ADEQUACY: NORMAL
PATH REPORT.FINAL DX SPEC: NORMAL
PATH REPORT.GROSS SPEC: NORMAL

## 2025-01-07 ENCOUNTER — PATIENT OUTREACH (OUTPATIENT)
Dept: OTHER | Facility: HOSPITAL | Age: 85
End: 2025-01-07
Payer: MEDICARE

## 2025-01-07 NOTE — PROGRESS NOTES
Call from patient and his wife asking about appt with Dr. Santos this morning. They can't reach the office. Explained his office is opening on a delayed schedule at 10AM. The patient elected to reschedule due to weather; he is uncertain he can drive in.  He sees Dr. Chávez 1/9 @ 9955 and is asking he could be scheduled with Dr. Santos the same day. Will communicate the request to Dr. Santos's office    Notified Gail Orozco ()

## 2025-01-08 NOTE — PROGRESS NOTES
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  Subsequent clinical stageIAI ([id number]) right upper lobe non-small cell lung cancer (biopsy deferred)    Subjective        History of Present Illness    Patient returns today in follow-up having undergone Ion bronchoscopy with Dr. Chávez on 12/27/2024 in the interval.  Patient did well with the procedure.  Fortunately procedure showed no evidence of malignancy on biopsies.  Patient was in to see Dr. Chávez as well earlier today.  He reports no change in his overall status, ECOG performance status of 1.  He reports mild chronic dyspnea on exertion which is unchanged.  He notes a stable appetite, weight stable at 150 pounds.      Objective   Vital Signs:   /68   Pulse 67   Temp 97.8 °F (36.6 °C) (Oral)   Ht 170.2 cm (67.01\")   Wt 68.4 kg (150 lb 11.2 oz)   SpO2 97%   BMI 23.60 kg/m²     Physical Exam  Constitutional:       Appearance: He is well-developed.      Comments: Patient in no distress   Eyes:      Conjunctiva/sclera: Conjunctivae normal.   Neck:      Thyroid: No thyromegaly.   Cardiovascular:      Rate and Rhythm: Normal rate and regular rhythm.      Heart sounds: No murmur heard.     No friction rub. No gallop.   Pulmonary:      Effort: No respiratory distress.      Breath sounds: Normal breath sounds.   Abdominal:      General: Bowel sounds are normal. There is no distension.      Palpations: Abdomen is soft.      Tenderness: There is no abdominal tenderness.   Musculoskeletal:         General: No swelling.   Lymphadenopathy:      Head:      Right side of head: No submandibular adenopathy.      Cervical: No cervical adenopathy.      Upper Body:      Right upper body: No supraclavicular adenopathy.      Left upper body: No supraclavicular adenopathy.   Skin:     General: Skin is warm and dry.      Findings: No rash.   Neurological:      Mental Status: He is alert and oriented to person, " place, and time.      Cranial Nerves: No cranial nerve deficit.      Motor: No abnormal muscle tone.      Deep Tendon Reflexes: Reflexes normal.   Psychiatric:         Behavior: Behavior normal.       Patient was examined today, unchanged from above      Result Review : Reviewed CBC, CMP from today.  Reviewed results from Ion bronchoscopy 12/27/2024 including procedure note and pathology.       Assessment and Plan     *Clinical stage IIIA ([id number]) non-small cell lung cancer (adenocarcinoma) of left upper lobe.  Subsequent clinical stageIAI ([id number]) right upper lobe non-small cell lung cancer (biopsy deferred)  The patient has a history of smoking 1 pack/day x 55 years quit in 2000.  CT chest 5/26/2023 showed a spiculated mass in the anterior left upper lobe 3.7 x 4.3 x 4.2 cm along the anterior pleural surface and lateral aspect of the anterior mediastinum.  Additional 3 mm nodule right major fissure.  Bilateral small to moderate-sized pleural effusions.  Bullae formation consistent with COPD.  Mediastinal lymphadenopathy with largest node infracarinal region 2.6 x 1.2 cm.  PET/CT on 6/12/2023 showed 5.2 x 3.1 cm irregular pleural-based mass anterior left upper lobe abutting the pleura anteriorly and medially, hypermetabolic with SUV 14.4.  Mediastinal lymph node activity in the subcarinal region with a 2.2 x 1.1 cm lymph node with SUV 4.5.  Remainder of mediastinal lymph nodes less intensely hypermetabolic with maximal SUV 3.3.  Moderate size loculated pleural effusions bilaterally with low-level activity along the pleura (SUV 2.5), no change in volume of pleural effusions since prior chest CT.  New small to moderate loculated right hydropneumothorax with air-fluid level 5.2 cm.   CT-guided left upper lobe lung biopsy 6/13/2023 with pathology that showed invasive poorly differentiated adenocarcinoma, PD-L1 less than 1%.  Caris analysis: TMB high (14 muts/Mb), mutations in KEAP1 and TP53, EGFR VUS, no targetable  mutations.  Patient was hospitalized 6/13 - 6/15/2023 due to right hydropneumothorax, had CT-guided chest tube placement performed 6/13/2023.  Right pleural fluid cytology negative on 6/13/2023.  Staging MRI brain was negative for evidence of metastatic disease on 7/2/2023  PFTs on 6/20/2023 with FEV1 0.99 (44% predicted), DLCO corrected 14.09 (56% predicted).  Mediastinoscopy 7/17/2023 with 4R and station 7 lymph nodes negative for malignancy  Left VATS performed 7/31/2023.  Intraoperatively, primary tumor was unresectable, invading into the mediastinum and pericardial fat pad as well as with phrenic nerve involvement (T4 lesion).  Sampling of left pleura with fibrosis, chronic inflammation, no evidence of malignancy.  Station 5 and 10 R lymph nodes sampled, negative for malignancy.  New baseline CT chest abdomen pelvis prior to initiation of radiation therapy performed on 9/20/2023.  Persistent loculated left pneumothorax, trace left pleural effusion, primary tumor in partially collapsed left upper lobe appears relatively stable.  Small right pleural effusion no change in borderline enlarged mediastinal lymph nodes.  Slight increase in right upper lobe nodule (for up to 7 mm), indeterminate.  Patient therefore with unresectable stage IIIA ([id number]) disease.  Plans to treat with radiation therapy.  Due to age, performance status, comorbidities, patient felt to be a poor candidate for concurrent chemotherapy.  Patient received radiation therapy 10/9 - 10/27/2023 to left upper lobe, 6000 cGy.  CT chest abdomen pelvis 1/23/2024 showed decrease in left pneumothorax/hydropneumothorax, left upper lobe mass difficult to accurately assess due to change in configuration (shift in lung parenchyma due to pneumothorax improvement) but appears relatively stable, 2.9 x 4.3 versus current 3.3 x 3.5 cm.  No lymphadenopathy noted, no evidence of distant metastatic disease.  PET scan 3/20/2024 with subcentimeter left supraclavicular  lymph node with new uptake (SUV 2.4).  Decrease in treated left upper lobe mass from 4.4 x 3.8 down to 3.4 x 2 (decrease in SUV from 14.4 down to 3.2).  Increase in 2 mildly hypermetabolic left upper lobe ill-defined opacities with linear consolidation felt to represent postradiation change (SUV 3.4).  Resolution of hypermetabolic activity and subcarinal lymph node as well as subcentimeter mediastinal and left hilar lymph nodes.  Increase and 2 adjacent right upper lobe solid nodules surrounding bullae measuring 1 and 0.8 cm with SUV 5.1 and 2.5 respectively.  Decrease in small loculated pleural effusions with SUV 3 on the left and 2.4 on the right.  Tubular hypermetabolic activity throughout the esophagus.  Persistent focal transverse colon hypermetabolism with SUV 6.9 and additional foci of the ascending colon hypermetabolism SUV 5.6.  Patient discussed at thoracic oncology conference 3/28/2024.  Consensus regarding not pursuing biopsy due to risk of pneumothorax and small size of the lesions.  Given the growth rate and activity, lesions were felt to represent additional primary sites of disease.  Recommendation to treat with SBRT.  Patient received SBRT to both right upper lobe lesions x 5 fractions each from 4/11 - 4/17/2024  CT chest 5/22/2024 showed irregular consolidation left upper lobe unchanged 3.5 x 3.2 cm, new small groundglass density left upper lobe.  Stable consolidation left base.  Nodularity posterior right upper lobe largest nodule 8 mm unchanged.  New nodule fissure right upper lobe and new right upper lobe micronodule.  PET scan 7/10/2024 with stable radiated left upper lobe mass with uptake near blood pool.  Left apical 3.3 x 1.3 cm bandlike consolidation with SUV 10.1 (previously few linear foci of consolidation and SUV 3.4).  Left upper lobe 1.6 cm focal consolidation with SUV 8.2 (previously few linear foci of consolidation SUV 4.3).  Right upper lobe nodules x 2 around bullae decreased in  size (1 cm down to 0.6 cm and 0.8 down to 0.5 cm), no activity.  New surrounding consolidation and groundglass opacity.  Persistent focal transverse colon hypermetabolism (SUV decreased from 6.9 down to 4.6) with generalized tubular hypermetabolism in the distal colon.  Discussed with Dr. Painter in radiation oncology and agreed that the degree of uptake and enlargement of left upper lobe focal consolidation was concerning for possible recurrent malignancy in this area.    Patient was referred back to thoracic surgery Dr. Chávez.  Discussed potential Ion bronchoscopy however patient was reluctant to proceed and elected to continue on a course of observation.  CT chest on 9/6/2024 showed decrease in residual left upper lobe mass (3.5 x 3.1 down to 3.2 x 3.0 cm), increased patchy consolidation in the left upper lobe around mass possibly reflective of postradiation change.  Stable nodules right upper lobe with increased groundglass opacity in this with radiation change  Chest 11/29/2024 with decrease in treated mass left upper lobe from 3.2 x 3.0 down to 2.7 x 2.6 cm.  Increasing consolidation and surrounding left upper lobe lung favored to represent postradiation change.  Stable pleural thickening on the left.  Stable rounded density left lower lobe favored to represent rounded atelectasis.  Stable nodular density right upper lobe.  Scan reviewed and discussed with Dr Painter.  The previously treated mass in the left upper lobe continues to decrease in size.  There is fairly extensive change however in the left upper lobe, some of which is nodular in appearance.  The nodular change in the left upper lobe did have activity on prior PET scan and there is still suspicion for potential recurrent malignancy in this area.  The previously treated right upper lobe disease is unchanged. Patient was amenable to consider bronchoscopy at this point.  Dr. Painter feels that if recurrent disease identified in left upper lobe there would be  significant overlap with previous radiation field and he did not recommend pursuit of further radiation therapy.  If malignancy identified we will need to consider pursuit of palliative systemic therapy.    Ion bronchoscopy 12/27/2024 with Dr. Chávez.  FNA left upper lobe negative for malignancy.  Left upper lobe biopsy with benign fibrous tissue and anthracosis, rare atypical reactive cells, no evidence of malignancy.  Left lower lobe biopsy with fibrinous debris and rare fragments of fibrous tissue with anthracosis, no evidence of malignancy.  Left upper lobe BAL negative for malignancy, left lower lobe BAL negative for malignancy.  Patient returns today in follow-up after the above Ion bronchoscopy on 12/27/2024.  Fortunately this did not show any evidence of malignancy in the left upper lobe or left lower lobe by biopsy or BAL.  I discussed the situation with Dr. Chávez.  We are both in agreement that although the lack of identification of malignancy on the current pathology is reassuring, we cannot be certain that there is not underlying malignancy and would recommend ongoing CT surveillance with repeat CT chest in 2 months.  The patient is in agreement.  I will see him back at that time with repeat CT chest to review.    *Chronic bilateral pleural effusions with right hydropneumothorax and subsequent postoperative left hydropneumothorax  Patient appears to have longstanding evidence of small bilateral pleural effusions likely related to CHF  PET/CT 6/12/2023 identified right small to moderate hydropneumothorax new since 5/26/2023 CT chest  Patient was hospitalized 6/13 - 6/15/2023 due to right hydropneumothorax, had CT-guided chest tube placement performed 6/13/2023.  Right pleural fluid cytology negative on 6/13/2023.  As above, patient underwent left VATS with attempted resection of primary lung cancer on 7/31/2023.    Patient required persistent left chest tube postoperatively, eventually removed on 9/6/2023  CT  chest 9/20/2023 with persistent loculated left pneumothorax.  CT chest 1/23/2024 with significant improvement in left pneumothorax/hydropneumothorax  PET scan 3/20/2024 with decrease in small loculated pleural effusions with SUV 3 on the left and 2.4 on the right.  Pleural effusions felt to likely benign in nature at this point.  PET scan 7/10/2024 with unchanged small bilateral pleural effusions  CT 9/6/24 with trace pleural fluid on the left, stable minimal pleural fluid right base  CT chest 11/29/2024 showed stable pleural thickening on the left, no significant residual effusion    *COPD  The patient has a history of smoking 1 pack/day x 55 years quit in 2000.  PFTs on 6/20/2023 with FEV1 0.99 (44% predicted), DLCO corrected 14.09 (56% predicted).  Patient continues with chronic dyspnea on exertion that does limit his level of activity although maintains O2 saturation in the 90% range even with activity.  Patient notes that his chronic dyspnea on exertion is stable.  He is no longer using Trelegy inhaler as he could not get it refilled.  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  Labs on 7/11/2023 with hemoglobin 10.6, MCV 82.8, iron 18, ferritin 504, iron saturation 6%, TIBC 295, B12 201, folate 6.79, CRP 1.07, ESR 27, erythropoietin level 17.8.  Labs consistent with anemia secondary to chronic disease/malignancy as well as anemia secondary to CKD3.  Patient initiated oral B12 1000 mcg daily for B12 deficiency  Hemoglobin on 11/7/2023 was relatively unchanged at 10.3.  Additional evaluation sent with iron 21, ferritin 415,  iron saturation 8%, TIBC 266, B12 398, folate 10.2, negative serum protein electrophoresis/immunoelectrophoresis, IgA 209, IgG 829, IgM 13, free kappa light chain 50.4, free lambda light chain 34.2, free light chain ratio 1.47 (normal).  Labs did consistent with anemia secondary to chronic disease as well as anemia secondary to CKD3.  He does not qualify for Procrit with hemoglobin above 10.  Patient continued on oral B12 1000 mcg daily  Labs on 5/14/2024 with hemoglobin that declined to 10.5.  Additional labs with iron 20, ferritin 394, iron saturation 7%, TIBC 299, reticulated hemoglobin low at 27.3.  Low reticulated hemoglobin potentially indicative of iron deficiency.  Patient with hemoglobin stable in the 11-12 range.  His residual anemia is felt to be related to his underlying CKD3a.  With low iron saturation and previously low reticulated hemoglobin, appears that he may have a mild component of iron deficiency.  The patient however has underlying constipation and would be intolerant of oral iron.  With a ferritin in the 200-300 range, would not recommend IV iron in this situation however we will continue to follow his hemoglobin and iron status.  Hemoglobin today stable 11.0.  We will check iron studies when patient returns in 2 months    *Transient thrombocytopenia  On 11/7/2024, new onset thrombocytopenia with platelet count of 137,000.  Additional labs with IPF low normal at 3.3%, B12 398, folate 10.2,negative serum protein electrophoresis/immunoelectrophoresis, IgA 209, IgG 829, IgM 13, free kappa light chain 50.4, free lambda light chain 34.2, free light chain ratio 1.47 (normal).  Platelet count today normal at 161,000    *Coronary artery disease, CHF  Chronic bilateral pleural effusions presumably related to CHF  Status post cardiac stent in 2013  Preoperative echocardiogram on 7/19/2023 with ejection fraction 56 to 60%, dilated LA, LV  Patient continues on aspirin 81 mg daily    *Peripheral vascular  disease  Patient is followed by Dr. Karen Barrera in vascular surgery  Abdominal aortic aneurysm status post stent graft repair 5/17/2021  Carotid stenosis status post carotid endarterectomy left, 2013.    *Anorexia, weight loss  Patient with anorexia associated with malignancy, mild degree of weight loss  Patient initiated Remeron 7.5 mg nightly on 9/22/2023.  Subsequently discontinued with improvement in weight and appetite  Weight did improve and is subsequently stable at 150 pounds.  He does use 1 to 2 cans of boost per day.    *Constipation  Patient continues with intermittent constipation, uses stool softener as needed.      *Pain control  The patient was experiencing significant pain in his chest wall on the left both anteriorly and posteriorly as well as pain in the left axilla and extending into the left upper arm.  Patient is reluctant to use hydrocodone 5/325 although does have a prescription to use if needed.  Patient was intolerant of gabapentin 100 mg 3 times daily due to anorexia, weight loss, somnolence  The patient's pain did spontaneously resolve.  He did not pursue MRI of the cervical spine and brachial plexus which were ordered by thoracic surgery.      Plan:  Patient with stage IIIA ([id number]) adenocarcinoma of the left upper lobe, unresectable.  Received primary radiation therapy to the left upper lobe completed on 10/27/2023  Patient with 2 additional hypermetabolic right upper lobe nodules felt to be new primary lesions.  High risk for pneumothorax with biopsy.  Both lesions treated with SBRT 4/11 - 4/17/2024.  Patient continuing on oral B12 1000 mcg daily for B12 deficiency  MD visit in 2 months with CBC, CMP, stat iron panel/ferritin.  CT chest 1 week prior

## 2025-01-09 ENCOUNTER — LAB (OUTPATIENT)
Dept: LAB | Facility: HOSPITAL | Age: 85
End: 2025-01-09
Payer: MEDICARE

## 2025-01-09 ENCOUNTER — OFFICE VISIT (OUTPATIENT)
Dept: ONCOLOGY | Facility: CLINIC | Age: 85
End: 2025-01-09
Payer: MEDICARE

## 2025-01-09 ENCOUNTER — OFFICE VISIT (OUTPATIENT)
Dept: SURGERY | Facility: CLINIC | Age: 85
End: 2025-01-09
Payer: MEDICARE

## 2025-01-09 VITALS
HEIGHT: 67 IN | TEMPERATURE: 97.8 F | BODY MASS INDEX: 23.65 KG/M2 | DIASTOLIC BLOOD PRESSURE: 68 MMHG | SYSTOLIC BLOOD PRESSURE: 149 MMHG | OXYGEN SATURATION: 97 % | HEART RATE: 67 BPM | WEIGHT: 150.7 LBS

## 2025-01-09 VITALS — HEART RATE: 68 BPM | DIASTOLIC BLOOD PRESSURE: 69 MMHG | SYSTOLIC BLOOD PRESSURE: 141 MMHG | OXYGEN SATURATION: 94 %

## 2025-01-09 DIAGNOSIS — C34.92 ADENOCARCINOMA, LUNG, LEFT: Primary | ICD-10-CM

## 2025-01-09 DIAGNOSIS — D64.9 NORMOCYTIC ANEMIA: ICD-10-CM

## 2025-01-09 DIAGNOSIS — C34.92 ADENOCARCINOMA, LUNG, LEFT: ICD-10-CM

## 2025-01-09 LAB
ALBUMIN SERPL-MCNC: 3.7 G/DL (ref 3.5–5.2)
ALBUMIN/GLOB SERPL: 1.2 G/DL
ALP SERPL-CCNC: 88 U/L (ref 39–117)
ALT SERPL W P-5'-P-CCNC: 10 U/L (ref 1–41)
ANION GAP SERPL CALCULATED.3IONS-SCNC: 13.2 MMOL/L (ref 5–15)
AST SERPL-CCNC: 15 U/L (ref 1–40)
BASOPHILS # BLD AUTO: 0.04 10*3/MM3 (ref 0–0.2)
BASOPHILS NFR BLD AUTO: 0.6 % (ref 0–1.5)
BILIRUB SERPL-MCNC: 0.4 MG/DL (ref 0–1.2)
BUN SERPL-MCNC: 23 MG/DL (ref 8–23)
BUN/CREAT SERPL: 12.8 (ref 7–25)
CALCIUM SPEC-SCNC: 9 MG/DL (ref 8.6–10.5)
CHLORIDE SERPL-SCNC: 104 MMOL/L (ref 98–107)
CO2 SERPL-SCNC: 24.8 MMOL/L (ref 22–29)
CREAT SERPL-MCNC: 1.8 MG/DL (ref 0.76–1.27)
DEPRECATED RDW RBC AUTO: 48.6 FL (ref 37–54)
EGFRCR SERPLBLD CKD-EPI 2021: 36.2 ML/MIN/1.73
EOSINOPHIL # BLD AUTO: 0.24 10*3/MM3 (ref 0–0.4)
EOSINOPHIL NFR BLD AUTO: 3.5 % (ref 0.3–6.2)
ERYTHROCYTE [DISTWIDTH] IN BLOOD BY AUTOMATED COUNT: 15.9 % (ref 12.3–15.4)
GLOBULIN UR ELPH-MCNC: 3 GM/DL
GLUCOSE SERPL-MCNC: 91 MG/DL (ref 65–99)
HCT VFR BLD AUTO: 36.4 % (ref 37.5–51)
HGB BLD-MCNC: 11 G/DL (ref 13–17.7)
IMM GRANULOCYTES # BLD AUTO: 0.03 10*3/MM3 (ref 0–0.05)
IMM GRANULOCYTES NFR BLD AUTO: 0.4 % (ref 0–0.5)
LYMPHOCYTES # BLD AUTO: 0.92 10*3/MM3 (ref 0.7–3.1)
LYMPHOCYTES NFR BLD AUTO: 13.5 % (ref 19.6–45.3)
MCH RBC QN AUTO: 25.5 PG (ref 26.6–33)
MCHC RBC AUTO-ENTMCNC: 30.2 G/DL (ref 31.5–35.7)
MCV RBC AUTO: 84.5 FL (ref 79–97)
MONOCYTES # BLD AUTO: 0.5 10*3/MM3 (ref 0.1–0.9)
MONOCYTES NFR BLD AUTO: 7.3 % (ref 5–12)
NEUTROPHILS NFR BLD AUTO: 5.09 10*3/MM3 (ref 1.7–7)
NEUTROPHILS NFR BLD AUTO: 74.7 % (ref 42.7–76)
NRBC BLD AUTO-RTO: 0 /100 WBC (ref 0–0.2)
PLATELET # BLD AUTO: 161 10*3/MM3 (ref 140–450)
PMV BLD AUTO: 9.1 FL (ref 6–12)
POTASSIUM SERPL-SCNC: 4.2 MMOL/L (ref 3.5–5.2)
PROT SERPL-MCNC: 6.7 G/DL (ref 6–8.5)
RBC # BLD AUTO: 4.31 10*6/MM3 (ref 4.14–5.8)
SODIUM SERPL-SCNC: 142 MMOL/L (ref 136–145)
WBC NRBC COR # BLD AUTO: 6.82 10*3/MM3 (ref 3.4–10.8)

## 2025-01-09 PROCEDURE — 36415 COLL VENOUS BLD VENIPUNCTURE: CPT

## 2025-01-09 PROCEDURE — 85025 COMPLETE CBC W/AUTO DIFF WBC: CPT

## 2025-01-09 PROCEDURE — 99214 OFFICE O/P EST MOD 30 MIN: CPT | Performed by: SURGERY

## 2025-01-09 PROCEDURE — 1160F RVW MEDS BY RX/DR IN RCRD: CPT | Performed by: INTERNAL MEDICINE

## 2025-01-09 PROCEDURE — 80053 COMPREHEN METABOLIC PANEL: CPT

## 2025-01-09 PROCEDURE — 1159F MED LIST DOCD IN RCRD: CPT | Performed by: INTERNAL MEDICINE

## 2025-01-09 PROCEDURE — 1126F AMNT PAIN NOTED NONE PRSNT: CPT | Performed by: INTERNAL MEDICINE

## 2025-01-09 PROCEDURE — 99214 OFFICE O/P EST MOD 30 MIN: CPT | Performed by: INTERNAL MEDICINE

## 2025-01-09 NOTE — LETTER
"January 9, 2025     Low Sepulveda MD  [address]  [address]  [place] [id number]    Patient: Bayron Acevedo   YOB: 1940   Date of Visit: 1/9/2025     Dear Low Sepulveda:       Thank you for referring Bayron Acevedo to me for evaluation. Below are the relevant portions of my assessment and plan of care.    If you have questions, please do not hesitate to call me. I look forward to following Bayron along with you.         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  Subsequent clinical stageIAI ([id number]) right upper lobe non-small cell lung cancer (biopsy deferred)    Subjective       History of Present Illness    Patient returns today in follow-up having undergone Ion bronchoscopy with Dr. Chávez on 12/27/2024 in the interval.  Patient did well with the procedure.  Fortunately procedure showed no evidence of malignancy on biopsies.  Patient was in to see Dr. Chávez as well earlier today.  He reports no change in his overall status, ECOG performance status of 1.  He reports mild chronic dyspnea on exertion which is unchanged.  He notes a stable appetite, weight stable at 150 pounds.      Objective  Vital Signs:   /68   Pulse 67   Temp 97.8 °F (36.6 °C) (Oral)   Ht 170.2 cm (67.01\")   Wt 68.4 kg (150 lb 11.2 oz)   SpO2 97%   BMI 23.60 kg/m²     Physical Exam  Constitutional:       Appearance: He is well-developed.      Comments: Patient in no distress   Eyes:      Conjunctiva/sclera: Conjunctivae normal.   Neck:      Thyroid: No thyromegaly.   Cardiovascular:      Rate and Rhythm: Normal rate and regular rhythm.      Heart sounds: No murmur heard.     No friction rub. No gallop.   Pulmonary:      Effort: No respiratory distress.      Breath sounds: Normal breath sounds.   Abdominal:      General: Bowel " sounds are normal. There is no distension.      Palpations: Abdomen is soft.      Tenderness: There is no abdominal tenderness.   Musculoskeletal:         General: No swelling.   Lymphadenopathy:      Head:      Right side of head: No submandibular adenopathy.      Cervical: No cervical adenopathy.      Upper Body:      Right upper body: No supraclavicular adenopathy.      Left upper body: No supraclavicular adenopathy.   Skin:     General: Skin is warm and dry.      Findings: No rash.   Neurological:      Mental Status: He is alert and oriented to person, place, and time.      Cranial Nerves: No cranial nerve deficit.      Motor: No abnormal muscle tone.      Deep Tendon Reflexes: Reflexes normal.   Psychiatric:         Behavior: Behavior normal.       Patient was examined today, unchanged from above      Result Review: Reviewed CBC, CMP from today.  Reviewed results from Ion bronchoscopy 12/27/2024 including procedure note and pathology.       Assessment and Plan     *Clinical stage IIIA ([id number]) non-small cell lung cancer (adenocarcinoma) of left upper lobe.  Subsequent clinical stageIAI ([id number]) right upper lobe non-small cell lung cancer (biopsy deferred)  The patient has a history of smoking 1 pack/day x 55 years quit in 2000.  CT chest 5/26/2023 showed a spiculated mass in the anterior left upper lobe 3.7 x 4.3 x 4.2 cm along the anterior pleural surface and lateral aspect of the anterior mediastinum.  Additional 3 mm nodule right major fissure.  Bilateral small to moderate-sized pleural effusions.  Bullae formation consistent with COPD.  Mediastinal lymphadenopathy with largest node infracarinal region 2.6 x 1.2 cm.  PET/CT on 6/12/2023 showed 5.2 x 3.1 cm irregular pleural-based mass anterior left upper lobe abutting the pleura anteriorly and medially, hypermetabolic with SUV 14.4.  Mediastinal lymph node activity in the subcarinal region with a 2.2 x 1.1 cm lymph node with SUV 4.5.  Remainder of  mediastinal lymph nodes less intensely hypermetabolic with maximal SUV 3.3.  Moderate size loculated pleural effusions bilaterally with low-level activity along the pleura (SUV 2.5), no change in volume of pleural effusions since prior chest CT.  New small to moderate loculated right hydropneumothorax with air-fluid level 5.2 cm.   CT-guided left upper lobe lung biopsy 6/13/2023 with pathology that showed invasive poorly differentiated adenocarcinoma, PD-L1 less than 1%.  Caris analysis: TMB high (14 muts/Mb), mutations in KEAP1 and TP53, EGFR VUS, no targetable mutations.  Patient was hospitalized 6/13 - 6/15/2023 due to right hydropneumothorax, had CT-guided chest tube placement performed 6/13/2023.  Right pleural fluid cytology negative on 6/13/2023.  Staging MRI brain was negative for evidence of metastatic disease on 7/2/2023  PFTs on 6/20/2023 with FEV1 0.99 (44% predicted), DLCO corrected 14.09 (56% predicted).  Mediastinoscopy 7/17/2023 with 4R and station 7 lymph nodes negative for malignancy  Left VATS performed 7/31/2023.  Intraoperatively, primary tumor was unresectable, invading into the mediastinum and pericardial fat pad as well as with phrenic nerve involvement (T4 lesion).  Sampling of left pleura with fibrosis, chronic inflammation, no evidence of malignancy.  Station 5 and 10 R lymph nodes sampled, negative for malignancy.  New baseline CT chest abdomen pelvis prior to initiation of radiation therapy performed on 9/20/2023.  Persistent loculated left pneumothorax, trace left pleural effusion, primary tumor in partially collapsed left upper lobe appears relatively stable.  Small right pleural effusion no change in borderline enlarged mediastinal lymph nodes.  Slight increase in right upper lobe nodule (for up to 7 mm), indeterminate.  Patient therefore with unresectable stage IIIA ([id number]) disease.  Plans to treat with radiation therapy.  Due to age, performance status, comorbidities, patient felt  to be a poor candidate for concurrent chemotherapy.  Patient received radiation therapy 10/9 - 10/27/2023 to left upper lobe, 6000 cGy.  CT chest abdomen pelvis 1/23/2024 showed decrease in left pneumothorax/hydropneumothorax, left upper lobe mass difficult to accurately assess due to change in configuration (shift in lung parenchyma due to pneumothorax improvement) but appears relatively stable, 2.9 x 4.3 versus current 3.3 x 3.5 cm.  No lymphadenopathy noted, no evidence of distant metastatic disease.  PET scan 3/20/2024 with subcentimeter left supraclavicular lymph node with new uptake (SUV 2.4).  Decrease in treated left upper lobe mass from 4.4 x 3.8 down to 3.4 x 2 (decrease in SUV from 14.4 down to 3.2).  Increase in 2 mildly hypermetabolic left upper lobe ill-defined opacities with linear consolidation felt to represent postradiation change (SUV 3.4).  Resolution of hypermetabolic activity and subcarinal lymph node as well as subcentimeter mediastinal and left hilar lymph nodes.  Increase and 2 adjacent right upper lobe solid nodules surrounding bullae measuring 1 and 0.8 cm with SUV 5.1 and 2.5 respectively.  Decrease in small loculated pleural effusions with SUV 3 on the left and 2.4 on the right.  Tubular hypermetabolic activity throughout the esophagus.  Persistent focal transverse colon hypermetabolism with SUV 6.9 and additional foci of the ascending colon hypermetabolism SUV 5.6.  Patient discussed at thoracic oncology conference 3/28/2024.  Consensus regarding not pursuing biopsy due to risk of pneumothorax and small size of the lesions.  Given the growth rate and activity, lesions were felt to represent additional primary sites of disease.  Recommendation to treat with SBRT.  Patient received SBRT to both right upper lobe lesions x 5 fractions each from 4/11 - 4/17/2024  CT chest 5/22/2024 showed irregular consolidation left upper lobe unchanged 3.5 x 3.2 cm, new small groundglass density left upper  lobe.  Stable consolidation left base.  Nodularity posterior right upper lobe largest nodule 8 mm unchanged.  New nodule fissure right upper lobe and new right upper lobe micronodule.  PET scan 7/10/2024 with stable radiated left upper lobe mass with uptake near blood pool.  Left apical 3.3 x 1.3 cm bandlike consolidation with SUV 10.1 (previously few linear foci of consolidation and SUV 3.4).  Left upper lobe 1.6 cm focal consolidation with SUV 8.2 (previously few linear foci of consolidation SUV 4.3).  Right upper lobe nodules x 2 around bullae decreased in size (1 cm down to 0.6 cm and 0.8 down to 0.5 cm), no activity.  New surrounding consolidation and groundglass opacity.  Persistent focal transverse colon hypermetabolism (SUV decreased from 6.9 down to 4.6) with generalized tubular hypermetabolism in the distal colon.  Discussed with Dr. Painter in radiation oncology and agreed that the degree of uptake and enlargement of left upper lobe focal consolidation was concerning for possible recurrent malignancy in this area.    Patient was referred back to thoracic surgery Dr. Chávez.  Discussed potential Ion bronchoscopy however patient was reluctant to proceed and elected to continue on a course of observation.  CT chest on 9/6/2024 showed decrease in residual left upper lobe mass (3.5 x 3.1 down to 3.2 x 3.0 cm), increased patchy consolidation in the left upper lobe around mass possibly reflective of postradiation change.  Stable nodules right upper lobe with increased groundglass opacity in this with radiation change  Chest 11/29/2024 with decrease in treated mass left upper lobe from 3.2 x 3.0 down to 2.7 x 2.6 cm.  Increasing consolidation and surrounding left upper lobe lung favored to represent postradiation change.  Stable pleural thickening on the left.  Stable rounded density left lower lobe favored to represent rounded atelectasis.  Stable nodular density right upper lobe.  Scan reviewed and discussed with   [place].  The previously treated mass in the left upper lobe continues to decrease in size.  There is fairly extensive change however in the left upper lobe, some of which is nodular in appearance.  The nodular change in the left upper lobe did have activity on prior PET scan and there is still suspicion for potential recurrent malignancy in this area.  The previously treated right upper lobe disease is unchanged. Patient was amenable to consider bronchoscopy at this point.  Dr. Painter feels that if recurrent disease identified in left upper lobe there would be significant overlap with previous radiation field and he did not recommend pursuit of further radiation therapy.  If malignancy identified we will need to consider pursuit of palliative systemic therapy.    Ion bronchoscopy 12/27/2024 with Dr. Chávez.  FNA left upper lobe negative for malignancy.  Left upper lobe biopsy with benign fibrous tissue and anthracosis, rare atypical reactive cells, no evidence of malignancy.  Left lower lobe biopsy with fibrinous debris and rare fragments of fibrous tissue with anthracosis, no evidence of malignancy.  Left upper lobe BAL negative for malignancy, left lower lobe BAL negative for malignancy.  Patient returns today in follow-up after the above Ion bronchoscopy on 12/27/2024.  Fortunately this did not show any evidence of malignancy in the left upper lobe or left lower lobe by biopsy or BAL.  I discussed the situation with Dr. Chávez.  We are both in agreement that although the lack of identification of malignancy on the current pathology is reassuring, we cannot be certain that there is not underlying malignancy and would recommend ongoing CT surveillance with repeat CT chest in 2 months.  The patient is in agreement.  I will see him back at that time with repeat CT chest to review.    *Chronic bilateral pleural effusions with right hydropneumothorax and subsequent postoperative left hydropneumothorax  Patient appears to have  longstanding evidence of small bilateral pleural effusions likely related to CHF  PET/CT 6/12/2023 identified right small to moderate hydropneumothorax new since 5/26/2023 CT chest  Patient was hospitalized 6/13 - 6/15/2023 due to right hydropneumothorax, had CT-guided chest tube placement performed 6/13/2023.  Right pleural fluid cytology negative on 6/13/2023.  As above, patient underwent left VATS with attempted resection of primary lung cancer on 7/31/2023.    Patient required persistent left chest tube postoperatively, eventually removed on 9/6/2023  CT chest 9/20/2023 with persistent loculated left pneumothorax.  CT chest 1/23/2024 with significant improvement in left pneumothorax/hydropneumothorax  PET scan 3/20/2024 with decrease in small loculated pleural effusions with SUV 3 on the left and 2.4 on the right.  Pleural effusions felt to likely benign in nature at this point.  PET scan 7/10/2024 with unchanged small bilateral pleural effusions  CT 9/6/24 with trace pleural fluid on the left, stable minimal pleural fluid right base  CT chest 11/29/2024 showed stable pleural thickening on the left, no significant residual effusion    *COPD  The patient has a history of smoking 1 pack/day x 55 years quit in 2000.  PFTs on 6/20/2023 with FEV1 0.99 (44% predicted), DLCO corrected 14.09 (56% predicted).  Patient continues with chronic dyspnea on exertion that does limit his level of activity although maintains O2 saturation in the 90% range even with activity.  Patient notes that his chronic dyspnea on exertion is stable.  He is no longer using Trelegy inhaler as he could not get it refilled.  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  Labs on 7/11/2023 with hemoglobin 10.6, MCV 82.8, iron 18, ferritin 504, iron saturation 6%, TIBC 295, B12 201, folate 6.79, CRP 1.07, ESR 27, erythropoietin level 17.8.  Labs consistent with anemia secondary to chronic disease/malignancy as well as anemia secondary to CKD3.  Patient initiated oral B12 1000 mcg daily for B12 deficiency  Hemoglobin on 11/7/2023 was relatively unchanged at 10.3.  Additional evaluation sent with iron 21, ferritin 415, iron saturation 8%, TIBC 266, B12 398, folate 10.2, negative serum protein electrophoresis/immunoelectrophoresis, IgA 209, IgG 829, IgM 13, free kappa light chain 50.4, free lambda light chain 34.2, free light chain ratio 1.47 (normal).  Labs did consistent with anemia secondary to chronic disease as well as anemia secondary to CKD3.  He does not qualify for Procrit with hemoglobin above 10.  Patient continued on oral B12 1000 mcg daily  Labs on 5/14/2024 with hemoglobin that declined to 10.5.  Additional labs with iron 20, ferritin 394, iron saturation 7%, TIBC 299, reticulated hemoglobin low at 27.3.  Low reticulated hemoglobin potentially indicative of iron deficiency.  Patient with hemoglobin stable in the 11-12 range.  His residual anemia is felt to be related to his underlying CKD3a.  With low iron saturation and previously low reticulated hemoglobin, appears that he may have a mild component of iron deficiency.  The patient however has underlying constipation and would be intolerant of oral iron.  With a ferritin in the 200-300 range, would not recommend IV iron in this situation however we will continue to follow his hemoglobin and iron status.  Hemoglobin today stable 11.0.  We will check iron studies when patient returns in 2 months    *Transient thrombocytopenia  On 11/7/2024, new onset thrombocytopenia with platelet count of 137,000.  Additional  labs with IPF low normal at 3.3%, B12 398, folate 10.2,negative serum protein electrophoresis/immunoelectrophoresis, IgA 209, IgG 829, IgM 13, free kappa light chain 50.4, free lambda light chain 34.2, free light chain ratio 1.47 (normal).  Platelet count today normal at 161,000    *Coronary artery disease, CHF  Chronic bilateral pleural effusions presumably related to CHF  Status post cardiac stent in 2013  Preoperative echocardiogram on 7/19/2023 with ejection fraction 56 to 60%, dilated LA, LV  Patient continues on aspirin 81 mg daily    *Peripheral vascular disease  Patient is followed by Dr. Karen Barrera in vascular surgery  Abdominal aortic aneurysm status post stent graft repair 5/17/2021  Carotid stenosis status post carotid endarterectomy left, 2013.    *Anorexia, weight loss  Patient with anorexia associated with malignancy, mild degree of weight loss  Patient initiated Remeron 7.5 mg nightly on 9/22/2023.  Subsequently discontinued with improvement in weight and appetite  Weight did improve and is subsequently stable at 150 pounds.  He does use 1 to 2 cans of boost per day.    *Constipation  Patient continues with intermittent constipation, uses stool softener as needed.      *Pain control  The patient was experiencing significant pain in his chest wall on the left both anteriorly and posteriorly as well as pain in the left axilla and extending into the left upper arm.  Patient is reluctant to use hydrocodone 5/325 although does have a prescription to use if needed.  Patient was intolerant of gabapentin 100 mg 3 times daily due to anorexia, weight loss, somnolence  The patient's pain did spontaneously resolve.  He did not pursue MRI of the cervical spine and brachial plexus which were ordered by thoracic surgery.      Plan:  Patient with stage IIIA ([id number]) adenocarcinoma of the left upper lobe, unresectable.  Received primary radiation therapy to the left upper lobe completed on 10/27/2023  Patient with  2 additional hypermetabolic right upper lobe nodules felt to be new primary lesions.  High risk for pneumothorax with biopsy.  Both lesions treated with SBRT 4/11 - 4/17/2024.  Patient continuing on oral B12 1000 mcg daily for B12 deficiency  MD visit in 2 months with CBC, CMP, stat iron panel/ferritin.  CT chest 1 week prior

## 2025-01-10 ENCOUNTER — PATIENT OUTREACH (OUTPATIENT)
Dept: OTHER | Facility: HOSPITAL | Age: 85
End: 2025-01-10
Payer: MEDICARE

## 2025-01-10 NOTE — PROGRESS NOTES
Reviewed chart. Met with Dr. Chávez and Dr. Santos 1/9/25. 12/27 bronch did not show any evidence of malignancy in the left upper lobe or left lower lobe by biopsy or BAL. F/u 2 months with scan prior. Sees Dr. Chávez 3/13    Called patient. Left message that I was checking in after his appts yesterday. Wanted to know if he had questions, concerns or resource needs. Requested cb

## 2025-01-24 ENCOUNTER — PATIENT OUTREACH (OUTPATIENT)
Dept: OTHER | Facility: HOSPITAL | Age: 85
End: 2025-01-24
Payer: MEDICARE

## 2025-01-24 NOTE — PROGRESS NOTES
Reviewed chart. Met with Dr. Chávez and Dr. Santos 1/9/25. 12/27 bronch did not show any evidence of malignancy in the left upper lobe or left lower lobe by biopsy or BAL. F/u 2 months with scan prior. Ct chest 3/3, Sees Dr. Chávez and Dr. Santos 3/13    Called patient. We discussed his recent visits with Dr. Chávez and Dr. Santos as well as his biopsy results, which did not reveal cancer.  He is pleased.     We discussed his upcoming scan 3/3 and appts on 3/13.    The patient denies any questions/concerns or ongoing resource needs.     Since he is stable with no active cancer treatment and no ongoing resource needs, I will close his case to navigation although reminded him that he can access the services in the [facility] even with his navigation case being closed. Encouraged patient to call in the future if the need arises. Patient verbalized understanding.

## 2025-02-02 NOTE — PROGRESS NOTES
THORACIC SURGERY CLINIC CONSULT NOTE    REASON FOR CONSULT: Clinical stage IIIA ([id number]) non-small cell lung cancer (adenocarcinoma) of left upper lobe     Subjective   HISTORY OF PRESENTING ILLNESS:   Bayron Acevedo is a 84 y.o. male who has significant medical problems as mentioned in the medical chart.     History of Present Illness  He underwent a robotic left upper lobe pulmonary decortication and aborted lobectomy back on 7/31/2023 by Dr. Nemesio Kramer due to the fact that his biopsy-proven lung cancer grossly invaded his mediastinum and phrenic nerve. This hospital course was prolonged with a extremely prolonged air leak. During follow-up, he was found to have 2 small enlarging right upper lobe nodules that were highly suspicious for malignancy with synchronous primary lesions. Nodules of borderline size for biopsy with significant risk for pneumothorax and biopsy was not pursued. Patient received presumptive treatment with SBRT to both nodules completed on 4/17/2024. Follow-up CT chest on 5/22/2024 showed increased ill-defined density anterior left apex, irregular consolidation left upper lobe that was stable, groundglass density in the periphery of the left upper lobe as well as new small nodules in the right upper lobe. Changes in the left lung were felt to be likely radiation related. PET scan 7/10/2024 with stable radiated left upper lobe mass with uptake near blood pool. Left apical 3.3 x 1.3 cm bandlike consolidation with SUV 10.1 (previously few linear foci of consolidation and SUV 3.4). Left upper lobe 1.6 cm focal consolidation with SUV 8.2 (previously few linear foci of consolidation SUV 4.3). Right upper lobe nodules x 2 around bullae decreased in size (1 cm down to 0.6 cm and 0.8 down to 0.5 cm), no activity. New surrounding consolidation and groundglass opacity. Persistent focal transverse colon hypermetabolism (SUV decreased from 6.9 down to 4.6) with generalized tubular hypermetabolism in the  distal colon. He was seen by me in August 2024 and did not want to proceed with biopsy at that time. He had repeat CT scan of the chest on 11/29/2024 which reported slight decrease in size of the treated mass in the anterior left upper lobe with surrounding increasing consolidation likely postradiation changes. PET CT scan on 12/13/2024 reported irregular pulmonary consultation with anterior aspect of the left upper lobe which has definitely increased in size as well as degree of FDG activity. There were a cluster of prominent left supraclavicular for noticeable and prominent left internal mammary lymph nodes which have increased in size since 2023. There is a cystic lesion in the right upper lobe which is slightly increased in size and degree of wall thickening.     I recommended robotic navigational bronchoscopy to rule out malignancy.  He underwent the procedure on 12/27/2024.  Final aspiration of left upper and left lower lobe nodules were performed.  There was no evidence of malignancy in the sample area.    He reports no complications following his biopsy procedure. He continues to experience a mild cough, which is productive of whitish phlegm. He has not observed any hemoptysis. He has not exhibited any febrile symptoms. He is scheduled for a consultation with Dr. Santos today, who has requested blood work.    SOCIAL HISTORY  The patient has a history of smoking.    Past Medical History:   Diagnosis Date    AAA (abdominal aortic aneurysm) 03/31/2021    without rupture    Acid reflux     Atherosclerosis of native arteries of extremities with intermittent claudication, bilateral legs 11/13/2019    CAD in native artery     STENT    Carotid stenosis 05/11/2016    gonzalez. s/p CEA Left    Cataract     CKD (chronic kidney disease)     Claudication     Colon polyp     COPD (chronic obstructive pulmonary disease)     H/O Acute myocardial infarction 2013    H/O PAD (peripheral artery disease)     Heart attack 2013    Heart  failure     History of atrial fibrillation     AFTER LUNG SURGERY    Hypercholesterolemia     Hyperlipidemia     Hypertension     Inguinal hernia     RIGHT    Left ventricular dysfunction     Mass of left lung     SHOWS ON MRI    Mitral valve regurgitation     Non-small cell lung cancer (NSCLC) 2023    LEFT UPPER LOBE, HAD SURGERY AND RADIATION    Perennial allergic rhinitis 01/06/2017    PVD (peripheral vascular disease)     S/P angioplasty with stent     Tinnitus     Tricuspid regurgitation        Past Surgical History:   Procedure Laterality Date    APPENDECTOMY      ARTERIOGRAM AORTIC N/A 05/17/2021    Procedure: ZENITH AORTIC STENT GRAFT;  Surgeon: Karen Barrera Jr., MD;  Location: [facility] HYBRID OR 18/19;  Service: Vascular;  Laterality: N/A;    BRONCHOSCOPY WITH ION ROBOTIC ASSIST N/A 12/27/2024    Procedure: BRONCHOSCOPY AND ION ROBOT NAVIGATION and Cryoprobe with FNA, biopsies, & BAL WITH ENDOBRONCHIAL ULTRASOUND;  Surgeon: Andrei Chávez MD;  Location: [facility] ENDOSCOPY;  Service: Robotics - Pulmonary;  Laterality: N/A;  non-small cell lung cancer (adenocarcinoma) of left upper lobe    CARDIAC CATHETERIZATION      X2    CARDIAC CATHETERIZATION N/A 04/11/2023    Procedure: Left Heart Cath;  Surgeon: Guru Jiang MD;  Location: [facility] CATH INVASIVE LOCATION;  Service: Cardiovascular;  Laterality: N/A;    CARDIAC CATHETERIZATION N/A 04/11/2023    Procedure: Right Heart Cath;  Surgeon: Guru Jiang MD;  Location: [facility] CATH INVASIVE LOCATION;  Service: Cardiovascular;  Laterality: N/A;    CARDIAC CATHETERIZATION N/A 04/11/2023    Procedure: Coronary angiography;  Surgeon: Guru Jiang MD;  Location: [facility] CATH INVASIVE LOCATION;  Service: Cardiovascular;  Laterality: N/A;    CARDIAC CATHETERIZATION N/A 04/11/2023    Procedure: Left ventriculography;  Surgeon: Guru Jiang MD;  Location: [facility] CATH INVASIVE LOCATION;  Service: Cardiovascular;  Laterality: N/A;    CARDIAC  CATHETERIZATION  04/11/2023    Procedure: RESTING FULL CYCLE RATIO;  Surgeon: Guru Jiang MD;  Location: [facility] CATH INVASIVE LOCATION;  Service: Cardiovascular;;    CARDIAC CATHETERIZATION  2002    CARDIAC CATHETERIZATION      CAROTID ENDARTERECTOMY Left 01/28/2013    Bruce Jones Jr, MD    COLONOSCOPY N/A 10/17/2011    Diverticulosis sigmoid colon, two 4-5 mm polyps in the transverse colon and in the ascending colon, internal hemorrhoids, otherwise normal-Dr. Bronson Blanc    COLONOSCOPY  2012    CORONARY STENT PLACEMENT      HYDROCELE EXCISION / REPAIR Right 05/28/2014    Dr. STANLEY Osorio    INGUINAL HERNIA REPAIR Right 03/05/2024    Procedure: right INGUINAL HERNIA REPAIR LAPAROSCOPIC WITH DAVINCI ROBOT;  Surgeon: Tejinder Hummel MD;  Location: [facility] MAIN OR;  Service: Robotics - DaVinci;  Laterality: Right;    LOBECTOMY Left 07/31/2023    Procedure: BRONCHOSCOPY, LEFT VAT WITH DAVINCI ROBOT, EXTENSIVE LYSIS OF ADHESIONS, PARTIAL PULMONARY DECORTICATION, INTERCOSTAL NERVE BLOCK;  Surgeon: Nemesio Krmaer MD PhD;  Location: [facility] MAIN OR;  Service: Robotics - DaVinci;  Laterality: Left;    MEDIASTINOSCOPY N/A 07/17/2023    Procedure: MEDIASTINOSCOPY;  Surgeon: Nemesio Kramer MD PhD;  Location: [facility] MAIN OR;  Service: Thoracic;  Laterality: N/A;    UMBILICAL HERNIA REPAIR N/A 05/18/2007    Umbilical hernia repair with Ventralax mesh-Dr. Prabhu Freedman       Family History   Problem Relation Age of Onset    No Known Problems Mother         complications of child birth    Cancer Father     Leukemia Father     Cancer Brother     Heart disease Maternal Uncle     Heart disease Other     Cancer Other     Other Other         blood clotts    Bleeding Disorder Other     Malig Hyperthermia Neg Hx        Social History     Socioeconomic History    Marital status:      Spouse name: Amina   Tobacco Use    Smoking status: Former     Current packs/day: 0.00     Average packs/day: 1 pack/day for  55.0 years (55.0 ttl pk-yrs)     Types: Cigarettes     Start date:      Quit date:      Years since quittin.1    Smokeless tobacco: Never   Vaping Use    Vaping status: Never Used   Substance and Sexual Activity    Alcohol use: No    Drug use: No    Sexual activity: Defer         Current Outpatient Medications:     albuterol (PROVENTIL) (2.5 MG/3ML) 0.083% nebulizer solution, USE ONE VIAL BY NEBULIZATION EVERY FOUR HOURS AS NEEDED FOR FOR WHEEZING (Patient taking differently: Take 2.5 mg by nebulization Every 4 (Four) Hours As Needed.), Disp: 180 mL, Rfl: 0    amLODIPine (NORVASC) 5 MG tablet, Take 1 tablet by mouth Daily., Disp: 90 tablet, Rfl: 3    aspirin 81 MG EC tablet, Take 1 tablet by mouth Daily. PT HOLDING 5 DAYS PRIOR TO SURGERY, Disp: , Rfl:     carvedilol (COREG) 25 MG tablet, Take 1 tablet by mouth 2 (Two) Times a Day., Disp: 180 tablet, Rfl: 3    hydrALAZINE (APRESOLINE) 25 MG tablet, Take 1 tablet by mouth 2 (Two) Times a Day., Disp: 180 tablet, Rfl: 3    lisinopril (PRINIVIL,ZESTRIL) 20 MG tablet, Take 1 tablet by mouth Daily., Disp: , Rfl:     nitroglycerin (NITROSTAT) 0.4 MG SL tablet, Place 1 tablet under the tongue Every 5 (Five) Minutes As Needed for Chest Pain. Take no more than 3 doses in 15 minutes., Disp: 25 tablet, Rfl: 3    rosuvastatin (CRESTOR) 20 MG tablet, Take 1 tablet by mouth Daily., Disp: 90 tablet, Rfl: 3    sennosides-docusate (senna-docusate sodium) 8.6-50 MG per tablet, Take 1 tablet by mouth 2 (Two) Times a Day As Needed for Constipation., Disp: 30 tablet, Rfl: 0    torsemide (DEMADEX) 20 MG tablet, Take 2 tablets by mouth Daily. (Patient taking differently: Take 1 tablet by mouth Daily.), Disp: 180 tablet, Rfl: 3    Umeclidinium-Vilanterol (ANORO ELLIPTA IN), Inhale., Disp: , Rfl:     vitamin B-12 (CYANOCOBALAMIN) 1000 MCG tablet, Take 1 tablet by mouth Daily., Disp: , Rfl:      Allergies   Allergen Reactions    Lisinopril Anaphylaxis    Codeine Sulfate Hives     Contrast Dye (Echo Or Unknown Ct/Mr) Other (See Comments)     HYPERTENSION    Iodinated Contrast Media Other (See Comments)     HYPERTENSION    Losartan Swelling     LIP    Penicillins Other (See Comments)     Passed out after a PCN shot- no other sx noted-per pateint  Beta lactam allergy details  Antibiotic reaction: unknown  Age at reaction: adult  Dose to reaction time: unknown  Reason for antibiotic: other  Epinephrine required for reaction?: no  Tolerated antibiotics: unknown                Objective    OBJECTIVE:     VITAL SIGNS:  /69 (BP Location: Left arm, Patient Position: Sitting)   Pulse 68   SpO2 94%     PHYSICAL EXAM:  Normal appearance.   Head is normocephalic.   Nose appears normal.   No obvious deformity of the mouth and throat.  Conjunctivae normal.   Heart rate and rhythm is normal.  Pulmonary effort is normal.   Moving all 4 extremities.  Extremities warm.  No focal deficit present.   Alert and oriented to person, place, and time.     RESULTS REVIEW:  I have reviewed the patient's all relevant laboratory and imaging findings.     Assessment & Plan    ASSESSMENT & PLAN:  Bayron Acevedo is a 84 y.o. male with significant medical conditions as mentioned above presented to my clinic.    Assessment & Plan  1. Clinical stage IIIA ([id number]) non-small cell lung cancer (adenocarcinoma) of left upper lobe   The biopsy results did not indicate the presence of cancer, which is a positive outcome. However, it is important to note that the biopsy was conducted on random areas due to significant inflammation, and not all areas could be sampled. The absence of cancer in the samples does not definitively rule out its presence. The plan is to monitor the condition over time. If the lesion remains stable or exhibits growth, further discussion with Dr. Duncan and Dr. Samson will be necessary to determine the appropriate course of action. The phlegm production is not unexpected given the condition of the  lungs, and as long as the phlegm is not foul-smelling, yellow, or greenish, there is no immediate cause for concern. A CT scan will be scheduled in 2 months to reassess the situation. Communication with Dr. Santos will be established to update him on the current status and discuss potential next steps. If there is any change in his condition, such as worsening breathing, he should contact us immediately, and a CT scan may be conducted sooner.    I discussed the patients findings and my recommendations with the patient/family/caregiver. The patient/family/caregiver was given adequate time to ask questions and all questions were answered to patient satisfaction. Thank you for this consult and allowing us to participate in the care of your patient.      Andrei Chávez MD  Thoracic Surgeon  [facility]        Dictated utilizing Dragon dictation    I spent 30 minutes caring for Bayron on this date of service. This time includes time spent by me in the following activities:preparing for the visit, reviewing tests, obtaining and/or reviewing a separately obtained history, performing a medically appropriate examination and/or evaluation, counseling and educating the patient/family/caregiver, ordering medications, tests, or procedures, referring and communicating with other health care professionals , documenting information in the medical record, independently interpreting results and communicating that information with the patient/family/caregiver, and care coordination and more than half the time was spent in direct face to face evaluation and decision making.     Patient or patient representative verbalized consent for the use of Ambient Listening during the visit with  Andrei Chávez MD for chart documentation. 2/2/2025  14:50 EST

## 2025-02-05 RX ORDER — HYDRALAZINE HYDROCHLORIDE 25 MG/1
25 TABLET, FILM COATED ORAL 2 TIMES DAILY
Qty: 180 TABLET | Refills: 1 | Status: SHIPPED | OUTPATIENT
Start: 2025-02-05

## 2025-02-24 ENCOUNTER — OFFICE VISIT (OUTPATIENT)
Dept: CARDIOLOGY | Facility: CLINIC | Age: 85
End: 2025-02-24
Payer: MEDICARE

## 2025-02-24 VITALS
HEIGHT: 67 IN | WEIGHT: 150 LBS | DIASTOLIC BLOOD PRESSURE: 70 MMHG | SYSTOLIC BLOOD PRESSURE: 118 MMHG | BODY MASS INDEX: 23.54 KG/M2 | HEART RATE: 58 BPM

## 2025-02-24 DIAGNOSIS — I25.10 CAD IN NATIVE ARTERY: ICD-10-CM

## 2025-02-24 DIAGNOSIS — I25.10 CORONARY ARTERY DISEASE INVOLVING NATIVE CORONARY ARTERY OF NATIVE HEART WITHOUT ANGINA PECTORIS: Primary | ICD-10-CM

## 2025-02-24 DIAGNOSIS — I10 PRIMARY HYPERTENSION: ICD-10-CM

## 2025-02-24 DIAGNOSIS — I48.0 PAROXYSMAL ATRIAL FIBRILLATION: ICD-10-CM

## 2025-02-24 DIAGNOSIS — I50.22 CHRONIC SYSTOLIC CHF (CONGESTIVE HEART FAILURE): ICD-10-CM

## 2025-02-24 PROCEDURE — 93000 ELECTROCARDIOGRAM COMPLETE: CPT | Performed by: NURSE PRACTITIONER

## 2025-02-24 PROCEDURE — 1159F MED LIST DOCD IN RCRD: CPT | Performed by: NURSE PRACTITIONER

## 2025-02-24 PROCEDURE — 1160F RVW MEDS BY RX/DR IN RCRD: CPT | Performed by: NURSE PRACTITIONER

## 2025-02-24 PROCEDURE — 99214 OFFICE O/P EST MOD 30 MIN: CPT | Performed by: NURSE PRACTITIONER

## 2025-02-24 RX ORDER — ROSUVASTATIN CALCIUM 20 MG/1
20 TABLET, COATED ORAL DAILY
Start: 2025-02-24

## 2025-02-24 RX ORDER — TORSEMIDE 20 MG/1
20 TABLET ORAL DAILY
Start: 2025-02-24

## 2025-02-24 NOTE — PROGRESS NOTES
Subjective:     Encounter Date:02/24/2025      Patient ID: Bayron Acevedo is a 84 y.o. male.    Chief Complaint:follow up CHF  History of Present Illness  This is an 83 y/o man who follows with Dr. Jiang and is new to me today. He has a pmhx of chronic kidney disease, essential hypertension, mixed hyperlipidemia, chronic systolic heart failure with recovered left ventricular function as well as coronary artery disease.    He is here today for a follow up visit. He is now cancer free. Unfortunately, he lost 60 lbs while undergoing treatment and has only been able to gain back about 6-8 lbs. He is drinking Boost but still does not have much of an appetite. From a cardiac standpoint, he is feeling well.  He denies any chest pain, palpitations, dizziness or syncope.  He does have some shortness of breath but feels like he is at his baseline.  No complaints of swelling in his legs, orthopnea or PND.  His blood pressure has been well-controlled. He was instructed by Dr. Barrera to take aspirin every 3 or 4 days. He would like to cut back on the torsemide.    I have reviewed and updated as appropriate allergies, current medications, past family history, past medical history, past surgical history and problem list.    Review of Systems   Constitutional: Negative for fever, malaise/fatigue, weight gain and weight loss.   HENT:  Negative for congestion, hoarse voice and sore throat.    Eyes:  Negative for blurred vision and double vision.   Cardiovascular:  Negative for chest pain, dyspnea on exertion, leg swelling, orthopnea, palpitations and syncope.   Respiratory:  Positive for shortness of breath. Negative for cough and wheezing.    Gastrointestinal:  Negative for abdominal pain, hematemesis, hematochezia and melena.   Genitourinary:  Negative for dysuria and hematuria.   Neurological:  Negative for dizziness, headaches, light-headedness and numbness.   Psychiatric/Behavioral:  Negative for depression. The patient is  not nervous/anxious.          Current Outpatient Medications:     albuterol (PROVENTIL) (2.5 MG/3ML) 0.083% nebulizer solution, USE ONE VIAL BY NEBULIZATION EVERY FOUR HOURS AS NEEDED FOR FOR WHEEZING (Patient taking differently: Take 2.5 mg by nebulization Every 4 (Four) Hours As Needed.), Disp: 180 mL, Rfl: 0    amLODIPine (NORVASC) 5 MG tablet, Take 1 tablet by mouth Daily., Disp: 90 tablet, Rfl: 3    aspirin 81 MG EC tablet, Take 1 tablet by mouth Daily. PT HOLDING 5 DAYS PRIOR TO SURGERY (Patient taking differently: Take 1 tablet by mouth Daily. Takes about every two to three days), Disp: , Rfl:     carvedilol (COREG) 25 MG tablet, Take 1 tablet by mouth 2 (Two) Times a Day., Disp: 180 tablet, Rfl: 3    hydrALAZINE (APRESOLINE) 25 MG tablet, Take 1 tablet by mouth 2 (Two) Times a Day., Disp: 180 tablet, Rfl: 1    nitroglycerin (NITROSTAT) 0.4 MG SL tablet, Place 1 tablet under the tongue Every 5 (Five) Minutes As Needed for Chest Pain. Take no more than 3 doses in 15 minutes., Disp: 25 tablet, Rfl: 3    rosuvastatin (CRESTOR) 20 MG tablet, Take 1 tablet by mouth Daily., Disp: , Rfl:     torsemide (DEMADEX) 20 MG tablet, Take 1 tablet by mouth Daily., Disp: , Rfl:     Umeclidinium-Vilanterol (ANORO ELLIPTA IN), Inhale., Disp: , Rfl:     vitamin B-12 (CYANOCOBALAMIN) 1000 MCG tablet, Take 1 tablet by mouth Daily., Disp: , Rfl:     Past Medical History:   Diagnosis Date    AAA (abdominal aortic aneurysm) 03/31/2021    without rupture    Acid reflux     Atherosclerosis of native arteries of extremities with intermittent claudication, bilateral legs 11/13/2019    CAD in native artery     STENT    Carotid stenosis 05/11/2016    gonzalez. s/p CEA Left    Cataract     CKD (chronic kidney disease)     Claudication     Colon polyp     COPD (chronic obstructive pulmonary disease)     H/O Acute myocardial infarction 2013    H/O PAD (peripheral artery disease)     Heart attack 2013    Heart failure     History of atrial  fibrillation     AFTER LUNG SURGERY    Hypercholesterolemia     Hyperlipidemia     Hypertension     Inguinal hernia     RIGHT    Left ventricular dysfunction     Mass of left lung     SHOWS ON MRI    Mitral valve regurgitation     Non-small cell lung cancer (NSCLC) 2023    LEFT UPPER LOBE, HAD SURGERY AND RADIATION    Perennial allergic rhinitis 01/06/2017    PVD (peripheral vascular disease)     S/P angioplasty with stent     Tinnitus     Tricuspid regurgitation        Past Surgical History:   Procedure Laterality Date    APPENDECTOMY      ARTERIOGRAM AORTIC N/A 05/17/2021    Procedure: ZENITH AORTIC STENT GRAFT;  Surgeon: Karen Barrera Jr., MD;  Location: [facility] HYBRID OR 18/19;  Service: Vascular;  Laterality: N/A;    BRONCHOSCOPY WITH ION ROBOTIC ASSIST N/A 12/27/2024    Procedure: BRONCHOSCOPY AND ION ROBOT NAVIGATION and Cryoprobe with FNA, biopsies, & BAL WITH ENDOBRONCHIAL ULTRASOUND;  Surgeon: Andrei Chávez MD;  Location: [facility] ENDOSCOPY;  Service: Robotics - Pulmonary;  Laterality: N/A;  non-small cell lung cancer (adenocarcinoma) of left upper lobe    CARDIAC CATHETERIZATION      X2    CARDIAC CATHETERIZATION N/A 04/11/2023    Procedure: Left Heart Cath;  Surgeon: Guru Jiang MD;  Location: [facility] CATH INVASIVE LOCATION;  Service: Cardiovascular;  Laterality: N/A;    CARDIAC CATHETERIZATION N/A 04/11/2023    Procedure: Right Heart Cath;  Surgeon: Guru Jiang MD;  Location: [facility] CATH INVASIVE LOCATION;  Service: Cardiovascular;  Laterality: N/A;    CARDIAC CATHETERIZATION N/A 04/11/2023    Procedure: Coronary angiography;  Surgeon: Guru Jiang MD;  Location: [facility] CATH INVASIVE LOCATION;  Service: Cardiovascular;  Laterality: N/A;    CARDIAC CATHETERIZATION N/A 04/11/2023    Procedure: Left ventriculography;  Surgeon: Guru Jiang MD;  Location: [facility] CATH INVASIVE LOCATION;  Service: Cardiovascular;  Laterality: N/A;    CARDIAC CATHETERIZATION  04/11/2023     Procedure: RESTING FULL CYCLE RATIO;  Surgeon: Guru Jiang MD;  Location: [facility] CATH INVASIVE LOCATION;  Service: Cardiovascular;;    CARDIAC CATHETERIZATION  2002    CARDIAC CATHETERIZATION      CAROTID ENDARTERECTOMY Left 01/28/2013    Bruce Jones Jr, MD    COLONOSCOPY N/A 10/17/2011    Diverticulosis sigmoid colon, two 4-5 mm polyps in the transverse colon and in the ascending colon, internal hemorrhoids, otherwise normal-Dr. Bronson Blanc    COLONOSCOPY  2012    CORONARY STENT PLACEMENT      HYDROCELE EXCISION / REPAIR Right 05/28/2014    Dr. STANLEY Osorio    INGUINAL HERNIA REPAIR Right 03/05/2024    Procedure: right INGUINAL HERNIA REPAIR LAPAROSCOPIC WITH DAVINCI ROBOT;  Surgeon: Tejinder Hummel MD;  Location: [facility] MAIN OR;  Service: Robotics - DaVinci;  Laterality: Right;    LOBECTOMY Left 07/31/2023    Procedure: BRONCHOSCOPY, LEFT VAT WITH DAVINCI ROBOT, EXTENSIVE LYSIS OF ADHESIONS, PARTIAL PULMONARY DECORTICATION, INTERCOSTAL NERVE BLOCK;  Surgeon: Nemesio Kramer MD PhD;  Location: [facility] MAIN OR;  Service: Robotics - DaVinci;  Laterality: Left;    MEDIASTINOSCOPY N/A 07/17/2023    Procedure: MEDIASTINOSCOPY;  Surgeon: Nemesio Kramer MD PhD;  Location: [facility] MAIN OR;  Service: Thoracic;  Laterality: N/A;    UMBILICAL HERNIA REPAIR N/A 05/18/2007    Umbilical hernia repair with Ventralax mesh-Dr. Prabhu Freedman       Family History   Problem Relation Age of Onset    No Known Problems Mother         complications of child birth    Cancer Father     Leukemia Father     Cancer Brother     Heart disease Maternal Uncle     Heart disease Other     Cancer Other     Other Other         blood clotts    Bleeding Disorder Other     Malig Hyperthermia Neg Hx        Social History     Tobacco Use    Smoking status: Former     Current packs/day: 0.00     Average packs/day: 1 pack/day for 55.0 years (55.0 ttl pk-yrs)     Types: Cigarettes     Start date: 1945     Quit date: 2000     Years  "since quittin.1    Smokeless tobacco: Never   Vaping Use    Vaping status: Never Used   Substance Use Topics    Alcohol use: No    Drug use: No         ECG 12 Lead    Date/Time: 2025 3:55 PM  Performed by: Nilda Marshall APRN    Authorized by: Nilda Marshall APRN  Comparison: compared with previous ECG from 2024  Similar to previous ECG  Rhythm: sinus rhythm  T inversion: II, III and aVF             Objective:     Visit Vitals  /70 (BP Location: Right arm, Patient Position: Sitting, Cuff Size: Adult)   Pulse 58   Ht 170.2 cm (67\")   Wt 68 kg (150 lb)   BMI 23.49 kg/m²             Physical Exam  Constitutional:       Appearance: Normal appearance. He is normal weight.   HENT:      Head: Normocephalic.   Neck:      Vascular: No carotid bruit.   Cardiovascular:      Rate and Rhythm: Normal rate and regular rhythm.      Chest Wall: PMI is not displaced.      Pulses: Normal pulses.           Radial pulses are 2+ on the right side and 2+ on the left side.        Posterior tibial pulses are 2+ on the right side and 2+ on the left side.      Heart sounds: Normal heart sounds. No murmur heard.     No friction rub. No gallop.   Pulmonary:      Effort: Pulmonary effort is normal.      Breath sounds: Normal breath sounds.   Abdominal:      General: Bowel sounds are normal. There is no distension.      Palpations: Abdomen is soft.   Musculoskeletal:      Right lower leg: No edema.      Left lower leg: No edema.   Skin:     General: Skin is warm and dry.      Capillary Refill: Capillary refill takes less than 2 seconds.   Neurological:      Mental Status: He is alert and oriented to person, place, and time.   Psychiatric:         Mood and Affect: Mood normal.         Behavior: Behavior normal.         Thought Content: Thought content normal.          Lab Review:         Cardiac Procedures:     Echocardiogram 2023:  Left ventricular systolic function is normal. Left ventricular ejection fraction appears " to be 56 - 60%.  The left ventricular cavity is mildly dilated.  The left atrial cavity is mildly dilated  Estimated right ventricular systolic pressure from tricuspid regurgitation is normal (<35 mmHg). Calculated right ventricular systolic pressure from tricuspid regurgitation is 29 mmHg.     Cardiac catheterization 4/11/2023:  Moderate coronary artery disease with 40% proximal left main stenoses, patent stent within the proximal LAD, and 40% proximal RCA segment stenoses.  Normal right and left-sided filling pressures with normal pulmonary artery pressures.  Normal cardiac output of 4.4 L/min and index of 2.3 L/min/m2  RFR assessment of the left main confirm no significant hemodynamic stenoses at 0.91 thus PCI was deferred.     Echocardiogram 4/7/2023 [facility]  Overall left ventricular systolic function is moderately depressed. The estimated ejection fraction is 35-40%. There is diffuse global hypokinesis of the left ventricle. Moderate concentric left ventricular hypertrophy.   Mildly dilated left atrium.   Trace aortic regurgitation is noted.   Moderate to severe mitral regurgitation is present.   Right ventricular systolic pressure of 49 mmHg.   Mild to moderate tricuspid regurgitation.   There is a small (trivial) pericardial effusion. There is no 2D and/or doppler evidence of tamponade physiology.   A pleural effusion is visualized.      Holter monitor 4/14/2022:  This is a 48-hour recording done due to ventricular ectopy   The intrinsic rhythm is sinus rhythm with an average heart rate of 65 bpm with a range from 44 to 120 bpm   There are frequent PVCs totaling over 2200 representing 1.2% of all beats.  There was 134 beat run of a wide-complex tachycardia at 170 bpm consistent with ventricular tachycardia  There are frequent PACs totaling over 2900 representing 1.6% of all beats.  The computer tallied 7 runs of SVT.  The longest was 20 beats and fastest 156 bpm.  Most of these appear to be a slow  atrial tachycardia of less than 120 bpm   There were no prolonged pauses   There were no symptoms      Echocardiogram 12/8/2014 [facility]:  Ejection fraction estimated at 53% with basal inferior, mid inferior, and apical inferior wall hypokinesis  Mild aortic stenosis with a mean gradient of 12 mmHg across the aortic valve     Stress MPI 4/17/2014 [facility]:  Inferior lateral wall hypokinesis  Baseline ejection fraction 60% but decreases to 40% with stress with a 3 times daily of 1.3    Assessment:         There are no diagnoses linked to this encounter.        Plan:         PAF: no known recurrent episodes. EKG today shows NSR.   CAD: EKG is stable with no new ischemic changes. No anginal symptoms. Continue with current dose of aspirin, carvedilol and statin therapy.  NICM/CHFrEF: normalized EF on echo in 2023. On carvedilol. Losartan stopped in past due to renal function. Was then on lisinopril but developed angioedema.  Renal insufficiency limits ability to add spironolactone.  Will continue with current GDMT  Nonrheumatic mitral regurgitation: Stable  Hypertension: Blood pressure is well-controlled.  No changes  Hyperlipidemia: On statin.  Goal LDL less than 70    Thank you for allowing me to participate in this patient's care. Please call with any questions or concerns. Mr. Acveedo will follow up with Dr. Jiang in 6 months.          Your medication list            Accurate as of February 24, 2025  3:59 PM. If you have any questions, ask your nurse or doctor.                CHANGE how you take these medications        Instructions Last Dose Given Next Dose Due   albuterol (2.5 MG/3ML) 0.083% nebulizer solution  Commonly known as: PROVENTIL  What changed: See the new instructions.      USE ONE VIAL BY NEBULIZATION EVERY FOUR HOURS AS NEEDED FOR FOR WHEEZING       aspirin 81 MG EC tablet  What changed: additional instructions      Take 1 tablet by mouth Daily. PT HOLDING 5 DAYS PRIOR TO SURGERY        rosuvastatin 20 MG tablet  Commonly known as: CRESTOR  What changed: additional instructions      Take 1 tablet by mouth Daily.              CONTINUE taking these medications        Instructions Last Dose Given Next Dose Due   amLODIPine 5 MG tablet  Commonly known as: NORVASC      Take 1 tablet by mouth Daily.       ANORO ELLIPTA IN      Inhale.       carvedilol 25 MG tablet  Commonly known as: COREG      Take 1 tablet by mouth 2 (Two) Times a Day.       hydrALAZINE 25 MG tablet  Commonly known as: APRESOLINE      Take 1 tablet by mouth 2 (Two) Times a Day.       nitroglycerin 0.4 MG SL tablet  Commonly known as: NITROSTAT      Place 1 tablet under the tongue Every 5 (Five) Minutes As Needed for Chest Pain. Take no more than 3 doses in 15 minutes.       torsemide 20 MG tablet  Commonly known as: DEMADEX      Take 1 tablet by mouth Daily.       vitamin B-12 1000 MCG tablet  Commonly known as: CYANOCOBALAMIN      Take 1 tablet by mouth Daily.                 Where to Get Your Medications        Information about where to get these medications is not yet available    Ask your nurse or doctor about these medications  rosuvastatin 20 MG tablet  torsemide 20 MG tablet           DASHA Melendez  02/24/25  11:02 AM EST

## 2025-03-03 ENCOUNTER — HOSPITAL ENCOUNTER (OUTPATIENT)
Dept: CT IMAGING | Facility: HOSPITAL | Age: 85
Discharge: HOME | End: 2025-03-03
Admitting: INTERNAL MEDICINE
Payer: MEDICARE

## 2025-03-03 DIAGNOSIS — C34.92 ADENOCARCINOMA, LUNG, LEFT: ICD-10-CM

## 2025-03-03 PROCEDURE — 71250 CT THORAX DX C-: CPT

## 2025-03-12 NOTE — PROGRESS NOTES
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  Subsequent clinical stageIAI ([id number]) right upper lobe non-small cell lung cancer (biopsy deferred)    Subjective        History of Present Illness    Patient returns today in follow-up with laboratory studies and CT chest to review.  In the interval since his last visit, the patient has no new complaints.  He does continue with some mild chronic dyspnea on exertion that is unchanged.  He is maintaining his weight at 150 pounds, reports that his appetite is stable.  He maintains ECOG performance status of 1.      Objective   Vital Signs:   /78   Pulse 62   Resp 16   Ht 170.2 cm (67\")   Wt 68 kg (150 lb)   SpO2 97%   BMI 23.49 kg/m²     Physical Exam  Constitutional:       Appearance: He is well-developed.      Comments: Patient in no distress   Eyes:      Conjunctiva/sclera: Conjunctivae normal.   Neck:      Thyroid: No thyromegaly.   Cardiovascular:      Rate and Rhythm: Normal rate and regular rhythm.      Heart sounds: No murmur heard.     No friction rub. No gallop.   Pulmonary:      Effort: No respiratory distress.      Comments: A few scattered bilateral wheezes  Abdominal:      General: Bowel sounds are normal. There is no distension.      Palpations: Abdomen is soft.      Tenderness: There is no abdominal tenderness.   Musculoskeletal:         General: No swelling.   Lymphadenopathy:      Head:      Right side of head: No submandibular adenopathy.      Cervical: No cervical adenopathy.      Upper Body:      Right upper body: No supraclavicular adenopathy.      Left upper body: No supraclavicular adenopathy.   Skin:     General: Skin is warm and dry.      Findings: No rash.   Neurological:      Mental Status: He is alert and oriented to person, place, and time.      Cranial Nerves: No cranial nerve deficit.      Motor: No abnormal muscle tone.      Deep Tendon Reflexes: Reflexes " normal.   Psychiatric:         Behavior: Behavior normal.             Result Review : Reviewed CBC, CMP, iron panel, ferritin from today.  Reviewed CT chest from 3/3/2025.       Assessment and Plan     *Clinical stage IIIA ([id number]) non-small cell lung cancer (adenocarcinoma) of left upper lobe.  Subsequent clinical stageIAI ([id number]) right upper lobe non-small cell lung cancer (biopsy deferred)  The patient has a history of smoking 1 pack/day x 55 years quit in 2000.  CT chest 5/26/2023 showed a spiculated mass in the anterior left upper lobe 3.7 x 4.3 x 4.2 cm along the anterior pleural surface and lateral aspect of the anterior mediastinum.  Additional 3 mm nodule right major fissure.  Bilateral small to moderate-sized pleural effusions.  Bullae formation consistent with COPD.  Mediastinal lymphadenopathy with largest node infracarinal region 2.6 x 1.2 cm.  PET/CT on 6/12/2023 showed 5.2 x 3.1 cm irregular pleural-based mass anterior left upper lobe abutting the pleura anteriorly and medially, hypermetabolic with SUV 14.4.  Mediastinal lymph node activity in the subcarinal region with a 2.2 x 1.1 cm lymph node with SUV 4.5.  Remainder of mediastinal lymph nodes less intensely hypermetabolic with maximal SUV 3.3.  Moderate size loculated pleural effusions bilaterally with low-level activity along the pleura (SUV 2.5), no change in volume of pleural effusions since prior chest CT.  New small to moderate loculated right hydropneumothorax with air-fluid level 5.2 cm.   CT-guided left upper lobe lung biopsy 6/13/2023 with pathology that showed invasive poorly differentiated adenocarcinoma, PD-L1 less than 1%.  Caris analysis: TMB high (14 muts/Mb), mutations in KEAP1 and TP53, EGFR VUS, no targetable mutations.  Patient was hospitalized 6/13 - 6/15/2023 due to right hydropneumothorax, had CT-guided chest tube placement performed 6/13/2023.  Right pleural fluid cytology negative on 6/13/2023.  Staging MRI brain was  negative for evidence of metastatic disease on 7/2/2023  PFTs on 6/20/2023 with FEV1 0.99 (44% predicted), DLCO corrected 14.09 (56% predicted).  Mediastinoscopy 7/17/2023 with 4R and station 7 lymph nodes negative for malignancy  Left VATS performed 7/31/2023.  Intraoperatively, primary tumor was unresectable, invading into the mediastinum and pericardial fat pad as well as with phrenic nerve involvement (T4 lesion).  Sampling of left pleura with fibrosis, chronic inflammation, no evidence of malignancy.  Station 5 and 10 R lymph nodes sampled, negative for malignancy.  New baseline CT chest abdomen pelvis prior to initiation of radiation therapy performed on 9/20/2023.  Persistent loculated left pneumothorax, trace left pleural effusion, primary tumor in partially collapsed left upper lobe appears relatively stable.  Small right pleural effusion no change in borderline enlarged mediastinal lymph nodes.  Slight increase in right upper lobe nodule (for up to 7 mm), indeterminate.  Patient therefore with unresectable stage IIIA ([id number]) disease.  Plans to treat with radiation therapy.  Due to age, performance status, comorbidities, patient felt to be a poor candidate for concurrent chemotherapy.  Patient received radiation therapy 10/9 - 10/27/2023 to left upper lobe, 6000 cGy.  CT chest abdomen pelvis 1/23/2024 showed decrease in left pneumothorax/hydropneumothorax, left upper lobe mass difficult to accurately assess due to change in configuration (shift in lung parenchyma due to pneumothorax improvement) but appears relatively stable, 2.9 x 4.3 versus current 3.3 x 3.5 cm.  No lymphadenopathy noted, no evidence of distant metastatic disease.  PET scan 3/20/2024 with subcentimeter left supraclavicular lymph node with new uptake (SUV 2.4).  Decrease in treated left upper lobe mass from 4.4 x 3.8 down to 3.4 x 2 (decrease in SUV from 14.4 down to 3.2).  Increase in 2 mildly hypermetabolic left upper lobe ill-defined  opacities with linear consolidation felt to represent postradiation change (SUV 3.4).  Resolution of hypermetabolic activity and subcarinal lymph node as well as subcentimeter mediastinal and left hilar lymph nodes.  Increase and 2 adjacent right upper lobe solid nodules surrounding bullae measuring 1 and 0.8 cm with SUV 5.1 and 2.5 respectively.  Decrease in small loculated pleural effusions with SUV 3 on the left and 2.4 on the right.  Tubular hypermetabolic activity throughout the esophagus.  Persistent focal transverse colon hypermetabolism with SUV 6.9 and additional foci of the ascending colon hypermetabolism SUV 5.6.  Patient discussed at thoracic oncology conference 3/28/2024.  Consensus regarding not pursuing biopsy due to risk of pneumothorax and small size of the lesions.  Given the growth rate and activity, lesions were felt to represent additional primary sites of disease.  Recommendation to treat with SBRT.  Patient received SBRT to both right upper lobe lesions x 5 fractions each from 4/11 - 4/17/2024  CT chest 5/22/2024 showed irregular consolidation left upper lobe unchanged 3.5 x 3.2 cm, new small groundglass density left upper lobe.  Stable consolidation left base.  Nodularity posterior right upper lobe largest nodule 8 mm unchanged.  New nodule fissure right upper lobe and new right upper lobe micronodule.  PET scan 7/10/2024 with stable radiated left upper lobe mass with uptake near blood pool.  Left apical 3.3 x 1.3 cm bandlike consolidation with SUV 10.1 (previously few linear foci of consolidation and SUV 3.4).  Left upper lobe 1.6 cm focal consolidation with SUV 8.2 (previously few linear foci of consolidation SUV 4.3).  Right upper lobe nodules x 2 around bullae decreased in size (1 cm down to 0.6 cm and 0.8 down to 0.5 cm), no activity.  New surrounding consolidation and groundglass opacity.  Persistent focal transverse colon hypermetabolism (SUV decreased from 6.9 down to 4.6) with  generalized tubular hypermetabolism in the distal colon.  Discussed with Dr. Painter in radiation oncology and agreed that the degree of uptake and enlargement of left upper lobe focal consolidation was concerning for possible recurrent malignancy in this area.    Patient was referred back to thoracic surgery Dr. Chávez.  Discussed potential Ion bronchoscopy however patient was reluctant to proceed and elected to continue on a course of observation.  CT chest on 9/6/2024 showed decrease in residual left upper lobe mass (3.5 x 3.1 down to 3.2 x 3.0 cm), increased patchy consolidation in the left upper lobe around mass possibly reflective of postradiation change.  Stable nodules right upper lobe with increased groundglass opacity in this with radiation change  Chest 11/29/2024 with decrease in treated mass left upper lobe from 3.2 x 3.0 down to 2.7 x 2.6 cm.  Increasing consolidation and surrounding left upper lobe lung favored to represent postradiation change.  Stable pleural thickening on the left.  Stable rounded density left lower lobe favored to represent rounded atelectasis.  Stable nodular density right upper lobe.  Scan reviewed and discussed with Dr Painter.  The previously treated mass in the left upper lobe continues to decrease in size.  There is fairly extensive change however in the left upper lobe, some of which is nodular in appearance.  The nodular change in the left upper lobe did have activity on prior PET scan and there is still suspicion for potential recurrent malignancy in this area.  The previously treated right upper lobe disease is unchanged. Patient was amenable to consider bronchoscopy at this point.  Dr. Painter feels that if recurrent disease identified in left upper lobe there would be significant overlap with previous radiation field and he did not recommend pursuit of further radiation therapy.  If malignancy identified we will need to consider pursuit of palliative systemic therapy.    Ion  bronchoscopy 12/27/2024 with Dr. Chávez.  FNA left upper lobe negative for malignancy.  Left upper lobe biopsy with benign fibrous tissue and anthracosis, rare atypical reactive cells, no evidence of malignancy.  Left lower lobe biopsy with fibrinous debris and rare fragments of fibrous tissue with anthracosis, no evidence of malignancy.  Left upper lobe BAL negative for malignancy, left lower lobe BAL negative for malignancy.  CT chest 3/3/2025 showed left apical consolidation/fibrosis with nodular component on anterior border slightly increased from 1.3 x 2.4 x 0.6 up to 1.8 x 2.4 x 0.8 cm.  Difference in size possibly related to technique however cannot rule out recurrent malignancy.  Increased subpleural atelectasis left upper lobe with small nodular focus 1 cm, could not exclude local recurrence.  Remainder of findings stable.  Patient returns today in follow-up with the above-mentioned CT to review from 3/3/2025.  The CT shows some subtle increase in left apical consolidation/fibrosis, unclear if this represents recurrent disease versus difference in technique on the scan.  There is certainly no definitive evidence of recurrent disease radiographically.  Patient would like to try to minimize appointments and frequent scans.  We therefore discussed moving out interval to the next CT scan to 4 months and the patient agrees.  Appetite is stable, weight stable at 150 pounds.  Respiratory status stable with mild chronic dyspnea on exertion that is unchanged.  Patient is aware to contact us with any change in his status interim.    *Chronic bilateral pleural effusions with right hydropneumothorax and subsequent postoperative left hydropneumothorax  Patient appears to have longstanding evidence of small bilateral pleural effusions likely related to CHF  PET/CT 6/12/2023 identified right small to moderate hydropneumothorax new since 5/26/2023 CT chest  Patient was hospitalized 6/13 - 6/15/2023 due to right  hydropneumothorax, had CT-guided chest tube placement performed 6/13/2023.  Right pleural fluid cytology negative on 6/13/2023.  As above, patient underwent left VATS with attempted resection of primary lung cancer on 7/31/2023.    Patient required persistent left chest tube postoperatively, eventually removed on 9/6/2023  CT chest 9/20/2023 with persistent loculated left pneumothorax.  CT chest 1/23/2024 with significant improvement in left pneumothorax/hydropneumothorax  PET scan 3/20/2024 with decrease in small loculated pleural effusions with SUV 3 on the left and 2.4 on the right.  Pleural effusions felt to likely benign in nature at this point.  PET scan 7/10/2024 with unchanged small bilateral pleural effusions  CT 9/6/24 with trace pleural fluid on the left, stable minimal pleural fluid right base  CT chest 11/29/2024 showed stable pleural thickening on the left, no significant residual effusion  CT 3/3/2025 with no significant effusion    *COPD  The patient has a history of smoking 1 pack/day x 55 years quit in 2000.  PFTs on 6/20/2023 with FEV1 0.99 (44% predicted), DLCO corrected 14.09 (56% predicted).  Patient continues with chronic dyspnea on exertion that does limit his level of activity although maintains O2 saturation in the 90% range even with activity.  Patient notes that his chronic dyspnea on exertion is stable.  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  Labs on 7/11/2023 with hemoglobin 10.6, MCV 82.8, iron 18, ferritin 504, iron saturation 6%, TIBC 295, B12 201, folate 6.79, CRP 1.07, ESR 27,  erythropoietin level 17.8.  Labs consistent with anemia secondary to chronic disease/malignancy as well as anemia secondary to CKD3.  Patient initiated oral B12 1000 mcg daily for B12 deficiency  Hemoglobin on 11/7/2023 was relatively unchanged at 10.3.  Additional evaluation sent with iron 21, ferritin 415, iron saturation 8%, TIBC 266, B12 398, folate 10.2, negative serum protein electrophoresis/immunoelectrophoresis, IgA 209, IgG 829, IgM 13, free kappa light chain 50.4, free lambda light chain 34.2, free light chain ratio 1.47 (normal).  Labs did consistent with anemia secondary to chronic disease as well as anemia secondary to CKD3.  He does not qualify for Procrit with hemoglobin above 10.  Patient continued on oral B12 1000 mcg daily  Labs on 5/14/2024 with hemoglobin that declined to 10.5.  Additional labs with iron 20, ferritin 394, iron saturation 7%, TIBC 299, reticulated hemoglobin low at 27.3.  Low reticulated hemoglobin potentially indicative of iron deficiency.  Patient with hemoglobin stable in the 11-12 range.  His residual anemia is felt to be related to his underlying CKD3a.  With low iron saturation and previously low reticulated hemoglobin, appears that he may have a mild component of iron deficiency.  The patient however has underlying constipation and would be intolerant of oral iron.  With a ferritin in the 200-300 range, would not recommend IV iron in this situation however we will continue to follow his hemoglobin and iron status.  Hemoglobin today stable at 11.0.  Iron studies are stable as well with iron that remains low at 21, ferritin remains borderline elevated 317 and iron saturation that remains low at 7% with TIBC 307.  As above, do not feel that the patient would tolerate oral iron very well and would not pursue IV iron unless his anemia worsens significantly or his ferritin declines further.  Recheck labs in 4 months at return visit.  He will continue on oral B12 1000 mcg daily  and we will recheck B12 level at next visit in 4 months.    *Transient thrombocytopenia  On 11/7/2024, new onset thrombocytopenia with platelet count of 137,000.  Additional labs with IPF low normal at 3.3%, B12 398, folate 10.2,negative serum protein electrophoresis/immunoelectrophoresis, IgA 209, IgG 829, IgM 13, free kappa light chain 50.4, free lambda light chain 34.2, free light chain ratio 1.47 (normal).  Platelet count today normal at 156,000    *Coronary artery disease, CHF  Chronic bilateral pleural effusions presumably related to CHF  Status post cardiac stent in 2013  Preoperative echocardiogram on 7/19/2023 with ejection fraction 56 to 60%, dilated LA, LV  Patient continues on aspirin 81 mg daily    *Peripheral vascular disease  Patient is followed by Dr. Karen Barrera in vascular surgery  Abdominal aortic aneurysm status post stent graft repair 5/17/2021  Carotid stenosis status post carotid endarterectomy left, 2013.    *Anorexia, weight loss  Patient with anorexia associated with malignancy, mild degree of weight loss  Patient initiated Remeron 7.5 mg nightly on 9/22/2023.  Subsequently discontinued with improvement in weight and appetite  Weight did improve and currently remains stable at 150 pounds.  He does use 1 to 2 cans of boost per day.    *Constipation  Patient continues with intermittent constipation, uses stool softener as needed.      *Pain control  The patient was experiencing significant pain in his chest wall on the left both anteriorly and posteriorly as well as pain in the left axilla and extending into the left upper arm.  Patient is reluctant to use hydrocodone 5/325 although does have a prescription to use if needed.  Patient was intolerant of gabapentin 100 mg 3 times daily due to anorexia, weight loss, somnolence  The patient's pain did spontaneously resolve.  He did not pursue MRI of the cervical spine and brachial plexus which were ordered by thoracic surgery.      Plan:  Patient  with stage IIIA ([id number]) adenocarcinoma of the left upper lobe, unresectable.  Received primary radiation therapy to the left upper lobe completed on 10/27/2023  Patient with 2 additional hypermetabolic right upper lobe nodules felt to be new primary lesions.  High risk for pneumothorax with biopsy.  Both lesions treated with SBRT 4/11 - 4/17/2024.  Patient with ongoing indeterminate changes on CT chest felt to possibly be benign in nature  Patient continuing on oral B12 1000 mcg daily for B12 deficiency  MD visit in 4 months with CBC, CMP, stat iron panel/ferritin, B12 level.  CT chest 1 week prior.

## 2025-03-13 ENCOUNTER — OFFICE VISIT (OUTPATIENT)
Dept: SURGERY | Facility: CLINIC | Age: 85
End: 2025-03-13
Payer: MEDICARE

## 2025-03-13 ENCOUNTER — OFFICE VISIT (OUTPATIENT)
Dept: ONCOLOGY | Facility: CLINIC | Age: 85
End: 2025-03-13
Payer: MEDICARE

## 2025-03-13 ENCOUNTER — LAB (OUTPATIENT)
Dept: LAB | Facility: HOSPITAL | Age: 85
End: 2025-03-13
Payer: MEDICARE

## 2025-03-13 ENCOUNTER — PATIENT OUTREACH (OUTPATIENT)
Dept: OTHER | Facility: HOSPITAL | Age: 85
End: 2025-03-13
Payer: MEDICARE

## 2025-03-13 VITALS
BODY MASS INDEX: 23.54 KG/M2 | DIASTOLIC BLOOD PRESSURE: 78 MMHG | OXYGEN SATURATION: 97 % | HEART RATE: 62 BPM | RESPIRATION RATE: 16 BRPM | HEIGHT: 67 IN | WEIGHT: 150 LBS | SYSTOLIC BLOOD PRESSURE: 148 MMHG

## 2025-03-13 VITALS
HEART RATE: 64 BPM | HEIGHT: 67 IN | OXYGEN SATURATION: 98 % | DIASTOLIC BLOOD PRESSURE: 58 MMHG | WEIGHT: 150.6 LBS | BODY MASS INDEX: 23.64 KG/M2 | SYSTOLIC BLOOD PRESSURE: 112 MMHG

## 2025-03-13 DIAGNOSIS — C34.92 ADENOCARCINOMA, LUNG, LEFT: ICD-10-CM

## 2025-03-13 DIAGNOSIS — C34.92 ADENOCARCINOMA, LUNG, LEFT: Primary | ICD-10-CM

## 2025-03-13 DIAGNOSIS — D64.9 NORMOCYTIC ANEMIA: ICD-10-CM

## 2025-03-13 LAB
ALBUMIN SERPL-MCNC: 3.8 G/DL (ref 3.5–5.2)
ALBUMIN/GLOB SERPL: 1.2 G/DL
ALP SERPL-CCNC: 85 U/L (ref 39–117)
ALT SERPL W P-5'-P-CCNC: 11 U/L (ref 1–41)
ANION GAP SERPL CALCULATED.3IONS-SCNC: 12.6 MMOL/L (ref 5–15)
AST SERPL-CCNC: 15 U/L (ref 1–40)
BASOPHILS # BLD AUTO: 0.04 10*3/MM3 (ref 0–0.2)
BASOPHILS NFR BLD AUTO: 0.6 % (ref 0–1.5)
BILIRUB SERPL-MCNC: 0.4 MG/DL (ref 0–1.2)
BUN SERPL-MCNC: 25 MG/DL (ref 8–23)
BUN/CREAT SERPL: 12.4 (ref 7–25)
CALCIUM SPEC-SCNC: 9.3 MG/DL (ref 8.6–10.5)
CHLORIDE SERPL-SCNC: 100 MMOL/L (ref 98–107)
CO2 SERPL-SCNC: 27.4 MMOL/L (ref 22–29)
CREAT SERPL-MCNC: 2.02 MG/DL (ref 0.76–1.27)
DEPRECATED RDW RBC AUTO: 49.2 FL (ref 37–54)
EGFRCR SERPLBLD CKD-EPI 2021: 31.5 ML/MIN/1.73
EOSINOPHIL # BLD AUTO: 0.15 10*3/MM3 (ref 0–0.4)
EOSINOPHIL NFR BLD AUTO: 2.3 % (ref 0.3–6.2)
ERYTHROCYTE [DISTWIDTH] IN BLOOD BY AUTOMATED COUNT: 16.2 % (ref 12.3–15.4)
FERRITIN SERPL-MCNC: 317 NG/ML (ref 30–400)
GLOBULIN UR ELPH-MCNC: 3.1 GM/DL
GLUCOSE SERPL-MCNC: 104 MG/DL (ref 65–99)
HCT VFR BLD AUTO: 35.8 % (ref 37.5–51)
HGB BLD-MCNC: 11 G/DL (ref 13–17.7)
IMM GRANULOCYTES # BLD AUTO: 0.02 10*3/MM3 (ref 0–0.05)
IMM GRANULOCYTES NFR BLD AUTO: 0.3 % (ref 0–0.5)
IRON 24H UR-MRATE: 21 MCG/DL (ref 59–158)
IRON SATN MFR SERPL: 7 % (ref 20–50)
LYMPHOCYTES # BLD AUTO: 0.83 10*3/MM3 (ref 0.7–3.1)
LYMPHOCYTES NFR BLD AUTO: 12.5 % (ref 19.6–45.3)
MCH RBC QN AUTO: 25.8 PG (ref 26.6–33)
MCHC RBC AUTO-ENTMCNC: 30.7 G/DL (ref 31.5–35.7)
MCV RBC AUTO: 83.8 FL (ref 79–97)
MONOCYTES # BLD AUTO: 0.52 10*3/MM3 (ref 0.1–0.9)
MONOCYTES NFR BLD AUTO: 7.8 % (ref 5–12)
NEUTROPHILS NFR BLD AUTO: 5.09 10*3/MM3 (ref 1.7–7)
NEUTROPHILS NFR BLD AUTO: 76.5 % (ref 42.7–76)
NRBC BLD AUTO-RTO: 0 /100 WBC (ref 0–0.2)
PLATELET # BLD AUTO: 156 10*3/MM3 (ref 140–450)
PMV BLD AUTO: 8.4 FL (ref 6–12)
POTASSIUM SERPL-SCNC: 4.2 MMOL/L (ref 3.5–5.2)
PROT SERPL-MCNC: 6.9 G/DL (ref 6–8.5)
RBC # BLD AUTO: 4.27 10*6/MM3 (ref 4.14–5.8)
SODIUM SERPL-SCNC: 140 MMOL/L (ref 136–145)
TIBC SERPL-MCNC: 307 MCG/DL (ref 298–536)
TRANSFERRIN SERPL-MCNC: 206 MG/DL (ref 200–360)
WBC NRBC COR # BLD AUTO: 6.65 10*3/MM3 (ref 3.4–10.8)

## 2025-03-13 PROCEDURE — 83540 ASSAY OF IRON: CPT

## 2025-03-13 PROCEDURE — 1159F MED LIST DOCD IN RCRD: CPT | Performed by: INTERNAL MEDICINE

## 2025-03-13 PROCEDURE — 84466 ASSAY OF TRANSFERRIN: CPT

## 2025-03-13 PROCEDURE — 80053 COMPREHEN METABOLIC PANEL: CPT

## 2025-03-13 PROCEDURE — 85025 COMPLETE CBC W/AUTO DIFF WBC: CPT

## 2025-03-13 PROCEDURE — 99214 OFFICE O/P EST MOD 30 MIN: CPT | Performed by: INTERNAL MEDICINE

## 2025-03-13 PROCEDURE — 1160F RVW MEDS BY RX/DR IN RCRD: CPT | Performed by: INTERNAL MEDICINE

## 2025-03-13 PROCEDURE — 36415 COLL VENOUS BLD VENIPUNCTURE: CPT

## 2025-03-13 PROCEDURE — 1126F AMNT PAIN NOTED NONE PRSNT: CPT | Performed by: INTERNAL MEDICINE

## 2025-03-13 PROCEDURE — 82728 ASSAY OF FERRITIN: CPT

## 2025-03-13 NOTE — LETTER
"March 13, 2025     Low Sepulveda MD  [address]  [address]  [place] [id number]    Patient: Bayron Acevedo   YOB: 1940   Date of Visit: 3/13/2025     Dear Low Sepulveda:       Thank you for referring Bayron Acevedo to me for evaluation. Below are the relevant portions of my assessment and plan of care.    If you have questions, please do not hesitate to call me. I look forward to following Bayron along with you.         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  Subsequent clinical stageIAI ([id number]) right upper lobe non-small cell lung cancer (biopsy deferred)    Subjective       History of Present Illness    Patient returns today in follow-up with laboratory studies and CT chest to review.  In the interval since his last visit, the patient has no new complaints.  He does continue with some mild chronic dyspnea on exertion that is unchanged.  He is maintaining his weight at 150 pounds, reports that his appetite is stable.  He maintains ECOG performance status of 1.      Objective  Vital Signs:   /78   Pulse 62   Resp 16   Ht 170.2 cm (67\")   Wt 68 kg (150 lb)   SpO2 97%   BMI 23.49 kg/m²     Physical Exam  Constitutional:       Appearance: He is well-developed.      Comments: Patient in no distress   Eyes:      Conjunctiva/sclera: Conjunctivae normal.   Neck:      Thyroid: No thyromegaly.   Cardiovascular:      Rate and Rhythm: Normal rate and regular rhythm.      Heart sounds: No murmur heard.     No friction rub. No gallop.   Pulmonary:      Effort: No respiratory distress.      Comments: A few scattered bilateral wheezes  Abdominal:      General: Bowel sounds are normal. There is no distension.      Palpations: Abdomen is soft.      Tenderness: There is no abdominal tenderness. "   Musculoskeletal:         General: No swelling.   Lymphadenopathy:      Head:      Right side of head: No submandibular adenopathy.      Cervical: No cervical adenopathy.      Upper Body:      Right upper body: No supraclavicular adenopathy.      Left upper body: No supraclavicular adenopathy.   Skin:     General: Skin is warm and dry.      Findings: No rash.   Neurological:      Mental Status: He is alert and oriented to person, place, and time.      Cranial Nerves: No cranial nerve deficit.      Motor: No abnormal muscle tone.      Deep Tendon Reflexes: Reflexes normal.   Psychiatric:         Behavior: Behavior normal.             Result Review: Reviewed CBC, CMP, iron panel, ferritin from today.  Reviewed CT chest from 3/3/2025.       Assessment and Plan     *Clinical stage IIIA ([id number]) non-small cell lung cancer (adenocarcinoma) of left upper lobe.  Subsequent clinical stageIAI ([id number]) right upper lobe non-small cell lung cancer (biopsy deferred)  The patient has a history of smoking 1 pack/day x 55 years quit in 2000.  CT chest 5/26/2023 showed a spiculated mass in the anterior left upper lobe 3.7 x 4.3 x 4.2 cm along the anterior pleural surface and lateral aspect of the anterior mediastinum.  Additional 3 mm nodule right major fissure.  Bilateral small to moderate-sized pleural effusions.  Bullae formation consistent with COPD.  Mediastinal lymphadenopathy with largest node infracarinal region 2.6 x 1.2 cm.  PET/CT on 6/12/2023 showed 5.2 x 3.1 cm irregular pleural-based mass anterior left upper lobe abutting the pleura anteriorly and medially, hypermetabolic with SUV 14.4.  Mediastinal lymph node activity in the subcarinal region with a 2.2 x 1.1 cm lymph node with SUV 4.5.  Remainder of mediastinal lymph nodes less intensely hypermetabolic with maximal SUV 3.3.  Moderate size loculated pleural effusions bilaterally with low-level activity along the pleura (SUV 2.5), no change in volume of pleural  effusions since prior chest CT.  New small to moderate loculated right hydropneumothorax with air-fluid level 5.2 cm.   CT-guided left upper lobe lung biopsy 6/13/2023 with pathology that showed invasive poorly differentiated adenocarcinoma, PD-L1 less than 1%.  Caris analysis: TMB high (14 muts/Mb), mutations in KEAP1 and TP53, EGFR VUS, no targetable mutations.  Patient was hospitalized 6/13 - 6/15/2023 due to right hydropneumothorax, had CT-guided chest tube placement performed 6/13/2023.  Right pleural fluid cytology negative on 6/13/2023.  Staging MRI brain was negative for evidence of metastatic disease on 7/2/2023  PFTs on 6/20/2023 with FEV1 0.99 (44% predicted), DLCO corrected 14.09 (56% predicted).  Mediastinoscopy 7/17/2023 with 4R and station 7 lymph nodes negative for malignancy  Left VATS performed 7/31/2023.  Intraoperatively, primary tumor was unresectable, invading into the mediastinum and pericardial fat pad as well as with phrenic nerve involvement (T4 lesion).  Sampling of left pleura with fibrosis, chronic inflammation, no evidence of malignancy.  Station 5 and 10 R lymph nodes sampled, negative for malignancy.  New baseline CT chest abdomen pelvis prior to initiation of radiation therapy performed on 9/20/2023.  Persistent loculated left pneumothorax, trace left pleural effusion, primary tumor in partially collapsed left upper lobe appears relatively stable.  Small right pleural effusion no change in borderline enlarged mediastinal lymph nodes.  Slight increase in right upper lobe nodule (for up to 7 mm), indeterminate.  Patient therefore with unresectable stage IIIA ([id number]) disease.  Plans to treat with radiation therapy.  Due to age, performance status, comorbidities, patient felt to be a poor candidate for concurrent chemotherapy.  Patient received radiation therapy 10/9 - 10/27/2023 to left upper lobe, 6000 cGy.  CT chest abdomen pelvis 1/23/2024 showed decrease in left  pneumothorax/hydropneumothorax, left upper lobe mass difficult to accurately assess due to change in configuration (shift in lung parenchyma due to pneumothorax improvement) but appears relatively stable, 2.9 x 4.3 versus current 3.3 x 3.5 cm.  No lymphadenopathy noted, no evidence of distant metastatic disease.  PET scan 3/20/2024 with subcentimeter left supraclavicular lymph node with new uptake (SUV 2.4).  Decrease in treated left upper lobe mass from 4.4 x 3.8 down to 3.4 x 2 (decrease in SUV from 14.4 down to 3.2).  Increase in 2 mildly hypermetabolic left upper lobe ill-defined opacities with linear consolidation felt to represent postradiation change (SUV 3.4).  Resolution of hypermetabolic activity and subcarinal lymph node as well as subcentimeter mediastinal and left hilar lymph nodes.  Increase and 2 adjacent right upper lobe solid nodules surrounding bullae measuring 1 and 0.8 cm with SUV 5.1 and 2.5 respectively.  Decrease in small loculated pleural effusions with SUV 3 on the left and 2.4 on the right.  Tubular hypermetabolic activity throughout the esophagus.  Persistent focal transverse colon hypermetabolism with SUV 6.9 and additional foci of the ascending colon hypermetabolism SUV 5.6.  Patient discussed at thoracic oncology conference 3/28/2024.  Consensus regarding not pursuing biopsy due to risk of pneumothorax and small size of the lesions.  Given the growth rate and activity, lesions were felt to represent additional primary sites of disease.  Recommendation to treat with SBRT.  Patient received SBRT to both right upper lobe lesions x 5 fractions each from 4/11 - 4/17/2024  CT chest 5/22/2024 showed irregular consolidation left upper lobe unchanged 3.5 x 3.2 cm, new small groundglass density left upper lobe.  Stable consolidation left base.  Nodularity posterior right upper lobe largest nodule 8 mm unchanged.  New nodule fissure right upper lobe and new right upper lobe micronodule.  PET scan  7/10/2024 with stable radiated left upper lobe mass with uptake near blood pool.  Left apical 3.3 x 1.3 cm bandlike consolidation with SUV 10.1 (previously few linear foci of consolidation and SUV 3.4).  Left upper lobe 1.6 cm focal consolidation with SUV 8.2 (previously few linear foci of consolidation SUV 4.3).  Right upper lobe nodules x 2 around bullae decreased in size (1 cm down to 0.6 cm and 0.8 down to 0.5 cm), no activity.  New surrounding consolidation and groundglass opacity.  Persistent focal transverse colon hypermetabolism (SUV decreased from 6.9 down to 4.6) with generalized tubular hypermetabolism in the distal colon.  Discussed with Dr. Painter in radiation oncology and agreed that the degree of uptake and enlargement of left upper lobe focal consolidation was concerning for possible recurrent malignancy in this area.    Patient was referred back to thoracic surgery Dr. Chávez.  Discussed potential Ion bronchoscopy however patient was reluctant to proceed and elected to continue on a course of observation.  CT chest on 9/6/2024 showed decrease in residual left upper lobe mass (3.5 x 3.1 down to 3.2 x 3.0 cm), increased patchy consolidation in the left upper lobe around mass possibly reflective of postradiation change.  Stable nodules right upper lobe with increased groundglass opacity in this with radiation change  Chest 11/29/2024 with decrease in treated mass left upper lobe from 3.2 x 3.0 down to 2.7 x 2.6 cm.  Increasing consolidation and surrounding left upper lobe lung favored to represent postradiation change.  Stable pleural thickening on the left.  Stable rounded density left lower lobe favored to represent rounded atelectasis.  Stable nodular density right upper lobe.  Scan reviewed and discussed with Dr Painter.  The previously treated mass in the left upper lobe continues to decrease in size.  There is fairly extensive change however in the left upper lobe, some of which is nodular in appearance.   The nodular change in the left upper lobe did have activity on prior PET scan and there is still suspicion for potential recurrent malignancy in this area.  The previously treated right upper lobe disease is unchanged. Patient was amenable to consider bronchoscopy at this point.  Dr. Painter feels that if recurrent disease identified in left upper lobe there would be significant overlap with previous radiation field and he did not recommend pursuit of further radiation therapy.  If malignancy identified we will need to consider pursuit of palliative systemic therapy.    Ion bronchoscopy 12/27/2024 with Dr. Chávez.  FNA left upper lobe negative for malignancy.  Left upper lobe biopsy with benign fibrous tissue and anthracosis, rare atypical reactive cells, no evidence of malignancy.  Left lower lobe biopsy with fibrinous debris and rare fragments of fibrous tissue with anthracosis, no evidence of malignancy.  Left upper lobe BAL negative for malignancy, left lower lobe BAL negative for malignancy.  CT chest 3/3/2025 showed left apical consolidation/fibrosis with nodular component on anterior border slightly increased from 1.3 x 2.4 x 0.6 up to 1.8 x 2.4 x 0.8 cm.  Difference in size possibly related to technique however cannot rule out recurrent malignancy.  Increased subpleural atelectasis left upper lobe with small nodular focus 1 cm, could not exclude local recurrence.  Remainder of findings stable.  Patient returns today in follow-up with the above-mentioned CT to review from 3/3/2025.  The CT shows some subtle increase in left apical consolidation/fibrosis, unclear if this represents recurrent disease versus difference in technique on the scan.  There is certainly no definitive evidence of recurrent disease radiographically.  Patient would like to try to minimize appointments and frequent scans.  We therefore discussed moving out interval to the next CT scan to 4 months and the patient agrees.  Appetite is stable,  weight stable at 150 pounds.  Respiratory status stable with mild chronic dyspnea on exertion that is unchanged.  Patient is aware to contact us with any change in his status interim.    *Chronic bilateral pleural effusions with right hydropneumothorax and subsequent postoperative left hydropneumothorax  Patient appears to have longstanding evidence of small bilateral pleural effusions likely related to CHF  PET/CT 6/12/2023 identified right small to moderate hydropneumothorax new since 5/26/2023 CT chest  Patient was hospitalized 6/13 - 6/15/2023 due to right hydropneumothorax, had CT-guided chest tube placement performed 6/13/2023.  Right pleural fluid cytology negative on 6/13/2023.  As above, patient underwent left VATS with attempted resection of primary lung cancer on 7/31/2023.    Patient required persistent left chest tube postoperatively, eventually removed on 9/6/2023  CT chest 9/20/2023 with persistent loculated left pneumothorax.  CT chest 1/23/2024 with significant improvement in left pneumothorax/hydropneumothorax  PET scan 3/20/2024 with decrease in small loculated pleural effusions with SUV 3 on the left and 2.4 on the right.  Pleural effusions felt to likely benign in nature at this point.  PET scan 7/10/2024 with unchanged small bilateral pleural effusions  CT 9/6/24 with trace pleural fluid on the left, stable minimal pleural fluid right base  CT chest 11/29/2024 showed stable pleural thickening on the left, no significant residual effusion  CT 3/3/2025 with no significant effusion    *COPD  The patient has a history of smoking 1 pack/day x 55 years quit in 2000.  PFTs on 6/20/2023 with FEV1 0.99 (44% predicted), DLCO corrected 14.09 (56% predicted).  Patient continues with chronic dyspnea on exertion that does limit his level of activity although maintains O2 saturation in the 90% range even with activity.  Patient notes that his chronic dyspnea on exertion is stable.  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  Labs on 7/11/2023 with hemoglobin 10.6, MCV 82.8, iron 18, ferritin 504, iron saturation 6%, TIBC 295, B12 201, folate 6.79, CRP 1.07, ESR 27, erythropoietin level 17.8.  Labs consistent with anemia secondary to chronic disease/malignancy as well as anemia secondary to CKD3.  Patient initiated oral B12 1000 mcg daily for B12 deficiency  Hemoglobin on 11/7/2023 was relatively unchanged at 10.3.  Additional evaluation sent with iron 21, ferritin 415, iron saturation 8%, TIBC 266, B12 398, folate 10.2, negative serum protein electrophoresis/immunoelectrophoresis, IgA 209, IgG 829, IgM 13, free kappa light chain 50.4, free lambda light chain 34.2, free light chain ratio 1.47 (normal).  Labs did consistent with anemia secondary to chronic disease as well as anemia secondary to CKD3.  He does not qualify for Procrit with hemoglobin above 10.  Patient continued on oral B12 1000 mcg daily  Labs on 5/14/2024 with hemoglobin that declined to 10.5.  Additional labs with iron 20, ferritin 394, iron saturation 7%, TIBC 299, reticulated hemoglobin low at 27.3.  Low reticulated hemoglobin potentially indicative of iron deficiency.  Patient with hemoglobin stable in the 11-12 range.  His residual anemia is felt to be related to his underlying CKD3a.  With low iron saturation and previously low reticulated hemoglobin, appears that he may have a mild component of iron deficiency.  The patient however has underlying constipation and would be intolerant of oral iron.  With a ferritin in the 200-300 range, would not recommend  IV iron in this situation however we will continue to follow his hemoglobin and iron status.  Hemoglobin today stable at 11.0.  Iron studies are stable as well with iron that remains low at 21, ferritin remains borderline elevated 317 and iron saturation that remains low at 7% with TIBC 307.  As above, do not feel that the patient would tolerate oral iron very well and would not pursue IV iron unless his anemia worsens significantly or his ferritin declines further.  Recheck labs in 4 months at return visit.  He will continue on oral B12 1000 mcg daily and we will recheck B12 level at next visit in 4 months.    *Transient thrombocytopenia  On 11/7/2024, new onset thrombocytopenia with platelet count of 137,000.  Additional labs with IPF low normal at 3.3%, B12 398, folate 10.2,negative serum protein electrophoresis/immunoelectrophoresis, IgA 209, IgG 829, IgM 13, free kappa light chain 50.4, free lambda light chain 34.2, free light chain ratio 1.47 (normal).  Platelet count today normal at 156,000    *Coronary artery disease, CHF  Chronic bilateral pleural effusions presumably related to CHF  Status post cardiac stent in 2013  Preoperative echocardiogram on 7/19/2023 with ejection fraction 56 to 60%, dilated LA, LV  Patient continues on aspirin 81 mg daily    *Peripheral vascular disease  Patient is followed by Dr. Karen Barrera in vascular surgery  Abdominal aortic aneurysm status post stent graft repair 5/17/2021  Carotid stenosis status post carotid endarterectomy left, 2013.    *Anorexia, weight loss  Patient with anorexia associated with malignancy, mild degree of weight loss  Patient initiated Remeron 7.5 mg nightly on 9/22/2023.  Subsequently discontinued with improvement in weight and appetite  Weight did improve and currently remains stable at 150 pounds.  He does use 1 to 2 cans of boost per day.    *Constipation  Patient continues with intermittent constipation, uses stool softener as needed.      *Pain  control  The patient was experiencing significant pain in his chest wall on the left both anteriorly and posteriorly as well as pain in the left axilla and extending into the left upper arm.  Patient is reluctant to use hydrocodone 5/325 although does have a prescription to use if needed.  Patient was intolerant of gabapentin 100 mg 3 times daily due to anorexia, weight loss, somnolence  The patient's pain did spontaneously resolve.  He did not pursue MRI of the cervical spine and brachial plexus which were ordered by thoracic surgery.      Plan:  Patient with stage IIIA ([id number]) adenocarcinoma of the left upper lobe, unresectable.  Received primary radiation therapy to the left upper lobe completed on 10/27/2023  Patient with 2 additional hypermetabolic right upper lobe nodules felt to be new primary lesions.  High risk for pneumothorax with biopsy.  Both lesions treated with SBRT 4/11 - 4/17/2024.  Patient with ongoing indeterminate changes on CT chest felt to possibly be benign in nature  Patient continuing on oral B12 1000 mcg daily for B12 deficiency  MD visit in 4 months with CBC, CMP, stat iron panel/ferritin, B12 level.  CT chest 1 week prior.

## 2025-03-13 NOTE — PROGRESS NOTES
Met patient/wife following his appt with Dr. Chávez today.  Patient voiced concerns about frequency of CT scans, labs and office visits.  He states his nephrologist draws labs frequently as does oncology.  Encouraged him to discuss these concerns with Dr. Santos at his follow up appt today.  Dr. Chávez ordered a CT scan in 4 months although did not schedule a follow up visit with the patient. The patient will continue routine appts with Dr. Santos and be referred back to Dr. Chávez on an as needed basis.  We discussed the importance of continuing CT surveillance for his lung cancer; patient agreed to ongoing CT scans. He may consider having the next scan at [facility] instead for cost purposes. He and his wife are checking with their insurance to see if [facility] is covered and will let Dr. Chávez's office know if he will have the scan at [facility] instead since Dr. Chávez's office will need to fax over the order.     We discussed several medical bills the patient and his wife have been paying. Offered to connect them with social work and  financial team, although they declined.  They deny ongoing resource or support care needs.     Encouraged the patient to call in the future if needed.

## 2025-03-18 NOTE — PROGRESS NOTES
THORACIC SURGERY CLINIC CONSULT NOTE    REASON FOR CONSULT: Clinical stage IIIA ([id number]) non-small cell lung cancer (adenocarcinoma) of left upper lobe     Subjective   HISTORY OF PRESENTING ILLNESS:   Bayron Acevedo is a 84 y.o. male who has significant medical problems as mentioned in the medical chart.     History of Present Illness  He underwent a robotic left upper lobe pulmonary decortication and aborted lobectomy back on 7/31/2023 by Dr. Nemesio Kramer due to the fact that his biopsy-proven lung cancer grossly invaded his mediastinum and phrenic nerve. This hospital course was prolonged with a extremely prolonged air leak. During follow-up, he was found to have 2 small enlarging right upper lobe nodules that were highly suspicious for malignancy with synchronous primary lesions. Nodules of borderline size for biopsy with significant risk for pneumothorax and biopsy was not pursued. Patient received presumptive treatment with SBRT to both nodules completed on 4/17/2024. Follow-up CT chest on 5/22/2024 showed increased ill-defined density anterior left apex, irregular consolidation left upper lobe that was stable, groundglass density in the periphery of the left upper lobe as well as new small nodules in the right upper lobe. Changes in the left lung were felt to be likely radiation related. PET scan 7/10/2024 with stable radiated left upper lobe mass with uptake near blood pool. Left apical 3.3 x 1.3 cm bandlike consolidation with SUV 10.1 (previously few linear foci of consolidation and SUV 3.4). Left upper lobe 1.6 cm focal consolidation with SUV 8.2 (previously few linear foci of consolidation SUV 4.3). Right upper lobe nodules x 2 around bullae decreased in size (1 cm down to 0.6 cm and 0.8 down to 0.5 cm), no activity. New surrounding consolidation and groundglass opacity. Persistent focal transverse colon hypermetabolism (SUV decreased from 6.9 down to 4.6) with generalized tubular hypermetabolism in the  distal colon. He was seen by me in August 2024 and did not want to proceed with biopsy at that time. He had repeat CT scan of the chest on 11/29/2024 which reported slight decrease in size of the treated mass in the anterior left upper lobe with surrounding increasing consolidation likely postradiation changes. PET CT scan on 12/13/2024 reported irregular pulmonary consultation with anterior aspect of the left upper lobe which has definitely increased in size as well as degree of FDG activity. There were a cluster of prominent left supraclavicular for noticeable and prominent left internal mammary lymph nodes which have increased in size since 2023. There is a cystic lesion in the right upper lobe which is slightly increased in size and degree of wall thickening.      I recommended robotic navigational bronchoscopy to rule out malignancy.  He underwent the procedure on 12/27/2024.  Final aspiration of left upper and left lower lobe nodules were performed.  There was no evidence of malignancy in the sample area.     He had repeat CT scan of the chest on 3/3/2025 which reported that showed subtle changes in the left upper lobe concerning for possible recurrence and short-term follow-up was recommended.    He he came to clinic for follow-up visit.  Reports no new health issues, including pneumonia. He maintains adequate hydration and nutrition, with no significant weight loss. His last consultation with his oncologist, Dr. Santos, was approximately 3 months ago. A recent CT scan was performed, and he has a scheduled appointment with Dr. Santos today. He expresses concern about the financial burden of biannual CT scans and questions the necessity of such frequent monitoring. He also inquires about the likelihood of cancer recurrence.    His appetite is suboptimal, leading to fluctuations in his weight. He has been advised by his nephrologist to consume red wine occasionally, although he does not typically drink  alcohol.    He has a history of a rib fracture, which causes discomfort when he lies on his back at night. This discomfort is exacerbated when he changes positions, as it feels like something is shifting internally. He notes that this issue did not bother him until after his cancer diagnosis and subsequent treatment, during which he lost 60 pounds.    SOCIAL HISTORY  He does not drink alcohol.    Past Medical History:   Diagnosis Date    AAA (abdominal aortic aneurysm) 03/31/2021    without rupture    Acid reflux     Atherosclerosis of native arteries of extremities with intermittent claudication, bilateral legs 11/13/2019    CAD in native artery     STENT    Carotid stenosis 05/11/2016    gonzalez. s/p CEA Left    Cataract     CKD (chronic kidney disease)     Claudication     Colon polyp     COPD (chronic obstructive pulmonary disease)     H/O Acute myocardial infarction 2013    H/O PAD (peripheral artery disease)     Heart attack 2013    Heart failure     History of atrial fibrillation     AFTER LUNG SURGERY    Hypercholesterolemia     Hyperlipidemia     Hypertension     Inguinal hernia     RIGHT    Left ventricular dysfunction     Mass of left lung     SHOWS ON MRI    Mitral valve regurgitation     Non-small cell lung cancer (NSCLC) 2023    LEFT UPPER LOBE, HAD SURGERY AND RADIATION    Perennial allergic rhinitis 01/06/2017    PVD (peripheral vascular disease)     S/P angioplasty with stent     Tinnitus     Tricuspid regurgitation        Past Surgical History:   Procedure Laterality Date    APPENDECTOMY      ARTERIOGRAM AORTIC N/A 05/17/2021    Procedure: ZENITH AORTIC STENT GRAFT;  Surgeon: Karen Barrera Jr., MD;  Location: [facility] OR 18/19;  Service: Vascular;  Laterality: N/A;    BRONCHOSCOPY WITH ION ROBOTIC ASSIST N/A 12/27/2024    Procedure: BRONCHOSCOPY AND ION ROBOT NAVIGATION and Cryoprobe with FNA, biopsies, & BAL WITH ENDOBRONCHIAL ULTRASOUND;  Surgeon: Andrei Chávez MD;  Location: [facility]  ENDOSCOPY;  Service: Robotics - Pulmonary;  Laterality: N/A;  non-small cell lung cancer (adenocarcinoma) of left upper lobe    CARDIAC CATHETERIZATION      X2    CARDIAC CATHETERIZATION N/A 04/11/2023    Procedure: Left Heart Cath;  Surgeon: Guru Jiang MD;  Location: [facility] CATH INVASIVE LOCATION;  Service: Cardiovascular;  Laterality: N/A;    CARDIAC CATHETERIZATION N/A 04/11/2023    Procedure: Right Heart Cath;  Surgeon: Guru Jiang MD;  Location: [facility] CATH [facility]ASIVE LOC[facility];  Service: Cardiovascular;  Laterality: N/A;    CARDIAC CATHETERIZATION N/A 04/11/2023    Procedure: Coronary angiography;  Surgeon: Guru Jiang MD;  Location: [facility] CATH [facility]VASIVE LOCATION;  Service: Cardiovascular;  Laterality: N/A;    CARDIAC CATHETERIZATION N/A 04/11/2023    Procedure: Left ventriculography;  Surgeon: Guru Jiang MD;  Location: [facility] CATH INVASIVE LOCATION;  Service: Cardiovascular;  Laterality: N/A;    CARDIAC CATHETERIZATION  04/11/2023    Procedure: RESTING FULL CYCLE RATIO;  Surgeon: Guru Jiang MD;  Location: [facility] CATH [facility]VASIVE LOC[facility];  Service: Cardiovascular;;    CARDIAC CATHETERIZATION  2002    CARDIAC CATHETERIZATION      CAROTID ENDARTERECTOMY Left 01/28/2013    Bruce Jones Jr, MD    COLONOSCOPY N/A 10/17/2011    Diverticulosis sigmoid colon, two 4-5 mm polyps in the transverse colon and in the ascending colon, internal hemorrhoids, otherwise normal-Dr. Bronson Blanc    COLONOSCOPY  2012    CORONARY STENT PLACEMENT      HYDROCELE EXCISION / REPAIR Right 05/28/2014    Dr. STANLEY Osorio    INGUINAL HERNIA REPAIR Right 03/05/2024    Procedure: right INGUINAL HERNIA REPAIR LAPAROSCOPIC WITH DAVINCI ROBOT;  Surgeon: Tejinder Hummel MD;  Location: [facility] OR;  Service: Robotics - DaVinci;  Laterality: Right;    LOBECTOMY Left 07/31/2023    Procedure: BRONCHOSCOPY, LEFT VAT WITH DAVINCI ROBOT, EXTENSIVE LYSIS OF ADHESIONS, PARTIAL PULMONARY DECORTICATION,  INTERCOSTAL NERVE BLOCK;  Surgeon: Nemesio Kramer MD PhD;  Location: [facility] MAIN OR;  Service: Robotics - DaVinci;  Laterality: Left;    MEDIASTINOSCOPY N/A 2023    Procedure: MEDIASTINOSCOPY;  Surgeon: Nemesio Kramer MD PhD;  Location: [facility] MAIN OR;  Service: Thoracic;  Laterality: N/A;    UMBILICAL HERNIA REPAIR N/A 2007    Umbilical hernia repair with Ventralax mesh-Dr. Prabhu Freedman       Family History   Problem Relation Age of Onset    No Known Problems Mother         complications of child birth    Cancer Father     Leukemia Father     Cancer Brother     Heart disease Maternal Uncle     Heart disease Other     Cancer Other     Other Other         blood clotts    Bleeding Disorder Other     Malig Hyperthermia Neg Hx        Social History     Socioeconomic History    Marital status:      Spouse name: Amina   Tobacco Use    Smoking status: Former     Current packs/day: 0.00     Average packs/day: 1 pack/day for 55.0 years (55.0 ttl pk-yrs)     Types: Cigarettes     Start date:      Quit date:      Years since quittin.2     Passive exposure: Past    Smokeless tobacco: Never   Vaping Use    Vaping status: Never Used   Substance and Sexual Activity    Alcohol use: No    Drug use: No    Sexual activity: Defer         Current Outpatient Medications:     albuterol (PROVENTIL) (2.5 MG/3ML) 0.083% nebulizer solution, USE ONE VIAL BY NEBULIZATION EVERY FOUR HOURS AS NEEDED FOR FOR WHEEZING (Patient taking differently: Take 2.5 mg by nebulization Every 4 (Four) Hours As Needed.), Disp: 180 mL, Rfl: 0    amLODIPine (NORVASC) 5 MG tablet, Take 1 tablet by mouth Daily., Disp: 90 tablet, Rfl: 3    aspirin 81 MG EC tablet, Take 1 tablet by mouth Daily. PT HOLDING 5 DAYS PRIOR TO SURGERY (Patient taking differently: Take 1 tablet by mouth Daily. Takes about every two to three days), Disp: , Rfl:     carvedilol (COREG) 25 MG tablet, Take 1 tablet by mouth 2 (Two) Times a Day., Disp: 180  "tablet, Rfl: 3    hydrALAZINE (APRESOLINE) 25 MG tablet, Take 1 tablet by mouth 2 (Two) Times a Day., Disp: 180 tablet, Rfl: 1    nitroglycerin (NITROSTAT) 0.4 MG SL tablet, Place 1 tablet under the tongue Every 5 (Five) Minutes As Needed for Chest Pain. Take no more than 3 doses in 15 minutes., Disp: 25 tablet, Rfl: 3    rosuvastatin (CRESTOR) 20 MG tablet, Take 1 tablet by mouth Daily., Disp: , Rfl:     torsemide (DEMADEX) 20 MG tablet, Take 1 tablet by mouth Daily., Disp: , Rfl:     Umeclidinium-Vilanterol (ANORO ELLIPTA IN), Inhale., Disp: , Rfl:     vitamin B-12 (CYANOCOBALAMIN) 1000 MCG tablet, Take 1 tablet by mouth Daily., Disp: , Rfl:      Allergies   Allergen Reactions    Lisinopril Anaphylaxis    Codeine Sulfate Hives    Contrast Dye (Echo Or Unknown Ct/Mr) Other (See Comments)     HYPERTENSION    Iodinated Contrast Media Other (See Comments)     HYPERTENSION    Losartan Swelling     LIP    Penicillins Other (See Comments)     Passed out after a PCN shot- no other sx noted-per pateint  Beta lactam allergy details  Antibiotic reaction: unknown  Age at reaction: adult  Dose to reaction time: unknown  Reason for antibiotic: other  Epinephrine required for reaction?: no  Tolerated antibiotics: unknown                Objective    OBJECTIVE:     VITAL SIGNS:  /58 (BP Location: Left arm, Patient Position: Sitting)   Pulse 64   Ht 170.2 cm (67.01\")   Wt 68.3 kg (150 lb 9.6 oz)   SpO2 98%   BMI 23.58 kg/m²     PHYSICAL EXAM:  Normal appearance.   Head is normocephalic.   Nose appears normal.   No obvious deformity of the mouth and throat.  Conjunctivae normal.   Heart rate and rhythm is normal.  Pulmonary effort is normal.   Moving all 4 extremities.  Extremities warm.  No focal deficit present.   Alert and oriented to person, place, and time.     RESULTS REVIEW:  I have reviewed the patient's all relevant laboratory and imaging findings.     Assessment & Plan    ASSESSMENT & PLAN:  Bayronchasity Lizamahn is a 84 " y.o. male with significant medical conditions as mentioned above presented to my clinic.    Assessment & Plan  1. Clinical stage IIIA ([id number]) non-small cell lung cancer (adenocarcinoma) of left upper lobe   The recent CT scan did not reveal any significant changes or concerning findings. The radiologist and I agree that there are no convincing significant differences compared to previous scans. A follow-up CT scan will be scheduled in 4 months to monitor the condition. He will continue his consultations with Dr. Santos, who will inform me of any concerns arising from these follow-ups. If any changes are observed in the subsequent scan, a discussion regarding potential treatment options, including another biopsy, will be initiated.    2. Weight loss.  He has experienced a weight loss of 60 pounds during his treatment. He is advised to discuss his weight loss and appetite issues with Dr. Santos, who may have resources or medications to help improve his appetite and nutrition.    3. Rib fracture.  The pain he experiences is likely due to inflammation from the joint in the area of the rib fracture. The weight loss during his treatment may have contributed to the discomfort by reducing the cushioning effect of fat in that area. He is advised to avoid activities that exacerbate the pain.    I discussed the patients findings and my recommendations with the patient/family/caregiver. The patient/family/caregiver was given adequate time to ask questions and all questions were answered to patient satisfaction. Thank you for this consult and allowing us to participate in the care of your patient.      Andrei Chávez MD  Thoracic Surgeon  [facility]        Dictated utilizing Dragon dictation    I spent 40 minutes caring for Bayron on this date of service. This time includes time spent by me in the following activities:preparing for the visit, reviewing tests, obtaining and/or reviewing a separately  obtained history, performing a medically appropriate examination and/or evaluation, counseling and educating the patient/family/caregiver, ordering medications, tests, or procedures, referring and communicating with other health care professionals , documenting information in the medical record, independently interpreting results and communicating that information with the patient/family/caregiver, and care coordination and more than half the time was spent in direct face to face evaluation and decision making.     Patient or patient representative verbalized consent for the use of Ambient Listening during the visit with  Andrei Chávez MD for chart documentation. 3/18/2025  18:49 EDT

## 2025-05-15 RX ORDER — AMLODIPINE BESYLATE 5 MG/1
5 TABLET ORAL DAILY
Qty: 90 TABLET | Refills: 3 | Status: SHIPPED | OUTPATIENT
Start: 2025-05-15

## 2025-05-30 NOTE — PROGRESS NOTES
Chief Complaint  Aortic Aneurysm  Follow-up after carotid endarterectomy and aneurysm repair    Subjective        Bayron Acevedo presents to [facility] VASCULAR SURGERY  History of Present IllnessThe patient is status post endovascular aortic stent graft repair of a large infrarenal abdominal aortic aneurysm (5.6 cm) with a Zenith aortic stent graft,performed on May 17, 2021. He had a left carotid endarterectomy performed in January 2013. He returned on May 22, 2024 with repeat studies.  Carotid duplex scan demonstrated less than 50% stenosis on the right and 50 to 69% stenosis on the left, unchanged.  Aortic duplex scan demonstrated no endoleak and the aorta measured 5.1 cm, unchanged.  ABIs were 0.68 on the right and 0.93 on the left, unchanged.  Unfortunately, he has been diagnosed with lung cancer bilaterally and has undergone radiation therapy.  He has lost about 60 pounds and just feels poorly.  He had a CT scan without contrast performed on March 3, 2025 for lung cancer follow-up and only the upper portion of his graft was visualized.  This study was personally and independently reviewed by me.  He was seen on June 4, 2025 and ABIs were 0.73 on the right and 0.88 on the left.  Carotid duplex scan demonstrated less than 50% stenosis on the right and 50 to 69% stenosis on the left, unchanged.  Aortic duplex scan was unchanged as well, no endoleak and the aneurysm sac measuring 5.1 cm.  He has been told that he is now cancer free after his lung cancer treatment.    Past History:  Medical History: has a past medical history of AAA (abdominal aortic aneurysm) (03/31/2021), Acid reflux, Atherosclerosis of native arteries of extremities with intermittent claudication, bilateral legs (11/13/2019), CAD in native artery, Carotid stenosis (05/11/2016), Cataract, CKD (chronic kidney disease), Claudication, Colon polyp, COPD (chronic obstructive pulmonary disease), H/O Acute myocardial infarction (2013),  H/O PAD (peripheral artery disease), Heart attack (2013), Heart failure, History of atrial fibrillation, Hypercholesterolemia, Hyperlipidemia, Hypertension, Inguinal hernia, Left ventricular dysfunction, Mass of left lung, Mitral valve regurgitation, Non-small cell lung cancer (NSCLC) (2023), Perennial allergic rhinitis (01/06/2017), PVD (peripheral vascular disease), S/P angioplasty with stent, Tinnitus, and Tricuspid regurgitation.   Surgical History: has a past surgical history that includes Carotid Endarterectomy (Left, 01/28/2013); Hydrocele excision / repair (Right, 05/28/2014); Cardiac catheterization; Umbilical hernia repair (N/A, 05/18/2007); Colonoscopy (N/A, 10/17/2011); Arteriogram Aortic (N/A, 05/17/2021); Cardiac catheterization (N/A, 04/11/2023); Cardiac catheterization (N/A, 04/11/2023); Cardiac catheterization (N/A, 04/11/2023); Cardiac catheterization (N/A, 04/11/2023); Cardiac catheterization (04/11/2023); Appendectomy; Mediastinoscopy (N/A, 07/17/2023); lobectomy (Left, 07/31/2023); Coronary stent placement; Inguinal Hernia Repair (Right, 03/05/2024); Cardiac catheterization (2002); Colonoscopy (2012); Cardiac catheterization; and BRONCHOSCOPY WITH ION ROBOTIC ASSIST (N/A, 12/27/2024).   Family History: family history includes Bleeding Disorder in an other family member; Cancer in his brother, father, and another family member; Heart disease in his maternal uncle and another family member; Leukemia in his father; No Known Problems in his mother; Other in an other family member.   Social History: reports that he quit smoking about 25 years ago. His smoking use included cigarettes. He started smoking about 80 years ago. He has a 55 pack-year smoking history. He has been exposed to tobacco smoke. He has never used smokeless tobacco. He reports that he does not drink alcohol and does not use drugs.    (Not in a hospital admission)     Allergies: Lisinopril, Codeine sulfate, Contrast dye (echo or  "unknown ct/mr), Iodinated contrast media, Losartan, and Penicillins   Objective   Vital Signs:  /86 (BP Location: Right arm, Patient Position: Sitting, Cuff Size: Adult)   Resp 17   Ht 170 cm (66.93\")   Wt 68 kg (150 lb)   BMI 23.54 kg/m²   Estimated body mass index is 23.54 kg/m² as calculated from the following:    Height as of this encounter: 170 cm (66.93\").    Weight as of this encounter: 68 kg (150 lb).     BMI is within normal parameters. No other follow-up for BMI required.    Bayron Acevedo  reports that he quit smoking about 25 years ago. His smoking use included cigarettes. He started smoking about 80 years ago. He has a 55 pack-year smoking history. He has been exposed to tobacco smoke. He has never used smokeless tobacco.          Physical Exam   Result Review :        Data reviewed : CT scan of the chest from March 3, 2025, as above; carotid duplex scan, aortic duplex scan and ABIs from June 4, 2025, findings as above             Assessment and Plan     Diagnoses and all orders for this visit:    1. Bilateral carotid artery stenosis (Primary)  -     Duplex Carotid Ultrasound CAR; Future    2. Infrarenal abdominal aortic aneurysm (AAA) without rupture  -     Doppler Ankle Brachial Index Single Level CAR; Future  -     Duplex Aorta IVC Iliac Graft Complete CAR; Future    3. Adenocarcinoma, lung, left    4. Pulmonary emphysema, unspecified emphysema type    5. Other specified peripheral vascular diseases  -     Doppler Ankle Brachial Index Single Level CAR; Future    He continues to do quite well from a vascular standpoint.  He has had no progression of carotid stenosis and he remains asymptomatic.  He has moderate recurrent stenosis on the left.  His aneurysm remains excluded with no change in the aneurysm sac size.  His ABIs remained stable and having no claudication symptoms.  He is most limited by shortness of breath.  At this point we will continue to monitor only.  I will reassess him in " follow-up in 1 year.  I am very pleased to know that his cancer has been fully treated and he is cancer free.         Follow Up     Return in about 1 year (around 6/4/2026) for With carotid duplex scan, aortic duplex scan, and ABIs.  Patient was given instructions and counseling regarding his condition or for health maintenance advice. Please see specific information pulled into the AVS if appropriate.

## 2025-06-02 ENCOUNTER — TELEPHONE (OUTPATIENT)
Dept: SURGERY | Facility: CLINIC | Age: 85
End: 2025-06-02
Payer: MEDICARE

## 2025-06-02 NOTE — TELEPHONE ENCOUNTER
LVM INFORMING PT DR. MONTES DE OCA WILL NOW BE OUT OF OFFICE ON 07/10/2025, THEREFORE HIS APPT HAS NOW BEEN MOVED TO 07/31/2025 AT 10:30AM. PROVIDED PT WITH CLINICS PHONE NUMBER FOR ANY QUESTIONS OR CONCERNS REGARDING THIS CHANGE.

## 2025-06-04 ENCOUNTER — HOSPITAL ENCOUNTER (OUTPATIENT)
Facility: HOSPITAL | Age: 85
Discharge: HOME | End: 2025-06-04
Payer: MEDICARE

## 2025-06-04 ENCOUNTER — OFFICE VISIT (OUTPATIENT)
Age: 85
End: 2025-06-04
Payer: MEDICARE

## 2025-06-04 VITALS
DIASTOLIC BLOOD PRESSURE: 86 MMHG | SYSTOLIC BLOOD PRESSURE: 150 MMHG | WEIGHT: 150 LBS | RESPIRATION RATE: 17 BRPM | BODY MASS INDEX: 23.54 KG/M2 | HEIGHT: 67 IN

## 2025-06-04 DIAGNOSIS — I65.23 BILATERAL CAROTID ARTERY STENOSIS: Primary | ICD-10-CM

## 2025-06-04 DIAGNOSIS — I65.23 BILATERAL CAROTID ARTERY STENOSIS: ICD-10-CM

## 2025-06-04 DIAGNOSIS — I71.43 INFRARENAL ABDOMINAL AORTIC ANEURYSM (AAA) WITHOUT RUPTURE: ICD-10-CM

## 2025-06-04 DIAGNOSIS — J43.9 PULMONARY EMPHYSEMA, UNSPECIFIED EMPHYSEMA TYPE: ICD-10-CM

## 2025-06-04 DIAGNOSIS — I71.40 ABDOMINAL AORTIC ANEURYSM (AAA) WITHOUT RUPTURE, UNSPECIFIED PART: ICD-10-CM

## 2025-06-04 DIAGNOSIS — I73.9 PERIPHERAL VASCULAR DISEASE: ICD-10-CM

## 2025-06-04 DIAGNOSIS — I73.89 OTHER SPECIFIED PERIPHERAL VASCULAR DISEASES: ICD-10-CM

## 2025-06-04 DIAGNOSIS — C34.92 ADENOCARCINOMA, LUNG, LEFT: ICD-10-CM

## 2025-06-04 LAB
ABDOMINAL AORTA LEFT DIST ANASTOMOSIS PSV: 130 CM/S
ABDOMINAL AORTA LEFT LIMB GRAFT PSV: 133 CM/S
ABDOMINAL AORTA RIGHT DIST ANASTOMOSIS PSV: 226 CM/S
ABDOMINAL AORTA RIGHT LIMB GRAFT PSV: 61 CM/S
ABDOMINAL LT COM ILIAC AP: 1.6 CM
ABDOMINAL LT COM ILIAC VEL: 133 CM/S
ABDOMINAL LT EXT ILIAC VEL: 130 CM/S
ABDOMINAL MID AORTA AP: 5.1 CM
ABDOMINAL MID AORTA TRANS: 5.1 CM
ABDOMINAL MID AORTA VEL: 28 CM/S
ABDOMINAL PROX AORTA AP: 2.3 CM
ABDOMINAL RT COM ILIAC AP: 1.5 CM
ABDOMINAL RT COM ILIAC VEL: 61 CM/S
ABDOMINAL RT EXT ILIAC VEL: 226 CM/S
BH CV LOWER ARTERIAL LEFT ABI RATIO: 0.88
BH CV LOWER ARTERIAL LEFT DORSALIS PEDIS SYS MAX: 132
BH CV LOWER ARTERIAL LEFT POST TIBIAL SYS MAX: 122
BH CV LOWER ARTERIAL RIGHT ABI RATIO: 0.73
BH CV LOWER ARTERIAL RIGHT DORSALIS PEDIS SYS MAX: 110
BH CV LOWER ARTERIAL RIGHT POST TIBIAL SYS MAX: 105
BH CV VAS ABD AO LT EXTERNAL ILIAC AP: 0.8 CM
BH CV VAS ABD AO RT EXTERNAL ILIAC AP: 0.7 CM
BH CV VAS ABDOMINAL AO MID GRAFT BODY PSV: 28 CM/S
BH CV VAS ABDOMINAL AO RESIDUAL LUMEN AP MEASURE: 5.1 CM
BH CV VAS ABDOMINAL AO RESIDUAL LUMEN TRANS MEASURE: 5.1 CM
BH CV VAS ABDOMINAL AORTA DISTAL RIGHT LIMB GRAFT PSV: 62 CM/S
BH CV XLRA MEAS LEFT CAROTID BULB EDV: -25.3 CM/SEC
BH CV XLRA MEAS LEFT CAROTID BULB PSV: -142.2 CM/SEC
BH CV XLRA MEAS LEFT DIST CCA EDV: 24.9 CM/SEC
BH CV XLRA MEAS LEFT DIST CCA PSV: 143.4 CM/SEC
BH CV XLRA MEAS LEFT DIST ICA EDV: -27.6 CM/SEC
BH CV XLRA MEAS LEFT DIST ICA PSV: -99.8 CM/SEC
BH CV XLRA MEAS LEFT ICA/CCA RATIO: 1.81
BH CV XLRA MEAS LEFT MID CCA EDV: 21.2 CM/SEC
BH CV XLRA MEAS LEFT MID CCA PSV: 116 CM/SEC
BH CV XLRA MEAS LEFT MID ICA EDV: -33.2 CM/SEC
BH CV XLRA MEAS LEFT MID ICA PSV: -126.1 CM/SEC
BH CV XLRA MEAS LEFT PROX CCA EDV: 20.9 CM/SEC
BH CV XLRA MEAS LEFT PROX CCA PSV: 123.3 CM/SEC
BH CV XLRA MEAS LEFT PROX ECA EDV: -4.7 CM/SEC
BH CV XLRA MEAS LEFT PROX ECA PSV: -128 CM/SEC
BH CV XLRA MEAS LEFT PROX ICA EDV: -37.9 CM/SEC
BH CV XLRA MEAS LEFT PROX ICA PSV: -210.2 CM/SEC
BH CV XLRA MEAS LEFT PROX SCLA PSV: 411.8 CM/SEC
BH CV XLRA MEAS LEFT VERTEBRAL A EDV: 13.3 CM/SEC
BH CV XLRA MEAS LEFT VERTEBRAL A PSV: 68.8 CM/SEC
BH CV XLRA MEAS RIGHT CAROTID BULB EDV: -31.3 CM/SEC
BH CV XLRA MEAS RIGHT CAROTID BULB PSV: -117.6 CM/SEC
BH CV XLRA MEAS RIGHT DIST CCA EDV: -12.7 CM/SEC
BH CV XLRA MEAS RIGHT DIST CCA PSV: -70.1 CM/SEC
BH CV XLRA MEAS RIGHT DIST ICA EDV: -24.8 CM/SEC
BH CV XLRA MEAS RIGHT DIST ICA PSV: -107.6 CM/SEC
BH CV XLRA MEAS RIGHT ICA/CCA RATIO: 2.34
BH CV XLRA MEAS RIGHT MID CCA EDV: 19.1 CM/SEC
BH CV XLRA MEAS RIGHT MID CCA PSV: 102.6 CM/SEC
BH CV XLRA MEAS RIGHT MID ICA EDV: 24.1 CM/SEC
BH CV XLRA MEAS RIGHT MID ICA PSV: 104 CM/SEC
BH CV XLRA MEAS RIGHT PROX CCA EDV: 14.2 CM/SEC
BH CV XLRA MEAS RIGHT PROX CCA PSV: 90.6 CM/SEC
BH CV XLRA MEAS RIGHT PROX ECA EDV: -9.5 CM/SEC
BH CV XLRA MEAS RIGHT PROX ECA PSV: -149.4 CM/SEC
BH CV XLRA MEAS RIGHT PROX ICA EDV: -39.1 CM/SEC
BH CV XLRA MEAS RIGHT PROX ICA PSV: -164.8 CM/SEC
BH CV XLRA MEAS RIGHT PROX SCLA PSV: 178.6 CM/SEC
BH CV XLRA MEAS RIGHT VERTEBRAL A EDV: 20.5 CM/SEC
BH CV XLRA MEAS RIGHT VERTEBRAL A PSV: 94.8 CM/SEC
LEFT ARM BP: 150 MMHG
RIGHT ARM BP: 150 MMHG
UPPER ARTERIAL LEFT ARM BRACHIAL SYS MAX: 150
UPPER ARTERIAL RIGHT ARM BRACHIAL SYS MAX: 150

## 2025-06-04 PROCEDURE — 93880 EXTRACRANIAL BILAT STUDY: CPT

## 2025-06-04 PROCEDURE — 93922 UPR/L XTREMITY ART 2 LEVELS: CPT

## 2025-06-04 PROCEDURE — 93978 VASCULAR STUDY: CPT

## 2025-07-08 NOTE — PROGRESS NOTES
"  POST-OPERATIVE NOTE     Chief Complaint: Non-small cell lung cancer of the left upper lobe of the lung, postoperative care  S/P: Bronchoscopy, left video-assisted thoracoscopy with da Ismael robot assisted partial decortication with lysis of adhesions and intercostal nerve block  POD # 5    Subjective:  Symptoms:  Improved.  No shortness of breath, cough or chest pain (Well-controlled.).    Diet:  Adequate intake.  No nausea or vomiting.    Activity level: Returning to normal.    Pain:  He complains of pain that is mild.  He reports pain is unchanged.  Pain is well controlled.    Denies any shortness of breath and her pain is morning.  Abdomen is slightly distended but extremely soft.  Continues to have appropriate bowel function    Objective:  General Appearance:  Comfortable, in no acute distress and in pain.    Vital signs: (most recent): Blood pressure 150/66, pulse 75, temperature 98.5 øF (36.9 øC), temperature source Oral, resp. rate 16, height 170.2 cm (67\"), weight 73 kg (161 lb), SpO2 94 %.  Vital signs are normal.  No fever.    HEENT: Normal HEENT exam.    Lungs:  Normal effort and normal respiratory rate.  He is not in respiratory distress.    Heart: Normal rate.  Regular rhythm.    Chest: Symmetric chest wall expansion. Chest wall tenderness present.    Abdomen: Abdomen is soft and distended (Improving).  There is no abdominal tenderness.     Neurological: Patient is alert and oriented to person, place and time.    Skin:  Warm and dry.          Lung dropped on the waterseal yesterday.  Continues to have an intermittent airleak.  Placed back to suction.  Chest tube:   Site: Left, Clean, Dry, Intact, and Securement device intact  Suction: -10 cm  Air Leak: positive, intermittent.  This is improving.  24 Hour Total: 110 mL    Results Review:     I reviewed the patient's new clinical results.  I reviewed the patient's new imaging results and agree with the interpretation.  I reviewed the patient's other " normal appearance , without tenderness upon palpation test results and agree with the interpretation  Discussed with patient, spouse at bedside, RN.    Assessment & Plan     Mr. Acevedo is a pleasant 82-year-old gentleman who is POD #4, s/p left VAT with da Ismael robot-assisted partial decortication and lysis of adhesions with intercostal block.    Unfortunately, due to gross tumor invasion into the mediastinum with the tumor itself involving the phrenic nerve and intercostal vessels of the anterior chest wall, Dr. Kramer elected not to proceed with an R1 resection.     Chest tube was placed to waterseal yesterday, his lung did not stay fully expanded.  He was placed back to suction after this.  He denies any shortness of breath this morning.  His lung is expanded on x-ray.  He continues to have an intermittent airleak with respiratory variation.  I am going to keep him to suction today.  If he drops again, we would consider possible blood patch.  He continues to have appropriate bowel function.  Overall he continues to improve.    Nemesio Kramer MD PhD  [facility]  08/05/23  11:30 EDT    Patient was seen and assessed while wearing personal protective equipment including facemask, protective eyewear and gloves.  Hand hygiene performed prior to entering the room and upon exiting with doffing of gloves.

## 2025-07-09 ENCOUNTER — HOSPITAL ENCOUNTER (OUTPATIENT)
Dept: CT IMAGING | Facility: HOSPITAL | Age: 85
Discharge: HOME | End: 2025-07-09
Admitting: INTERNAL MEDICINE
Payer: MEDICARE

## 2025-07-09 DIAGNOSIS — D64.9 NORMOCYTIC ANEMIA: ICD-10-CM

## 2025-07-09 DIAGNOSIS — C34.92 ADENOCARCINOMA, LUNG, LEFT: ICD-10-CM

## 2025-07-09 PROCEDURE — 71250 CT THORAX DX C-: CPT

## 2025-07-15 NOTE — PROGRESS NOTES
"Chief Complaint  Clinical stage IIIA ([id number]) non-small cell lung cancer (adenocarcinoma) of left upper lobe.  Subsequent clinical stageIAI ([id number]) right upper lobe non-small cell lung cancer (biopsy deferred)    Subjective        History of Present Illness    Patient returns today in follow-up with laboratory studies and CT chest to review.  In the interval since his last visit, he has been fairly stable overall.  He has lost weight from 150 down to 147 pounds, notes that he continues with chronic mild anorexia which has been present for a number of years and is overall unchanged.  He tries to drink boost 1 to 2 cans/day and is going to try to increase this to 3/day.  He has some stable mild dyspnea on exertion.  He remains very active, mows 2-1/2 acres weekly and has been cutting trees that fell during the storms.  He maintains ECOG performance status of 1.      Objective   Vital Signs:   /59   Pulse 52   Temp 97.6 °F (36.4 °C) (Oral)   Ht 170 cm (66.93\")   Wt 67 kg (147 lb 11.2 oz)   SpO2 98%   BMI 23.18 kg/m²     Physical Exam  Constitutional:       Appearance: He is well-developed.      Comments: Patient in no distress   Eyes:      Conjunctiva/sclera: Conjunctivae normal.   Neck:      Thyroid: No thyromegaly.   Cardiovascular:      Rate and Rhythm: Normal rate and regular rhythm.      Heart sounds: No murmur heard.     No friction rub. No gallop.   Pulmonary:      Effort: No respiratory distress.      Comments: Diminished breath sounds bilaterally with a few scattered crackles  Abdominal:      General: Bowel sounds are normal. There is no distension.      Palpations: Abdomen is soft.      Tenderness: There is no abdominal tenderness.   Musculoskeletal:         General: No swelling.   Lymphadenopathy:      Head:      Right side of head: No submandibular adenopathy.      Cervical: No cervical adenopathy.      Upper Body:      Right upper body: No supraclavicular adenopathy.      Left upper body: " No supraclavicular adenopathy.   Skin:     General: Skin is warm and dry.      Findings: No rash.   Neurological:      Mental Status: He is alert and oriented to person, place, and time.      Cranial Nerves: No cranial nerve deficit.      Motor: No abnormal muscle tone.      Deep Tendon Reflexes: Reflexes normal.   Psychiatric:         Behavior: Behavior normal.             Result Review : Reviewed CBC, CMP, iron panel, ferritin, B12 level from today.  Reviewed CT chest from 7/9/2025       Assessment and Plan     *Clinical stage IIIA ([id number]) non-small cell lung cancer (adenocarcinoma) of left upper lobe.  Subsequent clinical stageIAI ([id number]) right upper lobe non-small cell lung cancer (biopsy deferred)  The patient has a history of smoking 1 pack/day x 55 years quit in 2000.  CT chest 5/26/2023 showed a spiculated mass in the anterior left upper lobe 3.7 x 4.3 x 4.2 cm along the anterior pleural surface and lateral aspect of the anterior mediastinum.  Additional 3 mm nodule right major fissure.  Bilateral small to moderate-sized pleural effusions.  Bullae formation consistent with COPD.  Mediastinal lymphadenopathy with largest node infracarinal region 2.6 x 1.2 cm.  PET/CT on 6/12/2023 showed 5.2 x 3.1 cm irregular pleural-based mass anterior left upper lobe abutting the pleura anteriorly and medially, hypermetabolic with SUV 14.4.  Mediastinal lymph node activity in the subcarinal region with a 2.2 x 1.1 cm lymph node with SUV 4.5.  Remainder of mediastinal lymph nodes less intensely hypermetabolic with maximal SUV 3.3.  Moderate size loculated pleural effusions bilaterally with low-level activity along the pleura (SUV 2.5), no change in volume of pleural effusions since prior chest CT.  New small to moderate loculated right hydropneumothorax with air-fluid level 5.2 cm.   CT-guided left upper lobe lung biopsy 6/13/2023 with pathology that showed invasive poorly differentiated adenocarcinoma, PD-L1 less than  1%.  Caris analysis: TMB high (14 muts/Mb), mutations in KEAP1 and TP53, EGFR VUS, no targetable mutations.  Patient was hospitalized 6/13 - 6/15/2023 due to right hydropneumothorax, had CT-guided chest tube placement performed 6/13/2023.  Right pleural fluid cytology negative on 6/13/2023.  Staging MRI brain was negative for evidence of metastatic disease on 7/2/2023  PFTs on 6/20/2023 with FEV1 0.99 (44% predicted), DLCO corrected 14.09 (56% predicted).  Mediastinoscopy 7/17/2023 with 4R and station 7 lymph nodes negative for malignancy  Left VATS performed 7/31/2023.  Intraoperatively, primary tumor was unresectable, invading into the mediastinum and pericardial fat pad as well as with phrenic nerve involvement (T4 lesion).  Sampling of left pleura with fibrosis, chronic inflammation, no evidence of malignancy.  Station 5 and 10 R lymph nodes sampled, negative for malignancy.  New baseline CT chest abdomen pelvis prior to initiation of radiation therapy performed on 9/20/2023.  Persistent loculated left pneumothorax, trace left pleural effusion, primary tumor in partially collapsed left upper lobe appears relatively stable.  Small right pleural effusion no change in borderline enlarged mediastinal lymph nodes.  Slight increase in right upper lobe nodule (for up to 7 mm), indeterminate.  Patient therefore with unresectable stage IIIA ([id number]) disease.  Plans to treat with radiation therapy.  Due to age, performance status, comorbidities, patient felt to be a poor candidate for concurrent chemotherapy.  Patient received radiation therapy 10/9 - 10/27/2023 to left upper lobe, 6000 cGy.  CT chest abdomen pelvis 1/23/2024 showed decrease in left pneumothorax/hydropneumothorax, left upper lobe mass difficult to accurately assess due to change in configuration (shift in lung parenchyma due to pneumothorax improvement) but appears relatively stable, 2.9 x 4.3 versus current 3.3 x 3.5 cm.  No lymphadenopathy noted, no  evidence of distant metastatic disease.  PET scan 3/20/2024 with subcentimeter left supraclavicular lymph node with new uptake (SUV 2.4).  Decrease in treated left upper lobe mass from 4.4 x 3.8 down to 3.4 x 2 (decrease in SUV from 14.4 down to 3.2).  Increase in 2 mildly hypermetabolic left upper lobe ill-defined opacities with linear consolidation felt to represent postradiation change (SUV 3.4).  Resolution of hypermetabolic activity and subcarinal lymph node as well as subcentimeter mediastinal and left hilar lymph nodes.  Increase and 2 adjacent right upper lobe solid nodules surrounding bullae measuring 1 and 0.8 cm with SUV 5.1 and 2.5 respectively.  Decrease in small loculated pleural effusions with SUV 3 on the left and 2.4 on the right.  Tubular hypermetabolic activity throughout the esophagus.  Persistent focal transverse colon hypermetabolism with SUV 6.9 and additional foci of the ascending colon hypermetabolism SUV 5.6.  Patient discussed at thoracic oncology conference 3/28/2024.  Consensus regarding not pursuing biopsy due to risk of pneumothorax and small size of the lesions.  Given the growth rate and activity, lesions were felt to represent additional primary sites of disease.  Recommendation to treat with SBRT.  Patient received SBRT to both right upper lobe lesions x 5 fractions each from 4/11 - 4/17/2024  CT chest 5/22/2024 showed irregular consolidation left upper lobe unchanged 3.5 x 3.2 cm, new small groundglass density left upper lobe.  Stable consolidation left base.  Nodularity posterior right upper lobe largest nodule 8 mm unchanged.  New nodule fissure right upper lobe and new right upper lobe micronodule.  PET scan 7/10/2024 with stable radiated left upper lobe mass with uptake near blood pool.  Left apical 3.3 x 1.3 cm bandlike consolidation with SUV 10.1 (previously few linear foci of consolidation and SUV 3.4).  Left upper lobe 1.6 cm focal consolidation with SUV 8.2 (previously  few linear foci of consolidation SUV 4.3).  Right upper lobe nodules x 2 around bullae decreased in size (1 cm down to 0.6 cm and 0.8 down to 0.5 cm), no activity.  New surrounding consolidation and groundglass opacity.  Persistent focal transverse colon hypermetabolism (SUV decreased from 6.9 down to 4.6) with generalized tubular hypermetabolism in the distal colon.  Discussed with Dr. Painter in radiation oncology and agreed that the degree of uptake and enlargement of left upper lobe focal consolidation was concerning for possible recurrent malignancy in this area.    Patient was referred back to thoracic surgery Dr. Chávez.  Discussed potential Ion bronchoscopy however patient was reluctant to proceed and elected to continue on a course of observation.  CT chest on 9/6/2024 showed decrease in residual left upper lobe mass (3.5 x 3.1 down to 3.2 x 3.0 cm), increased patchy consolidation in the left upper lobe around mass possibly reflective of postradiation change.  Stable nodules right upper lobe with increased groundglass opacity in this with radiation change  Chest 11/29/2024 with decrease in treated mass left upper lobe from 3.2 x 3.0 down to 2.7 x 2.6 cm.  Increasing consolidation and surrounding left upper lobe lung favored to represent postradiation change.  Stable pleural thickening on the left.  Stable rounded density left lower lobe favored to represent rounded atelectasis.  Stable nodular density right upper lobe.  Scan reviewed and discussed with Dr Painter.  The previously treated mass in the left upper lobe continues to decrease in size.  There is fairly extensive change however in the left upper lobe, some of which is nodular in appearance.  The nodular change in the left upper lobe did have activity on prior PET scan and there is still suspicion for potential recurrent malignancy in this area.  The previously treated right upper lobe disease is unchanged. Patient was amenable to consider bronchoscopy at  this point.  Dr. Painter feels that if recurrent disease identified in left upper lobe there would be significant overlap with previous radiation field and he did not recommend pursuit of further radiation therapy.  If malignancy identified we will need to consider pursuit of palliative systemic therapy.    Ion bronchoscopy 12/27/2024 with Dr. Chávez.  FNA left upper lobe negative for malignancy.  Left upper lobe biopsy with benign fibrous tissue and anthracosis, rare atypical reactive cells, no evidence of malignancy.  Left lower lobe biopsy with fibrinous debris and rare fragments of fibrous tissue with anthracosis, no evidence of malignancy.  Left upper lobe BAL negative for malignancy, left lower lobe BAL negative for malignancy.  CT chest 3/3/2025 showed left apical consolidation/fibrosis with nodular component on anterior border slightly increased from 1.3 x 2.4 x 0.6 up to 1.8 x 2.4 x 0.8 cm.  Difference in size possibly related to technique however cannot rule out recurrent malignancy.  Increased subpleural atelectasis left upper lobe with small nodular focus 1 cm, could not exclude local recurrence.  Remainder of findings stable.  CT chest 7/9/2025 with increase in left upper lobe opacity at site of radiated mass with increase in surrounding opacity and decreased bronchograms possibly related to progressive treatment changes, atelectasis, infection, could not exclude malignancy.  New trace fluid right minor fissure.  Patient returns today in follow-up with 4-month interval CT chest to review from 7/9/2025.  The CT scan shows stable findings with the exception of the left upper lobe region.  This area was radiated previously.  There is increasing consolidation in the area with decrease in air bronchograms.  Certainly this could be related to progressive radiation fibrosis however there could be underlying progressive malignancy.  Clinically the patient is doing quite well overall.  His appetite and weight are  stable.  He remains active, ECOG performance status of 1.  We discussed the options at this point to pursue a PET scan immediately to evaluate the area in question although this could give an indeterminant result as well.  Patient is adverse to consideration of bronchoscopy or biopsy.  Therefore he would like to delay PET scan and would like to pursue this instead at a 4-month interval.  Patient and his wife expressed understanding of the situation.  We will therefore schedule him back in 4 months with PET scan to review at that time.    *Chronic bilateral pleural effusions with right hydropneumothorax and subsequent postoperative left hydropneumothorax  Patient appears to have longstanding evidence of small bilateral pleural effusions likely related to CHF  PET/CT 6/12/2023 identified right small to moderate hydropneumothorax new since 5/26/2023 CT chest  Patient was hospitalized 6/13 - 6/15/2023 due to right hydropneumothorax, had CT-guided chest tube placement performed 6/13/2023.  Right pleural fluid cytology negative on 6/13/2023.  As above, patient underwent left VATS with attempted resection of primary lung cancer on 7/31/2023.    Patient required persistent left chest tube postoperatively, eventually removed on 9/6/2023  CT chest 9/20/2023 with persistent loculated left pneumothorax.  CT chest 1/23/2024 with significant improvement in left pneumothorax/hydropneumothorax  PET scan 3/20/2024 with decrease in small loculated pleural effusions with SUV 3 on the left and 2.4 on the right.  Pleural effusions felt to likely benign in nature at this point.  PET scan 7/10/2024 with unchanged small bilateral pleural effusions  CT 9/6/24 with trace pleural fluid on the left, stable minimal pleural fluid right base  CT chest 11/29/2024 showed stable pleural thickening on the left, no significant residual effusion  CT 3/3/2025 with no significant effusion  CT 7/9/2025 with new trace fluid seen in the right minor fissure,  stable bilateral pleural thickening and trace loculated left pleural fluid    *COPD  The patient has a history of smoking 1 pack/day x 55 years quit in 2000.  PFTs on 6/20/2023 with FEV1 0.99 (44% predicted), DLCO corrected 14.09 (56% predicted).  Patient continues with chronic dyspnea on exertion that does limit his level of activity although maintains O2 saturation in the 90% range even with activity.  Patient notes that his chronic dyspnea on exertion is stable.  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  Labs on 7/11/2023 with hemoglobin 10.6, MCV 82.8, iron 18, ferritin 504, iron saturation 6%, TIBC 295, B12 201, folate 6.79, CRP 1.07, ESR 27, erythropoietin level 17.8.  Labs consistent with anemia secondary to chronic disease/malignancy as well as anemia secondary to CKD3.  Patient initiated oral B12 1000 mcg daily for B12 deficiency  Hemoglobin on 11/7/2023 was relatively unchanged at 10.3.  Additional evaluation sent with iron 21, ferritin 415, iron saturation 8%, TIBC 266, B12 398, folate 10.2, negative serum protein electrophoresis/immunoelectrophoresis, IgA 209, IgG 829, IgM 13, free kappa light chain 50.4, free lambda light chain 34.2, free light chain ratio 1.47 (normal).  Labs did consistent with anemia secondary to chronic disease as well as anemia secondary to CKD3.  He does not qualify for Procrit with hemoglobin above 10.  Patient continued on oral B12 1000 mcg daily  Labs on 5/14/2024 with hemoglobin that declined to 10.5.  Additional labs with iron 20, ferritin 394, iron saturation 7%, TIBC 299, reticulated  hemoglobin low at 27.3.  Low reticulated hemoglobin potentially indicative of iron deficiency.  Patient with hemoglobin stable in the 11-12 range.  His residual anemia is felt to be related to his underlying CKD3a.  With low iron saturation and previously low reticulated hemoglobin, appears that he may have a mild component of iron deficiency.  The patient however has underlying constipation and would be intolerant of oral iron.  With a ferritin in the 200-300 range, would not recommend IV iron in this situation however we will continue to follow his hemoglobin and iron status.  Hemoglobin today stable at 11.1.  Iron studies are stable as well with iron that remains low at 29, ferritin remains borderline elevated 316 and iron saturation that remains low at 9% with TIBC 311.  As above, do not feel that the patient would tolerate oral iron very well and would not pursue IV iron unless his anemia worsens significantly or his ferritin declines further.  Recheck labs in 4 months at return visit.  B12 level normal at 551.  He will continue on oral B12 1000 mcg daily and we will recheck B12 level at next visit in 4 months.    *Transient thrombocytopenia  On 11/7/2024, new onset thrombocytopenia with platelet count of 137,000.  Additional labs with IPF low normal at 3.3%, B12 398, folate 10.2,negative serum protein electrophoresis/immunoelectrophoresis, IgA 209, IgG 829, IgM 13, free kappa light chain 50.4, free lambda light chain 34.2, free light chain ratio 1.47 (normal).  Platelet count today normal at 141,000    *Coronary artery disease, CHF  Chronic bilateral pleural effusions presumably related to CHF  Status post cardiac stent in 2013  Preoperative echocardiogram on 7/19/2023 with ejection fraction 56 to 60%, dilated LA, LV  Patient continues on aspirin 81 mg daily    *Peripheral vascular disease  Patient is followed by Dr. Karen Barrera in vascular surgery  Abdominal aortic aneurysm status post stent graft repair  5/17/2021  Carotid stenosis status post carotid endarterectomy left, 2013.    *Anorexia, weight loss  Patient with anorexia associated with malignancy, mild degree of weight loss  Patient initiated Remeron 7.5 mg nightly on 9/22/2023.  Subsequently discontinued with improvement in weight and appetite  Weight did improve and currently remains stable to slightly decreased at 147 pounds.  He does use 1 to 2 cans of boost per day, will try to increase this to 3 cans/day.    *Constipation  Patient continues with intermittent constipation, uses stool softener as needed.      *Pain control  The patient was experiencing significant pain in his chest wall on the left both anteriorly and posteriorly as well as pain in the left axilla and extending into the left upper arm.  Patient is reluctant to use hydrocodone 5/325 although does have a prescription to use if needed.  Patient was intolerant of gabapentin 100 mg 3 times daily due to anorexia, weight loss, somnolence  The patient's pain did spontaneously resolve.  He did not pursue MRI of the cervical spine and brachial plexus which were ordered by thoracic surgery.      Plan:  Patient with stage IIIA ([id number]) adenocarcinoma of the left upper lobe, unresectable.  Received primary radiation therapy to the left upper lobe completed on 10/27/2023  Patient with 2 additional hypermetabolic right upper lobe nodules felt to be new primary lesions.  High risk for pneumothorax with biopsy.  Both lesions treated with SBRT 4/11 - 4/17/2024.  Patient with ongoing changes on CT chest that are indeterminate in nature, currently with increasing opacity in the left upper lobe at site of prior radiation  Patient continuing on oral B12 1000 mcg daily for B12 deficiency  MD visit in 4 months with CBC, CMP, stat iron panel/ferritin, B12 level.  PET scan 1 week prior    I did spend 50 minutes total time caring for the patient today, time spent in review of records, face-to-face consultation,  coordination of care, placement of orders, completion of documentation.

## 2025-07-16 ENCOUNTER — LAB (OUTPATIENT)
Dept: LAB | Facility: HOSPITAL | Age: 85
End: 2025-07-16
Payer: MEDICARE

## 2025-07-16 ENCOUNTER — OFFICE VISIT (OUTPATIENT)
Dept: ONCOLOGY | Facility: CLINIC | Age: 85
End: 2025-07-16
Payer: MEDICARE

## 2025-07-16 VITALS
TEMPERATURE: 97.6 F | HEART RATE: 52 BPM | SYSTOLIC BLOOD PRESSURE: 132 MMHG | BODY MASS INDEX: 23.18 KG/M2 | HEIGHT: 67 IN | OXYGEN SATURATION: 98 % | WEIGHT: 147.7 LBS | DIASTOLIC BLOOD PRESSURE: 59 MMHG

## 2025-07-16 DIAGNOSIS — C34.82 MALIGNANT NEOPLASM OF OVERLAPPING SITES OF LEFT BRONCHUS AND LUNG: ICD-10-CM

## 2025-07-16 DIAGNOSIS — D64.9 NORMOCYTIC ANEMIA: ICD-10-CM

## 2025-07-16 DIAGNOSIS — C34.92 ADENOCARCINOMA, LUNG, LEFT: ICD-10-CM

## 2025-07-16 DIAGNOSIS — C34.92 ADENOCARCINOMA, LUNG, LEFT: Primary | ICD-10-CM

## 2025-07-16 LAB
ALBUMIN SERPL-MCNC: 4.3 G/DL (ref 3.5–5.2)
ALBUMIN/GLOB SERPL: 1.7 G/DL
ALP SERPL-CCNC: 91 U/L (ref 39–117)
ALT SERPL W P-5'-P-CCNC: 14 U/L (ref 1–41)
ANION GAP SERPL CALCULATED.3IONS-SCNC: 12.1 MMOL/L (ref 5–15)
AST SERPL-CCNC: 16 U/L (ref 1–40)
BASOPHILS # BLD AUTO: 0.03 10*3/MM3 (ref 0–0.2)
BASOPHILS NFR BLD AUTO: 0.5 % (ref 0–1.5)
BILIRUB SERPL-MCNC: 0.4 MG/DL (ref 0–1.2)
BUN SERPL-MCNC: 29.9 MG/DL (ref 8–23)
BUN/CREAT SERPL: 15.4 (ref 7–25)
CALCIUM SPEC-SCNC: 9.4 MG/DL (ref 8.6–10.5)
CHLORIDE SERPL-SCNC: 101 MMOL/L (ref 98–107)
CO2 SERPL-SCNC: 26.9 MMOL/L (ref 22–29)
CREAT SERPL-MCNC: 1.94 MG/DL (ref 0.76–1.27)
DEPRECATED RDW RBC AUTO: 48.4 FL (ref 37–54)
EGFRCR SERPLBLD CKD-EPI 2021: 33.1 ML/MIN/1.73
EOSINOPHIL # BLD AUTO: 0.2 10*3/MM3 (ref 0–0.4)
EOSINOPHIL NFR BLD AUTO: 3.3 % (ref 0.3–6.2)
ERYTHROCYTE [DISTWIDTH] IN BLOOD BY AUTOMATED COUNT: 15.9 % (ref 12.3–15.4)
FERRITIN SERPL-MCNC: 316 NG/ML (ref 30–400)
GLOBULIN UR ELPH-MCNC: 2.6 GM/DL
GLUCOSE SERPL-MCNC: 85 MG/DL (ref 65–99)
HCT VFR BLD AUTO: 36.9 % (ref 37.5–51)
HGB BLD-MCNC: 11.1 G/DL (ref 13–17.7)
IMM GRANULOCYTES # BLD AUTO: 0.02 10*3/MM3 (ref 0–0.05)
IMM GRANULOCYTES NFR BLD AUTO: 0.3 % (ref 0–0.5)
IRON 24H UR-MRATE: 29 MCG/DL (ref 59–158)
IRON SATN MFR SERPL: 9 % (ref 20–50)
LYMPHOCYTES # BLD AUTO: 0.81 10*3/MM3 (ref 0.7–3.1)
LYMPHOCYTES NFR BLD AUTO: 13.4 % (ref 19.6–45.3)
MCH RBC QN AUTO: 25.3 PG (ref 26.6–33)
MCHC RBC AUTO-ENTMCNC: 30.1 G/DL (ref 31.5–35.7)
MCV RBC AUTO: 84.2 FL (ref 79–97)
MONOCYTES # BLD AUTO: 0.52 10*3/MM3 (ref 0.1–0.9)
MONOCYTES NFR BLD AUTO: 8.6 % (ref 5–12)
NEUTROPHILS NFR BLD AUTO: 4.46 10*3/MM3 (ref 1.7–7)
NEUTROPHILS NFR BLD AUTO: 73.9 % (ref 42.7–76)
NRBC BLD AUTO-RTO: 0 /100 WBC (ref 0–0.2)
PLATELET # BLD AUTO: 141 10*3/MM3 (ref 140–450)
PMV BLD AUTO: 9.1 FL (ref 6–12)
POTASSIUM SERPL-SCNC: 4.2 MMOL/L (ref 3.5–5.2)
PROT SERPL-MCNC: 6.9 G/DL (ref 6–8.5)
RBC # BLD AUTO: 4.38 10*6/MM3 (ref 4.14–5.8)
SODIUM SERPL-SCNC: 140 MMOL/L (ref 136–145)
TIBC SERPL-MCNC: 311 MCG/DL (ref 298–536)
TRANSFERRIN SERPL-MCNC: 209 MG/DL (ref 200–360)
VIT B12 BLD-MCNC: 551 PG/ML (ref 211–946)
WBC NRBC COR # BLD AUTO: 6.04 10*3/MM3 (ref 3.4–10.8)

## 2025-07-16 PROCEDURE — 85025 COMPLETE CBC W/AUTO DIFF WBC: CPT

## 2025-07-16 PROCEDURE — 83540 ASSAY OF IRON: CPT

## 2025-07-16 PROCEDURE — 84466 ASSAY OF TRANSFERRIN: CPT

## 2025-07-16 PROCEDURE — 82607 VITAMIN B-12: CPT | Performed by: INTERNAL MEDICINE

## 2025-07-16 PROCEDURE — 80053 COMPREHEN METABOLIC PANEL: CPT

## 2025-07-16 PROCEDURE — 36415 COLL VENOUS BLD VENIPUNCTURE: CPT

## 2025-07-16 PROCEDURE — 82728 ASSAY OF FERRITIN: CPT

## 2025-08-05 RX ORDER — CARVEDILOL 25 MG/1
25 TABLET ORAL 2 TIMES DAILY
Qty: 180 TABLET | Refills: 3 | Status: SHIPPED | OUTPATIENT
Start: 2025-08-05

## 2025-08-05 RX ORDER — HYDRALAZINE HYDROCHLORIDE 25 MG/1
25 TABLET, FILM COATED ORAL 2 TIMES DAILY
Qty: 180 TABLET | Refills: 3 | Status: SHIPPED | OUTPATIENT
Start: 2025-08-05

## 2025-08-07 ENCOUNTER — EXTERNAL PBMM DATA (OUTPATIENT)
Dept: PHARMACY | Facility: OTHER | Age: 85
End: 2025-08-07
Payer: MEDICARE

## 2025-08-18 ENCOUNTER — OFFICE VISIT (OUTPATIENT)
Age: 85
End: 2025-08-18
Payer: MEDICARE

## 2025-08-18 VITALS
HEART RATE: 59 BPM | DIASTOLIC BLOOD PRESSURE: 64 MMHG | HEIGHT: 67 IN | SYSTOLIC BLOOD PRESSURE: 120 MMHG | WEIGHT: 143.6 LBS | BODY MASS INDEX: 22.54 KG/M2

## 2025-08-18 DIAGNOSIS — I34.0 NONRHEUMATIC MITRAL VALVE REGURGITATION: ICD-10-CM

## 2025-08-18 DIAGNOSIS — I10 PRIMARY HYPERTENSION: ICD-10-CM

## 2025-08-18 DIAGNOSIS — I25.10 CORONARY ARTERY DISEASE INVOLVING NATIVE CORONARY ARTERY OF NATIVE HEART WITHOUT ANGINA PECTORIS: Primary | ICD-10-CM

## 2025-08-18 DIAGNOSIS — I50.22 CHRONIC SYSTOLIC CHF (CONGESTIVE HEART FAILURE): ICD-10-CM

## 2025-08-18 DIAGNOSIS — E78.5 HYPERLIPIDEMIA, UNSPECIFIED HYPERLIPIDEMIA TYPE: ICD-10-CM

## 2025-08-18 DIAGNOSIS — I48.0 PAROXYSMAL ATRIAL FIBRILLATION: ICD-10-CM

## 2025-08-18 RX ORDER — ROSUVASTATIN CALCIUM 20 MG/1
20 TABLET, COATED ORAL DAILY
Qty: 90 TABLET | Refills: 3 | Status: SHIPPED | OUTPATIENT
Start: 2025-08-18 | End: 2025-08-22 | Stop reason: SDUPTHER

## 2025-08-18 RX ORDER — MULTIPLE VITAMINS W/ MINERALS TAB 9MG-400MCG
1 TAB ORAL DAILY
COMMUNITY

## 2025-08-21 ENCOUNTER — EXTERNAL PBMM DATA (OUTPATIENT)
Dept: PHARMACY | Facility: OTHER | Age: 85
End: 2025-08-21
Payer: MEDICARE

## 2025-08-22 RX ORDER — ROSUVASTATIN CALCIUM 20 MG/1
20 TABLET, COATED ORAL DAILY
Qty: 90 TABLET | Refills: 3 | Status: SHIPPED | OUTPATIENT
Start: 2025-08-22

## (undated) DEVICE — TUBING, SUCTION, 1/4" X 10', STRAIGHT: Brand: MEDLINE

## (undated) DEVICE — PENCL ES MEGADINE EZ/CLEAN BUTN W/HOLSTR 10FT

## (undated) DEVICE — GLV SURG SIGNATURE ESSENTIAL PF LTX SZ8

## (undated) DEVICE — PK CATH CARD 40

## (undated) DEVICE — UNDERGLV SURG BIOGEL INDICATOR LF PF 7.5

## (undated) DEVICE — 3M™ IOBAN™ 2 ANTIMICROBIAL INCISE DRAPE 6648EZ: Brand: IOBAN™ 2

## (undated) DEVICE — EQUIPMENT COVER BAG TYPE 48” X 36” (122CM X 91CM): Brand: EQUIPMENT COVER BAG TYPE

## (undated) DEVICE — RADIFOCUS OPTITORQUE ANGIOGRAPHIC CATHETER: Brand: OPTITORQUE

## (undated) DEVICE — SEAL

## (undated) DEVICE — BLAKE CARDIO CONNECTOR 1:1: Brand: J-VAC

## (undated) DEVICE — Device

## (undated) DEVICE — CONTAINER,SPECIMEN,OR STERILE,4OZ: Brand: MEDLINE

## (undated) DEVICE — CVR PROB 96IN LF STRL

## (undated) DEVICE — VITAL SIGNS™ JACKSON-REES CIRCUITS: Brand: VITAL SIGNS™

## (undated) DEVICE — LOU THORACIC: Brand: MEDLINE INDUSTRIES, INC.

## (undated) DEVICE — SOL NACL 0.9PCT 1000ML

## (undated) DEVICE — PATIENT RETURN ELECTRODE, SINGLE-USE, CONTACT QUALITY MONITORING, ADULT, WITH 9FT CORD, FOR PATIENTS WEIGING OVER 33LBS. (15KG): Brand: MEGADYNE

## (undated) DEVICE — KT MANIFLD CARDIAC

## (undated) DEVICE — THE STERILE LIGHT HANDLE COVER IS USED WITH STERIS SURGICAL LIGHTING AND VISUALIZATION SYSTEMS.

## (undated) DEVICE — UNDYED BRAIDED (POLYGLACTIN 910), SYNTHETIC ABSORBABLE SUTURE: Brand: COATED VICRYL

## (undated) DEVICE — VISION PROBE ADAPTER AND SUCTION ADAPTER

## (undated) DEVICE — DRSNG WND BORDR/ADHS NONADHR/GZ LF 2X2IN STRL

## (undated) DEVICE — TOTAL TRAY, 16FR 10ML SIL FOLEY, URN: Brand: MEDLINE

## (undated) DEVICE — SUT ETHIB 0 CT2 CR8 18IN CX27D

## (undated) DEVICE — GLV SURG BIOGEL LTX PF 8 1/2

## (undated) DEVICE — ADAPT SWVL FIBROPTIC BRONCH

## (undated) DEVICE — ANTIBACTERIAL UNDYED BRAIDED (POLYGLACTIN 910), SYNTHETIC ABSORBABLE SUTURE: Brand: COATED VICRYL

## (undated) DEVICE — VIOLET BRAIDED (POLYGLACTIN 910), SYNTHETIC ABSORBABLE SUTURE: Brand: COATED VICRYL

## (undated) DEVICE — GLV SURG BIOGEL LTX PF 7

## (undated) DEVICE — DRAIN,WOUND,ROUND,24FR,5/16",FULL-FLUTED: Brand: MEDLINE

## (undated) DEVICE — ARM DRAPE

## (undated) DEVICE — MEDI-VAC YANKAUER SUCTION HANDLE W/BULBOUS TIP: Brand: CARDINAL HEALTH

## (undated) DEVICE — APPL CHLORAPREP HI/LITE 26ML ORNG

## (undated) DEVICE — SOL ANTISTICK CAUTRY ELECTROLUBE LF

## (undated) DEVICE — CATH SZ ACCUVU SEG/20CM PIG 5F 100CM

## (undated) DEVICE — ELECTRD BLD EZ CLN MOD 6.5IN

## (undated) DEVICE — VISION PROBE: Brand: ION

## (undated) DEVICE — VLV SXN ENDO DISP STRL

## (undated) DEVICE — ELECTRD BLD EZ CLN MOD 2.5IN

## (undated) DEVICE — 3M™ STERI-STRIP™ COMPOUND BENZOIN TINCTURE 40 BAGS/CARTON 4 CARTONS/CASE C1544: Brand: 3M™ STERI-STRIP™

## (undated) DEVICE — TBG INSUFFLATION LUER LOCK: Brand: MEDLINE INDUSTRIES, INC.

## (undated) DEVICE — GW PRESSUREWIRE AERIS W/ AGILE TP 175CM

## (undated) DEVICE — NDL HYPO ECLPS SFTY 22G 1 1/2IN

## (undated) DEVICE — SUCTION IRRIGATOR: Brand: ENDOWRIST

## (undated) DEVICE — KIT,ANTI FOG,W/SPONGE & FLUID,SOFT PACK: Brand: MEDLINE

## (undated) DEVICE — MAT FLR ABSORBENT LG 4FT 10 2.5FT

## (undated) DEVICE — STRIP,CLOSURE,WOUND,MEDI-STRIP,1/2X4: Brand: MEDLINE

## (undated) DEVICE — TBG PRESS EXCITE INJ HPF1200 CLR 100IN

## (undated) DEVICE — SENSR O2 OXIMAX FNGR A/ 18IN NONSTR

## (undated) DEVICE — SINGLE USE BIOPSY VALVE MAJ-210: Brand: SINGLE USE BIOPSY VALVE (STERILE)

## (undated) DEVICE — SYR LL TP 10ML STRL

## (undated) DEVICE — GLIDESHEATH BASIC HYDROPHILIC COATED INTRODUCER SHEATH: Brand: GLIDESHEATH

## (undated) DEVICE — TB PROSHIELD PROTECT PLSTC CLR

## (undated) DEVICE — BALN PRESS WEDGE 5F 110CM

## (undated) DEVICE — ADHS SKIN SURG TISS VISC PREMIERPRO EXOFIN HI/VISC FAST/DRY

## (undated) DEVICE — OASIS DRAIN, SINGLE, INLINE & ATS COMPATIBLE: Brand: OASIS

## (undated) DEVICE — Device: Brand: ERBE

## (undated) DEVICE — Device: Brand: TISSUE RETRIEVAL SYSTEM

## (undated) DEVICE — COVER,MAYO STAND,STERILE: Brand: MEDLINE

## (undated) DEVICE — CATHETER: Brand: ION

## (undated) DEVICE — RADIFOCUS GLIDEWIRE: Brand: GLIDEWIRE

## (undated) DEVICE — SUT MNCRYL PLS ANTIB UD 4/0 PS2 18IN

## (undated) DEVICE — MARKR SKIN W/RULR 2TP

## (undated) DEVICE — REDUCER: Brand: ENDOWRIST

## (undated) DEVICE — PK AAA 40

## (undated) DEVICE — SYR LUERLOK 30CC

## (undated) DEVICE — SYRINGE KIT,PACKAGED,,150FT,MK 7(ANGIO-ARTERION, 150ML SYR KIT W/QFT,MC)(60729385): Brand: MEDRAD® MARK 7 ARTERION DISPOSABLE SYRINGE 150 ML WITH QUICK FILL TUBE

## (undated) DEVICE — BALN OCCL CODA2 .035 9F 32MM 120CM

## (undated) DEVICE — 3M™ STERI-STRIP™ REINFORCED ADHESIVE SKIN CLOSURES, R1547, 1/2 IN X 4 IN (12 MM X 100 MM), 6 STRIPS/ENVELOPE: Brand: 3M™ STERI-STRIP™

## (undated) DEVICE — RADIFOCUS GLIDEWIRE ADVANTAGE GUIDEWIRE: Brand: GLIDEWIRE ADVANTAGE

## (undated) DEVICE — LP VESL MAXI 2.5X1MM RED 2PK

## (undated) DEVICE — LAPAROVUE VISIBILITY SYSTEM LAPAROSCOPIC SOLUTIONS: Brand: LAPAROVUE

## (undated) DEVICE — GLIDESHEATH SLENDER STAINLESS STEEL KIT: Brand: GLIDESHEATH SLENDER

## (undated) DEVICE — COLUMN DRAPE

## (undated) DEVICE — GW EMR FIX EXCHG J STD .035 3MM 260CM

## (undated) DEVICE — SINGLE USE SUCTION VALVE MAJ-209: Brand: SINGLE USE SUCTION VALVE (STERILE)

## (undated) DEVICE — NDL PERC 1PRT THNWALL W/BASEPLT 18G 7CM

## (undated) DEVICE — ENDOPATH XCEL BLADELESS TROCARS WITH STABILITY SLEEVES: Brand: ENDOPATH XCEL

## (undated) DEVICE — KT CLN CLEANOR SCPE

## (undated) DEVICE — 3M™ STERI-DRAPE™ INSTRUMENT POUCH 1018L: Brand: STERI-DRAPE™

## (undated) DEVICE — STPLR SKIN VISISTAT WD 35CT

## (undated) DEVICE — PINNACLE R/O II INTRODUCER SHEATH WITH RADIOPAQUE MARKER: Brand: PINNACLE

## (undated) DEVICE — LARGE BORE STOPCOCK WITH EXTENSION SET, MALE LUER LOCK ADAPTER WITH RETRACTABLE COLLAR

## (undated) DEVICE — SPNG LAP CIGARETTE KITTNER 5MM STRL PK/5

## (undated) DEVICE — LAPAROSCOPIC SMOKE FILTRATION SYSTEM: Brand: PALL LAPAROSHIELD® PLUS LAPAROSCOPIC SMOKE FILTRATION SYSTEM

## (undated) DEVICE — ADAPT CLN BIOGUARD AIR/H2O DISP

## (undated) DEVICE — 2, DISPOSABLE SUCTION/IRRIGATOR WITH DISPOSABLE TIP: Brand: STRYKEFLOW

## (undated) DEVICE — VLV PRT BRONCH LIP LTX DISP

## (undated) DEVICE — GLV SURG SENSICARE PI MIC PF SZ7.5 LF STRL

## (undated) DEVICE — CATH TEMPO 5F BER II 65CM: Brand: TEMPO

## (undated) DEVICE — PERCLOSE PROGLIDE™ SUTURE-MEDIATED CLOSURE SYSTEM: Brand: PERCLOSE PROGLIDE™

## (undated) DEVICE — PK ATS CUST W CARDIOTOMY RESEVOIR

## (undated) DEVICE — SWIVEL CONNECTOR

## (undated) DEVICE — BLADELESS OBTURATOR: Brand: WECK VISTA

## (undated) DEVICE — 6F .070 JL3.5 100CM: Brand: CORDIS

## (undated) DEVICE — COVER,TABLE,HEAVY DUTY,79"X110",STRL: Brand: MEDLINE

## (undated) DEVICE — TIP COVER ACCESSORY

## (undated) DEVICE — 1016 S-DRAPE IRRIG POUCH 10/BOX: Brand: STERI-DRAPE™

## (undated) DEVICE — PINNACLE R/O II HIFLO INTRODUCER SHEATH WITH RADIOPAQUE MARKER: Brand: PINNACLE

## (undated) DEVICE — DRAPE,FEM ANGIO,W/POUCHES, HD: Brand: MEDLINE

## (undated) DEVICE — DBD-DRAPE,LAP,CHOLE,W/TROUGHS,STERILE: Brand: MEDLINE

## (undated) DEVICE — LOU LAP CHOLE: Brand: MEDLINE INDUSTRIES, INC.

## (undated) DEVICE — BIOPSY NEEDLE, 23G: Brand: FLEXISION